# Patient Record
Sex: FEMALE | Race: WHITE | NOT HISPANIC OR LATINO
[De-identification: names, ages, dates, MRNs, and addresses within clinical notes are randomized per-mention and may not be internally consistent; named-entity substitution may affect disease eponyms.]

---

## 2021-08-25 ENCOUNTER — APPOINTMENT (OUTPATIENT)
Dept: CARDIOLOGY | Facility: CLINIC | Age: 86
End: 2021-08-25
Payer: MEDICARE

## 2021-08-25 ENCOUNTER — NON-APPOINTMENT (OUTPATIENT)
Age: 86
End: 2021-08-25

## 2021-08-25 VITALS
BODY MASS INDEX: 21.71 KG/M2 | SYSTOLIC BLOOD PRESSURE: 133 MMHG | HEIGHT: 61 IN | WEIGHT: 115 LBS | HEART RATE: 99 BPM | OXYGEN SATURATION: 98 % | DIASTOLIC BLOOD PRESSURE: 85 MMHG

## 2021-08-25 DIAGNOSIS — E03.9 HYPOTHYROIDISM, UNSPECIFIED: ICD-10-CM

## 2021-08-25 DIAGNOSIS — E78.5 HYPERLIPIDEMIA, UNSPECIFIED: ICD-10-CM

## 2021-08-25 DIAGNOSIS — Z87.891 PERSONAL HISTORY OF NICOTINE DEPENDENCE: ICD-10-CM

## 2021-08-25 PROCEDURE — 93000 ELECTROCARDIOGRAM COMPLETE: CPT

## 2021-08-25 PROCEDURE — 99205 OFFICE O/P NEW HI 60 MIN: CPT

## 2021-08-25 RX ORDER — ASPIRIN 325 MG/1
325 TABLET, FILM COATED ORAL DAILY
Refills: 0 | Status: DISCONTINUED | COMMUNITY
Start: 2021-08-25 | End: 2021-08-25

## 2021-08-26 ENCOUNTER — APPOINTMENT (OUTPATIENT)
Dept: CARDIOLOGY | Facility: CLINIC | Age: 86
End: 2021-08-26
Payer: MEDICARE

## 2021-08-26 PROCEDURE — 93306 TTE W/DOPPLER COMPLETE: CPT

## 2021-08-31 ENCOUNTER — TRANSCRIPTION ENCOUNTER (OUTPATIENT)
Age: 86
End: 2021-08-31

## 2021-10-12 ENCOUNTER — APPOINTMENT (OUTPATIENT)
Dept: CARDIOLOGY | Facility: CLINIC | Age: 86
End: 2021-10-12
Payer: MEDICARE

## 2021-10-12 ENCOUNTER — NON-APPOINTMENT (OUTPATIENT)
Age: 86
End: 2021-10-12

## 2021-10-12 VITALS
BODY MASS INDEX: 21.52 KG/M2 | HEART RATE: 94 BPM | HEIGHT: 61 IN | DIASTOLIC BLOOD PRESSURE: 83 MMHG | WEIGHT: 114 LBS | OXYGEN SATURATION: 97 % | SYSTOLIC BLOOD PRESSURE: 130 MMHG

## 2021-10-12 PROCEDURE — 93000 ELECTROCARDIOGRAM COMPLETE: CPT

## 2021-10-12 PROCEDURE — 99214 OFFICE O/P EST MOD 30 MIN: CPT

## 2021-11-15 ENCOUNTER — RX RENEWAL (OUTPATIENT)
Age: 86
End: 2021-11-15

## 2022-01-11 ENCOUNTER — NON-APPOINTMENT (OUTPATIENT)
Age: 87
End: 2022-01-11

## 2022-01-11 ENCOUNTER — APPOINTMENT (OUTPATIENT)
Dept: CARDIOLOGY | Facility: CLINIC | Age: 87
End: 2022-01-11
Payer: MEDICARE

## 2022-01-11 VITALS
HEIGHT: 61 IN | WEIGHT: 116 LBS | DIASTOLIC BLOOD PRESSURE: 98 MMHG | SYSTOLIC BLOOD PRESSURE: 167 MMHG | BODY MASS INDEX: 21.9 KG/M2 | OXYGEN SATURATION: 99 % | HEART RATE: 79 BPM

## 2022-01-11 VITALS — DIASTOLIC BLOOD PRESSURE: 80 MMHG | SYSTOLIC BLOOD PRESSURE: 150 MMHG

## 2022-01-11 PROCEDURE — 93000 ELECTROCARDIOGRAM COMPLETE: CPT

## 2022-01-11 PROCEDURE — 99214 OFFICE O/P EST MOD 30 MIN: CPT

## 2022-04-01 ENCOUNTER — RX RENEWAL (OUTPATIENT)
Age: 87
End: 2022-04-01

## 2022-04-26 ENCOUNTER — APPOINTMENT (OUTPATIENT)
Dept: CARDIOLOGY | Facility: CLINIC | Age: 87
End: 2022-04-26
Payer: MEDICARE

## 2022-04-26 ENCOUNTER — NON-APPOINTMENT (OUTPATIENT)
Age: 87
End: 2022-04-26

## 2022-04-26 VITALS
WEIGHT: 116 LBS | SYSTOLIC BLOOD PRESSURE: 150 MMHG | HEART RATE: 100 BPM | HEIGHT: 61 IN | OXYGEN SATURATION: 96 % | DIASTOLIC BLOOD PRESSURE: 85 MMHG | BODY MASS INDEX: 21.9 KG/M2

## 2022-04-26 VITALS — DIASTOLIC BLOOD PRESSURE: 60 MMHG | SYSTOLIC BLOOD PRESSURE: 110 MMHG

## 2022-04-26 PROCEDURE — 99214 OFFICE O/P EST MOD 30 MIN: CPT

## 2022-04-26 PROCEDURE — 93000 ELECTROCARDIOGRAM COMPLETE: CPT

## 2022-04-27 LAB
BASOPHILS # BLD AUTO: 0.07 K/UL
BASOPHILS NFR BLD AUTO: 0.8 %
DIGOXIN SERPL-MCNC: 0.9 NG/ML
EOSINOPHIL # BLD AUTO: 0.2 K/UL
EOSINOPHIL NFR BLD AUTO: 2.3 %
HCT VFR BLD CALC: 37.2 %
HGB BLD-MCNC: 11.2 G/DL
IMM GRANULOCYTES NFR BLD AUTO: 0.6 %
LYMPHOCYTES # BLD AUTO: 1.93 K/UL
LYMPHOCYTES NFR BLD AUTO: 21.9 %
MAN DIFF?: NORMAL
MCHC RBC-ENTMCNC: 28.6 PG
MCHC RBC-ENTMCNC: 30.1 GM/DL
MCV RBC AUTO: 94.9 FL
MONOCYTES # BLD AUTO: 0.82 K/UL
MONOCYTES NFR BLD AUTO: 9.3 %
NEUTROPHILS # BLD AUTO: 5.75 K/UL
NEUTROPHILS NFR BLD AUTO: 65.1 %
PLATELET # BLD AUTO: 283 K/UL
RBC # BLD: 3.92 M/UL
RBC # FLD: 14.8 %
WBC # FLD AUTO: 8.82 K/UL

## 2022-08-16 ENCOUNTER — RX RENEWAL (OUTPATIENT)
Age: 87
End: 2022-08-16

## 2022-09-07 ENCOUNTER — NON-APPOINTMENT (OUTPATIENT)
Age: 87
End: 2022-09-07

## 2022-09-07 ENCOUNTER — APPOINTMENT (OUTPATIENT)
Dept: CARDIOLOGY | Facility: CLINIC | Age: 87
End: 2022-09-07

## 2022-09-07 VITALS
DIASTOLIC BLOOD PRESSURE: 95 MMHG | WEIGHT: 116 LBS | HEART RATE: 90 BPM | SYSTOLIC BLOOD PRESSURE: 154 MMHG | OXYGEN SATURATION: 95 % | HEIGHT: 71 IN | BODY MASS INDEX: 16.24 KG/M2

## 2022-09-07 PROCEDURE — 93000 ELECTROCARDIOGRAM COMPLETE: CPT

## 2022-09-07 PROCEDURE — 99214 OFFICE O/P EST MOD 30 MIN: CPT

## 2022-10-26 ENCOUNTER — RX RENEWAL (OUTPATIENT)
Age: 87
End: 2022-10-26

## 2022-12-21 ENCOUNTER — INPATIENT (INPATIENT)
Facility: HOSPITAL | Age: 87
LOS: 1 days | Discharge: HOME | DRG: 309 | End: 2022-12-23
Attending: INTERNAL MEDICINE | Admitting: INTERNAL MEDICINE
Payer: MEDICARE

## 2022-12-21 VITALS
SYSTOLIC BLOOD PRESSURE: 164 MMHG | TEMPERATURE: 97 F | HEART RATE: 137 BPM | HEIGHT: 61 IN | RESPIRATION RATE: 20 BRPM | DIASTOLIC BLOOD PRESSURE: 107 MMHG | WEIGHT: 113.98 LBS | OXYGEN SATURATION: 93 %

## 2022-12-21 DIAGNOSIS — E03.9 HYPOTHYROIDISM, UNSPECIFIED: ICD-10-CM

## 2022-12-21 DIAGNOSIS — R79.89 OTHER SPECIFIED ABNORMAL FINDINGS OF BLOOD CHEMISTRY: ICD-10-CM

## 2022-12-21 DIAGNOSIS — Z29.9 ENCOUNTER FOR PROPHYLACTIC MEASURES, UNSPECIFIED: ICD-10-CM

## 2022-12-21 DIAGNOSIS — I48.91 UNSPECIFIED ATRIAL FIBRILLATION: ICD-10-CM

## 2022-12-21 DIAGNOSIS — E11.9 TYPE 2 DIABETES MELLITUS WITHOUT COMPLICATIONS: ICD-10-CM

## 2022-12-21 DIAGNOSIS — Z90.49 ACQUIRED ABSENCE OF OTHER SPECIFIED PARTS OF DIGESTIVE TRACT: Chronic | ICD-10-CM

## 2022-12-21 DIAGNOSIS — Z98.890 OTHER SPECIFIED POSTPROCEDURAL STATES: Chronic | ICD-10-CM

## 2022-12-21 DIAGNOSIS — G30.9 ALZHEIMER'S DISEASE, UNSPECIFIED: ICD-10-CM

## 2022-12-21 LAB
ALBUMIN SERPL ELPH-MCNC: 2.9 G/DL — LOW (ref 3.3–5)
ALP SERPL-CCNC: 59 U/L — SIGNIFICANT CHANGE UP (ref 40–120)
ALT FLD-CCNC: 56 U/L — SIGNIFICANT CHANGE UP (ref 12–78)
ANION GAP SERPL CALC-SCNC: 8 MMOL/L — SIGNIFICANT CHANGE UP (ref 5–17)
AST SERPL-CCNC: 57 U/L — HIGH (ref 15–37)
BASOPHILS # BLD AUTO: 0.05 K/UL — SIGNIFICANT CHANGE UP (ref 0–0.2)
BASOPHILS NFR BLD AUTO: 0.6 % — SIGNIFICANT CHANGE UP (ref 0–2)
BILIRUB SERPL-MCNC: 1.1 MG/DL — SIGNIFICANT CHANGE UP (ref 0.2–1.2)
BUN SERPL-MCNC: 29 MG/DL — HIGH (ref 7–23)
CALCIUM SERPL-MCNC: 9.3 MG/DL — SIGNIFICANT CHANGE UP (ref 8.5–10.1)
CHLORIDE SERPL-SCNC: 108 MMOL/L — SIGNIFICANT CHANGE UP (ref 96–108)
CK MB BLD-MCNC: 4.7 % — HIGH (ref 0–3.5)
CK MB BLD-MCNC: 6 % — HIGH (ref 0–3.5)
CK MB BLD-MCNC: 7.1 % — HIGH (ref 0–3.5)
CK MB CFR SERPL CALC: 2.8 NG/ML — SIGNIFICANT CHANGE UP (ref 0–3.6)
CK MB CFR SERPL CALC: 3.4 NG/ML — SIGNIFICANT CHANGE UP (ref 0–3.6)
CK MB CFR SERPL CALC: 3.9 NG/ML — HIGH (ref 0–3.6)
CK SERPL-CCNC: 47 U/L — SIGNIFICANT CHANGE UP (ref 26–192)
CK SERPL-CCNC: 48 U/L — SIGNIFICANT CHANGE UP (ref 26–192)
CK SERPL-CCNC: 83 U/L — SIGNIFICANT CHANGE UP (ref 26–192)
CO2 SERPL-SCNC: 22 MMOL/L — SIGNIFICANT CHANGE UP (ref 22–31)
CREAT SERPL-MCNC: 1.6 MG/DL — HIGH (ref 0.5–1.3)
DIGOXIN SERPL-MCNC: 0.8 NG/ML — SIGNIFICANT CHANGE UP (ref 0.8–2)
EGFR: 31 ML/MIN/1.73M2 — LOW
EOSINOPHIL # BLD AUTO: 0.08 K/UL — SIGNIFICANT CHANGE UP (ref 0–0.5)
EOSINOPHIL NFR BLD AUTO: 0.9 % — SIGNIFICANT CHANGE UP (ref 0–6)
GLUCOSE SERPL-MCNC: 265 MG/DL — HIGH (ref 70–99)
HCT VFR BLD CALC: 38.3 % — SIGNIFICANT CHANGE UP (ref 34.5–45)
HGB BLD-MCNC: 11.8 G/DL — SIGNIFICANT CHANGE UP (ref 11.5–15.5)
IMM GRANULOCYTES NFR BLD AUTO: 0.7 % — SIGNIFICANT CHANGE UP (ref 0–0.9)
LYMPHOCYTES # BLD AUTO: 1.72 K/UL — SIGNIFICANT CHANGE UP (ref 1–3.3)
LYMPHOCYTES # BLD AUTO: 19.5 % — SIGNIFICANT CHANGE UP (ref 13–44)
MCHC RBC-ENTMCNC: 27.7 PG — SIGNIFICANT CHANGE UP (ref 27–34)
MCHC RBC-ENTMCNC: 30.8 GM/DL — LOW (ref 32–36)
MCV RBC AUTO: 89.9 FL — SIGNIFICANT CHANGE UP (ref 80–100)
MONOCYTES # BLD AUTO: 0.74 K/UL — SIGNIFICANT CHANGE UP (ref 0–0.9)
MONOCYTES NFR BLD AUTO: 8.4 % — SIGNIFICANT CHANGE UP (ref 2–14)
NEUTROPHILS # BLD AUTO: 6.15 K/UL — SIGNIFICANT CHANGE UP (ref 1.8–7.4)
NEUTROPHILS NFR BLD AUTO: 69.9 % — SIGNIFICANT CHANGE UP (ref 43–77)
NRBC # BLD: 0 /100 WBCS — SIGNIFICANT CHANGE UP (ref 0–0)
NT-PROBNP SERPL-SCNC: HIGH PG/ML (ref 0–450)
PLATELET # BLD AUTO: 285 K/UL — SIGNIFICANT CHANGE UP (ref 150–400)
POTASSIUM SERPL-MCNC: 4.8 MMOL/L — SIGNIFICANT CHANGE UP (ref 3.5–5.3)
POTASSIUM SERPL-SCNC: 4.8 MMOL/L — SIGNIFICANT CHANGE UP (ref 3.5–5.3)
PROT SERPL-MCNC: 8.3 G/DL — SIGNIFICANT CHANGE UP (ref 6–8.3)
RAPID RVP RESULT: SIGNIFICANT CHANGE UP
RBC # BLD: 4.26 M/UL — SIGNIFICANT CHANGE UP (ref 3.8–5.2)
RBC # FLD: 16 % — HIGH (ref 10.3–14.5)
SARS-COV-2 RNA SPEC QL NAA+PROBE: SIGNIFICANT CHANGE UP
SODIUM SERPL-SCNC: 138 MMOL/L — SIGNIFICANT CHANGE UP (ref 135–145)
TROPONIN I, HIGH SENSITIVITY RESULT: 1173.9 NG/L — HIGH
TROPONIN I, HIGH SENSITIVITY RESULT: 866.2 NG/L — HIGH
TROPONIN I, HIGH SENSITIVITY RESULT: 868.7 NG/L — HIGH
WBC # BLD: 8.8 K/UL — SIGNIFICANT CHANGE UP (ref 3.8–10.5)
WBC # FLD AUTO: 8.8 K/UL — SIGNIFICANT CHANGE UP (ref 3.8–10.5)

## 2022-12-21 PROCEDURE — 99223 1ST HOSP IP/OBS HIGH 75: CPT | Mod: GC,AI

## 2022-12-21 PROCEDURE — 93010 ELECTROCARDIOGRAM REPORT: CPT

## 2022-12-21 PROCEDURE — 99223 1ST HOSP IP/OBS HIGH 75: CPT

## 2022-12-21 PROCEDURE — 99291 CRITICAL CARE FIRST HOUR: CPT

## 2022-12-21 PROCEDURE — 71045 X-RAY EXAM CHEST 1 VIEW: CPT | Mod: 26

## 2022-12-21 RX ORDER — APIXABAN 2.5 MG/1
2.5 TABLET, FILM COATED ORAL
Refills: 0 | Status: DISCONTINUED | OUTPATIENT
Start: 2022-12-21 | End: 2022-12-21

## 2022-12-21 RX ORDER — MEMANTINE HYDROCHLORIDE 10 MG/1
10 TABLET ORAL
Refills: 0 | Status: DISCONTINUED | OUTPATIENT
Start: 2022-12-21 | End: 2022-12-23

## 2022-12-21 RX ORDER — LANOLIN ALCOHOL/MO/W.PET/CERES
3 CREAM (GRAM) TOPICAL AT BEDTIME
Refills: 0 | Status: DISCONTINUED | OUTPATIENT
Start: 2022-12-21 | End: 2022-12-23

## 2022-12-21 RX ORDER — DEXTROSE 50 % IN WATER 50 %
25 SYRINGE (ML) INTRAVENOUS ONCE
Refills: 0 | Status: DISCONTINUED | OUTPATIENT
Start: 2022-12-21 | End: 2022-12-23

## 2022-12-21 RX ORDER — GLUCAGON INJECTION, SOLUTION 0.5 MG/.1ML
1 INJECTION, SOLUTION SUBCUTANEOUS ONCE
Refills: 0 | Status: DISCONTINUED | OUTPATIENT
Start: 2022-12-21 | End: 2022-12-23

## 2022-12-21 RX ORDER — RIVASTIGMINE 4.6 MG/24H
1 PATCH, EXTENDED RELEASE TRANSDERMAL EVERY 24 HOURS
Refills: 0 | Status: DISCONTINUED | OUTPATIENT
Start: 2022-12-21 | End: 2022-12-23

## 2022-12-21 RX ORDER — LEVOTHYROXINE SODIUM 125 MCG
75 TABLET ORAL DAILY
Refills: 0 | Status: DISCONTINUED | OUTPATIENT
Start: 2022-12-21 | End: 2022-12-23

## 2022-12-21 RX ORDER — SODIUM CHLORIDE 9 MG/ML
1000 INJECTION, SOLUTION INTRAVENOUS
Refills: 0 | Status: DISCONTINUED | OUTPATIENT
Start: 2022-12-21 | End: 2022-12-23

## 2022-12-21 RX ORDER — ACETAMINOPHEN 500 MG
650 TABLET ORAL EVERY 6 HOURS
Refills: 0 | Status: DISCONTINUED | OUTPATIENT
Start: 2022-12-21 | End: 2022-12-23

## 2022-12-21 RX ORDER — APIXABAN 2.5 MG/1
2.5 TABLET, FILM COATED ORAL ONCE
Refills: 0 | Status: COMPLETED | OUTPATIENT
Start: 2022-12-21 | End: 2022-12-21

## 2022-12-21 RX ORDER — INSULIN LISPRO 100/ML
VIAL (ML) SUBCUTANEOUS
Refills: 0 | Status: DISCONTINUED | OUTPATIENT
Start: 2022-12-21 | End: 2022-12-23

## 2022-12-21 RX ORDER — DILTIAZEM HCL 120 MG
10 CAPSULE, EXT RELEASE 24 HR ORAL ONCE
Refills: 0 | Status: COMPLETED | OUTPATIENT
Start: 2022-12-21 | End: 2022-12-21

## 2022-12-21 RX ORDER — DEXTROSE 50 % IN WATER 50 %
12.5 SYRINGE (ML) INTRAVENOUS ONCE
Refills: 0 | Status: DISCONTINUED | OUTPATIENT
Start: 2022-12-21 | End: 2022-12-23

## 2022-12-21 RX ORDER — ONDANSETRON 8 MG/1
4 TABLET, FILM COATED ORAL EVERY 8 HOURS
Refills: 0 | Status: DISCONTINUED | OUTPATIENT
Start: 2022-12-21 | End: 2022-12-23

## 2022-12-21 RX ORDER — INSULIN LISPRO 100/ML
VIAL (ML) SUBCUTANEOUS AT BEDTIME
Refills: 0 | Status: DISCONTINUED | OUTPATIENT
Start: 2022-12-21 | End: 2022-12-23

## 2022-12-21 RX ORDER — DILTIAZEM HCL 120 MG
30 CAPSULE, EXT RELEASE 24 HR ORAL ONCE
Refills: 0 | Status: COMPLETED | OUTPATIENT
Start: 2022-12-21 | End: 2022-12-21

## 2022-12-21 RX ORDER — METOPROLOL TARTRATE 50 MG
50 TABLET ORAL EVERY 12 HOURS
Refills: 0 | Status: DISCONTINUED | OUTPATIENT
Start: 2022-12-21 | End: 2022-12-23

## 2022-12-21 RX ORDER — DEXTROSE 50 % IN WATER 50 %
15 SYRINGE (ML) INTRAVENOUS ONCE
Refills: 0 | Status: DISCONTINUED | OUTPATIENT
Start: 2022-12-21 | End: 2022-12-23

## 2022-12-21 RX ORDER — FUROSEMIDE 40 MG
20 TABLET ORAL ONCE
Refills: 0 | Status: COMPLETED | OUTPATIENT
Start: 2022-12-21 | End: 2022-12-21

## 2022-12-21 RX ORDER — APIXABAN 2.5 MG/1
2.5 TABLET, FILM COATED ORAL EVERY 12 HOURS
Refills: 0 | Status: DISCONTINUED | OUTPATIENT
Start: 2022-12-22 | End: 2022-12-23

## 2022-12-21 RX ORDER — ATORVASTATIN CALCIUM 80 MG/1
40 TABLET, FILM COATED ORAL AT BEDTIME
Refills: 0 | Status: DISCONTINUED | OUTPATIENT
Start: 2022-12-21 | End: 2022-12-23

## 2022-12-21 RX ADMIN — Medication 50 MILLIGRAM(S): at 18:42

## 2022-12-21 RX ADMIN — Medication 30 MILLIGRAM(S): at 10:56

## 2022-12-21 RX ADMIN — APIXABAN 2.5 MILLIGRAM(S): 2.5 TABLET, FILM COATED ORAL at 21:44

## 2022-12-21 RX ADMIN — ATORVASTATIN CALCIUM 40 MILLIGRAM(S): 80 TABLET, FILM COATED ORAL at 21:44

## 2022-12-21 RX ADMIN — Medication 10 MILLIGRAM(S): at 10:57

## 2022-12-21 RX ADMIN — Medication 20 MILLIGRAM(S): at 17:03

## 2022-12-21 RX ADMIN — RIVASTIGMINE 1 PATCH: 4.6 PATCH, EXTENDED RELEASE TRANSDERMAL at 23:18

## 2022-12-21 NOTE — CONSULT NOTE ADULT - ASSESSMENT
Pt is a 87-year-old female with PMHx of atrial fibrillation ,hypothyroid ,hypothyroidism, alzheimer's presenting to ED with afib w/ RVR that has now resolved and is being managed.   -  Pt is a 87-year-old female with PMHx of atrial fibrillation ,hypothyroid ,hypothyroidism, alzheimer's presenting to ED with SOB 2/2 afib w/ RVR that has now resolved and is being managed for elevated troponin and creatinine.    #Afib w/ RVR   - recc admission to medicine   - monitor on tele  - Give lasix 40mg QD  - change Metroprolol 25mg BID to 50mg BID   - TTE ordered, f/u results     #Elevated troponin  - Troponin 868.7 --> 866.2   - Trend troponin   - CK, CKMB ordered, f/u results    #Elevated Cr   - Cr 1.60, 1.2 11/22  - Hold digoxin   - Monitor digoxin levels       Pt is a 87-year-old female with PMHx of atrial fibrillation on eliquis ,hypothyroid ,hypothyroidism, alzheimer's presenting to ED with SOB 2/2 afib w/ RVR; pending admission if family agreeable.       that has now resolved and is being managed for elevated troponin and creatinine.    #Afib w/ RVR   - initial EKG afib with RVR HR 118bpm  - received 30mg PO cardizem followed by 10mg IV cardizem with improvement of HR to 90s   - patient is on toprol 25mg BID and digoxin 125mcg daily  - given elevated Cr; will hold Digoxin and increase Toprol to 50mg BID  - Cr 1.60 today; Cr 1.2 noted on last outpatient labs 11/22  - f/u TTE, monitor on remote tele     #Elevated BNP  - BNP 17k; CXR with increased pulmonary congestion  - recommend 40mg IV lasix x 1 now   - F/u TTE     #Elevated troponin  - Troponin 868.7 --> 866.2   - Trend cardiac enzymes for 2 more sets  - likely elevated 2/2 demand in the setting of rapid afib  - CK and CKMB added to initial trops; f/u results          Pt is a 87-year-old female with PMHx of atrial fibrillation on eliquis ,hypothyroid ,hypothyroidism, alzheimer's presenting to ED with SOB 2/2 afib w/ RVR; pending admission if family agreeable.       that has now resolved and is being managed for elevated troponin and creatinine.    #Afib w/ RVR   - initial EKG afib with RVR HR 118bpm  - received 30mg PO cardizem followed by 10mg IV cardizem with improvement of HR to 90s   - patient is on toprol 25mg BID and digoxin 125mcg daily  - given elevated Cr; will hold Digoxin and increase Toprol to 50mg BID  - Cr 1.60 today; Cr 1.2 noted on last outpatient labs 11/22  - f/u TTE, monitor on remote tele     #Elevated BNP  - BNP 17k; CXR with increased pulmonary congestion  - recommend 20mg IV lasix x 1 now   - F/u TTE     #Elevated troponin  - Troponin 868.7 --> 866.2   - Trend cardiac enzymes for 2 more sets  - likely elevated 2/2 demand in the setting of rapid afib  - CK and CKMB added to initial trops; f/u results          87-year-old female with PMHx of atrial fibrillation on eliquis ,hypothyroid ,hypothyroidism, alzheimer's presenting to ED with SOB 2/2 afib w/ RVR    #Afib w/ RVR   - initial EKG afib with RVR HR 118bpm with tele up to 130s  - received 30mg PO cardizem followed by 10mg IV cardizem with improvement of HR to 90s   - patient is on toprol 25mg BID and digoxin 125mcg daily  - given elevated Cr; will hold Digoxin and increase Toprol to 50mg BID  - Cr 1.60 today; Cr 1.2 noted on last outpatient labs 11/22  - f/u TTE, monitor on tele   - cont eliquis for AC     #Elevated BNP  - BNP 17k; CXR with increased pulmonary congestion  - recommend lasix 40mg IV x 1  - F/u TTE     #Elevated troponin  - echo from office with inferior hypokinesis with normal overall EF. decision was made to be conservative. no ischemic testing was performed.   - Troponin 868.7 --> 866.2   - CK and CKMB added to initial trops; f/u results   - Trend full set cardiac enzymes    - likely elevated 2/2 demand in the setting of rapid afib    - Further cardiac workup will depend on clinical course.   - All other workup per primary team. Will followup.

## 2022-12-21 NOTE — H&P ADULT - PROBLEM SELECTOR PLAN 2
Patient found to have elevated BNP level, 77650  - CXR: curly B lines, consistent with thickening of the interlobular septa, no pneumothorax, no pleural effusions, no evidence of pneumonia  - Asymptomatic, denies SOB at this time. Mild edema of LE L>R.   - Given IV Lasix 20 mg x1 in ED  - Lasix 40 mg IVP x1 per cardiology  - Supplemental O2 if needed to maintain O2 sat >90%   - F/u TTE  - Strict I/Os, daily weights  - Monitor and replace electrolytes  - Cardiology consulted, f/u recs Patient found to have elevated BNP level, 11939  - CXR: curly B lines, consistent with thickening of the interlobular septa, no pneumothorax, no pleural effusions, no evidence of pneumonia  - Asymptomatic, denies SOB at this time.  Mild edema of LE L>R, no signs/symptoms of overload  - Per cardio, recommended IV Lasix 40mg x1  - Given IV Lasix 20 mg x1 in ED  - Hold off on diuretics for now  - Supplemental O2 if needed to maintain O2 sat >90%   - F/u TTE  - Monitor and replace electrolytes  - Cardiology consulted, f/u recs - Patient found to have elevated pro-BNP level, 18372  - suspect pt in mild acute on chronic systolic CHF due to afib w/ RVR  - CXR: curly B lines, consistent with thickening of the interlobular septa, no pneumothorax, no pleural effusions, no evidence of pneumonia  - denies SOB at this time at rest.  Trace edema of LEs, no significant evidence of total body fluid overload; pulm edema likely mediated by afib w/ RVR  - Given IV Lasix 20 mg x1 in ED this evening and pt feeling well, lying down comfortably on RA at 30 degrees without dyspnea; will hold off further lasix for now given mild YVETTE and reassess in AM  - F/u TTE  - Monitor and replace electrolytes  - Cardiology Dr. Carl following, recs appreciated

## 2022-12-21 NOTE — H&P ADULT - NSHPLABSRESULTS_GEN_ALL_CORE
CXR (personally reviewed): curly B lines, consistent with thickening of the interlobular septa, no pneumothorax, no pleural effusions, no evidence of pneumonia  EKG (personally reviewed): Afib with RVR, at 118bpm                          11.8   8.80  )-----------( 285      ( 21 Dec 2022 10:20 )             38.3     CBC Full  -  ( 21 Dec 2022 10:20 )  WBC Count : 8.80 K/uL  Hemoglobin : 11.8 g/dL  Hematocrit : 38.3 %  Platelet Count - Automated : 285 K/uL  Mean Cell Volume : 89.9 fl  Mean Cell Hemoglobin : 27.7 pg  Mean Cell Hemoglobin Concentration : 30.8 gm/dL  Auto Neutrophil # : 6.15 K/uL  Auto Lymphocyte # : 1.72 K/uL  Auto Monocyte # : 0.74 K/uL  Auto Eosinophil # : 0.08 K/uL  Auto Basophil # : 0.05 K/uL  Auto Neutrophil % : 69.9 %  Auto Lymphocyte % : 19.5 %  Auto Monocyte % : 8.4 %  Auto Eosinophil % : 0.9 %  Auto Basophil % : 0.6 %    12-21    138  |  108  |  29<H>  ----------------------------<  265<H>  4.8   |  22  |  1.60<H>    Ca    9.3      21 Dec 2022 10:20    TPro  8.3  /  Alb  2.9<L>  /  TBili  1.1  /  DBili  x   /  AST  57<H>  /  ALT  56  /  AlkPhos  59  12-21

## 2022-12-21 NOTE — H&P ADULT - PROBLEM SELECTOR PLAN 3
Chronic, known history of T2DM. Per daughter, DM is controlled on metformin  - Elevated  on admission  - Hold home meds  - Low dose insulin corrective scale  - Hypoglycemia protocol, fingerstick glucose QAC&HS   - Consistent carb/DASH diet  - F/u AM HbA1c

## 2022-12-21 NOTE — H&P ADULT - ASSESSMENT
86yo F with PMH Afib on Eliquis, DM on metformin, hypothyroidism, mild Alzheimer's dementia,  presents to the ED with SOB. Admitted for Afib with RVR.  88yo F with PMH of Afib on Eliquis, DM2 on metformin, hypothyroidism, mild Alzheimer's dementia, presents to the ED with SOB/MORTON, palpitations, admitted with Afib with RVR with pulmonary edema.

## 2022-12-21 NOTE — H&P ADULT - NSICDXPASTMEDICALHX_GEN_ALL_CORE_FT
PAST MEDICAL HISTORY:  Atrial fibrillation     Diabetes mellitus     Hypothyroidism     Mild Alzheimer's dementia

## 2022-12-21 NOTE — ED PROVIDER NOTE - PROGRESS NOTE DETAILS
HR about 75 and pt feels better. All results were explained to patient and/or family and a copy of all available results given.  As per Siddhartha, needs admission.  case verona Burns

## 2022-12-21 NOTE — CONSULT NOTE ADULT - ATTENDING COMMENTS
Pt presents with rapid AF. CXR appears congested and BNP is elevated. Would give lasix 40mg IV x 1. Please continue to maintain strict I/Os, monitor daily weights, Cr, and K.   AF better controlled post cardizem. stop dig in setting of rick. inc toprol to 50mg q12.   cont ac  tele  echo. previously conservative management was agreed upon given known inferior hypokinesis. Will need to reassess ischemic eval depending on clinical coarse. Trend full set of Arnold.   Monitor and replete electrolytes. Keep K>4.0 and Mg>2.0.  Further cardiac workup will depend on clinical course.

## 2022-12-21 NOTE — ED ADULT TRIAGE NOTE - CHIEF COMPLAINT QUOTE
patient to ED A&Ox4 hx of alzheimers as per daughter. pt with c/o SOB since this AM. hx COPD HR in 130-140ss hx of afib. o2 sat 93-94% on RA

## 2022-12-21 NOTE — H&P ADULT - NSICDXPASTSURGICALHX_GEN_ALL_CORE_FT
PAST SURGICAL HISTORY:  H/O hernia repair     History of appendectomy     History of cholecystectomy

## 2022-12-21 NOTE — ED ADULT NURSE NOTE - OBJECTIVE STATEMENT
patient complaining of SOB. daughter at bedside assisting with history. patient A&OX3. no complaints of pain at this time. patient stated that this morning she had some tenderness over L breast. no respiratory distress noted.

## 2022-12-21 NOTE — ED PROVIDER NOTE - OBJECTIVE STATEMENT
87-year-old female with history of atrial fibrillation ,hypothyroid ,hypothyroidism , mild dementia presents with sob, especially with exertion for last 2 days.  No chest pain no fever no chills no nausea no vomiting no diarrhea no other complaints.

## 2022-12-21 NOTE — H&P ADULT - PROBLEM SELECTOR PLAN 4
Chronic, history of hypothyroidism  On Synthroid  75 mcg at home  Continue Synthroid 75 mcg Chronic, history of hypothyroidism  On Synthroid  75 mcg at home  Continue Synthroid 75 mcg  check TFTs

## 2022-12-21 NOTE — H&P ADULT - NSHPSOCIALHISTORY_GEN_ALL_CORE
Living Situation: lives with daughter's family (daughter, son-in-law, 2 grandchildren  ADLs/Mobility: independently  Occupation: retired  Tobacco Use: denies  Alcohol Use: denies  Drug Use: denies  Vaccination: COVID x3 Pfizer, flu shot

## 2022-12-21 NOTE — H&P ADULT - NSHPPHYSICALEXAM_GEN_ALL_CORE
T(C): 36.1 (12-21-22 @ 10:10), Max: 36.1 (12-21-22 @ 10:10)  HR: 90 (12-21-22 @ 11:03) (90 - 137)  BP: 140/67 (12-21-22 @ 11:03) (140/67 - 164/107)  RR: 18 (12-21-22 @ 11:03) (18 - 20)  SpO2: 93% (12-21-22 @ 11:03) (93% - 93%)  Wt(kg): --    Physical Exam:   GENERAL: elderly female, NAD, lying in bed comfortably, pleasant  HEENT: head NC/AT; conjunctiva & sclera clear; hearing grossly intact, moist mucous membranes  NECK: supple  RESPIRATORY: +crackles B/L bases, no wheezing, rales, rhonchi  CARDIOVASCULAR: S1&S2, tachycardic, irregular rhythm, no murmurs or gallops  ABDOMEN: soft, non-tender, non-distended, + Bowel sounds x4 quadrants, no guarding, rebound or rigidity  MUSCULOSKELETAL:  +mild non-pitting edema of b/l LE L>R. No clubbing, cyanosis of all 4 extremities  LYMPH: no cervical lymphadenopathy  VASCULAR: Radial pulses 2+ bilaterally, no varicose veins   SKIN: warm and dry, color normal  NEUROLOGIC: AA&O X3, no focal deficits, no sensory loss  Psych: Normal mood and affect, normal behavior T(C): 36.1 (12-21-22 @ 10:10), Max: 36.1 (12-21-22 @ 10:10)  HR: 90 (12-21-22 @ 11:03) (90 - 137)  BP: 140/67 (12-21-22 @ 11:03) (140/67 - 164/107)  RR: 18 (12-21-22 @ 11:03) (18 - 20)  SpO2: 93% (12-21-22 @ 11:03) (93% - 93%)  Wt(kg): --    Physical Exam:   GENERAL: elderly female, NAD, lying in bed comfortably, pleasant  HEENT: head NC/AT; conjunctiva & sclera clear; hearing grossly intact, moist mucous membranes  NECK: supple  RESPIRATORY: +crackles in right lower lung field, no wheezing, rales, rhonchi  CARDIOVASCULAR: S1, S2, tachycardic, irregular rhythm at 92bpm  ABDOMEN: soft, non-tender, non-distended, + Bowel sounds x4 quadrants, no guarding, rebound or rigidity  MUSCULOSKELETAL: trace edema of LEs L>R. No clubbing, cyanosis of all 4 extremities  LYMPH: no cervical lymphadenopathy  SKIN: warm and dry, color normal  NEUROLOGIC: AA&O X3, no focal deficits, no sensory loss  Psych: Normal mood and affect, normal behavior

## 2022-12-21 NOTE — H&P ADULT - NSHPREVIEWOFSYSTEMS_GEN_ALL_CORE
CONSTITUTIONAL: denies fever, chills, fatigue, weakness  HEENT: denies blurred vision, sore throat  SKIN: denies new lesions, rash  CARDIOVASCULAR: +palpitations, denies chest pain, chest pressure  RESPIRATORY: + improving shortness of breath, No sputum production  GASTROINTESTINAL: denies nausea, vomiting, diarrhea, abdominal pain  GENITOURINARY: denies dysuria, discharge  NEUROLOGICAL: denies numbness, headache, focal weakness  MUSCULOSKELETAL: denies new joint pain, muscle aches  HEMATOLOGIC: denies gross bleeding, bruising  LYMPHATICS: denies enlarged lymph nodes, extremity swelling  PSYCHIATRIC: denies recent changes in anxiety, depression  ENDOCRINOLOGIC: denies sweating, cold or heat intolerance CONSTITUTIONAL: denies fever, chills, fatigue, weakness  HEENT: denies blurred vision, sore throat  SKIN: denies new lesions, rash  CARDIOVASCULAR: +palpitations, denies chest pain, chest pressure  RESPIRATORY: + improving shortness of breath, +MORTON, No sputum production  GASTROINTESTINAL: denies nausea, vomiting, diarrhea, abdominal pain  GENITOURINARY: denies dysuria, discharge  NEUROLOGICAL: denies numbness, headache, focal weakness  MUSCULOSKELETAL: denies new joint pain, muscle aches  HEMATOLOGIC: denies gross bleeding, bruising  LYMPHATICS: denies enlarged lymph nodes, extremity swelling  PSYCHIATRIC: denies recent changes in anxiety, depression  ENDOCRINOLOGIC: denies sweating, cold or heat intolerance

## 2022-12-21 NOTE — H&P ADULT - NSICDXFAMILYHX_GEN_ALL_CORE_FT
FAMILY HISTORY:  Father  Still living? No  FH: renal failure, Age at diagnosis: Age Unknown    Mother  Still living? No  Family history of cerebral hemorrhage, Age at diagnosis: Age Unknown

## 2022-12-21 NOTE — H&P ADULT - TIME BILLING
Pt seen + examined. Case discussed with resident. Agree with assessment and plan above (edited by me in detail):  Time spent: 75min. More than 50% of the visit was spent counseling the patient and pt's daughter on medical condition -- afib w/ RVR, pulmonary edema, rate control, diuresis.     88yo F with PMH of Afib on Eliquis, DM2 on metformin, hypothyroidism, mild Alzheimer's dementia, presents to the ED with SOB/MORTON, palpitations, admitted with Afib with RVR with pulmonary edema.    - Patient  with known history of Afib presents with SOB likely 2/2 pulmonary edema from Afib RVR up to 140s   - Admit to telemetry  - Received cardizem PO 30mg x1, cardizem IVP 10x1, furosemide 20mg IVP x1  - HR improved 90s-100s  - Takes metoprolol 25 BID and digoxin 125 mcg at home  - Hold digoxin per cardio recs due to YVETTE  - Increase metoprolol succinate to 50mg po BID  - Serial troponin x3 868.7>866.2>1173.9, likely elevated due to demand ischemia in setting of afib with RVR ; CK normal 83->47->48 -- ruled out ACS  - CKs flat, CKMB downtrending 3.9 > 2.8  - Cr 1.60 today; Cr 1.2 noted on last outpatient labs 11/2022  - F/u TTE to evaluation function and to r/o thrombus  - F/u TFTs, lipid panel, A1c, Mg, Phos  - Monitor lytes and replete as needed  - Continuous cardiac monitoring on tele  - Cardiology Dr. Carl following, recs appreciated    - Patient found to have elevated pro-BNP level, 30876  - suspect pt in mild acute on chronic systolic CHF due to afib w/ RVR  - CXR: curly B lines, consistent with thickening of the interlobular septa, no pneumothorax, no pleural effusions, no evidence of pneumonia  - denies SOB at this time at rest.  Trace edema of LEs, no significant evidence of total body fluid overload; pulm edema likely mediated by afib w/ RVR  - Given IV Lasix 20 mg x1 in ED this evening and pt feeling well, lying down comfortably on RA at 30 degrees without dyspnea; will hold off further lasix for now given mild YVETTE and reassess in AM  - F/u TTE  - Monitor and replace electrolytes  - Cardiology Dr. Carl following, recs appreciated Pt seen + examined. Case discussed with resident. Agree with assessment and plan above (edited by me in detail):  Time spent: 75min. More than 50% of the visit was spent counseling the patient and pt's daughter on medical condition -- afib w/ RVR, pulmonary edema, rate control, diuresis.     86yo F with PMH of Afib on Eliquis, DM2 on metformin, hypothyroidism, mild Alzheimer's dementia, presents to the ED with SOB/MORTON, palpitations, admitted with Afib with RVR with pulmonary edema.    - Patient  with known history of Afib presents with SOB likely 2/2 pulmonary edema from Afib RVR up to 140s   - Admit to telemetry  - Received cardizem PO 30mg x1, cardizem IVP 10x1, furosemide 20mg IVP x1  - HR improved 90s-100s  - Takes metoprolol 25 BID and digoxin 125 mcg at home  - Hold digoxin per cardio recs due to YVETTE  - Increase metoprolol succinate to 50mg po BID  - Serial troponin x3 868.7>866.2>1173.9, likely elevated due to demand ischemia in setting of afib with RVR ; CK normal 83->47->48 -- ruled out ACS  - CKs flat, CKMB downtrending 3.9 > 2.8  - Cr 1.60 today; Cr 1.2 noted on last outpatient labs 11/2022  - F/u TTE to evaluation function and to r/o thrombus  - F/u TFTs, lipid panel, A1c, Mg, Phos  - Monitor lytes and replete as needed  - Continuous cardiac monitoring on tele  - Cardiology Dr. Carl following, recs appreciated    - Patient found to have elevated pro-BNP level, 29895  - suspect pt in mild acute on chronic systolic CHF due to afib w/ RVR  - CXR: curly B lines, consistent with thickening of the interlobular septa, no pneumothorax, no pleural effusions, no evidence of pneumonia  - denies SOB at this time at rest.  Trace edema of LEs, no significant evidence of total body fluid overload; pulm edema likely mediated by afib w/ RVR  - Given IV Lasix 20 mg x1 in ED this evening and pt feeling well, lying down comfortably on RA at 30 degrees without dyspnea; will hold off further lasix for now given mild YVETTE and reassess in AM  - F/u TTE  - Monitor and replace electrolytes  - Cardiology Dr. Carl following, recs appreciated    Notified PMD, Dr. Shukla of the admission.

## 2022-12-21 NOTE — H&P ADULT - PROBLEM SELECTOR PLAN 5
Patient has history of mild Alzheimer's dementia, follows with outpatient neurology  - Takes home medications: memantine and rivastigmine patch  - Continue memantine and rivastigmine patch

## 2022-12-21 NOTE — H&P ADULT - HISTORY OF PRESENT ILLNESS
88yo F with PMH Afib on Eliquis, DM on metformin, hypothyroidism, mild Alzheimer's dementia,  presents to the ED with SOB since 3:00. History was taken from the patient with some assistance from daughter at bedside. Patient reports that she was not feeling well at 3am this morning, described that she felt winded, had palpitations, and felt bloated. Patient used the bathroom and then returned to sleep with an additional pillow. Patient reports that she felt SOB with lying flat and the additional pillow made her feel better. Patient recalls feeling winded in the last couple of days, especially when using the stairs. Patient's bedroom is on ground floor but she does use the stairs to do laundry in the basement. Patient had breakfast and tolerated her meal. Patient was taken to her neurologist Dr. Brannon for her appointment for managing her Alzheimer's disease. Patient mentioned that she was feeling winded and Dr. Brannon called patient's PCP Dr. Shukla who told patient to go to [facility] ED. Patient reports that her SOB has since resolved.     Denies fever, chills, chest pain, palpitations, cough, abdominal pain, nausea, vomiting, diarrhea, constipation, urinary frequency, urgency, or dysuria, headaches, changes in vision, dizziness, numbness, tingling.    ED Course:   Vitals: BP: 164/107, HR:137, Temp: 97F, RR: 20, SpO2: 93% on RA  Labs: Troponin 868.7>866.2, BUN/Cr 29/1.6, Gluc 265, BNP 81664  CXR: curly B lines, consistent with thickening of the interlobular septa, no pneumothorax, no pleural effusions, no evidence of pneumonia  EKG: Afib with RVR, rate 118  Received in the ED: cardizem PO 30mg x1, cardizem IVP 10x1, furosemide 20mg IVP x1     86yo F with PMH Afib on Eliquis, DM2 on metformin, hypothyroidism, mild Alzheimer's dementia, presents to the ED with SOB since 3:00. History was taken from the patient with some assistance from daughter at bedside. Patient reports that she was not feeling well at 3am this morning, described that she felt winded, had palpitations, and felt bloated. Patient used the bathroom and then returned to sleep with an additional pillow. Patient reports that she felt SOB with lying flat and the additional pillow made her feel better. Patient recalls feeling winded in the last couple of days, especially when using the stairs. Patient's bedroom is on ground floor but she does use the stairs to do laundry in the basement. Patient had breakfast and tolerated her meal. Patient was taken to her neurologist Dr. Brannon for her appointment for managing her Alzheimer's disease. Patient mentioned that she was feeling winded and Dr. Brannon called patient's PCP Dr. Shukla who told patient to go to [facility] ED. Patient reports that her SOB has since resolved in the ED with treatment.     Denies fever, chills, chest pain, palpitations, cough, abdominal pain, nausea, vomiting, diarrhea, constipation, urinary frequency, urgency, or dysuria, headaches, changes in vision, dizziness, numbness, tingling.    ED Course:   Vitals: BP: 164/107, HR:137, Temp: 97F, RR: 20, SpO2: 93% on RA  Labs: Troponin 868.7>866.2, BUN/Cr 29/1.6, Gluc 265, BNP 40175  CXR (personally reviewed): curly B lines, consistent with thickening of the interlobular septa, no pneumothorax, no pleural effusions, no evidence of pneumonia  EKG (personally reviewed): Afib with RVR, at 118bpm  Received in the ED: cardizem PO 30mg x1, cardizem IVP 10x1, furosemide 20mg IVP x1

## 2022-12-21 NOTE — CONSULT NOTE ADULT - SUBJECTIVE AND OBJECTIVE BOX
---IN PROGRESS-----    Interval HPI: Patient seen and examined at bedside. No acute events overnight.     HPI: Pt is a 87-year-old female with PMHx of atrial fibrillation ,hypothyroid ,hypothyroidism, alzheimer's presenting to ED with SOB, especially with exertion for last 2 days. Daughter is at beside. As per daughter, she noticed her mother looked "winded" as she was walking up and down the stairs this morning. Daughter reports that when walking with her mother to her neurology appointment this morning she noticed the pts SOB worsened as they were walking into the building. The neurologist told the daughter to bring the mother to the ED. Pt reports that she did not loose consciousness or experience dizziness during the episode. As per daughter, this is the first time this episode has occurred. Pt last saw  3 months ago, next f/u visit is scheduled for 01/03/23. Pt reports L sided chest pain that is worsened upon palpation. As per pt, pain does not radiate. No fever no chills no nausea no vomiting no diarrhea no other complaints.      PAST MEDICAL & SURGICAL HISTORY:  Atrial fibrillation      Hypothyroidism      History of appendectomy      H/O hernia repair          SOCIAL HISTORY: No active tobacco, alcohol or illicit drug use    FAMILY HISTORY:      Home Medications:      MEDICATIONS  (STANDING):    MEDICATIONS  (PRN):      Allergies    No Known Allergies    Intolerances        REVIEW OF SYSTEMS: Negative except as per HPI.    VITAL SIGNS:   Vital Signs Last 24 Hrs  T(C): 36.1 (21 Dec 2022 10:10), Max: 36.1 (21 Dec 2022 10:10)  T(F): 97 (21 Dec 2022 10:10), Max: 97 (21 Dec 2022 10:10)  HR: 90 (21 Dec 2022 11:03) (90 - 137)  BP: 140/67 (21 Dec 2022 11:03) (140/67 - 164/107)  BP(mean): --  RR: 18 (21 Dec 2022 11:03) (18 - 20)  SpO2: 93% (21 Dec 2022 11:03) (93% - 93%)    Parameters below as of 21 Dec 2022 11:03  Patient On (Oxygen Delivery Method): room air        I&O's Summary      PHYSICAL EXAM:  Constitutional: A&OX3, Pt laying comfortably in bed in NAD, well-developed  HEENT NC/AT, moist mucous membranes  Pulmonary: Non-labored, breath sounds are clear bilaterally, no wheezing, rales or rhonchi  Cardiovascular: +S1, S2, RRR, no murmur  Gastrointestinal: Soft, nontender, nondistended, normoactive bowel sounds  Extremities: No peripheral edema   Neurological: Alert  Skin: No obvious rashes  Psych: Mood & affect appropriate. Answered all questions appropriately    LABS: All Labs Reviewed:                        11.8   8.80  )-----------( 285      ( 21 Dec 2022 10:20 )             38.3     21 Dec 2022 10:20    138    |  108    |  29     ----------------------------<  265    4.8     |  22     |  1.60     Ca    9.3        21 Dec 2022 10:20    TPro  8.3    /  Alb  2.9    /  TBili  1.1    /  DBili  x      /  AST  57     /  ALT  56     /  AlkPhos  59     21 Dec 2022 10:20          Blood Culture:   12-21 @ 10:20  Pro Bnp [id number]        EKG:  Atrial fibrillation w. RVR with premature ventricular or aberrantly conducted complexes     RADIOLOGY:    CXR:   Interval HPI: Patient seen and examined at bedside. No acute events overnight.     HPI: Pt is a 87-year-old female with PMHx of atrial fibrillation (EF 55-60%, 2021) ,hypothyroid ,hypothyroidism, alzheimer's presenting to ED with SOB, especially with exertion for last 2 days. Daughter is at beside. As per daughter, she noticed her mother looked "winded" as she was walking up and down the stairs this morning. Daughter reports that when walking with her mother to her neurology appointment this morning she noticed the pts SOB worsened as they were walking into the building. The neurologist told the daughter to bring the mother to the ED. Pt reports that she did not loose consciousness or experience dizziness during the episode. As per daughter, this is the first time this episode has occurred. Pt last saw  3 months ago, next f/u visit is scheduled for 01/03/23. Pt reports L sided chest pain that is worsened upon palpation. As per pt, pain does not radiate. No fever no chills no nausea no vomiting no diarrhea no other complaints.      PAST MEDICAL & SURGICAL HISTORY:  Atrial fibrillation      Hypothyroidism      History of appendectomy      H/O hernia repair          SOCIAL HISTORY: No active tobacco, alcohol or illicit drug use    FAMILY HISTORY:      Home Medications:      MEDICATIONS  (STANDING):    MEDICATIONS  (PRN):      Allergies    No Known Allergies    Intolerances        REVIEW OF SYSTEMS: Negative except as per HPI.    VITAL SIGNS:   Vital Signs Last 24 Hrs  T(C): 36.1 (21 Dec 2022 10:10), Max: 36.1 (21 Dec 2022 10:10)  T(F): 97 (21 Dec 2022 10:10), Max: 97 (21 Dec 2022 10:10)  HR: 90 (21 Dec 2022 11:03) (90 - 137)  BP: 140/67 (21 Dec 2022 11:03) (140/67 - 164/107)  BP(mean): --  RR: 18 (21 Dec 2022 11:03) (18 - 20)  SpO2: 93% (21 Dec 2022 11:03) (93% - 93%)    Parameters below as of 21 Dec 2022 11:03  Patient On (Oxygen Delivery Method): room air        I&O's Summary      PHYSICAL EXAM:  Constitutional: A&OX3, Pt laying comfortably in bed in NAD, well-developed  HEENT NC/AT, moist mucous membranes  Pulmonary: Non-labored, breath sounds are clear bilaterally, no wheezing, rales or rhonchi  Cardiovascular: +S1, S2, RRR, no murmur  Gastrointestinal: Soft, nontender, nondistended, normoactive bowel sounds  Extremities: No peripheral edema   Neurological: Alert  Skin: No obvious rashes  Psych: Mood & affect appropriate. Answered all questions appropriately    LABS: All Labs Reviewed:                        11.8   8.80  )-----------( 285      ( 21 Dec 2022 10:20 )             38.3     21 Dec 2022 10:20    138    |  108    |  29     ----------------------------<  265    4.8     |  22     |  1.60     Ca    9.3        21 Dec 2022 10:20    TPro  8.3    /  Alb  2.9    /  TBili  1.1    /  DBili  x      /  AST  57     /  ALT  56     /  AlkPhos  59     21 Dec 2022 10:20          Blood Culture:   12-21 @ 10:20  Pro Bnp [id number]        EKG:  Atrial fibrillation w. RVR with premature ventricular or aberrantly conducted complexes     RADIOLOGY:    CXR:   Interval HPI: Patient seen and examined at bedside. No acute events overnight.     HPI: Pt is a 87-year-old female with PMHx of atrial fibrillation (EF 55-60%, 2021) ,hypothyroid ,hypothyroidism, alzheimer's presenting to ED with SOB, especially with exertion for last 2 days. Daughter is at beside. As per daughter, she noticed her mother looked "winded" as she was walking up and down the stairs this morning. Daughter reports that when walking with her mother to her neurology appointment this morning she noticed the pts SOB worsened as they were walking into the building. The neurologist told the daughter to bring the mother to the ED. Pt reports that she did not loose consciousness or experience dizziness during the episode. As per daughter, this is the first time this episode has occurred. Pt last saw  3 months ago, next f/u visit is scheduled for 01/03/23. Pt reports L sided chest pain that is worsened upon palpation. As per pt, pain does not radiate. No fever no chills no nausea no vomiting no diarrhea no other complaints.      PAST MEDICAL & SURGICAL HISTORY:  Atrial fibrillation      Hypothyroidism      History of appendectomy      H/O hernia repair          SOCIAL HISTORY: No active tobacco, alcohol or illicit drug use    FAMILY HISTORY: no scd or early CAD         Allergies  No Known Allergies    REVIEW OF SYSTEMS: Negative except as per HPI.    VITAL SIGNS:   Vital Signs Last 24 Hrs  T(C): 36.1 (21 Dec 2022 10:10), Max: 36.1 (21 Dec 2022 10:10)  T(F): 97 (21 Dec 2022 10:10), Max: 97 (21 Dec 2022 10:10)  HR: 90 (21 Dec 2022 11:03) (90 - 137)  BP: 140/67 (21 Dec 2022 11:03) (140/67 - 164/107)  BP(mean): --  RR: 18 (21 Dec 2022 11:03) (18 - 20)  SpO2: 93% (21 Dec 2022 11:03) (93% - 93%)    Parameters below as of 21 Dec 2022 11:03  Patient On (Oxygen Delivery Method): room air        I&O's Summary      PHYSICAL EXAM:  Constitutional: A&OX3, Pt laying comfortably in bed in NAD, well-developed  HEENT NC/AT, moist mucous membranes  Pulmonary: Non-labored, breath sounds are clear bilaterally, no wheezing, rales or rhonchi  Cardiovascular: +S1, S2, RRR, no murmur  Gastrointestinal: Soft, nontender, nondistended, normoactive bowel sounds  Extremities: No peripheral edema   Neurological: Alert  Skin: No obvious rashes  Psych: Mood & affect appropriate. Answered all questions appropriately    LABS: All Labs Reviewed:                        11.8   8.80  )-----------( 285      ( 21 Dec 2022 10:20 )             38.3     21 Dec 2022 10:20    138    |  108    |  29     ----------------------------<  265    4.8     |  22     |  1.60     Ca    9.3        21 Dec 2022 10:20    TPro  8.3    /  Alb  2.9    /  TBili  1.1    /  DBili  x      /  AST  57     /  ALT  56     /  AlkPhos  59     21 Dec 2022 10:20          Blood Culture:   12-21 @ 10:20  Pro Bnp [id number]        EKG:  Atrial fibrillation w. RVR with premature ventricular or aberrantly conducted complexes     RADIOLOGY:    CXR:

## 2022-12-22 ENCOUNTER — TRANSCRIPTION ENCOUNTER (OUTPATIENT)
Age: 87
End: 2022-12-22

## 2022-12-22 LAB
A1C WITH ESTIMATED AVERAGE GLUCOSE RESULT: 7.8 % — HIGH (ref 4–5.6)
ALBUMIN SERPL ELPH-MCNC: 2.9 G/DL — LOW (ref 3.3–5)
ALP SERPL-CCNC: 57 U/L — SIGNIFICANT CHANGE UP (ref 40–120)
ALT FLD-CCNC: 48 U/L — SIGNIFICANT CHANGE UP (ref 12–78)
ANION GAP SERPL CALC-SCNC: 5 MMOL/L — SIGNIFICANT CHANGE UP (ref 5–17)
AST SERPL-CCNC: 40 U/L — HIGH (ref 15–37)
BASOPHILS # BLD AUTO: 0.06 K/UL — SIGNIFICANT CHANGE UP (ref 0–0.2)
BASOPHILS NFR BLD AUTO: 0.7 % — SIGNIFICANT CHANGE UP (ref 0–2)
BILIRUB SERPL-MCNC: 1.4 MG/DL — HIGH (ref 0.2–1.2)
BUN SERPL-MCNC: 30 MG/DL — HIGH (ref 7–23)
CALCIUM SERPL-MCNC: 9.7 MG/DL — SIGNIFICANT CHANGE UP (ref 8.5–10.1)
CHLORIDE SERPL-SCNC: 109 MMOL/L — HIGH (ref 96–108)
CHOLEST SERPL-MCNC: 120 MG/DL — SIGNIFICANT CHANGE UP
CK MB BLD-MCNC: 5.2 % — HIGH (ref 0–3.5)
CK MB CFR SERPL CALC: 2.2 NG/ML — SIGNIFICANT CHANGE UP (ref 0–3.6)
CK SERPL-CCNC: 42 U/L — SIGNIFICANT CHANGE UP (ref 26–192)
CO2 SERPL-SCNC: 27 MMOL/L — SIGNIFICANT CHANGE UP (ref 22–31)
CREAT SERPL-MCNC: 1.4 MG/DL — HIGH (ref 0.5–1.3)
EGFR: 37 ML/MIN/1.73M2 — LOW
EOSINOPHIL # BLD AUTO: 0.19 K/UL — SIGNIFICANT CHANGE UP (ref 0–0.5)
EOSINOPHIL NFR BLD AUTO: 2.1 % — SIGNIFICANT CHANGE UP (ref 0–6)
ESTIMATED AVERAGE GLUCOSE: 177 MG/DL — HIGH (ref 68–114)
GLUCOSE SERPL-MCNC: 186 MG/DL — HIGH (ref 70–99)
HCT VFR BLD CALC: 36.2 % — SIGNIFICANT CHANGE UP (ref 34.5–45)
HDLC SERPL-MCNC: 39 MG/DL — LOW
HGB BLD-MCNC: 11.4 G/DL — LOW (ref 11.5–15.5)
IMM GRANULOCYTES NFR BLD AUTO: 0.7 % — SIGNIFICANT CHANGE UP (ref 0–0.9)
LIPID PNL WITH DIRECT LDL SERPL: 37 MG/DL — SIGNIFICANT CHANGE UP
LYMPHOCYTES # BLD AUTO: 2.1 K/UL — SIGNIFICANT CHANGE UP (ref 1–3.3)
LYMPHOCYTES # BLD AUTO: 23.6 % — SIGNIFICANT CHANGE UP (ref 13–44)
MAGNESIUM SERPL-MCNC: 1.8 MG/DL — SIGNIFICANT CHANGE UP (ref 1.6–2.6)
MCHC RBC-ENTMCNC: 28.1 PG — SIGNIFICANT CHANGE UP (ref 27–34)
MCHC RBC-ENTMCNC: 31.5 GM/DL — LOW (ref 32–36)
MCV RBC AUTO: 89.2 FL — SIGNIFICANT CHANGE UP (ref 80–100)
MONOCYTES # BLD AUTO: 0.97 K/UL — HIGH (ref 0–0.9)
MONOCYTES NFR BLD AUTO: 10.9 % — SIGNIFICANT CHANGE UP (ref 2–14)
NEUTROPHILS # BLD AUTO: 5.53 K/UL — SIGNIFICANT CHANGE UP (ref 1.8–7.4)
NEUTROPHILS NFR BLD AUTO: 62 % — SIGNIFICANT CHANGE UP (ref 43–77)
NON HDL CHOLESTEROL: 81 MG/DL — SIGNIFICANT CHANGE UP
NRBC # BLD: 0 /100 WBCS — SIGNIFICANT CHANGE UP (ref 0–0)
PHOSPHATE SERPL-MCNC: 3.9 MG/DL — SIGNIFICANT CHANGE UP (ref 2.5–4.5)
PLATELET # BLD AUTO: 288 K/UL — SIGNIFICANT CHANGE UP (ref 150–400)
POTASSIUM SERPL-MCNC: 5.1 MMOL/L — SIGNIFICANT CHANGE UP (ref 3.5–5.3)
POTASSIUM SERPL-SCNC: 5.1 MMOL/L — SIGNIFICANT CHANGE UP (ref 3.5–5.3)
PROT SERPL-MCNC: 7.9 G/DL — SIGNIFICANT CHANGE UP (ref 6–8.3)
RBC # BLD: 4.06 M/UL — SIGNIFICANT CHANGE UP (ref 3.8–5.2)
RBC # FLD: 16.1 % — HIGH (ref 10.3–14.5)
SODIUM SERPL-SCNC: 141 MMOL/L — SIGNIFICANT CHANGE UP (ref 135–145)
T3 SERPL-MCNC: 79 NG/DL — LOW (ref 80–200)
T4 FREE SERPL-MCNC: 1.5 NG/DL — SIGNIFICANT CHANGE UP (ref 0.9–1.8)
TRIGL SERPL-MCNC: 220 MG/DL — HIGH
TROPONIN I, HIGH SENSITIVITY RESULT: 788.2 NG/L — HIGH
TSH SERPL-MCNC: 2.63 UIU/ML — SIGNIFICANT CHANGE UP (ref 0.36–3.74)
WBC # BLD: 8.91 K/UL — SIGNIFICANT CHANGE UP (ref 3.8–10.5)
WBC # FLD AUTO: 8.91 K/UL — SIGNIFICANT CHANGE UP (ref 3.8–10.5)

## 2022-12-22 PROCEDURE — 99233 SBSQ HOSP IP/OBS HIGH 50: CPT | Mod: GC

## 2022-12-22 PROCEDURE — 99233 SBSQ HOSP IP/OBS HIGH 50: CPT

## 2022-12-22 PROCEDURE — 93306 TTE W/DOPPLER COMPLETE: CPT | Mod: 26

## 2022-12-22 RX ORDER — DILTIAZEM HCL 120 MG
30 CAPSULE, EXT RELEASE 24 HR ORAL EVERY 6 HOURS
Refills: 0 | Status: DISCONTINUED | OUTPATIENT
Start: 2022-12-22 | End: 2022-12-23

## 2022-12-22 RX ADMIN — Medication 1: at 12:31

## 2022-12-22 RX ADMIN — Medication 75 MICROGRAM(S): at 06:39

## 2022-12-22 RX ADMIN — RIVASTIGMINE 1 PATCH: 4.6 PATCH, EXTENDED RELEASE TRANSDERMAL at 10:10

## 2022-12-22 RX ADMIN — APIXABAN 2.5 MILLIGRAM(S): 2.5 TABLET, FILM COATED ORAL at 21:46

## 2022-12-22 RX ADMIN — Medication 50 MILLIGRAM(S): at 18:00

## 2022-12-22 RX ADMIN — APIXABAN 2.5 MILLIGRAM(S): 2.5 TABLET, FILM COATED ORAL at 11:43

## 2022-12-22 RX ADMIN — Medication 30 MILLIGRAM(S): at 17:59

## 2022-12-22 RX ADMIN — RIVASTIGMINE 1 PATCH: 4.6 PATCH, EXTENDED RELEASE TRANSDERMAL at 21:18

## 2022-12-22 RX ADMIN — Medication 30 MILLIGRAM(S): at 21:48

## 2022-12-22 RX ADMIN — ATORVASTATIN CALCIUM 40 MILLIGRAM(S): 80 TABLET, FILM COATED ORAL at 21:47

## 2022-12-22 RX ADMIN — MEMANTINE HYDROCHLORIDE 10 MILLIGRAM(S): 10 TABLET ORAL at 06:39

## 2022-12-22 RX ADMIN — Medication 1: at 09:02

## 2022-12-22 RX ADMIN — Medication 1: at 17:40

## 2022-12-22 RX ADMIN — Medication 50 MILLIGRAM(S): at 06:39

## 2022-12-22 RX ADMIN — RIVASTIGMINE 1 PATCH: 4.6 PATCH, EXTENDED RELEASE TRANSDERMAL at 21:52

## 2022-12-22 RX ADMIN — MEMANTINE HYDROCHLORIDE 10 MILLIGRAM(S): 10 TABLET ORAL at 17:59

## 2022-12-22 RX ADMIN — RIVASTIGMINE 1 PATCH: 4.6 PATCH, EXTENDED RELEASE TRANSDERMAL at 19:49

## 2022-12-22 NOTE — PROGRESS NOTE ADULT - PROBLEM SELECTOR PLAN 1
- Patient  with known history of Afib presents with SOB likely 2/2 pulmonary edema from Afib RVR up to 140s   - HR up to 133 today(12/22/22)  - Continue Telemetry Monitoring  - Metoprolol  Succinate 50mg po BID  - Hold digoxin per cardio recs due to YVETTE  - Serial troponin x3 868.7>866.2>1173.9, likely elevated due to demand ischemia in setting of afib with RVR ; CK normal 83->47->48 -- ruled out ACS  - CKs flat, CKMB downtrending 3.9 > 2.8  - Cr 1.40 today(12/22/22) Cr 1.2 noted on last outpatient labs 11/2022  - F/u TTE as  per cardiology recs  - , HDL 39 (12/22/22) - Patient  with known history of Afib presents with SOB likely 2/2 pulmonary edema from Afib RVR up to 140s   - HR up to 133 today(12/22/22), continue Telemetry Monitoring  - Metoprolol  Succinate 50mg po BID, can increase if HR remains elevated   - Continue Eliquis 2.5 mg BID for AC  - Hold digoxin per cardio recs due to YVETTE  - Serial troponin x3 868.7>866.2>1173.9-->788.2 likely elevated due to demand ischemia in setting of afib with RVR ; CK normal 83->47->48 -- ruled out ACS  - CKs flat, CKMB downtrending 3.9 > 2.8  - Cr 1.40 today(12/22/22) Cr 1.2 noted on last outpatient labs 11/2022  - F/u TTE as  per cardiology recs - Patient  with known history of Afib presents with SOB likely 2/2 pulmonary edema from Afib RVR up to 140s   - HR up to 133 today(12/22/22), continue Telemetry Monitoring  - Metoprolol  Succinate 50mg po BID, can increase if HR remains elevated   - Started on Cardizem 30 mg q6 hours today   - Continue Eliquis 2.5 mg BID for AC  - Hold digoxin per cardio recs due to YVETTE  - Serial troponin x3 868.7>866.2>1173.9-->788.2 likely elevated due to demand ischemia in setting of afib with RVR ; CK normal 83->47->48 -- ruled out ACS  - CKs flat, CKMB downtrending 3.9 > 2.8  - Cr 1.40 today(12/22/22) Cr 1.2 noted on last outpatient labs 11/2022  - F/u TTE as  per cardiology recs

## 2022-12-22 NOTE — PROGRESS NOTE ADULT - ASSESSMENT
86yo F with PMH of Afib on Eliquis, DM2 on metformin, hypothyroidism, mild Alzheimer's dementia, presents to the ED with SOB/MORTON, palpitations, admitted with Afib with RVR with pulmonary edema.  88yo F with PMH of Afib on Eliquis, DM2 on metformin, hypothyroidism, mild Alzheimer's dementia, presents to the ED with SOB/MORTON, palpitations, admitted with Afib with RVR with pulmonary edema.

## 2022-12-22 NOTE — DISCHARGE NOTE PROVIDER - HOSPITAL COURSE
FROM ADMISSION H+P:   HPI:  86yo F with PMH Afib on Eliquis, DM2 on metformin, hypothyroidism, mild Alzheimer's dementia, presents to the ED with SOB since 3:00. History was taken from the patient with some assistance from daughter at bedside. Patient reports that she was not feeling well at 3am this morning, described that she felt winded, had palpitations, and felt bloated. Patient used the bathroom and then returned to sleep with an additional pillow. Patient reports that she felt SOB with lying flat and the additional pillow made her feel better. Patient recalls feeling winded in the last couple of days, especially when using the stairs. Patient's bedroom is on ground floor but she does use the stairs to do laundry in the basement. Patient had breakfast and tolerated her meal. Patient was taken to her neurologist Dr. Brannon for her appointment for managing her Alzheimer's disease. Patient mentioned that she was feeling winded and Dr. Brannon called patient's PCP Dr. Shukla who told patient to go to [facility] ED. Patient reports that her SOB has since resolved in the ED with treatment.     Denies fever, chills, chest pain, palpitations, cough, abdominal pain, nausea, vomiting, diarrhea, constipation, urinary frequency, urgency, or dysuria, headaches, changes in vision, dizziness, numbness, tingling.    ED Course:   Vitals: BP: 164/107, HR:137, Temp: 97F, RR: 20, SpO2: 93% on RA  Labs: Troponin 868.7>866.2, BUN/Cr 29/1.6, Gluc 265, BNP 67653  CXR (personally reviewed): curly B lines, consistent with thickening of the interlobular septa, no pneumothorax, no pleural effusions, no evidence of pneumonia  EKG (personally reviewed): Afib with RVR, at 118bpm  Received in the ED: cardizem PO 30mg x1, cardizem IVP 10x1, furosemide 20mg IVP x1     (21 Dec 2022 17:55)      ---  HOSPITAL COURSE: Patient presented to the hospital with shortness of breath and was admitted for afib with RVR and volume overload. Her metoprolol was increased from 25mg BID to 50mg BID. TTE showed *******.     ---  CONSULTANTS:   Cardiology- Dr. Harvey     ---  TIME SPENT:  I, the attending physician, was physically present for the key portions of the evaluation and management (E/M) service provided. The total amount of time spent reviewing the hospital notes, laboratory values, imaging findings, assessing/counseling the patient, discussing with consultant physicians, social work, nursing staff was -- minutes    ---  Primary care provider was made aware of plan for discharge:      [  ] NO     [  ] YES   FROM ADMISSION H+P:   HPI:  86yo F with PMH Afib on Eliquis, DM2 on metformin, hypothyroidism, mild Alzheimer's dementia, presents to the ED with SOB since 3:00. History was taken from the patient with some assistance from daughter at bedside. Patient reports that she was not feeling well at 3am this morning, described that she felt winded, had palpitations, and felt bloated. Patient used the bathroom and then returned to sleep with an additional pillow. Patient reports that she felt SOB with lying flat and the additional pillow made her feel better. Patient recalls feeling winded in the last couple of days, especially when using the stairs. Patient's bedroom is on ground floor but she does use the stairs to do laundry in the basement. Patient had breakfast and tolerated her meal. Patient was taken to her neurologist Dr. Brannon for her appointment for managing her Alzheimer's disease. Patient mentioned that she was feeling winded and Dr. Brannon called patient's PCP Dr. Shukla who told patient to go to [facility] ED. Patient reports that her SOB has since resolved in the ED with treatment.     Denies fever, chills, chest pain, palpitations, cough, abdominal pain, nausea, vomiting, diarrhea, constipation, urinary frequency, urgency, or dysuria, headaches, changes in vision, dizziness, numbness, tingling.    ED Course:   Vitals: BP: 164/107, HR:137, Temp: 97F, RR: 20, SpO2: 93% on RA  Labs: Troponin 868.7>866.2, BUN/Cr 29/1.6, Gluc 265, BNP 17327  CXR (personally reviewed): curly B lines, consistent with thickening of the interlobular septa, no pneumothorax, no pleural effusions, no evidence of pneumonia  EKG (personally reviewed): Afib with RVR, at 118bpm  Received in the ED: cardizem PO 30mg x1, cardizem IVP 10x1, furosemide 20mg IVP x1     (21 Dec 2022 17:55)      ---  HOSPITAL COURSE: Patient presented to the hospital with shortness of breath and was admitted for afib with RVR and elevated BNP. Cardiology was consulted. Her metoprolol was increased from 25mg BID to 50mg BID, with improved rate control and intermittent elevation of HR. Digoxin was held in the setting on increased creatinine. Cardizem 30 mg q6hr was started. Patient was monitored off lasix as she did not appear to be volume overloaded on exam. Respiratory viral panel was negative. TTE was performed and showed preliminary result showed EF 25% with hypokinetic anterior. Metoprolol was then increased to 100 BID. Cardizem was discontinued. Nephrology was consulted for increased creatinine level, and digoxin was restarted at 62.5 mcg daily.     ---  CONSULTANTS:   Cardiology- Dr. Harvey   Nephrology- Dr. Platt    ---  TIME SPENT:  I, the attending physician, was physically present for the key portions of the evaluation and management (E/M) service provided. The total amount of time spent reviewing the hospital notes, laboratory values, imaging findings, assessing/counseling the patient, discussing with consultant physicians, social work, nursing staff was -- minutes    ---  Primary care provider was made aware of plan for discharge:      [  ] NO     [  ] YES   FROM ADMISSION H+P:   HPI:  86yo F with PMH Afib on Eliquis, DM2 on metformin, hypothyroidism, mild Alzheimer's dementia, presents to the ED with SOB since 3:00. History was taken from the patient with some assistance from daughter at bedside. Patient reports that she was not feeling well at 3am this morning, described that she felt winded, had palpitations, and felt bloated. Patient used the bathroom and then returned to sleep with an additional pillow. Patient reports that she felt SOB with lying flat and the additional pillow made her feel better. Patient recalls feeling winded in the last couple of days, especially when using the stairs. Patient's bedroom is on ground floor but she does use the stairs to do laundry in the basement. Patient had breakfast and tolerated her meal. Patient was taken to her neurologist Dr. Brannon for her appointment for managing her Alzheimer's disease. Patient mentioned that she was feeling winded and Dr. Brannon called patient's PCP Dr. Shukla who told patient to go to \[facility]\"" ED. Patient reports that her SOB has since resolved in the ED with treatment.     Denies fever, chills, chest pain, palpitations, cough, abdominal pain, nausea, vomiting, diarrhea, constipation, urinary frequency, urgency, or dysuria, headaches, changes in vision, dizziness, numbness, tingling.    ED Course:   Vitals: BP: 164/107, HR:137, Temp: 97F, RR: 20, SpO2: 93% on RA  Labs: Troponin 868.7>866.2, BUN/Cr 29/1.6, Gluc 265, BNP 27349  CXR (personally reviewed): curly B lines, consistent with thickening of the interlobular septa, no pneumothorax, no pleural effusions, no evidence of pneumonia  EKG (personally reviewed): Afib with RVR, at 118bpm  Received in the ED: cardizem PO 30mg x1, cardizem IVP 10x1, furosemide 20mg IVP x1     (21 Dec 2022 17:55)      ---  HOSPITAL COURSE: Patient presented to the hospital with shortness of breath and was admitted for afib with RVR and elevated BNP. Cardiology was consulted. Her metoprolol was increased from 25mg BID to 50mg BID, with improved rate control and intermittent elevation of HR. Digoxin was held in the setting on increased creatinine. Cardizem 30 mg q6hr was started. Patient was monitored off lasix as she did not appear to be volume overloaded on exam. Respiratory viral panel was negative. TTE was performed and showed preliminary result showed EF 25% with hypokinetic anterior. Metoprolol was then increased to 100 BID. Cardizem was discontinued. Nephrology was consulted for increased creatinine level, and digoxin was restarted at 62.5 mcg daily.     ---  CONSULTANTS:   Cardiology- Dr. Grider  Nephrology- Dr. Platt    ---  TIME SPENT:  I, the attending physician, was physically present for the key portions of the evaluation and management (E/M) service provided. The total amount of time spent reviewing the hospital notes, laboratory values, imaging findings, assessing/counseling the patient, discussing with consultant physicians, social work, nursing staff was 40 minutes

## 2022-12-22 NOTE — DISCHARGE NOTE PROVIDER - NSDCQMAMI_CARD_ALL_CORE
BEHAVIORAL HEALTH PROGRESS NOTE    Patient Name: Pedrito Belcher  YOB: 1977  MRN: [id number]    Date of Appointment: 4/9/2020    Time of Session: 1600    Duration of Session: 40    Type of Session: ind video session    People present in session (in addition to patient): None      Diagnoses:   ED Diagnosis   1. Severe recurrent major depression without psychotic features (CMS/HCC)           Mental Status Exam: No problem indicated      Orientation  x4 (person, place, time, situation)      Grooming good grooming and hygiene      Attitude cooperative      Affect/Mood euthymic      Thought content unremarkable      Suicidality:Admits to passive ideation without planning or intent.      Self-harm/dangerous to self Admits to passive ideation without planning or intent.      Homicidal thoughts/dangerousness to othersDenies ideation, plan, or intent.                Changes in symptoms since last session: same      Types of Interventions: Cognitive-behavioral therapy      Session Summary:Cont. To discuss identification of cognitive distortions and the importance of being able to understand the development of them and how to intervene when these thoughts develop.  Reports SI has decreased in intensity this past week.  Reports the thoughts cont. To be passive and less frequent.  Denies any plan or intent to harm self.  Cont. To talk with his wife on a daily basis for support.  He is reading some books on cognitive interventions.  He is looking forward to the weekend as he will see his kids and his family for Easter.  Needs to add more routine exercise to his routine as this is infrequent and may be helpful in managing anxiety and stress.                    Action Plan/Recommendations:-Continue psychotherapy        Return to Care:1 week\          Fran Zhao LCPC, CAADC   No

## 2022-12-22 NOTE — DISCHARGE NOTE PROVIDER - NSDCMRMEDTOKEN_GEN_ALL_CORE_FT
Crestor 10 mg oral tablet: 1 tab(s) orally once a day  digoxin 125 mcg (0.125 mg) oral tablet: 1 tab(s) orally once a day  Eliquis 2.5 mg oral tablet: 1 tab(s) orally 2 times a day  levothyroxine 75 mcg (0.075 mg) oral tablet: 1 tab(s) orally once a day  memantine 10 mg oral tablet: 1 tab(s) orally 2 times a day  metFORMIN 1000 mg oral tablet: 1 tab(s) orally 2 times a day  metoprolol tartrate 25 mg oral tablet: 1 tab(s) orally 2 times a day  rivastigmine 9.5 mg/24 hr transdermal film, extended release: 1 patch transdermal once a day  Vitamin D3 50 mcg (2000 intl units) oral tablet: 1 tab(s) orally once a day   Crestor 10 mg oral tablet: 1 tab(s) orally once a day  digoxin 62.5 mcg (0.0625 mg) oral tablet: 1 tab(s) orally once a day  Eliquis 2.5 mg oral tablet: 1 tab(s) orally 2 times a day  levothyroxine 75 mcg (0.075 mg) oral tablet: 1 tab(s) orally once a day  memantine 10 mg oral tablet: 1 tab(s) orally 2 times a day  metFORMIN 1000 mg oral tablet: 1 tab(s) orally 2 times a day  metoprolol succinate 100 mg oral tablet, extended release: 1 tab(s) orally 2 times a day   rivastigmine 9.5 mg/24 hr transdermal film, extended release: 1 patch transdermal once a day  Vitamin D3 50 mcg (2000 intl units) oral tablet: 1 tab(s) orally once a day   Crestor 10 mg oral tablet: 1 tab(s) orally once a day  digoxin 62.5 mcg (0.0625 mg) oral tablet: 1 tab(s) orally every other day   Eliquis 2.5 mg oral tablet: 1 tab(s) orally 2 times a day  levothyroxine 75 mcg (0.075 mg) oral tablet: 1 tab(s) orally once a day  memantine 10 mg oral tablet: 1 tab(s) orally 2 times a day  metFORMIN 1000 mg oral tablet: 1 tab(s) orally 2 times a day  metoprolol succinate 100 mg oral tablet, extended release: 1 tab(s) orally 2 times a day   rivastigmine 9.5 mg/24 hr transdermal film, extended release: 1 patch transdermal once a day  Vitamin D3 50 mcg (2000 intl units) oral tablet: 1 tab(s) orally once a day

## 2022-12-22 NOTE — PROGRESS NOTE ADULT - PROBLEM SELECTOR PLAN 2
- Patient found to have elevated pro-BNP level, 11571  - suspect pt in mild acute on chronic systolic CHF due to afib w/ RVR  - CXR: curly B lines, consistent with thickening of the interlobular septa, no pneumothorax, no pleural effusions, no evidence of pneumonia  - denies SOB at this time at rest.  No signs of volume overload. Monitor off Lasix.  -  F/u TTE  - Monitor and replace electrolytes  - Cardiology following, recs appreciated

## 2022-12-22 NOTE — DISCHARGE NOTE PROVIDER - CARE PROVIDER_API CALL
Amauri Shukla (DO)  Family Medicine  [address] [place] [address], [address]  [place] [address]  Phone: [phone]  Fax: [phone]  Established Patient  Follow Up Time:     Harpal Grider)  [place] [address]  Phone: [phone]  Fax: [phone]  Scheduled Appointment: 01/03/2023   Amauri Shukla (DO)  Family Medicine  [address], [address]  [place] [address]  Phone: [phone]  Fax: [phone]  Established Patient  Follow Up Time:     Harpal Grider)  [place] [address]  Phone: [phone]  Fax: [phone]  Scheduled Appointment: 01/03/2023    Luis Cherry)  Internal Medicine; Nephrology  [address], [address]  [place] [address]  Phone: [phone]  Fax: [phone]  Follow Up Time:

## 2022-12-22 NOTE — PROGRESS NOTE ADULT - ASSESSMENT
87-year-old female with PMHx of atrial fibrillation on eliquis ,hypothyroid ,hypothyroidism, alzheimer's presenting to ED with SOB 2/2 afib w/ RVR    Afib w/ RVR   - initial EKG afib with RVR HR 118bpm with tele up to 130s  -tele with Af  bpm   -on toprol , increased to 50mg q 12 hours   -add cardizem 30mg PO every six hours   -is on digoxin at home was held for Kiara ,   -2decho pending   -thromboembolism on eliquis renally dosed   -sp lasix iv x 1 , not volume overloaded on exam laying flat on room air   - echo from office with inferior hypokinesis with normal overall EF-decision was made to be conservative. no ischemic testing was performed.  follow up repeat echo, follow with Dr Grider   - Troponin peaked now downtrending , no anginal complaints   - likely elevated 2/2 demand in the setting of rapid afib    - Further cardiac workup will depend on clinical course.   - All other workup per primary team. Will followup.     Janet Cash FNP-C  Cardiology NP  [facility] [phone]

## 2022-12-22 NOTE — DISCHARGE NOTE PROVIDER - NSDCCPCAREPLAN_GEN_ALL_CORE_FT
PRINCIPAL DISCHARGE DIAGNOSIS  Diagnosis: Atrial fibrillation with rapid ventricular response  Assessment and Plan of Treatment: You were admitted with afib with RVR. You were seen and evaluated by cardiology. Your heart rate was controlled with increaed dosage of metoprolol.    Continue your eliquis 2.5mg twice a day.   Please START/CONTINUE ***********      SECONDARY DISCHARGE DIAGNOSES  Diagnosis: Diabetes mellitus  Assessment and Plan of Treatment: You have a known history of diabetes prior to your admission. This is a disorder that disrupts the way your body uses sugar. All the cells in your body need sugar to work normally. Sugar gets into the cells with the help of a hormone called insulin. If there is not enough insulin, or if the body stops responding to insulin, sugar builds up in the blood.   Uncontrolled blood sugar levels can lead to kidney and heart damage, pain/numbness/paralysis in your hands and feel and increased risk of infections. Your hemoglobin A1c on this hospital admission was ________. Continue your diabetes medication of metformin to control your blood sugar and prevent the possible complications from this disease.   Follow up with your PCP, podiatrist, ophthalmologist on a regular basis.    Diagnosis: Hypothyroid  Assessment and Plan of Treatment: You have a history of hypothyroidism.  Continue your synthroid 75cmg daily.    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 Continue memantine and rivastigmine.     PRINCIPAL DISCHARGE DIAGNOSIS  Diagnosis: Atrial fibrillation with rapid ventricular response  Assessment and Plan of Treatment: You were admitted with afib with RVR. You were seen and evaluated by cardiology. Your heart rate was controlled with increaed dosage of metoprolol.    Continue your eliquis 2.5mg twice a day.   Please take metoprolol 100mg twice a day  Please start digoxin 62.5 mcg daily      SECONDARY DISCHARGE DIAGNOSES  Diagnosis: Diabetes mellitus  Assessment and Plan of Treatment: You have a known history of diabetes prior to your admission. This is a disorder that disrupts the way your body uses sugar. All the cells in your body need sugar to work normally. Sugar gets into the cells with the help of a hormone called insulin. If there is not enough insulin, or if the body stops responding to insulin, sugar builds up in the blood.   Uncontrolled blood sugar levels can lead to kidney and heart damage, pain/numbness/paralysis in your hands and feel and increased risk of infections. Your hemoglobin A1c on this hospital admission was 7.8. Continue your diabetes medication of metformin to control your blood sugar and prevent the possible complications from this disease.   Follow up with your PCP, podiatrist, ophthalmologist on a regular basis.    Diagnosis: Hypothyroid  Assessment and Plan of Treatment: You have a history of hypothyroidism.  Continue your synthroid 75cmg daily.    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 Continue memantine and rivastigmine.     PRINCIPAL DISCHARGE DIAGNOSIS  Diagnosis: Atrial fibrillation with rapid ventricular response  Assessment and Plan of Treatment: You were admitted with afib with RVR. You were seen and evaluated by cardiology. Your heart rate was controlled with increaed dosage of metoprolol.    Continue your eliquis 2.5mg twice a day.   Please increase metoprolol succinate to 100mg twice a day  Please decrease digoxin to 62.5 mcg daily      SECONDARY DISCHARGE DIAGNOSES  Diagnosis: Diabetes mellitus  Assessment and Plan of Treatment: You have a known history of diabetes prior to your admission. This is a disorder that disrupts the way your body uses sugar. All the cells in your body need sugar to work normally. Sugar gets into the cells with the help of a hormone called insulin. If there is not enough insulin, or if the body stops responding to insulin, sugar builds up in the blood.   Uncontrolled blood sugar levels can lead to kidney and heart damage, pain/numbness/paralysis in your hands and feel and increased risk of infections. Your hemoglobin A1c on this hospital admission was 7.8. Continue your diabetes medication of metformin to control your blood sugar and prevent the possible complications from this disease.   Follow up with your PCP, podiatrist, ophthalmologist on a regular basis.    Diagnosis: Hypothyroid  Assessment and Plan of Treatment: You have a history of hypothyroidism.  Continue your synthroid 75mcg daily.    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 Continue memantine and rivastigmine.     PRINCIPAL DISCHARGE DIAGNOSIS  Diagnosis: Atrial fibrillation with rapid ventricular response  Assessment and Plan of Treatment: You were admitted with afib with RVR. You were seen and evaluated by cardiology. Your heart rate was controlled with increaed dosage of metoprolol.    Continue your eliquis 2.5mg twice a day.   Please increase metoprolol succinate to 100mg twice a day  Please decrease digoxin to 62.5 mcg every other day      SECONDARY DISCHARGE DIAGNOSES  Diagnosis: Diabetes mellitus  Assessment and Plan of Treatment: You have a known history of diabetes prior to your admission. This is a disorder that disrupts the way your body uses sugar. All the cells in your body need sugar to work normally. Sugar gets into the cells with the help of a hormone called insulin. If there is not enough insulin, or if the body stops responding to insulin, sugar builds up in the blood.   Uncontrolled blood sugar levels can lead to kidney and heart damage, pain/numbness/paralysis in your hands and feel and increased risk of infections. Your hemoglobin A1c on this hospital admission was 7.8. Continue your diabetes medication of metformin to control your blood sugar and prevent the possible complications from this disease.   Follow up with your PCP, podiatrist, ophthalmologist on a regular basis.    Diagnosis: Hypothyroid  Assessment and Plan of Treatment: You have a history of hypothyroidism.  Continue your synthroid 75mcg daily.    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 Continue memantine and rivastigmine.    Diagnosis: Elevated serum creatinine  Assessment and Plan of Treatment: You had an elevated serum Cr on admission, which a measure of your kidney function. You were seen by a Nephrologist. It is recommended to decrease your digoxin to 62.5mcg every other day. Please follow up with Nephrology as outpatient

## 2022-12-22 NOTE — DISCHARGE NOTE PROVIDER - NSDCFUSCHEDAPPT_GEN_ALL_CORE_FT
Harpal Grider  [facility]  CARDIOLOGY [address] [facility]  Scheduled Appointment: 01/03/2023

## 2022-12-22 NOTE — PROGRESS NOTE ADULT - ATTENDING COMMENTS
88yo F with PMH of Afib on Eliquis, DM2 on metformin, hypothyroidism, mild Alzheimer's dementia, presents to the ED with SOB/MORTON, palpitations, admitted with Afib with RVR with pulmonary edema.     Patient seen and examined at bedside. States she is feeling well, states her shortness of breath and orthopnea has resolved. Patient euvolemic, hold further diuresis at this time. Remains in Afib- HR 90s to 110s on tele, with spikes to 130s. Discussed with Cardio, will add cardizem to help control HR better. Troponin elevated, peaked, now downtrending- elevation likely 2/2 rapid afib. F/U TTE. Plan of care discussed with daughter at bedside.

## 2022-12-22 NOTE — PROGRESS NOTE ADULT - SUBJECTIVE AND OBJECTIVE BOX
Patient is a 87y old  Female who presents with a chief complaint of Afib with RVR, pulmonary edema (21 Dec 2022 17:55)      INTERVAL HPI/OVERNIGHT EVENTS: Patient seen and examined at bedside. No overnight events occurred. Patient has no complaints at this time. Patient slept well overnight and had no SOB. Denies fevers, chills, headache, lightheadedness, chest pain, dyspnea, abdominal pain, n/v/d/c.    MEDICATIONS  (STANDING):  apixaban 2.5 milliGRAM(s) Oral every 12 hours  atorvastatin 40 milliGRAM(s) Oral at bedtime  dextrose 5%. 1000 milliLiter(s) (50 mL/Hr) IV Continuous <Continuous>  dextrose 5%. 1000 milliLiter(s) (100 mL/Hr) IV Continuous <Continuous>  dextrose 50% Injectable 25 Gram(s) IV Push once  dextrose 50% Injectable 12.5 Gram(s) IV Push once  dextrose 50% Injectable 25 Gram(s) IV Push once  glucagon  Injectable 1 milliGRAM(s) IntraMuscular once  insulin lispro (ADMELOG) corrective regimen sliding scale   SubCutaneous three times a day before meals  insulin lispro (ADMELOG) corrective regimen sliding scale   SubCutaneous at bedtime  levothyroxine 75 MICROGram(s) Oral daily  memantine 10 milliGRAM(s) Oral two times a day  metoprolol succinate ER 50 milliGRAM(s) Oral every 12 hours  rivastigmine patch  9.5 mG/24 Hr(s) 1 Patch Transdermal every 24 hours    MEDICATIONS  (PRN):  acetaminophen     Tablet .. 650 milliGRAM(s) Oral every 6 hours PRN Temp greater or equal to 38C (100.4F), Mild Pain (1 - 3)  aluminum hydroxide/magnesium hydroxide/simethicone Suspension 30 milliLiter(s) Oral every 4 hours PRN Dyspepsia  dextrose Oral Gel 15 Gram(s) Oral once PRN Blood Glucose LESS THAN 70 milliGRAM(s)/deciliter  melatonin 3 milliGRAM(s) Oral at bedtime PRN Insomnia  ondansetron Injectable 4 milliGRAM(s) IV Push every 8 hours PRN Nausea and/or Vomiting      Allergies    No Known Allergies    Intolerances        REVIEW OF SYSTEMS:  CONSTITUTIONAL: No fever or chills  HEENT:  No headache, no sore throat  RESPIRATORY: No cough, wheezing, or shortness of breath  CARDIOVASCULAR: No chest pain, palpitations  GASTROINTESTINAL: No abd pain, nausea, vomiting, or diarrhea  GENITOURINARY: No dysuria, frequency, or hematuria  NEUROLOGICAL: no focal weakness or dizziness  MUSCULOSKELETAL: no myalgias     Vital Signs Last 24 Hrs  T(C): 36.5 (22 Dec 2022 10:07), Max: 36.8 (22 Dec 2022 08:30)  T(F): 97.7 (22 Dec 2022 10:07), Max: 98.3 (22 Dec 2022 08:30)  HR: 116 (22 Dec 2022 10:07) (111 - 116)  BP: 136/95 (22 Dec 2022 10:07) (132/89 - 153/89)  BP(mean): --  RR: 16 (22 Dec 2022 10:07) (16 - 18)  SpO2: 96% (22 Dec 2022 10:07) (94% - 96%)    Parameters below as of 22 Dec 2022 10:07  Patient On (Oxygen Delivery Method): room air        PHYSICAL EXAM:  GENERAL: NAD.AOX3  HEENT:  anicteric, moist mucous membranes  CHEST/LUNG:  CTA b/l, no rales, wheezes, or rhonchi  HEART:  Irregular   ABDOMEN:  BS+, soft, nontender, nondistended  EXTREMITIES: no edema, cyanosis, or calf tenderness  NERVOUS SYSTEM: answers questions and follows commands appropriately    LABS:                        11.4   8.91  )-----------( 288      ( 22 Dec 2022 06:25 )             36.2     CBC Full  -  ( 22 Dec 2022 06:25 )  WBC Count : 8.91 K/uL  Hemoglobin : 11.4 g/dL  Hematocrit : 36.2 %  Platelet Count - Automated : 288 K/uL  Mean Cell Volume : 89.2 fl  Mean Cell Hemoglobin : 28.1 pg  Mean Cell Hemoglobin Concentration : 31.5 gm/dL  Auto Neutrophil # : 5.53 K/uL  Auto Lymphocyte # : 2.10 K/uL  Auto Monocyte # : 0.97 K/uL  Auto Eosinophil # : 0.19 K/uL  Auto Basophil # : 0.06 K/uL  Auto Neutrophil % : 62.0 %  Auto Lymphocyte % : 23.6 %  Auto Monocyte % : 10.9 %  Auto Eosinophil % : 2.1 %  Auto Basophil % : 0.7 %    22 Dec 2022 06:25    141    |  109    |  30     ----------------------------<  186    5.1     |  27     |  1.40     Ca    9.7        22 Dec 2022 06:25  Phos  3.9       22 Dec 2022 06:25  Mg     1.8       22 Dec 2022 06:25    TPro  7.9    /  Alb  2.9    /  TBili  1.4    /  DBili  x      /  AST  40     /  ALT  48     /  AlkPhos  57     22 Dec 2022 06:25        CAPILLARY BLOOD GLUCOSE      POCT Blood Glucose.: 162 mg/dL (22 Dec 2022 07:34)  POCT Blood Glucose.: 172 mg/dL (22 Dec 2022 00:33)          RADIOLOGY & ADDITIONAL TESTS:    Personally reviewed.     Consultant(s) Notes Reviewed:  [x] YES  [ ] NO     Patient is a 87y old  Female who presents with a chief complaint of Afib with RVR, pulmonary edema (21 Dec 2022 17:55)      INTERVAL HPI/OVERNIGHT EVENTS: Patient seen and examined at bedside. No overnight events occurred. Patient has no complaints at this time. Patient slept well overnight and had no SOB. Denies fevers, chills, headache, lightheadedness, chest pain, dyspnea, abdominal pain, n/v/d/c.  Heart rate 133 today.     MEDICATIONS  (STANDING):  apixaban 2.5 milliGRAM(s) Oral every 12 hours  atorvastatin 40 milliGRAM(s) Oral at bedtime  dextrose 5%. 1000 milliLiter(s) (50 mL/Hr) IV Continuous <Continuous>  dextrose 5%. 1000 milliLiter(s) (100 mL/Hr) IV Continuous <Continuous>  dextrose 50% Injectable 25 Gram(s) IV Push once  dextrose 50% Injectable 12.5 Gram(s) IV Push once  dextrose 50% Injectable 25 Gram(s) IV Push once  glucagon  Injectable 1 milliGRAM(s) IntraMuscular once  insulin lispro (ADMELOG) corrective regimen sliding scale   SubCutaneous three times a day before meals  insulin lispro (ADMELOG) corrective regimen sliding scale   SubCutaneous at bedtime  levothyroxine 75 MICROGram(s) Oral daily  memantine 10 milliGRAM(s) Oral two times a day  metoprolol succinate ER 50 milliGRAM(s) Oral every 12 hours  rivastigmine patch  9.5 mG/24 Hr(s) 1 Patch Transdermal every 24 hours    MEDICATIONS  (PRN):  acetaminophen     Tablet .. 650 milliGRAM(s) Oral every 6 hours PRN Temp greater or equal to 38C (100.4F), Mild Pain (1 - 3)  aluminum hydroxide/magnesium hydroxide/simethicone Suspension 30 milliLiter(s) Oral every 4 hours PRN Dyspepsia  dextrose Oral Gel 15 Gram(s) Oral once PRN Blood Glucose LESS THAN 70 milliGRAM(s)/deciliter  melatonin 3 milliGRAM(s) Oral at bedtime PRN Insomnia  ondansetron Injectable 4 milliGRAM(s) IV Push every 8 hours PRN Nausea and/or Vomiting      Allergies    No Known Allergies    Intolerances        REVIEW OF SYSTEMS:  CONSTITUTIONAL: No fever or chills  HEENT:  No headache, no sore throat  RESPIRATORY: No cough, wheezing, or shortness of breath  CARDIOVASCULAR: No chest pain, palpitations  GASTROINTESTINAL: No abd pain, nausea, vomiting, or diarrhea  GENITOURINARY: No dysuria, frequency, or hematuria  NEUROLOGICAL: no focal weakness or dizziness  MUSCULOSKELETAL: no myalgias     Vital Signs Last 24 Hrs  T(C): 36.5 (22 Dec 2022 10:07), Max: 36.8 (22 Dec 2022 08:30)  T(F): 97.7 (22 Dec 2022 10:07), Max: 98.3 (22 Dec 2022 08:30)  HR: 116 (22 Dec 2022 10:07) (111 - 116)  BP: 136/95 (22 Dec 2022 10:07) (132/89 - 153/89)  BP(mean): --  RR: 16 (22 Dec 2022 10:07) (16 - 18)  SpO2: 96% (22 Dec 2022 10:07) (94% - 96%)    Parameters below as of 22 Dec 2022 10:07  Patient On (Oxygen Delivery Method): room air        PHYSICAL EXAM:  GENERAL: NAD.AOX3  HEENT:  anicteric, moist mucous membranes  CHEST/LUNG:  CTA b/l, no rales, wheezes, or rhonchi  HEART:  Irregular   ABDOMEN:  BS+, soft, nontender, nondistended  EXTREMITIES: no edema, cyanosis, or calf tenderness  NERVOUS SYSTEM: answers questions and follows commands appropriately    LABS:                        11.4   8.91  )-----------( 288      ( 22 Dec 2022 06:25 )             36.2     CBC Full  -  ( 22 Dec 2022 06:25 )  WBC Count : 8.91 K/uL  Hemoglobin : 11.4 g/dL  Hematocrit : 36.2 %  Platelet Count - Automated : 288 K/uL  Mean Cell Volume : 89.2 fl  Mean Cell Hemoglobin : 28.1 pg  Mean Cell Hemoglobin Concentration : 31.5 gm/dL  Auto Neutrophil # : 5.53 K/uL  Auto Lymphocyte # : 2.10 K/uL  Auto Monocyte # : 0.97 K/uL  Auto Eosinophil # : 0.19 K/uL  Auto Basophil # : 0.06 K/uL  Auto Neutrophil % : 62.0 %  Auto Lymphocyte % : 23.6 %  Auto Monocyte % : 10.9 %  Auto Eosinophil % : 2.1 %  Auto Basophil % : 0.7 %    22 Dec 2022 06:25    141    |  109    |  30     ----------------------------<  186    5.1     |  27     |  1.40     Ca    9.7        22 Dec 2022 06:25  Phos  3.9       22 Dec 2022 06:25  Mg     1.8       22 Dec 2022 06:25    TPro  7.9    /  Alb  2.9    /  TBili  1.4    /  DBili  x      /  AST  40     /  ALT  48     /  AlkPhos  57     22 Dec 2022 06:25        CAPILLARY BLOOD GLUCOSE      POCT Blood Glucose.: 162 mg/dL (22 Dec 2022 07:34)  POCT Blood Glucose.: 172 mg/dL (22 Dec 2022 00:33)          RADIOLOGY & ADDITIONAL TESTS:    Personally reviewed.     Consultant(s) Notes Reviewed:  [x] YES  [ ] NO     Patient is a 87y old  Female who presents with a chief complaint of Afib with RVR, pulmonary edema (21 Dec 2022 17:55)      INTERVAL HPI/OVERNIGHT EVENTS: Patient seen and examined at bedside. No overnight events occurred. Patient has no complaints at this time. Patient slept well overnight and had no SOB. Denies fevers, chills, headache, lightheadedness, chest pain, dyspnea, abdominal pain, n/v/d/c.  Heart rate 100-115 today per tele    MEDICATIONS  (STANDING):  apixaban 2.5 milliGRAM(s) Oral every 12 hours  atorvastatin 40 milliGRAM(s) Oral at bedtime  dextrose 5%. 1000 milliLiter(s) (50 mL/Hr) IV Continuous <Continuous>  dextrose 5%. 1000 milliLiter(s) (100 mL/Hr) IV Continuous <Continuous>  dextrose 50% Injectable 25 Gram(s) IV Push once  dextrose 50% Injectable 12.5 Gram(s) IV Push once  dextrose 50% Injectable 25 Gram(s) IV Push once  glucagon  Injectable 1 milliGRAM(s) IntraMuscular once  insulin lispro (ADMELOG) corrective regimen sliding scale   SubCutaneous three times a day before meals  insulin lispro (ADMELOG) corrective regimen sliding scale   SubCutaneous at bedtime  levothyroxine 75 MICROGram(s) Oral daily  memantine 10 milliGRAM(s) Oral two times a day  metoprolol succinate ER 50 milliGRAM(s) Oral every 12 hours  rivastigmine patch  9.5 mG/24 Hr(s) 1 Patch Transdermal every 24 hours    MEDICATIONS  (PRN):  acetaminophen     Tablet .. 650 milliGRAM(s) Oral every 6 hours PRN Temp greater or equal to 38C (100.4F), Mild Pain (1 - 3)  aluminum hydroxide/magnesium hydroxide/simethicone Suspension 30 milliLiter(s) Oral every 4 hours PRN Dyspepsia  dextrose Oral Gel 15 Gram(s) Oral once PRN Blood Glucose LESS THAN 70 milliGRAM(s)/deciliter  melatonin 3 milliGRAM(s) Oral at bedtime PRN Insomnia  ondansetron Injectable 4 milliGRAM(s) IV Push every 8 hours PRN Nausea and/or Vomiting      Allergies    No Known Allergies    Intolerances        REVIEW OF SYSTEMS:  CONSTITUTIONAL: No fever or chills  HEENT:  No headache, no sore throat  RESPIRATORY: No cough, wheezing, or shortness of breath  CARDIOVASCULAR: No chest pain, palpitations  GASTROINTESTINAL: No abd pain, nausea, vomiting, or diarrhea  GENITOURINARY: No dysuria, frequency, or hematuria  NEUROLOGICAL: no focal weakness or dizziness  MUSCULOSKELETAL: no myalgias     Vital Signs Last 24 Hrs  T(C): 36.5 (22 Dec 2022 10:07), Max: 36.8 (22 Dec 2022 08:30)  T(F): 97.7 (22 Dec 2022 10:07), Max: 98.3 (22 Dec 2022 08:30)  HR: 116 (22 Dec 2022 10:07) (111 - 116)  BP: 136/95 (22 Dec 2022 10:07) (132/89 - 153/89)  BP(mean): --  RR: 16 (22 Dec 2022 10:07) (16 - 18)  SpO2: 96% (22 Dec 2022 10:07) (94% - 96%)    Parameters below as of 22 Dec 2022 10:07  Patient On (Oxygen Delivery Method): room air        PHYSICAL EXAM:  GENERAL: NAD.AOX3  HEENT:  anicteric, moist mucous membranes  CHEST/LUNG:  CTA b/l, no rales, wheezes, or rhonchi  HEART:  Irregular   ABDOMEN:  BS+, soft, nontender, nondistended  EXTREMITIES: no edema, cyanosis, or calf tenderness  NERVOUS SYSTEM: answers questions and follows commands appropriately    LABS:                        11.4   8.91  )-----------( 288      ( 22 Dec 2022 06:25 )             36.2     CBC Full  -  ( 22 Dec 2022 06:25 )  WBC Count : 8.91 K/uL  Hemoglobin : 11.4 g/dL  Hematocrit : 36.2 %  Platelet Count - Automated : 288 K/uL  Mean Cell Volume : 89.2 fl  Mean Cell Hemoglobin : 28.1 pg  Mean Cell Hemoglobin Concentration : 31.5 gm/dL  Auto Neutrophil # : 5.53 K/uL  Auto Lymphocyte # : 2.10 K/uL  Auto Monocyte # : 0.97 K/uL  Auto Eosinophil # : 0.19 K/uL  Auto Basophil # : 0.06 K/uL  Auto Neutrophil % : 62.0 %  Auto Lymphocyte % : 23.6 %  Auto Monocyte % : 10.9 %  Auto Eosinophil % : 2.1 %  Auto Basophil % : 0.7 %    22 Dec 2022 06:25    141    |  109    |  30     ----------------------------<  186    5.1     |  27     |  1.40     Ca    9.7        22 Dec 2022 06:25  Phos  3.9       22 Dec 2022 06:25  Mg     1.8       22 Dec 2022 06:25    TPro  7.9    /  Alb  2.9    /  TBili  1.4    /  DBili  x      /  AST  40     /  ALT  48     /  AlkPhos  57     22 Dec 2022 06:25        CAPILLARY BLOOD GLUCOSE      POCT Blood Glucose.: 162 mg/dL (22 Dec 2022 07:34)  POCT Blood Glucose.: 172 mg/dL (22 Dec 2022 00:33)          RADIOLOGY & ADDITIONAL TESTS:    Personally reviewed.     Consultant(s) Notes Reviewed:  [x] YES  [ ] NO

## 2022-12-22 NOTE — DISCHARGE NOTE PROVIDER - PROVIDER TOKENS
PROVIDER:[TOKEN:[[id number]],ESTABLISHEDPATIENT:[T]],PROVIDER:[TOKEN:[[id number]],SCHEDULEDAPPT:[01/03/2023]] PROVIDER:[TOKEN:[[id number]],ESTABLISHEDPATIENT:[T]],PROVIDER:[TOKEN:[[id number]],SCHEDULEDAPPT:[01/03/2023]],PROVIDER:[TOKEN:[[id number]]]

## 2022-12-22 NOTE — PROGRESS NOTE ADULT - PROBLEM SELECTOR PLAN 3
Chronic, known history of T2DM. Per daughter, DM is controlled on metformin  - Elevated  today (12/22/22)  - Hold home meds  - Low dose insulin corrective scale  - Hypoglycemia protocol, fingerstick glucose QAC&HS   - Consistent carb/DASH diet  -  f/u AM HbA1c Chronic, known history of T2DM. Per daughter, DM is controlled on metformin  - Elevated  today (12/22/22)  - Hold home meds  - Low dose insulin corrective scale  - Hypoglycemia protocol, fingerstick glucose QAC&HS   - Consistent carb/DASH diet  -  f/u HbA1c

## 2022-12-22 NOTE — DISCHARGE NOTE PROVIDER - CARE PROVIDERS DIRECT ADDRESSES
,DirectAddress_Unknown,[email or web address] ,DirectAddress_Unknown,[email or web address],DirectAddress_Unknown

## 2022-12-22 NOTE — PATIENT PROFILE ADULT - NSPROHMDIABETMGMTSTRAT_GEN_A_NUR
activity/adequate rest/blood glucose testing/diet modification/exercise/medication therapy/routine screenings/weight management

## 2022-12-22 NOTE — PROGRESS NOTE ADULT - SUBJECTIVE AND OBJECTIVE BOX
[facility] -- Dylan Hernandez Pannella, Patel, Savella [facility]  Office # [phone]      Follow Up:  Af    Subjective/Observations:   No events overnight resting comfortably in bed.  No complaints of chest pain, dyspnea, or palpitations reported. No signs of orthopnea or PND.  remains on room air   daughter at bedside, all concerns answered     REVIEW OF SYSTEMS: All other review of systems is negative unless indicated above    PAST MEDICAL & SURGICAL HISTORY:  Atrial fibrillation      Hypothyroidism      Mild Alzheimer&#x27;s dementia      Diabetes mellitus      History of appendectomy      H/O hernia repair      History of cholecystectomy          MEDICATIONS  (STANDING):  apixaban 2.5 milliGRAM(s) Oral every 12 hours  atorvastatin 40 milliGRAM(s) Oral at bedtime  dextrose 5%. 1000 milliLiter(s) (50 mL/Hr) IV Continuous <Continuous>  dextrose 5%. 1000 milliLiter(s) (100 mL/Hr) IV Continuous <Continuous>  dextrose 50% Injectable 25 Gram(s) IV Push once  dextrose 50% Injectable 12.5 Gram(s) IV Push once  dextrose 50% Injectable 25 Gram(s) IV Push once  glucagon  Injectable 1 milliGRAM(s) IntraMuscular once  insulin lispro (ADMELOG) corrective regimen sliding scale   SubCutaneous three times a day before meals  insulin lispro (ADMELOG) corrective regimen sliding scale   SubCutaneous at bedtime  levothyroxine 75 MICROGram(s) Oral daily  memantine 10 milliGRAM(s) Oral two times a day  metoprolol succinate ER 50 milliGRAM(s) Oral every 12 hours  rivastigmine patch  9.5 mG/24 Hr(s) 1 Patch Transdermal every 24 hours    MEDICATIONS  (PRN):  acetaminophen     Tablet .. 650 milliGRAM(s) Oral every 6 hours PRN Temp greater or equal to 38C (100.4F), Mild Pain (1 - 3)  aluminum hydroxide/magnesium hydroxide/simethicone Suspension 30 milliLiter(s) Oral every 4 hours PRN Dyspepsia  dextrose Oral Gel 15 Gram(s) Oral once PRN Blood Glucose LESS THAN 70 milliGRAM(s)/deciliter  melatonin 3 milliGRAM(s) Oral at bedtime PRN Insomnia  ondansetron Injectable 4 milliGRAM(s) IV Push every 8 hours PRN Nausea and/or Vomiting      Allergies    No Known Allergies    Intolerances        Vital Signs Last 24 Hrs  T(C): 36.5 (22 Dec 2022 10:07), Max: 36.8 (22 Dec 2022 08:30)  T(F): 97.7 (22 Dec 2022 10:07), Max: 98.3 (22 Dec 2022 08:30)  HR: 116 (22 Dec 2022 10:07) (111 - 116)  BP: 136/95 (22 Dec 2022 10:07) (132/89 - 153/89)  BP(mean): --  RR: 16 (22 Dec 2022 10:07) (16 - 18)  SpO2: 96% (22 Dec 2022 10:07) (94% - 96%)    Parameters below as of 22 Dec 2022 10:07  Patient On (Oxygen Delivery Method): room air        I&O's Summary        PHYSICAL EXAM:  TELE: Af 116 bpm   Constitutional: NAD, awake and alert, well-developed  HEENT: Moist Mucous Membranes, Anicteric  Pulmonary: Non-labored, breath sounds are clear bilaterally, No wheezing, crackles or rhonchi  Cardiovascular: Regular, S1 and S2 nl, No murmurs, rubs, gallops or clicks  Gastrointestinal: Bowel Sounds present, soft, nontender.   Lymph: No lymphadenopathy. No peripheral edema.  Skin: No visible rashes or ulcers.  Psych:  Mood & affect appropriate    LABS: All Labs Reviewed:                        11.4   8.91  )-----------( 288      ( 22 Dec 2022 06:25 )             36.2                         11.8   8.80  )-----------( 285      ( 21 Dec 2022 10:20 )             38.3     22 Dec 2022 06:25    141    |  109    |  30     ----------------------------<  186    5.1     |  27     |  1.40   21 Dec 2022 10:20    138    |  108    |  29     ----------------------------<  265    4.8     |  22     |  1.60     Ca    9.7        22 Dec 2022 06:25  Ca    9.3        21 Dec 2022 10:20  Phos  3.9       22 Dec 2022 06:25  Mg     1.8       22 Dec 2022 06:25    TPro  7.9    /  Alb  2.9    /  TBili  1.4    /  DBili  x      /  AST  40     /  ALT  48     /  AlkPhos  57     22 Dec 2022 06:25  TPro  8.3    /  Alb  2.9    /  TBili  1.1    /  DBili  x      /  AST  57     /  ALT  56     /  AlkPhos  59     21 Dec 2022 10:20      CARDIAC MARKERS ( 22 Dec 2022 06:25 )  x     / x     / 42 U/L / x     / 2.2 ng/mL  CARDIAC MARKERS ( 21 Dec 2022 18:11 )  x     / x     / 48 U/L / x     / 3.4 ng/mL  CARDIAC MARKERS ( 21 Dec 2022 13:00 )  x     / x     / 47 U/L / x     / 2.8 ng/mL  CARDIAC MARKERS ( 21 Dec 2022 10:20 )  x     / x     / 83 U/L / x     / 3.9 ng/mL         EC Lead ECG:   Ventricular Rate 118 BPM    QRS Duration 86 ms    Q-T Interval 304 ms    QTC Calculation(Bazett) 426 ms    R Axis 8 degrees    T Axis -76 degrees    Diagnosis Line Atrial fibrillation with rapid ventricular response with premature ventricular or aberrantly conducted complexes  Minimal voltage criteria for LVH, may be normal variant ( [place] product )  Nonspecific ST and T wave abnormality  Abnormal ECG  When compared with ECG of 12-OCT-2011 23:06,  Significant changes have occurred  Confirmed by CR GRAJEDA ([id number]) on 2022 5:39:14 PM (22 @ 10:18)

## 2022-12-22 NOTE — PROGRESS NOTE ADULT - PROBLEM SELECTOR PLAN 4
Chronic, history of hypothyroidism  -On Synthroid  75 mcg at home  -Continue Synthroid 75 mcg  -TSH 2.63, T3 79 (12/22/22)

## 2022-12-22 NOTE — PATIENT PROFILE ADULT - FALL HARM RISK - HARM RISK INTERVENTIONS
Assistance with ambulation/Assistance OOB with selected safe patient handling equipment/Communicate Risk of Fall with Harm to all staff/Discuss with provider need for PT consult/Monitor for mental status changes/Monitor gait and stability/Provide patient with walking aids - walker, cane, crutches/Reinforce activity limits and safety measures with patient and family/Sit up slowly, dangle for a short time, stand at bedside before walking/Tailored Fall Risk Interventions/Use of alarms - bed, chair and/or voice tab/Visual Cue: Yellow wristband and red socks/Bed in lowest position, wheels locked, appropriate side rails in place/Call bell, personal items and telephone in reach/Instruct patient to call for assistance before getting out of bed or chair/Non-slip footwear when patient is out of bed/[place] to call system/Physically safe environment - no spills, clutter or unnecessary equipment/Purposeful Proactive Rounding/Room/bathroom lighting operational, light cord in reach

## 2022-12-23 ENCOUNTER — TRANSCRIPTION ENCOUNTER (OUTPATIENT)
Age: 87
End: 2022-12-23

## 2022-12-23 VITALS
TEMPERATURE: 98 F | RESPIRATION RATE: 16 BRPM | DIASTOLIC BLOOD PRESSURE: 69 MMHG | OXYGEN SATURATION: 96 % | HEART RATE: 89 BPM | SYSTOLIC BLOOD PRESSURE: 102 MMHG

## 2022-12-23 LAB
ALBUMIN SERPL ELPH-MCNC: 2.5 G/DL — LOW (ref 3.3–5)
ALP SERPL-CCNC: 53 U/L — SIGNIFICANT CHANGE UP (ref 40–120)
ALT FLD-CCNC: 45 U/L — SIGNIFICANT CHANGE UP (ref 12–78)
ANION GAP SERPL CALC-SCNC: 7 MMOL/L — SIGNIFICANT CHANGE UP (ref 5–17)
AST SERPL-CCNC: 42 U/L — HIGH (ref 15–37)
BILIRUB SERPL-MCNC: 1.3 MG/DL — HIGH (ref 0.2–1.2)
BUN SERPL-MCNC: 31 MG/DL — HIGH (ref 7–23)
CALCIUM SERPL-MCNC: 9.1 MG/DL — SIGNIFICANT CHANGE UP (ref 8.5–10.1)
CHLORIDE SERPL-SCNC: 107 MMOL/L — SIGNIFICANT CHANGE UP (ref 96–108)
CO2 SERPL-SCNC: 26 MMOL/L — SIGNIFICANT CHANGE UP (ref 22–31)
CREAT SERPL-MCNC: 1.4 MG/DL — HIGH (ref 0.5–1.3)
EGFR: 37 ML/MIN/1.73M2 — LOW
GLUCOSE SERPL-MCNC: 170 MG/DL — HIGH (ref 70–99)
HCT VFR BLD CALC: 34.4 % — LOW (ref 34.5–45)
HGB BLD-MCNC: 10.9 G/DL — LOW (ref 11.5–15.5)
MCHC RBC-ENTMCNC: 28 PG — SIGNIFICANT CHANGE UP (ref 27–34)
MCHC RBC-ENTMCNC: 31.7 GM/DL — LOW (ref 32–36)
MCV RBC AUTO: 88.4 FL — SIGNIFICANT CHANGE UP (ref 80–100)
NRBC # BLD: 0 /100 WBCS — SIGNIFICANT CHANGE UP (ref 0–0)
PLATELET # BLD AUTO: 268 K/UL — SIGNIFICANT CHANGE UP (ref 150–400)
POTASSIUM SERPL-MCNC: 4.5 MMOL/L — SIGNIFICANT CHANGE UP (ref 3.5–5.3)
POTASSIUM SERPL-SCNC: 4.5 MMOL/L — SIGNIFICANT CHANGE UP (ref 3.5–5.3)
PROT SERPL-MCNC: 7.3 G/DL — SIGNIFICANT CHANGE UP (ref 6–8.3)
RBC # BLD: 3.89 M/UL — SIGNIFICANT CHANGE UP (ref 3.8–5.2)
RBC # FLD: 16 % — HIGH (ref 10.3–14.5)
SODIUM SERPL-SCNC: 140 MMOL/L — SIGNIFICANT CHANGE UP (ref 135–145)
WBC # BLD: 8.42 K/UL — SIGNIFICANT CHANGE UP (ref 3.8–10.5)
WBC # FLD AUTO: 8.42 K/UL — SIGNIFICANT CHANGE UP (ref 3.8–10.5)

## 2022-12-23 PROCEDURE — 83880 ASSAY OF NATRIURETIC PEPTIDE: CPT

## 2022-12-23 PROCEDURE — 84443 ASSAY THYROID STIM HORMONE: CPT

## 2022-12-23 PROCEDURE — 99291 CRITICAL CARE FIRST HOUR: CPT

## 2022-12-23 PROCEDURE — 85027 COMPLETE CBC AUTOMATED: CPT

## 2022-12-23 PROCEDURE — 85025 COMPLETE CBC W/AUTO DIFF WBC: CPT

## 2022-12-23 PROCEDURE — 82962 GLUCOSE BLOOD TEST: CPT

## 2022-12-23 PROCEDURE — 99239 HOSP IP/OBS DSCHRG MGMT >30: CPT

## 2022-12-23 PROCEDURE — 0225U NFCT DS DNA&RNA 21 SARSCOV2: CPT

## 2022-12-23 PROCEDURE — 93306 TTE W/DOPPLER COMPLETE: CPT

## 2022-12-23 PROCEDURE — 84484 ASSAY OF TROPONIN QUANT: CPT

## 2022-12-23 PROCEDURE — 80061 LIPID PANEL: CPT

## 2022-12-23 PROCEDURE — 84480 ASSAY TRIIODOTHYRONINE (T3): CPT

## 2022-12-23 PROCEDURE — 99233 SBSQ HOSP IP/OBS HIGH 50: CPT

## 2022-12-23 PROCEDURE — 80162 ASSAY OF DIGOXIN TOTAL: CPT

## 2022-12-23 PROCEDURE — 82550 ASSAY OF CK (CPK): CPT

## 2022-12-23 PROCEDURE — 83036 HEMOGLOBIN GLYCOSYLATED A1C: CPT

## 2022-12-23 PROCEDURE — 71045 X-RAY EXAM CHEST 1 VIEW: CPT

## 2022-12-23 PROCEDURE — 83735 ASSAY OF MAGNESIUM: CPT

## 2022-12-23 PROCEDURE — 80053 COMPREHEN METABOLIC PANEL: CPT

## 2022-12-23 PROCEDURE — 84439 ASSAY OF FREE THYROXINE: CPT

## 2022-12-23 PROCEDURE — 82553 CREATINE MB FRACTION: CPT

## 2022-12-23 PROCEDURE — 36415 COLL VENOUS BLD VENIPUNCTURE: CPT

## 2022-12-23 PROCEDURE — 93005 ELECTROCARDIOGRAM TRACING: CPT

## 2022-12-23 PROCEDURE — 84100 ASSAY OF PHOSPHORUS: CPT

## 2022-12-23 PROCEDURE — 96374 THER/PROPH/DIAG INJ IV PUSH: CPT

## 2022-12-23 RX ORDER — METOPROLOL TARTRATE 50 MG
100 TABLET ORAL
Refills: 0 | Status: DISCONTINUED | OUTPATIENT
Start: 2022-12-23 | End: 2022-12-23

## 2022-12-23 RX ORDER — DIGOXIN 250 MCG
62.5 TABLET ORAL DAILY
Refills: 0 | Status: DISCONTINUED | OUTPATIENT
Start: 2022-12-23 | End: 2022-12-23

## 2022-12-23 RX ORDER — METOPROLOL TARTRATE 50 MG
1 TABLET ORAL
Qty: 0 | Refills: 0 | DISCHARGE

## 2022-12-23 RX ORDER — DIGOXIN 250 MCG
1 TABLET ORAL
Qty: 15 | Refills: 0
Start: 2022-12-23 | End: 2023-01-21

## 2022-12-23 RX ORDER — METOPROLOL TARTRATE 50 MG
1 TABLET ORAL
Qty: 60 | Refills: 0
Start: 2022-12-23 | End: 2023-01-21

## 2022-12-23 RX ORDER — DIGOXIN 250 MCG
1 TABLET ORAL
Qty: 30 | Refills: 0
Start: 2022-12-23 | End: 2023-01-21

## 2022-12-23 RX ORDER — DIGOXIN 250 MCG
1 TABLET ORAL
Qty: 0 | Refills: 0 | DISCHARGE

## 2022-12-23 RX ADMIN — Medication 75 MICROGRAM(S): at 05:26

## 2022-12-23 RX ADMIN — Medication 1: at 09:09

## 2022-12-23 RX ADMIN — Medication 3: at 12:29

## 2022-12-23 RX ADMIN — Medication 50 MILLIGRAM(S): at 05:26

## 2022-12-23 RX ADMIN — APIXABAN 2.5 MILLIGRAM(S): 2.5 TABLET, FILM COATED ORAL at 09:10

## 2022-12-23 RX ADMIN — Medication 30 MILLIGRAM(S): at 12:30

## 2022-12-23 RX ADMIN — Medication 30 MILLIGRAM(S): at 05:26

## 2022-12-23 RX ADMIN — MEMANTINE HYDROCHLORIDE 10 MILLIGRAM(S): 10 TABLET ORAL at 05:26

## 2022-12-23 NOTE — PROGRESS NOTE ADULT - SUBJECTIVE AND OBJECTIVE BOX
[facility] -- David Le,  Dylan, Siddhartha Grider Savella, [place]  Office # [phone]    Follow Up:  Afib    Subjective/Observations: No events overnight resting comfortably in bed.  No complaints of chest pain, dyspnea, or palpitations reported. No signs of orthopnea or PND.      REVIEW OF SYSTEMS: All other review of systems is negative unless indicated above  PAST MEDICAL & SURGICAL HISTORY:  Atrial fibrillation      Hypothyroidism      Mild Alzheimer&#x27;s dementia      Diabetes mellitus      History of appendectomy      H/O hernia repair      History of cholecystectomy        MEDICATIONS  (STANDING):  apixaban 2.5 milliGRAM(s) Oral every 12 hours  atorvastatin 40 milliGRAM(s) Oral at bedtime  dextrose 5%. 1000 milliLiter(s) (50 mL/Hr) IV Continuous <Continuous>  dextrose 5%. 1000 milliLiter(s) (100 mL/Hr) IV Continuous <Continuous>  dextrose 50% Injectable 25 Gram(s) IV Push once  dextrose 50% Injectable 12.5 Gram(s) IV Push once  dextrose 50% Injectable 25 Gram(s) IV Push once  diltiazem    Tablet 30 milliGRAM(s) Oral every 6 hours  glucagon  Injectable 1 milliGRAM(s) IntraMuscular once  insulin lispro (ADMELOG) corrective regimen sliding scale   SubCutaneous three times a day before meals  insulin lispro (ADMELOG) corrective regimen sliding scale   SubCutaneous at bedtime  levothyroxine 75 MICROGram(s) Oral daily  memantine 10 milliGRAM(s) Oral two times a day  metoprolol succinate ER 50 milliGRAM(s) Oral every 12 hours  rivastigmine patch  9.5 mG/24 Hr(s) 1 Patch Transdermal every 24 hours    MEDICATIONS  (PRN):  acetaminophen     Tablet .. 650 milliGRAM(s) Oral every 6 hours PRN Temp greater or equal to 38C (100.4F), Mild Pain (1 - 3)  aluminum hydroxide/magnesium hydroxide/simethicone Suspension 30 milliLiter(s) Oral every 4 hours PRN Dyspepsia  dextrose Oral Gel 15 Gram(s) Oral once PRN Blood Glucose LESS THAN 70 milliGRAM(s)/deciliter  melatonin 3 milliGRAM(s) Oral at bedtime PRN Insomnia  ondansetron Injectable 4 milliGRAM(s) IV Push every 8 hours PRN Nausea and/or Vomiting    Allergies    No Known Allergies    Intolerances      Vital Signs Last 24 Hrs  T(C): 36.4 (23 Dec 2022 04:15), Max: 36.7 (22 Dec 2022 21:04)  T(F): 97.6 (23 Dec 2022 04:15), Max: 98 (22 Dec 2022 21:04)  HR: 85 (23 Dec 2022 04:15) (85 - 116)  BP: 137/87 (23 Dec 2022 04:15) (136/95 - 142/88)  BP(mean): --  RR: 18 (23 Dec 2022 04:15) (16 - 18)  SpO2: 92% (23 Dec 2022 04:15) (92% - 97%)    Parameters below as of 23 Dec 2022 04:15  Patient On (Oxygen Delivery Method): room air      I&O's Summary    22 Dec 2022 07:01  -  23 Dec 2022 07:00  --------------------------------------------------------  IN: 200 mL / OUT: 0 mL / NET: 200 mL        TELE: Afib rate controlled, 6 beats of NSVTs  PHYSICAL EXAM:  Constitutional: NAD, awake and alert, well-developed  HEENT: Moist Mucous Membranes, Anicteric  Pulmonary: Non-labored, breath sounds are clear bilaterally, No wheezing, crackles or rhonchi  Cardiovascular: IRRegular, S1 and S2 nl, No murmurs, rubs, gallops or clicks  Gastrointestinal: Bowel Sounds present, soft, nontender.   Lymph: No lymphadenopathy. No peripheral edema.  Skin: No visible rashes or ulcers.  Psych:  Mood & affect appropriate  LABS: All Labs Reviewed:                        10.9   8.42  )-----------( 268      ( 23 Dec 2022 06:50 )             34.4                         11.4   8.91  )-----------( 288      ( 22 Dec 2022 06:25 )             36.2                         11.8   8.80  )-----------( 285      ( 21 Dec 2022 10:20 )             38.3     23 Dec 2022 06:50    140    |  107    |  31     ----------------------------<  170    4.5     |  26     |  1.40   22 Dec 2022 06:25    141    |  109    |  30     ----------------------------<  186    5.1     |  27     |  1.40   21 Dec 2022 10:20    138    |  108    |  29     ----------------------------<  265    4.8     |  22     |  1.60     Ca    9.1        23 Dec 2022 06:50  Ca    9.7        22 Dec 2022 06:25  Ca    9.3        21 Dec 2022 10:20  Phos  3.9       22 Dec 2022 06:25  Mg     1.8       22 Dec 2022 06:25    TPro  7.3    /  Alb  2.5    /  TBili  1.3    /  DBili  x      /  AST  42     /  ALT  45     /  AlkPhos  53     23 Dec 2022 06:50  TPro  7.9    /  Alb  2.9    /  TBili  1.4    /  DBili  x      /  AST  40     /  ALT  48     /  AlkPhos  57     22 Dec 2022 06:25  TPro  8.3    /  Alb  2.9    /  TBili  1.1    /  DBili  x      /  AST  57     /  ALT  56     /  AlkPhos  59     21 Dec 2022 10:20      CARDIAC MARKERS ( 22 Dec 2022 06:25 )  x     / x     / 42 U/L / x     / 2.2 ng/mL  CARDIAC MARKERS ( 21 Dec 2022 18:11 )  x     / x     / 48 U/L / x     / 3.4 ng/mL  CARDIAC MARKERS ( 21 Dec 2022 13:00 )  x     / x     / 47 U/L / x     / 2.8 ng/mL  CARDIAC MARKERS ( 21 Dec 2022 10:20 )  x     / x     / 83 U/L / x     / 3.9 ng/mL      12 Lead ECG:   Ventricular Rate 118 BPM    QRS Duration 86 ms    Q-T Interval 304 ms    QTC Calculation(Bazett) 426 ms    R Axis 8 degrees    T Axis -76 degrees    Diagnosis Line Atrial fibrillation with rapid ventricular response with premature ventricular or aberrantly conducted complexes  Minimal voltage criteria for LVH, may be normal variant ( [place] product )  Nonspecific ST and T wave abnormality  Abnormal ECG  When compared with ECG of 12-OCT-2011 23:06,  Significant changes have occurred  Confirmed by CR GRAJEDA ([id number]) on 12/21/2022 5:39:14 PM (12-21-22 @ 10:18)

## 2022-12-23 NOTE — PROGRESS NOTE ADULT - SUBJECTIVE AND OBJECTIVE BOX
Patient seen and examined at bedside. States she feels well, denies any chest pain, SOB, abd pain. Eager to go home. Prelim TTE showing global hypokinesis with EF 25%, discussed with Cardio- Dr. Grider, patient euvolemic on exam, Afib rate controlled, stop cardizem, increase metoprolol to 100mg BID. Cr slightly elevated, Nephro consulted, recommend restart digoxin at lower dose. Advised close outpatient follow up with Cardio and Nephrology upon discharge. Plan of care discussed with daughter at bedside who is agreeable with D/C plan.    Physical Exam:  GENERAL: NAD. sitting in chair  HEENT:  anicteric, moist mucous membranes  CHEST/LUNG:  CTA b/l, no rales, wheezes, or rhonchi  HEART:  Irregularly irregular  ABDOMEN:  BS+, soft, nontender, nondistended  EXTREMITIES: no edema, cyanosis, or calf tenderness  NERVOUS SYSTEM: answers questions and follows commands appropriately    Please refer to updated D/C note for further details.

## 2022-12-23 NOTE — DISCHARGE NOTE NURSING/CASE MANAGEMENT/SOCIAL WORK - NSSCTYPOFSERV_GEN_ALL_CORE
Nursing.  Agency will call to arrange first visit. Requesting start of care 12/24.   Family is aware that first visit may be delayed due to holiday weekend.

## 2022-12-23 NOTE — CONSULT NOTE ADULT - ASSESSMENT
CKD Stage 3b  A-fib  HTN with CKD  DM2      -Stable renal function at baseline; outpatient Creatinine 1.2-1.4 per primary tean  -Current eGFR 37ml/min  -Recommend Digoxin maintenance dose to be 62.5mcg because eGFR < 40ml/min; if the eGFR is > 40, we can do 125mcg daily for maintenance based on data  -BP stable on current regimen  -Insulin per primary team    Renally stable for DC; follow up outpatient    D/W Daughter and Dr. Toney

## 2022-12-23 NOTE — DISCHARGE NOTE NURSING/CASE MANAGEMENT/SOCIAL WORK - NSDCPEFALRISK_GEN_ALL_CORE
For information on Fall & Injury Prevention, visit: [email or web address] OR  [email or web address] OR  https://www.cdc.gov/steadi/patient.html

## 2022-12-23 NOTE — PROGRESS NOTE ADULT - REASON FOR ADMISSION
Afib with RVR, pulmonary edema

## 2022-12-23 NOTE — DISCHARGE NOTE NURSING/CASE MANAGEMENT/SOCIAL WORK - PATIENT PORTAL LINK FT
You can access the FollowMyHealth Patient Portal offered by [facility] by registering at the following website: [email or web address]. By joining [employer]’s FollowMyHealth portal, you will also be able to view your health information using other applications (apps) compatible with our system.

## 2022-12-23 NOTE — PROGRESS NOTE ADULT - ASSESSMENT
87-year-old female with PMHx of atrial fibrillation on eliquis ,hypothyroid ,hypothyroidism, alzheimer's presenting to ED with SOB 2/2 afib w/ RVR    Afib w/ RVR   - initial EKG afib with RVR HR 118bpm with tele up to 130s  - now rate controlled  - continue metoprolol 50mg q 12 hours and cardizem 30mg q6hrs  - is on digoxin at home, held for YVETTE   - thromboembolism on eliquis renally dosed     -s/p lasix iv x 1  - now euvolemic on exam,  laying flat on room air   - echo from office with inferior hypokinesis with normal overall EF-decision was made to be conservative. no ischemic testing was performed.  -  follow up repeat echo, follows with Dr Grider     - Troponin peaked now downtrending , no anginal complaints likely elevated 2/2 demand in the setting of rapid afib  - continue statin

## 2022-12-23 NOTE — CONSULT NOTE ADULT - SUBJECTIVE AND OBJECTIVE BOX
Patient is a 87y old  Female who presents with a chief complaint of Afib with RVR, pulmonary edema (23 Dec 2022 09:13)       HPI:  88yo F with PMH Afib on Eliquis, DM2 on metformin, hypothyroidism, mild Alzheimer's dementia, presents to the ED with SOB since 3:00. History was taken from the patient with some assistance from daughter at bedside. Patient reports that she was not feeling well at 3am this morning, described that she felt winded, had palpitations, and felt bloated. Patient used the bathroom and then returned to sleep with an additional pillow. Patient reports that she felt SOB with lying flat and the additional pillow made her feel better. Patient recalls feeling winded in the last couple of days, especially when using the stairs. Patient's bedroom is on ground floor but she does use the stairs to do laundry in the basement. Patient had breakfast and tolerated her meal. Patient was taken to her neurologist Dr. Brannon for her appointment for managing her Alzheimer's disease. Patient mentioned that she was feeling winded and Dr. Brannon called patient's PCP Dr. Shukla who told patient to go to [facility] ED. Patient reports that her SOB has since resolved in the ED with treatment.     Denies fever, chills, chest pain, palpitations, cough, abdominal pain, nausea, vomiting, diarrhea, constipation, urinary frequency, urgency, or dysuria, headaches, changes in vision, dizziness, numbness, tingling.    ED Course:   Vitals: BP: 164/107, HR:137, Temp: 97F, RR: 20, SpO2: 93% on RA  Labs: Troponin 868.7>866.2, BUN/Cr 29/1.6, Gluc 265, BNP 86812  CXR (personally reviewed): curly B lines, consistent with thickening of the interlobular septa, no pneumothorax, no pleural effusions, no evidence of pneumonia  EKG (personally reviewed): Afib with RVR, at 118bpm  Received in the ED: cardizem PO 30mg x1, cardizem IVP 10x1, furosemide 20mg IVP x1     (21 Dec 2022 17:55)       Renal consulted for recommendation on digoxin dosing. Patient with no complaints and feels better.     PAST MEDICAL & SURGICAL HISTORY:  Atrial fibrillation      Hypothyroidism      Mild Alzheimer&#x27;s dementia      Diabetes mellitus      History of appendectomy      H/O hernia repair      History of cholecystectomy           FAMILY HISTORY:  Family history of cerebral hemorrhage (Mother)    FH: renal failure (Father)    NC    Social History:Non smoker    MEDICATIONS  (STANDING):  apixaban 2.5 milliGRAM(s) Oral every 12 hours  atorvastatin 40 milliGRAM(s) Oral at bedtime  dextrose 5%. 1000 milliLiter(s) (50 mL/Hr) IV Continuous <Continuous>  dextrose 5%. 1000 milliLiter(s) (100 mL/Hr) IV Continuous <Continuous>  dextrose 50% Injectable 25 Gram(s) IV Push once  dextrose 50% Injectable 12.5 Gram(s) IV Push once  dextrose 50% Injectable 25 Gram(s) IV Push once  diltiazem    Tablet 30 milliGRAM(s) Oral every 6 hours  glucagon  Injectable 1 milliGRAM(s) IntraMuscular once  insulin lispro (ADMELOG) corrective regimen sliding scale   SubCutaneous three times a day before meals  insulin lispro (ADMELOG) corrective regimen sliding scale   SubCutaneous at bedtime  levothyroxine 75 MICROGram(s) Oral daily  memantine 10 milliGRAM(s) Oral two times a day  metoprolol succinate ER 50 milliGRAM(s) Oral every 12 hours  rivastigmine patch  9.5 mG/24 Hr(s) 1 Patch Transdermal every 24 hours    MEDICATIONS  (PRN):  acetaminophen     Tablet .. 650 milliGRAM(s) Oral every 6 hours PRN Temp greater or equal to 38C (100.4F), Mild Pain (1 - 3)  aluminum hydroxide/magnesium hydroxide/simethicone Suspension 30 milliLiter(s) Oral every 4 hours PRN Dyspepsia  dextrose Oral Gel 15 Gram(s) Oral once PRN Blood Glucose LESS THAN 70 milliGRAM(s)/deciliter  melatonin 3 milliGRAM(s) Oral at bedtime PRN Insomnia  ondansetron Injectable 4 milliGRAM(s) IV Push every 8 hours PRN Nausea and/or Vomiting      Allergies    No Known Allergies    Intolerances         REVIEW OF SYSTEMS:    Review of Systems:   Constitutional: Denies fatigue  HEENT: Denies headaches and dizziness  Respiratory: denies SOB, cough, or wheezing  Cardiovascular: denies CP, palpitations  Gastrointestinal: Denies nausea, denies vomiting, diarrhea, constipation, abdominal pain, or bloody stools  Genitourinary: denies painful urination, increased frequency, urgency, or bloody urine  Skin: denies rashes or itching  Musculoskeletal: denies muscle aches, joint swelling  Neurologic: Denies generalized weakness, denies loss of sensation, numbness, or tingling      Vital Signs Last 24 Hrs  T(C): 36.4 (23 Dec 2022 04:15), Max: 36.7 (22 Dec 2022 21:04)  T(F): 97.6 (23 Dec 2022 04:15), Max: 98 (22 Dec 2022 21:04)  HR: 85 (23 Dec 2022 04:15) (85 - 99)  BP: 137/87 (23 Dec 2022 04:15) (137/87 - 142/88)  BP(mean): --  RR: 18 (23 Dec 2022 04:15) (17 - 18)  SpO2: 92% (23 Dec 2022 04:15) (92% - 97%)    Parameters below as of 23 Dec 2022 04:15  Patient On (Oxygen Delivery Method): room air        PHYSICAL EXAM:    GENERAL: NAD  HEAD:  Atraumatic, Normocephalic  EYES: EOMI, conjunctiva and sclera clear  ENMT: No Drainage from nares, No drainage from ears  NECK: Supple, neck  veins full  NERVOUS SYSTEM:  Awake and Alert  CHEST/LUNG: Clear to percussion bilaterally; No rales, rhonchi, wheezing, or rubs  HEART: Regular rate and rhythm; No murmurs, rubs, or gallops  ABDOMEN: Soft, Nontender, Nondistended; Bowel sounds present  EXTREMITIES:  No Edema  SKIN: No rashes No obvious ecchymosis      LABS:                        10.9   8.42  )-----------( 268      ( 23 Dec 2022 06:50 )             34.4     12-23    140  |  107  |  31<H>  ----------------------------<  170<H>  4.5   |  26  |  1.40<H>    Ca    9.1      23 Dec 2022 06:50  Phos  3.9     12-22  Mg     1.8     12-22    TPro  7.3  /  Alb  2.5<L>  /  TBili  1.3<H>  /  DBili  x   /  AST  42<H>  /  ALT  45  /  AlkPhos  53  12-23                RADIOLOGY & ADDITIONAL TESTS:

## 2022-12-29 ENCOUNTER — EMERGENCY (EMERGENCY)
Facility: HOSPITAL | Age: 87
LOS: 1 days | Discharge: HOME | End: 2022-12-29
Attending: EMERGENCY MEDICINE | Admitting: EMERGENCY MEDICINE
Payer: MEDICARE

## 2022-12-29 VITALS
OXYGEN SATURATION: 98 % | TEMPERATURE: 99 F | DIASTOLIC BLOOD PRESSURE: 78 MMHG | HEART RATE: 100 BPM | SYSTOLIC BLOOD PRESSURE: 112 MMHG | RESPIRATION RATE: 20 BRPM

## 2022-12-29 VITALS
HEART RATE: 113 BPM | DIASTOLIC BLOOD PRESSURE: 83 MMHG | TEMPERATURE: 98 F | RESPIRATION RATE: 18 BRPM | SYSTOLIC BLOOD PRESSURE: 117 MMHG | WEIGHT: 117.95 LBS | HEIGHT: 62 IN | OXYGEN SATURATION: 99 %

## 2022-12-29 DIAGNOSIS — Z90.49 ACQUIRED ABSENCE OF OTHER SPECIFIED PARTS OF DIGESTIVE TRACT: Chronic | ICD-10-CM

## 2022-12-29 DIAGNOSIS — Z98.890 OTHER SPECIFIED POSTPROCEDURAL STATES: Chronic | ICD-10-CM

## 2022-12-29 LAB
ANION GAP SERPL CALC-SCNC: 8 MMOL/L — SIGNIFICANT CHANGE UP (ref 5–17)
BUN SERPL-MCNC: 36 MG/DL — HIGH (ref 7–23)
CALCIUM SERPL-MCNC: 8.8 MG/DL — SIGNIFICANT CHANGE UP (ref 8.5–10.1)
CHLORIDE SERPL-SCNC: 109 MMOL/L — HIGH (ref 96–108)
CO2 SERPL-SCNC: 23 MMOL/L — SIGNIFICANT CHANGE UP (ref 22–31)
CREAT SERPL-MCNC: 1.7 MG/DL — HIGH (ref 0.5–1.3)
DIGOXIN SERPL-MCNC: 0.4 NG/ML — LOW (ref 0.8–2)
EGFR: 29 ML/MIN/1.73M2 — LOW
GLUCOSE SERPL-MCNC: 303 MG/DL — HIGH (ref 70–99)
HCT VFR BLD CALC: 35.2 % — SIGNIFICANT CHANGE UP (ref 34.5–45)
HGB BLD-MCNC: 11 G/DL — LOW (ref 11.5–15.5)
MCHC RBC-ENTMCNC: 27.8 PG — SIGNIFICANT CHANGE UP (ref 27–34)
MCHC RBC-ENTMCNC: 31.3 GM/DL — LOW (ref 32–36)
MCV RBC AUTO: 89.1 FL — SIGNIFICANT CHANGE UP (ref 80–100)
NRBC # BLD: 0 /100 WBCS — SIGNIFICANT CHANGE UP (ref 0–0)
NT-PROBNP SERPL-SCNC: HIGH PG/ML (ref 0–450)
PLATELET # BLD AUTO: 314 K/UL — SIGNIFICANT CHANGE UP (ref 150–400)
POTASSIUM SERPL-MCNC: 4.6 MMOL/L — SIGNIFICANT CHANGE UP (ref 3.5–5.3)
POTASSIUM SERPL-SCNC: 4.6 MMOL/L — SIGNIFICANT CHANGE UP (ref 3.5–5.3)
RBC # BLD: 3.95 M/UL — SIGNIFICANT CHANGE UP (ref 3.8–5.2)
RBC # FLD: 15.9 % — HIGH (ref 10.3–14.5)
SODIUM SERPL-SCNC: 140 MMOL/L — SIGNIFICANT CHANGE UP (ref 135–145)
WBC # BLD: 9.52 K/UL — SIGNIFICANT CHANGE UP (ref 3.8–10.5)
WBC # FLD AUTO: 9.52 K/UL — SIGNIFICANT CHANGE UP (ref 3.8–10.5)

## 2022-12-29 PROCEDURE — 96374 THER/PROPH/DIAG INJ IV PUSH: CPT

## 2022-12-29 PROCEDURE — 83880 ASSAY OF NATRIURETIC PEPTIDE: CPT

## 2022-12-29 PROCEDURE — 99284 EMERGENCY DEPT VISIT MOD MDM: CPT

## 2022-12-29 PROCEDURE — 80048 BASIC METABOLIC PNL TOTAL CA: CPT

## 2022-12-29 PROCEDURE — 36415 COLL VENOUS BLD VENIPUNCTURE: CPT

## 2022-12-29 PROCEDURE — 85027 COMPLETE CBC AUTOMATED: CPT

## 2022-12-29 PROCEDURE — 99285 EMERGENCY DEPT VISIT HI MDM: CPT

## 2022-12-29 PROCEDURE — 93005 ELECTROCARDIOGRAM TRACING: CPT

## 2022-12-29 PROCEDURE — 99284 EMERGENCY DEPT VISIT MOD MDM: CPT | Mod: 25

## 2022-12-29 PROCEDURE — 93010 ELECTROCARDIOGRAM REPORT: CPT

## 2022-12-29 PROCEDURE — 80162 ASSAY OF DIGOXIN TOTAL: CPT

## 2022-12-29 RX ORDER — DIGOXIN 250 MCG
1 TABLET ORAL
Qty: 15 | Refills: 0
Start: 2022-12-29 | End: 2023-01-27

## 2022-12-29 RX ORDER — DIGOXIN 250 MCG
125 TABLET ORAL ONCE
Refills: 0 | Status: COMPLETED | OUTPATIENT
Start: 2022-12-29 | End: 2022-12-29

## 2022-12-29 RX ADMIN — Medication 125 MICROGRAM(S): at 17:09

## 2022-12-29 NOTE — ED ADULT TRIAGE NOTE - CHIEF COMPLAINT QUOTE
Patient BIB daughter with c/o fatigue, difficulty breathing, heart racing, denies chest pains. Trouble picking up pulse ox in triage 2/2 patient is cold, HR palp 90s-110s, feels irregular. Patient BIB daughter with c/o fatigue, difficulty breathing, heart racing, denies chest pains. Trouble picking up pulse ox in triage 2/2 patient is cold, HR palp 90s-120s, feels irregular. Daughter reports patient was seen here last week for the same complaint.

## 2022-12-29 NOTE — ED PROVIDER NOTE - PATIENT PORTAL LINK FT
You can access the FollowMyHealth Patient Portal offered by [facility] by registering at the following website: [email or web address]. By joining [employer]’s FollowMyHealth portal, you will also be able to view your health information using other applications (apps) compatible with our system.

## 2022-12-29 NOTE — ED PROVIDER NOTE - CARE PROVIDER_API CALL
John Harvey)  Cardiovascular Disease; Internal Medicine  [address], [place] [id number]  Phone: [phone]  Fax: [phone]  Follow Up Time:

## 2022-12-29 NOTE — CONSULT NOTE ADULT - SUBJECTIVE AND OBJECTIVE BOX
Reason for Consult: heart palpations and weakness     Interval HPI: Patient seen and examined at bedside. No acute events overnight.     HPI: Pt is an 86yo F with PMH Afib (EF 20-25%, 2022) on Eliquis, DM2 on metformin, hypothyroidism, mild Alzheimer's dementia, presents to the ED with heart palpitations and weakness. Pt was admitted on 12/21/2022 for similar symptoms. Daughter present at bedside. As per pt, this morning while eating breakfast her heart was racing and she "could not breath." Pt admits symptoms felt similar to her previous episode on 12/21 but reports this is "worse." Pt reports dizziness but denies any LOC, n//v, or chest tightness during episode. As per pt, since her previous visit she becomes easily fatigued while climbing the stairs. Pt reports she still sleeps with two pillows and is able to lay flat on her back. Pt reports having dysuria for the past two days. As per pt, her home health aid gave her cranberry juice and the symptoms have improved. As per daughter, her mother has been anxious recently.       PAST MEDICAL & SURGICAL HISTORY:  Atrial fibrillation      Hypothyroidism      Mild Alzheimer&#x27;s dementia      Diabetes mellitus      History of appendectomy      H/O hernia repair      History of cholecystectomy          SOCIAL HISTORY: No active tobacco, alcohol or illicit drug use    FAMILY HISTORY:  Family history of cerebral hemorrhage (Mother)    FH: renal failure (Father)        Home Medications:  Crestor 10 mg oral tablet: 1 tab(s) orally once a day (29 Dec 2022 09:08)  Eliquis 2.5 mg oral tablet: 1 tab(s) orally 2 times a day (29 Dec 2022 09:08)  levothyroxine 75 mcg (0.075 mg) oral tablet: 1 tab(s) orally once a day (29 Dec 2022 09:08)  memantine 10 mg oral tablet: 1 tab(s) orally 2 times a day (29 Dec 2022 09:08)  metFORMIN 1000 mg oral tablet: 1 tab(s) orally 2 times a day (29 Dec 2022 09:08)  rivastigmine 9.5 mg/24 hr transdermal film, extended release: 1 patch transdermal once a day (21 Dec 2022 17:55)  Vitamin D3 50 mcg (2000 intl units) oral tablet: 1 tab(s) orally once a day (21 Dec 2022 17:55)      MEDICATIONS  (STANDING):    MEDICATIONS  (PRN):      Allergies    No Known Allergies    Intolerances        REVIEW OF SYSTEMS: Negative except as per HPI.    VITAL SIGNS:   Vital Signs Last 24 Hrs  T(C): 36.8 (29 Dec 2022 09:04), Max: 36.8 (29 Dec 2022 09:04)  T(F): 98.2 (29 Dec 2022 09:04), Max: 98.2 (29 Dec 2022 09:04)  HR: 113 (29 Dec 2022 09:04) (113 - 113)  BP: 117/83 (29 Dec 2022 09:04) (117/83 - 117/83)  BP(mean): --  RR: 18 (29 Dec 2022 09:04) (18 - 18)  SpO2: 99% (29 Dec 2022 09:04) (99% - 99%)    Parameters below as of 29 Dec 2022 09:04  Patient On (Oxygen Delivery Method): room air        I&O's Summary      PHYSICAL EXAM:  Constitutional: A&ox3 Pt laying comfortably in bed in NAD, well-developed  HEENT NC/AT, moist mucous membranes  Pulmonary: Non-labored, breath sounds are clear bilaterally, no wheezing, rales or rhonchi  Cardiovascular: +S1, S2, RRR, no murmur  Gastrointestinal: Soft, nontender, nondistended, normoactive bowel sounds  Extremities: No peripheral edema   Neurological: Alert  Skin: No obvious lesions/rashes  Psych: Mood & affect appropriate. Answers questions appropriately     LABS: All Labs Reviewed:                        11.0   9.52  )-----------( 314      ( 29 Dec 2022 09:40 )             35.2     29 Dec 2022 09:40    140    |  109    |  36     ----------------------------<  303    4.6     |  23     |  1.70     Ca    8.8        29 Dec 2022 09:40            Blood Culture:   12-29 @ 09:40  Pro Bnp [id number]        EKG:    RADIOLOGY:    CXR:   Reason for Consult: heart palpations and weakness     Interval HPI: Patient seen and examined at bedside. No acute events overnight.     HPI: Pt is an 86yo F with PMH Afib on eliquis, CHF (EF 20-25%, 12/2022), DM2 on metformin, hypothyroidism, mild Alzheimer's dementia, presents to the ED with heart palpitations and weakness onset today. Pt was admitted on 12/21/2022 for similar symptoms; her cardizem was stopped due to her severe LV dysfunction, and her digoxin was decreased due to YVETTE, and her metoprolol was increased. She was discharged home. She returns with Daughter present at bedside. As per pt, this morning while eating breakfast her heart was racing and she "could not breath." Pt admits symptoms felt similar to her previous episode on 12/21 but reports this is "worse." Pt reports dizziness but denies any LOC, n//v, or chest tightness during episode. As per pt, since her previous visit she becomes easily fatigued while climbing the stairs. Pt reports she still sleeps with two pillows and is able to lay flat on her back. Pt reports having dysuria for the past two days. As per pt, her home health aid gave her cranberry juice and the symptoms have improved. As per daughter, her mother has been anxious recently.       PAST MEDICAL & SURGICAL HISTORY:  Atrial fibrillation      Hypothyroidism      Mild Alzheimer&#x27;s dementia      Diabetes mellitus      History of appendectomy      H/O hernia repair      History of cholecystectomy          SOCIAL HISTORY: No active tobacco, alcohol or illicit drug use    FAMILY HISTORY:  Family history of cerebral hemorrhage (Mother)    FH: renal failure (Father)        Home Medications:  Crestor 10 mg oral tablet: 1 tab(s) orally once a day (29 Dec 2022 09:08)  Eliquis 2.5 mg oral tablet: 1 tab(s) orally 2 times a day (29 Dec 2022 09:08)  levothyroxine 75 mcg (0.075 mg) oral tablet: 1 tab(s) orally once a day (29 Dec 2022 09:08)  memantine 10 mg oral tablet: 1 tab(s) orally 2 times a day (29 Dec 2022 09:08)  metFORMIN 1000 mg oral tablet: 1 tab(s) orally 2 times a day (29 Dec 2022 09:08)  rivastigmine 9.5 mg/24 hr transdermal film, extended release: 1 patch transdermal once a day (21 Dec 2022 17:55)  Vitamin D3 50 mcg (2000 intl units) oral tablet: 1 tab(s) orally once a day (21 Dec 2022 17:55)      MEDICATIONS  (STANDING):    MEDICATIONS  (PRN):      Allergies    No Known Allergies    Intolerances        REVIEW OF SYSTEMS: Negative except as per HPI.    VITAL SIGNS:   Vital Signs Last 24 Hrs  T(C): 36.8 (29 Dec 2022 09:04), Max: 36.8 (29 Dec 2022 09:04)  T(F): 98.2 (29 Dec 2022 09:04), Max: 98.2 (29 Dec 2022 09:04)  HR: 113 (29 Dec 2022 09:04) (113 - 113)  BP: 117/83 (29 Dec 2022 09:04) (117/83 - 117/83)  BP(mean): --  RR: 18 (29 Dec 2022 09:04) (18 - 18)  SpO2: 99% (29 Dec 2022 09:04) (99% - 99%)    Parameters below as of 29 Dec 2022 09:04  Patient On (Oxygen Delivery Method): room air        I&O's Summary      PHYSICAL EXAM:  Constitutional: A&ox3 Pt laying comfortably in bed in NAD, well-developed  HEENT NC/AT, moist mucous membranes  Pulmonary: Non-labored, breath sounds are clear bilaterally, no wheezing, rales or rhonchi  Cardiovascular: +S1, S2, RRR, no murmur  Gastrointestinal: Soft, nontender, nondistended, normoactive bowel sounds  Extremities: No peripheral edema   Neurological: Alert  Skin: No obvious lesions/rashes  Psych: Mood & affect appropriate. Answers questions appropriately     LABS: All Labs Reviewed:                        11.0   9.52  )-----------( 314      ( 29 Dec 2022 09:40 )             35.2     29 Dec 2022 09:40    140    |  109    |  36     ----------------------------<  303    4.6     |  23     |  1.70     Ca    8.8        29 Dec 2022 09:40            Blood Culture:   12-29 @ 09:40  Pro Bnp [id number]        EKG:    RADIOLOGY:    CXR:

## 2022-12-29 NOTE — ED ADULT NURSE NOTE - OBJECTIVE STATEMENT
patient came in for palpitations. daughter at bedside. patient states she felt her heart racing and couldn't breathe. daughter decided to get her checked out. patient was recently here for the same thing. patient stated she is feeling better now.

## 2022-12-29 NOTE — ED PROVIDER NOTE - NSFOLLOWUPINSTRUCTIONS_ED_ALL_ED_FT
Rest.  Increase fluid intake.  Increase dose of digoxin.  New dose is 0.125 milligrams every other day.  Follow-up with your cardiologist as directed and as scheduled.  Return here if needed.        [employer]® CareNotes®     :  [facility]  	                     A-FIB (ATRIAL FIBRILLATION) - AfterCare(R) Instructions(ER/ED)            A-fib (Atrial Fibrillation)    WHAT YOU NEED TO KNOW:    Atrial fibrillation (A-fib) is an irregular heartbeat. It reduces your heart's ability to pump blood through your body. A-fib may come and go, or it may be a long-term condition. A-fib can cause blood clots, stroke, or heart failure. These conditions may become life-threatening. It is important to treat and manage A-fib to help prevent a blood clot, stroke, or heart failure.  Heart Chambers         DISCHARGE INSTRUCTIONS:    Call your local emergency number (911 in the [place]) or have someone call if:   •You have any of the following signs of a heart attack: ?Squeezing, pressure, or pain in your chest      ?You may also have any of the following: ?Discomfort or pain in your back, neck, jaw, stomach, or arm      ?Shortness of breath      ?Nausea or vomiting      ?Lightheadedness or a sudden cold sweat        •You have any of the following signs of a stroke: ?Numbness or drooping on one side of your face       ?Weakness in an arm or leg      ?Confusion or difficulty speaking      ?Dizziness, a severe headache, or vision loss        Return to the emergency department if:   •Your arm or leg feels warm, tender, and painful. It may look swollen and red.      •Your heart rate is more than 110 beats per minute.      •You are short of breath, even at rest.      Call your doctor or cardiologist if:   •You have new or worsening swelling in your legs, feet, ankles, or abdomen.       •You have questions or concerns about your condition or care.      Medicines: You may need any of the following:  •Heart medicines help control your heart rate or rhythm. You may need more than one medicine to treat your symptoms.      •Blood thinners help prevent blood clots. Clots can cause strokes, heart attacks, and death. The following are general safety guidelines to follow while you are taking a blood thinner:?Watch for bleeding and bruising while you take blood thinners. Watch for bleeding from your gums or nose. Watch for blood in your urine and bowel movements. Use a soft washcloth on your skin, and a soft toothbrush to brush your teeth. This can keep your skin and gums from bleeding. If you shave, use an electric shaver. Do not play contact sports.       ?Tell your dentist and other healthcare providers that you take a blood thinner. Wear a bracelet or necklace that says you take this medicine.       ?Do not start or stop any other medicines unless your healthcare provider tells you to. Many medicines cannot be used with blood thinners.      ?Take your blood thinner exactly as prescribed by your healthcare provider. Do not skip does or take less than prescribed. Tell your provider right away if you forget to take your blood thinner, or if you take too much.      ?Warfarin is a blood thinner that you may need to take. The following are things you should be aware of if you take warfarin: ?Foods and medicines can affect the amount of warfarin in your blood. Do not make major changes to your diet while you take warfarin. Warfarin works best when you eat about the same amount of vitamin K every day. Vitamin K is found in green leafy vegetables and certain other foods. Ask for more information about what to eat when you are taking warfarin.      ?You will need to see your healthcare provider for follow-up visits when you are on warfarin. You will need regular blood tests. These tests are used to decide how much medicine you need.         •Antiplatelets, such as aspirin, help prevent blood clots. Take your antiplatelet medicine exactly as directed. These medicines make it more likely for you to bleed or bruise. If you are told to take aspirin, do not take acetaminophen or ibuprofen instead.      •Take your medicine as directed. Contact your healthcare provider if you think your medicine is not helping or if you have side effects. Tell your provider if you are allergic to any medicine. Keep a list of the medicines, vitamins, and herbs you take. Include the amounts, and when and why you take them. Bring the list or the pill bottles to follow-up visits. Carry your medicine list with you in case of an emergency.      Manage A-fib:   •Know your target heart rate. Learn how to check your pulse and monitor your heart rate.  How to Take a Pulse           •Know the risks if you choose to drink alcohol. Alcohol can increase your risk for A-fib or make A-fib harder to manage. Ask your healthcare provider if it is okay for you to drink any alcohol. He or she can help you set limits for the number of drinks you have in 24 hours and in a week. A drink of alcohol is 12 ounces of beer, 5 ounces of wine, or 1½ ounces of liquor.      •Do not smoke. Nicotine can cause heart damage and make it more difficult to manage your A-fib. Do not use e-cigarettes or smokeless tobacco in place of cigarettes or to help you quit. They still contain nicotine. Ask your healthcare provider for information if you currently smoke and need help quitting.      •Eat heart-healthy foods. Heart healthy foods will help keep your cholesterol low. These include fruits, vegetables, whole-grain breads, low-fat dairy products, beans, lean meats, and fish. Replace butter and margarine with heart-healthy oils such as olive oil and canola oil.             •Maintain a healthy weight. Ask your healthcare provider what a healthy weight is for you. Ask him or her to help you create a safe weight loss plan if needed. Even a small goal of a 10% weight loss can improve your heart health.      •Get regular physical activity. Physical activity helps improve your heart health. Get at least 150 minutes of moderate aerobic physical activity each week. Your healthcare provider can help you create an activity plan.  [employer] Walking for Exercise           •Manage other health conditions. This includes high blood pressure or cholesterol, sleep apnea, diabetes, and other heart conditions. Take medicine as directed and follow your treatment plan. Your healthcare provider may need to change a medicine you are taking if it is causing your A-fib. Do not stop taking any medicine unless directed by your provider.      Follow up with your doctor or cardiologist as directed: You will need regular blood tests and monitoring. Write down your questions so you remember to ask them during your visits.       © Copyright [employer] 2022           back to top                          © Copyright [employer] 2022

## 2022-12-29 NOTE — ED PROVIDER NOTE - CLINICAL SUMMARY MEDICAL DECISION MAKING FREE TEXT BOX
Palpitations in this elderly female with known history of atrial fibrillation requiring evaluation labs ECG and cardiology consultation.

## 2022-12-29 NOTE — ED PROVIDER NOTE - PHYSICAL EXAMINATION
Gen: WD/WN Elderly white female in NAD  Head:  NC/AT  ENT:  PERRLA, EOMI, Mucous membranes moist  Neck: Supple/No Midline tenderness/No meningismus  CV:  Irregular irregular w/o any murmurs/rubs or gallops  Pulm:  Clear to A and P  Abd:  Soft, nontender, nondistended, +BS, no rebound/guarding  Ext:  warm, well perfused, moving all extremities spontaneously, no peripheral edema, distal pulses intact  Skin: intact w/o rash  Neuro:  A&Ox3, no focal neuro deficit, speech clear

## 2022-12-29 NOTE — CONSULT NOTE ADULT - ASSESSMENT
Pt is an 88yo F with PMH Afib (EF 20-25%, 2022) on Eliquis, DM2 on metformin, hypothyroidism, mild Alzheimer's dementia, presents to the ED with heart palpitations and weakness.     #Afib w/ rate control   - pt reports having heart palpitations and weakness this morning  - patient is on toprol 100mg BID and digoxin 62.5mcg every other day  - will check digoxin levels given elevated Cr  - If dig levels are elevated, will hold Digoxin and continue Toprol 100mg BID  - If dig levels are low, will increase Digoxin to 62.5mcg QD  - Cr 1.70 today; Cr 1.40 noted on last inpatient labs 12/23/2022  - f/u out pt w/    - cont eliquis 2.5mg BID for AC    Pt is an 88yo F with PMH Afib on eliquis, CHF (EF 20-25%, 12/2022), DM2 on metformin, hypothyroidism, mild Alzheimer's dementia, presents to the ED with heart palpitations and weakness onset this morning.      #Afib  - Pt was recently admitted on 12/21/2022: her cardizem was stopped due to her severe LV dysfunction, her digoxin was decreased due to YVETTE, and her metoprolol was increased  - she now reports having heart palpitations and weakness this morning  - patient is compliant with her toprol 100mg BID and digoxin 62.5mcg every other day  - EKG afib 95bpm  - Cr 1.70 today; Cr 1.40 noted on last inpatient labs 12/23/2022; elevated Cr could be due to dehydration vs digoxin  - recommend checking digoxin levels  - If dig levels are elevated, will hold Digoxin and consider increasing Toprol dose  - If dig levels are low, will increase Digoxin from every other day to 62.5mcg QD  - f/u out pt w/  appt 1/3/22  - cont eliquis 2.5mg BID for AC    Pt is an 88yo F with PMH Afib on eliquis, CHF (EF 20-25%, 12/2022), DM2 on metformin, hypothyroidism, mild Alzheimer's dementia, presents to the ED with heart palpitations and weakness onset this morning.      #Afib  - Pt was recently admitted on 12/21/2022: her cardizem was stopped due to her severe LV dysfunction, her digoxin was decreased due to YVETTE, and her metoprolol was increased  - she now reports having heart palpitations and weakness this morning  - patient is compliant with her toprol 100mg BID and digoxin 62.5mcg every other day  - EKG afib 95bpm; Bnp 15k  - no signs or sx of acs or volume overload   - Cr 1.70 today; Cr 1.40 noted on last inpatient labs 12/23/2022; elevated Cr could be due to dehydration vs digoxin  - recommend checking digoxin levels  - If dig levels are elevated, will hold Digoxin and consider increasing Toprol dose  - If dig levels are low, will increase Digoxin from every other day to 62.5mcg QD  - f/u out pt w/  appt 1/3/22  - cont eliquis 2.5mg BID for AC

## 2022-12-29 NOTE — CONSULT NOTE ADULT - ATTENDING COMMENTS
recent presentation for af with rvr, initially rate controlled with cardizem which was stopped because of new diagnosis of lv dysfunction.  palpitations this am likely from af, though rates now ok  await digoxin level  cont metprolol 100 bid   will hopefully be able to dc home with outpatient fu with Dr. Grider next week

## 2022-12-29 NOTE — ED PROVIDER NOTE - OBJECTIVE STATEMENT
87-year-old white female with history of hypertension hyperlipidemia diabetes mild Alzheimer's dementia as well as atrial fibrillation who presents now to the emergency department at [facility] complaining of palpitations and feeling of weakness since this morning.  Patient denies any chest pain or shortness of breath no dizziness no lightheadedness no syncope.  Patient was hospitalized here last week for 3 days on telemetry for rapid atrial fibrillation.

## 2022-12-29 NOTE — ED ADULT NURSE NOTE - CHIEF COMPLAINT QUOTE
Patient BIB daughter with c/o fatigue, difficulty breathing, heart racing, denies chest pains. Trouble picking up pulse ox in triage 2/2 patient is cold, HR palp 90s-110s, feels irregular.

## 2022-12-29 NOTE — ED PROVIDER NOTE - PROGRESS NOTE DETAILS
Patient was reevaluated numerous times while in the emergency department was already seen by cardiology Dr. Harvey and it was decided that patient can be discharged increasing the dose of digoxin from 0.0626 to 0.125 mg every other day.  This was explained to patient and daughter and they agree and they understand.  At the present time patient feels much better heart rate approximately 70-90

## 2022-12-30 PROBLEM — G30.9 ALZHEIMER'S DISEASE, UNSPECIFIED: Chronic | Status: ACTIVE | Noted: 2022-12-21

## 2022-12-30 PROBLEM — I48.91 UNSPECIFIED ATRIAL FIBRILLATION: Chronic | Status: ACTIVE | Noted: 2022-12-21

## 2022-12-30 PROBLEM — E11.9 TYPE 2 DIABETES MELLITUS WITHOUT COMPLICATIONS: Chronic | Status: ACTIVE | Noted: 2022-12-21

## 2022-12-30 PROBLEM — E03.9 HYPOTHYROIDISM, UNSPECIFIED: Chronic | Status: ACTIVE | Noted: 2022-12-21

## 2023-01-03 ENCOUNTER — NON-APPOINTMENT (OUTPATIENT)
Age: 88
End: 2023-01-03

## 2023-01-03 ENCOUNTER — APPOINTMENT (OUTPATIENT)
Dept: CARDIOLOGY | Facility: CLINIC | Age: 88
End: 2023-01-03
Payer: MEDICARE

## 2023-01-03 VITALS
WEIGHT: 120 LBS | BODY MASS INDEX: 22.66 KG/M2 | SYSTOLIC BLOOD PRESSURE: 120 MMHG | HEIGHT: 61 IN | DIASTOLIC BLOOD PRESSURE: 73 MMHG | HEART RATE: 85 BPM | OXYGEN SATURATION: 96 %

## 2023-01-03 DIAGNOSIS — Z00.00 ENCOUNTER FOR GENERAL ADULT MEDICAL EXAMINATION W/OUT ABNORMAL FINDINGS: ICD-10-CM

## 2023-01-03 PROCEDURE — 93000 ELECTROCARDIOGRAM COMPLETE: CPT

## 2023-01-03 PROCEDURE — 99215 OFFICE O/P EST HI 40 MIN: CPT

## 2023-01-03 RX ORDER — LEVOTHYROXINE SODIUM 0.07 MG/1
75 TABLET ORAL
Qty: 90 | Refills: 0 | Status: DISCONTINUED | COMMUNITY
Start: 2021-08-25 | End: 2023-01-03

## 2023-01-03 RX ORDER — AMOXICILLIN AND CLAVULANATE POTASSIUM 500; 125 MG/1; MG/1
500-125 TABLET, FILM COATED ORAL
Qty: 20 | Refills: 0 | Status: DISCONTINUED | COMMUNITY
Start: 2022-08-18

## 2023-01-03 RX ORDER — LISINOPRIL 2.5 MG/1
2.5 TABLET ORAL DAILY
Refills: 3 | Status: DISCONTINUED | COMMUNITY
Start: 2021-08-25 | End: 2023-01-03

## 2023-01-03 RX ORDER — METOPROLOL SUCCINATE 100 MG/1
100 TABLET, EXTENDED RELEASE ORAL
Qty: 60 | Refills: 0 | Status: DISCONTINUED | COMMUNITY
Start: 2022-12-23

## 2023-01-03 RX ORDER — DIGOXIN 0.06 MG/1
62.5 TABLET ORAL
Qty: 15 | Refills: 0 | Status: DISCONTINUED | COMMUNITY
Start: 2022-12-23

## 2023-01-03 RX ORDER — CIPROFLOXACIN HYDROCHLORIDE 250 MG/1
250 TABLET, FILM COATED ORAL
Qty: 6 | Refills: 0 | Status: DISCONTINUED | COMMUNITY
Start: 2022-09-13

## 2023-01-04 LAB
BASOPHILS # BLD AUTO: 0.11 K/UL
BASOPHILS NFR BLD AUTO: 1 %
EOSINOPHIL # BLD AUTO: 0.21 K/UL
EOSINOPHIL NFR BLD AUTO: 1.9 %
HCT VFR BLD CALC: 40.9 %
HGB BLD-MCNC: 12.5 G/DL
IMM GRANULOCYTES NFR BLD AUTO: 0.5 %
LYMPHOCYTES # BLD AUTO: 2.42 K/UL
LYMPHOCYTES NFR BLD AUTO: 22.4 %
MAN DIFF?: NORMAL
MCHC RBC-ENTMCNC: 28 PG
MCHC RBC-ENTMCNC: 30.6 GM/DL
MCV RBC AUTO: 91.7 FL
MONOCYTES # BLD AUTO: 0.95 K/UL
MONOCYTES NFR BLD AUTO: 8.8 %
NEUTROPHILS # BLD AUTO: 7.07 K/UL
NEUTROPHILS NFR BLD AUTO: 65.4 %
PLATELET # BLD AUTO: 329 K/UL
RBC # BLD: 4.46 M/UL
RBC # FLD: 16.7 %
WBC # FLD AUTO: 10.81 K/UL

## 2023-01-05 LAB
ALBUMIN SERPL ELPH-MCNC: 3.8 G/DL
ALP BLD-CCNC: 89 U/L
ALT SERPL-CCNC: 371 U/L
ANION GAP SERPL CALC-SCNC: 11 MMOL/L
AST SERPL-CCNC: 193 U/L
BILIRUB SERPL-MCNC: 0.8 MG/DL
BUN SERPL-MCNC: 28 MG/DL
CALCIUM SERPL-MCNC: 9.6 MG/DL
CHLORIDE SERPL-SCNC: 105 MMOL/L
CHOLEST SERPL-MCNC: 125 MG/DL
CO2 SERPL-SCNC: 20 MMOL/L
CREAT SERPL-MCNC: 1.53 MG/DL
DIGOXIN SERPL-MCNC: 0.9 NG/ML
EGFR: 33 ML/MIN/1.73M2
ESTIMATED AVERAGE GLUCOSE: 192 MG/DL
GLUCOSE SERPL-MCNC: 141 MG/DL
HBA1C MFR BLD HPLC: 8.3 %
HDLC SERPL-MCNC: 39 MG/DL
LDLC SERPL CALC-MCNC: 43 MG/DL
NONHDLC SERPL-MCNC: 86 MG/DL
POTASSIUM SERPL-SCNC: 5.5 MMOL/L
PROT SERPL-MCNC: 7.7 G/DL
SODIUM SERPL-SCNC: 136 MMOL/L
T4 FREE SERPL-MCNC: 1.6 NG/DL
TRIGL SERPL-MCNC: 217 MG/DL
TSH SERPL-ACNC: 3.46 UIU/ML

## 2023-01-25 ENCOUNTER — INPATIENT (INPATIENT)
Facility: HOSPITAL | Age: 88
LOS: 5 days | Discharge: HOME | DRG: 291 | End: 2023-01-31
Attending: GENERAL PRACTICE | Admitting: STUDENT IN AN ORGANIZED HEALTH CARE EDUCATION/TRAINING PROGRAM
Payer: MEDICARE

## 2023-01-25 VITALS
TEMPERATURE: 98 F | DIASTOLIC BLOOD PRESSURE: 94 MMHG | RESPIRATION RATE: 18 BRPM | HEART RATE: 94 BPM | OXYGEN SATURATION: 98 % | WEIGHT: 113.1 LBS | HEIGHT: 62 IN | SYSTOLIC BLOOD PRESSURE: 145 MMHG

## 2023-01-25 DIAGNOSIS — Z90.49 ACQUIRED ABSENCE OF OTHER SPECIFIED PARTS OF DIGESTIVE TRACT: Chronic | ICD-10-CM

## 2023-01-25 DIAGNOSIS — Z98.890 OTHER SPECIFIED POSTPROCEDURAL STATES: Chronic | ICD-10-CM

## 2023-01-25 LAB
ALBUMIN SERPL ELPH-MCNC: 3 G/DL — LOW (ref 3.3–5)
ALP SERPL-CCNC: 99 U/L — SIGNIFICANT CHANGE UP (ref 40–120)
ALT FLD-CCNC: 228 U/L — HIGH (ref 12–78)
ANION GAP SERPL CALC-SCNC: 10 MMOL/L — SIGNIFICANT CHANGE UP (ref 5–17)
AST SERPL-CCNC: 210 U/L — HIGH (ref 15–37)
BASE EXCESS BLDA CALC-SCNC: -7.5 MMOL/L — LOW (ref -2–3)
BASOPHILS # BLD AUTO: 0.04 K/UL — SIGNIFICANT CHANGE UP (ref 0–0.2)
BASOPHILS NFR BLD AUTO: 0.3 % — SIGNIFICANT CHANGE UP (ref 0–2)
BILIRUB SERPL-MCNC: 1.2 MG/DL — SIGNIFICANT CHANGE UP (ref 0.2–1.2)
BLOOD GAS COMMENTS ARTERIAL: SIGNIFICANT CHANGE UP
BUN SERPL-MCNC: 27 MG/DL — HIGH (ref 7–23)
CALCIUM SERPL-MCNC: 8.8 MG/DL — SIGNIFICANT CHANGE UP (ref 8.5–10.1)
CHLORIDE SERPL-SCNC: 105 MMOL/L — SIGNIFICANT CHANGE UP (ref 96–108)
CO2 SERPL-SCNC: 16 MMOL/L — LOW (ref 22–31)
CREAT SERPL-MCNC: 1.4 MG/DL — HIGH (ref 0.5–1.3)
DIGOXIN SERPL-MCNC: 0.5 NG/ML — LOW (ref 0.8–2)
EGFR: 37 ML/MIN/1.73M2 — LOW
EOSINOPHIL # BLD AUTO: 0.05 K/UL — SIGNIFICANT CHANGE UP (ref 0–0.5)
EOSINOPHIL NFR BLD AUTO: 0.4 % — SIGNIFICANT CHANGE UP (ref 0–6)
GAS PNL BLDA: SIGNIFICANT CHANGE UP
GLUCOSE SERPL-MCNC: 249 MG/DL — HIGH (ref 70–99)
HCO3 BLDA-SCNC: 17 MMOL/L — LOW (ref 21–28)
HCT VFR BLD CALC: 38.9 % — SIGNIFICANT CHANGE UP (ref 34.5–45)
HGB BLD-MCNC: 11.8 G/DL — SIGNIFICANT CHANGE UP (ref 11.5–15.5)
HOROWITZ INDEX BLDA+IHG-RTO: 30 — SIGNIFICANT CHANGE UP
IMM GRANULOCYTES NFR BLD AUTO: 0.9 % — SIGNIFICANT CHANGE UP (ref 0–0.9)
LYMPHOCYTES # BLD AUTO: 1.92 K/UL — SIGNIFICANT CHANGE UP (ref 1–3.3)
LYMPHOCYTES # BLD AUTO: 16.3 % — SIGNIFICANT CHANGE UP (ref 13–44)
MAGNESIUM SERPL-MCNC: 1.8 MG/DL — SIGNIFICANT CHANGE UP (ref 1.6–2.6)
MCHC RBC-ENTMCNC: 27.7 PG — SIGNIFICANT CHANGE UP (ref 27–34)
MCHC RBC-ENTMCNC: 30.3 GM/DL — LOW (ref 32–36)
MCV RBC AUTO: 91.3 FL — SIGNIFICANT CHANGE UP (ref 80–100)
MONOCYTES # BLD AUTO: 0.91 K/UL — HIGH (ref 0–0.9)
MONOCYTES NFR BLD AUTO: 7.7 % — SIGNIFICANT CHANGE UP (ref 2–14)
NEUTROPHILS # BLD AUTO: 8.73 K/UL — HIGH (ref 1.8–7.4)
NEUTROPHILS NFR BLD AUTO: 74.4 % — SIGNIFICANT CHANGE UP (ref 43–77)
NRBC # BLD: 0 /100 WBCS — SIGNIFICANT CHANGE UP (ref 0–0)
NT-PROBNP SERPL-SCNC: HIGH PG/ML (ref 0–450)
PCO2 BLDA: 28 MMHG — LOW (ref 32–35)
PH BLDA: 7.4 — SIGNIFICANT CHANGE UP (ref 7.35–7.45)
PLATELET # BLD AUTO: 322 K/UL — SIGNIFICANT CHANGE UP (ref 150–400)
PO2 BLDA: 85 MMHG — SIGNIFICANT CHANGE UP (ref 83–108)
POTASSIUM SERPL-MCNC: 5.2 MMOL/L — SIGNIFICANT CHANGE UP (ref 3.5–5.3)
POTASSIUM SERPL-SCNC: 5.2 MMOL/L — SIGNIFICANT CHANGE UP (ref 3.5–5.3)
PROT SERPL-MCNC: 8.3 G/DL — SIGNIFICANT CHANGE UP (ref 6–8.3)
RAPID RVP RESULT: SIGNIFICANT CHANGE UP
RBC # BLD: 4.26 M/UL — SIGNIFICANT CHANGE UP (ref 3.8–5.2)
RBC # FLD: 17 % — HIGH (ref 10.3–14.5)
SAO2 % BLDA: 98 % — SIGNIFICANT CHANGE UP (ref 94–98)
SARS-COV-2 RNA SPEC QL NAA+PROBE: SIGNIFICANT CHANGE UP
SODIUM SERPL-SCNC: 131 MMOL/L — LOW (ref 135–145)
TROPONIN I, HIGH SENSITIVITY RESULT: 144.9 NG/L — HIGH
WBC # BLD: 11.75 K/UL — HIGH (ref 3.8–10.5)
WBC # FLD AUTO: 11.75 K/UL — HIGH (ref 3.8–10.5)

## 2023-01-25 PROCEDURE — 71250 CT THORAX DX C-: CPT | Mod: 26,MG

## 2023-01-25 PROCEDURE — 74176 CT ABD & PELVIS W/O CONTRAST: CPT | Mod: 26,MG

## 2023-01-25 PROCEDURE — 99285 EMERGENCY DEPT VISIT HI MDM: CPT | Mod: FS

## 2023-01-25 PROCEDURE — G1004: CPT

## 2023-01-25 PROCEDURE — 71045 X-RAY EXAM CHEST 1 VIEW: CPT | Mod: 26

## 2023-01-25 PROCEDURE — 93010 ELECTROCARDIOGRAM REPORT: CPT

## 2023-01-25 RX ORDER — METOPROLOL TARTRATE 50 MG
5 TABLET ORAL ONCE
Refills: 0 | Status: COMPLETED | OUTPATIENT
Start: 2023-01-25 | End: 2023-01-25

## 2023-01-25 RX ORDER — FUROSEMIDE 40 MG
40 TABLET ORAL ONCE
Refills: 0 | Status: COMPLETED | OUTPATIENT
Start: 2023-01-25 | End: 2023-01-25

## 2023-01-25 RX ORDER — DIGOXIN 250 MCG
125 TABLET ORAL ONCE
Refills: 0 | Status: COMPLETED | OUTPATIENT
Start: 2023-01-25 | End: 2023-01-25

## 2023-01-25 RX ADMIN — Medication 40 MILLIGRAM(S): at 22:44

## 2023-01-25 RX ADMIN — Medication 125 MICROGRAM(S): at 22:10

## 2023-01-25 RX ADMIN — Medication 5 MILLIGRAM(S): at 22:08

## 2023-01-25 NOTE — ED PROVIDER NOTE - NS ED ATTENDING STATEMENT MOD
This was a shared visit with the CATINA. I reviewed and verified the documentation and independently performed the documented:

## 2023-01-25 NOTE — ED PROVIDER NOTE - SKIN, MLM
Skin normal color for race, warm, dry and intact. No evidence of rash.
I personally performed the service described in the documentation recorded by the scribe in my presence, and it accurately and completely records my words and actions.

## 2023-01-25 NOTE — ED PROVIDER NOTE - CLINICAL SUMMARY MEDICAL DECISION MAKING FREE TEXT BOX
87-year old female with history of hypertension, hyperlipidemia, CHF, diabetes, A. fib on Eliquis, dementia presents to the ED with her daughter for shortness of breath, palpitations and generalized weakness since tonight.  Daughter states worsening swelling in bilateral legs.  Shortness of breath and palpitations worse with exertion.  Patient states that just sitting up and moving in different positions causes her to be short of breath.  Patient at baseline ambulates with walker however has been too short of breath to ambulate.  Denies fever, chills, chest pain, abdominal pain, nausea, vomiting, syncope, upper or lower extremity weakness or paresthesias.  Patient on exam with no respiratory distress although feels short of breath with atrial fibrillation between 90 and 110 bpm.  Question volume overload and CHF.  Chest x-ray appears to have worsening asymmetric CHF and proBNP is higher than prior.  Case discussed with cardiology and patient treated with Lasix and beta-blocker for rate control as well as low-dose digoxin.  Metabolic panel with acidosis and ABG reveals non-anion gap acidosis with respiratory compensation CT scans of the chest and abdomen were performed which did not reveal obvious source although they do show that the patient has effusions bilaterally.  Case discussed with hospitalist and will admit to telemetry with cardiology consultation in the morning

## 2023-01-25 NOTE — ED PROVIDER NOTE - CARE PLAN
1 Principal Discharge DX:	Acute on chronic systolic congestive heart failure   Principal Discharge DX:	Acute on chronic systolic congestive heart failure  Secondary Diagnosis:	Metabolic acidosis  Secondary Diagnosis:	Atrial fibrillation and flutter

## 2023-01-25 NOTE — ED PROVIDER NOTE - OBJECTIVE STATEMENT
87-year old female with history of hypertension, hyperlipidemia, CHF, diabetes, A. fib on Eliquis, dementia presents to the ED with her daughter for shortness of breath, palpitations and generalized weakness since tonight.  Daughter states worsening swelling in bilateral legs.  Shortness of breath and palpitations worse with exertion.  Patient states that just sitting up and moving in different positions causes her to be short of breath.  Patient at baseline ambulates with walker however has been too short of breath to ambulate.  Denies fever, chills, chest pain, abdominal pain, nausea, vomiting, syncope, upper or lower extremity weakness or paresthesias.    PMD: Dr. Shukla, cardiologist: Dr. Grider

## 2023-01-25 NOTE — ED ADULT TRIAGE NOTE - CHIEF COMPLAINT QUOTE
Pt brought to ed by family who reports increased sob today.   Family reports pt "broke out in a cold sweat" while sitting in her bed today and reports palpations at that time.   Pt denies cp/sob/palpitations at present.   Respiratoins even and unlabored pt speaking in clear complete sentences at this time.   Skin warm and dry, pt joking with family appears in no acute distress. BN

## 2023-01-25 NOTE — ED PROVIDER NOTE - DIFFERENTIAL DIAGNOSIS
differential includes but no limited to: rapid a fib vs chf exacerbation vs pneumonia Differential Diagnosis

## 2023-01-26 DIAGNOSIS — Z29.9 ENCOUNTER FOR PROPHYLACTIC MEASURES, UNSPECIFIED: ICD-10-CM

## 2023-01-26 DIAGNOSIS — I50.23 ACUTE ON CHRONIC SYSTOLIC (CONGESTIVE) HEART FAILURE: ICD-10-CM

## 2023-01-26 DIAGNOSIS — G30.9 ALZHEIMER'S DISEASE, UNSPECIFIED: ICD-10-CM

## 2023-01-26 DIAGNOSIS — R93.89 ABNORMAL FINDINGS ON DIAGNOSTIC IMAGING OF OTHER SPECIFIED BODY STRUCTURES: ICD-10-CM

## 2023-01-26 DIAGNOSIS — I48.91 UNSPECIFIED ATRIAL FIBRILLATION: ICD-10-CM

## 2023-01-26 DIAGNOSIS — E87.4 MIXED DISORDER OF ACID-BASE BALANCE: ICD-10-CM

## 2023-01-26 DIAGNOSIS — R74.01 ELEVATION OF LEVELS OF LIVER TRANSAMINASE LEVELS: ICD-10-CM

## 2023-01-26 DIAGNOSIS — E03.9 HYPOTHYROIDISM, UNSPECIFIED: ICD-10-CM

## 2023-01-26 DIAGNOSIS — E87.20 ACIDOSIS, UNSPECIFIED: ICD-10-CM

## 2023-01-26 DIAGNOSIS — E11.9 TYPE 2 DIABETES MELLITUS WITHOUT COMPLICATIONS: ICD-10-CM

## 2023-01-26 DIAGNOSIS — R77.8 OTHER SPECIFIED ABNORMALITIES OF PLASMA PROTEINS: ICD-10-CM

## 2023-01-26 LAB
ALBUMIN SERPL ELPH-MCNC: 2.6 G/DL — LOW (ref 3.3–5)
ALP SERPL-CCNC: 73 U/L — SIGNIFICANT CHANGE UP (ref 40–120)
ALT FLD-CCNC: 176 U/L — HIGH (ref 12–78)
ANION GAP SERPL CALC-SCNC: 9 MMOL/L — SIGNIFICANT CHANGE UP (ref 5–17)
APPEARANCE UR: CLEAR — SIGNIFICANT CHANGE UP
AST SERPL-CCNC: 148 U/L — HIGH (ref 15–37)
BASOPHILS # BLD AUTO: 0.03 K/UL — SIGNIFICANT CHANGE UP (ref 0–0.2)
BASOPHILS NFR BLD AUTO: 0.3 % — SIGNIFICANT CHANGE UP (ref 0–2)
BILIRUB DIRECT SERPL-MCNC: 0.4 MG/DL — HIGH (ref 0–0.3)
BILIRUB INDIRECT FLD-MCNC: 1 MG/DL — SIGNIFICANT CHANGE UP (ref 0.2–1)
BILIRUB SERPL-MCNC: 1.4 MG/DL — HIGH (ref 0.2–1.2)
BILIRUB SERPL-MCNC: 1.4 MG/DL — HIGH (ref 0.2–1.2)
BILIRUB UR-MCNC: NEGATIVE — SIGNIFICANT CHANGE UP
BUN SERPL-MCNC: 26 MG/DL — HIGH (ref 7–23)
CALCIUM SERPL-MCNC: 8.7 MG/DL — SIGNIFICANT CHANGE UP (ref 8.5–10.1)
CHLORIDE SERPL-SCNC: 105 MMOL/L — SIGNIFICANT CHANGE UP (ref 96–108)
CK MB BLD-MCNC: 1.5 % — SIGNIFICANT CHANGE UP (ref 0–3.5)
CK MB CFR SERPL CALC: 2.5 NG/ML — SIGNIFICANT CHANGE UP (ref 0–3.6)
CK SERPL-CCNC: 164 U/L — SIGNIFICANT CHANGE UP (ref 26–192)
CO2 SERPL-SCNC: 18 MMOL/L — LOW (ref 22–31)
COLOR SPEC: SIGNIFICANT CHANGE UP
CREAT SERPL-MCNC: 1.2 MG/DL — SIGNIFICANT CHANGE UP (ref 0.5–1.3)
DIFF PNL FLD: NEGATIVE — SIGNIFICANT CHANGE UP
EGFR: 44 ML/MIN/1.73M2 — LOW
EOSINOPHIL # BLD AUTO: 0.02 K/UL — SIGNIFICANT CHANGE UP (ref 0–0.5)
EOSINOPHIL NFR BLD AUTO: 0.2 % — SIGNIFICANT CHANGE UP (ref 0–6)
GLUCOSE SERPL-MCNC: 170 MG/DL — HIGH (ref 70–99)
GLUCOSE UR QL: NEGATIVE — SIGNIFICANT CHANGE UP
HCT VFR BLD CALC: 32.4 % — LOW (ref 34.5–45)
HGB BLD-MCNC: 10 G/DL — LOW (ref 11.5–15.5)
IMM GRANULOCYTES NFR BLD AUTO: 0.7 % — SIGNIFICANT CHANGE UP (ref 0–0.9)
KETONES UR-MCNC: NEGATIVE — SIGNIFICANT CHANGE UP
LACTATE SERPL-SCNC: 1.6 MMOL/L — SIGNIFICANT CHANGE UP (ref 0.7–2)
LEUKOCYTE ESTERASE UR-ACNC: NEGATIVE — SIGNIFICANT CHANGE UP
LYMPHOCYTES # BLD AUTO: 2.19 K/UL — SIGNIFICANT CHANGE UP (ref 1–3.3)
LYMPHOCYTES # BLD AUTO: 23.9 % — SIGNIFICANT CHANGE UP (ref 13–44)
MAGNESIUM SERPL-MCNC: 1.7 MG/DL — SIGNIFICANT CHANGE UP (ref 1.6–2.6)
MCHC RBC-ENTMCNC: 27.2 PG — SIGNIFICANT CHANGE UP (ref 27–34)
MCHC RBC-ENTMCNC: 30.9 GM/DL — LOW (ref 32–36)
MCV RBC AUTO: 88 FL — SIGNIFICANT CHANGE UP (ref 80–100)
MONOCYTES # BLD AUTO: 0.92 K/UL — HIGH (ref 0–0.9)
MONOCYTES NFR BLD AUTO: 10.1 % — SIGNIFICANT CHANGE UP (ref 2–14)
NEUTROPHILS # BLD AUTO: 5.93 K/UL — SIGNIFICANT CHANGE UP (ref 1.8–7.4)
NEUTROPHILS NFR BLD AUTO: 64.8 % — SIGNIFICANT CHANGE UP (ref 43–77)
NITRITE UR-MCNC: NEGATIVE — SIGNIFICANT CHANGE UP
NRBC # BLD: 0 /100 WBCS — SIGNIFICANT CHANGE UP (ref 0–0)
PH UR: 5 — SIGNIFICANT CHANGE UP (ref 5–8)
PHOSPHATE SERPL-MCNC: 3.8 MG/DL — SIGNIFICANT CHANGE UP (ref 2.5–4.5)
PLATELET # BLD AUTO: 290 K/UL — SIGNIFICANT CHANGE UP (ref 150–400)
POTASSIUM SERPL-MCNC: 5.5 MMOL/L — HIGH (ref 3.5–5.3)
POTASSIUM SERPL-SCNC: 5.5 MMOL/L — HIGH (ref 3.5–5.3)
PROT SERPL-MCNC: 7 G/DL — SIGNIFICANT CHANGE UP (ref 6–8.3)
PROT UR-MCNC: 30 MG/DL
RBC # BLD: 3.68 M/UL — LOW (ref 3.8–5.2)
RBC # FLD: 16.9 % — HIGH (ref 10.3–14.5)
SODIUM SERPL-SCNC: 132 MMOL/L — LOW (ref 135–145)
SP GR SPEC: 1.01 — SIGNIFICANT CHANGE UP (ref 1.01–1.02)
TROPONIN I, HIGH SENSITIVITY RESULT: 156.8 NG/L — HIGH
TROPONIN I, HIGH SENSITIVITY RESULT: 164.8 NG/L — HIGH
TROPONIN I, HIGH SENSITIVITY RESULT: 165.2 NG/L — HIGH
TSH SERPL-MCNC: 1.19 UIU/ML — SIGNIFICANT CHANGE UP (ref 0.36–3.74)
UROBILINOGEN FLD QL: NEGATIVE — SIGNIFICANT CHANGE UP
WBC # BLD: 9.15 K/UL — SIGNIFICANT CHANGE UP (ref 3.8–10.5)
WBC # FLD AUTO: 9.15 K/UL — SIGNIFICANT CHANGE UP (ref 3.8–10.5)

## 2023-01-26 PROCEDURE — 99223 1ST HOSP IP/OBS HIGH 75: CPT | Mod: GC

## 2023-01-26 PROCEDURE — 99223 1ST HOSP IP/OBS HIGH 75: CPT

## 2023-01-26 PROCEDURE — 99497 ADVNCD CARE PLAN 30 MIN: CPT | Mod: GC,25

## 2023-01-26 PROCEDURE — 99221 1ST HOSP IP/OBS SF/LOW 40: CPT

## 2023-01-26 RX ORDER — INSULIN LISPRO 100/ML
VIAL (ML) SUBCUTANEOUS
Refills: 0 | Status: DISCONTINUED | OUTPATIENT
Start: 2023-01-26 | End: 2023-01-29

## 2023-01-26 RX ORDER — ALPRAZOLAM 0.25 MG
0.25 TABLET ORAL ONCE
Refills: 0 | Status: DISCONTINUED | OUTPATIENT
Start: 2023-01-26 | End: 2023-01-26

## 2023-01-26 RX ORDER — GLUCAGON INJECTION, SOLUTION 0.5 MG/.1ML
1 INJECTION, SOLUTION SUBCUTANEOUS ONCE
Refills: 0 | Status: DISCONTINUED | OUTPATIENT
Start: 2023-01-26 | End: 2023-01-31

## 2023-01-26 RX ORDER — LEVOTHYROXINE SODIUM 125 MCG
75 TABLET ORAL DAILY
Refills: 0 | Status: DISCONTINUED | OUTPATIENT
Start: 2023-01-26 | End: 2023-01-31

## 2023-01-26 RX ORDER — CHOLECALCIFEROL (VITAMIN D3) 125 MCG
2000 CAPSULE ORAL DAILY
Refills: 0 | Status: DISCONTINUED | OUTPATIENT
Start: 2023-01-26 | End: 2023-01-31

## 2023-01-26 RX ORDER — DEXTROSE 50 % IN WATER 50 %
15 SYRINGE (ML) INTRAVENOUS ONCE
Refills: 0 | Status: DISCONTINUED | OUTPATIENT
Start: 2023-01-26 | End: 2023-01-31

## 2023-01-26 RX ORDER — LANOLIN ALCOHOL/MO/W.PET/CERES
3 CREAM (GRAM) TOPICAL AT BEDTIME
Refills: 0 | Status: DISCONTINUED | OUTPATIENT
Start: 2023-01-26 | End: 2023-01-30

## 2023-01-26 RX ORDER — APIXABAN 2.5 MG/1
2.5 TABLET, FILM COATED ORAL EVERY 12 HOURS
Refills: 0 | Status: DISCONTINUED | OUTPATIENT
Start: 2023-01-26 | End: 2023-01-26

## 2023-01-26 RX ORDER — SODIUM CHLORIDE 9 MG/ML
1000 INJECTION, SOLUTION INTRAVENOUS
Refills: 0 | Status: DISCONTINUED | OUTPATIENT
Start: 2023-01-26 | End: 2023-01-31

## 2023-01-26 RX ORDER — MEMANTINE HYDROCHLORIDE 10 MG/1
10 TABLET ORAL
Refills: 0 | Status: DISCONTINUED | OUTPATIENT
Start: 2023-01-26 | End: 2023-01-31

## 2023-01-26 RX ORDER — ACETAMINOPHEN 500 MG
650 TABLET ORAL EVERY 6 HOURS
Refills: 0 | Status: DISCONTINUED | OUTPATIENT
Start: 2023-01-26 | End: 2023-01-26

## 2023-01-26 RX ORDER — INSULIN LISPRO 100/ML
VIAL (ML) SUBCUTANEOUS AT BEDTIME
Refills: 0 | Status: DISCONTINUED | OUTPATIENT
Start: 2023-01-26 | End: 2023-01-29

## 2023-01-26 RX ORDER — ATORVASTATIN CALCIUM 80 MG/1
40 TABLET, FILM COATED ORAL AT BEDTIME
Refills: 0 | Status: DISCONTINUED | OUTPATIENT
Start: 2023-01-26 | End: 2023-01-31

## 2023-01-26 RX ORDER — SODIUM BICARBONATE 1 MEQ/ML
0.05 SYRINGE (ML) INTRAVENOUS
Qty: 50 | Refills: 0 | Status: COMPLETED | OUTPATIENT
Start: 2023-01-26 | End: 2023-01-26

## 2023-01-26 RX ORDER — DEXTROSE 50 % IN WATER 50 %
25 SYRINGE (ML) INTRAVENOUS ONCE
Refills: 0 | Status: DISCONTINUED | OUTPATIENT
Start: 2023-01-26 | End: 2023-01-31

## 2023-01-26 RX ORDER — DIGOXIN 250 MCG
1 TABLET ORAL
Qty: 0 | Refills: 0 | DISCHARGE

## 2023-01-26 RX ORDER — DEXTROSE 50 % IN WATER 50 %
12.5 SYRINGE (ML) INTRAVENOUS ONCE
Refills: 0 | Status: DISCONTINUED | OUTPATIENT
Start: 2023-01-26 | End: 2023-01-31

## 2023-01-26 RX ORDER — ONDANSETRON 8 MG/1
4 TABLET, FILM COATED ORAL EVERY 8 HOURS
Refills: 0 | Status: DISCONTINUED | OUTPATIENT
Start: 2023-01-26 | End: 2023-01-31

## 2023-01-26 RX ORDER — LANOLIN ALCOHOL/MO/W.PET/CERES
5 CREAM (GRAM) TOPICAL AT BEDTIME
Refills: 0 | Status: DISCONTINUED | OUTPATIENT
Start: 2023-01-26 | End: 2023-01-26

## 2023-01-26 RX ORDER — DIGOXIN 250 MCG
125 TABLET ORAL EVERY OTHER DAY
Refills: 0 | Status: DISCONTINUED | OUTPATIENT
Start: 2023-01-26 | End: 2023-01-28

## 2023-01-26 RX ORDER — SODIUM BICARBONATE 1 MEQ/ML
2 SYRINGE (ML) INTRAVENOUS
Qty: 50 | Refills: 0 | Status: DISCONTINUED | OUTPATIENT
Start: 2023-01-26 | End: 2023-01-26

## 2023-01-26 RX ORDER — METOPROLOL TARTRATE 50 MG
100 TABLET ORAL
Refills: 0 | Status: DISCONTINUED | OUTPATIENT
Start: 2023-01-26 | End: 2023-01-31

## 2023-01-26 RX ORDER — FUROSEMIDE 40 MG
40 TABLET ORAL
Refills: 0 | Status: DISCONTINUED | OUTPATIENT
Start: 2023-01-26 | End: 2023-01-28

## 2023-01-26 RX ADMIN — Medication 50 MEQ/KG/HR: at 06:26

## 2023-01-26 RX ADMIN — Medication 125 MICROGRAM(S): at 12:26

## 2023-01-26 RX ADMIN — Medication 2000 UNIT(S): at 12:26

## 2023-01-26 RX ADMIN — Medication 100 MILLIGRAM(S): at 06:15

## 2023-01-26 RX ADMIN — MEMANTINE HYDROCHLORIDE 10 MILLIGRAM(S): 10 TABLET ORAL at 06:15

## 2023-01-26 RX ADMIN — ATORVASTATIN CALCIUM 40 MILLIGRAM(S): 80 TABLET, FILM COATED ORAL at 22:51

## 2023-01-26 RX ADMIN — Medication 100 MILLIGRAM(S): at 17:28

## 2023-01-26 RX ADMIN — Medication 2: at 17:27

## 2023-01-26 RX ADMIN — APIXABAN 2.5 MILLIGRAM(S): 2.5 TABLET, FILM COATED ORAL at 06:15

## 2023-01-26 RX ADMIN — Medication 40 MILLIGRAM(S): at 14:02

## 2023-01-26 RX ADMIN — Medication 0.25 MILLIGRAM(S): at 22:50

## 2023-01-26 RX ADMIN — APIXABAN 2.5 MILLIGRAM(S): 2.5 TABLET, FILM COATED ORAL at 17:29

## 2023-01-26 RX ADMIN — Medication 3: at 12:26

## 2023-01-26 RX ADMIN — MEMANTINE HYDROCHLORIDE 10 MILLIGRAM(S): 10 TABLET ORAL at 17:29

## 2023-01-26 RX ADMIN — Medication 75 MICROGRAM(S): at 06:15

## 2023-01-26 RX ADMIN — Medication 0.25 MILLIGRAM(S): at 01:50

## 2023-01-26 RX ADMIN — Medication 1: at 08:39

## 2023-01-26 RX ADMIN — Medication 40 MILLIGRAM(S): at 06:14

## 2023-01-26 NOTE — H&P ADULT - ASSESSMENT
87 years old female patient with PMHx of HTN, HLD, HFrEF (EF 20-25% on 12/22), T2DM, hypothyroidism, alzheimer's dementia, chronic Afib who presents today for SOB. Of note patient recently admitted at [facility] from 12/21 to 12/23/22  for Afib with RVR.  History obtained from patient and daughter who reports that last night patient walked from the bedroom to the bathroom and complained severe respiratory distress. Daughter states that patient has been progressively getting more weak and short of breath since last discharge. Patient has been complaint with meds and diet. Denies chest pain, dizziness, abdominal pain, urinary symptoms, fever chills or other symptoms.     ED course   VS: Afebrile, HR: 94->130->109, BP: 145/94 SpO2 98% 3 lt NC   Labs significant for WBC 11.7 K, Na 131, CO2 16, BUN/ creatine 27/1.4, glucose 249, AST//228 Trop 144.9, Pro BMP >93528 ABG 7.4/28/85/17  EKG shows Afib with RVR @ 105 bpm, minimal voltage criteria for LVH, non specify T wave   CT chest shows Moderate right and small to moderate left pleural effusion with compressive atelectasis. Nonspecific interlobar septal thickening and ground-glass opacities Nonspecific pulmonary nodules.  Nonspecific mediastinal lymph adenopathy. Mildly heterogeneous thyroid gland  CT a/p shows central intrahepatic and common bile duct dilatation, reflecting postcholecystectomy state. 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 Pancreatic ductal dilatation. Nonspecific small density/calcification at the pancreatic head. Prominent wall of the stomach and duodenum, which may be due to partial distention and/or gastroduodenitis. Nonspecific bilateral perinephric stranding without hydronephrosis or obstructing radiopaque urinary tract stone. Prominent bladder wall. Nodular thickening of the left adrenal gland.  Given in the ED xanax 0.25 mg x1, digoxin 125 mcg x1, Lopressor 5 IVP x1, Furosemide 40 mg x1, cardio consulted    87 years old female patient with PMHx of HTN, HLD, HFrEF (EF 20-25% on 12/22), T2DM, hypothyroidism, alzheimer's dementia, chronic Afib who presents today for sever SOB. Patient admitted for ADHF

## 2023-01-26 NOTE — PROGRESS NOTE ADULT - PROBLEM SELECTOR PLAN 6
Acute on chronic, prer daughter liver enzymes have been slightly elevated on previous visits to her pmd however have never been as high as on admission   - AST//228  - Trend hepatic function   - Avoid hepatotoxic medications  - GI consult Acute on chronic, prer daughter liver enzymes have been slightly elevated on previous visits to her pmd however have never been as high as on admission   - AST//228 --> AST//176  - Trend hepatic function   - Avoid hepatotoxic medications  - GI Dr. Leonardo following, recs appreciated Acute on chronic, prer daughter liver enzymes have been slightly elevated on previous visits to her pmd however have never been as high as on admission   - AST//228 --> AST//176  - Trend hepatic function   - Avoid hepatotoxic medications  - Consider discontinue statin if liver enzymes continue to uptrend  - GI Dr. Leonardo following, recs appreciated

## 2023-01-26 NOTE — H&P ADULT - PROBLEM SELECTOR PLAN 7
T2DM non insulin dependent   - F/up HgbA1c  -  hold home metformin   - Low Insulin sliding scale  - Accu checks w/ hypoglycemic protocol

## 2023-01-26 NOTE — H&P ADULT - CONVERSATION DETAILS
Writer met with  patient and patient daughter at bedside. Reviewed patient's medical and social history as well as events leading to patient's hospitalization. Writer discussed patient's current diagnosis acidosis, decompensated HF, rapid a fib,  medical condition and management,   prognosis, and life expectancy. Inquired about patient's wishes regarding extent of medical care to be provided including escalation of medical care into the ICU and use of vasopressor support. In addition, the writer inquired about thoughts regarding cardiopulmnary resuscitation, artificial nutrition and hydration including use of feeding tubes and IVF, antibiotics, and further investigative studies such as blood draws and radiology. Pt. daughter showed good insight into medical condition. All questions answered. Writer recommended MOLST. Patient consented to DNR/DNI status, no feeding tubes.  MOLST form filled out and placed in chart. Psychosocial support provided.    time spent 20 minutes

## 2023-01-26 NOTE — PROGRESS NOTE ADULT - PROBLEM SELECTOR PLAN 2
Probably secondary to metformin and CKD   - ABG on admission 7.4/28/85/17  - Given NaHCO3 50 meq x1   - fu repeat abg   - Nephro Dr. Oleary consulted

## 2023-01-26 NOTE — PROGRESS NOTE ADULT - PROBLEM SELECTOR PLAN 1
Patient presents with shortness of breath likely 2/2 acute on chronic CHF exacerbation  - Admit to telemetry   - CT chest showed moderate right and small to moderate left pleural effusion with compressive atelectasis.  - On admission Pro BMP >78004 <-97025  - Last TTE on 12/22  showed Left ventricular enlargement with severe left ventricular systolic dysfunction, estimated LVEF of 20-25%. The entire apex and anterior walls appear akinetic. The inferior and inferolateral walls appear hypokinetic.  - Given  digoxin 125 mcg x1, Lopressor 5 IVP x1, Furosemide 40 mg x1, cardio consulted in the ED   - start Lasix 40 mg IVP BID   - Will continue home digoxin 125 mcg qod, Metoprolol succinate 100 mg BID,   - Strict I/Os, daily weights  - Monitor and replace electrolytes  - Cardiology consulted ([facility]) consulted in the ED Patient presents with shortness of breath likely 2/2 acute on chronic CHF exacerbation  - Admit to telemetry   - CT chest showed moderate right and small to moderate left pleural effusion with compressive atelectasis.  - On admission Pro BMP >00866 <-08963  - Last TTE on 12/22  showed Left ventricular enlargement with severe left ventricular systolic dysfunction, estimated LVEF of 20-25%. The entire apex and anterior walls appear akinetic. The inferior and inferolateral walls appear hypokinetic.  - Given digoxin 125 mcg x1, Lopressor 5 IVP x1, Furosemide 40 mg x1, cardio consulted in the ED   - Lasix 40 mg IVP BID   - Continue home digoxin 125 mcg qod, Metoprolol succinate 100 mg BID,   - Strict I/Os, daily weights  - Monitor and replace electrolytes  - Per pulm, consider thoracentesis for pleural effusions  - Cardiology Dr. Harvey following, recs appreciated  - Pulm Dr. Isaac following, recs appreciated

## 2023-01-26 NOTE — CONSULT NOTE ADULT - ASSESSMENT
elevated lfts  chf  abnormal CT     suspect elevated lfts 2/2 passive congestion  diuresis per cardiology  normal liver on CT   dilated cbd likely 2/2 pancreatic atrophy  doubt duodenal-colonic fistula; patient and dtr deny severe diarrhea  can consider repeat CT with oral contrast only; however patient and dtr do not want surgery if present  will follow     I reviewed the overnight course of events on the unit, re-confirming the patient history. I discussed the care with the patient and their family  The plan of care was discussed with the physician assistant and modifications were made to the notation where appropriate.   Differential diagnosis and plan of care discussed with patient after the evaluation  35 minutes spent on total encounter of which more than fifty percent of the encounter was spent counseling and/or coordinating care by the attending physician.  Advanced care planning was discussed with patient and family.  Advanced care planning forms were reviewed and discussed.  Risks, benefits and alternatives of gastroenterologic procedures were discussed in detail and all questions were answered.

## 2023-01-26 NOTE — CONSULT NOTE ADULT - ASSESSMENT
86 yo F with PMHx of HTN, HLD, HFrEF (EF 20-25% on 12/22), T2DM, hypothyroidism, alzheimer's dementia, chronic Afib who presents today for SOB, admitted for acute on chronic congestive heart failure exacerbation     - Patient is not complaining of any cardiac symptoms at this time.  - Troponin mildly elevated x2 (144.9 --> 156.9) likely 2/2 demand in setting of CHF exacerbation, trend to peak   - , CKMB 2.5, CPK 1.5; wnl   - Pro-BNP >04302, with physical signs of volume overload  - 2 L NC currently, continue to wean as tolerated   - EKG: Atrial fibrillation with RVR, minimal voltage criteria for LVH, nonspecific T wave abnormality; improved lateral leads from prior EKG on 12/29/2022   - ECHO FINDINGS: 12/22/2022 LV enlargement with severe left ventricular systolic dysfunction, LVEF of 20-25%, apex and anterior walls akinetic, inferior and inferolateral walls hypokinetic, biatrial enlargement, Moderate MR, Mild TR    - BP well controlled, monitor routine hemodynamics.  - Continue Lasix 40 IVP BID   - Continue home Digoxin 125 mcg PO every other day (first dose today) and Metoprolol succinate 100 mg BID  - Monitor and replete lytes, keep K>4, Mg>2.  - Strict I/Os, daily weights  - Other cardiovascular workup will depend on clinical course.  - All other workup per primary team.  - Will continue to follow.             86 yo F with PMHx of HTN, HLD, HFrEF (EF 20-25% on 12/22), T2DM, hypothyroidism, alzheimer's dementia, chronic Afib who presents today for SOB, admitted for acute on chronic congestive heart failure exacerbation     - Patient is not complaining of any cardiac symptoms at this time.  - Troponin mildly elevated x2 (144.9 --> 156.9) likely 2/2 demand in setting of CHF exacerbation, trend to peak   - , CKMB 2.5, CPK 1.5; wnl   - Pro-BNP >89775, with physical signs of volume overload  - 2 L NC currently, continue to wean as tolerated   - EKG: Atrial fibrillation with RVR, minimal voltage criteria for LVH, nonspecific T wave abnormality; improved lateral leads from prior EKG on 12/29/2022   - ECHO FINDINGS: 12/22/2022 LV enlargement with severe left ventricular systolic dysfunction, LVEF of 20-25%, apex and anterior walls akinetic, inferior and inferolateral walls hypokinetic, biatrial enlargement, Moderate MR, Mild TR    - BP well controlled, monitor routine hemodynamics.  - Continue Lasix 40 IVP BID   - Continue home Digoxin 125 mcg PO every other day (first dose today) and Metoprolol succinate 100 mg BID  - Hold Eliquis for Thoracentesis   - Monitor and replete lytes, keep K>4, Mg>2.  - Strict I/Os, daily weights  - Other cardiovascular workup will depend on clinical course.  - All other workup per primary team.  - Will continue to follow.             86 yo F with PMHx of HTN, HLD, HFrEF (EF 20-25% on 12/22), T2DM, hypothyroidism, alzheimer's dementia, chronic Afib who presents today for SOB, admitted for acute on chronic congestive heart failure exacerbation     - Patient is not complaining of any cardiac symptoms at this time.  - Troponin mildly elevated x2 (144.9 --> 156.9) likely 2/2 demand in setting of CHF exacerbation, trend to peak   - , CKMB 2.5, CPK 1.5; wnl   - Pro-BNP >94842, with physical signs of volume overload  - 2 L NC currently, continue to wean as tolerated   - EKG: Atrial fibrillation with RVR, minimal voltage criteria for LVH, nonspecific T wave abnormality; improved lateral leads from prior EKG on 12/29/2022   - ECHO FINDINGS: 12/22/2022 LV enlargement with severe left ventricular systolic dysfunction, LVEF of 20-25%, apex and anterior walls akinetic, inferior and inferolateral walls hypokinetic, biatrial enlargement, Moderate MR, Mild TR    - BP well controlled, monitor routine hemodynamics.  - Continue Lasix 40 IVP BID   - Continue home Digoxin 125 mcg PO every other day (first dose today) and Metoprolol succinate 100 mg BID  - Hold Eliquis for Thoracentesis   - Monitor and replete lytes, keep K>4, Mg>2.  - Strict I/Os, daily weights  - Other cardiovascular workup will depend on clinical course.  - All other workup per primary team.  - Will continue to follow.

## 2023-01-26 NOTE — PROGRESS NOTE ADULT - PROBLEM SELECTOR PLAN 10
DVT ppx: continue home Eliquis DVT ppx: consider transition Eliquis to FD lovenox for thoracentesis pending Cardio recs

## 2023-01-26 NOTE — CONSULT NOTE ADULT - SUBJECTIVE AND OBJECTIVE BOX
[facility]  INFECTIOUS DISEASES - [address], [place] [address]  Tel: [phone]     Fax: [phone]  =======================================================    [id number]  LEXY EISENBERG     CC: Patient is a 87y old  Female who presents with a chief complaint of ADHF (26 Jan 2023 10:38)    HPI:  87 years old female patient with PMHx of HTN, HLD, HFrEF, T2DM, hypothyroidism, alzheimer's dementia, chronic Afib, who presented with SOB. Daughter at bedside provided additional information. Patient has been progressively getting more weak and short of breath since last discharge, but yesterday did not feel well and had some sweats. Denies fevers, chills, chest pain,  abdominal pain, nausea, vomiting or diarrhea.      PAST MEDICAL & SURGICAL HISTORY:  Atrial fibrillation      Hypothyroidism      Mild Alzheimer&#x27;s dementia      Diabetes mellitus      Acute on chronic systolic congestive heart failure      Hypertension      Hyperlipemia      Cardiac LV ejection fraction 21-30%  12/22      History of appendectomy      H/O hernia repair      History of cholecystectomy          Social Hx:     FAMILY HISTORY:  Family history of cerebral hemorrhage (Mother)    FH: renal failure (Father)        Allergies    No Known Allergies    Intolerances        Antibiotics:  MEDICATIONS  (STANDING):  apixaban 2.5 milliGRAM(s) Oral every 12 hours  atorvastatin 40 milliGRAM(s) Oral at bedtime  cholecalciferol 2000 Unit(s) Oral daily  dextrose 5%. 1000 milliLiter(s) (100 mL/Hr) IV Continuous <Continuous>  dextrose 5%. 1000 milliLiter(s) (50 mL/Hr) IV Continuous <Continuous>  dextrose 50% Injectable 25 Gram(s) IV Push once  dextrose 50% Injectable 12.5 Gram(s) IV Push once  dextrose 50% Injectable 25 Gram(s) IV Push once  digoxin     Tablet 125 MICROGram(s) Oral every other day  furosemide   Injectable 40 milliGRAM(s) IV Push two times a day  glucagon  Injectable 1 milliGRAM(s) IntraMuscular once  insulin lispro (ADMELOG) corrective regimen sliding scale   SubCutaneous three times a day before meals  insulin lispro (ADMELOG) corrective regimen sliding scale   SubCutaneous at bedtime  levothyroxine 75 MICROGram(s) Oral daily  memantine 10 milliGRAM(s) Oral two times a day  metoprolol succinate  milliGRAM(s) Oral two times a day    MEDICATIONS  (PRN):  aluminum hydroxide/magnesium hydroxide/simethicone Suspension 30 milliLiter(s) Oral every 4 hours PRN Dyspepsia  dextrose Oral Gel 15 Gram(s) Oral once PRN Blood Glucose LESS THAN 70 milliGRAM(s)/deciliter  melatonin 3 milliGRAM(s) Oral at bedtime PRN Insomnia  ondansetron Injectable 4 milliGRAM(s) IV Push every 8 hours PRN Nausea and/or Vomiting       REVIEW OF SYSTEMS:  CONSTITUTIONAL:  No Fever or chills  HEENT:  No sore throat or runny nose.  CARDIOVASCULAR:  No chest pain   RESPIRATORY:  see history  GASTROINTESTINAL:  No nausea, vomiting or diarrhea.  GENITOURINARY:  No dysuria, frequency or urgency  NEUROLOGIC: No headache, no dizziness  PSYCHIATRIC:  No disorder of thought or mood.      Physical Exam:  Vital Signs Last 24 Hrs  T(C): 36.6 (26 Jan 2023 09:46), Max: 36.7 (26 Jan 2023 02:20)  T(F): 97.8 (26 Jan 2023 09:46), Max: 98 (26 Jan 2023 02:20)  HR: 96 (26 Jan 2023 11:56) (94 - 130)  BP: 135/63 (26 Jan 2023 09:46) (126/80 - 145/94)  BP(mean): --  RR: 18 (26 Jan 2023 09:46) (16 - 18)  SpO2: 95% (26 Jan 2023 09:46) (95% - 98%)    Parameters below as of 26 Jan 2023 09:46  Patient On (Oxygen Delivery Method): room air      Height (cm): 157.5 (01-25 @ 18:47)  Weight (kg): 51.3 (01-25 @ 18:47)  BMI (kg/m2): 20.7 (01-25 @ 18:47)  BSA (m2): 1.5 (01-25 @ 18:47)  GEN: NAD  HEENT: normocephalic and atraumatic.    NECK: Supple.  LUNGS: Clear to auscultation.  HEART: Regular rate and rhythm   ABDOMEN: Soft, nontender, and nondistended.   EXTREMITIES: bipedal edema.  NEUROLOGIC: Answering questions appropriately  PSYCHIATRIC: Appropriate affect .      Labs:  01-26    132<L>  |  105  |  26<H>  ----------------------------<  170<H>  5.5<H>   |  18<L>  |  1.20    Ca    8.7      26 Jan 2023 04:06  Phos  3.8     01-26  Mg     1.7     01-26    TPro  x   /  Alb  x   /  TBili  1.4<H>  /  DBili  0.4<H>  /  AST  x   /  ALT  x   /  AlkPhos  x   01-26                          10.0   9.15  )-----------( 290      ( 26 Jan 2023 05:52 )             32.4         LIVER FUNCTIONS - ( 26 Jan 2023 04:06 )  Alb: 2.6 g/dL / Pro: 7.0 g/dL / ALK PHOS: 73 U/L / ALT: 176 U/L / AST: 148 U/L / GGT: x           CARDIAC MARKERS ( 26 Jan 2023 04:00 )  x     / x     / 164 U/L / x     / 2.5 ng/mL      ABG - ( 25 Jan 2023 22:54 )  pH, Arterial: 7.40  pH, Blood: x     /  pCO2: 28    /  pO2: 85    / HCO3: 17    / Base Excess: -7.5  /  SaO2: 98.0                    SARS-CoV-2: NotDetec (01-25-23 @ 20:20)      RECENT CULTURES:  01-25 @ 20:20          NotDetec        All imaging and other data have been reviewed.    CT chest and A/P:  Addendum: Prominent wall at the vaginal introitus, which was mentioned in   the body of the original report. Recommend clinical correlation to assess   infection/inflammation and direct visualization to exclude potential   underlying lesion/neoplasm.    --- End of Report ---    *** END OF ADDENDUM # 1 ***      PROCEDURE DATE:  01/25/2023          INTERPRETATION:  CLINICAL INFORMATION: shortness of breath chf vs   infiltrate.    PROCEDURE: CT of the chest, abdomen and pelvis was performed without   intravenous contrast. Coronal and sagittal reconstruction images were   obtained.    CONTRAST/COMPLICATIONS:  IV Contrast: NONE  Oral Contrast: NONE  Complications: None reported at time of study completion    COMPARISON: None.    FINDINGS:  Evaluation of the thoracic, abdominal and pelvic organs and   vasculature is limited without intravenous contrast. The patient's   respiratory motion degrades images.    CHEST:    LUNGS AND AIRWAYS: Patent central airways. Interlobar septal thickening   and groundglass opacities in both lungs. Compressive atelectasis of the   right > left lung. Small scattered nodules measuring up to 0.3 cm, for   example, right upper lobe (4:118) and left lower lobe (4:134).  PLEURA: Moderate right and small to moderate left pleural effusion  HEART: Cardiomegaly. No pericardial effusion..  VESSELS: Atherosclerosis. Normal caliber of the thoracic aorta.  MEDIASTINUM AND FATOUMATA: Enlarged mediastinal lymph nodes, for example, 1.8   x 1.5 cm precarinal lymph node (2:33).  CHEST WALL AND LOWER NECK: Mildly heterogeneous thyroid gland.    ABDOMEN/PELVIS:    LIVER: Unremarkable.  BILE DUCTS/GALLBLADDER: Central intrahepatic and common bile duct (  1.0 cm) dilatation, reflecting postcholecystectomy state.  PANCREAS: Diffuse fatty atrophy. Duct measuring up to 0.4 cm. Nonspecific   0.8 x 0.6 cm density/calcification at the pancreatic head (2:97). A 2.5 x   1.5 cm hypodense structure near the duodenum/duodenal diverticulum   (2:106), suspicious for collection. Adjacent calcifications.  SPLEEN: Unremarkable.    ADRENALS: A 1.3 x 1.3 cm nodular thickening of the left adrenal gland.  KIDNEYS/URETERS: Bilateral perinephric stranding without   hydroureteronephrosis. Nonobstructing 0.4 x 0.3 cm left renal stone. No   obstructing radiopaque urinary tract stone. Bilateral renal cysts, the   largest on the right measuring up to 8.5 x 10.0 cm. Subcentimeter   hyperdense focus at the left kidney, suggesting proteinaceous/hemorrhagic   cyst.  BLADDER: Partially distended. Prominent wall.  REPRODUCTIVE ORGANS: Anteverted uterus.. Fluid-filled vaginal canal.   Prominent wall at the vaginal introitus.    BOWEL: No bowel obstruction. Unremarkable appendix. Colon diverticulosis.   Prominent wall of the stomach and duodenum. Duodenal diverticulum.   Connection between the second/third portion of duodenum and ascending   colon (2:113 and 601:32), which appears filled with stool and measures   6.0 x 2.5 x 3.0 cm (transverse x AP x CC). Scattered areas prominent   colon wall.  PERITONEUM: No obvious free air.  VESSELS: Atherosclerosis. Normal caliber of the abdominal aorta.   Displaced intimal calcification at the infrarenal aorta, which may be due   to chronic dissection and/or atheromatous plaque.  RETROPERITONEUM: Subcentimeter lymph nodes without lymphadenopathy.  ABDOMINAL WALL/SOFT TISSUES: Small fat-containing umbilical hernia.  BONES: Degenerative changes of the spine. Age-indeterminate endplate   depression at T11, suggesting chronic.    IMPRESSION:    Moderate right and small to moderate left pleural effusion with   compressive atelectasis. Nonspecific interlobar septal thickening and   groundglass opacities, which can be seen in noninfectious (pulmonary   edema) and infectious processes.    Nonspecific pulmonary nodules. If the patient is at low risk for   malignancy, no further follow-up is needed. If the patient is at high   risk, 12 month follow-up CT chest may be obtained for further evaluation.    Nonspecific mediastinal lymphadenopathy.    Mildly heterogeneous thyroid gland, which may further characterized on a   nonemergent ultrasound as indicated.    Central intrahepatic and common bile duct dilatation, reflecting   postcholecystectomy state. Recommend clinical correlation (LFT) and   additional imaging (MRCP/MR) if there is clinical suspicion for biliary   pathology.    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   Pancreatic ductal dilatation. Nonspecific small density/calcification at   the pancreatic head. Recommend correlation with MRCP/MR.    Prominent wall of the stomach and duodenum, which may be due to partial   distention and/or gastroduodenitis. Recommend correlation with patient's   symptoms and endoscopy as indicated.    Nonspecific bilateral perinephric stranding without hydronephrosis or   obstructing radiopaque urinary tract stone. Prominent bladder wall.   Recommend clinical correlation to assess urinary tract infection.    Nodular thickening of the left adrenal gland, which can be further   assessed by a nonemergent MRI.    Additional findings as described.

## 2023-01-26 NOTE — PROGRESS NOTE ADULT - PROBLEM SELECTOR PLAN 5
CT a/p shows central intrahepatic and common bile duct dilatation, reflecting postcholecystectomy state. 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 Pancreatic ductal dilatation. Nonspecific small density/calcification at the pancreatic head. Prominent wall of the stomach and duodenum, which may be due to partial distention and/or gastroduodenitis. Nonspecific bilateral perinephric stranding without hydronephrosis or obstructing radiopaque urinary tract stone. Prominent bladder wall. Nodular thickening of the left adrenal gland. Prominent wall at the vaginal introitus, which was mentioned in the body of the original report. Recommend clinical correlation to assess infection/inflammation and direct visualization to exclude potential underlying lesion/neoplasm.  - GI Sheikh Yoo consulted   - Surgery consulted  - Consider GYN consult in AM CT a/p shows central intrahepatic and common bile duct dilatation, reflecting postcholecystectomy state. 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 Pancreatic ductal dilatation. Nonspecific small density/calcification at the pancreatic head. Prominent wall of the stomach and duodenum, which may be due to partial distention and/or gastroduodenitis. Nonspecific bilateral perinephric stranding without hydronephrosis or obstructing radiopaque urinary tract stone. Prominent bladder wall. Nodular thickening of the left adrenal gland. Prominent wall at the vaginal introitus, which was mentioned in the body of the original report. Recommend clinical correlation to assess infection/inflammation and direct visualization to exclude potential underlying lesion/neoplasm.  - Per GI, dilated cbd likely 2/2 pancreatic atrophy, doubt duodenal-colonic fistula; patient denies severe diarrhea  - Consider repeat CT with oral contrast only; however patient and dtr do not want surgery if present  - GI Dr. Leonardo following, recs appreciated  - Surgery consulted, recs appreciated. Signed off.   - ID Dr. Laughlin consulted, f/u recs  - Recommend follow up outpatient with gynecology on discharge

## 2023-01-26 NOTE — CONSULT NOTE ADULT - SUBJECTIVE AND OBJECTIVE BOX
86 yo F with PMHx of HTN, HLD, HFrEF (EF 20-25% on 12/22), T2DM, hypothyroidism, alzheimer's dementia, chronic Afib who presents today for SOB. Daughter at bedside who provided supplemental history. States that pt was doing well since her discharge fro [facility] on 12/23/22 without any cardiac complaints. Pt worked with physical therapy on Tuesday morning, but by the evening pt endorsed feeling short of breath. Daughter states pt would walk a couple of feet and experience increased respiratory effort and diaphoresis. Symptoms resolved at rest. Pt experienced orthopnea when laying flat and again increased work of breathing. Throughout this time, pt states she started to feel palpitations, denies chest pain. Pt became profusely diaphoretic with increased respiratory effort with slight exertion, prompting presentation to the ED. Pt and daughter deny any diet changes, increased salt or fluid intake, changes to medications. Pt is compliant with all medications. Pt endorses feeling much better in the ED, with decreased work of breathing and shortness of breath. Pt denies any cardiac symptoms at this time.       HPI:  87 years old female patient with PMHx of HTN, HLD, HFrEF (EF 20-25% on 12/22), T2DM, hypothyroidism, alzheimer's dementia, chronic Afib who presents today for SOB. Of note patient recently admitted at [facility] from 12/21 to 12/23/22  for Afib with RVR.  History obtained from patient and daughter who reports that last night patient walked from the bedroom to the bathroom and complained severe respiratory distress. Daughter states that patient has been progressively getting more weak and short of breath since last discharge. Patient has been complaint with meds and diet. Denies chest pain, dizziness, abdominal pain, urinary symptoms, fever chills or other symptoms.   ED course   VS: Afebrile, HR: 94->130->109, BP: 145/94 SpO2 98% 3 lt NC   Labs significant for WBC 11.7 K, Na 131, CO2 16, BUN/ creatine 27/1.4, glucose 249, AST//228 Trop 144.9, Pro BMP >63157 ABG 7.4/28/85/17  EKG shows Afib with RVR @ 105 bpm, minimal voltage criteria for LVH, non specify T wave   CT chest shows Moderate right and small to moderate left pleural effusion with compressive atelectasis. Nonspecific interlobar septal thickening and ground-glass opacities Nonspecific pulmonary nodules. Nonspecific mediastinal lymphadenopathy. Mildly heterogeneous thyroid gland  CT a/p shows central intrahepatic and common bile duct dilatation, reflecting postcholecystectomy state. 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 Pancreatic ductal dilatation. Nonspecific small density/calcification at the pancreatic head. Prominent wall of the stomach and duodenum, which may be due to partial distention and/or gastroduodenitis. Nonspecific bilateral perinephric stranding without hydronephrosis or obstructing radiopaque urinary tract stone. Prominent bladder wall. Nodular thickening of the left adrenal gland.  Given in the ED xanax 0.25 mg x1, digoxin 125 mcg x1, Lopressor 5 IVP x1, Furosemide 40 mg x1, cardio consulted    (26 Jan 2023 02:15)      PAST MEDICAL & SURGICAL HISTORY:  Atrial fibrillation      Hypothyroidism      Mild Alzheimer&#x27;s dementia      Diabetes mellitus      Acute on chronic systolic congestive heart failure      History of appendectomy      H/O hernia repair      History of cholecystectomy        EKG: Atrial fibrillation with RVR, minimal voltage criteria for LVH, nonspecific T wave abnormality; improved lateral leads from prior EKG on 12/29/2022       ECHO FINDINGS: 12/22/2022 LV enlargement with severe left ventricular systolic dysfunction, LVEF of 20-25%, apex and anterior walls akinetic, inferior and inferolateral walls hypokinetic, biatrial enlargement, Moderate MR, Mild TR        MEDICATIONS  (STANDING):  apixaban 2.5 milliGRAM(s) Oral every 12 hours  atorvastatin 40 milliGRAM(s) Oral at bedtime  cholecalciferol 2000 Unit(s) Oral daily  dextrose 5%. 1000 milliLiter(s) (100 mL/Hr) IV Continuous <Continuous>  dextrose 5%. 1000 milliLiter(s) (50 mL/Hr) IV Continuous <Continuous>  dextrose 50% Injectable 25 Gram(s) IV Push once  dextrose 50% Injectable 12.5 Gram(s) IV Push once  dextrose 50% Injectable 25 Gram(s) IV Push once  digoxin     Tablet 125 MICROGram(s) Oral every other day  furosemide   Injectable 40 milliGRAM(s) IV Push two times a day  glucagon  Injectable 1 milliGRAM(s) IntraMuscular once  insulin lispro (ADMELOG) corrective regimen sliding scale   SubCutaneous three times a day before meals  insulin lispro (ADMELOG) corrective regimen sliding scale   SubCutaneous at bedtime  levothyroxine 75 MICROGram(s) Oral daily  memantine 10 milliGRAM(s) Oral two times a day  metoprolol succinate  milliGRAM(s) Oral two times a day    MEDICATIONS  (PRN):  aluminum hydroxide/magnesium hydroxide/simethicone Suspension 30 milliLiter(s) Oral every 4 hours PRN Dyspepsia  dextrose Oral Gel 15 Gram(s) Oral once PRN Blood Glucose LESS THAN 70 milliGRAM(s)/deciliter  melatonin 3 milliGRAM(s) Oral at bedtime PRN Insomnia  ondansetron Injectable 4 milliGRAM(s) IV Push every 8 hours PRN Nausea and/or Vomiting      FAMILY HISTORY:  Family history of cerebral hemorrhage (Mother)    FH: renal failure (Father)      Denies Family history of CAD or early MI    REVIEW OF SYSTEMS:   Constitutional: denies fever, chills  HEENT: denies blurry vision, difficulty hearing  Respiratory: denies SOB, MORTON, cough  Cardiovascular: +LE edema; denies CP, palpitations, orthopnea  Gastrointestinal: denies nausea, vomiting, abdominal pain  Genitourinary: denies urinary changes  Skin: Denies rashes, itching  Neurologic: denies headache, weakness, dizziness  Hematology/Oncology: denies bleeding, easy bruising  ROS negative except as noted above      SOCIAL HISTORY:    No tobacco, Alcohol or Drug use    Vital Signs Last 24 Hrs  T(C): 36.7 (26 Jan 2023 06:10), Max: 36.7 (26 Jan 2023 02:20)  T(F): 98 (26 Jan 2023 06:10), Max: 98 (26 Jan 2023 02:20)  HR: 96 (26 Jan 2023 06:10) (94 - 130)  BP: 132/75 (26 Jan 2023 06:10) (126/80 - 145/94)  BP(mean): --  RR: 18 (26 Jan 2023 06:10) (16 - 18)  SpO2: 98% (26 Jan 2023 06:10) (98% - 98%)    Parameters below as of 26 Jan 2023 06:10  Patient On (Oxygen Delivery Method): nasal cannula  O2 Flow (L/min): 2      Physical Exam:  General: Elderly, pleasant woman, resting comfortably in bed, head elevated to 60 degrees, NAD   HEENT: NCAT, EOMI bl, moist mucous membranes   Neck: Supple, nontender  Neurology: A&Ox3, nonfocal, sensation intact   Respiratory: Moderate inspiratory crackles in left lower lung field, right lung fields CTA, no increased work of breathing, NC 2L   CV: afib, +S1/S2, no murmurs, rubs or gallops  Abdominal: Soft, NT, ND +BSx4   Extremities: b/l +2 LE edema, warm, +peripheral pulses   Heme: No obvious ecchymosis or petechiae   Skin: warm, dry, normal color      I&O's Detail      LABS:                        10.0   9.15  )-----------( 290      ( 26 Jan 2023 05:52 )             32.4     01-26    132<L>  |  105  |  26<H>  ----------------------------<  170<H>  5.5<H>   |  18<L>  |  1.20    Ca    8.7      26 Jan 2023 04:06  Phos  3.8     01-26  Mg     1.7     01-26    TPro  x   /  Alb  x   /  TBili  1.4<H>  /  DBili  0.4<H>  /  AST  x   /  ALT  x   /  AlkPhos  x   01-26    CARDIAC MARKERS ( 26 Jan 2023 04:00 )  x     / x     / 164 U/L / x     / 2.5 ng/mL          I&O's Summary    BNPSerum Pro-Brain Natriuretic Peptide: >40083 pg/mL (01-25 @ 20:20)    RADIOLOGY & ADDITIONAL STUDIES:

## 2023-01-26 NOTE — CONSULT NOTE ADULT - ASSESSMENT
87 years old female patient with PMHx of HTN, HLD, HFrEF (EF 20-25% on 12/22), T2DM, hypothyroidism, alzheimer's dementia, chronic Afib who presents for SOB. 87 years old female patient with PMHx of HTN, HLD, HFrEF (EF 20-25% on 12/22), T2DM, hypothyroidism, alzheimer's dementia, chronic Afib who presents for SOB.    mild Dementia  Dyspnea  Pleural Effusions  HF  Atelectasis  Sub Cm Pulm Nodules  Eval for Biliary tract pathology -   HTN  HLD  OP  OA  DM  Thyroid Disease    spoke with Dtr  reviewed CT chest and abdomen  effusions - R > L - may benefit from thoracentesis right side - if we can HOLD ELIQUIS  Cardio eval - follows with Dr Del Cid in the office - diuresis - cvs rx regimen optimization  monitor VS and HD and Sat  Surgery and GI eval for abnormal Biliary - GI CT imaging - eval cholangitis - malignancy -   GOC discussion  assist with needs  serial labs  serial ABD exam  keep sat > 88 pct

## 2023-01-26 NOTE — CONSULT NOTE ADULT - ASSESSMENT
87 years old female patient with PMHx of HTN, HLD, HFrEF, T2DM, hypothyroidism, alzheimer's dementia, chronic Afib, who presented with SOB. Incidentally found to have transaminitis and central intrahepatic/common bile duct dilatation. 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     ID consulted to determine if antibiotics needed for this--currently lower clinical suspicion for cholangitis or intraabdominal infection. She has no fevers or GI symptoms. No leukocytosis and LFT's improving.    -observe closely off antibiotics  -follow blood cultures  -monitor LFT's  -discussed with daughter at bedside    Thank you for courtesy of this consult.     Will follow.  Discussed with the primary team.     Andreina Laughlin MD  Division of Infectious Diseases   Cell [phone] between 8am and 6pm   After 6pm and weekends please call ID service at [phone].

## 2023-01-26 NOTE — PROGRESS NOTE ADULT - PROBLEM SELECTOR PLAN 3
Probably demand ischemia in the setting of ADHF.   - EKG shows Afib with RVR @ 105 bpm, minimal voltage criteria for LVH, non specify T wave   - Trend CE until peak   - Cardio consulted in the ED, fu recs

## 2023-01-26 NOTE — H&P ADULT - PROBLEM SELECTOR PLAN 5
CT a/p shows central intrahepatic and common bile duct dilatation, reflecting postcholecystectomy state. 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 Pancreatic ductal dilatation. Nonspecific small density/calcification at the pancreatic head. Prominent wall of the stomach and duodenum, which may be due to partial distention and/or gastroduodenitis. Nonspecific bilateral perinephric stranding without hydronephrosis or obstructing radiopaque urinary tract stone. Prominent bladder wall. Nodular thickening of the left adrenal gland.  - GI Sheik Yoo consulted   - Surgery consulted CT a/p shows central intrahepatic and common bile duct dilatation, reflecting postcholecystectomy state. 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 Pancreatic ductal dilatation. Nonspecific small density/calcification at the pancreatic head. Prominent wall of the stomach and duodenum, which may be due to partial distention and/or gastroduodenitis. Nonspecific bilateral perinephric stranding without hydronephrosis or obstructing radiopaque urinary tract stone. Prominent bladder wall. Nodular thickening of the left adrenal gland. Prominent wall at the vaginal introitus, which was mentioned in the body of the original report. Recommend clinical correlation to assess infection/inflammation and direct visualization to exclude potential underlying lesion/neoplasm.  - GI Sheik Yoo consulted   - Surgery consulted  - Consider GYN consult CT a/p shows central intrahepatic and common bile duct dilatation, reflecting postcholecystectomy state. 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 Pancreatic ductal dilatation. Nonspecific small density/calcification at the pancreatic head. Prominent wall of the stomach and duodenum, which may be due to partial distention and/or gastroduodenitis. Nonspecific bilateral perinephric stranding without hydronephrosis or obstructing radiopaque urinary tract stone. Prominent bladder wall. Nodular thickening of the left adrenal gland. Prominent wall at the vaginal introitus, which was mentioned in the body of the original report. Recommend clinical correlation to assess infection/inflammation and direct visualization to exclude potential underlying lesion/neoplasm.  - GI Sheikh Yoo consulted   - Surgery consulted  - Consider GYN consult in AM

## 2023-01-26 NOTE — H&P ADULT - HISTORY OF PRESENT ILLNESS
87 years old female patient with PMHx of HTN, HLD, HFrEF (EF 20-25% on 12/22), T2DM, hypothyroidism, alzheimer's dementia, chronic Afib who presents today for SOB. Of note patient recently admitted at [facility] from 12/21 to 12/23/22  for Afib with RVR.     ED course   VS: Afebrile, HR: 94->130->109, BP: 145/94 SpO2 98% 3 lt NC   Labs significant for WBC 11.7 K, Na 131, CO2 16, BUN/ creatine 27/1.4, glucose 249, AST//228 Trop 144.9, Pro BMP >60881 ABG 7.4/28/85/17  EKG shows   Given in the ED xanax 0.25 mg x1, digoxin 125 mcg x1, Lopressor 5 IVP x1, Furosemide 40 mg x1, cardio consulted  87 years old female patient with PMHx of HTN, HLD, HFrEF (EF 20-25% on 12/22), T2DM, hypothyroidism, alzheimer's dementia, chronic Afib who presents today for SOB. Of note patient recently admitted at [facility] from 12/21 to 12/23/22  for Afib with RVR.  History obtained from patient and daughter who reports that last night patient walked from the bedroom to the bathroom and complained severe respiratory distress. Daughter states that patient has been progressively getting more weak and short of breath since last discharge. Patient has been complaint with meds and diet. Denies chest pain, dizziness, abdominal pain, urinary symptoms, fever chills or other symptoms.     ED course   VS: Afebrile, HR: 94->130->109, BP: 145/94 SpO2 98% 3 lt NC   Labs significant for WBC 11.7 K, Na 131, CO2 16, BUN/ creatine 27/1.4, glucose 249, AST//228 Trop 144.9, Pro BMP >78100 ABG 7.4/28/85/17  EKG shows Afib with RVR @ 105 bpm, minimal voltage criteria for LVH, non specify T wave   CT chest shows Moderate right and small to moderate left pleural effusion with compressive atelectasis. Nonspecific interlobar septal thickening and ground-glass opacitiesNonspecific pulmonary nodules. INonspecific mediastinal lymphadenopathy.Mildly heterogeneous thyroid gland  CT a/p shows central intrahepatic and common bile duct dilatation, reflecting postcholecystectomy state. 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 Pancreatic ductal dilatation. Nonspecific small density/calcification at the pancreatic head. Prominent wall of the stomach and duodenum, which may be due to partial distention and/or gastroduodenitis. Nonspecific bilateral perinephric stranding without hydronephrosis or obstructing radiopaque urinary tract stone. Prominent bladder wall. Nodular thickening of the left adrenal gland.  Given in the ED xanax 0.25 mg x1, digoxin 125 mcg x1, Lopressor 5 IVP x1, Furosemide 40 mg x1, cardio consulted    87 years old female patient with PMHx of HTN, HLD, HFrEF (EF 20-25% on 12/22), T2DM, hypothyroidism, alzheimer's dementia, chronic Afib who presents today for SOB. Of note patient recently admitted at [facility] from 12/21 to 12/23/22  for Afib with RVR.  History obtained from patient and daughter who reports that last night patient walked from the bedroom to the bathroom and complained severe respiratory distress. Daughter states that patient has been progressively getting more weak and short of breath since last discharge. Patient has been complaint with meds and diet. Denies chest pain, dizziness, abdominal pain, urinary symptoms, fever chills or other symptoms.     ED course   VS: Afebrile, HR: 94->130->109, BP: 145/94 SpO2 98% 3 lt NC   Labs significant for WBC 11.7 K, Na 131, CO2 16, BUN/ creatine 27/1.4, glucose 249, AST//228 Trop 144.9, Pro BMP >34003 ABG 7.4/28/85/17  EKG shows Afib with RVR @ 105 bpm, minimal voltage criteria for LVH, non specify T wave   CT chest shows Moderate right and small to moderate left pleural effusion with compressive atelectasis. Nonspecific interlobar septal thickening and ground-glass opacitiesNonspecific pulmonary nodules. INonspecific mediastinal lymphadenopathy.Mildly heterogeneous thyroid gland  CT a/p shows central intrahepatic and common bile duct dilatation, reflecting postcholecystectomy state. 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 Pancreatic ductal dilatation. Nonspecific small density/calcification at the pancreatic head. Prominent wall of the stomach and duodenum, which may be due to partial distention and/or gastroduodenitis. Nonspecific bilateral perinephric stranding without hydronephrosis or obstructing radiopaque urinary tract stone. Prominent bladder wall. Nodular thickening of the left adrenal gland.  Given in the ED xanax 0.25 mg x1, digoxin 125 mcg x1, Lopressor 5 IVP x1, Furosemide 40 mg x1, cardio consulted    87 years old female patient with PMHx of HTN, HLD, HFrEF (EF 20-25% on 12/22), T2DM, hypothyroidism, alzheimer's dementia, chronic Afib who presents today for SOB. Of note patient recently admitted at [facility] from 12/21 to 12/23/22  for Afib with RVR.  History obtained from patient and daughter who reports that last night patient walked from the bedroom to the bathroom and complained severe respiratory distress. Daughter states that patient has been progressively getting more weak and short of breath since last discharge. Patient has been complaint with meds and diet. Denies chest pain, dizziness, abdominal pain, urinary symptoms, fever chills or other symptoms.   ED course   VS: Afebrile, HR: 94->130->109, BP: 145/94 SpO2 98% 3 lt NC   Labs significant for WBC 11.7 K, Na 131, CO2 16, BUN/ creatine 27/1.4, glucose 249, AST//228 Trop 144.9, Pro BMP >22026 ABG 7.4/28/85/17  EKG shows Afib with RVR @ 105 bpm, minimal voltage criteria for LVH, non specify T wave   CT chest shows Moderate right and small to moderate left pleural effusion with compressive atelectasis. Nonspecific interlobar septal thickening and ground-glass opacitiesNonspecific pulmonary nodules. INonspecific mediastinal lymphadenopathy.Mildly heterogeneous thyroid gland  CT a/p shows central intrahepatic and common bile duct dilatation, reflecting postcholecystectomy state. 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 Pancreatic ductal dilatation. Nonspecific small density/calcification at the pancreatic head. Prominent wall of the stomach and duodenum, which may be due to partial distention and/or gastroduodenitis. Nonspecific bilateral perinephric stranding without hydronephrosis or obstructing radiopaque urinary tract stone. Prominent bladder wall. Nodular thickening of the left adrenal gland.  Given in the ED xanax 0.25 mg x1, digoxin 125 mcg x1, Lopressor 5 IVP x1, Furosemide 40 mg x1, cardio consulted

## 2023-01-26 NOTE — H&P ADULT - NSICDXPASTMEDICALHX_GEN_ALL_CORE_FT
PAST MEDICAL HISTORY:  Atrial fibrillation     Diabetes mellitus     Hypothyroidism     Mild Alzheimer's dementia      PAST MEDICAL HISTORY:  Acute on chronic systolic congestive heart failure     Atrial fibrillation     Diabetes mellitus     Hypothyroidism     Mild Alzheimer's dementia

## 2023-01-26 NOTE — H&P ADULT - PROBLEM SELECTOR PLAN 3
Probably demand ischemia in the setting of ADHF.   - EKG shows Afib with RVR @ 105 bpm, minimal voltage criteria for LVH, non specify T wave   - Trend CE until peak   - Cardio consulted in the ED Probably demand ischemia in the setting of ADHF.   - EKG shows Afib with RVR @ 105 bpm, minimal voltage criteria for LVH, non specify T wave   - Trend CE until peak   - Cardio consulted in the ED, fu recs

## 2023-01-26 NOTE — CONSULT NOTE ADULT - ATTENDING COMMENTS
short of breath and volume overloaded  cont iv lasix  known severe lv dysfunction with an attempt to manage medically  known af, with borderline rate control  cont with av mindy blockers  can repeat echocardiogram to evaluate mr  can hold ac if need for thoracentesis

## 2023-01-26 NOTE — H&P ADULT - PROBLEM SELECTOR PLAN 1
Patient presents with shortness of breath likely 2/2 acute on chronic CHF exacerbation  - Admit to telemetry   - CT chest showed moderate right and small to moderate left pleural effusion with compressive atelectasis. Nonspecific interlobar septal thickening and ground-glass opacities Nonspecific pulmonary nodules.  Nonspecific mediastinal lymph adenopathy. Mildly heterogeneous thyroid gland  - Asymptomatic with no signs or symptoms of volume overload  - Given  digoxin 125 mcg x1, Lopressor 5 IVP x1, Furosemide 40 mg x1, cardio consulted in the ED   - Will continue home   - Unless contraindicated: ACE/ARBs, BB  - Supplemental O2 to maintain O2 sat >90%. Will attempt to wean patient to baseline O2 status  - F/u TTE  - Strict I/Os, daily weights  - Monitor and replace electrolytes  - Cardiology consulted ([facility]), f/u recs/recs appreciated Patient presents with shortness of breath likely 2/2 acute on chronic CHF exacerbation  - Admit to telemetry   - CT chest showed moderate right and small to moderate left pleural effusion with compressive atelectasis.  - On admission Pro BMP >[id number] <-[id number]  - Last TTE on 12/22  showed Left ventricular enlargement with severe left ventricular systolic dysfunction, estimated LVEF of 20-25%. The entire apex and anterior walls appear akinetic. The inferior and inferolateral walls appear hypokinetic.  - Given  digoxin 125 mcg x1, Lopressor 5 IVP x1, Furosemide 40 mg x1, cardio consulted in the ED   - Will continue home digoxin 125 mcg qod, Metoprolol succinate 100 mg BID,   - Strict I/Os, daily weights  - Monitor and replace electrolytes  - Cardiology consulted ([facility]) consulted in the ED Patient presents with shortness of breath likely 2/2 acute on chronic CHF exacerbation  - Admit to telemetry   - CT chest showed moderate right and small to moderate left pleural effusion with compressive atelectasis.  - On admission Pro BMP >[id number] <-[id number]  - Last TTE on 12/22  showed Left ventricular enlargement with severe left ventricular systolic dysfunction, estimated LVEF of 20-25%. The entire apex and anterior walls appear akinetic. The inferior and inferolateral walls appear hypokinetic.  - Given  digoxin 125 mcg x1, Lopressor 5 IVP x1, Furosemide 40 mg x1, cardio consulted in the ED   - Lasix 40 mg IVP BID   - Will continue home digoxin 125 mcg qod, Metoprolol succinate 100 mg BID,   - Strict I/Os, daily weights  - Monitor and replace electrolytes  - Cardiology consulted ([facility]) consulted in the ED Patient presents with shortness of breath likely 2/2 acute on chronic CHF exacerbation  - Admit to telemetry   - CT chest showed moderate right and small to moderate left pleural effusion with compressive atelectasis.  - On admission Pro BMP >[id number] <-[id number]  - Last TTE on 12/22  showed Left ventricular enlargement with severe left ventricular systolic dysfunction, estimated LVEF of 20-25%. The entire apex and anterior walls appear akinetic. The inferior and inferolateral walls appear hypokinetic.  - Given  digoxin 125 mcg x1, Lopressor 5 IVP x1, Furosemide 40 mg x1, cardio consulted in the ED   - start Lasix 40 mg IVP BID   - Will continue home digoxin 125 mcg qod, Metoprolol succinate 100 mg BID,   - Strict I/Os, daily weights  - Monitor and replace electrolytes  - Cardiology consulted ([facility]) consulted in the ED

## 2023-01-26 NOTE — H&P ADULT - PROBLEM SELECTOR PLAN 4
Chronic Afib   - EKG shows Afib with RVR @ 105 bpm, minimal voltage criteria for LVH, non specify T wave   - Will continue home metoprolol succinate 100 mg BID and digoxin 125 mcg qod   - AC Eliquis 2.5 BID initially in RVR on arrival to the ed HR now improved s/p digoxin 125 mcg x1, Lopressor 5 IVP x1  - EKG shows Afib with RVR @ 105 bpm, minimal voltage criteria for LVH, non specify T wave   - Will continue home metoprolol succinate 100 mg BID and digoxin 125 mcg qod   - AC Eliquis 2.5 BID

## 2023-01-26 NOTE — PROGRESS NOTE ADULT - PROBLEM SELECTOR PLAN 9
Patient oriented in person, time and place   - Continue home memantine   - Family need to bring from home rivastigmine patch   - Fall and aspiration precautions

## 2023-01-26 NOTE — PROGRESS NOTE ADULT - PROBLEM SELECTOR PLAN 4
initially in RVR on arrival to the ed HR now improved s/p digoxin 125 mcg x1, Lopressor 5 IVP x1  - EKG shows Afib with RVR @ 105 bpm, minimal voltage criteria for LVH, non specify T wave   - Will continue home metoprolol succinate 100 mg BID and digoxin 125 mcg qod   - AC Eliquis 2.5 BID

## 2023-01-26 NOTE — CONSULT NOTE ADULT - SUBJECTIVE AND OBJECTIVE BOX
Date/Time Patient Seen:  		  Referring MD:   Data Reviewed	       Patient is a 87y old  Female who presents with a chief complaint of ADHF (26 Jan 2023 02:15)      Subjective/HPI    History of Present Illness:  Reason for Admission: ADHF  History of Present Illness:   87 years old female patient with PMHx of HTN, HLD, HFrEF (EF 20-25% on 12/22), T2DM, hypothyroidism, alzheimer's dementia, chronic Afib who presents today for SOB. Of note patient recently admitted at [facility] from 12/21 to 12/23/22  for Afib with RVR.  History obtained from patient and daughter who reports that last night patient walked from the bedroom to the bathroom and complained severe respiratory distress. Daughter states that patient has been progressively getting more weak and short of breath since last discharge. Patient has been complaint with meds and diet. Denies chest pain, dizziness, abdominal pain, urinary symptoms, fever chills or other symptoms.   ED course   PAST MEDICAL & SURGICAL HISTORY:  Atrial fibrillation    Hypothyroidism    Mild Alzheimer&#x27;s dementia    Diabetes mellitus    Acute on chronic systolic congestive heart failure    History of appendectomy    H/O hernia repair    History of cholecystectomy      FAMILY HISTORY:  Father  Still living? No  FH: renal failure, Age at diagnosis: Age Unknown    Mother  Still living? No  Family history of cerebral hemorrhage, Age at diagnosis: Age Unknown.     Social History:  · Substance use	No  · Social History (marital status, living situation, occupation, and sexual history)	Tobacco: denies   EtOH:  denies   Recreational drug use: denies   Lives with: daughter   Ambulates: cane   ADLs: independent     Tobacco Screening:  · Core Measure Site	Yes  · Has the patient used tobacco in the past 30 days?	No    Risk Assessment:    Present on Admission:  Deep Venous Thrombosis	no  Pulmonary Embolus	no     HIV Screening:  · In accordance with [place] State law, we offer every patient who comes to our ED an HIV test. Would you like to be tested today?	Opt out        Medication list         MEDICATIONS  (STANDING):  apixaban 2.5 milliGRAM(s) Oral every 12 hours  atorvastatin 40 milliGRAM(s) Oral at bedtime  cholecalciferol 2000 Unit(s) Oral daily  dextrose 5%. 1000 milliLiter(s) (100 mL/Hr) IV Continuous <Continuous>  dextrose 5%. 1000 milliLiter(s) (50 mL/Hr) IV Continuous <Continuous>  dextrose 50% Injectable 25 Gram(s) IV Push once  dextrose 50% Injectable 12.5 Gram(s) IV Push once  dextrose 50% Injectable 25 Gram(s) IV Push once  digoxin     Tablet 125 MICROGram(s) Oral every other day  furosemide   Injectable 40 milliGRAM(s) IV Push two times a day  glucagon  Injectable 1 milliGRAM(s) IntraMuscular once  insulin lispro (ADMELOG) corrective regimen sliding scale   SubCutaneous three times a day before meals  insulin lispro (ADMELOG) corrective regimen sliding scale   SubCutaneous at bedtime  levothyroxine 75 MICROGram(s) Oral daily  memantine 10 milliGRAM(s) Oral two times a day  metoprolol succinate  milliGRAM(s) Oral two times a day  sodium bicarbonate  Infusion 0.049 mEq/kG/Hr (50 mL/Hr) IV Continuous <Continuous>    MEDICATIONS  (PRN):  aluminum hydroxide/magnesium hydroxide/simethicone Suspension 30 milliLiter(s) Oral every 4 hours PRN Dyspepsia  dextrose Oral Gel 15 Gram(s) Oral once PRN Blood Glucose LESS THAN 70 milliGRAM(s)/deciliter  melatonin 3 milliGRAM(s) Oral at bedtime PRN Insomnia  ondansetron Injectable 4 milliGRAM(s) IV Push every 8 hours PRN Nausea and/or Vomiting         Vitals log        ICU Vital Signs Last 24 Hrs  T(C): 36.7 (26 Jan 2023 02:20), Max: 36.7 (26 Jan 2023 02:20)  T(F): 98 (26 Jan 2023 02:20), Max: 98 (26 Jan 2023 02:20)  HR: 108 (26 Jan 2023 02:20) (94 - 130)  BP: 134/74 (26 Jan 2023 02:20) (126/80 - 145/94)  BP(mean): --  ABP: --  ABP(mean): --  RR: 16 (26 Jan 2023 02:20) (16 - 18)  SpO2: 98% (26 Jan 2023 02:20) (98% - 98%)    O2 Parameters below as of 26 Jan 2023 02:20  Patient On (Oxygen Delivery Method): nasal cannula  O2 Flow (L/min): 2               Input and Output:  I&O's Detail      Lab Data                        11.8   11.75 )-----------( 322      ( 25 Jan 2023 20:20 )             38.9     01-26    132<L>  |  105  |  26<H>  ----------------------------<  170<H>  5.5<H>   |  18<L>  |  1.20    Ca    8.7      26 Jan 2023 04:06  Phos  3.8     01-26  Mg     1.7     01-26    TPro  7.0  /  Alb  2.6<L>  /  TBili  1.4<H>  /  DBili  x   /  AST  148<H>  /  ALT  176<H>  /  AlkPhos  73  01-26    ABG - ( 25 Jan 2023 22:54 )  pH, Arterial: 7.40  pH, Blood: x     /  pCO2: 28    /  pO2: 85    / HCO3: 17    / Base Excess: -7.5  /  SaO2: 98.0              CARDIAC MARKERS ( 26 Jan 2023 04:00 )  x     / x     / 164 U/L / x     / 2.5 ng/mL        Review of Systems	  sob  putnam  weakness      Objective     Physical Examination        Pertinent Lab findings & Imaging      Clancy:  NO   Adequate UO     I&O's Detail           Discussed with:     Cultures:	        Radiology      ACC: [id number] EXAM:  CT ABDOMEN AND PELVIS   ORDERED BY: KAY OLIVAREZ     ACC: [id number] EXAM:  CT CHEST   ORDERED BY: KAY OLIVAREZ     *** ADDENDUM # 1 ***    Addendum: Prominent wall at the vaginal introitus, which was mentioned in   the body of the original report. Recommend clinical correlation to assess   infection/inflammation and direct visualization to exclude potential   underlying lesion/neoplasm.    --- End of Report ---    *** END OF ADDENDUM # 1 ***      PROCEDURE DATE:  01/25/2023          INTERPRETATION:  CLINICAL INFORMATION: shortness of breath chf vs   infiltrate.    PROCEDURE: CT of the chest, abdomen and pelvis was performed without   intravenous contrast. Coronal and sagittal reconstruction images were   obtained.    CONTRAST/COMPLICATIONS:  IV Contrast: NONE  Oral Contrast: NONE  Complications: None reported at time of study completion    COMPARISON: None.    FINDINGS:  Evaluation of the thoracic, abdominal and pelvic organs and   vasculature is limited without intravenous contrast. The patient's   respiratory motion degrades images.    CHEST:    LUNGS AND AIRWAYS: Patent central airways. Interlobar septal thickening   and groundglass opacities in both lungs. Compressive atelectasis of the   right > left lung. Small scattered nodules measuring up to 0.3 cm, for   example, right upper lobe (4:118) and left lower lobe (4:134).  PLEURA: Moderate right and small to moderate left pleural effusion  HEART: Cardiomegaly. No pericardial effusion..  VESSELS: Atherosclerosis. Normal caliber of the thoracic aorta.  MEDIASTINUM AND FATOUMATA: Enlarged mediastinal lymph nodes, for example, 1.8   x 1.5 cm precarinal lymph node (2:33).  CHEST WALL AND LOWER NECK: Mildly heterogeneous thyroid gland.    ABDOMEN/PELVIS:    LIVER: Unremarkable.  BILE DUCTS/GALLBLADDER: Central intrahepatic and common bile duct (  1.0 cm) dilatation, reflecting postcholecystectomy state.  PANCREAS: Diffuse fatty atrophy. Duct measuring up to 0.4 cm. Nonspecific   0.8 x 0.6 cm density/calcification at the pancreatic head (2:97). A 2.5 x   1.5 cm hypodense structure near the duodenum/duodenal diverticulum   (2:106), suspicious for collection. Adjacent calcifications.  SPLEEN: Unremarkable.    ADRENALS: A 1.3 x 1.3 cm nodular thickening of the left adrenal gland.  KIDNEYS/URETERS: Bilateral perinephric stranding without   hydroureteronephrosis. Nonobstructing 0.4 x 0.3 cm left renal stone. No   obstructing radiopaque urinary tract stone. Bilateral renal cysts, the   largest on the right measuring up to 8.5 x 10.0 cm. Subcentimeter   hyperdense focus at the left kidney, suggesting proteinaceous/hemorrhagic   cyst.  BLADDER: Partially distended. Prominent wall.  REPRODUCTIVE ORGANS: Anteverted uterus.. Fluid-filled vaginal canal.   Prominent wall at the vaginal introitus.    BOWEL: No bowel obstruction. Unremarkable appendix. Colon diverticulosis.   Prominent wall of the stomach and duodenum. Duodenal diverticulum.   Connection between the second/third portion of duodenum and ascending   colon (2:113 and 601:32), which appears filled with stool and measures   6.0 x 2.5 x 3.0 cm (transverse x AP x CC). Scattered areas prominent   colon wall.  PERITONEUM: No obvious free air.  VESSELS: Atherosclerosis. Normal caliber of the abdominal aorta.   Displaced intimal calcification at the infrarenal aorta, which may be due   to chronic dissection and/or atheromatous plaque.  RETROPERITONEUM: Subcentimeter lymph nodes without lymphadenopathy.  ABDOMINAL WALL/SOFT TISSUES: Small fat-containing umbilical hernia.  BONES: Degenerative changes of the spine. Age-indeterminate endplate   depression at T11, suggesting chronic.    IMPRESSION:    Moderate right and small to moderate left pleural effusion with   compressive atelectasis. Nonspecific interlobar septal thickening and   groundglass opacities, which can be seen in noninfectious (pulmonary   edema) and infectious processes.    Nonspecific pulmonary nodules. If the patient is at low risk for   malignancy, no further follow-up is needed. If the patient is at high   risk, 12 month follow-up CT chest may be obtained for further evaluation.    Nonspecific mediastinal lymphadenopathy.    Mildly heterogeneous thyroid gland, which may further characterized on a   nonemergent ultrasound as indicated.    Central intrahepatic and common bile duct dilatation, reflecting   postcholecystectomy state. Recommend clinical correlation (LFT) and   additional imaging (MRCP/MR) if there is clinical suspicion for biliary   pathology.    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   Pancreatic ductal dilatation. Nonspecific small density/calcification at   the pancreatic head. Recommend correlation with MRCP/MR.    Prominent wall of the stomach and duodenum, which may be due to partial   distention and/or gastroduodenitis. Recommend correlation with patient's   symptoms and endoscopy as indicated.    Nonspecific bilateral perinephric stranding without hydronephrosis or   obstructing radiopaque urinary tract stone. Prominent bladder wall.   Recommend clinical correlation to assess urinary tract infection.    Nodular thickening of the left adrenal gland, which can be further   assessed by a nonemergent MRI.    Additional findings as described.    --- End of Report ---    ***Please see the addendum at the top of this report. It may contain   additional important information or changes.****        IGNACIA THOMAS MD; Attending Radiologist  This document has been electronically signed. Jan 26 2023  1:17AM  1st Addendum: IGNACIA THOMAS MD; Attending Radiologist  The first addendum was electronically signed on: Jan 26 2023  2:29AM.                         Date/Time Patient Seen:  		  Referring MD:   Data Reviewed	       Patient is a 87y old  Female who presents with a chief complaint of ADHF (26 Jan 2023 02:15)      Subjective/HPI  in bed  seen and examined  vs noted  labs reviewed  imaging reviewed  dtr at bedside  H and P reviewed  ER provider note reviewed  alert  verbal  weak  on o2 support  SOB and PUTNAM      History of Present Illness:  Reason for Admission: ADHF  History of Present Illness:   87 years old female patient with PMHx of HTN, HLD, HFrEF (EF 20-25% on 12/22), T2DM, hypothyroidism, alzheimer's dementia, chronic Afib who presents today for SOB. Of note patient recently admitted [facility] from 12/21 to 12/23/22  for Afib with RVR.  History obtained from patient and daughter who reports that last night patient walked from the bedroom to the bathroom and complained severe respiratory distress. Daughter states that patient has been progressively getting more weak and short of breath since last discharge. Patient has been complaint with meds and diet. Denies chest pain, dizziness, abdominal pain, urinary symptoms, fever chills or other symptoms.   ED course   PAST MEDICAL & SURGICAL HISTORY:  Atrial fibrillation    Hypothyroidism    Mild Alzheimer&#x27;s dementia    Diabetes mellitus    Acute on chronic systolic congestive heart failure    History of appendectomy    H/O hernia repair    History of cholecystectomy      FAMILY HISTORY:  Father  Still living? No  FH: renal failure, Age at diagnosis: Age Unknown    Mother  Still living? No  Family history of cerebral hemorrhage, Age at diagnosis: Age Unknown.     Social History:  · Substance use	No  · Social History (marital status, living situation, occupation, and sexual history)	Tobacco: denies   EtOH:  denies   Recreational drug use: denies   Lives with: daughter   Ambulates: cane   ADLs: independent     Tobacco Screening:  · Core Measure Site	Yes  · Has the patient used tobacco in the past 30 days?	No    Risk Assessment:    Present on Admission:  Deep Venous Thrombosis	no  Pulmonary Embolus	no     HIV Screening:  · In accordance with [place] State law, we offer every patient who comes to our ED an HIV test. Would you like to be tested today?	Opt out        Medication list         MEDICATIONS  (STANDING):  apixaban 2.5 milliGRAM(s) Oral every 12 hours  atorvastatin 40 milliGRAM(s) Oral at bedtime  cholecalciferol 2000 Unit(s) Oral daily  dextrose 5%. 1000 milliLiter(s) (100 mL/Hr) IV Continuous <Continuous>  dextrose 5%. 1000 milliLiter(s) (50 mL/Hr) IV Continuous <Continuous>  dextrose 50% Injectable 25 Gram(s) IV Push once  dextrose 50% Injectable 12.5 Gram(s) IV Push once  dextrose 50% Injectable 25 Gram(s) IV Push once  digoxin     Tablet 125 MICROGram(s) Oral every other day  furosemide   Injectable 40 milliGRAM(s) IV Push two times a day  glucagon  Injectable 1 milliGRAM(s) IntraMuscular once  insulin lispro (ADMELOG) corrective regimen sliding scale   SubCutaneous three times a day before meals  insulin lispro (ADMELOG) corrective regimen sliding scale   SubCutaneous at bedtime  levothyroxine 75 MICROGram(s) Oral daily  memantine 10 milliGRAM(s) Oral two times a day  metoprolol succinate  milliGRAM(s) Oral two times a day  sodium bicarbonate  Infusion 0.049 mEq/kG/Hr (50 mL/Hr) IV Continuous <Continuous>    MEDICATIONS  (PRN):  aluminum hydroxide/magnesium hydroxide/simethicone Suspension 30 milliLiter(s) Oral every 4 hours PRN Dyspepsia  dextrose Oral Gel 15 Gram(s) Oral once PRN Blood Glucose LESS THAN 70 milliGRAM(s)/deciliter  melatonin 3 milliGRAM(s) Oral at bedtime PRN Insomnia  ondansetron Injectable 4 milliGRAM(s) IV Push every 8 hours PRN Nausea and/or Vomiting         Vitals log        ICU Vital Signs Last 24 Hrs  T(C): 36.7 (26 Jan 2023 02:20), Max: 36.7 (26 Jan 2023 02:20)  T(F): 98 (26 Jan 2023 02:20), Max: 98 (26 Jan 2023 02:20)  HR: 108 (26 Jan 2023 02:20) (94 - 130)  BP: 134/74 (26 Jan 2023 02:20) (126/80 - 145/94)  BP(mean): --  ABP: --  ABP(mean): --  RR: 16 (26 Jan 2023 02:20) (16 - 18)  SpO2: 98% (26 Jan 2023 02:20) (98% - 98%)    O2 Parameters below as of 26 Jan 2023 02:20  Patient On (Oxygen Delivery Method): nasal cannula  O2 Flow (L/min): 2               Input and Output:  I&O's Detail      Lab Data                        11.8   11.75 )-----------( 322      ( 25 Jan 2023 20:20 )             38.9     01-26    132<L>  |  105  |  26<H>  ----------------------------<  170<H>  5.5<H>   |  18<L>  |  1.20    Ca    8.7      26 Jan 2023 04:06  Phos  3.8     01-26  Mg     1.7     01-26    TPro  7.0  /  Alb  2.6<L>  /  TBili  1.4<H>  /  DBili  x   /  AST  148<H>  /  ALT  176<H>  /  AlkPhos  73  01-26    ABG - ( 25 Jan 2023 22:54 )  pH, Arterial: 7.40  pH, Blood: x     /  pCO2: 28    /  pO2: 85    / HCO3: 17    / Base Excess: -7.5  /  SaO2: 98.0              CARDIAC MARKERS ( 26 Jan 2023 04:00 )  x     / x     / 164 U/L / x     / 2.5 ng/mL        Review of Systems	  sob  putnam  weakness      Objective     Physical Examination    heart s1s2  lung dec BS  head nc  head at  verbal  alert  cn grossly int  on o2 support      Pertinent Lab findings & Imaging      Clancy:  NO   Adequate UO     I&O's Detail           Discussed with:     Cultures:	        Radiology      ACC: [id number] EXAM:  CT ABDOMEN AND PELVIS   ORDERED BY: KAY OLIVAREZ     ACC: [id number] EXAM:  CT CHEST   ORDERED BY: KAY OLIVAREZ     *** ADDENDUM # 1 ***    Addendum: Prominent wall at the vaginal introitus, which was mentioned in   the body of the original report. Recommend clinical correlation to assess   infection/inflammation and direct visualization to exclude potential   underlying lesion/neoplasm.    --- End of Report ---    *** END OF ADDENDUM # 1 ***      PROCEDURE DATE:  01/25/2023          INTERPRETATION:  CLINICAL INFORMATION: shortness of breath chf vs   infiltrate.    PROCEDURE: CT of the chest, abdomen and pelvis was performed without   intravenous contrast. Coronal and sagittal reconstruction images were   obtained.    CONTRAST/COMPLICATIONS:  IV Contrast: NONE  Oral Contrast: NONE  Complications: None reported at time of study completion    COMPARISON: None.    FINDINGS:  Evaluation of the thoracic, abdominal and pelvic organs and   vasculature is limited without intravenous contrast. The patient's   respiratory motion degrades images.    CHEST:    LUNGS AND AIRWAYS: Patent central airways. Interlobar septal thickening   and groundglass opacities in both lungs. Compressive atelectasis of the   right > left lung. Small scattered nodules measuring up to 0.3 cm, for   example, right upper lobe (4:118) and left lower lobe (4:134).  PLEURA: Moderate right and small to moderate left pleural effusion  HEART: Cardiomegaly. No pericardial effusion..  VESSELS: Atherosclerosis. Normal caliber of the thoracic aorta.  MEDIASTINUM AND FATOUMATA: Enlarged mediastinal lymph nodes, for example, 1.8   x 1.5 cm precarinal lymph node (2:33).  CHEST WALL AND LOWER NECK: Mildly heterogeneous thyroid gland.    ABDOMEN/PELVIS:    LIVER: Unremarkable.  BILE DUCTS/GALLBLADDER: Central intrahepatic and common bile duct (  1.0 cm) dilatation, reflecting postcholecystectomy state.  PANCREAS: Diffuse fatty atrophy. Duct measuring up to 0.4 cm. Nonspecific   0.8 x 0.6 cm density/calcification at the pancreatic head (2:97). A 2.5 x   1.5 cm hypodense structure near the duodenum/duodenal diverticulum   (2:106), suspicious for collection. Adjacent calcifications.  SPLEEN: Unremarkable.    ADRENALS: A 1.3 x 1.3 cm nodular thickening of the left adrenal gland.  KIDNEYS/URETERS: Bilateral perinephric stranding without   hydroureteronephrosis. Nonobstructing 0.4 x 0.3 cm left renal stone. No   obstructing radiopaque urinary tract stone. Bilateral renal cysts, the   largest on the right measuring up to 8.5 x 10.0 cm. Subcentimeter   hyperdense focus at the left kidney, suggesting proteinaceous/hemorrhagic   cyst.  BLADDER: Partially distended. Prominent wall.  REPRODUCTIVE ORGANS: Anteverted uterus.. Fluid-filled vaginal canal.   Prominent wall at the vaginal introitus.    BOWEL: No bowel obstruction. Unremarkable appendix. Colon diverticulosis.   Prominent wall of the stomach and duodenum. Duodenal diverticulum.   Connection between the second/third portion of duodenum and ascending   colon (2:113 and 601:32), which appears filled with stool and measures   6.0 x 2.5 x 3.0 cm (transverse x AP x CC). Scattered areas prominent   colon wall.  PERITONEUM: No obvious free air.  VESSELS: Atherosclerosis. Normal caliber of the abdominal aorta.   Displaced intimal calcification at the infrarenal aorta, which may be due   to chronic dissection and/or atheromatous plaque.  RETROPERITONEUM: Subcentimeter lymph nodes without lymphadenopathy.  ABDOMINAL WALL/SOFT TISSUES: Small fat-containing umbilical hernia.  BONES: Degenerative changes of the spine. Age-indeterminate endplate   depression at T11, suggesting chronic.    IMPRESSION:    Moderate right and small to moderate left pleural effusion with   compressive atelectasis. Nonspecific interlobar septal thickening and   groundglass opacities, which can be seen in noninfectious (pulmonary   edema) and infectious processes.    Nonspecific pulmonary nodules. If the patient is at low risk for   malignancy, no further follow-up is needed. If the patient is at high   risk, 12 month follow-up CT chest may be obtained for further evaluation.    Nonspecific mediastinal lymphadenopathy.    Mildly heterogeneous thyroid gland, which may further characterized on a   nonemergent ultrasound as indicated.    Central intrahepatic and common bile duct dilatation, reflecting   postcholecystectomy state. Recommend clinical correlation (LFT) and   additional imaging (MRCP/MR) if there is clinical suspicion for biliary   pathology.    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   Pancreatic ductal dilatation. Nonspecific small density/calcification at   the pancreatic head. Recommend correlation with MRCP/MR.    Prominent wall of the stomach and duodenum, which may be due to partial   distention and/or gastroduodenitis. Recommend correlation with patient's   symptoms and endoscopy as indicated.    Nonspecific bilateral perinephric stranding without hydronephrosis or   obstructing radiopaque urinary tract stone. Prominent bladder wall.   Recommend clinical correlation to assess urinary tract infection.    Nodular thickening of the left adrenal gland, which can be further   assessed by a nonemergent MRI.    Additional findings as described.    --- End of Report ---    ***Please see the addendum at the top of this report. It may contain   additional important information or changes.****        IGNACIA THOMAS MD; Attending Radiologist  This document has been electronically signed. Jan 26 2023  1:17AM  1st Addendum: IGNACIA THOMAS MD; Attending Radiologist  The first addendum was electronically signed on: Jan 26 2023  2:29AM.

## 2023-01-26 NOTE — PROGRESS NOTE ADULT - SUBJECTIVE AND OBJECTIVE BOX
-------------------------------NOTE IN PROGRESS----------------------------  Patient is a 87y old  Female who presents with a chief complaint of ADHF (26 Jan 2023 08:06)      INTERVAL HPI/OVERNIGHT EVENTS: Patient seen and examined at bedside. Daughters Machelle and Kaylie present at bedside. Patient has no complaints at this time. Denies fevers, chills, headache, lightheadedness, chest pain, dyspnea, abdominal pain, n/v/d/c. Patient reports she is breathing well this morning, speaking in full sentences. Now on RA.     MEDICATIONS  (STANDING):  apixaban 2.5 milliGRAM(s) Oral every 12 hours  atorvastatin 40 milliGRAM(s) Oral at bedtime  cholecalciferol 2000 Unit(s) Oral daily  dextrose 5%. 1000 milliLiter(s) (100 mL/Hr) IV Continuous <Continuous>  dextrose 5%. 1000 milliLiter(s) (50 mL/Hr) IV Continuous <Continuous>  dextrose 50% Injectable 25 Gram(s) IV Push once  dextrose 50% Injectable 12.5 Gram(s) IV Push once  dextrose 50% Injectable 25 Gram(s) IV Push once  digoxin     Tablet 125 MICROGram(s) Oral every other day  furosemide   Injectable 40 milliGRAM(s) IV Push two times a day  glucagon  Injectable 1 milliGRAM(s) IntraMuscular once  insulin lispro (ADMELOG) corrective regimen sliding scale   SubCutaneous three times a day before meals  insulin lispro (ADMELOG) corrective regimen sliding scale   SubCutaneous at bedtime  levothyroxine 75 MICROGram(s) Oral daily  memantine 10 milliGRAM(s) Oral two times a day  metoprolol succinate  milliGRAM(s) Oral two times a day    MEDICATIONS  (PRN):  aluminum hydroxide/magnesium hydroxide/simethicone Suspension 30 milliLiter(s) Oral every 4 hours PRN Dyspepsia  dextrose Oral Gel 15 Gram(s) Oral once PRN Blood Glucose LESS THAN 70 milliGRAM(s)/deciliter  melatonin 3 milliGRAM(s) Oral at bedtime PRN Insomnia  ondansetron Injectable 4 milliGRAM(s) IV Push every 8 hours PRN Nausea and/or Vomiting      Allergies    No Known Allergies    Intolerances        REVIEW OF SYSTEMS:  CONSTITUTIONAL: No fever or chills  HEENT:  No headache, no sore throat  RESPIRATORY: No cough, wheezing, or shortness of breath  CARDIOVASCULAR: No chest pain, palpitations  GASTROINTESTINAL: No abd pain, nausea, vomiting, or diarrhea  GENITOURINARY: No dysuria, frequency, or hematuria  NEUROLOGICAL: no focal weakness or dizziness  MUSCULOSKELETAL: no myalgias     Vital Signs Last 24 Hrs  T(C): 36.6 (26 Jan 2023 09:46), Max: 36.7 (26 Jan 2023 02:20)  T(F): 97.8 (26 Jan 2023 09:46), Max: 98 (26 Jan 2023 02:20)  HR: 101 (26 Jan 2023 09:46) (94 - 130)  BP: 135/63 (26 Jan 2023 09:46) (126/80 - 145/94)  BP(mean): --  RR: 18 (26 Jan 2023 09:46) (16 - 18)  SpO2: 95% (26 Jan 2023 09:46) (95% - 98%)    Parameters below as of 26 Jan 2023 09:46  Patient On (Oxygen Delivery Method): room air        PHYSICAL EXAM:  GENERAL: NAD, sitting up in bed comfortably  HEENT:  anicteric, moist mucous membranes  CHEST/LUNG:  decreased breath sounds b/l bases, no rales, wheezes, or rhonchi  HEART:  IRRR, S1, S2  ABDOMEN:  BS+, soft, nontender, nondistended  EXTREMITIES: no edema, cyanosis, or calf tenderness  NERVOUS SYSTEM: answers questions and follows commands appropriately    LABS:                        10.0   9.15  )-----------( 290      ( 26 Jan 2023 05:52 )             32.4     CBC Full  -  ( 26 Jan 2023 05:52 )  WBC Count : 9.15 K/uL  Hemoglobin : 10.0 g/dL  Hematocrit : 32.4 %  Platelet Count - Automated : 290 K/uL  Mean Cell Volume : 88.0 fl  Mean Cell Hemoglobin : 27.2 pg  Mean Cell Hemoglobin Concentration : 30.9 gm/dL  Auto Neutrophil # : 5.93 K/uL  Auto Lymphocyte # : 2.19 K/uL  Auto Monocyte # : 0.92 K/uL  Auto Eosinophil # : 0.02 K/uL  Auto Basophil # : 0.03 K/uL  Auto Neutrophil % : 64.8 %  Auto Lymphocyte % : 23.9 %  Auto Monocyte % : 10.1 %  Auto Eosinophil % : 0.2 %  Auto Basophil % : 0.3 %    26 Jan 2023 04:06    132    |  105    |  26     ----------------------------<  170    5.5     |  18     |  1.20     Ca    8.7        26 Jan 2023 04:06  Phos  3.8       26 Jan 2023 04:06  Mg     1.7       26 Jan 2023 04:06    TPro  x      /  Alb  x      /  TBili  1.4    /  DBili  0.4    /  AST  x      /  ALT  x      /  AlkPhos  x      26 Jan 2023 06:05        CAPILLARY BLOOD GLUCOSE      POCT Blood Glucose.: 155 mg/dL (26 Jan 2023 08:34)          RADIOLOGY & ADDITIONAL TESTS:    Personally reviewed.     Consultant(s) Notes Reviewed:  [x] YES  [ ] NO     Patient is a 87y old  Female who presents with a chief complaint of ADHF (26 Jan 2023 08:06)      INTERVAL HPI/OVERNIGHT EVENTS: Patient seen and examined at bedside. Daughters Machelle and Kaylie present at bedside. Patient has no complaints at this time. Denies fevers, chills, headache, lightheadedness, chest pain, dyspnea, abdominal pain, n/v/d/c. Patient reports she is breathing well this morning, speaking in full sentences. Now on RA.     MEDICATIONS  (STANDING):  apixaban 2.5 milliGRAM(s) Oral every 12 hours  atorvastatin 40 milliGRAM(s) Oral at bedtime  cholecalciferol 2000 Unit(s) Oral daily  dextrose 5%. 1000 milliLiter(s) (100 mL/Hr) IV Continuous <Continuous>  dextrose 5%. 1000 milliLiter(s) (50 mL/Hr) IV Continuous <Continuous>  dextrose 50% Injectable 25 Gram(s) IV Push once  dextrose 50% Injectable 12.5 Gram(s) IV Push once  dextrose 50% Injectable 25 Gram(s) IV Push once  digoxin     Tablet 125 MICROGram(s) Oral every other day  furosemide   Injectable 40 milliGRAM(s) IV Push two times a day  glucagon  Injectable 1 milliGRAM(s) IntraMuscular once  insulin lispro (ADMELOG) corrective regimen sliding scale   SubCutaneous three times a day before meals  insulin lispro (ADMELOG) corrective regimen sliding scale   SubCutaneous at bedtime  levothyroxine 75 MICROGram(s) Oral daily  memantine 10 milliGRAM(s) Oral two times a day  metoprolol succinate  milliGRAM(s) Oral two times a day    MEDICATIONS  (PRN):  aluminum hydroxide/magnesium hydroxide/simethicone Suspension 30 milliLiter(s) Oral every 4 hours PRN Dyspepsia  dextrose Oral Gel 15 Gram(s) Oral once PRN Blood Glucose LESS THAN 70 milliGRAM(s)/deciliter  melatonin 3 milliGRAM(s) Oral at bedtime PRN Insomnia  ondansetron Injectable 4 milliGRAM(s) IV Push every 8 hours PRN Nausea and/or Vomiting      Allergies    No Known Allergies    Intolerances        REVIEW OF SYSTEMS:  CONSTITUTIONAL: No fever or chills  HEENT:  No headache, no sore throat  RESPIRATORY: No cough, wheezing, or shortness of breath  CARDIOVASCULAR: No chest pain, palpitations  GASTROINTESTINAL: No abd pain, nausea, vomiting, or diarrhea  GENITOURINARY: No dysuria, frequency, or hematuria  NEUROLOGICAL: no focal weakness or dizziness  MUSCULOSKELETAL: no myalgias     Vital Signs Last 24 Hrs  T(C): 36.6 (26 Jan 2023 09:46), Max: 36.7 (26 Jan 2023 02:20)  T(F): 97.8 (26 Jan 2023 09:46), Max: 98 (26 Jan 2023 02:20)  HR: 101 (26 Jan 2023 09:46) (94 - 130)  BP: 135/63 (26 Jan 2023 09:46) (126/80 - 145/94)  BP(mean): --  RR: 18 (26 Jan 2023 09:46) (16 - 18)  SpO2: 95% (26 Jan 2023 09:46) (95% - 98%)    Parameters below as of 26 Jan 2023 09:46  Patient On (Oxygen Delivery Method): room air        PHYSICAL EXAM:  GENERAL: NAD, sitting up in bed comfortably  HEENT:  anicteric, moist mucous membranes  CHEST/LUNG:  decreased breath sounds b/l bases, no rales, wheezes, or rhonchi  HEART:  IRRR, S1, S2  ABDOMEN:  BS+, soft, nontender, nondistended  EXTREMITIES: no edema, cyanosis, or calf tenderness  NERVOUS SYSTEM: answers questions and follows commands appropriately    LABS:                        10.0   9.15  )-----------( 290      ( 26 Jan 2023 05:52 )             32.4     CBC Full  -  ( 26 Jan 2023 05:52 )  WBC Count : 9.15 K/uL  Hemoglobin : 10.0 g/dL  Hematocrit : 32.4 %  Platelet Count - Automated : 290 K/uL  Mean Cell Volume : 88.0 fl  Mean Cell Hemoglobin : 27.2 pg  Mean Cell Hemoglobin Concentration : 30.9 gm/dL  Auto Neutrophil # : 5.93 K/uL  Auto Lymphocyte # : 2.19 K/uL  Auto Monocyte # : 0.92 K/uL  Auto Eosinophil # : 0.02 K/uL  Auto Basophil # : 0.03 K/uL  Auto Neutrophil % : 64.8 %  Auto Lymphocyte % : 23.9 %  Auto Monocyte % : 10.1 %  Auto Eosinophil % : 0.2 %  Auto Basophil % : 0.3 %    26 Jan 2023 04:06    132    |  105    |  26     ----------------------------<  170    5.5     |  18     |  1.20     Ca    8.7        26 Jan 2023 04:06  Phos  3.8       26 Jan 2023 04:06  Mg     1.7       26 Jan 2023 04:06    TPro  x      /  Alb  x      /  TBili  1.4    /  DBili  0.4    /  AST  x      /  ALT  x      /  AlkPhos  x      26 Jan 2023 06:05        CAPILLARY BLOOD GLUCOSE      POCT Blood Glucose.: 155 mg/dL (26 Jan 2023 08:34)          RADIOLOGY & ADDITIONAL TESTS:    Personally reviewed.     Consultant(s) Notes Reviewed:  [x] YES  [ ] NO

## 2023-01-26 NOTE — CONSULT NOTE ADULT - ASSESSMENT
87 years old female patient with PMHx of HTN, HLD, HFrEF (EF 20-25% on 12/22), T2DM, hypothyroidism, alzheimer's dementia, chronic Afib who presents today for sever SOB. Patient admitted for ADHF.  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 Johns  87 years old female patient with PMHx of HTN, HLD, HFrEF (EF 20-25% on 12/22), T2DM, hypothyroidism, alzheimer's dementia, chronic Afib who presents today for sever SOB. Patient admitted for ADHF.  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 Johns   Surg. Att.  Imaging studies reviewed with Dr. Braun.  There , indeed is a structure suspicious of af a fistulous tract between duodenum and colon. Better , contrast studies and EGD should be performed.

## 2023-01-26 NOTE — H&P ADULT - NSHPSOCIALHISTORY_GEN_ALL_CORE
Tobacco:   EtOH:   Recreational drug use:  Lives with:  Ambulates:  ADLs:  Occupation:  Vaccinations: Tobacco: denies   EtOH:  denies   Recreational drug use: denies   Lives with: daughter   Ambulates: cane   ADLs: independent

## 2023-01-26 NOTE — PATIENT PROFILE ADULT - FALL HARM RISK - HARM RISK INTERVENTIONS
Assistance with ambulation/Assistance OOB with selected safe patient handling equipment/Communicate Risk of Fall with Harm to all staff/Reinforce activity limits and safety measures with patient and family/Tailored Fall Risk Interventions/Visual Cue: Yellow wristband and red socks/Bed in lowest position, wheels locked, appropriate side rails in place/Call bell, personal items and telephone in reach/Instruct patient to call for assistance before getting out of bed or chair/Non-slip footwear when patient is out of bed/[place] to call system/Physically safe environment - no spills, clutter or unnecessary equipment/Purposeful Proactive Rounding/Room/bathroom lighting operational, light cord in reach Assistance with ambulation/Assistance OOB with selected safe patient handling equipment/Communicate Risk of Fall with Harm to all staff/Discuss with provider need for PT consult/Monitor gait and stability/Provide patient with walking aids - walker, cane, crutches/Reinforce activity limits and safety measures with patient and family/Tailored Fall Risk Interventions/Visual Cue: Yellow wristband and red socks/Bed in lowest position, wheels locked, appropriate side rails in place/Call bell, personal items and telephone in reach/Instruct patient to call for assistance before getting out of bed or chair/Non-slip footwear when patient is out of bed/[place] to call system/Physically safe environment - no spills, clutter or unnecessary equipment/Purposeful Proactive Rounding/Room/bathroom lighting operational, light cord in reach

## 2023-01-26 NOTE — PROGRESS NOTE ADULT - PROBLEM SELECTOR PLAN 7
T2DM non insulin dependent   - F/up HgbA1c  -  hold home metformin   - Low Insulin sliding scale  - Accu checks w/ hypoglycemic protocol T2DM non insulin dependent   - HgbA1c from 12/22/22 7.8  - Hold home metformin   - Low Insulin sliding scale  - Accu checks w/ hypoglycemic protocol

## 2023-01-26 NOTE — H&P ADULT - NSHPPHYSICALEXAM_GEN_ALL_CORE
T(C): 36.3 (01-25-23 @ 20:12), Max: 36.4 (01-25-23 @ 18:47)  HR: 109 (01-25-23 @ 22:50) (94 - 130)  BP: 126/80 (01-25-23 @ 22:00) (126/80 - 145/94)  RR: 18 (01-25-23 @ 22:00) (18 - 18)  SpO2: 98% (01-25-23 @ 22:00) (98% - 98%)    GENERAL: patient appears well, sleeping s  LUNGS: decreased air entry b/l base, clear to auscultation, symmetric breath sounds, no wheezing or rhonchi appreciated  HEART: soft S1/S2, irregular rhythm and rate , no murmurs noted, no lower extremity edema  GASTROINTESTINAL: abdomen is soft, nontender, nondistended, normoactive bowel sounds, no palpable masses  INTEGUMENT: good skin turgor, no lesions noted  NEUROLOGIC: awake, alert, oriented x3,  PSYCHIATRIC: mood is good, affect is congruent,  appear anxious   HEME/LYMPH: no palpable supraclavicular nodules, no obvious ecchymosis or petechiae T(C): 36.3 (01-25-23 @ 20:12), Max: 36.4 (01-25-23 @ 18:47)  HR: 109 (01-25-23 @ 22:50) (94 - 130)  BP: 126/80 (01-25-23 @ 22:00) (126/80 - 145/94)  RR: 18 (01-25-23 @ 22:00) (18 - 18)  SpO2: 98% (01-25-23 @ 22:00) (98% - 98%)  GENERAL: patient appears well, sleeping s  LUNGS: decreased air entry b/l base, clear to auscultation, symmetric breath sounds, no wheezing or rhonchi appreciated  HEART: soft S1/S2, irregular rhythm and rate , no murmurs noted, no lower extremity edema  GASTROINTESTINAL: abdomen is soft, nontender, nondistended, normoactive bowel sounds, no palpable masses  INTEGUMENT: good skin turgor, no lesions noted  NEUROLOGIC: awake, alert, oriented x3,  PSYCHIATRIC: mood is good, affect is congruent,  appear anxious   HEME/LYMPH: no palpable supraclavicular nodules, no obvious ecchymosis or petechiae

## 2023-01-26 NOTE — PROGRESS NOTE ADULT - ATTENDING COMMENTS
Patient seen at bedside.   reports feeling well  tolerated diet without any nausea/vomiting or abdominal pain  Daughter Kaylie at bedside.     A/P  acute on chronic HFrEF  Afib, chronic      - cardio following    - cont lasix 40mg IV BID    - eliquis was given today.    - pulm following. CT with moderate pleural effusions.      transaminitis likely secondary to CHF  abnormal CT    - seen by GI and surgery. ?duodenal-colonic fistula. d/w daughter at bedside. currently want to hold off on invasive measures for her abdomen. Patient tolerating diet. denies any abdominal pain.     - ID eval appreciated. no abx needed at this time.     Prominent wall at vaginal introitus on CT   - d/w daughter. will follow up with PMD. She does not want too much other workup because it can overwhelm patient. agreeable with outpatient follow up with PMD/gyn.     - UA negative.     DVT proph: eliquis 2.5mg BID. May need to hold for possible thoracentesis.

## 2023-01-26 NOTE — H&P ADULT - TIME BILLING
I personally conducted a physical examination of the patient. I personally gathered the patient's history. I edited the above listed findings which were prepared by the listed resident physician. I personally discussed the plan of care with the patient. The questions and concerns were addressed to the best of my ability. The patient is in agreement with the listed treatment plan.    DW daughter at bedside. all questions answered

## 2023-01-26 NOTE — H&P ADULT - PROBLEM SELECTOR PLAN 6
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   - AST//228  - Trend hepatic function   - Avoid hepatotoxic medications Acute on chronic, prer daughter liver enzymes have been slightly elevated on previous visits to her pmd however have never been as high as on admission   - AST//228  - Trend hepatic function   - Avoid hepatotoxic medications  - GI consult

## 2023-01-26 NOTE — CONSULT NOTE ADULT - ASSESSMENT
Mild hyperkalemia  Metabolic acidosis with Resp alkalosis  Mild Hyponatremia  CHF  Abnormal LFTs  Hypertension  Diabetes    Continue IV lasix. Will follow potassium trend. Follow bicarb trend. GI and Cardiology evaluation in progress. PO sodium bicarb if acidosis worsening.   Avoid hypotonic fluids. Monitor blood sugar levels. Insulin coverage as needed. Monitor BP trend. Titrate BP meds as needed.   Will follow electrolytes and renal function trend.     Further recommendations pending clinical course. Thank you for the courtesy of this referral.

## 2023-01-26 NOTE — CONSULT NOTE ADULT - SUBJECTIVE AND OBJECTIVE BOX
HPI:  87 years old female patient with PMHx of HTN, HLD, HFrEF (EF 20-25% on 12/22), T2DM, hypothyroidism, alzheimer's dementia, chronic Afib who presents today for SOB. Of note patient recently admitted at [facility] from 12/21 to 12/23/22  for Afib with RVR.  History obtained from patient and daughter who reports that last night patient walked from the bedroom to the bathroom and complained severe respiratory distress. Daughter states that patient has been progressively getting more weak and short of breath since last discharge. Patient has been complaint with meds and diet. Denies chest pain, dizziness, abdominal pain, urinary symptoms, fever chills or other symptoms.   ED course   VS: Afebrile, HR: 94->130->109, BP: 145/94 SpO2 98% 3 lt NC   Labs significant for WBC 11.7 K, Na 131, CO2 16, BUN/ creatine 27/1.4, glucose 249, AST//228 Trop 144.9, Pro BMP >63608 ABG 7.4/28/85/17  EKG shows Afib with RVR @ 105 bpm, minimal voltage criteria for LVH, non specify T wave   CT chest shows Moderate right and small to moderate left pleural effusion with compressive atelectasis. Nonspecific interlobar septal thickening and ground-glass opacitiesNonspecific pulmonary nodules. INonspecific mediastinal lymphadenopathy.Mildly heterogeneous thyroid gland  CT a/p shows central intrahepatic and common bile duct dilatation, reflecting postcholecystectomy state. 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 Pancreatic ductal dilatation. Nonspecific small density/calcification at the pancreatic head. Prominent wall of the stomach and duodenum, which may be due to partial distention and/or gastroduodenitis. Nonspecific bilateral perinephric stranding without hydronephrosis or obstructing radiopaque urinary tract stone. Prominent bladder wall. Nodular thickening of the left adrenal gland.  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 Currently without any complaints, asymptomatic      PAST MEDICAL & SURGICAL HISTORY:  Atrial fibrillation      Hypothyroidism      Mild Alzheimer&#x27;s dementia      Diabetes mellitus      Acute on chronic systolic congestive heart failure      History of appendectomy      H/O hernia repair      History of cholecystectomy          REVIEW OF SYSTEMS      General:	    Skin/Breast:  	  Ophthalmologic:  	  ENMT:	    Respiratory and Thorax:  	  Cardiovascular:	    Gastrointestinal:	    Genitourinary:	    Musculoskeletal:	    Neurological:	    Psychiatric:	    Hematology/Lymphatics:	    Endocrine:	    Allergic/Immunologic:	    MEDICATIONS  (STANDING):  apixaban 2.5 milliGRAM(s) Oral every 12 hours  atorvastatin 40 milliGRAM(s) Oral at bedtime  cholecalciferol 2000 Unit(s) Oral daily  dextrose 5%. 1000 milliLiter(s) (100 mL/Hr) IV Continuous <Continuous>  dextrose 5%. 1000 milliLiter(s) (50 mL/Hr) IV Continuous <Continuous>  dextrose 50% Injectable 25 Gram(s) IV Push once  dextrose 50% Injectable 12.5 Gram(s) IV Push once  dextrose 50% Injectable 25 Gram(s) IV Push once  digoxin     Tablet 125 MICROGram(s) Oral every other day  furosemide   Injectable 40 milliGRAM(s) IV Push two times a day  glucagon  Injectable 1 milliGRAM(s) IntraMuscular once  insulin lispro (ADMELOG) corrective regimen sliding scale   SubCutaneous three times a day before meals  insulin lispro (ADMELOG) corrective regimen sliding scale   SubCutaneous at bedtime  levothyroxine 75 MICROGram(s) Oral daily  memantine 10 milliGRAM(s) Oral two times a day  metoprolol succinate  milliGRAM(s) Oral two times a day  sodium bicarbonate  Infusion 0.049 mEq/kG/Hr (50 mL/Hr) IV Continuous <Continuous>    MEDICATIONS  (PRN):  aluminum hydroxide/magnesium hydroxide/simethicone Suspension 30 milliLiter(s) Oral every 4 hours PRN Dyspepsia  dextrose Oral Gel 15 Gram(s) Oral once PRN Blood Glucose LESS THAN 70 milliGRAM(s)/deciliter  melatonin 3 milliGRAM(s) Oral at bedtime PRN Insomnia  ondansetron Injectable 4 milliGRAM(s) IV Push every 8 hours PRN Nausea and/or Vomiting      Allergies    No Known Allergies    Intolerances        SOCIAL HISTORY:    FAMILY HISTORY:  Family history of cerebral hemorrhage (Mother)    FH: renal failure (Father)        Vital Signs Last 24 Hrs  T(C): 36.7 (26 Jan 2023 02:20), Max: 36.7 (26 Jan 2023 02:20)  T(F): 98 (26 Jan 2023 02:20), Max: 98 (26 Jan 2023 02:20)  HR: 108 (26 Jan 2023 02:20) (94 - 130)  BP: 134/74 (26 Jan 2023 02:20) (126/80 - 145/94)  BP(mean): --  RR: 16 (26 Jan 2023 02:20) (16 - 18)  SpO2: 98% (26 Jan 2023 02:20) (98% - 98%)    Parameters below as of 26 Jan 2023 02:20  Patient On (Oxygen Delivery Method): nasal cannula  O2 Flow (L/min): 2      PHYSICAL EXAM:      Constitutional:    Eyes:    ENMT:    Neck:    Breasts:    Back:    Respiratory:    Cardiovascular:    Gastrointestinal:    Genitourinary:    Rectal:    Extremities:    Vascular:    Neurological:    Skin:    Lymph Nodes:    Musculoskeletal:    Psychiatric:        LABS:                        11.8   11.75 )-----------( 322      ( 25 Jan 2023 20:20 )             38.9     01-26    132<L>  |  105  |  26<H>  ----------------------------<  170<H>  5.5<H>   |  18<L>  |  1.20    Ca    8.7      26 Jan 2023 04:06  Phos  3.8     01-26  Mg     1.7     01-26    TPro  7.0  /  Alb  2.6<L>  /  TBili  1.4<H>  /  DBili  x   /  AST  148<H>  /  ALT  176<H>  /  AlkPhos  73  01-26          RADIOLOGY & ADDITIONAL STUDIES: HPI:  87 years old female patient with PMHx of HTN, HLD, HFrEF (EF 20-25% on 12/22), T2DM, hypothyroidism, alzheimer's dementia, chronic Afib who presents today for SOB. Of note patient recently admitted at [facility] from 12/21 to 12/23/22  for Afib with RVR.  History obtained from patient and daughter who reports that last night patient walked from the bedroom to the bathroom and complained severe respiratory distress. Daughter states that patient has been progressively getting more weak and short of breath since last discharge. Patient has been complaint with meds and diet. Denies chest pain, dizziness, abdominal pain, urinary symptoms, fever chills or other symptoms.   ED course   VS: Afebrile, HR: 94->130->109, BP: 145/94 SpO2 98% 3 lt NC   Labs significant for WBC 11.7 K, Na 131, CO2 16, BUN/ creatine 27/1.4, glucose 249, AST//228 Trop 144.9, Pro BMP >37846 ABG 7.4/28/85/17  EKG shows Afib with RVR @ 105 bpm, minimal voltage criteria for LVH, non specify T wave   CT chest shows Moderate right and small to moderate left pleural effusion with compressive atelectasis. Nonspecific interlobar septal thickening and ground-glass opacitiesNonspecific pulmonary nodules. INonspecific mediastinal lymphadenopathy.Mildly heterogeneous thyroid gland  CT a/p shows central intrahepatic and common bile duct dilatation, reflecting postcholecystectomy state. 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 Pancreatic ductal dilatation. Nonspecific small density/calcification at the pancreatic head. Prominent wall of the stomach and duodenum, which may be due to partial distention and/or gastroduodenitis. Nonspecific bilateral perinephric stranding without hydronephrosis or obstructing radiopaque urinary tract stone. Prominent bladder wall. Nodular thickening of the left adrenal gland.  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 Currently without any complaints, asymptomatic, abdominal exam is benign. Vitals are stable, WBC slightly elevated at 11.7. History obtained from daughter at bedside.       PAST MEDICAL & SURGICAL HISTORY:  Atrial fibrillation    Hypothyroidism    Mild Alzheimer&#x27;s dementia    Diabetes mellitus    Acute on chronic systolic congestive heart failure    History of appendectomy    H/O hernia repair    History of cholecystectomy        REVIEW OF SYSTEMS  Head: denies headaches, dizziness & lightheadedness  Eyes: denies changes in vision, eye pain, double vision & eye discharge  Ears: denies changes in hearing & ear discharge  Nose: denies rhinorrhea  Mouth: denies bleeding gums & sore tongue & sore throat  Neck: denies swollen lymph nodes   Respiratory: denies SOB, cough, sputum production, wheezing  Cardiac: denies CP & irregular heart beat  Abdominal: denies abdominal pain, + bowel movements  : denies dysuria, frequent urination, hematuria  Musculoskeletal: denies joint pain & muscle pain  Neuro: denies involuntary muscle movements  Psych: no depression, no anxiety    MEDICATIONS  (STANDING):  apixaban 2.5 milliGRAM(s) Oral every 12 hours  atorvastatin 40 milliGRAM(s) Oral at bedtime  cholecalciferol 2000 Unit(s) Oral daily  dextrose 5%. 1000 milliLiter(s) (100 mL/Hr) IV Continuous <Continuous>  dextrose 5%. 1000 milliLiter(s) (50 mL/Hr) IV Continuous <Continuous>  dextrose 50% Injectable 25 Gram(s) IV Push once  dextrose 50% Injectable 12.5 Gram(s) IV Push once  dextrose 50% Injectable 25 Gram(s) IV Push once  digoxin     Tablet 125 MICROGram(s) Oral every other day  furosemide   Injectable 40 milliGRAM(s) IV Push two times a day  glucagon  Injectable 1 milliGRAM(s) IntraMuscular once  insulin lispro (ADMELOG) corrective regimen sliding scale   SubCutaneous three times a day before meals  insulin lispro (ADMELOG) corrective regimen sliding scale   SubCutaneous at bedtime  levothyroxine 75 MICROGram(s) Oral daily  memantine 10 milliGRAM(s) Oral two times a day  metoprolol succinate  milliGRAM(s) Oral two times a day  sodium bicarbonate  Infusion 0.049 mEq/kG/Hr (50 mL/Hr) IV Continuous <Continuous>    MEDICATIONS  (PRN):  aluminum hydroxide/magnesium hydroxide/simethicone Suspension 30 milliLiter(s) Oral every 4 hours PRN Dyspepsia  dextrose Oral Gel 15 Gram(s) Oral once PRN Blood Glucose LESS THAN 70 milliGRAM(s)/deciliter  melatonin 3 milliGRAM(s) Oral at bedtime PRN Insomnia  ondansetron Injectable 4 milliGRAM(s) IV Push every 8 hours PRN Nausea and/or Vomiting      Allergies    No Known Allergies    FAMILY HISTORY:  Family history of cerebral hemorrhage (Mother)    FH: renal failure (Father)        Vital Signs Last 24 Hrs  T(C): 36.7 (26 Jan 2023 02:20), Max: 36.7 (26 Jan 2023 02:20)  T(F): 98 (26 Jan 2023 02:20), Max: 98 (26 Jan 2023 02:20)  HR: 108 (26 Jan 2023 02:20) (94 - 130)  BP: 134/74 (26 Jan 2023 02:20) (126/80 - 145/94)  BP(mean): --  RR: 16 (26 Jan 2023 02:20) (16 - 18)  SpO2: 98% (26 Jan 2023 02:20) (98% - 98%)    Parameters below as of 26 Jan 2023 02:20  Patient On (Oxygen Delivery Method): nasal cannula  O2 Flow (L/min): 2      PHYSICAL EXAM:  Constitutional: AAOx3, no acute distress  HEENT: NCAT, airway patent  Cardiovascular: RRR, pulses present bilaterally  Respiratory: nonlabored breathing  Gastrointestinal: abdomen soft, nontender, non distended, no rebound or guarding  Neuro: no focal deficits     LABS:                        11.8   11.75 )-----------( 322      ( 25 Jan 2023 20:20 )             38.9     01-26    132<L>  |  105  |  26<H>  ----------------------------<  170<H>  5.5<H>   |  18<L>  |  1.20    Ca    8.7      26 Jan 2023 04:06  Phos  3.8     01-26  Mg     1.7     01-26    TPro  7.0  /  Alb  2.6<L>  /  TBili  1.4<H>  /  DBili  x   /  AST  148<H>  /  ALT  176<H>  /  AlkPhos  73  01-26          RADIOLOGY & ADDITIONAL STUDIES:  < from: CT Abdomen and Pelvis No Cont (01.25.23 @ 22:42) >  ACC: [id number] EXAM:  CT ABDOMEN AND PELVIS   ORDERED BY: KAY OLIVAREZ     ACC: [id number] EXAM:  CT CHEST   ORDERED BY: KAY OLIVAREZ     *** ADDENDUM # 1 ***    Addendum: Prominent wall at the vaginal introitus, which was mentioned in   the body of the original report. Recommend clinical correlation to assess   infection/inflammation and direct visualization to exclude potential   underlying lesion/neoplasm.    --- End of Report ---    *** END OF ADDENDUM # 1 ***      PROCEDURE DATE:  01/25/2023          INTERPRETATION:  CLINICAL INFORMATION: shortness of breath chf vs   infiltrate.    PROCEDURE: CT of the chest, abdomen and pelvis was performed without   intravenous contrast. Coronal and sagittal reconstruction images were   obtained.    CONTRAST/COMPLICATIONS:  IV Contrast: NONE  Oral Contrast: NONE  Complications: None reported at time of study completion    COMPARISON: None.    FINDINGS:  Evaluation of the thoracic, abdominal and pelvic organs and   vasculature is limited without intravenous contrast. The patient's   respiratory motion degrades images.    CHEST:    LUNGS AND AIRWAYS: Patent central airways. Interlobar septal thickening   and groundglass opacities in both lungs. Compressive atelectasis of the   right > left lung. Small scattered nodules measuring up to 0.3 cm, for   example, right upper lobe (4:118) and left lower lobe (4:134).  PLEURA: Moderate right and small to moderate left pleural effusion  HEART: Cardiomegaly. No pericardial effusion..  VESSELS: Atherosclerosis. Normal caliber of the thoracic aorta.  MEDIASTINUM AND FATOUMATA: Enlarged mediastinal lymph nodes, for example, 1.8   x 1.5 cm precarinal lymph node (2:33).  CHEST WALL AND LOWER NECK: Mildly heterogeneous thyroid gland.    ABDOMEN/PELVIS:    LIVER: Unremarkable.  BILE DUCTS/GALLBLADDER: Central intrahepatic and common bile duct (  1.0 cm) dilatation, reflecting postcholecystectomy state.  PANCREAS: Diffuse fatty atrophy. Duct measuring up to 0.4 cm. Nonspecific   0.8 x 0.6 cm density/calcification at the pancreatic head (2:97). A 2.5 x   1.5 cm hypodense structure near the duodenum/duodenal diverticulum   (2:106), suspicious for collection. Adjacent calcifications.  SPLEEN: Unremarkable.    ADRENALS: A 1.3 x 1.3 cm nodular thickening of the left adrenal gland.  KIDNEYS/URETERS: Bilateral perinephric stranding without   hydroureteronephrosis. Nonobstructing 0.4 x 0.3 cm left renal stone. No   obstructing radiopaque urinary tract stone. Bilateral renal cysts, the   largeston the right measuring up to 8.5 x 10.0 cm. Subcentimeter   hyperdense focus at the left kidney, suggesting proteinaceous/hemorrhagic   cyst.  BLADDER: Partially distended. Prominent wall.  REPRODUCTIVE ORGANS: Anteverted uterus.. Fluid-filled vaginal canal.   Prominent wall at the vaginal introitus.    BOWEL: No bowel obstruction. Unremarkable appendix. Colon diverticulosis.   Prominent wall of the stomach and duodenum. Duodenal diverticulum.   Connection between the second/third portion of duodenum and ascending   colon (2:113 and 601:32), which appears filled with stool and measures   6.0 x 2.5 x 3.0 cm (transverse x AP x CC). Scattered areas prominent   colon wall.  PERITONEUM: No obvious free air.  VESSELS: Atherosclerosis. Normal caliber of the abdominal aorta.   Displaced intimal calcification at the infrarenal aorta, which may be due   to chronic dissection and/or atheromatous plaque.  RETROPERITONEUM: Subcentimeter lymph nodes without lymphadenopathy.  ABDOMINAL WALL/SOFT TISSUES: Small fat-containing umbilical hernia.  BONES: Degenerative changes of the spine. Age-indeterminate endplate   depression at T11, suggesting chronic.    IMPRESSION:    Moderate right and small to moderate left pleural effusion with   compressive atelectasis. Nonspecific interlobar septal thickening and   groundglass opacities, which can be seen in noninfectious (pulmonary   edema) and infectious processes.    Nonspecific pulmonary nodules. If the patient is at low risk for   malignancy, no further follow-up is needed. If the patient is at high   risk, 12 month follow-up CT chest may be obtained for further evaluation.    Nonspecific mediastinal lymphadenopathy.    Mildly heterogeneous thyroid gland, which may further characterized on a   nonemergent ultrasound as indicated.    Central intrahepatic and common bile duct dilatation, reflecting   postcholecystectomy state. Recommend clinical correlation (LFT) and   additional imaging (MRCP/MR) if there is clinical suspicion for biliary   pathology.    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   Pancreatic ductal dilatation. Nonspecific small density/calcification at   the pancreatic head. Recommend correlation with MRCP/MR.    Prominent wall of the stomach and duodenum, which may be due to partial   distention and/or gastroduodenitis. Recommend correlation with patient's   symptoms and endoscopy as indicated.    Nonspecific bilateral perinephric stranding without hydronephrosis or   obstructing radiopaque urinary tract stone. Prominent bladder wall.   Recommend clinical correlation to assess urinary tract infection.    Nodular thickening of the left adrenal gland, which can be further   assessed by a nonemergent MRI.    Additional findings as described.    --- End of Report ---    ***Please see the addendum at the top of this report. It may contain   additional important information or changes.****        IGNACIA THOMAS MD; Attending Radiologist  This document has been electronically signed. Jan 26 2023  1:17AM  1st Addendum: IGNACIA THOMAS MD; Attending Radiologist  The first addendum was electronically signed on: Jan 26 2023  2:29AM.    < end of copied text >

## 2023-01-26 NOTE — PROGRESS NOTE ADULT - ASSESSMENT
87 years old female patient with PMHx of HTN, HLD, HFrEF (EF 20-25% on 12/22), T2DM, hypothyroidism, alzheimer's dementia, chronic Afib who presents today for sever SOB. Patient admitted for ADHF

## 2023-01-26 NOTE — CONSULT NOTE ADULT - SUBJECTIVE AND OBJECTIVE BOX
[facility]  Murphy Jenkins PA-C  [address] [place] [id number]  [phone]      Chief Complaint:  Patient is a 87y old  Female who presents with a chief complaint of ADHF (26 Jan 2023 10:13)      HPI:87 years old female patient with PMHx of HTN, HLD, HFrEF (EF 20-25% on 12/22), T2DM, hypothyroidism, alzheimer's dementia, chronic Afib who presents today for sever SOB. Patient admitted for ADHF    Allergies:  No Known Allergies      Medications:  aluminum hydroxide/magnesium hydroxide/simethicone Suspension 30 milliLiter(s) Oral every 4 hours PRN  apixaban 2.5 milliGRAM(s) Oral every 12 hours  atorvastatin 40 milliGRAM(s) Oral at bedtime  cholecalciferol 2000 Unit(s) Oral daily  dextrose 5%. 1000 milliLiter(s) IV Continuous <Continuous>  dextrose 5%. 1000 milliLiter(s) IV Continuous <Continuous>  dextrose 50% Injectable 25 Gram(s) IV Push once  dextrose 50% Injectable 12.5 Gram(s) IV Push once  dextrose 50% Injectable 25 Gram(s) IV Push once  dextrose Oral Gel 15 Gram(s) Oral once PRN  digoxin     Tablet 125 MICROGram(s) Oral every other day  furosemide   Injectable 40 milliGRAM(s) IV Push two times a day  glucagon  Injectable 1 milliGRAM(s) IntraMuscular once  insulin lispro (ADMELOG) corrective regimen sliding scale   SubCutaneous three times a day before meals  insulin lispro (ADMELOG) corrective regimen sliding scale   SubCutaneous at bedtime  levothyroxine 75 MICROGram(s) Oral daily  melatonin 3 milliGRAM(s) Oral at bedtime PRN  memantine 10 milliGRAM(s) Oral two times a day  metoprolol succinate  milliGRAM(s) Oral two times a day  ondansetron Injectable 4 milliGRAM(s) IV Push every 8 hours PRN      PMHX/PSHX:  Atrial fibrillation    Hypothyroidism    Mild Alzheimer&#x27;s dementia    Diabetes mellitus    Acute on chronic systolic congestive heart failure    Hypertension    Hyperlipemia    Cardiac LV ejection fraction 21-30%    History of appendectomy    H/O hernia repair    History of cholecystectomy        Family history:  Family history of cerebral hemorrhage (Mother)    FH: renal failure (Father)        Social History:     ROS:     General:  no fevers, chills, night sweats, fatigue,   Eyes:  Good vision, no reported pain  ENT:  No sore throat, pain, runny nose, dysphagia  CV:  No pain, + palpitations, hypo/hypertension  Resp:  + dyspnea, cough, tachypnea, wheezing  GI:  No pain, No nausea, No vomiting, No diarrhea, No constipation, No weight loss, No fever, No pruritis, No rectal bleeding, No tarry stools, No dysphagia,  :  No pain, bleeding, incontinence, nocturia  Muscle:  No pain, weakness  Neuro:  No weakness, tingling, memory problems  Psych:  No fatigue, insomnia, mood problems, depression  Endocrine:  No polyuria, polydipsia, cold/heat intolerance  Heme:  No petechiae, ecchymosis, easy bruisability  Skin:  No rash, tattoos, scars, edema      PHYSICAL EXAM:   Vital Signs:  Vital Signs Last 24 Hrs  T(C): 36.6 (26 Jan 2023 09:46), Max: 36.7 (26 Jan 2023 02:20)  T(F): 97.8 (26 Jan 2023 09:46), Max: 98 (26 Jan 2023 02:20)  HR: 101 (26 Jan 2023 09:46) (94 - 130)  BP: 135/63 (26 Jan 2023 09:46) (126/80 - 145/94)  BP(mean): --  RR: 18 (26 Jan 2023 09:46) (16 - 18)  SpO2: 95% (26 Jan 2023 09:46) (95% - 98%)    Parameters below as of 26 Jan 2023 09:46  Patient On (Oxygen Delivery Method): room air      Daily Height in cm: 157.48 (25 Jan 2023 18:47)    Daily     GENERAL:  Appears stated age, frail  HEENT:  NC/AT,    CHEST:  Full & symmetric excursion,   HEART:  Regular rhythm  ABDOMEN:  Soft, non-tender, non-distended,   EXTEREMITIES:  no cyanosis,clubbing or edema  SKIN:  No rash  NEURO:  Alert,    LABS:                        10.0   9.15  )-----------( 290      ( 26 Jan 2023 05:52 )             32.4     01-26    132<L>  |  105  |  26<H>  ----------------------------<  170<H>  5.5<H>   |  18<L>  |  1.20    Ca    8.7      26 Jan 2023 04:06  Phos  3.8     01-26  Mg     1.7     01-26    TPro  x   /  Alb  x   /  TBili  1.4<H>  /  DBili  0.4<H>  /  AST  x   /  ALT  x   /  AlkPhos  x   01-26    LIVER FUNCTIONS - ( 26 Jan 2023 04:06 )  Alb: 2.6 g/dL / Pro: 7.0 g/dL / ALK PHOS: 73 U/L / ALT: 176 U/L / AST: 148 U/L / GGT: x                   Imaging:

## 2023-01-26 NOTE — CONSULT NOTE ADULT - CONSULT REQUESTED DATE/TIME
26-Jan-2023 10:39
26-Jan-2023 08:07
26-Jan-2023 10:13
26-Jan-2023 13:44
26-Jan-2023 05:20
26-Jan-2023 05:55

## 2023-01-26 NOTE — H&P ADULT - ATTENDING COMMENTS
87 years old female patient with PMHx of HTN, HLD, HFrEF (EF 20-25% on 12/22), T2DM, hypothyroidism, alzheimer's dementia, chronic Afib who presents today for sever SOB. Patient admitted for ADHF    Agree with above. Edited where appropriate in the body of the note.

## 2023-01-26 NOTE — H&P ADULT - PROBLEM SELECTOR PLAN 2
Probably secondary to metformin and CKD   - ABG 7.4/28/85/17  - Given NaHCO3 50 meq x1   - Nephro Dr. Oleary consulted Probably secondary to metformin and CKD   - ABG on admission 7.4/28/85/17  - Given NaHCO3 50 meq x1   - fu repeat abg   - Nephro Dr. Oleary consulted

## 2023-01-26 NOTE — CONSULT NOTE ADULT - SUBJECTIVE AND OBJECTIVE BOX
Patient is a 87y old  Female who presents with a chief complaint of ADHF (26 Jan 2023 10:07)    HPI:  87 years old female patient with PMHx of HTN, HLD, HFrEF (EF 20-25% on 12/22), T2DM, hypothyroidism, alzheimer's dementia, chronic Afib who presents today for SOB. Of note patient recently admitted at [facility] from 12/21 to 12/23/22  for Afib with RVR.  History obtained from patient and daughter who reports that last night patient walked from the bedroom to the bathroom and complained severe respiratory distress. Daughter states that patient has been progressively getting more weak and short of breath since last discharge. Patient has been complaint with meds and diet. Denies chest pain, dizziness, abdominal pain, urinary symptoms, fever chills or other symptoms.   ED course   VS: Afebrile, HR: 94->130->109, BP: 145/94 SpO2 98% 3 lt NC   Labs significant for WBC 11.7 K, Na 131, CO2 16, BUN/ creatine 27/1.4, glucose 249, AST//228 Trop 144.9, Pro BMP >58561 ABG 7.4/28/85/17  EKG shows Afib with RVR @ 105 bpm, minimal voltage criteria for LVH, non specify T wave   CT chest shows Moderate right and small to moderate left pleural effusion with compressive atelectasis. Nonspecific interlobar septal thickening and ground-glass opacitiesNonspecific pulmonary nodules. INonspecific mediastinal lymphadenopathy.Mildly heterogeneous thyroid gland  CT a/p shows central intrahepatic and common bile duct dilatation, reflecting postcholecystectomy state. 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 Pancreatic ductal dilatation. Nonspecific small density/calcification at the pancreatic head. Prominent wall of the stomach and duodenum, which may be due to partial distention and/or gastroduodenitis. Nonspecific bilateral perinephric stranding without hydronephrosis or obstructing radiopaque urinary tract stone. Prominent bladder wall. Nodular thickening of the left adrenal gland.  Given in the ED xanax 0.25 mg x1, digoxin 125 mcg x1, Lopressor 5 IVP x1, Furosemide 40 mg x1, cardio consulted    (26 Jan 2023 02:15)      PAST MEDICAL HISTORY:  Atrial fibrillation    Hypothyroidism    Mild Alzheimer&#x27;s dementia    Diabetes mellitus    Acute on chronic systolic congestive heart failure    Hypertension    Hyperlipemia    Cardiac LV ejection fraction 21-30%        PAST SURGICAL HISTORY:  History of appendectomy    H/O hernia repair    History of cholecystectomy        FAMILY HISTORY:  Family history of cerebral hemorrhage (Mother)    FH: renal failure (Father)        SOCIAL HISTORY:    Allergies    No Known Allergies    Intolerances      Home Medications:  Crestor 10 mg oral tablet: 1 tab(s) orally once a day (26 Jan 2023 03:29)  Eliquis 2.5 mg oral tablet: 1 tab(s) orally 2 times a day (26 Jan 2023 03:29)  levothyroxine 75 mcg (0.075 mg) oral tablet: 1 tab(s) orally once a day (26 Jan 2023 03:29)  Melatonin 10 mg oral capsule: 1 cap(s) orally once a day (at bedtime) (26 Jan 2023 03:29)  memantine 10 mg oral tablet: 1 tab(s) orally 2 times a day (26 Jan 2023 03:29)  metFORMIN 1000 mg oral tablet: 1 tab(s) orally 2 times a day (26 Jan 2023 03:29)  rivastigmine 9.5 mg/24 hr transdermal film, extended release: 1 patch transdermal once a day (26 Jan 2023 03:29)  Vitamin D3 50 mcg (2000 intl units) oral tablet: 1 tab(s) orally once a day (26 Jan 2023 03:29)    MEDICATIONS  (STANDING):  apixaban 2.5 milliGRAM(s) Oral every 12 hours  atorvastatin 40 milliGRAM(s) Oral at bedtime  cholecalciferol 2000 Unit(s) Oral daily  dextrose 5%. 1000 milliLiter(s) (100 mL/Hr) IV Continuous <Continuous>  dextrose 5%. 1000 milliLiter(s) (50 mL/Hr) IV Continuous <Continuous>  dextrose 50% Injectable 25 Gram(s) IV Push once  dextrose 50% Injectable 12.5 Gram(s) IV Push once  dextrose 50% Injectable 25 Gram(s) IV Push once  digoxin     Tablet 125 MICROGram(s) Oral every other day  furosemide   Injectable 40 milliGRAM(s) IV Push two times a day  glucagon  Injectable 1 milliGRAM(s) IntraMuscular once  insulin lispro (ADMELOG) corrective regimen sliding scale   SubCutaneous three times a day before meals  insulin lispro (ADMELOG) corrective regimen sliding scale   SubCutaneous at bedtime  levothyroxine 75 MICROGram(s) Oral daily  memantine 10 milliGRAM(s) Oral two times a day  metoprolol succinate  milliGRAM(s) Oral two times a day    MEDICATIONS  (PRN):  aluminum hydroxide/magnesium hydroxide/simethicone Suspension 30 milliLiter(s) Oral every 4 hours PRN Dyspepsia  dextrose Oral Gel 15 Gram(s) Oral once PRN Blood Glucose LESS THAN 70 milliGRAM(s)/deciliter  melatonin 3 milliGRAM(s) Oral at bedtime PRN Insomnia  ondansetron Injectable 4 milliGRAM(s) IV Push every 8 hours PRN Nausea and/or Vomiting      REVIEW OF SYSTEMS:  General:   Respiratory: No cough, SOB  Cardiovascular: No CP or Palpitations	  Gastrointestinal: No nausea, Vomiting. No diarrhea  Genitourinary: No urinary complaints	  Musculoskeletal: No leg swelling, No new rash or lesions	  Neurological: 	  all other systems negative    T(F): 97.8 (01-26-23 @ 09:46), Max: 98 (01-26-23 @ 02:20)  HR: 101 (01-26-23 @ 09:46) (94 - 130)  BP: 135/63 (01-26-23 @ 09:46) (126/80 - 145/94)  RR: 18 (01-26-23 @ 09:46) (16 - 18)  SpO2: 95% (01-26-23 @ 09:46) (95% - 98%)  Wt(kg): --    PHYSICAL EXAM:  General: NAD  Respiratory: b/l air entry  Cardiovascular: S1 S2  Gastrointestinal: soft  Extremities: edema        01-26    132<L>  |  105  |  26<H>  ----------------------------<  170<H>  5.5<H>   |  18<L>  |  1.20    Ca    8.7      26 Jan 2023 04:06  Phos  3.8     01-26  Mg     1.7     01-26    TPro  x   /  Alb  x   /  TBili  1.4<H>  /  DBili  0.4<H>  /  AST  x   /  ALT  x   /  AlkPhos  x   01-26                          10.0   9.15  )-----------( 290      ( 26 Jan 2023 05:52 )             32.4       Hemoglobin: 10.0 g/dL (01-26 @ 05:52)  Hematocrit: 32.4 % (01-26 @ 05:52)  Potassium, Serum: 5.5 mmol/L (01-26 @ 04:06)  Blood Urea Nitrogen, Serum: 26 mg/dL (01-26 @ 04:06)      Creatinine, Serum: 1.20 (01-26 @ 04:06)  Creatinine, Serum: 1.40 (01-25 @ 20:20)        LIVER FUNCTIONS - ( 26 Jan 2023 04:06 )  Alb: 2.6 g/dL / Pro: 7.0 g/dL / ALK PHOS: 73 U/L / ALT: 176 U/L / AST: 148 U/L / GGT: x           CARDIAC MARKERS ( 26 Jan 2023 04:00 )  x     / x     / 164 U/L / x     / 2.5 ng/mL      Creatine Kinase, Serum: 164 U/L (01-26-23 @ 04:00)        ABG - ( 25 Jan 2023 22:54 )  pH, Arterial: 7.40  pH, Blood: x     /  pCO2: 28    /  pO2: 85    / HCO3: 17    / Base Excess: -7.5  /  SaO2: 98.0              I&O's Detail         Patient is a 87y old  Female who presents with a chief complaint of ADHF (26 Jan 2023 10:07)    HPI:  87 years old female patient with PMHx of HTN, HLD, HFrEF (EF 20-25% on 12/22), T2DM, hypothyroidism, alzheimer's dementia, chronic Afib who presents today for SOB. Of note patient recently admitted at [facility] from 12/21 to 12/23/22  for Afib with RVR.  History obtained from patient and daughter who reports that last night patient walked from the bedroom to the bathroom and complained severe respiratory distress. Daughter states that patient has been progressively getting more weak and short of breath since last discharge. Patient has been complaint with meds and diet. Denies chest pain, dizziness, abdominal pain, urinary symptoms, fever chills or other symptoms.   ED course   VS: Afebrile, HR: 94->130->109, BP: 145/94 SpO2 98% 3 lt NC   Labs significant for WBC 11.7 K, Na 131, CO2 16, BUN/ creatine 27/1.4, glucose 249, AST//228 Trop 144.9, Pro BMP >85357 ABG 7.4/28/85/17  EKG shows Afib with RVR @ 105 bpm, minimal voltage criteria for LVH, non specify T wave   CT chest shows Moderate right and small to moderate left pleural effusion with compressive atelectasis. Nonspecific interlobar septal thickening and ground-glass opacitiesNonspecific pulmonary nodules. INonspecific mediastinal lymphadenopathy.Mildly heterogeneous thyroid gland  CT a/p shows central intrahepatic and common bile duct dilatation, reflecting postcholecystectomy state. 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 Pancreatic ductal dilatation. Nonspecific small density/calcification at the pancreatic head. Prominent wall of the stomach and duodenum, which may be due to partial distention and/or gastroduodenitis. Nonspecific bilateral perinephric stranding without hydronephrosis or obstructing radiopaque urinary tract stone. Prominent bladder wall. Nodular thickening of the left adrenal gland.  Given in the ED xanax 0.25 mg x1, digoxin 125 mcg x1, Lopressor 5 IVP x1, Furosemide 40 mg x1, cardio consulted    (26 Jan 2023 02:15)    Renal consult called for hyperkalemia, Metabolic acidosis. History obtained from chart and patient's daughter at bedside.       PAST MEDICAL HISTORY:  Atrial fibrillation    Hypothyroidism    Mild Alzheimer&#x27;s dementia    Diabetes mellitus    Acute on chronic systolic congestive heart failure    Hypertension    Hyperlipemia    Cardiac LV ejection fraction 21-30%        PAST SURGICAL HISTORY:  History of appendectomy    H/O hernia repair    History of cholecystectomy        FAMILY HISTORY:  Family history of cerebral hemorrhage (Mother)    FH: renal failure (Father)        SOCIAL HISTORY: No smoking or alcohol use     Allergies    No Known Allergies    Intolerances      Home Medications:  Crestor 10 mg oral tablet: 1 tab(s) orally once a day (26 Jan 2023 03:29)  Eliquis 2.5 mg oral tablet: 1 tab(s) orally 2 times a day (26 Jan 2023 03:29)  levothyroxine 75 mcg (0.075 mg) oral tablet: 1 tab(s) orally once a day (26 Jan 2023 03:29)  Melatonin 10 mg oral capsule: 1 cap(s) orally once a day (at bedtime) (26 Jan 2023 03:29)  memantine 10 mg oral tablet: 1 tab(s) orally 2 times a day (26 Jan 2023 03:29)  metFORMIN 1000 mg oral tablet: 1 tab(s) orally 2 times a day (26 Jan 2023 03:29)  rivastigmine 9.5 mg/24 hr transdermal film, extended release: 1 patch transdermal once a day (26 Jan 2023 03:29)  Vitamin D3 50 mcg (2000 intl units) oral tablet: 1 tab(s) orally once a day (26 Jan 2023 03:29)    MEDICATIONS  (STANDING):  apixaban 2.5 milliGRAM(s) Oral every 12 hours  atorvastatin 40 milliGRAM(s) Oral at bedtime  cholecalciferol 2000 Unit(s) Oral daily  dextrose 5%. 1000 milliLiter(s) (100 mL/Hr) IV Continuous <Continuous>  dextrose 5%. 1000 milliLiter(s) (50 mL/Hr) IV Continuous <Continuous>  dextrose 50% Injectable 25 Gram(s) IV Push once  dextrose 50% Injectable 12.5 Gram(s) IV Push once  dextrose 50% Injectable 25 Gram(s) IV Push once  digoxin     Tablet 125 MICROGram(s) Oral every other day  furosemide   Injectable 40 milliGRAM(s) IV Push two times a day  glucagon  Injectable 1 milliGRAM(s) IntraMuscular once  insulin lispro (ADMELOG) corrective regimen sliding scale   SubCutaneous three times a day before meals  insulin lispro (ADMELOG) corrective regimen sliding scale   SubCutaneous at bedtime  levothyroxine 75 MICROGram(s) Oral daily  memantine 10 milliGRAM(s) Oral two times a day  metoprolol succinate  milliGRAM(s) Oral two times a day    MEDICATIONS  (PRN):  aluminum hydroxide/magnesium hydroxide/simethicone Suspension 30 milliLiter(s) Oral every 4 hours PRN Dyspepsia  dextrose Oral Gel 15 Gram(s) Oral once PRN Blood Glucose LESS THAN 70 milliGRAM(s)/deciliter  melatonin 3 milliGRAM(s) Oral at bedtime PRN Insomnia  ondansetron Injectable 4 milliGRAM(s) IV Push every 8 hours PRN Nausea and/or Vomiting      REVIEW OF SYSTEMS:  General: no distress  Respiratory: No cough, SOB  Cardiovascular: No CP or Palpitations	  Gastrointestinal: No nausea, Vomiting. No diarrhea  Genitourinary: No urinary complaints	  Musculoskeletal: No new rash or lesions		  all other systems negative    T(F): 97.8 (01-26-23 @ 09:46), Max: 98 (01-26-23 @ 02:20)  HR: 101 (01-26-23 @ 09:46) (94 - 130)  BP: 135/63 (01-26-23 @ 09:46) (126/80 - 145/94)  RR: 18 (01-26-23 @ 09:46) (16 - 18)  SpO2: 95% (01-26-23 @ 09:46) (95% - 98%)  Wt(kg): --      PHYSICAL EXAM:  General: NAD  Respiratory: b/l air entry  Cardiovascular: S1 S2  Gastrointestinal: soft  Extremities: trace edema        01-26    132<L>  |  105  |  26<H>  ----------------------------<  170<H>  5.5<H>   |  18<L>  |  1.20    Ca    8.7      26 Jan 2023 04:06  Phos  3.8     01-26  Mg     1.7     01-26    TPro  x   /  Alb  x   /  TBili  1.4<H>  /  DBili  0.4<H>  /  AST  x   /  ALT  x   /  AlkPhos  x   01-26                          10.0   9.15  )-----------( 290      ( 26 Jan 2023 05:52 )             32.4       Hemoglobin: 10.0 g/dL (01-26 @ 05:52)  Hematocrit: 32.4 % (01-26 @ 05:52)  Potassium, Serum: 5.5 mmol/L (01-26 @ 04:06)  Blood Urea Nitrogen, Serum: 26 mg/dL (01-26 @ 04:06)      Creatinine, Serum: 1.20 (01-26 @ 04:06)  Creatinine, Serum: 1.40 (01-25 @ 20:20)        LIVER FUNCTIONS - ( 26 Jan 2023 04:06 )  Alb: 2.6 g/dL / Pro: 7.0 g/dL / ALK PHOS: 73 U/L / ALT: 176 U/L / AST: 148 U/L / GGT: x           CARDIAC MARKERS ( 26 Jan 2023 04:00 )  x     / x     / 164 U/L / x     / 2.5 ng/mL      Creatine Kinase, Serum: 164 U/L (01-26-23 @ 04:00)        ABG - ( 25 Jan 2023 22:54 )  pH, Arterial: 7.40  pH, Blood: x     /  pCO2: 28    /  pO2: 85    / HCO3: 17    / Base Excess: -7.5  /  SaO2: 98.0            < from: CT Chest No Cont (01.25.23 @ 22:43) >    ACC: [id number] EXAM:  CT ABDOMEN AND PELVIS   ORDERED BY: KAY OLIVAREZ     ACC: [id number] EXAM:  CT CHEST   ORDERED BY: KAY OLIVAREZ     *** ADDENDUM # 1 ***    Addendum: Prominent wall at the vaginal introitus, which was mentioned in   the body of the original report. Recommend clinical correlation to assess   infection/inflammation and direct visualization to exclude potential   underlying lesion/neoplasm.    --- End of Report ---    *** END OF ADDENDUM # 1 ***      PROCEDURE DATE:  01/25/2023          INTERPRETATION:  CLINICAL INFORMATION: shortness of breath chf vs   infiltrate.    PROCEDURE: CT of the chest, abdomen and pelvis was performed without   intravenous contrast. Coronal and sagittal reconstruction images were   obtained.    CONTRAST/COMPLICATIONS:  IV Contrast: NONE  Oral Contrast: NONE  Complications: None reported at time of study completion    COMPARISON: None.    FINDINGS:  Evaluation of the thoracic, abdominal and pelvic organs and   vasculature is limited without intravenous contrast. The patient's   respiratory motion degrades images.    CHEST:    LUNGS AND AIRWAYS: Patent central airways. Interlobar septal thickening   and groundglass opacities in both lungs. Compressive atelectasis of the   right > left lung. Small scattered nodules measuring up to 0.3 cm, for   example, right upper lobe (4:118) and left lower lobe (4:134).  PLEURA: Moderate right and small to moderate left pleural effusion  HEART: Cardiomegaly. No pericardial effusion..  VESSELS: Atherosclerosis. Normal caliber of the thoracic aorta.  MEDIASTINUM AND FATOUMATA: Enlarged mediastinal lymph nodes, for example, 1.8   x 1.5 cm precarinal lymph node (2:33).  CHEST WALL AND LOWER NECK: Mildly heterogeneous thyroid gland.    ABDOMEN/PELVIS:    LIVER: Unremarkable.  BILE DUCTS/GALLBLADDER: Central intrahepatic and common bile duct (  1.0 cm) dilatation, reflecting postcholecystectomy state.  PANCREAS: Diffuse fatty atrophy. Duct measuring up to 0.4 cm. Nonspecific   0.8 x 0.6 cm density/calcification at the pancreatic head (2:97). A 2.5 x   1.5 cm hypodense structure near the duodenum/duodenal diverticulum   (2:106), suspicious for collection. Adjacent calcifications.  SPLEEN: Unremarkable.    ADRENALS: A 1.3 x 1.3 cm nodular thickening of the left adrenal gland.  KIDNEYS/URETERS: Bilateral perinephric stranding without   hydroureteronephrosis. Nonobstructing 0.4 x 0.3 cm left renal stone. No   obstructing radiopaque urinary tract stone. Bilateral renal cysts, the   largeston the right measuring up to 8.5 x 10.0 cm. Subcentimeter   hyperdense focus at the left kidney, suggesting proteinaceous/hemorrhagic   cyst.  BLADDER: Partially distended. Prominent wall.  REPRODUCTIVE ORGANS: Anteverted uterus.. Fluid-filled vaginal canal.   Prominent wall at the vaginal introitus.    BOWEL: No bowel obstruction. Unremarkable appendix. Colon diverticulosis.   Prominent wall of the stomach and duodenum. Duodenal diverticulum.   Connection between the second/third portion of duodenum and ascending   colon (2:113 and 601:32), which appears filled with stool and measures   6.0 x 2.5 x 3.0 cm (transverse x AP x CC). Scattered areas prominent   colon wall.  PERITONEUM: No obvious free air.  VESSELS: Atherosclerosis. Normal caliber of the abdominal aorta.   Displaced intimal calcification at the infrarenal aorta, which may be due   to chronic dissection and/or atheromatous plaque.  RETROPERITONEUM: Subcentimeter lymph nodes without lymphadenopathy.  ABDOMINAL WALL/SOFT TISSUES: Small fat-containing umbilical hernia.  BONES: Degenerative changes of the spine. Age-indeterminate endplate   depression at T11, suggesting chronic.    IMPRESSION:    Moderate right and small to moderate left pleural effusion with   compressive atelectasis. Nonspecific interlobar septal thickening and   groundglass opacities, which can be seen in noninfectious (pulmonary   edema) and infectious processes.    Nonspecific pulmonary nodules. If the patient is at low risk for   malignancy, no further follow-up is needed. If the patient is at high   risk, 12 month follow-up CT chest may be obtained for further evaluation.    Nonspecific mediastinal lymphadenopathy.    Mildly heterogeneous thyroid gland, which may further characterized on a   nonemergent ultrasound as indicated.    Central intrahepatic and common bile duct dilatation, reflecting   postcholecystectomy state. Recommend clinical correlation (LFT) and   additional imaging (MRCP/MR) if there is clinical suspicion for biliary   pathology.    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   Pancreatic ductal dilatation. Nonspecific small density/calcification at   the pancreatic head. Recommend correlation with MRCP/MR.    Prominent wall of the stomach and duodenum, which may be due to partial   distention and/or gastroduodenitis. Recommend correlation with patient's   symptoms and endoscopy as indicated.    Nonspecific bilateral perinephric stranding without hydronephrosis or   obstructing radiopaque urinary tract stone. Prominent bladder wall.   Recommend clinical correlation to assess urinary tract infection.    Nodular thickening of the left adrenal gland, which can be further   assessed by a nonemergent MRI.    Additional findings as described.    --- End of Report ---    ***Please see the addendum at the top of this report. It may contain   additional important information or changes.****        IGNACIA THOMAS MD; Attending Radiologist  This document has been electronically signed. Jan 26 2023  1:17AM  1st Addendum: IGNACIA THOMAS MD; Attending Radiologist  The first addendum was electronically signed on: Jan 26 2023  2:29AM.    < end of copied text >  < from: Xray Chest 1 View- PORTABLE-Urgent (01.25.23 @ 21:37) >    ACC: [id number] EXAM:  XR CHEST PORTABLE URGENT 1V   ORDERED BY: KAY OLIVAREZ     PROCEDURE DATE:  01/25/2023          INTERPRETATION:  AP semierect chest on January 25, 2023 at 9:16 PM.   Patient is short of breath.    Heart possibly enlarged.    Present film shows a moderate right effusion and central CHF. CHF   findings have increased from December 21, 2022.    IMPRESSION: Increasing CHF.    --- End of Report ---            AKOSUA CONTRERAS MD; Attending Radiologist  This document has been electronically signed. Jan 26 2023  9:46AM    < end of copied text >

## 2023-01-26 NOTE — H&P ADULT - NSHPREVIEWOFSYSTEMS_GEN_ALL_CORE
CONSTITUTIONAL: denies fever, chills, admits  fatigue, weakness  CARDIOVASCULAR: denies chest pain, chest pressure, palpitations  RESPIRATORY: admits shortness of breath no , sputum production  GASTROINTESTINAL: denies nausea, vomiting, diarrhea, abdominal pain  GENITOURINARY: denies dysuria, discharge  NEUROLOGICAL: denies numbness, headache, focal weakness  MUSCULOSKELETAL: denies new joint pain, muscle aches  HEMATOLOGIC: denies gross bleeding, bruising  LYMPHATICS: denies enlarged lymph nodes, extremity swelling  PSYCHIATRIC:  admits anxiety   ENDOCRINOLOGIC: denies sweating, cold or heat intolerance

## 2023-01-27 LAB
ALBUMIN FLD-MCNC: 1.1 G/DL — SIGNIFICANT CHANGE UP
ALBUMIN SERPL ELPH-MCNC: 2.7 G/DL — LOW (ref 3.3–5)
ALP SERPL-CCNC: 84 U/L — SIGNIFICANT CHANGE UP (ref 40–120)
ALT FLD-CCNC: 134 U/L — HIGH (ref 12–78)
ANION GAP SERPL CALC-SCNC: 8 MMOL/L — SIGNIFICANT CHANGE UP (ref 5–17)
APTT BLD: 27.2 SEC — LOW (ref 27.5–35.5)
AST SERPL-CCNC: 72 U/L — HIGH (ref 15–37)
BASOPHILS # BLD AUTO: 0.07 K/UL — SIGNIFICANT CHANGE UP (ref 0–0.2)
BASOPHILS NFR BLD AUTO: 0.6 % — SIGNIFICANT CHANGE UP (ref 0–2)
BILIRUB SERPL-MCNC: 1.5 MG/DL — HIGH (ref 0.2–1.2)
BUN SERPL-MCNC: 30 MG/DL — HIGH (ref 7–23)
CALCIUM SERPL-MCNC: 8.7 MG/DL — SIGNIFICANT CHANGE UP (ref 8.5–10.1)
CHLORIDE SERPL-SCNC: 94 MMOL/L — LOW (ref 96–108)
CO2 SERPL-SCNC: 29 MMOL/L — SIGNIFICANT CHANGE UP (ref 22–31)
CREAT SERPL-MCNC: 1.2 MG/DL — SIGNIFICANT CHANGE UP (ref 0.5–1.3)
EGFR: 44 ML/MIN/1.73M2 — LOW
EOSINOPHIL # BLD AUTO: 0.14 K/UL — SIGNIFICANT CHANGE UP (ref 0–0.5)
EOSINOPHIL NFR BLD AUTO: 1.3 % — SIGNIFICANT CHANGE UP (ref 0–6)
GLUCOSE FLD-MCNC: 288 MG/DL — SIGNIFICANT CHANGE UP
GLUCOSE SERPL-MCNC: 200 MG/DL — HIGH (ref 70–99)
HCT VFR BLD CALC: 37.2 % — SIGNIFICANT CHANGE UP (ref 34.5–45)
HGB BLD-MCNC: 12 G/DL — SIGNIFICANT CHANGE UP (ref 11.5–15.5)
IMM GRANULOCYTES NFR BLD AUTO: 0.5 % — SIGNIFICANT CHANGE UP (ref 0–0.9)
INR BLD: 1.22 RATIO — HIGH (ref 0.88–1.16)
LDH SERPL L TO P-CCNC: 267 U/L — HIGH (ref 50–242)
LDH SERPL L TO P-CCNC: 91 U/L — SIGNIFICANT CHANGE UP
LYMPHOCYTES # BLD AUTO: 2.27 K/UL — SIGNIFICANT CHANGE UP (ref 1–3.3)
LYMPHOCYTES # BLD AUTO: 21 % — SIGNIFICANT CHANGE UP (ref 13–44)
MAGNESIUM SERPL-MCNC: 1.4 MG/DL — LOW (ref 1.6–2.6)
MCHC RBC-ENTMCNC: 27.2 PG — SIGNIFICANT CHANGE UP (ref 27–34)
MCHC RBC-ENTMCNC: 32.3 GM/DL — SIGNIFICANT CHANGE UP (ref 32–36)
MCV RBC AUTO: 84.4 FL — SIGNIFICANT CHANGE UP (ref 80–100)
MONOCYTES # BLD AUTO: 1.17 K/UL — HIGH (ref 0–0.9)
MONOCYTES NFR BLD AUTO: 10.8 % — SIGNIFICANT CHANGE UP (ref 2–14)
NEUTROPHILS # BLD AUTO: 7.11 K/UL — SIGNIFICANT CHANGE UP (ref 1.8–7.4)
NEUTROPHILS NFR BLD AUTO: 65.8 % — SIGNIFICANT CHANGE UP (ref 43–77)
NRBC # BLD: 0 /100 WBCS — SIGNIFICANT CHANGE UP (ref 0–0)
PHOSPHATE SERPL-MCNC: 3.4 MG/DL — SIGNIFICANT CHANGE UP (ref 2.5–4.5)
PLATELET # BLD AUTO: 311 K/UL — SIGNIFICANT CHANGE UP (ref 150–400)
POTASSIUM SERPL-MCNC: 3.3 MMOL/L — LOW (ref 3.5–5.3)
POTASSIUM SERPL-SCNC: 3.3 MMOL/L — LOW (ref 3.5–5.3)
PROT FLD-MCNC: 2.2 G/DL — SIGNIFICANT CHANGE UP
PROT SERPL-MCNC: 7.3 G/DL — SIGNIFICANT CHANGE UP (ref 6–8.3)
PROTHROM AB SERPL-ACNC: 14.3 SEC — HIGH (ref 10.5–13.4)
RBC # BLD: 4.41 M/UL — SIGNIFICANT CHANGE UP (ref 3.8–5.2)
RBC # FLD: 16.7 % — HIGH (ref 10.3–14.5)
SODIUM SERPL-SCNC: 131 MMOL/L — LOW (ref 135–145)
TSH SERPL-MCNC: 2.41 UIU/ML — SIGNIFICANT CHANGE UP (ref 0.36–3.74)
WBC # BLD: 10.81 K/UL — HIGH (ref 3.8–10.5)
WBC # FLD AUTO: 10.81 K/UL — HIGH (ref 3.8–10.5)

## 2023-01-27 PROCEDURE — 99233 SBSQ HOSP IP/OBS HIGH 50: CPT

## 2023-01-27 PROCEDURE — 88305 TISSUE EXAM BY PATHOLOGIST: CPT | Mod: 26

## 2023-01-27 PROCEDURE — 71045 X-RAY EXAM CHEST 1 VIEW: CPT | Mod: 26

## 2023-01-27 PROCEDURE — 88108 CYTOPATH CONCENTRATE TECH: CPT | Mod: 26

## 2023-01-27 PROCEDURE — 99233 SBSQ HOSP IP/OBS HIGH 50: CPT | Mod: GC

## 2023-01-27 PROCEDURE — 32555 ASPIRATE PLEURA W/ IMAGING: CPT | Mod: RT

## 2023-01-27 RX ORDER — ALPRAZOLAM 0.25 MG
0.25 TABLET ORAL
Refills: 0 | Status: DISCONTINUED | OUTPATIENT
Start: 2023-01-27 | End: 2023-01-30

## 2023-01-27 RX ORDER — APIXABAN 2.5 MG/1
2.5 TABLET, FILM COATED ORAL EVERY 12 HOURS
Refills: 0 | Status: DISCONTINUED | OUTPATIENT
Start: 2023-01-28 | End: 2023-01-31

## 2023-01-27 RX ORDER — POTASSIUM CHLORIDE 20 MEQ
40 PACKET (EA) ORAL ONCE
Refills: 0 | Status: COMPLETED | OUTPATIENT
Start: 2023-01-27 | End: 2023-01-27

## 2023-01-27 RX ORDER — MAGNESIUM SULFATE 500 MG/ML
2 VIAL (ML) INJECTION ONCE
Refills: 0 | Status: COMPLETED | OUTPATIENT
Start: 2023-01-27 | End: 2023-01-27

## 2023-01-27 RX ADMIN — Medication 25 GRAM(S): at 12:42

## 2023-01-27 RX ADMIN — Medication 3: at 12:41

## 2023-01-27 RX ADMIN — Medication 75 MICROGRAM(S): at 06:37

## 2023-01-27 RX ADMIN — Medication 1: at 08:15

## 2023-01-27 RX ADMIN — Medication 0.25 MILLIGRAM(S): at 22:11

## 2023-01-27 RX ADMIN — Medication 40 MILLIGRAM(S): at 17:22

## 2023-01-27 RX ADMIN — Medication 100 MILLIGRAM(S): at 06:12

## 2023-01-27 RX ADMIN — ATORVASTATIN CALCIUM 40 MILLIGRAM(S): 80 TABLET, FILM COATED ORAL at 22:12

## 2023-01-27 RX ADMIN — Medication 100 MILLIGRAM(S): at 17:21

## 2023-01-27 RX ADMIN — Medication 2000 UNIT(S): at 12:41

## 2023-01-27 RX ADMIN — Medication 40 MILLIEQUIVALENT(S): at 12:42

## 2023-01-27 RX ADMIN — MEMANTINE HYDROCHLORIDE 10 MILLIGRAM(S): 10 TABLET ORAL at 06:11

## 2023-01-27 RX ADMIN — Medication 1: at 17:21

## 2023-01-27 RX ADMIN — MEMANTINE HYDROCHLORIDE 10 MILLIGRAM(S): 10 TABLET ORAL at 17:21

## 2023-01-27 RX ADMIN — Medication 40 MILLIGRAM(S): at 06:37

## 2023-01-27 NOTE — DIETITIAN INITIAL EVALUATION ADULT - NSICDXPASTMEDICALHX_GEN_ALL_CORE_FT
PAST MEDICAL HISTORY:  Acute on chronic systolic congestive heart failure     Atrial fibrillation     Diabetes mellitus     Hypothyroidism     Mild Alzheimer's dementia

## 2023-01-27 NOTE — PROGRESS NOTE ADULT - SUBJECTIVE AND OBJECTIVE BOX
Patient is a 87y old  Female who presents with a chief complaint of ADHF (2023 13:05)    Patient seen in follow up for       PAST MEDICAL HISTORY:  Atrial fibrillation    Hypothyroidism    Mild Alzheimer&#x27;s dementia    Diabetes mellitus    Acute on chronic systolic congestive heart failure    Hypertension    Hyperlipemia    Cardiac LV ejection fraction 21-30%      MEDICATIONS  (STANDING):  ALPRAZolam 0.25 milliGRAM(s) Oral <User Schedule>  atorvastatin 40 milliGRAM(s) Oral at bedtime  cholecalciferol 2000 Unit(s) Oral daily  dextrose 5%. 1000 milliLiter(s) (100 mL/Hr) IV Continuous <Continuous>  dextrose 5%. 1000 milliLiter(s) (50 mL/Hr) IV Continuous <Continuous>  dextrose 50% Injectable 25 Gram(s) IV Push once  dextrose 50% Injectable 12.5 Gram(s) IV Push once  dextrose 50% Injectable 25 Gram(s) IV Push once  digoxin     Tablet 125 MICROGram(s) Oral every other day  furosemide   Injectable 40 milliGRAM(s) IV Push two times a day  glucagon  Injectable 1 milliGRAM(s) IntraMuscular once  insulin lispro (ADMELOG) corrective regimen sliding scale   SubCutaneous three times a day before meals  insulin lispro (ADMELOG) corrective regimen sliding scale   SubCutaneous at bedtime  levothyroxine 75 MICROGram(s) Oral daily  memantine 10 milliGRAM(s) Oral two times a day  metoprolol succinate  milliGRAM(s) Oral two times a day    MEDICATIONS  (PRN):  aluminum hydroxide/magnesium hydroxide/simethicone Suspension 30 milliLiter(s) Oral every 4 hours PRN Dyspepsia  dextrose Oral Gel 15 Gram(s) Oral once PRN Blood Glucose LESS THAN 70 milliGRAM(s)/deciliter  melatonin 3 milliGRAM(s) Oral at bedtime PRN Insomnia  ondansetron Injectable 4 milliGRAM(s) IV Push every 8 hours PRN Nausea and/or Vomiting    T(C): 36.8 (23 @ 17:18), Max: 36.8 (23 @ 17:18)  HR: 85 (23 @ 17:18) (85 - 101)  BP: 125/84 (23 @ 17:18) (111/65 - 140/89)  RR: 17 (23 @ 17:18) (17 - 20)  SpO2: 95% (23 @ 17:18) (91% - 98%)  Wt(kg): --  I&O's Detail    2023 07:01  -  2023 07:00  --------------------------------------------------------  IN:  Total IN: 0 mL    OUT:    Voided (mL): 1300 mL  Total OUT: 1300 mL    Total NET: -1300 mL          PHYSICAL EXAM:  General: No distress  Respiratory: b/l air entry  Cardiovascular: S1 S2  Gastrointestinal: soft  Extremities:  edema                              12.0   10.81 )-----------( 311      ( 2023 08:18 )             37.2         131<L>  |  94<L>  |  30<H>  ----------------------------<  200<H>  3.3<L>   |  29  |  1.20    Ca    8.7      2023 08:18  Phos  3.4       Mg     1.4         TPro  7.3  /  Alb  2.7<L>  /  TBili  1.5<H>  /  DBili  x   /  AST  72<H>  /  ALT  134<H>  /  AlkPhos  84      CARDIAC MARKERS ( 2023 04:00 )  x     / x     / 164 U/L / x     / 2.5 ng/mL      LIVER FUNCTIONS - ( 2023 08:18 )  Alb: 2.7 g/dL / Pro: 7.3 g/dL / ALK PHOS: 84 U/L / ALT: 134 U/L / AST: 72 U/L / GGT: x           Urinalysis Basic - ( 2023 14:40 )    Color: Pale Yellow / Appearance: Clear / S.010 / pH: x  Gluc: x / Ketone: Negative  / Bili: Negative / Urobili: Negative   Blood: x / Protein: 30 mg/dL / Nitrite: Negative   Leuk Esterase: Negative / RBC: Negative /HPF / WBC Negative   Sq Epi: x / Non Sq Epi: Occasional / Bacteria: Negative      ABG - ( 2023 22:54 )  pH, Arterial: 7.40  pH, Blood: x     /  pCO2: 28    /  pO2: 85    / HCO3: 17    / Base Excess: -7.5  /  SaO2: 98.0              Sodium, Serum: 131 ( @ 08:18)  Sodium, Serum: 132 ( @ 04:06)  Sodium, Serum: 131 ( @ 20:20)    Creatinine, Serum: 1.20 ( @ 08:18)  Creatinine, Serum: 1.20 ( @ 04:06)  Creatinine, Serum: 1.40 ( @ 20:20)    Potassium, Serum: 3.3 ( @ 08:18)  Potassium, Serum: 5.5 ( @ 04:06)  Potassium, Serum: 5.2 ( @ 20:20)    Hemoglobin: 12.0 ( @ 08:18)  Hemoglobin: 10.0 ( @ 05:52)  Hemoglobin: 11.8 ( @ 20:20)     Patient is a 87y old  Female who presents with a chief complaint of ADHF (2023 13:05)    Patient seen in follow up for CKD 3.        PAST MEDICAL HISTORY:  Atrial fibrillation    Hypothyroidism    Mild Alzheimer&#x27;s dementia    Diabetes mellitus    Acute on chronic systolic congestive heart failure    Hypertension    Hyperlipemia    Cardiac LV ejection fraction 21-30%      MEDICATIONS  (STANDING):  ALPRAZolam 0.25 milliGRAM(s) Oral <User Schedule>  atorvastatin 40 milliGRAM(s) Oral at bedtime  cholecalciferol 2000 Unit(s) Oral daily  dextrose 5%. 1000 milliLiter(s) (100 mL/Hr) IV Continuous <Continuous>  dextrose 5%. 1000 milliLiter(s) (50 mL/Hr) IV Continuous <Continuous>  dextrose 50% Injectable 25 Gram(s) IV Push once  dextrose 50% Injectable 12.5 Gram(s) IV Push once  dextrose 50% Injectable 25 Gram(s) IV Push once  digoxin     Tablet 125 MICROGram(s) Oral every other day  furosemide   Injectable 40 milliGRAM(s) IV Push two times a day  glucagon  Injectable 1 milliGRAM(s) IntraMuscular once  insulin lispro (ADMELOG) corrective regimen sliding scale   SubCutaneous three times a day before meals  insulin lispro (ADMELOG) corrective regimen sliding scale   SubCutaneous at bedtime  levothyroxine 75 MICROGram(s) Oral daily  memantine 10 milliGRAM(s) Oral two times a day  metoprolol succinate  milliGRAM(s) Oral two times a day    MEDICATIONS  (PRN):  aluminum hydroxide/magnesium hydroxide/simethicone Suspension 30 milliLiter(s) Oral every 4 hours PRN Dyspepsia  dextrose Oral Gel 15 Gram(s) Oral once PRN Blood Glucose LESS THAN 70 milliGRAM(s)/deciliter  melatonin 3 milliGRAM(s) Oral at bedtime PRN Insomnia  ondansetron Injectable 4 milliGRAM(s) IV Push every 8 hours PRN Nausea and/or Vomiting    T(C): 36.8 (23 @ 17:18), Max: 36.8 (23 @ 17:18)  HR: 85 (23 @ 17:18) (85 - 101)  BP: 125/84 (23 @ 17:18) (111/65 - 140/89)  RR: 17 (23 @ 17:18) (17 - 20)  SpO2: 95% (23 @ 17:18) (91% - 98%)  Wt(kg): --  I&O's Detail    2023 07:01  -  2023 07:00  --------------------------------------------------------  IN:  Total IN: 0 mL    OUT:    Voided (mL): 1300 mL  Total OUT: 1300 mL    Total NET: -1300 mL          PHYSICAL EXAM:  General: No distress  Respiratory: b/l air entry  Cardiovascular: S1 S2  Gastrointestinal: soft  Extremities:  edema                              12.0   10.81 )-----------( 311      ( 2023 08:18 )             37.2         131<L>  |  94<L>  |  30<H>  ----------------------------<  200<H>  3.3<L>   |  29  |  1.20    Ca    8.7      2023 08:18  Phos  3.4       Mg     1.4         TPro  7.3  /  Alb  2.7<L>  /  TBili  1.5<H>  /  DBili  x   /  AST  72<H>  /  ALT  134<H>  /  AlkPhos  84      CARDIAC MARKERS ( 2023 04:00 )  x     / x     / 164 U/L / x     / 2.5 ng/mL      LIVER FUNCTIONS - ( 2023 08:18 )  Alb: 2.7 g/dL / Pro: 7.3 g/dL / ALK PHOS: 84 U/L / ALT: 134 U/L / AST: 72 U/L / GGT: x           Urinalysis Basic - ( 2023 14:40 )    Color: Pale Yellow / Appearance: Clear / S.010 / pH: x  Gluc: x / Ketone: Negative  / Bili: Negative / Urobili: Negative   Blood: x / Protein: 30 mg/dL / Nitrite: Negative   Leuk Esterase: Negative / RBC: Negative /HPF / WBC Negative   Sq Epi: x / Non Sq Epi: Occasional / Bacteria: Negative      ABG - ( 2023 22:54 )  pH, Arterial: 7.40  pH, Blood: x     /  pCO2: 28    /  pO2: 85    / HCO3: 17    / Base Excess: -7.5  /  SaO2: 98.0              Sodium, Serum: 131 ( @ 08:18)  Sodium, Serum: 132 ( @ 04:06)  Sodium, Serum: 131 ( @ 20:20)    Creatinine, Serum: 1.20 ( @ 08:18)  Creatinine, Serum: 1.20 ( @ 04:06)  Creatinine, Serum: 1.40 ( @ 20:20)    Potassium, Serum: 3.3 ( @ 08:18)  Potassium, Serum: 5.5 ( @ 04:06)  Potassium, Serum: 5.2 ( @ 20:20)    Hemoglobin: 12.0 ( @ 08:18)  Hemoglobin: 10.0 ( @ 05:52)  Hemoglobin: 11.8 ( @ 20:20)

## 2023-01-27 NOTE — PROGRESS NOTE ADULT - SUBJECTIVE AND OBJECTIVE BOX
[facility]  Murphy Jenkins PA-C  [address] [place] [address]  [phone]      INTERVAL HPI/OVERNIGHT EVENTS:  Pt s/e  No new GI events  LFTs noted    MEDICATIONS  (STANDING):  atorvastatin 40 milliGRAM(s) Oral at bedtime  cholecalciferol 2000 Unit(s) Oral daily  dextrose 5%. 1000 milliLiter(s) (100 mL/Hr) IV Continuous <Continuous>  dextrose 5%. 1000 milliLiter(s) (50 mL/Hr) IV Continuous <Continuous>  dextrose 50% Injectable 25 Gram(s) IV Push once  dextrose 50% Injectable 12.5 Gram(s) IV Push once  dextrose 50% Injectable 25 Gram(s) IV Push once  digoxin     Tablet 125 MICROGram(s) Oral every other day  furosemide   Injectable 40 milliGRAM(s) IV Push two times a day  glucagon  Injectable 1 milliGRAM(s) IntraMuscular once  insulin lispro (ADMELOG) corrective regimen sliding scale   SubCutaneous three times a day before meals  insulin lispro (ADMELOG) corrective regimen sliding scale   SubCutaneous at bedtime  levothyroxine 75 MICROGram(s) Oral daily  magnesium sulfate  IVPB 2 Gram(s) IV Intermittent once  memantine 10 milliGRAM(s) Oral two times a day  metoprolol succinate  milliGRAM(s) Oral two times a day  potassium chloride   Powder 40 milliEquivalent(s) Oral once    MEDICATIONS  (PRN):  aluminum hydroxide/magnesium hydroxide/simethicone Suspension 30 milliLiter(s) Oral every 4 hours PRN Dyspepsia  dextrose Oral Gel 15 Gram(s) Oral once PRN Blood Glucose LESS THAN 70 milliGRAM(s)/deciliter  melatonin 3 milliGRAM(s) Oral at bedtime PRN Insomnia  ondansetron Injectable 4 milliGRAM(s) IV Push every 8 hours PRN Nausea and/or Vomiting      Allergies    No Known Allergies      PHYSICAL EXAM:   Vital Signs:  Vital Signs Last 24 Hrs  T(C): 36.4 (2023 11:30), Max: 36.7 (2023 17:26)  T(F): 97.6 (2023 11:30), Max: 98.1 (2023 17:26)  HR: 87 (2023 12:00) (87 - 101)  BP: 111/65 (2023 12:00) (111/65 - 140/89)  BP(mean): --  RR: 18 (2023 12:00) (17 - 20)  SpO2: 98% (2023 12:00) (91% - 98%)    Parameters below as of 2023 12:00  Patient On (Oxygen Delivery Method): room air      Daily     Daily Weight in k.1 (2023 05:50)    GENERAL:  Appears stated age  HEENT:  NC/AT  CHEST:  Full & symmetric excursion  HEART:  Regular rhythm  ABDOMEN:  Soft, non-tender, non-distended  EXTEREMITIES:  no cyanosis  SKIN:  No rash  NEURO:  Alert      LABS:                        12.0   10.81 )-----------( 311      ( 2023 08:18 )             37.2         131<L>  |  94<L>  |  30<H>  ----------------------------<  200<H>  3.3<L>   |  29  |  1.20    Ca    8.7      2023 08:18  Phos  3.4       Mg     1.4         TPro  7.3  /  Alb  2.7<L>  /  TBili  1.5<H>  /  DBili  x   /  AST  72<H>  /  ALT  134<H>  /  AlkPhos  84      PT/INR - ( 2023 09:22 )   PT: 14.3 sec;   INR: 1.22 ratio         PTT - ( 2023 09:22 )  PTT:27.2 sec  Urinalysis Basic - ( 2023 14:40 )    Color: Pale Yellow / Appearance: Clear / S.010 / pH: x  Gluc: x / Ketone: Negative  / Bili: Negative / Urobili: Negative   Blood: x / Protein: 30 mg/dL / Nitrite: Negative   Leuk Esterase: Negative / RBC: Negative /HPF / WBC Negative   Sq Epi: x / Non Sq Epi: Occasional / Bacteria: Negative

## 2023-01-27 NOTE — DIETITIAN INITIAL EVALUATION ADULT - PROBLEM/PLAN-7
Airway  Urgency: elective      General Information and Staff    Patient location during procedure: OR  Anesthesiologist: Rubia Baig MD  Performed: anesthesiologist     Indications and Patient Condition  Indications for airway management: anesthesia  S
Regional Block  Performed by: Avery Bustillo MD  Authorized by: Avery Bustillo MD       General Information and Staff    Start Time:  1/6/2021 8:17 AM  End Time:  1/6/2021 8:20 AM  Anesthesiologist:  Avery Bustillo MD  Performed by:   Anesthesiologist  P
DISPLAY PLAN FREE TEXT

## 2023-01-27 NOTE — PROGRESS NOTE ADULT - ASSESSMENT
87 years old female patient with PMHx of HTN, HLD, HFrEF (EF 20-25% on 12/22), T2DM, hypothyroidism, alzheimer's dementia, chronic Afib who presents today for sever SOB. Patient admitted for ADHF.

## 2023-01-27 NOTE — DIETITIAN INITIAL EVALUATION ADULT - NUTRITIONGOAL OUTCOME2
-140 mg %  serum potassium -140 mg %  serum potassium 3.5-5.3 mmol/L  BUN within ref range -140 mg %  serum potassium 3.5-5.3 mmol/L  BUN 7-23 mg/dl

## 2023-01-27 NOTE — PROGRESS NOTE ADULT - PROBLEM SELECTOR PLAN 2
Probably secondary to metformin and CKD   - ABG on admission 7.4/28/85/17  - Given NaHCO3 50 meq x1   - Nephro Dr. Oleary consulted

## 2023-01-27 NOTE — DIETITIAN INITIAL EVALUATION ADULT - ORAL NUTRITION SUPPLEMENTS
RD will provide SF magic Cup BID ( each 4 oz serv provies 270 kcals and 9 gm pro)  RD will provide SF magic Cup BID ( each 4 oz serv provides 270 kcals and 9 gm pro)

## 2023-01-27 NOTE — CASE MANAGEMENT PROGRESS NOTE - NSCMPROGRESSNOTE_GEN_ALL_CORE
Attempted to meet with patient at bedside.  Sleeping,  discharge/ resource folder left at bedside with CM contact information.  Attempted to call pts HCP/daughter Machelle saravia at [phone], call did not go through.  Will continue to remain available and contact family.

## 2023-01-27 NOTE — DIETITIAN INITIAL EVALUATION ADULT - PERTINENT MEDS FT
MEDICATIONS  (STANDING):  atorvastatin 40 milliGRAM(s) Oral at bedtime  cholecalciferol 2000 Unit(s) Oral daily  dextrose 5%. 1000 milliLiter(s) (100 mL/Hr) IV Continuous <Continuous>  dextrose 5%. 1000 milliLiter(s) (50 mL/Hr) IV Continuous <Continuous>  dextrose 50% Injectable 25 Gram(s) IV Push once  dextrose 50% Injectable 12.5 Gram(s) IV Push once  dextrose 50% Injectable 25 Gram(s) IV Push once  digoxin     Tablet 125 MICROGram(s) Oral every other day  furosemide   Injectable 40 milliGRAM(s) IV Push two times a day  glucagon  Injectable 1 milliGRAM(s) IntraMuscular once  insulin lispro (ADMELOG) corrective regimen sliding scale   SubCutaneous three times a day before meals  insulin lispro (ADMELOG) corrective regimen sliding scale   SubCutaneous at bedtime  levothyroxine 75 MICROGram(s) Oral daily  memantine 10 milliGRAM(s) Oral two times a day  metoprolol succinate  milliGRAM(s) Oral two times a day    MEDICATIONS  (PRN):  aluminum hydroxide/magnesium hydroxide/simethicone Suspension 30 milliLiter(s) Oral every 4 hours PRN Dyspepsia  dextrose Oral Gel 15 Gram(s) Oral once PRN Blood Glucose LESS THAN 70 milliGRAM(s)/deciliter  melatonin 3 milliGRAM(s) Oral at bedtime PRN Insomnia  ondansetron Injectable 4 milliGRAM(s) IV Push every 8 hours PRN Nausea and/or Vomiting

## 2023-01-27 NOTE — CARE COORDINATION ASSESSMENT. - NS SW READMITTED REASON
last admit for afib with rvr, this admit for acute on chronic CHF/current reason for admission unrelated to previous admission

## 2023-01-27 NOTE — PROCEDURE NOTE - PROCEDURE FINDINGS AND DETAILS
400cc of pleuritic fluid removed from right thorax under sterile conditions and ultrasound guidance.  Post procedure CXR was ordered.

## 2023-01-27 NOTE — CAREGIVER ENGAGEMENT NOTE - CAREGIVER OUTREACH NOTES - FREE TEXT
Discussed potential d/c plan with pt and daughter Kaylie, they are in agreement with follow up services through [facility] for CHF.  Family will be able to transport home when medically ready.

## 2023-01-27 NOTE — DIETITIAN INITIAL EVALUATION ADULT - OTHER INFO
87 years old female patient with PMHx of HTN, HLD, HFrEF (EF 20-25% on 12/22), T2DM, hypothyroidism, alzheimer's dementia, chronic Afib who presents for SOB.    mild Dementia  Dyspnea  Pleural Effusions  HF  Atelectasis  Sub Cm Pulm Nodules  Eval for Biliary tract pathology -   HTN  HLD  OP  OA  DM  Thyroid Disease    diuresis in progress  thoracentesis planned    spoke with Dtr  reviewed CT chest and abdomen  effusions - R > L - may benefit from thoracentesis right side - if we can HOLD ELIQUIS  Cardio eval - follows with Dr Del Cid in the office - diuresis - cvs rx regimen optimization  monitor VS and HD and Sat  Surgery and GI eval for abnormal Biliary - GI CT imaging - eval cholangitis - malignancy -   GOC discussion  assist with needs  serial labs  serial ABD exam  keep sat > 88 pct   87 years old female patient with PMHx of  OA,HTN, HLD, HFrEF (EF 20-25% on 12/22), T2DM, hypothyroidism,mild alzheimer's dementia, chronic Afib who presented for SOB admitted for acute diastolic ADHF , being diuresed , effusions R > L thoracentesis planned if Eliquis can be held.surgery and GI eval for abnormal Biliary - GI CT imaging - eval cholangitis .  Per nsg, flow sheets, pt eating well ( %) with tray set up assistance

## 2023-01-27 NOTE — PROGRESS NOTE ADULT - PROBLEM SELECTOR PLAN 7
T2DM non insulin dependent   - HgbA1c from 12/22/22 7.8  - Hold home metformin   - Low Insulin sliding scale  - Accu checks w/ hypoglycemic protocol

## 2023-01-27 NOTE — DIETITIAN INITIAL EVALUATION ADULT - PERTINENT LABORATORY DATA
01-26    132<L>  |  105  |  26<H>  ----------------------------<  170<H>  5.5<H>   |  18<L>  |  1.20    Ca    8.7      26 Jan 2023 04:06  Phos  3.8     01-26  Mg     1.7     01-26    TPro  x   /  Alb  x   /  TBili  1.4<H>  /  DBili  0.4<H>  /  AST  x   /  ALT  x   /  AlkPhos  x   01-26  POCT Blood Glucose.: 193 mg/dL (01-27-23 @ 08:11)  A1C with Estimated Average Glucose Result: 7.8 % (12-22-22 @ 06:25)

## 2023-01-27 NOTE — PROGRESS NOTE ADULT - ASSESSMENT
87 years old female patient with PMHx of HTN, HLD, HFrEF (EF 20-25% on 12/22), T2DM, hypothyroidism, alzheimer's dementia, chronic Afib who presents for SOB.    mild Dementia  Dyspnea  Pleural Effusions  HF  Atelectasis  Sub Cm Pulm Nodules  Eval for Biliary tract pathology -   HTN  HLD  OP  OA  DM  Thyroid Disease    diuresis in progress  thoracentesis planned    spoke with Dtr  reviewed CT chest and abdomen  effusions - R > L - may benefit from thoracentesis right side - if we can HOLD ELIQUIS  Cardio eval - follows with Dr Del Cid in the office - diuresis - cvs rx regimen optimization  monitor VS and HD and Sat  Surgery and GI eval for abnormal Biliary - GI CT imaging - eval cholangitis - malignancy -   GOC discussion  assist with needs  serial labs  serial ABD exam  keep sat > 88 pct

## 2023-01-27 NOTE — DIETITIAN INITIAL EVALUATION ADULT - SIGNS/SYMPTOMS
see nutrition focused physical exam findings serum glucose today 200, yesterday 170 ( A1c in dec 2022: 7.8 acceptable for age)  serum glucose today 200, yesterday 170 ( A1c in dec 2022: 7.8 acceptable for age) , trending up BUN

## 2023-01-27 NOTE — PROGRESS NOTE ADULT - PROBLEM SELECTOR PLAN 1
Patient presents with shortness of breath likely 2/2 acute on chronic CHF exacerbation  - Admit to telemetry   - CT chest showed moderate right and small to moderate left pleural effusion with compressive atelectasis.  - On admission Pro BMP >[id number] <-[id number]  - Last TTE on 12/22  showed Left ventricular enlargement with severe left ventricular systolic dysfunction, estimated LVEF of 20-25%. The entire apex and anterior walls appear akinetic. The inferior and inferolateral walls appear hypokinetic.  - Given digoxin 125 mcg x1, Lopressor 5 IVP x1, Furosemide 40 mg x1, cardio consulted in the ED   - Lasix 40 mg IVP BID   - Continue Metoprolol succinate 100 mg BID, will increase digoxin 125 mcg to daily per cardio recs as HR uncontrolled per cardio recs  - Strict I/Os, daily weights  - Monitor and replace electrolytes  - S/p R thoracentesis 1/27 with 400cc collected  - Cardiology Dr. Harvey following, recs appreciated  - Pulm Dr. Isaac following, recs appreciated Patient presents with shortness of breath likely 2/2 acute on chronic CHF exacerbation  - Admit to telemetry   - CT chest showed moderate right and small to moderate left pleural effusion with compressive atelectasis.  - On admission Pro BMP >[id number] <-[id number]  - Last TTE on 12/22  showed Left ventricular enlargement with severe left ventricular systolic dysfunction, estimated LVEF of 20-25%. The entire apex and anterior walls appear akinetic. The inferior and inferolateral walls appear hypokinetic.  - Given digoxin 125 mcg x1, Lopressor 5 IVP x1, Furosemide 40 mg x1, cardio consulted in the ED   - Lasix 40 mg IVP BID   - Continue Metoprolol succinate 100 mg BID, digoxin 125mg qod  - Strict I/Os, daily weights  - Monitor and replace electrolytes  - S/p R thoracentesis 1/27 with 400cc collected  - Cardiology Dr. Harvey following, recs appreciated  - Pulm Dr. Isaac following, recs appreciated

## 2023-01-27 NOTE — PROGRESS NOTE ADULT - ASSESSMENT
Mild hyperkalemia  Metabolic acidosis with Resp alkalosis  Mild Hyponatremia  CHF  Abnormal LFTs  Hypertension  Diabetes    01/26/23: Continue IV lasix. Will follow potassium trend. Follow bicarb trend. GI and Cardiology evaluation in progress. PO sodium bicarb if acidosis worsening.   Avoid hypotonic fluids. Monitor blood sugar levels. Insulin coverage as needed. Monitor BP trend. Titrate BP meds as needed.   Will follow electrolytes and renal function trend.   01/27/23: Stable renal indices. Potassium lower. Cautious PO supps. Thoracentesis today. D/w pt's daughter at bedside. Avoid hypotonic fluids. PO fluid restriction.

## 2023-01-27 NOTE — PROGRESS NOTE ADULT - ATTENDING COMMENTS
Patient seen at bedside.   reports feeling well  tolerated diet without any nausea/vomiting or abdominal pain  Daughter Kaylie at bedside.   s/p thoracentesis today     A/P  acute on chronic HFrEF  Afib, chronic      - cardio following    - cont lasix 40mg IV BID    - pulm following. CT with moderate pleural effusions. s/p 400 cc fluid removed.      transaminitis likely secondary to CHF  abnormal CT    - seen by GI and surgery. ?duodenal-colonic fistula. d/w daughter at bedside. currently want to hold off on invasive measures for her abdomen. Patient tolerating diet. denies any abdominal pain.     - ID eval appreciated. no abx needed at this time.     Prominent wall at vaginal introitus on CT   - d/w daughter. will follow up with PMD. She does not want too much other workup because it can overwhelm patient. agreeable with outpatient follow up with PMD/gyn.     - UA negative.     DVT proph: restart eliquis tomorrow. Patient seen at bedside.   reports feeling well  tolerated diet without any nausea/vomiting or abdominal pain  Daughter Kaylie at bedside.   s/p thoracentesis today   patient was agitated overnight.     A/P  acute on chronic HFrEF  Afib, chronic      - cardio following    - cont lasix 40mg IV BID    - pulm following. CT with moderate pleural effusions. s/p 400 cc fluid removed.      transaminitis likely secondary to CHF  abnormal CT    - seen by GI and surgery. ?duodenal-colonic fistula. d/w daughter at bedside. currently want to hold off on invasive measures for her abdomen. Patient tolerating diet. denies any abdominal pain.     - ID eval appreciated. no abx needed at this time.     Prominent wall at vaginal introitus on CT   - d/w daughter. will follow up with PMD. She does not want too much other workup because it can overwhelm patient. agreeable with outpatient follow up with PMD/gyn.     - UA negative.     DVT proph: restart eliquis tomorrow.  d/w daughter, will order xanax at night.

## 2023-01-27 NOTE — PROGRESS NOTE ADULT - SUBJECTIVE AND OBJECTIVE BOX
[facility] -- David Le,  Dylan, Siddhartha Grider [place], [place]  Office # [phone]    Follow Up:  HF, Afib RVR, pleural effusion    Subjective/Observations: Pt seen and examined, resting comfortably in bed.  No complaints of chest pain, dyspnea, or palpitations reported. Tolerating RA.     REVIEW OF SYSTEMS: All other review of systems is negative unless indicated above  PAST MEDICAL & SURGICAL HISTORY:  Atrial fibrillation      Hypothyroidism      Mild Alzheimer&#x27;s dementia      Diabetes mellitus      Acute on chronic systolic congestive heart failure      Hypertension      Hyperlipemia      Cardiac LV ejection fraction 21-30%  12/22      History of appendectomy      H/O hernia repair      History of cholecystectomy        MEDICATIONS  (STANDING):  atorvastatin 40 milliGRAM(s) Oral at bedtime  cholecalciferol 2000 Unit(s) Oral daily  dextrose 5%. 1000 milliLiter(s) (100 mL/Hr) IV Continuous <Continuous>  dextrose 5%. 1000 milliLiter(s) (50 mL/Hr) IV Continuous <Continuous>  dextrose 50% Injectable 25 Gram(s) IV Push once  dextrose 50% Injectable 12.5 Gram(s) IV Push once  dextrose 50% Injectable 25 Gram(s) IV Push once  digoxin     Tablet 125 MICROGram(s) Oral every other day  furosemide   Injectable 40 milliGRAM(s) IV Push two times a day  glucagon  Injectable 1 milliGRAM(s) IntraMuscular once  insulin lispro (ADMELOG) corrective regimen sliding scale   SubCutaneous three times a day before meals  insulin lispro (ADMELOG) corrective regimen sliding scale   SubCutaneous at bedtime  levothyroxine 75 MICROGram(s) Oral daily  magnesium sulfate  IVPB 2 Gram(s) IV Intermittent once  memantine 10 milliGRAM(s) Oral two times a day  metoprolol succinate  milliGRAM(s) Oral two times a day  potassium chloride   Powder 40 milliEquivalent(s) Oral once    MEDICATIONS  (PRN):  aluminum hydroxide/magnesium hydroxide/simethicone Suspension 30 milliLiter(s) Oral every 4 hours PRN Dyspepsia  dextrose Oral Gel 15 Gram(s) Oral once PRN Blood Glucose LESS THAN 70 milliGRAM(s)/deciliter  melatonin 3 milliGRAM(s) Oral at bedtime PRN Insomnia  ondansetron Injectable 4 milliGRAM(s) IV Push every 8 hours PRN Nausea and/or Vomiting    Allergies    No Known Allergies    Intolerances      Vital Signs Last 24 Hrs  T(C): 36.3 (27 Jan 2023 05:50), Max: 36.7 (26 Jan 2023 17:26)  T(F): 97.4 (27 Jan 2023 05:50), Max: 98.1 (26 Jan 2023 17:26)  HR: 97 (27 Jan 2023 05:50) (95 - 101)  BP: 117/82 (27 Jan 2023 05:50) (117/82 - 138/69)  BP(mean): --  RR: 17 (27 Jan 2023 05:50) (17 - 19)  SpO2: 91% (27 Jan 2023 05:50) (91% - 95%)    Parameters below as of 27 Jan 2023 05:50  Patient On (Oxygen Delivery Method): room air      I&O's Summary    26 Jan 2023 07:01  -  27 Jan 2023 07:00  --------------------------------------------------------  IN: 0 mL / OUT: 1300 mL / NET: -1300 mL        TELE:   PHYSICAL EXAM:  Constitutional: NAD, awake and alert  HEENT: Moist Mucous Membranes, Anicteric  Pulmonary: Non-labored, breath sounds are clear bilaterally, No wheezing, rales or rhonchi  Cardiovascular: IRRR,  S1 and S2, No murmurs, rubs, gallops or clicks  Gastrointestinal: Bowel Sounds present, soft, nontender.   Lymph: trace b/l LE peripheral edema. No lymphadenopathy.  Skin: No visible rashes or ulcers.  Psych:  Mood & affect appropriate, mildly confused    LABS: All Labs Reviewed:                        12.0   10.81 )-----------( 311      ( 27 Jan 2023 08:18 )             37.2                         10.0   9.15  )-----------( 290      ( 26 Jan 2023 05:52 )             32.4                         11.8   11.75 )-----------( 322      ( 25 Jan 2023 20:20 )             38.9     27 Jan 2023 08:18    131    |  94     |  30     ----------------------------<  200    3.3     |  29     |  1.20   26 Jan 2023 04:06    132    |  105    |  26     ----------------------------<  170    5.5     |  18     |  1.20   25 Jan 2023 20:20    131    |  105    |  27     ----------------------------<  249    5.2     |  16     |  1.40     Ca    8.7        27 Jan 2023 08:18  Ca    8.7        26 Jan 2023 04:06  Ca    8.8        25 Jan 2023 20:20  Phos  3.4       27 Jan 2023 08:18  Phos  3.8       26 Jan 2023 04:06  Mg     1.4       27 Jan 2023 08:18  Mg     1.7       26 Jan 2023 04:06  Mg     1.8       25 Jan 2023 20:20    TPro  7.3    /  Alb  2.7    /  TBili  1.5    /  DBili  x      /  AST  72     /  ALT  134    /  AlkPhos  84     27 Jan 2023 08:18  TPro  x      /  Alb  x      /  TBili  1.4    /  DBili  0.4    /  AST  x      /  ALT  x      /  AlkPhos  x      26 Jan 2023 06:05  TPro  7.0    /  Alb  2.6    /  TBili  1.4    /  DBili  x      /  AST  148    /  ALT  176    /  AlkPhos  73     26 Jan 2023 04:06    PT/INR - ( 27 Jan 2023 09:22 )   PT: 14.3 sec;   INR: 1.22 ratio         PTT - ( 27 Jan 2023 09:22 )  PTT:27.2 sec  CARDIAC MARKERS ( 26 Jan 2023 04:00 )  x     / x     / 164 U/L / x     / 2.5 ng/mL      12 Lead ECG:   Ventricular Rate 105 BPM    QRS Duration 96 ms    Q-T Interval 336 ms    QTC Calculation(Bazett) 444 ms    R Axis 28 degrees    T Axis 263 degrees    Diagnosis Line Atrial fibrillation with rapid ventricular response  Minimal voltage criteria for LVH, may be normal variant ( Jonathan product )  Nonspecific T wave abnormality  Abnormal ECG    Confirmed by rogerio Harvey ([id number]) on 1/26/2023 1:29:07 PM (01-25-23 @ 19:15)      ACC: [id number] EXAM:  ECHO TTE WO CON COMP W DOPP                          PROCEDURE DATE:  12/22/2022          INTERPRETATION:  INDICATION: Abnormal EKG  Sonographer KL    Blood Pressure 142/86    Height 158 cm     Weight 51.7 kg       BSA 1.5   sqm    Dimensions:  LA 3.6       Normal Values: 2.0 - 4.0 cm  Ao 3.2        Normal Values: 2.0 - 3.8 cm  SEPTUM 1.0       Normal Values: 0.6 - 1.2 cm  PWT 0.9       Normal Values: 0.6 - 1.1 cm  LVIDd 5.8         Normal Values: 3.0 - 5.6 cm  LVIDs 4.3      Normal Values: 1.8 - 4.0 cm      OBSERVATIONS:  Mitral Valve: Moderate MR.  Aortic Valve/Aorta: normal trileaflet aortic valve.  Tricuspid Valve: Mild TR.  Pulmonic Valve: Mild PI  Left Atrium: Enlarged  Right Atrium: Enlarged  Left Ventricle: Left ventricular enlargement with severe left ventricular   systolic dysfunction, estimated LVEF of 20-25%. The entire apex and   anterior walls appear akinetic. The inferior and inferolateral walls   appear hypokinetic. Remaining walls are not well-visualized  Right Ventricle: Grossly normal size and systolic function.  Pericardium: no significant pericardial effusion.  Pulmonary/RV Pressure: estimated PA systolic pressure of at least 35 mmHg   assuming an RA pressure of 3mmHg.        IMPRESSION:  Left ventricular enlargement with severe left ventricular systolic   dysfunction, estimated LVEF of 20-25%. The entire apex and anterior walls   appear akinetic. The inferior and inferolateral walls appear hypokinetic.  Grossly normal RV size and systolic function.  Biatrial enlargement  Normal trileaflet aortic valve, without AI.  Moderate MR  Mild TR.  No significant pericardial effusion.    --- End of Report ---            JOHN BAEZA MD; Attending Cardiologist  This document has been electronically signed. Dec 23 2022  4:40PM

## 2023-01-27 NOTE — CARE COORDINATION ASSESSMENT. - CURRENT MENTAL STATUS/COGNITIVE FUNCTIONING
Met with patient and daughter at bedside. Pt is documented with dementia but is alert and oriented at present.  She is in agreement that pt speak with daughter Kaylie present.  CM contact information given in discharge/ transition resource folder, they are aware a CM will remain available through hospitalization./alert/oriented to person/oriented to place/oriented to time/oriented to situation

## 2023-01-27 NOTE — PROGRESS NOTE ADULT - PROBLEM SELECTOR PLAN 3
Probably demand ischemia in the setting of ADHF.   - EKG shows Afib with RVR @ 105 bpm, minimal voltage criteria for LVH, non specify T wave   - Troponins trended to peak  - Cardio consulted recs appreciated

## 2023-01-27 NOTE — PROGRESS NOTE ADULT - PROBLEM SELECTOR PLAN 6
Acute on chronic, prer daughter liver enzymes have been slightly elevated on previous visits to her pmd however have never been as high as on admission   - AST//228 --> AST//176, continue to decrease  - Trend hepatic function   - Avoid hepatotoxic medications  - Consider discontinuing statin if liver enzymes continue to uptrend  - GI Dr. Leonardo following, recs appreciated

## 2023-01-27 NOTE — CARE COORDINATION ASSESSMENT. - PATIENT WAS INVOLVED WITH A HOMECARE AGENCY PRIOR TO ADMISSION?
Had [facility] in the past. services had stopped but they are open to resume servcies for [facility] program/No

## 2023-01-27 NOTE — PATIENT CHOICE NOTE. - NSPTCHOICESTATE_GEN_ALL_CORE

## 2023-01-27 NOTE — PROGRESS NOTE ADULT - SUBJECTIVE AND OBJECTIVE BOX
Patient is a 87y old  Female who presents with a chief complaint of ADHF (2023 12:32)      INTERVAL HPI/OVERNIGHT EVENTS: Patient seen and examined at bedside. No overnight events occurred. Patient has no complaints, she is sitting up in bed and eating breakfast. Denies shortness of breath. She is saturating well on room air. Denies fevers, chills, headache, lightheadedness, chest pain, abdominal pain, n/v/d/c.    MEDICATIONS  (STANDING):  atorvastatin 40 milliGRAM(s) Oral at bedtime  cholecalciferol 2000 Unit(s) Oral daily  dextrose 5%. 1000 milliLiter(s) (100 mL/Hr) IV Continuous <Continuous>  dextrose 5%. 1000 milliLiter(s) (50 mL/Hr) IV Continuous <Continuous>  dextrose 50% Injectable 25 Gram(s) IV Push once  dextrose 50% Injectable 12.5 Gram(s) IV Push once  dextrose 50% Injectable 25 Gram(s) IV Push once  digoxin     Tablet 125 MICROGram(s) Oral every other day  furosemide   Injectable 40 milliGRAM(s) IV Push two times a day  glucagon  Injectable 1 milliGRAM(s) IntraMuscular once  insulin lispro (ADMELOG) corrective regimen sliding scale   SubCutaneous three times a day before meals  insulin lispro (ADMELOG) corrective regimen sliding scale   SubCutaneous at bedtime  levothyroxine 75 MICROGram(s) Oral daily  memantine 10 milliGRAM(s) Oral two times a day  metoprolol succinate  milliGRAM(s) Oral two times a day    MEDICATIONS  (PRN):  aluminum hydroxide/magnesium hydroxide/simethicone Suspension 30 milliLiter(s) Oral every 4 hours PRN Dyspepsia  dextrose Oral Gel 15 Gram(s) Oral once PRN Blood Glucose LESS THAN 70 milliGRAM(s)/deciliter  melatonin 3 milliGRAM(s) Oral at bedtime PRN Insomnia  ondansetron Injectable 4 milliGRAM(s) IV Push every 8 hours PRN Nausea and/or Vomiting      Allergies    No Known Allergies    Intolerances        REVIEW OF SYSTEMS:  CONSTITUTIONAL: No fever or chills  HEENT:  No headache, no sore throat  RESPIRATORY: No cough, wheezing, or shortness of breath  CARDIOVASCULAR: No chest pain, palpitations  GASTROINTESTINAL: No abd pain, nausea, vomiting, or diarrhea  GENITOURINARY: No dysuria, frequency, or hematuria  NEUROLOGICAL: no focal weakness or dizziness  MUSCULOSKELETAL: no myalgias     Vital Signs Last 24 Hrs  T(C): 36.4 (2023 11:30), Max: 36.7 (2023 17:26)  T(F): 97.6 (2023 11:30), Max: 98.1 (2023 17:26)  HR: 87 (2023 12:00) (87 - 101)  BP: 111/65 (2023 12:00) (111/65 - 140/89)  BP(mean): --  RR: 18 (2023 12:00) (17 - 20)  SpO2: 98% (2023 12:00) (91% - 98%)    Parameters below as of 2023 12:00  Patient On (Oxygen Delivery Method): room air        PHYSICAL EXAM:  GENERAL: NAD, sitting up in bed comfortably  HEENT:  anicteric, moist mucous membranes  CHEST/LUNG:  decreased breath sounds b/l bases, no rales, wheezes, or rhonchi  HEART:  IRRR, S1, S2  ABDOMEN:  BS+, soft, nontender, nondistended  EXTREMITIES: no edema, cyanosis, or calf tenderness  NERVOUS SYSTEM: answers questions and follows commands appropriately    LABS:                        12.0   10.81 )-----------( 311      ( 2023 08:18 )             37.2     CBC Full  -  ( 2023 08:18 )  WBC Count : 10.81 K/uL  Hemoglobin : 12.0 g/dL  Hematocrit : 37.2 %  Platelet Count - Automated : 311 K/uL  Mean Cell Volume : 84.4 fl  Mean Cell Hemoglobin : 27.2 pg  Mean Cell Hemoglobin Concentration : 32.3 gm/dL  Auto Neutrophil # : 7.11 K/uL  Auto Lymphocyte # : 2.27 K/uL  Auto Monocyte # : 1.17 K/uL  Auto Eosinophil # : 0.14 K/uL  Auto Basophil # : 0.07 K/uL  Auto Neutrophil % : 65.8 %  Auto Lymphocyte % : 21.0 %  Auto Monocyte % : 10.8 %  Auto Eosinophil % : 1.3 %  Auto Basophil % : 0.6 %    2023 08:18    131    |  94     |  30     ----------------------------<  200    3.3     |  29     |  1.20     Ca    8.7        2023 08:18  Phos  3.4       2023 08:18  Mg     1.4       2023 08:18    TPro  7.3    /  Alb  2.7    /  TBili  1.5    /  DBili  x      /  AST  72     /  ALT  134    /  AlkPhos  84     2023 08:18    PT/INR - ( 2023 09:22 )   PT: 14.3 sec;   INR: 1.22 ratio         PTT - ( 2023 09:22 )  PTT:27.2 sec  Urinalysis Basic - ( 2023 14:40 )    Color: Pale Yellow / Appearance: Clear / S.010 / pH: x  Gluc: x / Ketone: Negative  / Bili: Negative / Urobili: Negative   Blood: x / Protein: 30 mg/dL / Nitrite: Negative   Leuk Esterase: Negative / RBC: Negative /HPF / WBC Negative   Sq Epi: x / Non Sq Epi: Occasional / Bacteria: Negative      CAPILLARY BLOOD GLUCOSE      POCT Blood Glucose.: 262 mg/dL (2023 12:37)  POCT Blood Glucose.: 193 mg/dL (2023 08:11)  POCT Blood Glucose.: 170 mg/dL (2023 21:49)  POCT Blood Glucose.: 210 mg/dL (2023 16:37)        Culture - Blood (collected 23 @ 01:15)  Source: .Blood Blood-Venous  Preliminary Report (23 @ 09:01):    No growth to date.    Culture - Blood (collected 23 @ 01:15)  Source: .Blood Blood-Venous  Preliminary Report (23 @ 09:01):    No growth to date.        RADIOLOGY & ADDITIONAL TESTS:    Personally reviewed.     Consultant(s) Notes Reviewed:  [x] YES  [ ] NO

## 2023-01-27 NOTE — PROGRESS NOTE ADULT - SUBJECTIVE AND OBJECTIVE BOX
Date/Time Patient Seen:  		  Referring MD:   Data Reviewed	       Patient is a 87y old  Female who presents with a chief complaint of ADHF (26 Jan 2023 13:44)      Subjective/HPI     PAST MEDICAL & SURGICAL HISTORY:  Atrial fibrillation    Hypothyroidism    Mild Alzheimer&#x27;s dementia    Diabetes mellitus    Acute on chronic systolic congestive heart failure    Hypertension    Hyperlipemia    Cardiac LV ejection fraction 21-30%  12/22    History of appendectomy    H/O hernia repair    History of cholecystectomy          Medication list         MEDICATIONS  (STANDING):  atorvastatin 40 milliGRAM(s) Oral at bedtime  cholecalciferol 2000 Unit(s) Oral daily  dextrose 5%. 1000 milliLiter(s) (100 mL/Hr) IV Continuous <Continuous>  dextrose 5%. 1000 milliLiter(s) (50 mL/Hr) IV Continuous <Continuous>  dextrose 50% Injectable 25 Gram(s) IV Push once  dextrose 50% Injectable 12.5 Gram(s) IV Push once  dextrose 50% Injectable 25 Gram(s) IV Push once  digoxin     Tablet 125 MICROGram(s) Oral every other day  furosemide   Injectable 40 milliGRAM(s) IV Push two times a day  glucagon  Injectable 1 milliGRAM(s) IntraMuscular once  insulin lispro (ADMELOG) corrective regimen sliding scale   SubCutaneous three times a day before meals  insulin lispro (ADMELOG) corrective regimen sliding scale   SubCutaneous at bedtime  levothyroxine 75 MICROGram(s) Oral daily  memantine 10 milliGRAM(s) Oral two times a day  metoprolol succinate  milliGRAM(s) Oral two times a day    MEDICATIONS  (PRN):  aluminum hydroxide/magnesium hydroxide/simethicone Suspension 30 milliLiter(s) Oral every 4 hours PRN Dyspepsia  dextrose Oral Gel 15 Gram(s) Oral once PRN Blood Glucose LESS THAN 70 milliGRAM(s)/deciliter  melatonin 3 milliGRAM(s) Oral at bedtime PRN Insomnia  ondansetron Injectable 4 milliGRAM(s) IV Push every 8 hours PRN Nausea and/or Vomiting         Vitals log        ICU Vital Signs Last 24 Hrs  T(C): 36.3 (27 Jan 2023 05:50), Max: 36.7 (26 Jan 2023 17:26)  T(F): 97.4 (27 Jan 2023 05:50), Max: 98.1 (26 Jan 2023 17:26)  HR: 97 (27 Jan 2023 05:50) (95 - 101)  BP: 117/82 (27 Jan 2023 05:50) (117/82 - 138/69)  BP(mean): --  ABP: --  ABP(mean): --  RR: 17 (27 Jan 2023 05:50) (17 - 19)  SpO2: 91% (27 Jan 2023 05:50) (91% - 95%)    O2 Parameters below as of 27 Jan 2023 05:50  Patient On (Oxygen Delivery Method): room air                 Input and Output:  I&O's Detail    26 Jan 2023 07:01  -  27 Jan 2023 06:48  --------------------------------------------------------  IN:  Total IN: 0 mL    OUT:    Voided (mL): 1300 mL  Total OUT: 1300 mL    Total NET: -1300 mL          Lab Data                        10.0   9.15  )-----------( 290      ( 26 Jan 2023 05:52 )             32.4     01-26    132<L>  |  105  |  26<H>  ----------------------------<  170<H>  5.5<H>   |  18<L>  |  1.20    Ca    8.7      26 Jan 2023 04:06  Phos  3.8     01-26  Mg     1.7     01-26    TPro  x   /  Alb  x   /  TBili  1.4<H>  /  DBili  0.4<H>  /  AST  x   /  ALT  x   /  AlkPhos  x   01-26    ABG - ( 25 Jan 2023 22:54 )  pH, Arterial: 7.40  pH, Blood: x     /  pCO2: 28    /  pO2: 85    / HCO3: 17    / Base Excess: -7.5  /  SaO2: 98.0              CARDIAC MARKERS ( 26 Jan 2023 04:00 )  x     / x     / 164 U/L / x     / 2.5 ng/mL        Review of Systems	      Objective     Physical Examination  heart s1s2  lng dc BS  headnc        Pertinent Lab findings & Imaging      Julieth:  NO   Adequate UO     I&O's Detail    26 Jan 2023 07:01  -  27 Jan 2023 06:48  --------------------------------------------------------  IN:  Total IN: 0 mL    OUT:    Voided (mL): 1300 mL  Total OUT: 1300 mL    Total NET: -1300 mL               Discussed with:     Cultures:	        Radiology

## 2023-01-27 NOTE — PATIENT CHOICE NOTE. - NSPTCHOICENOTES_GEN_ALL_CORE
Family wishes to use same vendor as prior admission, reviewed EMR/ had [place], will sent to [facility] at time of discharge

## 2023-01-27 NOTE — CARE COORDINATION ASSESSMENT. - NSPASTMEDSURGHISTORY_GEN_ALL_CORE_FT
PAST MEDICAL & SURGICAL HISTORY:  Diabetes mellitus      Mild Alzheimer&#x27;s dementia      Hypothyroidism      Atrial fibrillation      History of cholecystectomy      H/O hernia repair      History of appendectomy      Cardiac LV ejection fraction 21-30%  12/22      Hyperlipemia      Hypertension      Acute on chronic systolic congestive heart failure

## 2023-01-27 NOTE — PROGRESS NOTE ADULT - PROBLEM SELECTOR PLAN 4
initially in RVR on arrival to the ed HR now improved s/p digoxin 125 mcg x1, Lopressor 5 IVP x1  - EKG shows Afib with RVR @ 105 bpm, minimal voltage criteria for LVH, non specify T wave   - Will continue home metoprolol succinate 100 mg BID and digoxin 125 mcg qod   - AC Eliquis 2.5 BID initially in RVR on arrival to the ed HR now improved s/p digoxin 125 mcg x1, Lopressor 5 IVP x1  - EKG shows Afib with RVR @ 105 bpm, minimal voltage criteria for LVH, non specify T wave   - Will continue home metoprolol succinate 100 mg BID and digoxin 125 mcg qod   - AC Eliquis 2.5 BID to restart 1/28 evening

## 2023-01-27 NOTE — PROGRESS NOTE ADULT - ASSESSMENT
86 yo F with PMHx of HTN, HLD, HFrEF (EF 20-25% on 12/22), T2DM, hypothyroidism, alzheimer's dementia, chronic Afib who presents today for SOB, admitted for acute on chronic congestive heart failure exacerbation     HF, Afib  - Patient is not complaining of any cardiac symptoms at this time.  - Troponin mildly elevated x2 (144.9 --> 156.9) likely 2/2 demand in setting of CHF exacerbation, trend to peak   - , CKMB 2.5, CPK 1.5; wnl   - Pro-BNP >07112  - continue Lasix 40mg IVP BID  - ECHO FINDINGS: 12/22/2022 LV enlargement with severe left ventricular systolic dysfunction, LVEF of 20-25%, apex and anterior walls akinetic, inferior and inferolateral walls hypokinetic, biatrial enlargement, Moderate MR, Mild TR    - CT chest and abdomen with pleural effusions - R > L - may benefit from thoracentesis right side. can HOLD eliquis for procedure if needed     - EKG: Atrial fibrillation with RVR, minimal voltage criteria for LVH, nonspecific T wave abnormality; improved lateral leads from prior EKG on 12/29/2022   - HR uncontrolled, tele reviewed HR 90-120s  - Increase home Digoxin 125 mcg PO to daily  - Continue Metoprolol succinate 100 mg BID    - Monitor and replete lytes, keep K>4, Mg>2.  - Will continue to follow.    Samuel Alcazar NP  Nurse Practitioner- Cardiology   Spectra #[phone]

## 2023-01-27 NOTE — PROGRESS NOTE ADULT - PROBLEM SELECTOR PLAN 5
CT a/p shows central intrahepatic and common bile duct dilatation, reflecting postcholecystectomy state. 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 Pancreatic ductal dilatation. Nonspecific small density/calcification at the pancreatic head. Prominent wall of the stomach and duodenum, which may be due to partial distention and/or gastroduodenitis. Nonspecific bilateral perinephric stranding without hydronephrosis or obstructing radiopaque urinary tract stone. Prominent bladder wall. Nodular thickening of the left adrenal gland. Prominent wall at the vaginal introitus, which was mentioned in the body of the original report. Recommend clinical correlation to assess infection/inflammation and direct visualization to exclude potential underlying lesion/neoplasm.  - Per GI, dilated cbd likely 2/2 pancreatic atrophy, doubt duodenal-colonic fistula; patient denies severe diarrhea  - Consider repeat CT with oral contrast only; however patient and dtr do not want surgery if present  - GI Dr. Leonardo following, recs appreciated  - Surgery consulted, recs appreciated. Signed off.   - ID Dr. Laughlin consulted, recs appreciated  - Recommend follow up outpatient with gynecology on discharge

## 2023-01-27 NOTE — DIETITIAN NUTRITION RISK NOTIFICATION - TREATMENT: THE FOLLOWING DIET HAS BEEN RECOMMENDED
Diet, Consistent Carbohydrate w/Evening Snack:   1200mL Fluid Restriction ([id number])  Low Sodium (01-26-23 @ 02:25) [Active]

## 2023-01-27 NOTE — PROGRESS NOTE ADULT - ASSESSMENT
87 years old female patient with PMHx of HTN, HLD, HFrEF, T2DM, hypothyroidism, alzheimer's dementia, chronic Afib, who presented with SOB. Incidentally found to have transaminitis and central intrahepatic/common bile duct dilatation. 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     Patient improving off antibiotics. No fevers and denies any GI symptoms. No leukocytosis and LFT's improving. Bili stable. Blood cultures currently no growth.    -observe off antibiotics  -follow cultures to completion.  -discussed with daughter at bedside  -will sign off, please call ID if any further questions. Thank you.    Andreina Laughlin MD  Division of Infectious Diseases   Cell [phone] between 8am and 6pm   After 6pm and weekends please call ID service at [phone].

## 2023-01-27 NOTE — PROGRESS NOTE ADULT - SUBJECTIVE AND OBJECTIVE BOX
[facility]  INFECTIOUS DISEASES - Nasrin Amezcua, [place] [address]  Tel: [phone]     Fax: [phone]  =======================================================    ELGINLEXY [id number]    Follow up: No fevers. Seen earlier today. Feels better. Denies any cough or SOB. Denies any pain, nausea or diarrhea.    Allergies:  No Known Allergies      Antibiotics:  ALPRAZolam 0.25 milliGRAM(s) Oral <User Schedule>  aluminum hydroxide/magnesium hydroxide/simethicone Suspension 30 milliLiter(s) Oral every 4 hours PRN  atorvastatin 40 milliGRAM(s) Oral at bedtime  cholecalciferol 2000 Unit(s) Oral daily  dextrose 5%. 1000 milliLiter(s) IV Continuous <Continuous>  dextrose 5%. 1000 milliLiter(s) IV Continuous <Continuous>  dextrose 50% Injectable 25 Gram(s) IV Push once  dextrose 50% Injectable 12.5 Gram(s) IV Push once  dextrose 50% Injectable 25 Gram(s) IV Push once  dextrose Oral Gel 15 Gram(s) Oral once PRN  digoxin     Tablet 125 MICROGram(s) Oral every other day  furosemide   Injectable 40 milliGRAM(s) IV Push two times a day  glucagon  Injectable 1 milliGRAM(s) IntraMuscular once  insulin lispro (ADMELOG) corrective regimen sliding scale   SubCutaneous three times a day before meals  insulin lispro (ADMELOG) corrective regimen sliding scale   SubCutaneous at bedtime  levothyroxine 75 MICROGram(s) Oral daily  melatonin 3 milliGRAM(s) Oral at bedtime PRN  memantine 10 milliGRAM(s) Oral two times a day  metoprolol succinate  milliGRAM(s) Oral two times a day  ondansetron Injectable 4 milliGRAM(s) IV Push every 8 hours PRN       REVIEW OF SYSTEMS:  CONSTITUTIONAL:  No Fever or chills  HEENT:  No sore throat or runny nose.  CARDIOVASCULAR:  No chest pain   RESPIRATORY:  see history  GASTROINTESTINAL:  No nausea, vomiting or diarrhea.  GENITOURINARY:  No dysuria, frequency or urgency  NEUROLOGIC: No headache, no dizziness  PSYCHIATRIC:  No disorder of thought or mood.     Physical Exam:  ICU Vital Signs Last 24 Hrs  T(C): 36.8 (2023 17:18), Max: 36.8 (2023 17:18)  T(F): 98.2 (2023 17:18), Max: 98.2 (2023 17:18)  HR: 85 (2023 17:18) (85 - 101)  BP: 125/84 (2023 17:18) (111/65 - 140/89)  BP(mean): --  ABP: --  ABP(mean): --  RR: 17 (2023 17:18) (17 - 20)  SpO2: 95% (2023 17:18) (91% - 98%)    O2 Parameters below as of 2023 17:18  Patient On (Oxygen Delivery Method): room air        GEN: NAD  HEENT: normocephalic and atraumatic.    NECK: Supple.  LUNGS: Normal respiratory effort  HEART: Regular rate and rhythm   ABDOMEN: Soft, nontender, and nondistended.   EXTREMITIES: no leg edema.  NEUROLOGIC: Answering questions appropriately  PSYCHIATRIC: Appropriate affect .    Labs:      131<L>  |  94<L>  |  30<H>  ----------------------------<  200<H>  3.3<L>   |  29  |  1.20    Ca    8.7      2023 08:18  Phos  3.4       Mg     1.4         TPro  7.3  /  Alb  2.7<L>  /  TBili  1.5<H>  /  DBili  x   /  AST  72<H>  /  ALT  134<H>  /  AlkPhos  84                            12.0   10.81 )-----------( 311      ( 2023 08:18 )             37.2     PT/INR - ( 2023 09:22 )   PT: 14.3 sec;   INR: 1.22 ratio         PTT - ( 2023 09:22 )  PTT:27.2 sec  Urinalysis Basic - ( 2023 14:40 )    Color: Pale Yellow / Appearance: Clear / S.010 / pH: x  Gluc: x / Ketone: Negative  / Bili: Negative / Urobili: Negative   Blood: x / Protein: 30 mg/dL / Nitrite: Negative   Leuk Esterase: Negative / RBC: Negative /HPF / WBC Negative   Sq Epi: x / Non Sq Epi: Occasional / Bacteria: Negative      LIVER FUNCTIONS - ( 2023 08:18 )  Alb: 2.7 g/dL / Pro: 7.3 g/dL / ALK PHOS: 84 U/L / ALT: 134 U/L / AST: 72 U/L / GGT: x             RECENT CULTURES:   @ 01:15 .Blood Blood-Venous     No growth to date.         @ 20:20          NotDete        All imaging and data are reviewed.     Assessment and Plan:       Andreina Laughlin MD  Division of infectious Diseases  Cell [phone] between 8am and 6pm  After 6pm and over the weekends please call ID service line at [phone].

## 2023-01-28 ENCOUNTER — TRANSCRIPTION ENCOUNTER (OUTPATIENT)
Age: 88
End: 2023-01-28

## 2023-01-28 DIAGNOSIS — N18.9 CHRONIC KIDNEY DISEASE, UNSPECIFIED: ICD-10-CM

## 2023-01-28 LAB
ALBUMIN SERPL ELPH-MCNC: 2.7 G/DL — LOW (ref 3.3–5)
ALP SERPL-CCNC: 90 U/L — SIGNIFICANT CHANGE UP (ref 40–120)
ALT FLD-CCNC: 117 U/L — HIGH (ref 12–78)
ANION GAP SERPL CALC-SCNC: 7 MMOL/L — SIGNIFICANT CHANGE UP (ref 5–17)
AST SERPL-CCNC: 68 U/L — HIGH (ref 15–37)
B PERT IGG+IGM PNL SER: ABNORMAL
BASOPHILS # BLD AUTO: 0.09 K/UL — SIGNIFICANT CHANGE UP (ref 0–0.2)
BASOPHILS NFR BLD AUTO: 0.9 % — SIGNIFICANT CHANGE UP (ref 0–2)
BILIRUB SERPL-MCNC: 0.9 MG/DL — SIGNIFICANT CHANGE UP (ref 0.2–1.2)
BUN SERPL-MCNC: 41 MG/DL — HIGH (ref 7–23)
CALCIUM SERPL-MCNC: 9.3 MG/DL — SIGNIFICANT CHANGE UP (ref 8.5–10.1)
CHLORIDE SERPL-SCNC: 96 MMOL/L — SIGNIFICANT CHANGE UP (ref 96–108)
CO2 SERPL-SCNC: 29 MMOL/L — SIGNIFICANT CHANGE UP (ref 22–31)
COLOR FLD: YELLOW — SIGNIFICANT CHANGE UP
CREAT SERPL-MCNC: 1.5 MG/DL — HIGH (ref 0.5–1.3)
EGFR: 34 ML/MIN/1.73M2 — LOW
EOSINOPHIL # BLD AUTO: 0.22 K/UL — SIGNIFICANT CHANGE UP (ref 0–0.5)
EOSINOPHIL # FLD: 0 % — SIGNIFICANT CHANGE UP
EOSINOPHIL NFR BLD AUTO: 2.1 % — SIGNIFICANT CHANGE UP (ref 0–6)
FLUID INTAKE SUBSTANCE CLASS: SIGNIFICANT CHANGE UP
FOLATE+VIT B12 SERBLD-IMP: 0 % — SIGNIFICANT CHANGE UP
GLUCOSE SERPL-MCNC: 217 MG/DL — HIGH (ref 70–99)
GRAM STN FLD: SIGNIFICANT CHANGE UP
HCT VFR BLD CALC: 38.8 % — SIGNIFICANT CHANGE UP (ref 34.5–45)
HGB BLD-MCNC: 12.5 G/DL — SIGNIFICANT CHANGE UP (ref 11.5–15.5)
IMM GRANULOCYTES NFR BLD AUTO: 0.7 % — SIGNIFICANT CHANGE UP (ref 0–0.9)
LYMPHOCYTES # BLD AUTO: 1.86 K/UL — SIGNIFICANT CHANGE UP (ref 1–3.3)
LYMPHOCYTES # BLD AUTO: 17.6 % — SIGNIFICANT CHANGE UP (ref 13–44)
LYMPHOCYTES # FLD: 32 % — SIGNIFICANT CHANGE UP
MCHC RBC-ENTMCNC: 27.5 PG — SIGNIFICANT CHANGE UP (ref 27–34)
MCHC RBC-ENTMCNC: 32.2 GM/DL — SIGNIFICANT CHANGE UP (ref 32–36)
MCV RBC AUTO: 85.5 FL — SIGNIFICANT CHANGE UP (ref 80–100)
MESOTHL CELL # FLD: 1 % — SIGNIFICANT CHANGE UP
MONOCYTES # BLD AUTO: 1.32 K/UL — HIGH (ref 0–0.9)
MONOCYTES NFR BLD AUTO: 12.5 % — SIGNIFICANT CHANGE UP (ref 2–14)
MONOS+MACROS # FLD: 50 % — SIGNIFICANT CHANGE UP
NEUTROPHILS # BLD AUTO: 7.01 K/UL — SIGNIFICANT CHANGE UP (ref 1.8–7.4)
NEUTROPHILS NFR BLD AUTO: 66.2 % — SIGNIFICANT CHANGE UP (ref 43–77)
NEUTROPHILS-BODY FLUID: 17 % — SIGNIFICANT CHANGE UP
NRBC # BLD: 0 /100 WBCS — SIGNIFICANT CHANGE UP (ref 0–0)
NRBC # FLD: 0 % — SIGNIFICANT CHANGE UP
OTHER CELLS FLD MANUAL: 0 % — SIGNIFICANT CHANGE UP
PH FLD: 7.9 — SIGNIFICANT CHANGE UP
PLATELET # BLD AUTO: 287 K/UL — SIGNIFICANT CHANGE UP (ref 150–400)
POTASSIUM SERPL-MCNC: 4.4 MMOL/L — SIGNIFICANT CHANGE UP (ref 3.5–5.3)
POTASSIUM SERPL-SCNC: 4.4 MMOL/L — SIGNIFICANT CHANGE UP (ref 3.5–5.3)
PROT SERPL-MCNC: 7.3 G/DL — SIGNIFICANT CHANGE UP (ref 6–8.3)
RBC # BLD: 4.54 M/UL — SIGNIFICANT CHANGE UP (ref 3.8–5.2)
RBC # FLD: 17.1 % — HIGH (ref 10.3–14.5)
RCV VOL RI: 1000 /UL — HIGH (ref 0–0)
SODIUM SERPL-SCNC: 132 MMOL/L — LOW (ref 135–145)
SPECIMEN SOURCE: SIGNIFICANT CHANGE UP
TOTAL NUCLEATED CELL COUNT, BODY FLUID: 909 /UL — SIGNIFICANT CHANGE UP
TUBE TYPE: SIGNIFICANT CHANGE UP
WBC # BLD: 10.57 K/UL — HIGH (ref 3.8–10.5)
WBC # FLD AUTO: 10.57 K/UL — HIGH (ref 3.8–10.5)

## 2023-01-28 PROCEDURE — 99232 SBSQ HOSP IP/OBS MODERATE 35: CPT

## 2023-01-28 PROCEDURE — 99233 SBSQ HOSP IP/OBS HIGH 50: CPT | Mod: GC

## 2023-01-28 RX ORDER — DIGOXIN 250 MCG
125 TABLET ORAL DAILY
Refills: 0 | Status: DISCONTINUED | OUTPATIENT
Start: 2023-01-28 | End: 2023-01-31

## 2023-01-28 RX ADMIN — MEMANTINE HYDROCHLORIDE 10 MILLIGRAM(S): 10 TABLET ORAL at 18:01

## 2023-01-28 RX ADMIN — Medication 2: at 08:49

## 2023-01-28 RX ADMIN — APIXABAN 2.5 MILLIGRAM(S): 2.5 TABLET, FILM COATED ORAL at 18:00

## 2023-01-28 RX ADMIN — Medication 75 MICROGRAM(S): at 05:15

## 2023-01-28 RX ADMIN — ATORVASTATIN CALCIUM 40 MILLIGRAM(S): 80 TABLET, FILM COATED ORAL at 20:49

## 2023-01-28 RX ADMIN — Medication 0.25 MILLIGRAM(S): at 20:49

## 2023-01-28 RX ADMIN — Medication 6: at 12:30

## 2023-01-28 RX ADMIN — Medication 1: at 17:53

## 2023-01-28 RX ADMIN — MEMANTINE HYDROCHLORIDE 10 MILLIGRAM(S): 10 TABLET ORAL at 05:14

## 2023-01-28 RX ADMIN — Medication 100 MILLIGRAM(S): at 18:01

## 2023-01-28 RX ADMIN — Medication 2000 UNIT(S): at 12:29

## 2023-01-28 RX ADMIN — Medication 125 MICROGRAM(S): at 12:28

## 2023-01-28 NOTE — PROGRESS NOTE ADULT - ATTENDING COMMENTS
Patient seen at bedside.   reports feeling well  tolerated diet without any nausea/vomiting or abdominal pain  s/p thoracentesis 1/27  BP soft    A/P  acute on chronic HFrEF  Afib, chronic      - cardio following    - cont lasix 40mg IV BID, held today given elevated Cr.     - pulm following. CT with moderate pleural effusions. s/p 400 cc fluid removed.      YVETTE    - lasix held today given elevated Cr.     transaminitis likely secondary to CHF  abnormal CT    - seen by GI and surgery. ?duodenal-colonic fistula. d/w daughter at bedside. currently want to hold off on invasive measures for her abdomen. Patient tolerating diet. denies any abdominal pain.     - ID eval appreciated. no abx needed at this time.     Prominent wall at vaginal introitus on CT   - d/w daughter. will follow up with PMD. She does not want too much other workup because it can overwhelm patient. agreeable with outpatient follow up with PMD/gyn.     - UA negative.     DVT proph: restart eliquis today  xanax at night to help with agitation/sundowning.

## 2023-01-28 NOTE — DISCHARGE NOTE PROVIDER - NSDCCPCAREPLAN_GEN_ALL_CORE_FT
PRINCIPAL DISCHARGE DIAGNOSIS  Diagnosis: Acute on chronic systolic congestive heart failure  Assessment and Plan of Treatment: You had a procedure done to drian fluid from your R lung.      SECONDARY DISCHARGE DIAGNOSES  Diagnosis: Metabolic acidosis  Assessment and Plan of Treatment:     Diagnosis: Atrial fibrillation and flutter  Assessment and Plan of Treatment: Continue your home eliquis dose twice daily  Increase your digoxin to once daily instead of once every other day     PRINCIPAL DISCHARGE DIAGNOSIS  Diagnosis: Acute on chronic systolic congestive heart failure  Assessment and Plan of Treatment: You had a procedure done to drian fluid from your R lung.      SECONDARY DISCHARGE DIAGNOSES  Diagnosis: Metabolic acidosis  Assessment and Plan of Treatment:     Diagnosis: Transaminitis  Assessment and Plan of Treatment: You have a history of high liver enzymes and they were found to be high while in the hospital as well, but improved throughout your hospital stay. Please follow up with your PCP for repeat bloodwork to monitor your liver enzymes    Diagnosis: Atrial fibrillation and flutter  Assessment and Plan of Treatment: Continue your home eliquis dose twice daily  Increase your digoxin to once daily instead of once every other day    Diagnosis: Abnormal CT scan  Assessment and Plan of Treatment: Follow up with PMD/gyn for Prominent wall at vaginal introitus on CT     PRINCIPAL DISCHARGE DIAGNOSIS  Diagnosis: Acute on chronic systolic congestive heart failure  Assessment and Plan of Treatment: You had a procedure done to drain fluid from your R lung.  Your creatinine was monitored while you were given diuretics.      SECONDARY DISCHARGE DIAGNOSES  Diagnosis: Metabolic acidosis  Assessment and Plan of Treatment:     Diagnosis: Atrial fibrillation and flutter  Assessment and Plan of Treatment: Continue your home eliquis dose twice daily  Increase your digoxin to once daily instead of once every other day    Diagnosis: Transaminitis  Assessment and Plan of Treatment: You have a history of high liver enzymes and they were found to be high while in the hospital as well, but improved throughout your hospital stay. Please follow up with your PCP for repeat bloodwork to monitor your liver enzymes    Diagnosis: Diabetes mellitus  Assessment and Plan of Treatment: Your blood sugar was monitored.  You were given insulin according to your fingerstick glucose numbers.    Diagnosis: Abnormal CT scan  Assessment and Plan of Treatment: Follow up with PMD/gyn for Pprominent wall at vaginal introitus on CT     PRINCIPAL DISCHARGE DIAGNOSIS  Diagnosis: Acute on chronic systolic congestive heart failure  Assessment and Plan of Treatment: You had a procedure done to drain fluid from your R lung.  Your creatinine was monitored while you were given diuretics.  Please take lasix 20mg lasix once a day as prescribed. Follow up with Dr. Oleary (nephrology) 4-6 weeks after discharge.   Please follow with your primary care doctor 1 week after discharge.  Please follow with your Cardiologist Dr. Harvey 1 week after discharge.      SECONDARY DISCHARGE DIAGNOSES  Diagnosis: Diabetes mellitus  Assessment and Plan of Treatment: Your blood sugar was monitored.  You were given insulin according to your fingerstick glucose numbers.   Please continue to take your metformin 1000 twice a day upon discharge. Please start taking Januvia 25mg once a day as prescribed.   Please follow with misty primary care doctor 1 week after discharge.    Diagnosis: Atrial fibrillation and flutter  Assessment and Plan of Treatment: Continue your home eliquis 2.5mg  dose twice daily.  Increase your digoxin 0.125mg to once daily instead of once every other day.  Continue to take your metoprolol 100mg twice a day.   Please follow with your PCP 1 week after discharge.       Diagnosis: Transaminitis  Assessment and Plan of Treatment: You have a history of high liver enzymes and they were found to be high while in the hospital as well, but improved throughout your hospital stay. Please follow up with your PCP for repeat bloodwork to monitor your liver enzymes    Diagnosis: Abnormal CT scan  Assessment and Plan of Treatment: Follow up with PMD/gyn for prominent wall at vaginal introitus on CT    Diagnosis: Hypothyroidism  Assessment and Plan of Treatment: Please continue to take your levothyroxine 75 micrograms as prescribed.       Diagnosis: Mild Alzheimer's dementia  Assessment and Plan of Treatment: Continue to take your memantine 10mg twice a day as prescribed. Continue to take your rivastigmine patch as prescribed.  PLease follow with your PCP 1 week after discharge.    Diagnosis: Hyperlipidemia  Assessment and Plan of Treatment: Please continue to  take your crestor 10mg once a day as prescribed.     PRINCIPAL DISCHARGE DIAGNOSIS  Diagnosis: Acute on chronic systolic congestive heart failure  Assessment and Plan of Treatment: You had a procedure done to drain fluid from your R lung.  Your creatinine was monitored while you were given diuretics.  Please take lasix 20mg lasix once a day as prescribed. Follow up with Dr. Oleary (nephrology) 4-6 weeks after discharge.   Please follow with your primary care doctor 1 week after discharge.  Please follow with your Cardiologist Dr. Harvey 1 week after discharge.      SECONDARY DISCHARGE DIAGNOSES  Diagnosis: Diabetes mellitus  Assessment and Plan of Treatment: Your blood sugar was monitored.  You were given insulin according to your fingerstick glucose numbers.   Please continue to take your metformin 1000 twice a day upon discharge. Please start taking Januvia 25mg once a day as prescribed.   Please follow with [facility] primary care doctor 1 week after discharge.    Diagnosis: Atrial fibrillation and flutter  Assessment and Plan of Treatment: Continue your home eliquis 2.5mg  dose twice daily.  Increase your digoxin 0.125mg to once daily instead of once every other day.  Continue to take your metoprolol 100mg twice a day.   Please follow with your PCP 1 week after discharge.   Please follow with your Cardiologist Dr. Harvey 1 week after discharge.      Diagnosis: Transaminitis  Assessment and Plan of Treatment: You have a history of high liver enzymes and they were found to be high while in the hospital as well, but improved throughout your hospital stay. Please follow up with your PCP for repeat bloodwork to monitor your liver enzymes    Diagnosis: Abnormal CT scan  Assessment and Plan of Treatment: Follow up with PMD/gyn for prominent wall at vaginal introitus on CT    Diagnosis: Hypothyroidism  Assessment and Plan of Treatment: Please continue to take your levothyroxine 75 micrograms as prescribed.       Diagnosis: Mild Alzheimer's dementia  Assessment and Plan of Treatment: Continue to take your memantine 10mg twice a day as prescribed. Continue to take your rivastigmine patch as prescribed.  PLease follow with your PCP 1 week after discharge.    Diagnosis: Hyperlipidemia  Assessment and Plan of Treatment: Please continue to  take your crestor 10mg once a day as prescribed.     PRINCIPAL DISCHARGE DIAGNOSIS  Diagnosis: Acute on chronic systolic congestive heart failure  Assessment and Plan of Treatment: You had a procedure done to drain fluid from your R lung.  Your creatinine was monitored while you were given diuretics.  Please take lasix 20mg lasix once a day as prescribed. Follow up with Dr. Oleary (nephrology) 3-4 weeks after discharge. Please call office to obtain lab script for outpatient blood work.   Please follow with your primary care doctor 1 week after discharge.  Please follow with your Cardiologist Dr. Grider 1-2 week after discharge.      SECONDARY DISCHARGE DIAGNOSES  Diagnosis: Atrial fibrillation and flutter  Assessment and Plan of Treatment: Continue your home eliquis 2.5mg  dose twice daily.  Increase your digoxin 0.125mg to once daily instead of every other day.  Continue to take your metoprolol 100mg twice a day.   Please follow with your PCP 1 week after discharge.   Please follow with your Cardiologist Dr. Grider 1-2 week after discharge.      Diagnosis: Transaminitis  Assessment and Plan of Treatment: You have a history of high liver enzymes and they were found to be high while in the hospital as well, but improved throughout your hospital stay. Please follow up with your PCP for repeat bloodwork to monitor your liver enzymes  You were seen by GI and surgery. after discussion with family, we have decided to hold off on aggressive management. Low suspicion for fistula per discussion with GI. Suspect elevated transaminitis secondary to hepatic congestion from  You were tolerating diet during hospitalization.    Diagnosis: Abnormal CT scan  Assessment and Plan of Treatment: Follow up with PMD/gyn for prominent wall at vaginal introitus on CT. We have discussed with family. Recommended outpatient followup with PMD or Gyn if it correlates with your goals of care.    Diagnosis: Diabetes mellitus  Assessment and Plan of Treatment: Your blood sugar was monitored.  You were given insulin according to your fingerstick glucose numbers.   Your hgba1c was 7.8. You had several episodes of hyperglycemia during hospitalization.   Please continue to take your metformin 1000 twice a day upon discharge. Please start taking Januvia 25mg once a day as prescribed.   Please follow with misty primary care doctor 1 week after discharge.    Diagnosis: Hypothyroidism  Assessment and Plan of Treatment: Please continue to take your levothyroxine 75 micrograms as prescribed.       Diagnosis: Mild Alzheimer's dementia  Assessment and Plan of Treatment: Continue to take your memantine 10mg twice a day as prescribed. Continue to take your rivastigmine patch as prescribed.  Please follow with your PCP 1 week after discharge.    Diagnosis: Hyperlipidemia  Assessment and Plan of Treatment: Please continue to take your crestor 10mg once a day as prescribed.

## 2023-01-28 NOTE — PROGRESS NOTE ADULT - PROBLEM SELECTOR PLAN 1
Patient presents with shortness of breath likely 2/2 acute on chronic CHF exacerbation  - Admit to telemetry   - CT chest showed moderate right and small to moderate left pleural effusion with compressive atelectasis.  - On admission Pro BMP >[id number] <-[id number]  - Last TTE on 12/22  showed Left ventricular enlargement with severe left ventricular systolic dysfunction, estimated LVEF of 20-25%. The entire apex and anterior walls appear akinetic. The inferior and inferolateral walls appear hypokinetic.  - Given digoxin 125 mcg x1, Lopressor 5 IVP x1, Furosemide 40 mg x1, cardio consulted in the ED   - Lasix 40 mg IVP BID   - Continue Metoprolol succinate 100 mg BID, digoxin 125mg qod  - Strict I/Os, daily weights  - Monitor and replace electrolytes  - S/p R thoracentesis 1/27 with 400cc collected  - Cardiology Dr. Harvey following, recs appreciated  - Pulm Dr. Isaac following, recs appreciated Patient presents with shortness of breath likely 2/2 acute on chronic CHF exacerbation  - CT chest showed moderate right and small to moderate left pleural effusion with compressive atelectasis.  - Last TTE on 12/22  showed Left ventricular enlargement with severe left ventricular systolic dysfunction, estimated LVEF of 20-25%. The entire apex and anterior walls appear akinetic. The inferior and inferolateral walls appear hypokinetic.  - Lasix 40 mg IVP BID w/hold parameters  - Continue Metoprolol succinate 100 mg BID, digoxin 125mg qod  - Fluid restriction, Strict I/Os, daily weights  - Monitor and replace electrolytes  - S/p R thoracentesis 1/27 with 400cc collected  - Cardiology Dr. Harvey following, recs appreciated  - Pulm Dr. Isaac following, recs appreciated Patient presents with shortness of breath likely 2/2 acute on chronic CHF exacerbation  - CT chest showed moderate right and small to moderate left pleural effusion with compressive atelectasis.  - Last TTE on 12/22  showed Left ventricular enlargement with severe left ventricular systolic dysfunction, estimated LVEF of 20-25%. The entire apex and anterior walls appear akinetic. The inferior and inferolateral walls appear hypokinetic.  - On Lasix 40 mg IVP BID w/hold parameters. Hold today given Cr uptrending and reassess tomorrow  - Continue Metoprolol succinate 100 mg BID, increase digoxin 125mg qod --> qd  - Trops elevated 2/2 demand ischemia in the setting of CHF  - Fluid restriction, Strict I/Os, daily weights  - Monitor and replace electrolytes  - S/p R thoracentesis 1/27 with 400cc collected transudative effusion  - Cardiology Dr. Harvey following, recs appreciated  - Pulm Dr. Isaac following, recs appreciated

## 2023-01-28 NOTE — PROGRESS NOTE ADULT - PROBLEM SELECTOR PLAN 7
T2DM non insulin dependent   - HgbA1c from 12/22/22 7.8  - Hold home metformin   - Low Insulin sliding scale  - Accu checks w/ hypoglycemic protocol CKD, Cr increased today to 1.5, baseline appears to be 1.2 per chart review  f/u bladder scan  f/u AM renal indices and lytes

## 2023-01-28 NOTE — PROGRESS NOTE ADULT - SUBJECTIVE AND OBJECTIVE BOX
Subjective: somnolent. Not SOB      MEDICATIONS  (STANDING):  ALPRAZolam 0.25 milliGRAM(s) Oral <User Schedule>  apixaban 2.5 milliGRAM(s) Oral every 12 hours  atorvastatin 40 milliGRAM(s) Oral at bedtime  cholecalciferol 2000 Unit(s) Oral daily  dextrose 5%. 1000 milliLiter(s) (100 mL/Hr) IV Continuous <Continuous>  dextrose 5%. 1000 milliLiter(s) (50 mL/Hr) IV Continuous <Continuous>  dextrose 50% Injectable 25 Gram(s) IV Push once  dextrose 50% Injectable 12.5 Gram(s) IV Push once  dextrose 50% Injectable 25 Gram(s) IV Push once  digoxin     Tablet 125 MICROGram(s) Oral daily  glucagon  Injectable 1 milliGRAM(s) IntraMuscular once  insulin lispro (ADMELOG) corrective regimen sliding scale   SubCutaneous three times a day before meals  insulin lispro (ADMELOG) corrective regimen sliding scale   SubCutaneous at bedtime  levothyroxine 75 MICROGram(s) Oral daily  memantine 10 milliGRAM(s) Oral two times a day  metoprolol succinate  milliGRAM(s) Oral two times a day    MEDICATIONS  (PRN):  aluminum hydroxide/magnesium hydroxide/simethicone Suspension 30 milliLiter(s) Oral every 4 hours PRN Dyspepsia  dextrose Oral Gel 15 Gram(s) Oral once PRN Blood Glucose LESS THAN 70 milliGRAM(s)/deciliter  melatonin 3 milliGRAM(s) Oral at bedtime PRN Insomnia  ondansetron Injectable 4 milliGRAM(s) IV Push every 8 hours PRN Nausea and/or Vomiting          T(C): 36.4 (01-28-23 @ 04:19), Max: 36.8 (01-27-23 @ 17:18)  HR: 70 (01-28-23 @ 08:45) (70 - 91)  BP: 110/70 (01-28-23 @ 08:45) (107/66 - 130/70)  RR: 16 (01-28-23 @ 08:45) (16 - 19)  SpO2: 94% (01-28-23 @ 08:45) (91% - 98%)  Wt(kg): --        I&O's Detail    27 Jan 2023 07:01  -  28 Jan 2023 07:00  --------------------------------------------------------  IN:  Total IN: 0 mL    OUT:    Voided (mL): 250 mL  Total OUT: 250 mL    Total NET: -250 mL               PHYSICAL EXAM:    GENERAL: NAD  NECK: Supple, no inc in JVP  CHEST/LUNG: Clear  HEART: S1S2  ABDOMEN: Soft  EXTREMITIES: min edema.       LABS:  CBC Full  -  ( 28 Jan 2023 08:30 )  WBC Count : 10.57 K/uL  RBC Count : 4.54 M/uL  Hemoglobin : 12.5 g/dL  Hematocrit : 38.8 %  Platelet Count - Automated : 287 K/uL  Mean Cell Volume : 85.5 fl  Mean Cell Hemoglobin : 27.5 pg  Mean Cell Hemoglobin Concentration : 32.2 gm/dL  Auto Neutrophil # : 7.01 K/uL  Auto Lymphocyte # : 1.86 K/uL  Auto Monocyte # : 1.32 K/uL  Auto Eosinophil # : 0.22 K/uL  Auto Basophil # : 0.09 K/uL  Auto Neutrophil % : 66.2 %  Auto Lymphocyte % : 17.6 %  Auto Monocyte % : 12.5 %  Auto Eosinophil % : 2.1 %  Auto Basophil % : 0.9 %    01-28    132<L>  |  96  |  41<H>  ----------------------------<  217<H>  4.4   |  29  |  1.50<H>    Ca    9.3      28 Jan 2023 08:30  Phos  3.4     01-27  Mg     1.4     01-27    TPro  7.3  /  Alb  2.7<L>  /  TBili  0.9  /  DBili  x   /  AST  68<H>  /  ALT  117<H>  /  AlkPhos  90  01-28    PT/INR - ( 27 Jan 2023 09:22 )   PT: 14.3 sec;   INR: 1.22 ratio        Impression:        CKD, mild Cr flux near baseline  Mild Hyponatremia, hypervolemic, high ADH state of severe CM  Systolic CM, EF 20-25%, mod MR  Abnormal LFTs  Hypertension  Diabetes    Recommendations:   * Monitor off lasix  * Follow bladder scan and repeat Cr in 24h

## 2023-01-28 NOTE — PROGRESS NOTE ADULT - SUBJECTIVE AND OBJECTIVE BOX
Date/Time Patient Seen:  		  Referring MD:   Data Reviewed	       Patient is a 87y old  Female who presents with a chief complaint of ADHF (27 Jan 2023 20:36)      Subjective/HPI     PAST MEDICAL & SURGICAL HISTORY:  Atrial fibrillation    Hypothyroidism    Mild Alzheimer&#x27;s dementia    Diabetes mellitus    Acute on chronic systolic congestive heart failure    Hypertension    Hyperlipemia    Cardiac LV ejection fraction 21-30%  12/22    History of appendectomy    H/O hernia repair    History of cholecystectomy          Medication list         MEDICATIONS  (STANDING):  ALPRAZolam 0.25 milliGRAM(s) Oral <User Schedule>  apixaban 2.5 milliGRAM(s) Oral every 12 hours  atorvastatin 40 milliGRAM(s) Oral at bedtime  cholecalciferol 2000 Unit(s) Oral daily  dextrose 5%. 1000 milliLiter(s) (100 mL/Hr) IV Continuous <Continuous>  dextrose 5%. 1000 milliLiter(s) (50 mL/Hr) IV Continuous <Continuous>  dextrose 50% Injectable 25 Gram(s) IV Push once  dextrose 50% Injectable 12.5 Gram(s) IV Push once  dextrose 50% Injectable 25 Gram(s) IV Push once  digoxin     Tablet 125 MICROGram(s) Oral every other day  furosemide   Injectable 40 milliGRAM(s) IV Push two times a day  glucagon  Injectable 1 milliGRAM(s) IntraMuscular once  insulin lispro (ADMELOG) corrective regimen sliding scale   SubCutaneous three times a day before meals  insulin lispro (ADMELOG) corrective regimen sliding scale   SubCutaneous at bedtime  levothyroxine 75 MICROGram(s) Oral daily  memantine 10 milliGRAM(s) Oral two times a day  metoprolol succinate  milliGRAM(s) Oral two times a day    MEDICATIONS  (PRN):  aluminum hydroxide/magnesium hydroxide/simethicone Suspension 30 milliLiter(s) Oral every 4 hours PRN Dyspepsia  dextrose Oral Gel 15 Gram(s) Oral once PRN Blood Glucose LESS THAN 70 milliGRAM(s)/deciliter  melatonin 3 milliGRAM(s) Oral at bedtime PRN Insomnia  ondansetron Injectable 4 milliGRAM(s) IV Push every 8 hours PRN Nausea and/or Vomiting         Vitals log        ICU Vital Signs Last 24 Hrs  T(C): 36.4 (28 Jan 2023 04:19), Max: 36.8 (27 Jan 2023 17:18)  T(F): 97.5 (28 Jan 2023 04:19), Max: 98.2 (27 Jan 2023 17:18)  HR: 91 (28 Jan 2023 04:19) (85 - 91)  BP: 109/76 (28 Jan 2023 04:19) (107/66 - 140/89)  BP(mean): --  ABP: --  ABP(mean): --  RR: 17 (28 Jan 2023 04:19) (17 - 20)  SpO2: 91% (28 Jan 2023 04:19) (91% - 98%)    O2 Parameters below as of 28 Jan 2023 04:19  Patient On (Oxygen Delivery Method): room air                 Input and Output:  I&O's Detail    26 Jan 2023 07:01  -  27 Jan 2023 07:00  --------------------------------------------------------  IN:  Total IN: 0 mL    OUT:    Voided (mL): 1300 mL  Total OUT: 1300 mL    Total NET: -1300 mL          Lab Data                        12.0   10.81 )-----------( 311      ( 27 Jan 2023 08:18 )             37.2     01-27    131<L>  |  94<L>  |  30<H>  ----------------------------<  200<H>  3.3<L>   |  29  |  1.20    Ca    8.7      27 Jan 2023 08:18  Phos  3.4     01-27  Mg     1.4     01-27    TPro  7.3  /  Alb  2.7<L>  /  TBili  1.5<H>  /  DBili  x   /  AST  72<H>  /  ALT  134<H>  /  AlkPhos  84  01-27            Review of Systems	      Objective     Physical Examination    heart s1s2  lung dc BS      Pertinent Lab findings & Imaging      Julieth:  NO   Adequate UO     I&O's Detail    26 Jan 2023 07:01  -  27 Jan 2023 07:00  --------------------------------------------------------  IN:  Total IN: 0 mL    OUT:    Voided (mL): 1300 mL  Total OUT: 1300 mL    Total NET: -1300 mL               Discussed with:     Cultures:	        Radiology

## 2023-01-28 NOTE — DISCHARGE NOTE PROVIDER - HOSPITAL COURSE
HPI:  87 years old female patient with PMHx of HTN, HLD, HFrEF (EF 20-25% on 12/22), T2DM, hypothyroidism, alzheimer's dementia, chronic Afib who presents today for SOB. Of note patient recently admitted at [facility] from 12/21 to 12/23/22  for Afib with RVR.  History obtained from patient and daughter who reports that last night patient walked from the bedroom to the bathroom and complained severe respiratory distress. Daughter states that patient has been progressively getting more weak and short of breath since last discharge. Patient has been complaint with meds and diet. Denies chest pain, dizziness, abdominal pain, urinary symptoms, fever chills or other symptoms.   ED course   VS: Afebrile, HR: 94->130->109, BP: 145/94 SpO2 98% 3 lt NC   Labs significant for WBC 11.7 K, Na 131, CO2 16, BUN/ creatine 27/1.4, glucose 249, AST//228 Trop 144.9, Pro BMP >77998 ABG 7.4/28/85/17  EKG shows Afib with RVR @ 105 bpm, minimal voltage criteria for LVH, non specify T wave   CT chest shows Moderate right and small to moderate left pleural effusion with compressive atelectasis. Nonspecific interlobar septal thickening and ground-glass opacitiesNonspecific pulmonary nodules. INonspecific mediastinal lymphadenopathy.Mildly heterogeneous thyroid gland  CT a/p shows central intrahepatic and common bile duct dilatation, reflecting postcholecystectomy state. 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 Pancreatic ductal dilatation. Nonspecific small density/calcification at the pancreatic head. Prominent wall of the stomach and duodenum, which may be due to partial distention and/or gastroduodenitis. Nonspecific bilateral perinephric stranding without hydronephrosis or obstructing radiopaque urinary tract stone. Prominent bladder wall. Nodular thickening of the left adrenal gland.  Given in the ED xanax 0.25 mg x1, digoxin 125 mcg x1, Lopressor 5 IVP x1, Furosemide 40 mg x1, cardio consulted    (26 Jan 2023 02:15)      ---  HOSPITAL COURSE:   Pt admitted for acute decompensated HF treated w/IV lasix, fluid restrictions. s/p 400cc R transudative thoracentesis, tolerated well.   Digoxin dose incr to 125mcg daily. Xanax 0.25mg started nightly. Troponins mildly elevated, trended to peak, likely 2/2 demand in setting of CHF exacerbation.  Pt found to have transaminitis which improved prior to DC. A1c 7.8.    PT recommended ____________.    Patient showed improvement throughout hospitalization. Patient was seen and examined on day of discharge. Patient was medically optimized for discharge _____ with close outpatient follow up.    ---  CONSULTANTS:   Pulm: Poncho  Surgery: Nat  Cardio: savella  Nephro: Anirudh  Gi:   ID: Truman  IR: Eva BAILEY  CM  PT  ---  TIME SPENT:  I, the attending physician, was physically present for the key portions of the evaluation and management (E/M) service provided. The total amount of time spent reviewing the hospital notes, laboratory values, imaging findings, assessing/counseling the patient, discussing with consultant physicians, social work, nursing staff was -- minutes    ---  Primary care provider was made aware of plan for discharge:      [  ] NO     [  ] YES   HPI:  87 years old female patient with PMHx of HTN, HLD, HFrEF (EF 20-25% on 12/22), T2DM, hypothyroidism, alzheimer's dementia, chronic Afib who presents today for SOB. Of note patient recently admitted at [facility] from 12/21 to 12/23/22  for Afib with RVR.  History obtained from patient and daughter who reports that last night patient walked from the bedroom to the bathroom and complained severe respiratory distress. Daughter states that patient has been progressively getting more weak and short of breath since last discharge. Patient has been complaint with meds and diet. Denies chest pain, dizziness, abdominal pain, urinary symptoms, fever chills or other symptoms.   ED course   VS: Afebrile, HR: 94->130->109, BP: 145/94 SpO2 98% 3 lt NC   Labs significant for WBC 11.7 K, Na 131, CO2 16, BUN/ creatine 27/1.4, glucose 249, AST//228 Trop 144.9, Pro BMP >87779 ABG 7.4/28/85/17  EKG shows Afib with RVR @ 105 bpm, minimal voltage criteria for LVH, non specify T wave   CT chest shows Moderate right and small to moderate left pleural effusion with compressive atelectasis. Nonspecific interlobar septal thickening and ground-glass opacitiesNonspecific pulmonary nodules. INonspecific mediastinal lymphadenopathy.Mildly heterogeneous thyroid gland  CT a/p shows central intrahepatic and common bile duct dilatation, reflecting postcholecystectomy state. 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 Pancreatic ductal dilatation. Nonspecific small density/calcification at the pancreatic head. Prominent wall of the stomach and duodenum, which may be due to partial distention and/or gastroduodenitis. Nonspecific bilateral perinephric stranding without hydronephrosis or obstructing radiopaque urinary tract stone. Prominent bladder wall. Nodular thickening of the left adrenal gland.  Given in the ED xanax 0.25 mg x1, digoxin 125 mcg x1, Lopressor 5 IVP x1, Furosemide 40 mg x1, cardio consulted    (26 Jan 2023 02:15)      ---  HOSPITAL COURSE:   Pt admitted for acute decompensated HF treated w/IV lasix, fluid restrictions. s/p 400cc R transudative thoracentesis, tolerated well. Troponins mildly elevated, trended to peak, likely 2/2 demand in setting of CHF exacerbation. Digoxin dose incr to 125mcg daily. CT A/P showed central intrahepatic and common bile duct dilatation, reflecting postcholecystectomy state. 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 Pancreatic ductal dilatation. Nonspecific small density/calcification at the pancreatic head. Prominent wall of the stomach and duodenum, which may be due to partial distention and/or gastroduodenitis. Nonspecific bilateral perinephric stranding without hydronephrosis or obstructing radiopaque urinary tract stone. Prominent bladder wall. Nodular thickening of the left adrenal gland. Prominent wall at the vaginal introitus, which was mentioned in the body of the original report. Recommend clinical correlation to assess infection/inflammation and direct visualization to exclude potential underlying lesion/neoplasm.  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 Pt and family would prefer to followup outpt w/pmd/gyn regarding Prominent wall at vaginal introitus on CT. Xanax 0.25mg started nightly.  UA negative. Pt found to have acute on chronic transaminitis which improved prior to DC. A1c 7.8.    PT recommended ____________.    Patient showed improvement throughout hospitalization. Patient was seen and examined on day of discharge. Patient was medically optimized for discharge _____ with close outpatient follow up.    ---  CONSULTANTS:   Pulm: Poncho  Surgery: Nat  Cardio: savella  Nephro: Anirudh  Gi: Leonardo  ID: Truman  IR: Eva  RD  CM  PT  ---  TIME SPENT:  I, the attending physician, was physically present for the key portions of the evaluation and management (E/M) service provided. The total amount of time spent reviewing the hospital notes, laboratory values, imaging findings, assessing/counseling the patient, discussing with consultant physicians, social work, nursing staff was -- minutes    ---  Primary care provider was made aware of plan for discharge:      [  ] NO     [  ] YES   HPI:  87 years old female patient with PMHx of HTN, HLD, HFrEF (EF 20-25% on 12/22), T2DM, hypothyroidism, alzheimer's dementia, chronic Afib who presents today for SOB. Of note patient [facility] to 12/23/22  for Afib with RVR.  History obtained from patient and daughter who reports that last night patient walked from the bedroom to the bathroom and complained severe respiratory distress. Daughter states that patient has been progressively getting more weak and short of breath since last discharge. Patient has been complaint with meds and diet. Denies chest pain, dizziness, abdominal pain, urinary symptoms, fever chills or other symptoms.   ED course   VS: Afebrile, HR: 94->130->109, BP: 145/94 SpO2 98% 3 lt NC   Labs significant for WBC 11.7 K, Na 131, CO2 16, BUN/ creatine 27/1.4, glucose 249, AST//228 Trop 144.9, Pro BMP >78834 ABG 7.4/28/85/17  EKG shows Afib with RVR @ 105 bpm, minimal voltage criteria for LVH, non specify T wave   CT chest shows Moderate right and small to moderate left pleural effusion with compressive atelectasis. Nonspecific interlobar septal thickening and ground-glass opacitiesNonspecific pulmonary nodules. INonspecific mediastinal lymphadenopathy.Mildly heterogeneous thyroid gland  CT a/p shows central intrahepatic and common bile duct dilatation, reflecting postcholecystectomy state. 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 Pancreatic ductal dilatation. Nonspecific small density/calcification at the pancreatic head. Prominent wall of the stomach and duodenum, which may be due to partial distention and/or gastroduodenitis. Nonspecific bilateral perinephric stranding without hydronephrosis or obstructing radiopaque urinary tract stone. Prominent bladder wall. Nodular thickening of the left adrenal gland.  Given in the ED xanax 0.25 mg x1, digoxin 125 mcg x1, Lopressor 5 IVP x1, Furosemide 40 mg x1, cardio consulted    (26 Jan 2023 02:15)      ---  HOSPITAL COURSE:   Pt admitted for acute decompensated HF treated w/IV lasix, fluid restrictions. s/p 400cc R transudative thoracentesis, tolerated well. Troponins mildly elevated, trended to peak, likely 2/2 demand in setting of CHF exacerbation. Digoxin dose incr to 125mcg daily. CT A/P showed central intrahepatic and common bile duct dilatation, reflecting postcholecystectomy state. 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 Pancreatic ductal dilatation. Nonspecific small density/calcification at the pancreatic head. Prominent wall of the stomach and duodenum, which may be due to partial distention and/or gastroduodenitis. Nonspecific bilateral perinephric stranding without hydronephrosis or obstructing radiopaque urinary tract stone. Prominent bladder wall. Nodular thickening of the left adrenal gland. Prominent wall at the vaginal introitus, which was mentioned in the body of the original report. Recommend clinical correlation to assess infection/inflammation and direct visualization to exclude potential underlying lesion/neoplasm.  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 Pt and family would prefer to followup outpt w/pmd/gyn regarding Prominent wall at vaginal introitus on CT. Xanax 0.25mg started nightly.  UA negative. Pt found to have acute on chronic transaminitis which improved prior to DC. Patient underwent thoracentesis on 1/27/23, 400cc fluid were removed. Pleural fluid culture results showed Pmn leukocytes, no organisms. Blood cultures from 1/26 showed ___(prelim NGTD__. Patient's creatinine was monitored. Cr increased from 1.2 to 1.5 and Lasix was held. BG increased to 401, patient was palced on moderate ISS.     ******* Updated as of 1/29/23    PT recommended ____________.    Patient showed improvement throughout hospitalization. Patient was seen and examined on day of discharge. Patient was medically optimized for discharge to _____ with close outpatient follow up.    ---  CONSULTANTS:   Pulm: Poncho  Surgery: Nat  Cardio: Savella  Nephro: Anirudh  Gi:   ID: Truman  IR: Eva BAILEY  CM  PT  ---  TIME SPENT:  I, the attending physician, was physically present for the key portions of the evaluation and management (E/M) service provided. The total amount of time spent reviewing the hospital notes, laboratory values, imaging findings, assessing/counseling the patient, discussing with consultant physicians, social work, nursing staff was -- minutes    ---  Primary care provider was made aware of plan for discharge:      [  ] NO     [  ] YES   HPI:  87 years old female patient with PMHx of HTN, HLD, HFrEF (EF 20-25% on 12/22), T2DM, hypothyroidism, alzheimer's dementia, chronic Afib who presents today for SOB. Of note patient recently [facility] from 12/21 to 12/23/22  for Afib with RVR.  History obtained from patient and daughter who reports that last night patient walked from the bedroom to the bathroom and complained severe respiratory distress. Daughter states that patient has been progressively getting more weak and short of breath since last discharge. Patient has been complaint with meds and diet. Denies chest pain, dizziness, abdominal pain, urinary symptoms, fever chills or other symptoms.   ED course   VS: Afebrile, HR: 94->130->109, BP: 145/94 SpO2 98% 3 lt NC   Labs significant for WBC 11.7 K, Na 131, CO2 16, BUN/ creatine 27/1.4, glucose 249, AST//228 Trop 144.9, Pro BMP >61383 ABG 7.4/28/85/17  EKG shows Afib with RVR @ 105 bpm, minimal voltage criteria for LVH, non specify T wave   CT chest shows Moderate right and small to moderate left pleural effusion with compressive atelectasis. Nonspecific interlobar septal thickening and ground-glass opacitiesNonspecific pulmonary nodules. INonspecific mediastinal lymphadenopathy.Mildly heterogeneous thyroid gland  CT a/p shows central intrahepatic and common bile duct dilatation, reflecting postcholecystectomy state. 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 Pancreatic ductal dilatation. Nonspecific small density/calcification at the pancreatic head. Prominent wall of the stomach and duodenum, which may be due to partial distention and/or gastroduodenitis. Nonspecific bilateral perinephric stranding without hydronephrosis or obstructing radiopaque urinary tract stone. Prominent bladder wall. Nodular thickening of the left adrenal gland.  Given in the ED xanax 0.25 mg x1, digoxin 125 mcg x1, Lopressor 5 IVP x1, Furosemide 40 mg x1, cardio consulted    (26 Jan 2023 02:15)      ---  HOSPITAL COURSE:   Pt admitted for acute decompensated HF treated w/IV lasix, fluid restrictions. s/p 400cc R transudative thoracentesis, tolerated well. Troponins mildly elevated, trended to peak, likely 2/2 demand in setting of CHF exacerbation. Digoxin dose incr to 125mcg daily. CT A/P showed central intrahepatic and common bile duct dilatation, reflecting postcholecystectomy state. 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 Pancreatic ductal dilatation. Nonspecific small density/calcification at the pancreatic head. Prominent wall of the stomach and duodenum, which may be due to partial distention and/or gastroduodenitis. Nonspecific bilateral perinephric stranding without hydronephrosis or obstructing radiopaque urinary tract stone. Prominent bladder wall. Nodular thickening of the left adrenal gland. Prominent wall at the vaginal introitus, which was mentioned in the body of the original report. Recommend clinical correlation to assess infection/inflammation and direct visualization to exclude potential underlying lesion/neoplasm.  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 Pt and family would prefer to followup outpt w/pmd/gyn regarding Prominent wall at vaginal introitus on CT. Xanax 0.25mg started nightly and then switched to melatonin for insomnia.  UA negative. Pt found to have acute on chronic transaminitis which improved prior to DC. Patient underwent thoracentesis on 1/27/23, 400cc fluid were removed. Pleural fluid culture results showed Pmn leukocytes, no organisms. Blood cultures from 1/26 showed no growth. Patient's creatinine was monitored. Cr increased from 1.2 to 1.5 and Lasix was held for 4 dyas during her stay. She will be discharged on Lasix 20 po qd and will follow up with Dr. Oleary outpatient to titrate as needed. BG increased to 401, patient was palced on moderate ISS.         PT recommended home PT.    Patient showed improvement throughout hospitalization. Patient was seen and examined on day of discharge. Patient was medically optimized for discharge to home with close outpatient follow up.    ---  CONSULTANTS:   Pulm: Poncho  Surgery: Nat  Cardio: Savella  Nephro: Anirudh  Gi:   ID: Truman  IR: Eva  RD  CM  PT  ---  TIME SPENT:  I, the attending physician, was physically present for the key portions of the evaluation and management (E/M) service provided. The total amount of time spent reviewing the hospital notes, laboratory values, imaging findings, assessing/counseling the patient, discussing with consultant physicians, social work, nursing staff was -- minutes    ---  Primary care provider was made aware of plan for discharge:      [  ] NO     [  ] YES   HPI:  87 years old female patient with PMHx of HTN, HLD, HFrEF (EF 20-25% on 12/22), T2DM, hypothyroidism, alzheimer's dementia, chronic Afib who presents today for SOB. Of note patient recently admitted at [facility] from 12/21 to 12/23/22  for Afib with RVR.  History obtained from patient and daughter who reports that last night patient walked from the bedroom to the bathroom and complained severe respiratory distress. Daughter states that patient has been progressively getting more weak and short of breath since last discharge. Patient has been complaint with meds and diet. Denies chest pain, dizziness, abdominal pain, urinary symptoms, fever chills or other symptoms.   ED course   VS: Afebrile, HR: 94->130->109, BP: 145/94 SpO2 98% 3 lt NC   Labs significant for WBC 11.7 K, Na 131, CO2 16, BUN/ creatine 27/1.4, glucose 249, AST//228 Trop 144.9, Pro BMP >85976 ABG 7.4/28/85/17  EKG shows Afib with RVR @ 105 bpm, minimal voltage criteria for LVH, non specify T wave   CT chest shows Moderate right and small to moderate left pleural effusion with compressive atelectasis. Nonspecific interlobar septal thickening and ground-glass opacitiesNonspecific pulmonary nodules. INonspecific mediastinal lymphadenopathy.Mildly heterogeneous thyroid gland  CT a/p shows central intrahepatic and common bile duct dilatation, reflecting postcholecystectomy state. 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 Pancreatic ductal dilatation. Nonspecific small density/calcification at the pancreatic head. Prominent wall of the stomach and duodenum, which may be due to partial distention and/or gastroduodenitis. Nonspecific bilateral perinephric stranding without hydronephrosis or obstructing radiopaque urinary tract stone. Prominent bladder wall. Nodular thickening of the left adrenal gland.  Given in the ED xanax 0.25 mg x1, digoxin 125 mcg x1, Lopressor 5 IVP x1, Furosemide 40 mg x1, cardio consulted    (26 Jan 2023 02:15)      ---  HOSPITAL COURSE:   Pt admitted for acute decompensated HF treated w/IV lasix, fluid restrictions. s/p 400cc R transudative thoracentesis, tolerated well. Troponins mildly elevated, trended to peak, likely 2/2 demand in setting of CHF exacerbation. Digoxin dose incr to 125mcg daily. CT A/P showed central intrahepatic and common bile duct dilatation, reflecting postcholecystectomy state. 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 Pancreatic ductal dilatation. Nonspecific small density/calcification at the pancreatic head. Prominent wall of the stomach and duodenum, which may be due to partial distention and/or gastroduodenitis. Nonspecific bilateral perinephric stranding without hydronephrosis or obstructing radiopaque urinary tract stone. Prominent bladder wall. Nodular thickening of the left adrenal gland. Prominent wall at the vaginal introitus, which was mentioned in the body of the original report. Recommend clinical correlation to assess infection/inflammation and direct visualization to exclude potential underlying lesion/neoplasm.  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 Pt and family would prefer to followup outpt w/pmd/gyn regarding Prominent wall at vaginal introitus on CT. Xanax 0.25mg started nightly and then switched to melatonin for insomnia.  UA negative. Pt found to have acute on chronic transaminitis which improved prior to DC. Patient underwent thoracentesis on 1/27/23, 400cc fluid were removed. Pleural fluid culture results showed Pmn leukocytes, no organisms. Blood cultures from 1/26 showed no growth. Patient's creatinine was monitored. Cr increased from 1.2 to 1.5 and Lasix was held for 4 dyas during her stay. She will be discharged on Lasix 20 po qd and will follow up with Dr. Oleary outpatient to titrate as needed. Her home digoxin was increased to every day instead of every other day at the same dosage. BG increased to 401, patient was palced on moderate ISS.         PT recommended home PT.    Patient showed improvement throughout hospitalization. Patient was seen and examined on day of discharge. Patient was medically optimized for discharge to home with close outpatient follow up.    ---  CONSULTANTS:   Pulm: Poncho  Surgery: Nat  Cardio: Savella  Nephro: Anirudh  Gi:   ID: Truman  IR: Eva  RD  CM  PT  ---  TIME SPENT:  I, the attending physician, was physically present for the key portions of the evaluation and management (E/M) service provided. The total amount of time spent reviewing the hospital notes, laboratory values, imaging findings, assessing/counseling the patient, discussing with consultant physicians, social work, nursing staff was -- minutes    ---  Primary care provider was made aware of plan for discharge:      [  ] NO     [  ] YES   HPI:  87 years old female patient with PMHx of HTN, HLD, HFrEF (EF 20-25% on 12/22), T2DM, hypothyroidism, alzheimer's dementia, chronic Afib who presents today for SOB. Of note patient recently admitted at [facility] from 12/21 to 12/23/22  for Afib with RVR.  History obtained from patient and daughter who reports that last night patient walked from the bedroom to the bathroom and complained severe respiratory distress. Daughter states that patient has been progressively getting more weak and short of breath since last discharge. Patient has been complaint with meds and diet. Denies chest pain, dizziness, abdominal pain, urinary symptoms, fever chills or other symptoms.   ED course   VS: Afebrile, HR: 94->130->109, BP: 145/94 SpO2 98% 3 lt NC   Labs significant for WBC 11.7 K, Na 131, CO2 16, BUN/ creatine 27/1.4, glucose 249, AST//228 Trop 144.9, Pro BMP >90377 ABG 7.4/28/85/17  EKG shows Afib with RVR @ 105 bpm, minimal voltage criteria for LVH, non specify T wave   CT chest shows Moderate right and small to moderate left pleural effusion with compressive atelectasis. Nonspecific interlobar septal thickening and ground-glass opacitiesNonspecific pulmonary nodules. INonspecific mediastinal lymphadenopathy.Mildly heterogeneous thyroid gland  CT a/p shows central intrahepatic and common bile duct dilatation, reflecting postcholecystectomy state. 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 Pancreatic ductal dilatation. Nonspecific small density/calcification at the pancreatic head. Prominent wall of the stomach and duodenum, which may be due to partial distention and/or gastroduodenitis. Nonspecific bilateral perinephric stranding without hydronephrosis or obstructing radiopaque urinary tract stone. Prominent bladder wall. Nodular thickening of the left adrenal gland.  Given in the ED xanax 0.25 mg x1, digoxin 125 mcg x1, Lopressor 5 IVP x1, Furosemide 40 mg x1, cardio consulted    (26 Jan 2023 02:15)      ---  HOSPITAL COURSE:   Pt admitted for acute decompensated HF treated w/IV lasix, fluid restrictions. s/p 400cc R transudative thoracentesis, tolerated well. Troponins mildly elevated, trended to peak, likely 2/2 demand in setting of CHF exacerbation. Digoxin dose incr to 125mcg daily. CT A/P showed central intrahepatic and common bile duct dilatation, reflecting postcholecystectomy state. 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 Pancreatic ductal dilatation. Nonspecific small density/calcification at the pancreatic head. Prominent wall of the stomach and duodenum, which may be due to partial distention and/or gastroduodenitis. Nonspecific bilateral perinephric stranding without hydronephrosis or obstructing radiopaque urinary tract stone. Prominent bladder wall. Nodular thickening of the left adrenal gland. Prominent wall at the vaginal introitus, which was mentioned in the body of the original report. Recommend clinical correlation to assess infection/inflammation and direct visualization to exclude potential underlying lesion/neoplasm.  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 Pt and family would prefer to followup outpt w/pmd/gyn regarding Prominent wall at vaginal introitus on CT. Xanax 0.25mg started nightly and then switched to melatonin for insomnia.  UA negative. Pt found to have acute on chronic transaminitis which improved prior to DC. Patient underwent thoracentesis on 1/27/23, 400cc fluid were removed. Pleural fluid culture results showed Pmn leukocytes, no organisms. Blood cultures from 1/26 showed no growth. Patient's creatinine was monitored. Cr increased from 1.2 to 1.5 and Lasix was held for 4 days during her stay. She will be discharged on Lasix 20 po qd and will follow up with Dr. Oleary outpatient to titrate as needed. Her home digoxin was increased to every day instead of every other day at the same dosage. BG increased to 401, patient was palced on moderate ISS. Will discharge on Januvia 25mg daily. Advised to follow up with PMD for monitoring.     PT recommended home PT.    Patient showed improvement throughout hospitalization. Patient was seen and examined on day of discharge. Patient was medically optimized for discharge to home with close outpatient follow up.    ---  CONSULTANTS:   Pulm: Poncho  Surgery: Nat  Cardio: Savella  Nephro: Anirudh  Gi:   ID: Truman  IR: Eva  RD  CM  PT  ---  TIME SPENT:  I, the attending physician, was physically present for the key portions of the evaluation and management (E/M) service provided. The total amount of time spent reviewing the hospital notes, laboratory values, imaging findings, assessing/counseling the patient, discussing with consultant physicians, social work, nursing staff was 50 minutes

## 2023-01-28 NOTE — PROGRESS NOTE ADULT - PROBLEM SELECTOR PLAN 6
Acute on chronic, prer daughter liver enzymes have been slightly elevated on previous visits to her pmd however have never been as high as on admission   - AST//228 --> AST//176, continue to decrease  - Trend hepatic function   - Avoid hepatotoxic medications  - Consider discontinuing statin if liver enzymes continue to uptrend  - GI Dr. Leonardo following, recs appreciated Acute on chronic, per daughter liver enzymes have been slightly elevated on previous visits to her pmd however have never been as high as on admission   - AST/ALT downtrending  - Trend hepatic function   - Avoid hepatotoxic medications  - Consider discontinuing statin if liver enzymes uptrend  - GI Dr. Sheikh jc, recs appreciated

## 2023-01-28 NOTE — DISCHARGE NOTE PROVIDER - ATTENDING DISCHARGE PHYSICAL EXAMINATION:
Orders placed for labs to be done prior to appointment tomorrow   GENERAL: NAD, pleasant  HEENT:  anicteric, moist mucous membranes  CHEST/LUNG: Normal breath sounds. No rhonchi noted on exam.   HEART:  irregular   ABDOMEN:  BS+, soft, nontender, nondistended  EXTREMITIES: no cyanosis, or calf tenderness. Trace b/l LE nonpitting edema L>6  NERVOUS SYSTEM: answers questions and follows commands appropriately

## 2023-01-28 NOTE — PROGRESS NOTE ADULT - SUBJECTIVE AND OBJECTIVE BOX
Patient is a 87y old  Female who presents with a chief complaint of ADHF (2023 06:44)      TELE:     INTERVAL HPI/OVERNIGHT EVENTS: No acute overnight events. Pt seen and examined at the bedside this AM.     MEDICATIONS  (STANDING):  ALPRAZolam 0.25 milliGRAM(s) Oral <User Schedule>  apixaban 2.5 milliGRAM(s) Oral every 12 hours  atorvastatin 40 milliGRAM(s) Oral at bedtime  cholecalciferol 2000 Unit(s) Oral daily  dextrose 5%. 1000 milliLiter(s) (100 mL/Hr) IV Continuous <Continuous>  dextrose 5%. 1000 milliLiter(s) (50 mL/Hr) IV Continuous <Continuous>  dextrose 50% Injectable 25 Gram(s) IV Push once  dextrose 50% Injectable 12.5 Gram(s) IV Push once  dextrose 50% Injectable 25 Gram(s) IV Push once  digoxin     Tablet 125 MICROGram(s) Oral every other day  furosemide   Injectable 40 milliGRAM(s) IV Push two times a day  glucagon  Injectable 1 milliGRAM(s) IntraMuscular once  insulin lispro (ADMELOG) corrective regimen sliding scale   SubCutaneous three times a day before meals  insulin lispro (ADMELOG) corrective regimen sliding scale   SubCutaneous at bedtime  levothyroxine 75 MICROGram(s) Oral daily  memantine 10 milliGRAM(s) Oral two times a day  metoprolol succinate  milliGRAM(s) Oral two times a day    MEDICATIONS  (PRN):  aluminum hydroxide/magnesium hydroxide/simethicone Suspension 30 milliLiter(s) Oral every 4 hours PRN Dyspepsia  dextrose Oral Gel 15 Gram(s) Oral once PRN Blood Glucose LESS THAN 70 milliGRAM(s)/deciliter  melatonin 3 milliGRAM(s) Oral at bedtime PRN Insomnia  ondansetron Injectable 4 milliGRAM(s) IV Push every 8 hours PRN Nausea and/or Vomiting      Allergies    No Known Allergies    Intolerances        REVIEW OF SYSTEMS:  CONSTITUTIONAL: No fever or chills  HEENT:  No headache, no sore throat  RESPIRATORY: No cough, wheezing, or shortness of breath  CARDIOVASCULAR: No chest pain, palpitations  GASTROINTESTINAL: No abd pain, nausea, vomiting, or diarrhea  GENITOURINARY: No dysuria, frequency, or hematuria  NEUROLOGICAL: no focal weakness or dizziness  MUSCULOSKELETAL: no myalgias     Vital Signs Last 24 Hrs  T(C): 36.4 (2023 04:19), Max: 36.8 (2023 17:18)  T(F): 97.5 (2023 04:19), Max: 98.2 (2023 17:18)  HR: 70 (2023 08:45) (70 - 91)  BP: 110/70 (2023 08:45) (107/66 - 140/89)  BP(mean): --  RR: 16 (2023 08:45) (16 - 20)  SpO2: 94% (2023 08:45) (91% - 98%)    Parameters below as of 2023 08:45  Patient On (Oxygen Delivery Method): room air        PHYSICAL EXAM:  GENERAL: NAD  HEENT:  anicteric, moist mucous membranes  CHEST/LUNG:  CTA b/l, no rales, wheezes, or rhonchi  HEART:  RRR, S1, S2  ABDOMEN:  BS+, soft, nontender, nondistended  EXTREMITIES: no edema, cyanosis, or calf tenderness  NERVOUS SYSTEM: answers questions and follows commands appropriately    LABS:                        12.5   10.57 )-----------( 287      ( 2023 08:30 )             38.8     CBC Full  -  ( 2023 08:30 )  WBC Count : 10.57 K/uL  Hemoglobin : 12.5 g/dL  Hematocrit : 38.8 %  Platelet Count - Automated : 287 K/uL  Mean Cell Volume : 85.5 fl  Mean Cell Hemoglobin : 27.5 pg  Mean Cell Hemoglobin Concentration : 32.2 gm/dL  Auto Neutrophil # : 7.01 K/uL  Auto Lymphocyte # : 1.86 K/uL  Auto Monocyte # : 1.32 K/uL  Auto Eosinophil # : 0.22 K/uL  Auto Basophil # : 0.09 K/uL  Auto Neutrophil % : 66.2 %  Auto Lymphocyte % : 17.6 %  Auto Monocyte % : 12.5 %  Auto Eosinophil % : 2.1 %  Auto Basophil % : 0.9 %    2023 08:30    132    |  96     |  41     ----------------------------<  217    4.4     |  29     |  1.50     Ca    9.3        2023 08:30    TPro  7.3    /  Alb  2.7    /  TBili  0.9    /  DBili  x      /  AST  68     /  ALT  117    /  AlkPhos  90     2023 08:30    PT/INR - ( 2023 09:22 )   PT: 14.3 sec;   INR: 1.22 ratio         PTT - ( 2023 09:22 )  PTT:27.2 sec  Urinalysis Basic - ( 2023 14:40 )    Color: Pale Yellow / Appearance: Clear / S.010 / pH: x  Gluc: x / Ketone: Negative  / Bili: Negative / Urobili: Negative   Blood: x / Protein: 30 mg/dL / Nitrite: Negative   Leuk Esterase: Negative / RBC: Negative /HPF / WBC Negative   Sq Epi: x / Non Sq Epi: Occasional / Bacteria: Negative      CAPILLARY BLOOD GLUCOSE      POCT Blood Glucose.: 212 mg/dL (2023 08:16)  POCT Blood Glucose.: 215 mg/dL (2023 21:49)  POCT Blood Glucose.: 200 mg/dL (2023 16:46)  POCT Blood Glucose.: 262 mg/dL (2023 12:37)        Culture - Body Fluid with Gram Stain (collected 23 @ 11:54)  Source: Pleural Fl Pleural Fluid  Gram Stain (23 @ 03:08):    polymorphonuclear leukocytes seen    No organisms seen    by cytocentrifuge    Culture - Blood (collected 23 @ 01:15)  Source: .Blood Blood-Venous  Preliminary Report (23 @ 09:01):    No growth to date.    Culture - Blood (collected 23 @ 01:15)  Source: .Blood Blood-Venous  Preliminary Report (23 @ 09:01):    No growth to date.        RADIOLOGY & ADDITIONAL TESTS:  Personally reviewed.     Consultant(s) Notes Reviewed:  [x] YES  [ ] NO Patient is a 87y old  Female who presents with a chief complaint of ADHF (2023 06:44)    INTERVAL HPI/OVERNIGHT EVENTS: No acute overnight events. VSS. Pt seen and examined at the bedside this AM.     MEDICATIONS  (STANDING):  ALPRAZolam 0.25 milliGRAM(s) Oral <User Schedule>  apixaban 2.5 milliGRAM(s) Oral every 12 hours  atorvastatin 40 milliGRAM(s) Oral at bedtime  cholecalciferol 2000 Unit(s) Oral daily  dextrose 5%. 1000 milliLiter(s) (100 mL/Hr) IV Continuous <Continuous>  dextrose 5%. 1000 milliLiter(s) (50 mL/Hr) IV Continuous <Continuous>  dextrose 50% Injectable 25 Gram(s) IV Push once  dextrose 50% Injectable 12.5 Gram(s) IV Push once  dextrose 50% Injectable 25 Gram(s) IV Push once  digoxin     Tablet 125 MICROGram(s) Oral every other day  furosemide   Injectable 40 milliGRAM(s) IV Push two times a day  glucagon  Injectable 1 milliGRAM(s) IntraMuscular once  insulin lispro (ADMELOG) corrective regimen sliding scale   SubCutaneous three times a day before meals  insulin lispro (ADMELOG) corrective regimen sliding scale   SubCutaneous at bedtime  levothyroxine 75 MICROGram(s) Oral daily  memantine 10 milliGRAM(s) Oral two times a day  metoprolol succinate  milliGRAM(s) Oral two times a day    MEDICATIONS  (PRN):  aluminum hydroxide/magnesium hydroxide/simethicone Suspension 30 milliLiter(s) Oral every 4 hours PRN Dyspepsia  dextrose Oral Gel 15 Gram(s) Oral once PRN Blood Glucose LESS THAN 70 milliGRAM(s)/deciliter  melatonin 3 milliGRAM(s) Oral at bedtime PRN Insomnia  ondansetron Injectable 4 milliGRAM(s) IV Push every 8 hours PRN Nausea and/or Vomiting      Allergies    No Known Allergies    Intolerances        REVIEW OF SYSTEMS:  CONSTITUTIONAL: No fever or chills  HEENT:  No headache, no sore throat  RESPIRATORY: No cough, wheezing, or shortness of breath  CARDIOVASCULAR: No chest pain, palpitations  GASTROINTESTINAL: No abd pain, nausea, vomiting, or diarrhea  GENITOURINARY: No dysuria, frequency, or hematuria  NEUROLOGICAL: no focal weakness or dizziness  MUSCULOSKELETAL: no myalgias     Vital Signs Last 24 Hrs  T(C): 36.4 (2023 04:19), Max: 36.8 (2023 17:18)  T(F): 97.5 (2023 04:19), Max: 98.2 (2023 17:18)  HR: 70 (2023 08:45) (70 - 91)  BP: 110/70 (2023 08:45) (107/66 - 140/89)  BP(mean): --  RR: 16 (2023 08:45) (16 - 20)  SpO2: 94% (2023 08:45) (91% - 98%)    Parameters below as of 2023 08:45  Patient On (Oxygen Delivery Method): room air        PHYSICAL EXAM:  GENERAL: NAD  HEENT:  anicteric, moist mucous membranes  CHEST/LUNG:  CTA b/l, no rales, wheezes, or rhonchi  HEART:  RRR, S1, S2  ABDOMEN:  BS+, soft, nontender, nondistended  EXTREMITIES: no edema, cyanosis, or calf tenderness  NERVOUS SYSTEM: answers questions and follows commands appropriately    LABS:                        12.5   10.57 )-----------( 287      ( 2023 08:30 )             38.8     CBC Full  -  ( 2023 08:30 )  WBC Count : 10.57 K/uL  Hemoglobin : 12.5 g/dL  Hematocrit : 38.8 %  Platelet Count - Automated : 287 K/uL  Mean Cell Volume : 85.5 fl  Mean Cell Hemoglobin : 27.5 pg  Mean Cell Hemoglobin Concentration : 32.2 gm/dL  Auto Neutrophil # : 7.01 K/uL  Auto Lymphocyte # : 1.86 K/uL  Auto Monocyte # : 1.32 K/uL  Auto Eosinophil # : 0.22 K/uL  Auto Basophil # : 0.09 K/uL  Auto Neutrophil % : 66.2 %  Auto Lymphocyte % : 17.6 %  Auto Monocyte % : 12.5 %  Auto Eosinophil % : 2.1 %  Auto Basophil % : 0.9 %    2023 08:30    132    |  96     |  41     ----------------------------<  217    4.4     |  29     |  1.50     Ca    9.3        2023 08:30    TPro  7.3    /  Alb  2.7    /  TBili  0.9    /  DBili  x      /  AST  68     /  ALT  117    /  AlkPhos  90     2023 08:30    PT/INR - ( 2023 09:22 )   PT: 14.3 sec;   INR: 1.22 ratio         PTT - ( 2023 09:22 )  PTT:27.2 sec  Urinalysis Basic - ( 2023 14:40 )    Color: Pale Yellow / Appearance: Clear / S.010 / pH: x  Gluc: x / Ketone: Negative  / Bili: Negative / Urobili: Negative   Blood: x / Protein: 30 mg/dL / Nitrite: Negative   Leuk Esterase: Negative / RBC: Negative /HPF / WBC Negative   Sq Epi: x / Non Sq Epi: Occasional / Bacteria: Negative      CAPILLARY BLOOD GLUCOSE      POCT Blood Glucose.: 212 mg/dL (2023 08:16)  POCT Blood Glucose.: 215 mg/dL (2023 21:49)  POCT Blood Glucose.: 200 mg/dL (2023 16:46)  POCT Blood Glucose.: 262 mg/dL (2023 12:37)        Culture - Body Fluid with Gram Stain (collected 23 @ 11:54)  Source: Pleural Fl Pleural Fluid  Gram Stain (23 @ 03:08):    polymorphonuclear leukocytes seen    No organisms seen    by cytocentrifuge    Culture - Blood (collected 23 @ 01:15)  Source: .Blood Blood-Venous  Preliminary Report (23 @ 09:01):    No growth to date.    Culture - Blood (collected 23 @ 01:15)  Source: .Blood Blood-Venous  Preliminary Report (23 @ 09:01):    No growth to date.        RADIOLOGY & ADDITIONAL TESTS:  Personally reviewed.     Consultant(s) Notes Reviewed:  [x] YES  [ ] NO Patient is a 87y old  Female who presents with a chief complaint of ADHF (2023 06:44)    INTERVAL HPI/OVERNIGHT EVENTS: Pt missed lasix and metoprolol at 6am 2/2 hold parameters. BP ok given fluid removal via diuresis and thoracentesis. VSS. Pt seen and examined at the bedside this AM. Pt AAOX2 to self and place but not time. Pt denies dizziness lightheadedness. Pt states she feels "much better" s/p R thoracentesis yesterday. Now saturating well on RA. States that she's finishing her meal trays, tolerating well w/o N/V and loves the food here.    MEDICATIONS  (STANDING):  ALPRAZolam 0.25 milliGRAM(s) Oral <User Schedule>  apixaban 2.5 milliGRAM(s) Oral every 12 hours  atorvastatin 40 milliGRAM(s) Oral at bedtime  cholecalciferol 2000 Unit(s) Oral daily  dextrose 5%. 1000 milliLiter(s) (100 mL/Hr) IV Continuous <Continuous>  dextrose 5%. 1000 milliLiter(s) (50 mL/Hr) IV Continuous <Continuous>  dextrose 50% Injectable 25 Gram(s) IV Push once  dextrose 50% Injectable 12.5 Gram(s) IV Push once  dextrose 50% Injectable 25 Gram(s) IV Push once  digoxin     Tablet 125 MICROGram(s) Oral every other day  furosemide   Injectable 40 milliGRAM(s) IV Push two times a day  glucagon  Injectable 1 milliGRAM(s) IntraMuscular once  insulin lispro (ADMELOG) corrective regimen sliding scale   SubCutaneous three times a day before meals  insulin lispro (ADMELOG) corrective regimen sliding scale   SubCutaneous at bedtime  levothyroxine 75 MICROGram(s) Oral daily  memantine 10 milliGRAM(s) Oral two times a day  metoprolol succinate  milliGRAM(s) Oral two times a day    MEDICATIONS  (PRN):  aluminum hydroxide/magnesium hydroxide/simethicone Suspension 30 milliLiter(s) Oral every 4 hours PRN Dyspepsia  dextrose Oral Gel 15 Gram(s) Oral once PRN Blood Glucose LESS THAN 70 milliGRAM(s)/deciliter  melatonin 3 milliGRAM(s) Oral at bedtime PRN Insomnia  ondansetron Injectable 4 milliGRAM(s) IV Push every 8 hours PRN Nausea and/or Vomiting      Allergies    No Known Allergies    Intolerances        REVIEW OF SYSTEMS:  CONSTITUTIONAL: No fever or chills  HEENT:  No headache, no sore throat  RESPIRATORY: No cough, wheezing, or shortness of breath  CARDIOVASCULAR: No chest pain, palpitations  GASTROINTESTINAL: No abd pain, nausea, vomiting, or diarrhea  GENITOURINARY: No dysuria, frequency, or hematuria  NEUROLOGICAL: no focal weakness or dizziness  MUSCULOSKELETAL: no myalgias     Vital Signs Last 24 Hrs  T(C): 36.4 (2023 04:19), Max: 36.8 (2023 17:18)  T(F): 97.5 (2023 04:19), Max: 98.2 (2023 17:18)  HR: 70 (2023 08:45) (70 - 91)  BP: 110/70 (2023 08:45) (107/66 - 140/89)  RR: 16 (2023 08:45) (16 - 20)  SpO2: 94% (2023 08:45) (91% - 98%)    Parameters below as of 2023 08:45  Patient On (Oxygen Delivery Method): room air        PHYSICAL EXAM:  GENERAL: NAD, sitting up in bed comfortably with breakfast tray blueberry pancakes at bedside  HEENT:  anicteric, moist mucous membranes  CHEST/LUNG: no respiratory distress, speaking in full sentences on RA w/o difficulty, decreased breath sounds b/l bases, no rales, wheezes, or rhonchi  HEART:  IRRR, S1, S2  ABDOMEN:  BS+, soft, nontender, nondistended  EXTREMITIES: no edema, cyanosis, or calf tenderness  NERVOUS SYSTEM: answers questions and follows commands appropriately, AAOX2 to self and place but not time, moving all 4 extremities spontaneously    LABS:                        12.5   10.57 )-----------( 287      ( 2023 08:30 )             38.8     CBC Full  -  ( 2023 08:30 )  WBC Count : 10.57 K/uL  Hemoglobin : 12.5 g/dL  Hematocrit : 38.8 %  Platelet Count - Automated : 287 K/uL  Mean Cell Volume : 85.5 fl  Mean Cell Hemoglobin : 27.5 pg  Mean Cell Hemoglobin Concentration : 32.2 gm/dL  Auto Neutrophil # : 7.01 K/uL  Auto Lymphocyte # : 1.86 K/uL  Auto Monocyte # : 1.32 K/uL  Auto Eosinophil # : 0.22 K/uL  Auto Basophil # : 0.09 K/uL  Auto Neutrophil % : 66.2 %  Auto Lymphocyte % : 17.6 %  Auto Monocyte % : 12.5 %  Auto Eosinophil % : 2.1 %  Auto Basophil % : 0.9 %    2023 08:30    132    |  96     |  41     ----------------------------<  217    4.4     |  29     |  1.50     Ca    9.3        2023 08:30    TPro  7.3    /  Alb  2.7    /  TBili  0.9    /  DBili  x      /  AST  68     /  ALT  117    /  AlkPhos  90     2023 08:30    PT/INR - ( 2023 09:22 )   PT: 14.3 sec;   INR: 1.22 ratio         PTT - ( 2023 09:22 )  PTT:27.2 sec  Urinalysis Basic - ( 2023 14:40 )    Color: Pale Yellow / Appearance: Clear / S.010 / pH: x  Gluc: x / Ketone: Negative  / Bili: Negative / Urobili: Negative   Blood: x / Protein: 30 mg/dL / Nitrite: Negative   Leuk Esterase: Negative / RBC: Negative /HPF / WBC Negative   Sq Epi: x / Non Sq Epi: Occasional / Bacteria: Negative      CAPILLARY BLOOD GLUCOSE      POCT Blood Glucose.: 212 mg/dL (2023 08:16)  POCT Blood Glucose.: 215 mg/dL (2023 21:49)  POCT Blood Glucose.: 200 mg/dL (2023 16:46)  POCT Blood Glucose.: 262 mg/dL (2023 12:37)        Culture - Body Fluid with Gram Stain (collected 23 @ 11:54)  Source: Pleural Fl Pleural Fluid  Gram Stain (23 @ 03:08):    polymorphonuclear leukocytes seen    No organisms seen    by cytocentrifuge    Culture - Blood (collected 23 @ 01:15)  Source: .Blood Blood-Venous  Preliminary Report (23 @ 09:01):    No growth to date.    Culture - Blood (collected 23 @ 01:15)  Source: .Blood Blood-Venous  Preliminary Report (23 @ 09:01):    No growth to date.        RADIOLOGY & ADDITIONAL TESTS:  Personally reviewed.     Consultant(s) Notes Reviewed:  [x] YES  [ ] NO

## 2023-01-28 NOTE — PROGRESS NOTE ADULT - PROBLEM SELECTOR PLAN 10
DVT ppx: Eliquis 2.5 mg BID - to restart 1/28 evening DVT ppx: Eliquis 2.5 mg BID Patient oriented in person, time and place   - Continue home memantine   - Family need to bring from home rivastigmine patch   - Fall and aspiration precautions

## 2023-01-28 NOTE — PROGRESS NOTE ADULT - PROBLEM SELECTOR PLAN 3
Probably demand ischemia in the setting of ADHF.   - EKG shows Afib with RVR @ 105 bpm, minimal voltage criteria for LVH, non specify T wave   - Troponins trended to peak  - Cardio consulted recs appreciated RESOLVED  Probably demand ischemia in the setting of ADHF.   - EKG shows Afib with RVR @ 105 bpm, minimal voltage criteria for LVH, non specify T wave   - Troponins trended to peak  - Pt w/o acute ACS symptoms  - Cardio following, recs appreciated

## 2023-01-28 NOTE — PROGRESS NOTE ADULT - PROBLEM SELECTOR PLAN 9
Patient oriented in person, time and place   - Continue home memantine   - Family need to bring from home rivastigmine patch   - Fall and aspiration precautions Chronic   - Continue home synthroid  - TSH WNL

## 2023-01-28 NOTE — DISCHARGE NOTE PROVIDER - PROVIDER TOKENS
PROVIDER:[TOKEN:[[id number]],FOLLOWUP:[1 week]] PROVIDER:[TOKEN:[[id number]],FOLLOWUP:[1 week]],PROVIDER:[TOKEN:[[id number]],FOLLOWUP:[2 months]],PROVIDER:[TOKEN:[[id number]],FOLLOWUP:[1 week]] PROVIDER:[TOKEN:[[id number]],FOLLOWUP:[1 week]],PROVIDER:[TOKEN:[[id number]],FOLLOWUP:[1 month]],PROVIDER:[TOKEN:[[id number]],FOLLOWUP:[2 weeks],ESTABLISHEDPATIENT:[T]]

## 2023-01-28 NOTE — PROGRESS NOTE ADULT - ASSESSMENT
87 years old female patient with PMHx of HTN, HLD, HFrEF (EF 20-25% on 12/22), T2DM, hypothyroidism, alzheimer's dementia, chronic Afib who presents today for sever SOB. Patient admitted for ADHF. 87 years old female patient with PMHx of HTN, HLD, HFrEF (EF 20-25% on 12/22), T2DM, hypothyroidism, alzheimer's dementia, chronic Afib admitted for ADHF. s/p 400 cc Transudative thoracentesis 87 years old female patient with PMHx of HTN, HLD, HFrEF (EF 20-25% on 12/22), T2DM, hypothyroidism, alzheimer's dementia, chronic Afib admitted for ADHF. s/p R 400 cc Transudative thoracentesis 2/2 CHF

## 2023-01-28 NOTE — PROGRESS NOTE ADULT - PROBLEM SELECTOR PLAN 11
DVT ppx: Eliquis 2.5 mg BID      Updated daughter Machelle via telephone regarding updated care plan, including tolerating thora yesterday (transudative results) holding lasix, and resuming eliquis, and answered all questions.

## 2023-01-28 NOTE — DISCHARGE NOTE PROVIDER - NSDCMRMEDTOKEN_GEN_ALL_CORE_FT
Crestor 10 mg oral tablet: 1 tab(s) orally once a day  digoxin 125 mcg (0.125 mg) oral tablet: 1 tab(s) orally every other day   Eliquis 2.5 mg oral tablet: 1 tab(s) orally 2 times a day  levothyroxine 75 mcg (0.075 mg) oral tablet: 1 tab(s) orally once a day  Melatonin 10 mg oral capsule: 1 cap(s) orally once a day (at bedtime)  memantine 10 mg oral tablet: 1 tab(s) orally 2 times a day  metFORMIN 1000 mg oral tablet: 1 tab(s) orally 2 times a day  metoprolol succinate 100 mg oral tablet, extended release: 1 tab(s) orally 2 times a day   rivastigmine 9.5 mg/24 hr transdermal film, extended release: 1 patch transdermal once a day  Vitamin D3 50 mcg (2000 intl units) oral tablet: 1 tab(s) orally once a day   Crestor 10 mg oral tablet: 1 tab(s) orally once a day  digoxin 125 mcg (0.125 mg) oral tablet: 1 tab(s) orally every other day   Eliquis 2.5 mg oral tablet: 1 tab(s) orally 2 times a day  furosemide 20 mg oral tablet: 1 tab(s) orally once a day  Januvia 25 mg oral tablet: 1 tab(s) orally once a day   levothyroxine 75 mcg (0.075 mg) oral tablet: 1 tab(s) orally once a day  Melatonin 10 mg oral capsule: 1 cap(s) orally once a day (at bedtime)  memantine 10 mg oral tablet: 1 tab(s) orally 2 times a day  metFORMIN 1000 mg oral tablet: 1 tab(s) orally 2 times a day  metoprolol succinate 100 mg oral tablet, extended release: 1 tab(s) orally 2 times a day   rivastigmine 9.5 mg/24 hr transdermal film, extended release: 1 patch transdermal once a day  Vitamin D3 50 mcg (2000 intl units) oral tablet: 1 tab(s) orally once a day   Crestor 10 mg oral tablet: 1 tab(s) orally once a day  digoxin 125 mcg (0.125 mg) oral tablet: 1 tab(s) orally once a day   Eliquis 2.5 mg oral tablet: 1 tab(s) orally 2 times a day  furosemide 20 mg oral tablet: 1 tab(s) orally once a day  Januvia 25 mg oral tablet: 1 tab(s) orally once a day   levothyroxine 75 mcg (0.075 mg) oral tablet: 1 tab(s) orally once a day  Melatonin 10 mg oral capsule: 1 cap(s) orally once a day (at bedtime)  memantine 10 mg oral tablet: 1 tab(s) orally 2 times a day  metFORMIN 1000 mg oral tablet: 1 tab(s) orally 2 times a day  metoprolol succinate 100 mg oral tablet, extended release: 1 tab(s) orally 2 times a day   rivastigmine 9.5 mg/24 hr transdermal film, extended release: 1 patch transdermal once a day  Vitamin D3 50 mcg (2000 intl units) oral tablet: 1 tab(s) orally once a day

## 2023-01-28 NOTE — PROGRESS NOTE ADULT - PROBLEM SELECTOR PLAN 4
initially in RVR on arrival to the ed HR now improved s/p digoxin 125 mcg x1, Lopressor 5 IVP x1  - EKG shows Afib with RVR @ 105 bpm, minimal voltage criteria for LVH, non specify T wave   - Will continue home metoprolol succinate 100 mg BID and digoxin 125 mcg qod   - AC Eliquis 2.5 BID to restart 1/28 evening initially in RVR on arrival to the ed HR now improved  - Continue home metoprolol succinate 100 mg BID and digoxin 125 mcg qod   - AC Eliquis 2.5 BID initially in RVR on arrival to the ed HR now improved  - Continue home metoprolol succinate 100 mg BID and incr digoxin 125 mcg qod --> qd  - Continue home Eliquis 2.5 BID for AC

## 2023-01-28 NOTE — PROGRESS NOTE ADULT - ASSESSMENT
86 yo F with PMHx of HTN, HLD, HFrEF (EF 20-25% on 12/22), T2DM, hypothyroidism, alzheimer's dementia, chronic Afib who presents today for SOB, admitted for acute on chronic congestive heart failure exacerbation     HF, Afib  - Patient is not complaining of any cardiac symptoms at this time.  - Troponin mildly elevated x2 (144.9 --> 156.9) likely 2/2 demand in setting of CHF exacerbation, trend to peak   - , CKMB 2.5, CPK 1.5; wnl   - Pro-BNP >91957  - continue Lasix 40mg IVP BID  - Recent TTE on 12/22/2022 LV enlargement with severe left ventricular systolic dysfunction, LVEF of 20-25%, apex and anterior walls akinetic, inferior and inferolateral walls hypokinetic, biatrial enlargement, Moderate MR, Mild TR    - CT chest and abdomen with pleural effusions -s/p R thoracentesis 1/27    - EKG: Atrial fibrillation with RVR, minimal voltage criteria for LVH, nonspecific T wave abnormality; improved lateral leads from prior EKG on 12/29/2022   - Tele reviewed, Afib rate controlled   - Continue Digoxin 125 mcg PO daily and Metoprolol succinate 100 mg BID  - resume Eliquis post thoracentesis when able    - Monitor and replete lytes, keep K>4, Mg>2.  - Will continue to follow.    Samuel Alcazar NP  Nurse Practitioner- Cardiology   Spectra #[phone] 86 yo F with PMHx of HTN, HLD, HFrEF (EF 20-25% on 12/22), T2DM, hypothyroidism, alzheimer's dementia, chronic Afib who presents today for SOB, admitted for acute on chronic congestive heart failure exacerbation     HF, Afib  - Patient is not complaining of any cardiac symptoms at this time.  - Troponin mildly elevated x2 (144.9 --> 156.9) likely 2/2 demand in setting of CHF exacerbation, trend to peak   - , CKMB 2.5, CPK 1.5; wnl   - Pro-BNP >20980  - On Lasix 40mg IVP BID  - Cr now uptrending, hold Lasix today, reassess tomorrow   - CT chest and abdomen with pleural effusions -s/p R thoracentesis 1/27, now RA.   - Recent TTE on 12/22/2022 LV enlargement with severe left ventricular systolic dysfunction, LVEF of 20-25%, apex and anterior walls akinetic, inferior and inferolateral walls hypokinetic, biatrial enlargement, Moderate MR, Mild TR      - EKG: Atrial fibrillation with RVR, minimal voltage criteria for LVH, nonspecific T wave abnormality; improved lateral leads from prior EKG on 12/29/2022   - Tele reviewed, Afib rate controlled   - Continue Digoxin 125 mcg PO daily and Metoprolol succinate 100 mg BID  - resume Eliquis post thoracentesis when able    - Monitor and replete lytes, keep K>4, Mg>2.  - Will continue to follow.    Samuel Alcazar NP  Nurse Practitioner- Cardiology   Spectra #[phone]

## 2023-01-28 NOTE — PROGRESS NOTE ADULT - ASSESSMENT
87 years old female patient with PMHx of HTN, HLD, HFrEF (EF 20-25% on 12/22), T2DM, hypothyroidism, alzheimer's dementia, chronic Afib who presents for SOB.    mild Dementia  Dyspnea  Pleural Effusions  HF  Atelectasis  Sub Cm Pulm Nodules  Eval for Biliary tract pathology -   HTN  HLD  OP  OA  DM  Thyroid Disease    diuresis in progress  right thorax - thoracentesis - 400 cc -     spoke with Dtr  reviewed CT chest and abdomen  Cardio eval - follows with Dr Del Cid in the office - diuresis - cvs rx regimen optimization  monitor VS and HD and Sat  Surgery and GI eval for abnormal Biliary - GI CT imaging - eval cholangitis - malignancy -   GOC discussion  assist with needs  serial labs  serial ABD exam  keep sat > 88 pct

## 2023-01-28 NOTE — DISCHARGE NOTE PROVIDER - CARE PROVIDER_API CALL
Amauri Shukla (DO)  Family Medicine  [address] [place] [address], [address]  [place] [address]  Phone: [phone]  Fax: [phone]  Follow Up Time: 1 week   Amauri Shukla (DO)  Family Medicine  [address], [address]  [place] [address]  Phone: [phone]  Fax: [phone]  Follow Up Time: 1 week    Saravanan Oleary  [facility]  [address], [address]  [place] [address]  Phone: [phone]  Fax: [phone]  Follow Up Time: 2 months    John Harvey)  Cardiovascular Disease; Internal Medicine  [address], [place] [id number]  Phone: [phone]  Fax: [phone]  Follow Up Time: 1 week   Amauri Shukla (DO)  Family Medicine  [address], [address]  [place] [address]  Phone: [phone]  Fax: [phone]  Follow Up Time: 1 week    Saravanan Oleary  [facility]  [address], [address]  [place] [address]  Phone: [phone]  Fax: [phone]  Follow Up Time: 1 month    Harpal Grider)  [place] [address]  Phone: [phone]  Fax: [phone]  Established Patient  Follow Up Time: 2 weeks

## 2023-01-28 NOTE — PROGRESS NOTE ADULT - SUBJECTIVE AND OBJECTIVE BOX
[facility] -- Daivd Le,  Dylan, Siddhartha Grider Savella, Goodger  Office # [phone]    Follow Up:  HF, Afib RVR, pleural effusion    Subjective/Observations: Pt seen and examined, resting comfortably in bed.  No complaints of chest pain, dyspnea, or palpitations reported. Tolerating RA.   s/p R Thoracentesis 1/27, 400 ml removed.     REVIEW OF SYSTEMS: All other review of systems is negative unless indicated above  PAST MEDICAL & SURGICAL HISTORY:  Atrial fibrillation      Hypothyroidism      Mild Alzheimer&#x27;s dementia      Diabetes mellitus      Acute on chronic systolic congestive heart failure      Hypertension      Hyperlipemia      Cardiac LV ejection fraction 21-30%  12/22      History of appendectomy      H/O hernia repair      History of cholecystectomy        MEDICATIONS  (STANDING):  ALPRAZolam 0.25 milliGRAM(s) Oral <User Schedule>  apixaban 2.5 milliGRAM(s) Oral every 12 hours  atorvastatin 40 milliGRAM(s) Oral at bedtime  cholecalciferol 2000 Unit(s) Oral daily  dextrose 5%. 1000 milliLiter(s) (100 mL/Hr) IV Continuous <Continuous>  dextrose 5%. 1000 milliLiter(s) (50 mL/Hr) IV Continuous <Continuous>  dextrose 50% Injectable 25 Gram(s) IV Push once  dextrose 50% Injectable 12.5 Gram(s) IV Push once  dextrose 50% Injectable 25 Gram(s) IV Push once  digoxin     Tablet 125 MICROGram(s) Oral daily  furosemide   Injectable 40 milliGRAM(s) IV Push two times a day  glucagon  Injectable 1 milliGRAM(s) IntraMuscular once  insulin lispro (ADMELOG) corrective regimen sliding scale   SubCutaneous three times a day before meals  insulin lispro (ADMELOG) corrective regimen sliding scale   SubCutaneous at bedtime  levothyroxine 75 MICROGram(s) Oral daily  memantine 10 milliGRAM(s) Oral two times a day  metoprolol succinate  milliGRAM(s) Oral two times a day    MEDICATIONS  (PRN):  aluminum hydroxide/magnesium hydroxide/simethicone Suspension 30 milliLiter(s) Oral every 4 hours PRN Dyspepsia  dextrose Oral Gel 15 Gram(s) Oral once PRN Blood Glucose LESS THAN 70 milliGRAM(s)/deciliter  melatonin 3 milliGRAM(s) Oral at bedtime PRN Insomnia  ondansetron Injectable 4 milliGRAM(s) IV Push every 8 hours PRN Nausea and/or Vomiting    Allergies    No Known Allergies    Intolerances      Vital Signs Last 24 Hrs  T(C): 36.4 (28 Jan 2023 04:19), Max: 36.8 (27 Jan 2023 17:18)  T(F): 97.5 (28 Jan 2023 04:19), Max: 98.2 (27 Jan 2023 17:18)  HR: 70 (28 Jan 2023 08:45) (70 - 91)  BP: 110/70 (28 Jan 2023 08:45) (107/66 - 140/89)  BP(mean): --  RR: 16 (28 Jan 2023 08:45) (16 - 20)  SpO2: 94% (28 Jan 2023 08:45) (91% - 98%)    Parameters below as of 28 Jan 2023 08:45  Patient On (Oxygen Delivery Method): room air      I&O's Summary      TELE: Afib rate controlled  PHYSICAL EXAM:  Constitutional: NAD, awake and alert  HEENT: Moist Mucous Membranes, Anicteric  Pulmonary: Non-labored, breath sounds are clear bilaterally, No wheezing, rales or rhonchi  Cardiovascular: IRRR,  S1 and S2, No murmurs, rubs, gallops or clicks  Gastrointestinal: Bowel Sounds present, soft, nontender.   Lymph: trace b/l LE peripheral edema. No lymphadenopathy.  Skin: No visible rashes or ulcers.  Psych:  Mood & affect appropriate, mildly confuse    LABS: All Labs Reviewed:                        12.5   10.57 )-----------( 287      ( 28 Jan 2023 08:30 )             38.8                         12.0   10.81 )-----------( 311      ( 27 Jan 2023 08:18 )             37.2                         10.0   9.15  )-----------( 290      ( 26 Jan 2023 05:52 )             32.4     28 Jan 2023 08:30    132    |  96     |  41     ----------------------------<  217    4.4     |  29     |  1.50   27 Jan 2023 08:18    131    |  94     |  30     ----------------------------<  200    3.3     |  29     |  1.20   26 Jan 2023 04:06    132    |  105    |  26     ----------------------------<  170    5.5     |  18     |  1.20     Ca    9.3        28 Jan 2023 08:30  Ca    8.7        27 Jan 2023 08:18  Ca    8.7        26 Jan 2023 04:06  Phos  3.4       27 Jan 2023 08:18  Phos  3.8       26 Jan 2023 04:06  Mg     1.4       27 Jan 2023 08:18  Mg     1.7       26 Jan 2023 04:06  Mg     1.8       25 Jan 2023 20:20    TPro  7.3    /  Alb  2.7    /  TBili  0.9    /  DBili  x      /  AST  68     /  ALT  117    /  AlkPhos  90     28 Jan 2023 08:30  TPro  7.3    /  Alb  2.7    /  TBili  1.5    /  DBili  x      /  AST  72     /  ALT  134    /  AlkPhos  84     27 Jan 2023 08:18  TPro  x      /  Alb  x      /  TBili  1.4    /  DBili  0.4    /  AST  x      /  ALT  x      /  AlkPhos  x      26 Jan 2023 06:05    PT/INR - ( 27 Jan 2023 09:22 )   PT: 14.3 sec;   INR: 1.22 ratio         PTT - ( 27 Jan 2023 09:22 )  PTT:27.2 sec      12 Lead ECG:   Ventricular Rate 105 BPM    QRS Duration 96 ms    Q-T Interval 336 ms    QTC Calculation(Bazett) 444 ms    R Axis 28 degrees    T Axis 263 degrees    Diagnosis Line Atrial fibrillation with rapid ventricular response  Minimal voltage criteria for LVH, may be normal variant ( Jonathan product )  Nonspecific T wave abnormality  Abnormal ECG    Confirmed by rogerio Harvey ([id number]) on 1/26/2023 1:29:07 PM (01-25-23 @ 19:15)      ACC: [id number] EXAM:  ECHO TTE WO CON COMP W DOPP                          PROCEDURE DATE:  12/22/2022          INTERPRETATION:  INDICATION: Abnormal EKG  Sonographer ARTI    Blood Pressure 142/86    Height 158 cm     Weight 51.7 kg       BSA 1.5   sqm    Dimensions:  LA 3.6       Normal Values: 2.0 - 4.0 cm  Ao 3.2        Normal Values: 2.0 - 3.8 cm  SEPTUM 1.0       Normal Values: 0.6 - 1.2 cm  PWT 0.9       Normal Values: 0.6 - 1.1 cm  LVIDd 5.8         Normal Values: 3.0 - 5.6 cm  LVIDs 4.3      Normal Values: 1.8 - 4.0 cm      OBSERVATIONS:  Mitral Valve: Moderate MR.  Aortic Valve/Aorta: normal trileaflet aortic valve.  Tricuspid Valve: Mild TR.  Pulmonic Valve: Mild PI  Left Atrium: Enlarged  Right Atrium: Enlarged  Left Ventricle: Left ventricular enlargement with severe left ventricular   systolic dysfunction, estimated LVEF of 20-25%. The entire apex and   anterior walls appear akinetic. The inferior and inferolateral walls   appear hypokinetic. Remaining walls are not well-visualized  Right Ventricle: Grossly normal size and systolic function.  Pericardium: no significant pericardial effusion.  Pulmonary/RV Pressure: estimated PA systolic pressure of at least 35 mmHg   assuming an RA pressure of 3mmHg.        IMPRESSION:  Left ventricular enlargement with severe left ventricular systolic   dysfunction, estimated LVEF of 20-25%. The entire apex and anterior walls   appear akinetic. The inferior and inferolateral walls appear hypokinetic.  Grossly normal RV size and systolic function.  Biatrial enlargement  Normal trileaflet aortic valve, without AI.  Moderate MR  Mild TR.  No significant pericardial effusion.    --- End of Report ---            JOHN BAEZA MD; Attending Cardiologist  This document has been electronically signed. Dec 23 2022  4:40PM

## 2023-01-28 NOTE — DISCHARGE NOTE PROVIDER - NSDCFUSCHEDAPPT_GEN_ALL_CORE_FT
Harpal Grider  [facility] Physician Pending sale to [facility]  CARDIOLOGY [address]  Scheduled Appointment: 02/02/2023

## 2023-01-28 NOTE — DISCHARGE NOTE PROVIDER - CARE PROVIDERS DIRECT ADDRESSES
,DirectAddress_Unknown ,DirectAddress_Unknown,DirectAddress_Unknown,DirectAddress_Unknown ,DirectAddress_Unknown,DirectAddress_Unknown,[email or web address]

## 2023-01-28 NOTE — PROGRESS NOTE ADULT - PROBLEM SELECTOR PLAN 8
Chronic   - Continue home synthroid  - TSH WNL T2DM non insulin dependent   - HgbA1c from 12/22/22 7.8  - Hold home metformin   - Low Insulin sliding scale  - Accu checks w/ hypoglycemic protocol

## 2023-01-29 LAB
ALBUMIN SERPL ELPH-MCNC: 2.7 G/DL — LOW (ref 3.3–5)
ALP SERPL-CCNC: 89 U/L — SIGNIFICANT CHANGE UP (ref 40–120)
ALT FLD-CCNC: 98 U/L — HIGH (ref 12–78)
ANION GAP SERPL CALC-SCNC: 4 MMOL/L — LOW (ref 5–17)
AST SERPL-CCNC: 47 U/L — HIGH (ref 15–37)
BILIRUB SERPL-MCNC: 0.9 MG/DL — SIGNIFICANT CHANGE UP (ref 0.2–1.2)
BUN SERPL-MCNC: 55 MG/DL — HIGH (ref 7–23)
CALCIUM SERPL-MCNC: 9.1 MG/DL — SIGNIFICANT CHANGE UP (ref 8.5–10.1)
CHLORIDE SERPL-SCNC: 100 MMOL/L — SIGNIFICANT CHANGE UP (ref 96–108)
CO2 SERPL-SCNC: 31 MMOL/L — SIGNIFICANT CHANGE UP (ref 22–31)
CREAT SERPL-MCNC: 1.6 MG/DL — HIGH (ref 0.5–1.3)
EGFR: 31 ML/MIN/1.73M2 — LOW
GLUCOSE SERPL-MCNC: 234 MG/DL — HIGH (ref 70–99)
HCT VFR BLD CALC: 38.5 % — SIGNIFICANT CHANGE UP (ref 34.5–45)
HGB BLD-MCNC: 12.2 G/DL — SIGNIFICANT CHANGE UP (ref 11.5–15.5)
MAGNESIUM SERPL-MCNC: 2.1 MG/DL — SIGNIFICANT CHANGE UP (ref 1.6–2.6)
MCHC RBC-ENTMCNC: 27.9 PG — SIGNIFICANT CHANGE UP (ref 27–34)
MCHC RBC-ENTMCNC: 31.7 GM/DL — LOW (ref 32–36)
MCV RBC AUTO: 88.1 FL — SIGNIFICANT CHANGE UP (ref 80–100)
NRBC # BLD: 0 /100 WBCS — SIGNIFICANT CHANGE UP (ref 0–0)
PHOSPHATE SERPL-MCNC: 3.8 MG/DL — SIGNIFICANT CHANGE UP (ref 2.5–4.5)
PLATELET # BLD AUTO: 307 K/UL — SIGNIFICANT CHANGE UP (ref 150–400)
POTASSIUM SERPL-MCNC: 4.6 MMOL/L — SIGNIFICANT CHANGE UP (ref 3.5–5.3)
POTASSIUM SERPL-SCNC: 4.6 MMOL/L — SIGNIFICANT CHANGE UP (ref 3.5–5.3)
PROT SERPL-MCNC: 7.4 G/DL — SIGNIFICANT CHANGE UP (ref 6–8.3)
RBC # BLD: 4.37 M/UL — SIGNIFICANT CHANGE UP (ref 3.8–5.2)
RBC # FLD: 16.9 % — HIGH (ref 10.3–14.5)
SODIUM SERPL-SCNC: 135 MMOL/L — SIGNIFICANT CHANGE UP (ref 135–145)
WBC # BLD: 9.93 K/UL — SIGNIFICANT CHANGE UP (ref 3.8–10.5)
WBC # FLD AUTO: 9.93 K/UL — SIGNIFICANT CHANGE UP (ref 3.8–10.5)

## 2023-01-29 PROCEDURE — 99233 SBSQ HOSP IP/OBS HIGH 50: CPT | Mod: GC

## 2023-01-29 PROCEDURE — 99233 SBSQ HOSP IP/OBS HIGH 50: CPT

## 2023-01-29 RX ORDER — POLYETHYLENE GLYCOL 3350 17 G/17G
17 POWDER, FOR SOLUTION ORAL AT BEDTIME
Refills: 0 | Status: DISCONTINUED | OUTPATIENT
Start: 2023-01-29 | End: 2023-01-31

## 2023-01-29 RX ORDER — INSULIN LISPRO 100/ML
VIAL (ML) SUBCUTANEOUS
Refills: 0 | Status: DISCONTINUED | OUTPATIENT
Start: 2023-01-29 | End: 2023-01-31

## 2023-01-29 RX ORDER — INSULIN LISPRO 100/ML
VIAL (ML) SUBCUTANEOUS AT BEDTIME
Refills: 0 | Status: DISCONTINUED | OUTPATIENT
Start: 2023-01-29 | End: 2023-01-31

## 2023-01-29 RX ADMIN — APIXABAN 2.5 MILLIGRAM(S): 2.5 TABLET, FILM COATED ORAL at 17:36

## 2023-01-29 RX ADMIN — Medication 30 MILLILITER(S): at 14:52

## 2023-01-29 RX ADMIN — MEMANTINE HYDROCHLORIDE 10 MILLIGRAM(S): 10 TABLET ORAL at 17:36

## 2023-01-29 RX ADMIN — POLYETHYLENE GLYCOL 3350 17 GRAM(S): 17 POWDER, FOR SOLUTION ORAL at 21:38

## 2023-01-29 RX ADMIN — Medication 8: at 12:43

## 2023-01-29 RX ADMIN — APIXABAN 2.5 MILLIGRAM(S): 2.5 TABLET, FILM COATED ORAL at 06:14

## 2023-01-29 RX ADMIN — Medication 2000 UNIT(S): at 12:07

## 2023-01-29 RX ADMIN — Medication 125 MICROGRAM(S): at 06:13

## 2023-01-29 RX ADMIN — Medication 8: at 17:36

## 2023-01-29 RX ADMIN — Medication 75 MICROGRAM(S): at 06:14

## 2023-01-29 RX ADMIN — MEMANTINE HYDROCHLORIDE 10 MILLIGRAM(S): 10 TABLET ORAL at 06:14

## 2023-01-29 RX ADMIN — Medication 4: at 21:41

## 2023-01-29 RX ADMIN — Medication 2: at 08:49

## 2023-01-29 RX ADMIN — ATORVASTATIN CALCIUM 40 MILLIGRAM(S): 80 TABLET, FILM COATED ORAL at 21:38

## 2023-01-29 RX ADMIN — Medication 100 MILLIGRAM(S): at 17:36

## 2023-01-29 RX ADMIN — Medication 100 MILLIGRAM(S): at 06:14

## 2023-01-29 NOTE — PROGRESS NOTE ADULT - PROBLEM SELECTOR PLAN 1
Patient presents with shortness of breath likely 2/2 acute on chronic CHF exacerbation  - CT chest showed moderate right and small to moderate left pleural effusion with compressive atelectasis.  - Last TTE on 12/22  showed Left ventricular enlargement with severe left ventricular systolic dysfunction, estimated LVEF of 20-25%. The entire apex and anterior walls appear akinetic. The inferior and inferolateral walls appear hypokinetic.  - On Lasix 40 mg IVP BID w/hold parameters. Hold today given Cr uptrending and reassess tomorrow  - Continue Metoprolol succinate 100 mg BID, increase digoxin 125mg qod --> qd  - Trops elevated 2/2 demand ischemia in the setting of CHF  - Fluid restriction, Strict I/Os, daily weights  - Monitor and replace electrolytes  - S/p R thoracentesis 1/27 with 400cc collected transudative effusion  - Cardiology Dr. Harvey following, recs appreciated  - Pulm Dr. Isaac following, recs appreciated Patient presents with shortness of breath likely 2/2 acute on chronic CHF exacerbation  - CT chest showed moderate right and small to moderate left pleural effusion with compressive atelectasis.  - Last TTE on 12/22  showed Left ventricular enlargement with severe left ventricular systolic dysfunction, estimated LVEF of 20-25%. The entire apex and anterior walls appear akinetic. The inferior and inferolateral walls appear hypokinetic.  - On Lasix 40 mg IVP BID w/hold parameters. HELD 1/28  - Cr 1.5 --> 1.6, continue to hold Lasix 1/29  - Continue Metoprolol succinate 100 mg BID, increase digoxin 125mg qod --> qd  - Trops elevated 2/2 demand ischemia in the setting of CHF  - Fluid restriction, Strict I/Os, daily weights  - Monitor and replace electrolytes  - S/p R thoracentesis 1/27 with 400cc collected transudative effusion  - Cardiology Dr. Richardson following, recs appreciated  - Pulm Dr. Isaac following, recs appreciated  - Nephro Dr. Dickinson following, recs appreciated

## 2023-01-29 NOTE — PROGRESS NOTE ADULT - SUBJECTIVE AND OBJECTIVE BOX
-------------------------------NOTE IN PROGRESS----------------------------  Patient is a 87y old  Female who presents with a chief complaint of ADHF (29 Jan 2023 09:55)      INTERVAL HPI/OVERNIGHT EVENTS: Patient seen and examined at bedside. No overnight events occurred. Patient has no complaints at this time. Denies fevers, chills, headache, lightheadedness, chest pain, dyspnea, abdominal pain, n/v/d/c.    MEDICATIONS  (STANDING):  ALPRAZolam 0.25 milliGRAM(s) Oral <User Schedule>  apixaban 2.5 milliGRAM(s) Oral every 12 hours  atorvastatin 40 milliGRAM(s) Oral at bedtime  cholecalciferol 2000 Unit(s) Oral daily  dextrose 5%. 1000 milliLiter(s) (100 mL/Hr) IV Continuous <Continuous>  dextrose 5%. 1000 milliLiter(s) (50 mL/Hr) IV Continuous <Continuous>  dextrose 50% Injectable 25 Gram(s) IV Push once  dextrose 50% Injectable 12.5 Gram(s) IV Push once  dextrose 50% Injectable 25 Gram(s) IV Push once  digoxin     Tablet 125 MICROGram(s) Oral daily  glucagon  Injectable 1 milliGRAM(s) IntraMuscular once  insulin lispro (ADMELOG) corrective regimen sliding scale   SubCutaneous three times a day before meals  insulin lispro (ADMELOG) corrective regimen sliding scale   SubCutaneous at bedtime  levothyroxine 75 MICROGram(s) Oral daily  memantine 10 milliGRAM(s) Oral two times a day  metoprolol succinate  milliGRAM(s) Oral two times a day    MEDICATIONS  (PRN):  aluminum hydroxide/magnesium hydroxide/simethicone Suspension 30 milliLiter(s) Oral every 4 hours PRN Dyspepsia  dextrose Oral Gel 15 Gram(s) Oral once PRN Blood Glucose LESS THAN 70 milliGRAM(s)/deciliter  melatonin 3 milliGRAM(s) Oral at bedtime PRN Insomnia  ondansetron Injectable 4 milliGRAM(s) IV Push every 8 hours PRN Nausea and/or Vomiting      Allergies    No Known Allergies    Intolerances        REVIEW OF SYSTEMS:  CONSTITUTIONAL: No fever or chills  HEENT:  No headache, no sore throat  RESPIRATORY: No cough, wheezing, or shortness of breath  CARDIOVASCULAR: No chest pain, palpitations  GASTROINTESTINAL: No abd pain, nausea, vomiting, or diarrhea  GENITOURINARY: No dysuria, frequency, or hematuria  NEUROLOGICAL: no focal weakness or dizziness  MUSCULOSKELETAL: no myalgias     Vital Signs Last 24 Hrs  T(C): 36.4 (29 Jan 2023 04:43), Max: 37.1 (28 Jan 2023 20:34)  T(F): 97.5 (29 Jan 2023 04:43), Max: 98.7 (28 Jan 2023 20:34)  HR: 87 (29 Jan 2023 04:43) (71 - 100)  BP: 114/76 (29 Jan 2023 04:43) (114/76 - 131/87)  BP(mean): --  RR: 18 (29 Jan 2023 04:43) (17 - 18)  SpO2: 95% (29 Jan 2023 04:43) (93% - 95%)    Parameters below as of 29 Jan 2023 04:43  Patient On (Oxygen Delivery Method): room air        PHYSICAL EXAM:  GENERAL: NAD  HEENT:  anicteric, moist mucous membranes  CHEST/LUNG:  CTA b/l, no rales, wheezes, or rhonchi  HEART:  RRR, S1, S2  ABDOMEN:  BS+, soft, nontender, nondistended  EXTREMITIES: no edema, cyanosis, or calf tenderness  NERVOUS SYSTEM: answers questions and follows commands appropriately    LABS:                        12.2   9.93  )-----------( 307      ( 29 Jan 2023 08:10 )             38.5     CBC Full  -  ( 29 Jan 2023 08:10 )  WBC Count : 9.93 K/uL  Hemoglobin : 12.2 g/dL  Hematocrit : 38.5 %  Platelet Count - Automated : 307 K/uL  Mean Cell Volume : 88.1 fl  Mean Cell Hemoglobin : 27.9 pg  Mean Cell Hemoglobin Concentration : 31.7 gm/dL  Auto Neutrophil # : x  Auto Lymphocyte # : x  Auto Monocyte # : x  Auto Eosinophil # : x  Auto Basophil # : x  Auto Neutrophil % : x  Auto Lymphocyte % : x  Auto Monocyte % : x  Auto Eosinophil % : x  Auto Basophil % : x    29 Jan 2023 08:10    135    |  100    |  55     ----------------------------<  234    4.6     |  31     |  1.60     Ca    9.1        29 Jan 2023 08:10  Phos  3.8       29 Jan 2023 08:10  Mg     2.1       29 Jan 2023 08:10    TPro  7.4    /  Alb  2.7    /  TBili  0.9    /  DBili  x      /  AST  47     /  ALT  98     /  AlkPhos  89     29 Jan 2023 08:10        CAPILLARY BLOOD GLUCOSE      POCT Blood Glucose.: 228 mg/dL (29 Jan 2023 08:02)  POCT Blood Glucose.: 200 mg/dL (28 Jan 2023 21:25)  POCT Blood Glucose.: 191 mg/dL (28 Jan 2023 17:16)  POCT Blood Glucose.: 401 mg/dL (28 Jan 2023 12:05)        Culture - Body Fluid with Gram Stain (collected 01-27-23 @ 11:54)  Source: Pleural Fl Pleural Fluid  Gram Stain (01-28-23 @ 03:08):    polymorphonuclear leukocytes seen    No organisms seen    by cytocentrifuge  Preliminary Report (01-28-23 @ 20:40):    No growth to date.    Culture - Blood (collected 01-26-23 @ 01:15)  Source: .Blood Blood-Venous  Preliminary Report (01-27-23 @ 09:01):    No growth to date.    Culture - Blood (collected 01-26-23 @ 01:15)  Source: .Blood Blood-Venous  Preliminary Report (01-27-23 @ 09:01):    No growth to date.        RADIOLOGY & ADDITIONAL TESTS:    Personally reviewed.     Consultant(s) Notes Reviewed:  [x] YES  [ ] NO     Patient is a 87y old  Female who presents with a chief complaint of ADHF (29 Jan 2023 09:55)      INTERVAL HPI/OVERNIGHT EVENTS: Patient seen and examined at bedside. No overnight events occurred. Patient has no complaints at this time. Patient is sitting up in bed, eating breakfast. Lasix held yesterday due to uptrending Cr. Saturating well on RA.     MEDICATIONS  (STANDING):  ALPRAZolam 0.25 milliGRAM(s) Oral <User Schedule>  apixaban 2.5 milliGRAM(s) Oral every 12 hours  atorvastatin 40 milliGRAM(s) Oral at bedtime  cholecalciferol 2000 Unit(s) Oral daily  dextrose 5%. 1000 milliLiter(s) (100 mL/Hr) IV Continuous <Continuous>  dextrose 5%. 1000 milliLiter(s) (50 mL/Hr) IV Continuous <Continuous>  dextrose 50% Injectable 25 Gram(s) IV Push once  dextrose 50% Injectable 12.5 Gram(s) IV Push once  dextrose 50% Injectable 25 Gram(s) IV Push once  digoxin     Tablet 125 MICROGram(s) Oral daily  glucagon  Injectable 1 milliGRAM(s) IntraMuscular once  insulin lispro (ADMELOG) corrective regimen sliding scale   SubCutaneous three times a day before meals  insulin lispro (ADMELOG) corrective regimen sliding scale   SubCutaneous at bedtime  levothyroxine 75 MICROGram(s) Oral daily  memantine 10 milliGRAM(s) Oral two times a day  metoprolol succinate  milliGRAM(s) Oral two times a day    MEDICATIONS  (PRN):  aluminum hydroxide/magnesium hydroxide/simethicone Suspension 30 milliLiter(s) Oral every 4 hours PRN Dyspepsia  dextrose Oral Gel 15 Gram(s) Oral once PRN Blood Glucose LESS THAN 70 milliGRAM(s)/deciliter  melatonin 3 milliGRAM(s) Oral at bedtime PRN Insomnia  ondansetron Injectable 4 milliGRAM(s) IV Push every 8 hours PRN Nausea and/or Vomiting      Allergies    No Known Allergies    Intolerances        REVIEW OF SYSTEMS:  CONSTITUTIONAL: No fever or chills  HEENT:  No headache, no sore throat  RESPIRATORY: No cough, wheezing, or shortness of breath  CARDIOVASCULAR: No chest pain, palpitations  GASTROINTESTINAL: No abd pain, nausea, vomiting, or diarrhea  GENITOURINARY: No dysuria, frequency, or hematuria  NEUROLOGICAL: no focal weakness or dizziness  MUSCULOSKELETAL: no myalgias     Vital Signs Last 24 Hrs  T(C): 36.4 (29 Jan 2023 04:43), Max: 37.1 (28 Jan 2023 20:34)  T(F): 97.5 (29 Jan 2023 04:43), Max: 98.7 (28 Jan 2023 20:34)  HR: 87 (29 Jan 2023 04:43) (71 - 100)  BP: 114/76 (29 Jan 2023 04:43) (114/76 - 131/87)  BP(mean): --  RR: 18 (29 Jan 2023 04:43) (17 - 18)  SpO2: 95% (29 Jan 2023 04:43) (93% - 95%)    Parameters below as of 29 Jan 2023 04:43  Patient On (Oxygen Delivery Method): room air        PHYSICAL EXAM:  GENERAL: NAD, pleasant  HEENT:  anicteric, moist mucous membranes  CHEST/LUNG: decreased breath sounds b/l with bibasilar crackles; speaking in full sentences, no increased WOB  HEART:  RRR, S1, S2  ABDOMEN:  BS+, soft, nontender, nondistended  EXTREMITIES: no cyanosis, or calf tenderness. Trace b/l LE nonpitting edema L>6  NERVOUS SYSTEM: answers questions and follows commands appropriately    LABS:                        12.2   9.93  )-----------( 307      ( 29 Jan 2023 08:10 )             38.5     CBC Full  -  ( 29 Jan 2023 08:10 )  WBC Count : 9.93 K/uL  Hemoglobin : 12.2 g/dL  Hematocrit : 38.5 %  Platelet Count - Automated : 307 K/uL  Mean Cell Volume : 88.1 fl  Mean Cell Hemoglobin : 27.9 pg  Mean Cell Hemoglobin Concentration : 31.7 gm/dL  Auto Neutrophil # : x  Auto Lymphocyte # : x  Auto Monocyte # : x  Auto Eosinophil # : x  Auto Basophil # : x  Auto Neutrophil % : x  Auto Lymphocyte % : x  Auto Monocyte % : x  Auto Eosinophil % : x  Auto Basophil % : x    29 Jan 2023 08:10    135    |  100    |  55     ----------------------------<  234    4.6     |  31     |  1.60     Ca    9.1        29 Jan 2023 08:10  Phos  3.8       29 Jan 2023 08:10  Mg     2.1       29 Jan 2023 08:10    TPro  7.4    /  Alb  2.7    /  TBili  0.9    /  DBili  x      /  AST  47     /  ALT  98     /  AlkPhos  89     29 Jan 2023 08:10        CAPILLARY BLOOD GLUCOSE      POCT Blood Glucose.: 228 mg/dL (29 Jan 2023 08:02)  POCT Blood Glucose.: 200 mg/dL (28 Jan 2023 21:25)  POCT Blood Glucose.: 191 mg/dL (28 Jan 2023 17:16)  POCT Blood Glucose.: 401 mg/dL (28 Jan 2023 12:05)        Culture - Body Fluid with Gram Stain (collected 01-27-23 @ 11:54)  Source: Pleural Fl Pleural Fluid  Gram Stain (01-28-23 @ 03:08):    polymorphonuclear leukocytes seen    No organisms seen    by cytocentrifuge  Preliminary Report (01-28-23 @ 20:40):    No growth to date.    Culture - Blood (collected 01-26-23 @ 01:15)  Source: .Blood Blood-Venous  Preliminary Report (01-27-23 @ 09:01):    No growth to date.    Culture - Blood (collected 01-26-23 @ 01:15)  Source: .Blood Blood-Venous  Preliminary Report (01-27-23 @ 09:01):    No growth to date.        RADIOLOGY & ADDITIONAL TESTS:    Personally reviewed.     Consultant(s) Notes Reviewed:  [x] YES  [ ] NO

## 2023-01-29 NOTE — PROGRESS NOTE ADULT - ASSESSMENT
87 years old female patient with PMHx of HTN, HLD, HFrEF (EF 20-25% on 12/22), T2DM, hypothyroidism, alzheimer's dementia, chronic Afib who presents for SOB.    mild Dementia  Dyspnea  Pleural Effusions  HF  Atelectasis  Sub Cm Pulm Nodules  Eval for Biliary tract pathology -   HTN  HLD  OP  OA  DM  Thyroid Disease    cardio follow up  right thorax - thoracentesis - 400 cc -     spoke with Dtr  reviewed CT chest and abdomen  Cardio eval - follows with Dr Del Cdi in the office - diuresis - cvs rx regimen optimization  monitor VS and HD and Sat  Surgery and GI eval for abnormal Biliary - GI CT imaging - eval cholangitis - malignancy -   GOC discussion  assist with needs  serial labs  serial ABD exam  keep sat > 88 pct

## 2023-01-29 NOTE — PROGRESS NOTE ADULT - PROBLEM SELECTOR PLAN 3
RESOLVED  Probably demand ischemia in the setting of ADHF.   - EKG shows Afib with RVR @ 105 bpm, minimal voltage criteria for LVH, non specify T wave   - Troponins trended to peak  - Pt w/o acute ACS symptoms  - Cardio following, recs appreciated RESOLVED  Probably demand ischemia in the setting of ADHF.   - EKG shows Afib with RVR @ 105 bpm, minimal voltage criteria for LVH, non specify T wave   - Troponins trended to peak, no need to further trend  - Pt w/o acute ACS symptoms  - Cardio following, recs appreciated

## 2023-01-29 NOTE — PROGRESS NOTE ADULT - PROBLEM SELECTOR PLAN 2
Probably secondary to metformin and CKD   - ABG on admission 7.4/28/85/17  - Given NaHCO3 50 meq x1   - Nephro Dr. Oleary consulted Probably secondary to metformin and CKD   - ABG on admission 7.4/28/85/17  - Given NaHCO3 50 meq x1   - Nephro Dr. Dickinson following, recs appreciated

## 2023-01-29 NOTE — PROGRESS NOTE ADULT - ATTENDING COMMENTS
Patient seen at bedside.   reports feeling well  tolerated diet without any nausea/vomiting or abdominal pain  s/p thoracentesis 1/27    A/P  acute on chronic HFrEF  Afib, chronic      - cardio following    - lasix 40mg IV BID, held     - pulm following. CT with moderate pleural effusions. s/p 400 cc fluid removed.      YVETTE    - lasix held today given elevated Cr. Cr 1.6    - nephro following.     - bladder scan 143. voiding     transaminitis likely secondary to CHF  abnormal CT    - seen by GI and surgery. ?duodenal-colonic fistula. d/w daughter at bedside. currently want to hold off on invasive measures for her abdomen. Patient tolerating diet. denies any abdominal pain.     - ID eval appreciated. no abx needed at this time.     Prominent wall at vaginal introitus on CT   - d/w daughter. will follow up with PMD. She does not want too much other workup because it can overwhelm patient. agreeable with outpatient follow up with PMD/gyn.     - UA negative.     DVT proph: eliquis  xanax at night to help with agitation/sundowning.

## 2023-01-29 NOTE — PROGRESS NOTE ADULT - PROBLEM SELECTOR PLAN 4
initially in RVR on arrival to the ed HR now improved  - Continue home metoprolol succinate 100 mg BID and incr digoxin 125 mcg qod --> qd  - Continue home Eliquis 2.5 BID for AC

## 2023-01-29 NOTE — PHYSICAL THERAPY INITIAL EVALUATION ADULT - ADDITIONAL COMMENTS
Pt lives w/ her dtr and dtr's family in a house, no steps. Pt ambulates independently with RW and dtr assists with ADLs as needed.

## 2023-01-29 NOTE — PROGRESS NOTE ADULT - ASSESSMENT
88 yo F with PMHx of HTN, HLD, HFrEF (EF 20-25% on 12/22), T2DM, hypothyroidism, alzheimer's dementia, chronic Afib who presents today for SOB, admitted for acute on chronic congestive heart failure exacerbation     HF, Afib  - Patient is not complaining of any cardiac symptoms at this time.  - Troponin mildly elevated x2 (144.9 --> 156.9) likely 2/2 demand in setting of CHF exacerbation, trend to peak   - , CKMB 2.5, CPK 1.5; wnl   - Pro-BNP >81521  - CT chest and abdomen with pleural effusions -s/p R thoracentesis 1/27, now RA. Non orthopneic.   - Cr uptrending, hold Lasix today, reassess tomorrow   - Recent TTE on 12/22/2022 LV enlargement with severe left ventricular systolic dysfunction, LVEF of 20-25%, apex and anterior walls akinetic, inferior and inferolateral walls hypokinetic, biatrial enlargement, Moderate MR, Mild TR      - EKG: Atrial fibrillation with RVR, minimal voltage criteria for LVH, nonspecific T wave abnormality; improved lateral leads from prior EKG on 12/29/2022   - no anginal complaints  - continue statin    - Tele reviewed, Afib rate controlled   - Continue Digoxin 125 mcg PO daily and Metoprolol succinate 100 mg BID  - Contiue Eliquis     - Monitor and replete lytes, keep K>4, Mg>2.  - Will continue to follow.    Samuel Alcazar NP  Nurse Practitioner- Cardiology   Spectra #[phone]

## 2023-01-29 NOTE — PROGRESS NOTE ADULT - PROBLEM SELECTOR PLAN 11
DVT ppx: Eliquis 2.5 mg BID      Updated daughter Machelle via telephone regarding updated care plan, including tolerating thora yesterday (transudative results) holding lasix, and resuming eliquis, and answered all questions. DVT ppx: Eliquis 2.5 mg BID    Updated daughter Machelle via phone

## 2023-01-29 NOTE — PROGRESS NOTE ADULT - PROBLEM SELECTOR PLAN 7
CKD, Cr increased today to 1.5, baseline appears to be 1.2 per chart review  f/u bladder scan  f/u AM renal indices and lytes CKD, Cr increased today to 1.6, baseline appears to be 1.2 per chart review  - Monitor off Lasix  - Bladder scan 1/29 ~100cc  - F/u UA  - F/u AM renal indices and lytes

## 2023-01-29 NOTE — PROGRESS NOTE ADULT - SUBJECTIVE AND OBJECTIVE BOX
Subjective: c/o fatigue, thirst.       MEDICATIONS  (STANDING):  ALPRAZolam 0.25 milliGRAM(s) Oral <User Schedule>  apixaban 2.5 milliGRAM(s) Oral every 12 hours  atorvastatin 40 milliGRAM(s) Oral at bedtime  cholecalciferol 2000 Unit(s) Oral daily  dextrose 5%. 1000 milliLiter(s) (100 mL/Hr) IV Continuous <Continuous>  dextrose 5%. 1000 milliLiter(s) (50 mL/Hr) IV Continuous <Continuous>  dextrose 50% Injectable 25 Gram(s) IV Push once  dextrose 50% Injectable 12.5 Gram(s) IV Push once  dextrose 50% Injectable 25 Gram(s) IV Push once  digoxin     Tablet 125 MICROGram(s) Oral daily  glucagon  Injectable 1 milliGRAM(s) IntraMuscular once  insulin lispro (ADMELOG) corrective regimen sliding scale   SubCutaneous three times a day before meals  insulin lispro (ADMELOG) corrective regimen sliding scale   SubCutaneous at bedtime  levothyroxine 75 MICROGram(s) Oral daily  memantine 10 milliGRAM(s) Oral two times a day  metoprolol succinate  milliGRAM(s) Oral two times a day    MEDICATIONS  (PRN):  aluminum hydroxide/magnesium hydroxide/simethicone Suspension 30 milliLiter(s) Oral every 4 hours PRN Dyspepsia  dextrose Oral Gel 15 Gram(s) Oral once PRN Blood Glucose LESS THAN 70 milliGRAM(s)/deciliter  melatonin 3 milliGRAM(s) Oral at bedtime PRN Insomnia  ondansetron Injectable 4 milliGRAM(s) IV Push every 8 hours PRN Nausea and/or Vomiting          T(C): 36.6 (01-29-23 @ 12:04), Max: 37.1 (01-28-23 @ 20:34)  HR: 92 (01-29-23 @ 12:04) (71 - 100)  BP: 145/89 (01-29-23 @ 12:04) (114/76 - 145/89)  RR: 18 (01-29-23 @ 12:04) (17 - 18)  SpO2: 92% (01-29-23 @ 12:04) (92% - 95%)  Wt(kg): --        I&O's Detail    28 Jan 2023 07:01  -  29 Jan 2023 07:00  --------------------------------------------------------  IN:  Total IN: 0 mL    OUT:    Voided (mL): 800 mL  Total OUT: 800 mL    Total NET: -800 mL               PHYSICAL EXAM:    GENERAL:   NECK: Supple, no inc in JVP  CHEST/LUNG: Clear  HEART: S1S2  ABDOMEN: Soft, Nontender, Nondistended; Bowel sounds present  EXTREMITIES:  some edema        LABS:  CBC Full  -  ( 29 Jan 2023 08:10 )  WBC Count : 9.93 K/uL  RBC Count : 4.37 M/uL  Hemoglobin : 12.2 g/dL  Hematocrit : 38.5 %  Platelet Count - Automated : 307 K/uL  Mean Cell Volume : 88.1 fl  Mean Cell Hemoglobin : 27.9 pg  Mean Cell Hemoglobin Concentration : 31.7 gm/dL  Auto Neutrophil # : x  Auto Lymphocyte # : x  Auto Monocyte # : x  Auto Eosinophil # : x  Auto Basophil # : x  Auto Neutrophil % : x  Auto Lymphocyte % : x  Auto Monocyte % : x  Auto Eosinophil % : x  Auto Basophil % : x    01-29    135  |  100  |  55<H>  ----------------------------<  234<H>  4.6   |  31  |  1.60<H>    Ca    9.1      29 Jan 2023 08:10  Phos  3.8     01-29  Mg     2.1     01-29    TPro  7.4  /  Alb  2.7<L>  /  TBili  0.9  /  DBili  x   /  AST  47<H>  /  ALT  98<H>  /  AlkPhos  89  01-29        Impression:        CKD, mild Cr flux near baseline  Hyponatremia, hypervolemic, high ADH state of severe CM. Better  Systolic CM, EF 20-25%, mod MR  Abnormal LFTs  Hypertension  Diabetes    Recommendations:   * Monitor off lasix  * Follow bladder scan and repeat Cr in 24h

## 2023-01-29 NOTE — PROGRESS NOTE ADULT - SUBJECTIVE AND OBJECTIVE BOX
Date/Time Patient Seen:  		  Referring MD:   Data Reviewed	       Patient is a 87y old  Female who presents with a chief complaint of ADHF (28 Jan 2023 12:33)      Subjective/HPI     PAST MEDICAL & SURGICAL HISTORY:  Atrial fibrillation    Hypothyroidism    Mild Alzheimer&#x27;s dementia    Diabetes mellitus    Acute on chronic systolic congestive heart failure    Hypertension    Hyperlipemia    Cardiac LV ejection fraction 21-30%  12/22    History of appendectomy    H/O hernia repair    History of cholecystectomy          Medication list         MEDICATIONS  (STANDING):  ALPRAZolam 0.25 milliGRAM(s) Oral <User Schedule>  apixaban 2.5 milliGRAM(s) Oral every 12 hours  atorvastatin 40 milliGRAM(s) Oral at bedtime  cholecalciferol 2000 Unit(s) Oral daily  dextrose 5%. 1000 milliLiter(s) (50 mL/Hr) IV Continuous <Continuous>  dextrose 5%. 1000 milliLiter(s) (100 mL/Hr) IV Continuous <Continuous>  dextrose 50% Injectable 25 Gram(s) IV Push once  dextrose 50% Injectable 25 Gram(s) IV Push once  dextrose 50% Injectable 12.5 Gram(s) IV Push once  digoxin     Tablet 125 MICROGram(s) Oral daily  glucagon  Injectable 1 milliGRAM(s) IntraMuscular once  insulin lispro (ADMELOG) corrective regimen sliding scale   SubCutaneous three times a day before meals  insulin lispro (ADMELOG) corrective regimen sliding scale   SubCutaneous at bedtime  levothyroxine 75 MICROGram(s) Oral daily  memantine 10 milliGRAM(s) Oral two times a day  metoprolol succinate  milliGRAM(s) Oral two times a day    MEDICATIONS  (PRN):  aluminum hydroxide/magnesium hydroxide/simethicone Suspension 30 milliLiter(s) Oral every 4 hours PRN Dyspepsia  dextrose Oral Gel 15 Gram(s) Oral once PRN Blood Glucose LESS THAN 70 milliGRAM(s)/deciliter  melatonin 3 milliGRAM(s) Oral at bedtime PRN Insomnia  ondansetron Injectable 4 milliGRAM(s) IV Push every 8 hours PRN Nausea and/or Vomiting         Vitals log        ICU Vital Signs Last 24 Hrs  T(C): 36.4 (29 Jan 2023 04:43), Max: 37.1 (28 Jan 2023 20:34)  T(F): 97.5 (29 Jan 2023 04:43), Max: 98.7 (28 Jan 2023 20:34)  HR: 87 (29 Jan 2023 04:43) (70 - 100)  BP: 114/76 (29 Jan 2023 04:43) (110/70 - 131/87)  BP(mean): --  ABP: --  ABP(mean): --  RR: 18 (29 Jan 2023 04:43) (16 - 18)  SpO2: 95% (29 Jan 2023 04:43) (93% - 95%)    O2 Parameters below as of 29 Jan 2023 04:43  Patient On (Oxygen Delivery Method): room air                 Input and Output:  I&O's Detail    27 Jan 2023 07:01  -  28 Jan 2023 07:00  --------------------------------------------------------  IN:  Total IN: 0 mL    OUT:    Voided (mL): 250 mL  Total OUT: 250 mL    Total NET: -250 mL      28 Jan 2023 07:01  -  29 Jan 2023 06:48  --------------------------------------------------------  IN:  Total IN: 0 mL    OUT:    Voided (mL): 800 mL  Total OUT: 800 mL    Total NET: -800 mL          Lab Data                        12.5   10.57 )-----------( 287      ( 28 Jan 2023 08:30 )             38.8     01-28    132<L>  |  96  |  41<H>  ----------------------------<  217<H>  4.4   |  29  |  1.50<H>    Ca    9.3      28 Jan 2023 08:30  Phos  3.4     01-27  Mg     1.4     01-27    TPro  7.3  /  Alb  2.7<L>  /  TBili  0.9  /  DBili  x   /  AST  68<H>  /  ALT  117<H>  /  AlkPhos  90  01-28            Review of Systems	      Objective     Physical Examination    heart s1s2  lung dec BS  head nc      Pertinent Lab findings & Imaging      Julieth:  NO   Adequate UO     I&O's Detail    27 Jan 2023 07:01  -  28 Jan 2023 07:00  --------------------------------------------------------  IN:  Total IN: 0 mL    OUT:    Voided (mL): 250 mL  Total OUT: 250 mL    Total NET: -250 mL      28 Jan 2023 07:01  -  29 Jan 2023 06:48  --------------------------------------------------------  IN:  Total IN: 0 mL    OUT:    Voided (mL): 800 mL  Total OUT: 800 mL    Total NET: -800 mL               Discussed with:     Cultures:	        Radiology

## 2023-01-29 NOTE — PROGRESS NOTE ADULT - ASSESSMENT
87 years old female patient with PMHx of HTN, HLD, HFrEF (EF 20-25% on 12/22), T2DM, hypothyroidism, alzheimer's dementia, chronic Afib admitted for ADHF. s/p R 400 cc Transudative thoracentesis 2/2 CHF

## 2023-01-29 NOTE — PROGRESS NOTE ADULT - PROBLEM SELECTOR PLAN 8
T2DM non insulin dependent   - HgbA1c from 12/22/22 7.8  - Hold home metformin   - Low Insulin sliding scale  - Accu checks w/ hypoglycemic protocol T2DM non insulin dependent   - HgbA1c from 12/22/22 7.8  - Hold home metformin   - BG elevated 401 on 1/28  - LDISS --> moderate ISS  - Accu checks w/ hypoglycemic protocol

## 2023-01-29 NOTE — PROGRESS NOTE ADULT - PROBLEM SELECTOR PLAN 6
Acute on chronic, per daughter liver enzymes have been slightly elevated on previous visits to her pmd however have never been as high as on admission   - AST/ALT downtrending  - Trend hepatic function   - Avoid hepatotoxic medications  - Consider discontinuing statin if liver enzymes uptrend  - GI Dr. Sheikh jc, recs appreciated

## 2023-01-29 NOTE — PROGRESS NOTE ADULT - SUBJECTIVE AND OBJECTIVE BOX
[facility] -- David Le,  Dylan, Siddhartha Grider [place], [place]  Office # [phone]    Follow Up:  HF, Afib RVR, pleural effusion    Subjective/Observations:  Pt seen and examined, resting comfortably in bed.  No complaints of chest pain, dyspnea, or palpitations reported. Tolerating RA, able to lay flat.       REVIEW OF SYSTEMS: All other review of systems is negative unless indicated above  PAST MEDICAL & SURGICAL HISTORY:  Atrial fibrillation      Hypothyroidism      Mild Alzheimer&#x27;s dementia      Diabetes mellitus      Acute on chronic systolic congestive heart failure      Hypertension      Hyperlipemia      Cardiac LV ejection fraction 21-30%  12/22      History of appendectomy      H/O hernia repair      History of cholecystectomy        MEDICATIONS  (STANDING):  ALPRAZolam 0.25 milliGRAM(s) Oral <User Schedule>  apixaban 2.5 milliGRAM(s) Oral every 12 hours  atorvastatin 40 milliGRAM(s) Oral at bedtime  cholecalciferol 2000 Unit(s) Oral daily  dextrose 5%. 1000 milliLiter(s) (100 mL/Hr) IV Continuous <Continuous>  dextrose 5%. 1000 milliLiter(s) (50 mL/Hr) IV Continuous <Continuous>  dextrose 50% Injectable 25 Gram(s) IV Push once  dextrose 50% Injectable 12.5 Gram(s) IV Push once  dextrose 50% Injectable 25 Gram(s) IV Push once  digoxin     Tablet 125 MICROGram(s) Oral daily  glucagon  Injectable 1 milliGRAM(s) IntraMuscular once  insulin lispro (ADMELOG) corrective regimen sliding scale   SubCutaneous three times a day before meals  insulin lispro (ADMELOG) corrective regimen sliding scale   SubCutaneous at bedtime  levothyroxine 75 MICROGram(s) Oral daily  memantine 10 milliGRAM(s) Oral two times a day  metoprolol succinate  milliGRAM(s) Oral two times a day    MEDICATIONS  (PRN):  aluminum hydroxide/magnesium hydroxide/simethicone Suspension 30 milliLiter(s) Oral every 4 hours PRN Dyspepsia  dextrose Oral Gel 15 Gram(s) Oral once PRN Blood Glucose LESS THAN 70 milliGRAM(s)/deciliter  melatonin 3 milliGRAM(s) Oral at bedtime PRN Insomnia  ondansetron Injectable 4 milliGRAM(s) IV Push every 8 hours PRN Nausea and/or Vomiting    Allergies    No Known Allergies    Intolerances      Vital Signs Last 24 Hrs  T(C): 36.4 (29 Jan 2023 04:43), Max: 37.1 (28 Jan 2023 20:34)  T(F): 97.5 (29 Jan 2023 04:43), Max: 98.7 (28 Jan 2023 20:34)  HR: 87 (29 Jan 2023 04:43) (71 - 100)  BP: 114/76 (29 Jan 2023 04:43) (114/76 - 131/87)  BP(mean): --  RR: 18 (29 Jan 2023 04:43) (17 - 18)  SpO2: 95% (29 Jan 2023 04:43) (93% - 95%)    Parameters below as of 29 Jan 2023 04:43  Patient On (Oxygen Delivery Method): room air      I&O's Summary    28 Jan 2023 07:01  -  29 Jan 2023 07:00  --------------------------------------------------------  IN: 0 mL / OUT: 800 mL / NET: -800 mL        TELE: Afib rate controlled  PHYSICAL EXAM:  Constitutional: NAD, awake and alert  HEENT: Moist Mucous Membranes, Anicteric  Pulmonary: Non-labored, breath sounds are clear bilaterally, No wheezing, rales or rhonchi  Cardiovascular: IRRR,  S1 and S2, No murmurs, rubs, gallops or clicks  Gastrointestinal: Bowel Sounds present, soft, nontender.   Lymph: trace b/l LE peripheral edema. No lymphadenopathy.  Skin: No visible rashes or ulcers.  Psych:  Mood & affect appropriate, confuse    LABS: All Labs Reviewed:                        12.2   9.93  )-----------( 307      ( 29 Jan 2023 08:10 )             38.5                         12.5   10.57 )-----------( 287      ( 28 Jan 2023 08:30 )             38.8                         12.0   10.81 )-----------( 311      ( 27 Jan 2023 08:18 )             37.2     29 Jan 2023 08:10    135    |  100    |  55     ----------------------------<  234    4.6     |  31     |  1.60   28 Jan 2023 08:30    132    |  96     |  41     ----------------------------<  217    4.4     |  29     |  1.50   27 Jan 2023 08:18    131    |  94     |  30     ----------------------------<  200    3.3     |  29     |  1.20     Ca    9.1        29 Jan 2023 08:10  Ca    9.3        28 Jan 2023 08:30  Ca    8.7        27 Jan 2023 08:18  Phos  3.8       29 Jan 2023 08:10  Phos  3.4       27 Jan 2023 08:18  Mg     2.1       29 Jan 2023 08:10  Mg     1.4       27 Jan 2023 08:18    TPro  7.4    /  Alb  2.7    /  TBili  0.9    /  DBili  x      /  AST  47     /  ALT  98     /  AlkPhos  89     29 Jan 2023 08:10  TPro  7.3    /  Alb  2.7    /  TBili  0.9    /  DBili  x      /  AST  68     /  ALT  117    /  AlkPhos  90     28 Jan 2023 08:30  TPro  7.3    /  Alb  2.7    /  TBili  1.5    /  DBili  x      /  AST  72     /  ALT  134    /  AlkPhos  84     27 Jan 2023 08:18          12 Lead ECG:   Ventricular Rate 105 BPM    QRS Duration 96 ms    Q-T Interval 336 ms    QTC Calculation(Bazett) 444 ms    R Axis 28 degrees    T Axis 263 degrees    Diagnosis Line Atrial fibrillation with rapid ventricular response  Minimal voltage criteria for LVH, may be normal variant ( Morganza product )  Nonspecific T wave abnormality  Abnormal ECG    Confirmed by rogerio Harvey ([id number]) on 1/26/2023 1:29:07 PM (01-25-23 @ 19:15)      ACC: [id number] EXAM:  ECHO TTE WO CON COMP W DOPP                          PROCEDURE DATE:  12/22/2022          INTERPRETATION:  INDICATION: Abnormal EKG  Sonographer KL    Blood Pressure 142/86    Height 158 cm     Weight 51.7 kg       BSA 1.5   sqm    Dimensions:  LA 3.6       Normal Values: 2.0 - 4.0 cm  Ao 3.2        Normal Values: 2.0 - 3.8 cm  SEPTUM 1.0       Normal Values: 0.6 - 1.2 cm  PWT 0.9       Normal Values: 0.6 - 1.1 cm  LVIDd 5.8         Normal Values: 3.0 - 5.6 cm  LVIDs 4.3      Normal Values: 1.8 - 4.0 cm      OBSERVATIONS:  Mitral Valve: Moderate MR.  Aortic Valve/Aorta: normal trileaflet aortic valve.  Tricuspid Valve: Mild TR.  Pulmonic Valve: Mild PI  Left Atrium: Enlarged  Right Atrium: Enlarged  Left Ventricle: Left ventricular enlargement with severe left ventricular   systolic dysfunction, estimated LVEF of 20-25%. The entire apex and   anterior walls appear akinetic. The inferior and inferolateral walls   appear hypokinetic. Remaining walls are not well-visualized  Right Ventricle: Grossly normal size and systolic function.  Pericardium: no significant pericardial effusion.  Pulmonary/RV Pressure: estimated PA systolic pressure of at least 35 mmHg   assuming an RA pressure of 3mmHg.        IMPRESSION:  Left ventricular enlargement with severe left ventricular systolic   dysfunction, estimated LVEF of 20-25%. The entire apex and anterior walls   appear akinetic. The inferior and inferolateral walls appear hypokinetic.  Grossly normal RV size and systolic function.  Biatrial enlargement  Normal trileaflet aortic valve, without AI.  Moderate MR  Mild TR.  No significant pericardial effusion.    --- End of Report ---            JOHN BAEZA MD; Attending Cardiologist  This document has been electronically signed. Dec 23 2022  4:40PM

## 2023-01-30 LAB
ALBUMIN SERPL ELPH-MCNC: 2.4 G/DL — LOW (ref 3.3–5)
ALP SERPL-CCNC: 99 U/L — SIGNIFICANT CHANGE UP (ref 40–120)
ALT FLD-CCNC: 81 U/L — HIGH (ref 12–78)
ANION GAP SERPL CALC-SCNC: 5 MMOL/L — SIGNIFICANT CHANGE UP (ref 5–17)
AST SERPL-CCNC: 49 U/L — HIGH (ref 15–37)
BASOPHILS # BLD AUTO: 0.05 K/UL — SIGNIFICANT CHANGE UP (ref 0–0.2)
BASOPHILS NFR BLD AUTO: 0.5 % — SIGNIFICANT CHANGE UP (ref 0–2)
BILIRUB SERPL-MCNC: 0.9 MG/DL — SIGNIFICANT CHANGE UP (ref 0.2–1.2)
BUN SERPL-MCNC: 52 MG/DL — HIGH (ref 7–23)
CALCIUM SERPL-MCNC: 8.6 MG/DL — SIGNIFICANT CHANGE UP (ref 8.5–10.1)
CHLORIDE SERPL-SCNC: 99 MMOL/L — SIGNIFICANT CHANGE UP (ref 96–108)
CO2 SERPL-SCNC: 30 MMOL/L — SIGNIFICANT CHANGE UP (ref 22–31)
CREAT SERPL-MCNC: 1.5 MG/DL — HIGH (ref 0.5–1.3)
EGFR: 34 ML/MIN/1.73M2 — LOW
EOSINOPHIL # BLD AUTO: 0.17 K/UL — SIGNIFICANT CHANGE UP (ref 0–0.5)
EOSINOPHIL NFR BLD AUTO: 1.8 % — SIGNIFICANT CHANGE UP (ref 0–6)
GLUCOSE SERPL-MCNC: 258 MG/DL — HIGH (ref 70–99)
HCT VFR BLD CALC: 36.5 % — SIGNIFICANT CHANGE UP (ref 34.5–45)
HGB BLD-MCNC: 11.3 G/DL — LOW (ref 11.5–15.5)
IMM GRANULOCYTES NFR BLD AUTO: 0.4 % — SIGNIFICANT CHANGE UP (ref 0–0.9)
LYMPHOCYTES # BLD AUTO: 1.71 K/UL — SIGNIFICANT CHANGE UP (ref 1–3.3)
LYMPHOCYTES # BLD AUTO: 18.5 % — SIGNIFICANT CHANGE UP (ref 13–44)
MAGNESIUM SERPL-MCNC: 2 MG/DL — SIGNIFICANT CHANGE UP (ref 1.6–2.6)
MCHC RBC-ENTMCNC: 27.3 PG — SIGNIFICANT CHANGE UP (ref 27–34)
MCHC RBC-ENTMCNC: 31 GM/DL — LOW (ref 32–36)
MCV RBC AUTO: 88.2 FL — SIGNIFICANT CHANGE UP (ref 80–100)
MONOCYTES # BLD AUTO: 1.19 K/UL — HIGH (ref 0–0.9)
MONOCYTES NFR BLD AUTO: 12.9 % — SIGNIFICANT CHANGE UP (ref 2–14)
NEUTROPHILS # BLD AUTO: 6.09 K/UL — SIGNIFICANT CHANGE UP (ref 1.8–7.4)
NEUTROPHILS NFR BLD AUTO: 65.9 % — SIGNIFICANT CHANGE UP (ref 43–77)
NRBC # BLD: 0 /100 WBCS — SIGNIFICANT CHANGE UP (ref 0–0)
PHOSPHATE SERPL-MCNC: 2.6 MG/DL — SIGNIFICANT CHANGE UP (ref 2.5–4.5)
PLATELET # BLD AUTO: 295 K/UL — SIGNIFICANT CHANGE UP (ref 150–400)
POTASSIUM SERPL-MCNC: 4.6 MMOL/L — SIGNIFICANT CHANGE UP (ref 3.5–5.3)
POTASSIUM SERPL-SCNC: 4.6 MMOL/L — SIGNIFICANT CHANGE UP (ref 3.5–5.3)
PROT SERPL-MCNC: 6.9 G/DL — SIGNIFICANT CHANGE UP (ref 6–8.3)
RBC # BLD: 4.14 M/UL — SIGNIFICANT CHANGE UP (ref 3.8–5.2)
RBC # FLD: 16.7 % — HIGH (ref 10.3–14.5)
SODIUM SERPL-SCNC: 134 MMOL/L — LOW (ref 135–145)
WBC # BLD: 9.25 K/UL — SIGNIFICANT CHANGE UP (ref 3.8–10.5)
WBC # FLD AUTO: 9.25 K/UL — SIGNIFICANT CHANGE UP (ref 3.8–10.5)

## 2023-01-30 PROCEDURE — 99232 SBSQ HOSP IP/OBS MODERATE 35: CPT

## 2023-01-30 PROCEDURE — 99233 SBSQ HOSP IP/OBS HIGH 50: CPT | Mod: GC

## 2023-01-30 RX ORDER — ALPRAZOLAM 0.25 MG
0.25 TABLET ORAL
Refills: 0 | Status: DISCONTINUED | OUTPATIENT
Start: 2023-01-30 | End: 2023-01-31

## 2023-01-30 RX ORDER — SENNA PLUS 8.6 MG/1
2 TABLET ORAL AT BEDTIME
Refills: 0 | Status: DISCONTINUED | OUTPATIENT
Start: 2023-01-30 | End: 2023-01-31

## 2023-01-30 RX ORDER — INSULIN GLARGINE 100 [IU]/ML
10 INJECTION, SOLUTION SUBCUTANEOUS AT BEDTIME
Refills: 0 | Status: DISCONTINUED | OUTPATIENT
Start: 2023-01-30 | End: 2023-01-31

## 2023-01-30 RX ORDER — MAGNESIUM HYDROXIDE 400 MG/1
30 TABLET, CHEWABLE ORAL ONCE
Refills: 0 | Status: COMPLETED | OUTPATIENT
Start: 2023-01-30 | End: 2023-01-30

## 2023-01-30 RX ORDER — INSULIN LISPRO 100/ML
2 VIAL (ML) SUBCUTANEOUS
Refills: 0 | Status: DISCONTINUED | OUTPATIENT
Start: 2023-01-30 | End: 2023-01-31

## 2023-01-30 RX ORDER — LANOLIN ALCOHOL/MO/W.PET/CERES
3 CREAM (GRAM) TOPICAL AT BEDTIME
Refills: 0 | Status: DISCONTINUED | OUTPATIENT
Start: 2023-01-30 | End: 2023-01-31

## 2023-01-30 RX ADMIN — Medication 125 MICROGRAM(S): at 06:21

## 2023-01-30 RX ADMIN — APIXABAN 2.5 MILLIGRAM(S): 2.5 TABLET, FILM COATED ORAL at 17:57

## 2023-01-30 RX ADMIN — MEMANTINE HYDROCHLORIDE 10 MILLIGRAM(S): 10 TABLET ORAL at 06:22

## 2023-01-30 RX ADMIN — Medication 4: at 08:27

## 2023-01-30 RX ADMIN — Medication 2000 UNIT(S): at 12:11

## 2023-01-30 RX ADMIN — ATORVASTATIN CALCIUM 40 MILLIGRAM(S): 80 TABLET, FILM COATED ORAL at 22:37

## 2023-01-30 RX ADMIN — Medication 10: at 12:12

## 2023-01-30 RX ADMIN — APIXABAN 2.5 MILLIGRAM(S): 2.5 TABLET, FILM COATED ORAL at 06:20

## 2023-01-30 RX ADMIN — INSULIN GLARGINE 10 UNIT(S): 100 INJECTION, SOLUTION SUBCUTANEOUS at 22:38

## 2023-01-30 RX ADMIN — Medication 3 MILLIGRAM(S): at 22:37

## 2023-01-30 RX ADMIN — SENNA PLUS 2 TABLET(S): 8.6 TABLET ORAL at 22:36

## 2023-01-30 RX ADMIN — POLYETHYLENE GLYCOL 3350 17 GRAM(S): 17 POWDER, FOR SOLUTION ORAL at 22:36

## 2023-01-30 RX ADMIN — MEMANTINE HYDROCHLORIDE 10 MILLIGRAM(S): 10 TABLET ORAL at 17:56

## 2023-01-30 RX ADMIN — Medication 75 MICROGRAM(S): at 06:20

## 2023-01-30 RX ADMIN — Medication 2: at 17:57

## 2023-01-30 RX ADMIN — MAGNESIUM HYDROXIDE 30 MILLILITER(S): 400 TABLET, CHEWABLE ORAL at 17:56

## 2023-01-30 RX ADMIN — Medication 2 UNIT(S): at 12:12

## 2023-01-30 RX ADMIN — Medication 100 MILLIGRAM(S): at 06:20

## 2023-01-30 RX ADMIN — Medication 2 UNIT(S): at 17:57

## 2023-01-30 NOTE — PROGRESS NOTE ADULT - SUBJECTIVE AND OBJECTIVE BOX
Patient is a 87y old  Female who presents with a chief complaint of ADHF (30 Jan 2023 12:11)      INTERVAL HPI/OVERNIGHT EVENTS: Patient seen and examined at bedside with daughter Machelle present. . No overnight events occurred. Patient has no complaints at this time. Patient is sitting up in bed after breakfast. Lasix have been held due to uptrending Cr. Saturating well on RA.       MEDICATIONS  (STANDING):  ALPRAZolam 0.25 milliGRAM(s) Oral <User Schedule>  apixaban 2.5 milliGRAM(s) Oral every 12 hours  atorvastatin 40 milliGRAM(s) Oral at bedtime  cholecalciferol 2000 Unit(s) Oral daily  dextrose 5%. 1000 milliLiter(s) (100 mL/Hr) IV Continuous <Continuous>  dextrose 5%. 1000 milliLiter(s) (50 mL/Hr) IV Continuous <Continuous>  dextrose 50% Injectable 25 Gram(s) IV Push once  dextrose 50% Injectable 12.5 Gram(s) IV Push once  dextrose 50% Injectable 25 Gram(s) IV Push once  digoxin     Tablet 125 MICROGram(s) Oral daily  glucagon  Injectable 1 milliGRAM(s) IntraMuscular once  insulin glargine Injectable (LANTUS) 10 Unit(s) SubCutaneous at bedtime  insulin lispro (ADMELOG) corrective regimen sliding scale   SubCutaneous three times a day before meals  insulin lispro (ADMELOG) corrective regimen sliding scale   SubCutaneous at bedtime  insulin lispro Injectable (ADMELOG) 2 Unit(s) SubCutaneous three times a day before meals  levothyroxine 75 MICROGram(s) Oral daily  memantine 10 milliGRAM(s) Oral two times a day  metoprolol succinate  milliGRAM(s) Oral two times a day  polyethylene glycol 3350 17 Gram(s) Oral at bedtime  senna 2 Tablet(s) Oral at bedtime    MEDICATIONS  (PRN):  aluminum hydroxide/magnesium hydroxide/simethicone Suspension 30 milliLiter(s) Oral every 4 hours PRN Dyspepsia  dextrose Oral Gel 15 Gram(s) Oral once PRN Blood Glucose LESS THAN 70 milliGRAM(s)/deciliter  melatonin 3 milliGRAM(s) Oral at bedtime PRN Insomnia  ondansetron Injectable 4 milliGRAM(s) IV Push every 8 hours PRN Nausea and/or Vomiting      Allergies    No Known Allergies    Intolerances        REVIEW OF SYSTEMS:  CONSTITUTIONAL: No fever or chills  HEENT:  No headache, no sore throat  RESPIRATORY: No cough, wheezing, or shortness of breath  CARDIOVASCULAR: No chest pain, palpitations  GASTROINTESTINAL: endorses constipation  GENITOURINARY: No dysuria, frequency, or hematuria  NEUROLOGICAL: no focal weakness or dizziness  MUSCULOSKELETAL: no myalgias     Vital Signs Last 24 Hrs  T(C): 36.4 (30 Jan 2023 13:24), Max: 36.7 (30 Jan 2023 04:59)  T(F): 97.5 (30 Jan 2023 13:24), Max: 98 (30 Jan 2023 04:59)  HR: 73 (30 Jan 2023 13:24) (73 - 100)  BP: 105/76 (30 Jan 2023 13:24) (105/76 - 128/82)  BP(mean): --  RR: 18 (30 Jan 2023 13:24) (18 - 19)  SpO2: 94% (30 Jan 2023 13:24) (92% - 94%)    Parameters below as of 30 Jan 2023 13:24  Patient On (Oxygen Delivery Method): room air      PHYSICAL EXAM:  GENERAL: NAD, pleasant  HEENT:  anicteric, moist mucous membranes  CHEST/LUNG: decreased breath sounds b/l with bibasilar crackles; speaking in full sentences, no increased WOB  HEART:  RRR, S1, S2  ABDOMEN:  BS+, soft, nontender, nondistended  EXTREMITIES: no cyanosis, or calf tenderness. Trace b/l LE nonpitting edema L>6  NERVOUS SYSTEM: answers questions and follows commands appropriately    LABS:                        11.3   9.25  )-----------( 295      ( 30 Jan 2023 08:30 )             36.5     CBC Full  -  ( 30 Jan 2023 08:30 )  WBC Count : 9.25 K/uL  Hemoglobin : 11.3 g/dL  Hematocrit : 36.5 %  Platelet Count - Automated : 295 K/uL  Mean Cell Volume : 88.2 fl  Mean Cell Hemoglobin : 27.3 pg  Mean Cell Hemoglobin Concentration : 31.0 gm/dL  Auto Neutrophil # : 6.09 K/uL  Auto Lymphocyte # : 1.71 K/uL  Auto Monocyte # : 1.19 K/uL  Auto Eosinophil # : 0.17 K/uL  Auto Basophil # : 0.05 K/uL  Auto Neutrophil % : 65.9 %  Auto Lymphocyte % : 18.5 %  Auto Monocyte % : 12.9 %  Auto Eosinophil % : 1.8 %  Auto Basophil % : 0.5 %    30 Jan 2023 08:30    134    |  99     |  52     ----------------------------<  258    4.6     |  30     |  1.50     Ca    8.6        30 Jan 2023 08:30  Phos  2.6       30 Jan 2023 08:30  Mg     2.0       30 Jan 2023 08:30    TPro  6.9    /  Alb  2.4    /  TBili  0.9    /  DBili  x      /  AST  49     /  ALT  81     /  AlkPhos  99     30 Jan 2023 08:30        CAPILLARY BLOOD GLUCOSE      POCT Blood Glucose.: 363 mg/dL (30 Jan 2023 12:04)  POCT Blood Glucose.: 246 mg/dL (30 Jan 2023 08:09)  POCT Blood Glucose.: 340 mg/dL (29 Jan 2023 21:16)  POCT Blood Glucose.: 328 mg/dL (29 Jan 2023 17:25)        Culture - Fungal, Body Fluid (collected 01-27-23 @ 11:54)  Source: Pleural Fl Pleural Fluid  Preliminary Report (01-30-23 @ 09:24):    Testing in progress    Culture - Body Fluid with Gram Stain (collected 01-27-23 @ 11:54)  Source: Pleural Fl Pleural Fluid  Gram Stain (01-28-23 @ 03:08):    polymorphonuclear leukocytes seen    No organisms seen    by cytocentrifuge  Preliminary Report (01-28-23 @ 20:40):    No growth to date.    Culture - Blood (collected 01-26-23 @ 01:15)  Source: .Blood Blood-Venous  Preliminary Report (01-27-23 @ 09:01):    No growth to date.    Culture - Blood (collected 01-26-23 @ 01:15)  Source: .Blood Blood-Venous  Preliminary Report (01-27-23 @ 09:01):    No growth to date.        RADIOLOGY & ADDITIONAL TESTS:    Personally reviewed.     Consultant(s) Notes Reviewed:  [x] YES  [ ] NO     Patient is a 87y old  Female who presents with a chief complaint of ADHF (30 Jan 2023 12:11)      INTERVAL HPI/OVERNIGHT EVENTS: Patient seen and examined at bedside with daughter Machelle present. . No overnight events occurred. Patient has no complaints at this time. Patient is sitting up in bed after breakfast. Lasix have been held due to uptrending Cr. Saturating well on RA. Pt reports last bowel movement was several days ago.       MEDICATIONS  (STANDING):  ALPRAZolam 0.25 milliGRAM(s) Oral <User Schedule>  apixaban 2.5 milliGRAM(s) Oral every 12 hours  atorvastatin 40 milliGRAM(s) Oral at bedtime  cholecalciferol 2000 Unit(s) Oral daily  dextrose 5%. 1000 milliLiter(s) (100 mL/Hr) IV Continuous <Continuous>  dextrose 5%. 1000 milliLiter(s) (50 mL/Hr) IV Continuous <Continuous>  dextrose 50% Injectable 25 Gram(s) IV Push once  dextrose 50% Injectable 12.5 Gram(s) IV Push once  dextrose 50% Injectable 25 Gram(s) IV Push once  digoxin     Tablet 125 MICROGram(s) Oral daily  glucagon  Injectable 1 milliGRAM(s) IntraMuscular once  insulin glargine Injectable (LANTUS) 10 Unit(s) SubCutaneous at bedtime  insulin lispro (ADMELOG) corrective regimen sliding scale   SubCutaneous three times a day before meals  insulin lispro (ADMELOG) corrective regimen sliding scale   SubCutaneous at bedtime  insulin lispro Injectable (ADMELOG) 2 Unit(s) SubCutaneous three times a day before meals  levothyroxine 75 MICROGram(s) Oral daily  memantine 10 milliGRAM(s) Oral two times a day  metoprolol succinate  milliGRAM(s) Oral two times a day  polyethylene glycol 3350 17 Gram(s) Oral at bedtime  senna 2 Tablet(s) Oral at bedtime    MEDICATIONS  (PRN):  aluminum hydroxide/magnesium hydroxide/simethicone Suspension 30 milliLiter(s) Oral every 4 hours PRN Dyspepsia  dextrose Oral Gel 15 Gram(s) Oral once PRN Blood Glucose LESS THAN 70 milliGRAM(s)/deciliter  melatonin 3 milliGRAM(s) Oral at bedtime PRN Insomnia  ondansetron Injectable 4 milliGRAM(s) IV Push every 8 hours PRN Nausea and/or Vomiting      Allergies    No Known Allergies    Intolerances        REVIEW OF SYSTEMS:  CONSTITUTIONAL: No fever or chills  HEENT:  No headache, no sore throat  RESPIRATORY: No cough, wheezing, or shortness of breath  CARDIOVASCULAR: No chest pain, palpitations  GASTROINTESTINAL: endorses constipation  GENITOURINARY: cloudy urine  NEUROLOGICAL: no focal weakness or dizziness  MUSCULOSKELETAL: no myalgias     Vital Signs Last 24 Hrs  T(C): 36.4 (30 Jan 2023 13:24), Max: 36.7 (30 Jan 2023 04:59)  T(F): 97.5 (30 Jan 2023 13:24), Max: 98 (30 Jan 2023 04:59)  HR: 73 (30 Jan 2023 13:24) (73 - 100)  BP: 105/76 (30 Jan 2023 13:24) (105/76 - 128/82)  BP(mean): --  RR: 18 (30 Jan 2023 13:24) (18 - 19)  SpO2: 94% (30 Jan 2023 13:24) (92% - 94%)    Parameters below as of 30 Jan 2023 13:24  Patient On (Oxygen Delivery Method): room air      PHYSICAL EXAM:  GENERAL: NAD, pleasant  HEENT:  anicteric, moist mucous membranes  CHEST/LUNG: decreased breath sounds b/l with bibasilar crackles; speaking in full sentences, no increased WOB  HEART:  RRR, S1, S2  ABDOMEN:  BS+, soft, nontender, nondistended  EXTREMITIES: no cyanosis, or calf tenderness. Trace b/l LE nonpitting edema L>6  NERVOUS SYSTEM: answers questions and follows commands appropriately    LABS:                        11.3   9.25  )-----------( 295      ( 30 Jan 2023 08:30 )             36.5     CBC Full  -  ( 30 Jan 2023 08:30 )  WBC Count : 9.25 K/uL  Hemoglobin : 11.3 g/dL  Hematocrit : 36.5 %  Platelet Count - Automated : 295 K/uL  Mean Cell Volume : 88.2 fl  Mean Cell Hemoglobin : 27.3 pg  Mean Cell Hemoglobin Concentration : 31.0 gm/dL  Auto Neutrophil # : 6.09 K/uL  Auto Lymphocyte # : 1.71 K/uL  Auto Monocyte # : 1.19 K/uL  Auto Eosinophil # : 0.17 K/uL  Auto Basophil # : 0.05 K/uL  Auto Neutrophil % : 65.9 %  Auto Lymphocyte % : 18.5 %  Auto Monocyte % : 12.9 %  Auto Eosinophil % : 1.8 %  Auto Basophil % : 0.5 %    30 Jan 2023 08:30    134    |  99     |  52     ----------------------------<  258    4.6     |  30     |  1.50     Ca    8.6        30 Jan 2023 08:30  Phos  2.6       30 Jan 2023 08:30  Mg     2.0       30 Jan 2023 08:30    TPro  6.9    /  Alb  2.4    /  TBili  0.9    /  DBili  x      /  AST  49     /  ALT  81     /  AlkPhos  99     30 Jan 2023 08:30        CAPILLARY BLOOD GLUCOSE      POCT Blood Glucose.: 363 mg/dL (30 Jan 2023 12:04)  POCT Blood Glucose.: 246 mg/dL (30 Jan 2023 08:09)  POCT Blood Glucose.: 340 mg/dL (29 Jan 2023 21:16)  POCT Blood Glucose.: 328 mg/dL (29 Jan 2023 17:25)        Culture - Fungal, Body Fluid (collected 01-27-23 @ 11:54)  Source: Pleural Fl Pleural Fluid  Preliminary Report (01-30-23 @ 09:24):    Testing in progress    Culture - Body Fluid with Gram Stain (collected 01-27-23 @ 11:54)  Source: Pleural Fl Pleural Fluid  Gram Stain (01-28-23 @ 03:08):    polymorphonuclear leukocytes seen    No organisms seen    by cytocentrifuge  Preliminary Report (01-28-23 @ 20:40):    No growth to date.    Culture - Blood (collected 01-26-23 @ 01:15)  Source: .Blood Blood-Venous  Preliminary Report (01-27-23 @ 09:01):    No growth to date.    Culture - Blood (collected 01-26-23 @ 01:15)  Source: .Blood Blood-Venous  Preliminary Report (01-27-23 @ 09:01):    No growth to date.        RADIOLOGY & ADDITIONAL TESTS:    Personally reviewed.     Consultant(s) Notes Reviewed:  [x] YES  [ ] NO

## 2023-01-30 NOTE — PROGRESS NOTE ADULT - ATTENDING COMMENTS
Patient seen at bedside.   reports feeling well  tolerated diet without any nausea/vomiting or abdominal pain. completing meals.   Accuchecks noted to be elevated.   s/p thoracentesis 1/27  on RA.     A/P  acute on chronic HFrEF  Afib, chronic      - cardio following    - lasix 40mg IV BID, held today as well given elevated Cr. reassess in AM if to restart.     - pulm following. CT with moderate pleural effusions. s/p 400 cc fluid removed.     - pleural fluid negative for malignant cells. BC NGTD. culture so far negative.      YVETTE    - lasix held today as well. Cr 1.5     - nephro following.     - bladder scan 143. voiding     transaminitis likely secondary to CHF  abnormal CT    - seen by GI and surgery. ?duodenal-colonic fistula. d/w daughter at bedside. currently want to hold off on invasive measures for her abdomen. Patient tolerating diet. denies any abdominal pain.     - ID eval appreciated. no abx needed at this time.     Prominent wall at vaginal introitus on CT   - d/w daughter. will follow up with PMD. She does not want too much other workup because it can overwhelm patient. agreeable with outpatient follow up with PMD/gyn.     - UA negative.     uncontrolled DM    - will add lantus 10 units at night and 2 units admelog with meals.     - moderate ISS    - hgba1c 7.8     DVT proph: eliquis  xanax at night to help with agitation/sundowning.  daughter Machelle at bedside. request repeat UA. will order.   constipation, added milk of magnesia and senna. Miralax.

## 2023-01-30 NOTE — PROGRESS NOTE ADULT - ASSESSMENT
elevated lfts  chf  abnormal CT     suspect elevated lfts 2/2 passive congestion; trending down  diuresis per cardiology  normal liver on CT   dilated cbd likely 2/2 pancreatic atrophy  doubt duodenal-colonic fistula; patient and dtr deny severe diarrhea  bowel regimen  will follow     I reviewed the overnight course of events on the unit, re-confirming the patient history. I discussed the care with the patient and their family  The plan of care was discussed with the physician assistant and modifications were made to the notation where appropriate.   Differential diagnosis and plan of care discussed with patient after the evaluation  35 minutes spent on total encounter of which more than fifty percent of the encounter was spent counseling and/or coordinating care by the attending physician.  Advanced care planning was discussed with patient and family.  Advanced care planning forms were reviewed and discussed.  Risks, benefits and alternatives of gastroenterologic procedures were discussed in detail and all questions were answered.

## 2023-01-30 NOTE — PROGRESS NOTE ADULT - TIME BILLING
coordination of care with medical consultants, medical residents, [facility] [place]/ [place]
coordination of care with medical consultants, medical residents, [facility] [place]
coordination of care with medical consultants, medical residents, [facility] [place]
coordination of care with medical consultants, medical residents, [facility] [place]
coordination of care with medical consultants, medical residents, [facility] [place]

## 2023-01-30 NOTE — PROGRESS NOTE ADULT - PROBLEM SELECTOR PLAN 1
Patient presents with shortness of breath likely 2/2 acute on chronic CHF exacerbation  - CT chest showed moderate right and small to moderate left pleural effusion with compressive atelectasis.  - Last TTE on 12/22  showed Left ventricular enlargement with severe left ventricular systolic dysfunction, estimated LVEF of 20-25%. The entire apex and anterior walls appear akinetic. The inferior and inferolateral walls appear hypokinetic.  - On Lasix 40 mg IVP BID w/hold parameters. HELD 1/28-1/30  - Cr 1.5 --> 1.6, continue to hold Lasix 1/29, 1/30  - Continue Metoprolol succinate 100 mg BID, increase digoxin 125mg qod --> qd  - Trops elevated 2/2 demand ischemia in the setting of CHF  - Fluid restriction, Strict I/Os, daily weights  - Monitor and replace electrolytes  - S/p R thoracentesis 1/27 with 400cc collected transudative effusion  - Cardiology Dr. Richardson following, recs appreciated  - Pulm Dr. Isaac following, recs appreciated  - Nephro Dr. Dickinson following, recs appreciated

## 2023-01-30 NOTE — PROGRESS NOTE ADULT - PROBLEM SELECTOR PLAN 3
RESOLVED  Probably demand ischemia in the setting of ADHF.   - EKG shows Afib with RVR @ 105 bpm, minimal voltage criteria for LVH, non specify T wave   - Troponins trended to peak, no need to further trend  - Pt w/o acute ACS symptoms  - Cardio following, recs appreciated

## 2023-01-30 NOTE — PROGRESS NOTE ADULT - SUBJECTIVE AND OBJECTIVE BOX
[facility]  Murphy Jenkins PA-C  [address] [place] [address]  [phone]      INTERVAL HPI/OVERNIGHT EVENTS:  Pt s/e with daughter at bedside  Reports constipation  Otherwise no new GI complaints    MEDICATIONS  (STANDING):  ALPRAZolam 0.25 milliGRAM(s) Oral <User Schedule>  apixaban 2.5 milliGRAM(s) Oral every 12 hours  atorvastatin 40 milliGRAM(s) Oral at bedtime  cholecalciferol 2000 Unit(s) Oral daily  dextrose 5%. 1000 milliLiter(s) (100 mL/Hr) IV Continuous <Continuous>  dextrose 5%. 1000 milliLiter(s) (50 mL/Hr) IV Continuous <Continuous>  dextrose 50% Injectable 25 Gram(s) IV Push once  dextrose 50% Injectable 12.5 Gram(s) IV Push once  dextrose 50% Injectable 25 Gram(s) IV Push once  digoxin     Tablet 125 MICROGram(s) Oral daily  glucagon  Injectable 1 milliGRAM(s) IntraMuscular once  insulin glargine Injectable (LANTUS) 10 Unit(s) SubCutaneous at bedtime  insulin lispro (ADMELOG) corrective regimen sliding scale   SubCutaneous three times a day before meals  insulin lispro (ADMELOG) corrective regimen sliding scale   SubCutaneous at bedtime  insulin lispro Injectable (ADMELOG) 2 Unit(s) SubCutaneous three times a day before meals  levothyroxine 75 MICROGram(s) Oral daily  memantine 10 milliGRAM(s) Oral two times a day  metoprolol succinate  milliGRAM(s) Oral two times a day  polyethylene glycol 3350 17 Gram(s) Oral at bedtime  senna 2 Tablet(s) Oral at bedtime    MEDICATIONS  (PRN):  aluminum hydroxide/magnesium hydroxide/simethicone Suspension 30 milliLiter(s) Oral every 4 hours PRN Dyspepsia  dextrose Oral Gel 15 Gram(s) Oral once PRN Blood Glucose LESS THAN 70 milliGRAM(s)/deciliter  melatonin 3 milliGRAM(s) Oral at bedtime PRN Insomnia  ondansetron Injectable 4 milliGRAM(s) IV Push every 8 hours PRN Nausea and/or Vomiting      Allergies    No Known Allergies    PHYSICAL EXAM:   Vital Signs:  Vital Signs Last 24 Hrs  T(C): 36.7 (2023 04:59), Max: 36.7 (2023 04:59)  T(F): 98 (2023 04:59), Max: 98 (2023 04:59)  HR: 99 (2023 04:59) (96 - 100)  BP: 125/78 (2023 04:59) (124/74 - 128/82)  BP(mean): --  RR: 19 (2023 04:59) (18 - 19)  SpO2: 94% (2023 04:59) (92% - 94%)    Parameters below as of 2023 04:59  Patient On (Oxygen Delivery Method): room air      Daily     Daily Weight in k.4 (2023 04:59)    GENERAL:  Appears stated age  HEENT:  NC/AT  CHEST:  Full & symmetric excursion  HEART:  Regular rhythm  ABDOMEN:  Soft, non-tender, non-distended  EXTEREMITIES:  no cyanosis  SKIN:  No rash  NEURO:  Alert      LABS:                        11.3   9.25  )-----------( 295      ( 2023 08:30 )             36.5     01-30    134<L>  |  99  |  52<H>  ----------------------------<  258<H>  4.6   |  30  |  1.50<H>    Ca    8.6      2023 08:30  Phos  2.6       Mg     2.0         TPro  6.9  /  Alb  2.4<L>  /  TBili  0.9  /  DBili  x   /  AST  49<H>  /  ALT  81<H>  /  AlkPhos  99

## 2023-01-30 NOTE — PROGRESS NOTE ADULT - PROBLEM SELECTOR PLAN 7
CKD, Cr increased today to 1.6, baseline appears to be 1.2 per chart review  - Monitor off Lasix  - Bladder scan 1/29 ~100cc  - F/u UA  - F/u AM renal indices and lytes

## 2023-01-30 NOTE — PROGRESS NOTE ADULT - ASSESSMENT
88 yo F with PMHx of HTN, HLD, HFrEF (EF 20-25% on 12/22), T2DM, hypothyroidism, alzheimer's dementia, chronic Afib who presents today for SOB, admitted for acute on chronic congestive heart failure exacerbation     HF, Afib  - not complaining of any cardiac symptoms at this time.  - Troponin mildly elevated x2 (144.9 --> 156.9) likely 2/2 demand in setting of CHF exacerbation, trend to peak   - Pro-BNP >73437  - CT chest and abdomen with pleural effusions  - s/p R thoracentesis with 400 cc fluid removal (1/27), now RA. Non orthopneic. pulm following   - noted Cr ~ 1.5 - 1.6  would not diurese today, would reassess in AM     - TTE (12/22/2022) LV enlargement with severe left ventricular systolic dysfunction, LVEF of 20-25%, apex and anterior walls akinetic, inferior and inferolateral walls hypokinetic, biatrial enlargement, Moderate MR, Mild TR      - EKG: Atrial fibrillation with RVR, minimal voltage criteria for LVH, nonspecific T wave abnormality; improved lateral leads from prior EKG on 12/29/2022   - no anginal complaints  - continue statin    - Tele reviewed, Afib rate controlled, no events noted overnight   - Continue Digoxin 125 mcg PO daily   - continue Metoprolol succinate 100 mg BID  - Continue Eliquis   - Monitor and replete Lytes. Keep K > 4 and Mg > 2    - Will continue to follow.    Lola Garza, [facility]  Nurse Practitioner - Cardiology   Spectra #[phone]/ [phone]

## 2023-01-30 NOTE — PROGRESS NOTE ADULT - SUBJECTIVE AND OBJECTIVE BOX
[facility] -- David Le Wachsman, Pannella, Patel, Savella, Goodger  Office # [phone]    Follow Up: HF, Afib RVR, pleural effusion    Subjective/Observations: seen and examined, awake, alert, resting comfortably in bed, denies chest pain, dyspnea, palpitations or dizziness. Tolerating room air. states breathing much better after thoracentesis     REVIEW OF SYSTEMS: All other review of systems is negative unless indicated above  PAST MEDICAL & SURGICAL HISTORY:  Atrial fibrillation      Hypothyroidism      Mild Alzheimer&#x27;s dementia      Diabetes mellitus      Acute on chronic systolic congestive heart failure      Hypertension      Hyperlipemia      Cardiac LV ejection fraction 21-30%  12/22      History of appendectomy      H/O hernia repair      History of cholecystectomy        MEDICATIONS  (STANDING):  ALPRAZolam 0.25 milliGRAM(s) Oral <User Schedule>  apixaban 2.5 milliGRAM(s) Oral every 12 hours  atorvastatin 40 milliGRAM(s) Oral at bedtime  cholecalciferol 2000 Unit(s) Oral daily  dextrose 5%. 1000 milliLiter(s) (100 mL/Hr) IV Continuous <Continuous>  dextrose 5%. 1000 milliLiter(s) (50 mL/Hr) IV Continuous <Continuous>  dextrose 50% Injectable 25 Gram(s) IV Push once  dextrose 50% Injectable 12.5 Gram(s) IV Push once  dextrose 50% Injectable 25 Gram(s) IV Push once  digoxin     Tablet 125 MICROGram(s) Oral daily  glucagon  Injectable 1 milliGRAM(s) IntraMuscular once  insulin lispro (ADMELOG) corrective regimen sliding scale   SubCutaneous three times a day before meals  insulin lispro (ADMELOG) corrective regimen sliding scale   SubCutaneous at bedtime  levothyroxine 75 MICROGram(s) Oral daily  memantine 10 milliGRAM(s) Oral two times a day  metoprolol succinate  milliGRAM(s) Oral two times a day  polyethylene glycol 3350 17 Gram(s) Oral at bedtime    MEDICATIONS  (PRN):  aluminum hydroxide/magnesium hydroxide/simethicone Suspension 30 milliLiter(s) Oral every 4 hours PRN Dyspepsia  dextrose Oral Gel 15 Gram(s) Oral once PRN Blood Glucose LESS THAN 70 milliGRAM(s)/deciliter  melatonin 3 milliGRAM(s) Oral at bedtime PRN Insomnia  ondansetron Injectable 4 milliGRAM(s) IV Push every 8 hours PRN Nausea and/or Vomiting    Allergies    No Known Allergies    Intolerances      Vital Signs Last 24 Hrs  T(C): 36.7 (30 Jan 2023 04:59), Max: 36.7 (30 Jan 2023 04:59)  T(F): 98 (30 Jan 2023 04:59), Max: 98 (30 Jan 2023 04:59)  HR: 99 (30 Jan 2023 04:59) (92 - 100)  BP: 125/78 (30 Jan 2023 04:59) (124/74 - 145/89)  BP(mean): --  RR: 19 (30 Jan 2023 04:59) (18 - 19)  SpO2: 94% (30 Jan 2023 04:59) (92% - 94%)    Parameters below as of 30 Jan 2023 04:59  Patient On (Oxygen Delivery Method): room air      I&O's Summary    29 Jan 2023 07:01  -  30 Jan 2023 07:00  --------------------------------------------------------  IN: 0 mL / OUT: 1100 mL / NET: -1100 mL        TELE: [id number]   PHYSICAL EXAM:  Constitutional: NAD, awake and alert, well-developed  HEENT: Moist Mucous Membranes, Anicteric  Pulmonary: Non-labored, breath sounds are clear bilaterally, No wheezing, rales or rhonchi  Cardiovascular: IRRR,  S1 and S2, No murmurs, rubs, gallops or clicks  Gastrointestinal: Bowel Sounds present, soft, nontender.   Lymph: trace peripheral edema. No lymphadenopathy.  Skin: No visible rashes or ulcers.  Psych:  Mood & affect appropriate  LABS: All Labs Reviewed:                        11.3   9.25  )-----------( 295      ( 30 Jan 2023 08:30 )             36.5                         12.2   9.93  )-----------( 307      ( 29 Jan 2023 08:10 )             38.5                         12.5   10.57 )-----------( 287      ( 28 Jan 2023 08:30 )             38.8     30 Jan 2023 08:30    134    |  99     |  52     ----------------------------<  258    4.6     |  30     |  1.50   29 Jan 2023 08:10    135    |  100    |  55     ----------------------------<  234    4.6     |  31     |  1.60   28 Jan 2023 08:30    132    |  96     |  41     ----------------------------<  217    4.4     |  29     |  1.50     Ca    8.6        30 Jan 2023 08:30  Ca    9.1        29 Jan 2023 08:10  Ca    9.3        28 Jan 2023 08:30  Phos  2.6       30 Jan 2023 08:30  Phos  3.8       29 Jan 2023 08:10  Mg     2.0       30 Jan 2023 08:30  Mg     2.1       29 Jan 2023 08:10    TPro  6.9    /  Alb  2.4    /  TBili  0.9    /  DBili  x      /  AST  49     /  ALT  81     /  AlkPhos  99     30 Jan 2023 08:30  TPro  7.4    /  Alb  2.7    /  TBili  0.9    /  DBili  x      /  AST  47     /  ALT  98     /  AlkPhos  89     29 Jan 2023 08:10  TPro  7.3    /  Alb  2.7    /  TBili  0.9    /  DBili  x      /  AST  68     /  ALT  117    /  AlkPhos  90     28 Jan 2023 08:30          12 Lead ECG:   Ventricular Rate 105 BPM    QRS Duration 96 ms    Q-T Interval 336 ms    QTC Calculation(Bazett) 444 ms    R Axis 28 degrees    T Axis 263 degrees    Diagnosis Line Atrial fibrillation with rapid ventricular response  Minimal voltage criteria for LVH, may be normal variant ( Jonathan product )  Nonspecific T wave abnormality  Abnormal ECG    Confirmed by rogerio Harvey ([id number]) on 1/26/2023 1:29:07 PM (01-25-23 @ 19:15)      ACC: [id number] EXAM:  ECHO TTE WO CON COMP W DOPP                          PROCEDURE DATE:  12/22/2022          INTERPRETATION:  INDICATION: Abnormal EKG  Sonographer KL    Blood Pressure 142/86    Height 158 cm     Weight 51.7 kg       BSA 1.5   sqm    Dimensions:  LA 3.6       Normal Values: 2.0 - 4.0 cm  Ao 3.2        Normal Values: 2.0 - 3.8 cm  SEPTUM 1.0       Normal Values: 0.6 - 1.2 cm  PWT 0.9       Normal Values: 0.6 - 1.1 cm  LVIDd 5.8         Normal Values: 3.0 - 5.6 cm  LVIDs 4.3      Normal Values: 1.8 - 4.0 cm      OBSERVATIONS:  Mitral Valve: Moderate MR.  Aortic Valve/Aorta: normal trileaflet aortic valve.  Tricuspid Valve: Mild TR.  Pulmonic Valve: Mild PI  Left Atrium: Enlarged  Right Atrium: Enlarged  Left Ventricle: Left ventricular enlargement with severe left ventricular   systolic dysfunction, estimated LVEF of 20-25%. The entire apex and   anterior walls appear akinetic. The inferior and inferolateral walls   appear hypokinetic. Remaining walls are not well-visualized  Right Ventricle: Grossly normal size and systolic function.  Pericardium: no significant pericardial effusion.  Pulmonary/RV Pressure: estimated PA systolic pressure of at least 35 mmHg   assuming an RA pressure of 3mmHg.        IMPRESSION:  Left ventricular enlargement with severe left ventricular systolic   dysfunction, estimated LVEF of 20-25%. The entire apex and anterior walls   appear akinetic. The inferior and inferolateral walls appear hypokinetic.  Grossly normal RV size and systolic function.  Biatrial enlargement  Normal trileaflet aortic valve, without AI.  Moderate MR  Mild TR.  No significant pericardial effusion.    --- End of Report ---            JOHN BAEZA MD; Attending Cardiologist  This document has been electronically signed. Dec 23 2022  4:40PM

## 2023-01-30 NOTE — PROGRESS NOTE ADULT - PROBLEM SELECTOR PLAN 2
Probably secondary to metformin and CKD   - ABG on admission 7.4/28/85/17  - Given NaHCO3 50 meq x1   - Nephro Dr. Dickinson following, recs appreciated

## 2023-01-30 NOTE — PROGRESS NOTE ADULT - ASSESSMENT
87 years old female patient with PMHx of HTN, HLD, HFrEF (EF 20-25% on 12/22), T2DM, hypothyroidism, alzheimer's dementia, chronic Afib who presents for SOB.    mild Dementia  Dyspnea  Pleural Effusions  HF  Atelectasis  Sub Cm Pulm Nodules  Eval for Biliary tract pathology -   HTN  HLD  OP  OA  DM  Thyroid Disease    cardio follow up  right thorax - thoracentesis - 400 cc - transudate -     spoke with Dtr  reviewed CT chest and abdomen  Cardio eval - follows with Dr Del Cid in the office - diuresis - cvs rx regimen optimization  monitor VS and HD and Sat  Surgery and GI eval for abnormal Biliary - GI CT imaging - eval cholangitis - malignancy -   GOC discussion  assist with needs  serial labs  serial ABD exam  keep sat > 88 pct

## 2023-01-30 NOTE — PROGRESS NOTE ADULT - SUBJECTIVE AND OBJECTIVE BOX
Date/Time Patient Seen:  		  Referring MD:   Data Reviewed	       Patient is a 87y old  Female who presents with a chief complaint of ADHF (29 Jan 2023 14:33)      Subjective/HPI     PAST MEDICAL & SURGICAL HISTORY:  Atrial fibrillation    Hypothyroidism    Mild Alzheimer&#x27;s dementia    Diabetes mellitus    Acute on chronic systolic congestive heart failure    Hypertension    Hyperlipemia    Cardiac LV ejection fraction 21-30%  12/22    History of appendectomy    H/O hernia repair    History of cholecystectomy          Medication list         MEDICATIONS  (STANDING):  ALPRAZolam 0.25 milliGRAM(s) Oral <User Schedule>  apixaban 2.5 milliGRAM(s) Oral every 12 hours  atorvastatin 40 milliGRAM(s) Oral at bedtime  cholecalciferol 2000 Unit(s) Oral daily  dextrose 5%. 1000 milliLiter(s) (100 mL/Hr) IV Continuous <Continuous>  dextrose 5%. 1000 milliLiter(s) (50 mL/Hr) IV Continuous <Continuous>  dextrose 50% Injectable 25 Gram(s) IV Push once  dextrose 50% Injectable 12.5 Gram(s) IV Push once  dextrose 50% Injectable 25 Gram(s) IV Push once  digoxin     Tablet 125 MICROGram(s) Oral daily  glucagon  Injectable 1 milliGRAM(s) IntraMuscular once  insulin lispro (ADMELOG) corrective regimen sliding scale   SubCutaneous three times a day before meals  insulin lispro (ADMELOG) corrective regimen sliding scale   SubCutaneous at bedtime  levothyroxine 75 MICROGram(s) Oral daily  memantine 10 milliGRAM(s) Oral two times a day  metoprolol succinate  milliGRAM(s) Oral two times a day  polyethylene glycol 3350 17 Gram(s) Oral at bedtime    MEDICATIONS  (PRN):  aluminum hydroxide/magnesium hydroxide/simethicone Suspension 30 milliLiter(s) Oral every 4 hours PRN Dyspepsia  dextrose Oral Gel 15 Gram(s) Oral once PRN Blood Glucose LESS THAN 70 milliGRAM(s)/deciliter  melatonin 3 milliGRAM(s) Oral at bedtime PRN Insomnia  ondansetron Injectable 4 milliGRAM(s) IV Push every 8 hours PRN Nausea and/or Vomiting         Vitals log        ICU Vital Signs Last 24 Hrs  T(C): 36.7 (30 Jan 2023 04:59), Max: 36.7 (30 Jan 2023 04:59)  T(F): 98 (30 Jan 2023 04:59), Max: 98 (30 Jan 2023 04:59)  HR: 99 (30 Jan 2023 04:59) (92 - 100)  BP: 125/78 (30 Jan 2023 04:59) (124/74 - 145/89)  BP(mean): --  ABP: --  ABP(mean): --  RR: 19 (30 Jan 2023 04:59) (18 - 19)  SpO2: 94% (30 Jan 2023 04:59) (92% - 94%)    O2 Parameters below as of 30 Jan 2023 04:59  Patient On (Oxygen Delivery Method): room air                 Input and Output:  I&O's Detail    28 Jan 2023 07:01  -  29 Jan 2023 07:00  --------------------------------------------------------  IN:  Total IN: 0 mL    OUT:    Voided (mL): 800 mL  Total OUT: 800 mL    Total NET: -800 mL      29 Jan 2023 07:01  -  30 Jan 2023 06:17  --------------------------------------------------------  IN:  Total IN: 0 mL    OUT:    Voided (mL): 1100 mL  Total OUT: 1100 mL    Total NET: -1100 mL          Lab Data                        12.2   9.93  )-----------( 307      ( 29 Jan 2023 08:10 )             38.5     01-29    135  |  100  |  55<H>  ----------------------------<  234<H>  4.6   |  31  |  1.60<H>    Ca    9.1      29 Jan 2023 08:10  Phos  3.8     01-29  Mg     2.1     01-29    TPro  7.4  /  Alb  2.7<L>  /  TBili  0.9  /  DBili  x   /  AST  47<H>  /  ALT  98<H>  /  AlkPhos  89  01-29            Review of Systems	      Objective     Physical Examination    heart s1s2  lung dc BS  head nc      Pertinent Lab findings & Imaging      Julieth:  NO   Adequate UO     I&O's Detail    28 Jan 2023 07:01  -  29 Jan 2023 07:00  --------------------------------------------------------  IN:  Total IN: 0 mL    OUT:    Voided (mL): 800 mL  Total OUT: 800 mL    Total NET: -800 mL      29 Jan 2023 07:01  -  30 Jan 2023 06:17  --------------------------------------------------------  IN:  Total IN: 0 mL    OUT:    Voided (mL): 1100 mL  Total OUT: 1100 mL    Total NET: -1100 mL               Discussed with:     Cultures:	        Radiology

## 2023-01-30 NOTE — PROGRESS NOTE ADULT - NUTRITIONAL ASSESSMENT
This patient has been assessed with a concern for Malnutrition and has been determined to have a diagnosis/diagnoses of Moderate protein-calorie malnutrition.    This patient is being managed with:   Diet Consistent Carbohydrate w/Evening Snack-  1200mL Fluid Restriction ([id number])  Low Sodium  Entered: Jan 26 2023  2:22AM    
This patient has been assessed with a concern for Malnutrition and has been determined to have a diagnosis/diagnoses of Moderate protein-calorie malnutrition.    This patient is being managed with:   Diet Consistent Carbohydrate w/Evening Snack-  1200mL Fluid Restriction ([id number])  Low Sodium  Entered: Jan 26 2023  2:22AM    
This patient has been assessed with a concern for Malnutrition and has been determined to have a diagnosis/diagnoses of Moderate protein-calorie malnutrition.    This patient is being managed with:   Diet Consistent Carbohydrate w/Evening Snack-  1200mL Fluid Restriction ([id number])  Low Sodium  Entered: Jan 26 2023  2:22AM    
This patient has been assessed with a concern for Malnutrition and has been determined to have a diagnosis/diagnoses of Moderate protein-calorie malnutrition.    This patient is being managed with:   Diet Consistent Carbohydrate w/Evening Snack-  1200mL Fluid Restriction ([id number])  Low Sodium  Entered: Jan 26 2023  2:22AM

## 2023-01-30 NOTE — PROGRESS NOTE ADULT - PROBLEM SELECTOR PLAN 8
T2DM non insulin dependent   - HgbA1c from 12/22/22 7.8  - Hold home metformin   - BG elevated 401 on 1/28  - LDISS --> moderate ISS  - 10u lantus, 2U admelog TID (started on 1/30)  - Accu checks w/ hypoglycemic protocol

## 2023-01-30 NOTE — PROGRESS NOTE ADULT - PROBLEM SELECTOR PROBLEM 2
I called patient because there is a referral in her chart to Dr José Hanna from Dr Tejinder Wynne  I did confirm that she would like to get on the schedule with Dr José Hanna for ultrasound-guided corticosteroid injection to the left biceps tendon  I let her know that someone would be calling her back to schedule  Patient also prefers to schedule this in [place]        [phone]   
Metabolic acidosis

## 2023-01-31 ENCOUNTER — TRANSCRIPTION ENCOUNTER (OUTPATIENT)
Age: 88
End: 2023-01-31

## 2023-01-31 VITALS
DIASTOLIC BLOOD PRESSURE: 79 MMHG | SYSTOLIC BLOOD PRESSURE: 125 MMHG | HEART RATE: 80 BPM | RESPIRATION RATE: 17 BRPM | OXYGEN SATURATION: 96 % | TEMPERATURE: 97 F

## 2023-01-31 LAB
ANION GAP SERPL CALC-SCNC: 4 MMOL/L — LOW (ref 5–17)
BUN SERPL-MCNC: 50 MG/DL — HIGH (ref 7–23)
CALCIUM SERPL-MCNC: 8.8 MG/DL — SIGNIFICANT CHANGE UP (ref 8.5–10.1)
CHLORIDE SERPL-SCNC: 101 MMOL/L — SIGNIFICANT CHANGE UP (ref 96–108)
CO2 SERPL-SCNC: 30 MMOL/L — SIGNIFICANT CHANGE UP (ref 22–31)
CREAT SERPL-MCNC: 1.5 MG/DL — HIGH (ref 0.5–1.3)
CULTURE RESULTS: SIGNIFICANT CHANGE UP
CULTURE RESULTS: SIGNIFICANT CHANGE UP
EGFR: 34 ML/MIN/1.73M2 — LOW
GLUCOSE SERPL-MCNC: 225 MG/DL — HIGH (ref 70–99)
HCT VFR BLD CALC: 35.4 % — SIGNIFICANT CHANGE UP (ref 34.5–45)
HGB BLD-MCNC: 10.9 G/DL — LOW (ref 11.5–15.5)
MCHC RBC-ENTMCNC: 27.1 PG — SIGNIFICANT CHANGE UP (ref 27–34)
MCHC RBC-ENTMCNC: 30.8 GM/DL — LOW (ref 32–36)
MCV RBC AUTO: 88.1 FL — SIGNIFICANT CHANGE UP (ref 80–100)
NRBC # BLD: 0 /100 WBCS — SIGNIFICANT CHANGE UP (ref 0–0)
PLATELET # BLD AUTO: 285 K/UL — SIGNIFICANT CHANGE UP (ref 150–400)
POTASSIUM SERPL-MCNC: 5.1 MMOL/L — SIGNIFICANT CHANGE UP (ref 3.5–5.3)
POTASSIUM SERPL-SCNC: 5.1 MMOL/L — SIGNIFICANT CHANGE UP (ref 3.5–5.3)
RBC # BLD: 4.02 M/UL — SIGNIFICANT CHANGE UP (ref 3.8–5.2)
RBC # FLD: 16.8 % — HIGH (ref 10.3–14.5)
SODIUM SERPL-SCNC: 135 MMOL/L — SIGNIFICANT CHANGE UP (ref 135–145)
SPECIMEN SOURCE: SIGNIFICANT CHANGE UP
SPECIMEN SOURCE: SIGNIFICANT CHANGE UP
WBC # BLD: 9.51 K/UL — SIGNIFICANT CHANGE UP (ref 3.8–10.5)
WBC # FLD AUTO: 9.51 K/UL — SIGNIFICANT CHANGE UP (ref 3.8–10.5)

## 2023-01-31 PROCEDURE — 84443 ASSAY THYROID STIM HORMONE: CPT

## 2023-01-31 PROCEDURE — 85610 PROTHROMBIN TIME: CPT

## 2023-01-31 PROCEDURE — 83615 LACTATE (LD) (LDH) ENZYME: CPT

## 2023-01-31 PROCEDURE — 71045 X-RAY EXAM CHEST 1 VIEW: CPT

## 2023-01-31 PROCEDURE — 36415 COLL VENOUS BLD VENIPUNCTURE: CPT

## 2023-01-31 PROCEDURE — G1004: CPT

## 2023-01-31 PROCEDURE — 82945 GLUCOSE OTHER FLUID: CPT

## 2023-01-31 PROCEDURE — 82962 GLUCOSE BLOOD TEST: CPT

## 2023-01-31 PROCEDURE — 89051 BODY FLUID CELL COUNT: CPT

## 2023-01-31 PROCEDURE — 87040 BLOOD CULTURE FOR BACTERIA: CPT

## 2023-01-31 PROCEDURE — 82042 OTHER SOURCE ALBUMIN QUAN EA: CPT

## 2023-01-31 PROCEDURE — 71250 CT THORAX DX C-: CPT | Mod: MG

## 2023-01-31 PROCEDURE — 74176 CT ABD & PELVIS W/O CONTRAST: CPT | Mod: MG

## 2023-01-31 PROCEDURE — 84100 ASSAY OF PHOSPHORUS: CPT

## 2023-01-31 PROCEDURE — 97116 GAIT TRAINING THERAPY: CPT

## 2023-01-31 PROCEDURE — C1729: CPT

## 2023-01-31 PROCEDURE — 87205 SMEAR GRAM STAIN: CPT

## 2023-01-31 PROCEDURE — 83605 ASSAY OF LACTIC ACID: CPT

## 2023-01-31 PROCEDURE — 84484 ASSAY OF TROPONIN QUANT: CPT

## 2023-01-31 PROCEDURE — 82550 ASSAY OF CK (CPK): CPT

## 2023-01-31 PROCEDURE — 80053 COMPREHEN METABOLIC PANEL: CPT

## 2023-01-31 PROCEDURE — 87070 CULTURE OTHR SPECIMN AEROBIC: CPT

## 2023-01-31 PROCEDURE — 36600 WITHDRAWAL OF ARTERIAL BLOOD: CPT

## 2023-01-31 PROCEDURE — 82248 BILIRUBIN DIRECT: CPT

## 2023-01-31 PROCEDURE — 80048 BASIC METABOLIC PNL TOTAL CA: CPT

## 2023-01-31 PROCEDURE — 83986 ASSAY PH BODY FLUID NOS: CPT

## 2023-01-31 PROCEDURE — 0225U NFCT DS DNA&RNA 21 SARSCOV2: CPT

## 2023-01-31 PROCEDURE — 82553 CREATINE MB FRACTION: CPT

## 2023-01-31 PROCEDURE — 99232 SBSQ HOSP IP/OBS MODERATE 35: CPT

## 2023-01-31 PROCEDURE — 85730 THROMBOPLASTIN TIME PARTIAL: CPT

## 2023-01-31 PROCEDURE — 83735 ASSAY OF MAGNESIUM: CPT

## 2023-01-31 PROCEDURE — 81001 URINALYSIS AUTO W/SCOPE: CPT

## 2023-01-31 PROCEDURE — 84157 ASSAY OF PROTEIN OTHER: CPT

## 2023-01-31 PROCEDURE — 96374 THER/PROPH/DIAG INJ IV PUSH: CPT

## 2023-01-31 PROCEDURE — 99239 HOSP IP/OBS DSCHRG MGMT >30: CPT

## 2023-01-31 PROCEDURE — 82247 BILIRUBIN TOTAL: CPT

## 2023-01-31 PROCEDURE — 99285 EMERGENCY DEPT VISIT HI MDM: CPT | Mod: 25

## 2023-01-31 PROCEDURE — 87102 FUNGUS ISOLATION CULTURE: CPT

## 2023-01-31 PROCEDURE — 96375 TX/PRO/DX INJ NEW DRUG ADDON: CPT

## 2023-01-31 PROCEDURE — 32555 ASPIRATE PLEURA W/ IMAGING: CPT

## 2023-01-31 PROCEDURE — 80162 ASSAY OF DIGOXIN TOTAL: CPT

## 2023-01-31 PROCEDURE — 93005 ELECTROCARDIOGRAM TRACING: CPT

## 2023-01-31 PROCEDURE — 88305 TISSUE EXAM BY PATHOLOGIST: CPT

## 2023-01-31 PROCEDURE — 85025 COMPLETE CBC W/AUTO DIFF WBC: CPT

## 2023-01-31 PROCEDURE — 87075 CULTR BACTERIA EXCEPT BLOOD: CPT

## 2023-01-31 PROCEDURE — 97530 THERAPEUTIC ACTIVITIES: CPT

## 2023-01-31 PROCEDURE — 97161 PT EVAL LOW COMPLEX 20 MIN: CPT

## 2023-01-31 PROCEDURE — 88108 CYTOPATH CONCENTRATE TECH: CPT

## 2023-01-31 PROCEDURE — 82803 BLOOD GASES ANY COMBINATION: CPT

## 2023-01-31 PROCEDURE — 85027 COMPLETE CBC AUTOMATED: CPT

## 2023-01-31 PROCEDURE — 83880 ASSAY OF NATRIURETIC PEPTIDE: CPT

## 2023-01-31 RX ORDER — DIGOXIN 250 MCG
1 TABLET ORAL
Qty: 30 | Refills: 0
Start: 2023-01-31 | End: 2023-03-01

## 2023-01-31 RX ORDER — FUROSEMIDE 40 MG
20 TABLET ORAL DAILY
Refills: 0 | Status: DISCONTINUED | OUTPATIENT
Start: 2023-01-31 | End: 2023-01-31

## 2023-01-31 RX ORDER — INSULIN GLARGINE 100 [IU]/ML
14 INJECTION, SOLUTION SUBCUTANEOUS AT BEDTIME
Refills: 0 | Status: DISCONTINUED | OUTPATIENT
Start: 2023-01-31 | End: 2023-01-31

## 2023-01-31 RX ORDER — DIGOXIN 250 MCG
1 TABLET ORAL
Qty: 30 | Refills: 0 | DISCHARGE
Start: 2023-01-31 | End: 2023-03-01

## 2023-01-31 RX ORDER — SITAGLIPTIN 50 MG/1
1 TABLET, FILM COATED ORAL
Qty: 30 | Refills: 0
Start: 2023-01-31

## 2023-01-31 RX ADMIN — APIXABAN 2.5 MILLIGRAM(S): 2.5 TABLET, FILM COATED ORAL at 06:06

## 2023-01-31 RX ADMIN — Medication 2000 UNIT(S): at 11:37

## 2023-01-31 RX ADMIN — Medication 2 UNIT(S): at 12:17

## 2023-01-31 RX ADMIN — MEMANTINE HYDROCHLORIDE 10 MILLIGRAM(S): 10 TABLET ORAL at 06:06

## 2023-01-31 RX ADMIN — Medication 10: at 12:16

## 2023-01-31 RX ADMIN — Medication 75 MICROGRAM(S): at 06:06

## 2023-01-31 RX ADMIN — Medication 20 MILLIGRAM(S): at 12:16

## 2023-01-31 RX ADMIN — Medication 2 UNIT(S): at 08:04

## 2023-01-31 RX ADMIN — Medication 125 MICROGRAM(S): at 06:06

## 2023-01-31 RX ADMIN — Medication 4: at 08:04

## 2023-01-31 RX ADMIN — Medication 100 MILLIGRAM(S): at 06:06

## 2023-01-31 NOTE — PROGRESS NOTE ADULT - ASSESSMENT
Mild hyperkalemia  Metabolic acidosis with Resp alkalosis  Mild Hyponatremia  CHF, Pleural effusion, s/p thoracentesis  Abnormal LFTs  Hypertension  Diabetes    Stable renal indices. Low potassium diet. PO fluid restriction. Resume PO lasix.   Monitor BP trend. Titrate BP meds as needed. Monitor blood sugar levels. Insulin coverage as needed. Dietary restriction.   Will follow electrolytes and renal function trend. D/w pt's daughter at bedside. D/w patient regarding need for out patient nephrology follow up. D/c planning.

## 2023-01-31 NOTE — CASE MANAGEMENT PROGRESS NOTE - NSCMPROGRESSNOTE_GEN_ALL_CORE
Met with patient and daughter at bedside.  Pt is for home today.  Reviewed d/c plan, pt is currently on room air, having no supplemental o2 needs.  She is recommended for home PT and nursing for STAR patient.  Referral sent to [facility] for [facility] services requesting SOC 2/1 as was discussed with family.  Awaiting acceptance.  Family will transport patient home.

## 2023-01-31 NOTE — DISCHARGE NOTE NURSING/CASE MANAGEMENT/SOCIAL WORK - PATIENT PORTAL LINK FT
You can access the FollowMyHealth Patient Portal offered by [facility] by registering at the following website: [email or web address]. By joining [employer]’s FollowMyHealth portal, you will also be able to view your health information using other applications (apps) compatible with our system.

## 2023-01-31 NOTE — PROGRESS NOTE ADULT - REASON FOR ADMISSION
ADHF

## 2023-01-31 NOTE — PROGRESS NOTE ADULT - ASSESSMENT
87 years old female patient with PMHx of HTN, HLD, HFrEF (EF 20-25% on 12/22), T2DM, hypothyroidism, alzheimer's dementia, chronic Afib who presents for SOB.    mild Dementia  Dyspnea  Pleural Effusions  HF  Atelectasis  Sub Cm Pulm Nodules  Eval for Biliary tract pathology -   HTN  HLD  OP  OA  DM  Thyroid Disease    cardio follow up  right thorax - thoracentesis - 400 cc - transudate -   back on Eliquis    spoke with Dtr  reviewed CT chest and abdomen  Cardio eval - follows with Dr Del Cid in the office - diuresis - cvs rx regimen optimization  monitor VS and HD and Sat  Surgery and GI eval for abnormal Biliary - GI CT imaging - eval cholangitis - malignancy -   GOC discussion  assist with needs  serial labs  serial ABD exam  keep sat > 88 pct

## 2023-01-31 NOTE — PROGRESS NOTE ADULT - PROVIDER SPECIALTY LIST ADULT
Cardiology
Gastroenterology
Cardiology
Gastroenterology
Nephrology
Nephrology
Hospitalist
Infectious Disease
Pulmonology
Cardiology
Gastroenterology
Nephrology
Nephrology
Pulmonology
Hospitalist

## 2023-01-31 NOTE — PROGRESS NOTE ADULT - SUBJECTIVE AND OBJECTIVE BOX
[facility]  Murphy Jenkins PA-C  [address] [place] [address]  [phone]      INTERVAL HPI/OVERNIGHT EVENTS:  Pt s/e with daughter at bedside  Reports +BM overnight  Otherwise no GI complaints    MEDICATIONS  (STANDING):  apixaban 2.5 milliGRAM(s) Oral every 12 hours  atorvastatin 40 milliGRAM(s) Oral at bedtime  cholecalciferol 2000 Unit(s) Oral daily  dextrose 5%. 1000 milliLiter(s) (100 mL/Hr) IV Continuous <Continuous>  dextrose 5%. 1000 milliLiter(s) (50 mL/Hr) IV Continuous <Continuous>  dextrose 50% Injectable 25 Gram(s) IV Push once  dextrose 50% Injectable 12.5 Gram(s) IV Push once  dextrose 50% Injectable 25 Gram(s) IV Push once  digoxin     Tablet 125 MICROGram(s) Oral daily  furosemide    Tablet 20 milliGRAM(s) Oral daily  glucagon  Injectable 1 milliGRAM(s) IntraMuscular once  insulin glargine Injectable (LANTUS) 14 Unit(s) SubCutaneous at bedtime  insulin lispro (ADMELOG) corrective regimen sliding scale   SubCutaneous three times a day before meals  insulin lispro (ADMELOG) corrective regimen sliding scale   SubCutaneous at bedtime  insulin lispro Injectable (ADMELOG) 2 Unit(s) SubCutaneous three times a day before meals  levothyroxine 75 MICROGram(s) Oral daily  melatonin 3 milliGRAM(s) Oral at bedtime  memantine 10 milliGRAM(s) Oral two times a day  metoprolol succinate  milliGRAM(s) Oral two times a day  polyethylene glycol 3350 17 Gram(s) Oral at bedtime  senna 2 Tablet(s) Oral at bedtime    MEDICATIONS  (PRN):  ALPRAZolam 0.25 milliGRAM(s) Oral <User Schedule> PRN insomnia  aluminum hydroxide/magnesium hydroxide/simethicone Suspension 30 milliLiter(s) Oral every 4 hours PRN Dyspepsia  dextrose Oral Gel 15 Gram(s) Oral once PRN Blood Glucose LESS THAN 70 milliGRAM(s)/deciliter  ondansetron Injectable 4 milliGRAM(s) IV Push every 8 hours PRN Nausea and/or Vomiting      Allergies    No Known Allergies    PHYSICAL EXAM:   Vital Signs:  Vital Signs Last 24 Hrs  T(C): 36.3 (2023 11:54), Max: 36.8 (2023 20:22)  T(F): 97.3 (2023 11:54), Max: 98.2 (2023 20:22)  HR: 80 (2023 11:54) (73 - 97)  BP: 125/79 (2023 11:54) (105/76 - 135/83)  BP(mean): --  RR: 17 (2023 11:54) (17 - 18)  SpO2: 96% (2023 11:54) (94% - 97%)    Parameters below as of 2023 11:54  Patient On (Oxygen Delivery Method): room air      Daily     Daily Weight in k (2023 04:57)    GENERAL:  Appears stated age  HEENT:  NC/AT  CHEST:  Full & symmetric excursion  HEART:  Regular rhythm  ABDOMEN:  Soft, non-tender, non-distended  EXTEREMITIES:  no cyanosis  SKIN:  No rash  NEURO:  Alert      LABS:                        10.9   9.51  )-----------( 285      ( 2023 07:20 )             35.4         135  |  101  |  50<H>  ----------------------------<  225<H>  5.1   |  30  |  1.50<H>    Ca    8.8      2023 07:20  Phos  2.6       Mg     2.0         TPro  6.9  /  Alb  2.4<L>  /  TBili  0.9  /  DBili  x   /  AST  49<H>  /  ALT  81<H>  /  AlkPhos  99

## 2023-01-31 NOTE — PROGRESS NOTE ADULT - SUBJECTIVE AND OBJECTIVE BOX
Patient is a 87y old  Female who presents with a chief complaint of ADHF (27 Jan 2023 13:05)    Patient seen in follow up for CKD 3.        PAST MEDICAL HISTORY:  Atrial fibrillation    Hypothyroidism    Mild Alzheimer&#x27;s dementia    Diabetes mellitus    Acute on chronic systolic congestive heart failure    Hypertension    Hyperlipemia    Cardiac LV ejection fraction 21-30%      MEDICATIONS  (STANDING):  apixaban 2.5 milliGRAM(s) Oral every 12 hours  atorvastatin 40 milliGRAM(s) Oral at bedtime  cholecalciferol 2000 Unit(s) Oral daily  dextrose 5%. 1000 milliLiter(s) (100 mL/Hr) IV Continuous <Continuous>  dextrose 5%. 1000 milliLiter(s) (50 mL/Hr) IV Continuous <Continuous>  dextrose 50% Injectable 25 Gram(s) IV Push once  dextrose 50% Injectable 12.5 Gram(s) IV Push once  dextrose 50% Injectable 25 Gram(s) IV Push once  digoxin     Tablet 125 MICROGram(s) Oral daily  glucagon  Injectable 1 milliGRAM(s) IntraMuscular once  insulin glargine Injectable (LANTUS) 14 Unit(s) SubCutaneous at bedtime  insulin lispro (ADMELOG) corrective regimen sliding scale   SubCutaneous three times a day before meals  insulin lispro (ADMELOG) corrective regimen sliding scale   SubCutaneous at bedtime  insulin lispro Injectable (ADMELOG) 2 Unit(s) SubCutaneous three times a day before meals  levothyroxine 75 MICROGram(s) Oral daily  melatonin 3 milliGRAM(s) Oral at bedtime  memantine 10 milliGRAM(s) Oral two times a day  metoprolol succinate  milliGRAM(s) Oral two times a day  polyethylene glycol 3350 17 Gram(s) Oral at bedtime  senna 2 Tablet(s) Oral at bedtime    MEDICATIONS  (PRN):  ALPRAZolam 0.25 milliGRAM(s) Oral <User Schedule> PRN insomnia  aluminum hydroxide/magnesium hydroxide/simethicone Suspension 30 milliLiter(s) Oral every 4 hours PRN Dyspepsia  dextrose Oral Gel 15 Gram(s) Oral once PRN Blood Glucose LESS THAN 70 milliGRAM(s)/deciliter  ondansetron Injectable 4 milliGRAM(s) IV Push every 8 hours PRN Nausea and/or Vomiting    T(C): 36.6 (01-31-23 @ 04:57), Max: 36.8 (01-30-23 @ 20:22)  HR: 81 (01-31-23 @ 04:57) (73 - 100)  BP: 108/72 (01-31-23 @ 04:57) (105/76 - 145/89)  RR: 18 (01-31-23 @ 04:57)  SpO2: 96% (01-31-23 @ 04:57)  Wt(kg): --  I&O's Detail    30 Jan 2023 07:01  -  31 Jan 2023 07:00  --------------------------------------------------------  IN:  Total IN: 0 mL    OUT:    Voided (mL): 600 mL  Total OUT: 600 mL    Total NET: -600 mL              PHYSICAL EXAM:  General: No distress  Respiratory: b/l air entry  Cardiovascular: S1 S2  Gastrointestinal: soft  Extremities: no edema                             LABORATORY:                        10.9   9.51  )-----------( 285      ( 31 Jan 2023 07:20 )             35.4     01-31    135  |  101  |  50<H>  ----------------------------<  225<H>  5.1   |  30  |  1.50<H>    Ca    8.8      31 Jan 2023 07:20  Phos  2.6     01-30  Mg     2.0     01-30    TPro  6.9  /  Alb  2.4<L>  /  TBili  0.9  /  DBili  x   /  AST  49<H>  /  ALT  81<H>  /  AlkPhos  99  01-30    Sodium, Serum: 135 mmol/L (01-31 @ 07:20)  Sodium, Serum: 134 mmol/L (01-30 @ 08:30)    Potassium, Serum: 5.1 mmol/L (01-31 @ 07:20)  Potassium, Serum: 4.6 mmol/L (01-30 @ 08:30)    Hemoglobin: 10.9 g/dL (01-31 @ 07:20)  Hemoglobin: 11.3 g/dL (01-30 @ 08:30)  Hemoglobin: 12.2 g/dL (01-29 @ 08:10)    Creatinine, Serum 1.50 (01-31 @ 07:20)  Creatinine, Serum 1.50 (01-30 @ 08:30)  Creatinine, Serum 1.60 (01-29 @ 08:10)        LIVER FUNCTIONS - ( 30 Jan 2023 08:30 )  Alb: 2.4 g/dL / Pro: 6.9 g/dL / ALK PHOS: 99 U/L / ALT: 81 U/L / AST: 49 U/L / GGT: x

## 2023-01-31 NOTE — PROGRESS NOTE ADULT - SUBJECTIVE AND OBJECTIVE BOX
[facility] -- Dylan Hernandez Pannella, Patel, Savella [facility]  Office # [phone]      Follow Up:    hf afib pleural effusion   Subjective/Observations:   No events overnight resting comfortably in bed.  No complaints of chest pain, dyspnea, or palpitations reported. No signs of orthopnea or PND.  on room air laying flat in nad     REVIEW OF SYSTEMS: All other review of systems is negative unless indicated above    PAST MEDICAL & SURGICAL HISTORY:  Atrial fibrillation      Hypothyroidism      Mild Alzheimer&#x27;s dementia      Diabetes mellitus      Acute on chronic systolic congestive heart failure      Hypertension      Hyperlipemia      Cardiac LV ejection fraction 21-30%        History of appendectomy      H/O hernia repair      History of cholecystectomy          MEDICATIONS  (STANDING):  apixaban 2.5 milliGRAM(s) Oral every 12 hours  atorvastatin 40 milliGRAM(s) Oral at bedtime  cholecalciferol 2000 Unit(s) Oral daily  dextrose 5%. 1000 milliLiter(s) (100 mL/Hr) IV Continuous <Continuous>  dextrose 5%. 1000 milliLiter(s) (50 mL/Hr) IV Continuous <Continuous>  dextrose 50% Injectable 25 Gram(s) IV Push once  dextrose 50% Injectable 12.5 Gram(s) IV Push once  dextrose 50% Injectable 25 Gram(s) IV Push once  digoxin     Tablet 125 MICROGram(s) Oral daily  glucagon  Injectable 1 milliGRAM(s) IntraMuscular once  insulin glargine Injectable (LANTUS) 14 Unit(s) SubCutaneous at bedtime  insulin lispro (ADMELOG) corrective regimen sliding scale   SubCutaneous three times a day before meals  insulin lispro (ADMELOG) corrective regimen sliding scale   SubCutaneous at bedtime  insulin lispro Injectable (ADMELOG) 2 Unit(s) SubCutaneous three times a day before meals  levothyroxine 75 MICROGram(s) Oral daily  melatonin 3 milliGRAM(s) Oral at bedtime  memantine 10 milliGRAM(s) Oral two times a day  metoprolol succinate  milliGRAM(s) Oral two times a day  polyethylene glycol 3350 17 Gram(s) Oral at bedtime  senna 2 Tablet(s) Oral at bedtime    MEDICATIONS  (PRN):  ALPRAZolam 0.25 milliGRAM(s) Oral <User Schedule> PRN insomnia  aluminum hydroxide/magnesium hydroxide/simethicone Suspension 30 milliLiter(s) Oral every 4 hours PRN Dyspepsia  dextrose Oral Gel 15 Gram(s) Oral once PRN Blood Glucose LESS THAN 70 milliGRAM(s)/deciliter  ondansetron Injectable 4 milliGRAM(s) IV Push every 8 hours PRN Nausea and/or Vomiting      Allergies    No Known Allergies    Intolerances        Vital Signs Last 24 Hrs  T(C): 36.6 (2023 04:57), Max: 36.8 (2023 20:22)  T(F): 97.9 (2023 04:57), Max: 98.2 (2023 20:22)  HR: 81 (2023 04:57) (73 - 97)  BP: 108/72 (2023 04:57) (105/76 - 135/83)  BP(mean): --  RR: 18 (2023 04:57) (18 - 18)  SpO2: 96% (2023 04:57) (94% - 97%)    Parameters below as of 2023 04:57  Patient On (Oxygen Delivery Method): room air        I&O's Summary    2023 07:01  -  2023 07:00  --------------------------------------------------------  IN: 0 mL / OUT: 600 mL / NET: -600 mL          PHYSICAL EXAM:  TELE: af  Constitutional: NAD, awake and alert, well-developed  HEENT:dry Mucous Membranes, Anicteric  Pulmonary: Non-labored, breath sounds are clear bilaterally, No wheezing, crackles or rhonchi  Cardiovascular: UIRRegular, S1 and S2 nl, murmur   Gastrointestinal: Bowel Sounds present, soft, nontender.   Lymph: No lymphadenopathy. No peripheral edema.  Skin: No visible rashes or ulcers.  Psych:  Mood & affect appropriate    LABS: All Labs Reviewed:                        10.9   9.51  )-----------( 285      ( 2023 07:20 )             35.4                         11.3   9.25  )-----------( 295      ( 2023 08:30 )             36.5                         12.2   9.93  )-----------( 307      ( 2023 08:10 )             38.5     2023 07:20    135    |  101    |  50     ----------------------------<  225    5.1     |  30     |  1.50   2023 08:30    134    |  99     |  52     ----------------------------<  258    4.6     |  30     |  1.50   2023 08:10    135    |  100    |  55     ----------------------------<  234    4.6     |  31     |  1.60     Ca    8.8        2023 07:20  Ca    8.6        2023 08:30  Ca    9.1        2023 08:10  Phos  2.6       2023 08:30  Phos  3.8       2023 08:10  Mg     2.0       2023 08:30  Mg     2.1       2023 08:10    TPro  6.9    /  Alb  2.4    /  TBili  0.9    /  DBili  x      /  AST  49     /  ALT  81     /  AlkPhos  99     2023 08:30  TPro  7.4    /  Alb  2.7    /  TBili  0.9    /  DBili  x      /  AST  47     /  ALT  98     /  AlkPhos  89     2023 08:10             EC Lead ECG:   Ventricular Rate 105 BPM    QRS Duration 96 ms    Q-T Interval 336 ms    QTC Calculation(Bazett) 444 ms    R Axis 28 degrees    T Axis 263 degrees    Diagnosis Line Atrial fibrillation with rapid ventricular response  Minimal voltage criteria for LVH, may be normal variant ( Montgomery product )  Nonspecific T wave abnormality  Abnormal ECG    Confirmed by rogerio Harvey ([id number]) on 2023 1:29:07 PM (23 @ 19:15)      ACC: [id number] EXAM:  ECHO TTE WO CON COMP W DOPP                          PROCEDURE DATE:  2022          INTERPRETATION:  INDICATION: Abnormal EKG  Sonographer KL    Blood Pressure 142/86    Height 158 cm     Weight 51.7 kg       BSA 1.5   sqm    Dimensions:  LA 3.6       Normal Values: 2.0 - 4.0 cm  Ao 3.2        Normal Values: 2.0 - 3.8 cm  SEPTUM 1.0       Normal Values: 0.6 - 1.2 cm  PWT 0.9       Normal Values: 0.6 - 1.1 cm  LVIDd 5.8         Normal Values: 3.0 - 5.6 cm  LVIDs 4.3      Normal Values: 1.8 - 4.0 cm      OBSERVATIONS:  Mitral Valve: Moderate MR.  Aortic Valve/Aorta: normal trileaflet aortic valve.  Tricuspid Valve: Mild TR.  Pulmonic Valve: Mild PI  Left Atrium: Enlarged  Right Atrium: Enlarged  Left Ventricle: Left ventricular enlargement with severe left ventricular   systolic dysfunction, estimated LVEF of 20-25%. The entire apex and   anterior walls appear akinetic. The inferior and inferolateral walls   appear hypokinetic. Remaining walls are not well-visualized  Right Ventricle: Grossly normal size and systolic function.  Pericardium: no significant pericardial effusion.  Pulmonary/RV Pressure: estimated PA systolic pressure of at least 35 mmHg   assuming an RA pressure of 3mmHg.        IMPRESSION:  Left ventricular enlargement with severe left ventricular systolic   dysfunction, estimated LVEF of 20-25%. The entire apex and anterior walls   appear akinetic. The inferior and inferolateral walls appear hypokinetic.  Grossly normal RV size and systolic function.  Biatrial enlargement  Normal trileaflet aortic valve, without AI.  Moderate MR  Mild TR.  No significant pericardial effusion.    --- End of Report ---            JOHN BAEZA MD; Attending Cardiologist  This document has been electronically signed. Dec 23 2022  4:40PM      Radiology:

## 2023-01-31 NOTE — PROGRESS NOTE ADULT - SUBJECTIVE AND OBJECTIVE BOX
Date/Time Patient Seen:  		  Referring MD:   Data Reviewed	       Patient is a 87y old  Female who presents with a chief complaint of ADHF (30 Jan 2023 14:21)      Subjective/HPI     PAST MEDICAL & SURGICAL HISTORY:  Atrial fibrillation    Hypothyroidism    Mild Alzheimer&#x27;s dementia    Diabetes mellitus    Acute on chronic systolic congestive heart failure    Hypertension    Hyperlipemia    Cardiac LV ejection fraction 21-30%  12/22    History of appendectomy    H/O hernia repair    History of cholecystectomy          Medication list         MEDICATIONS  (STANDING):  apixaban 2.5 milliGRAM(s) Oral every 12 hours  atorvastatin 40 milliGRAM(s) Oral at bedtime  cholecalciferol 2000 Unit(s) Oral daily  dextrose 5%. 1000 milliLiter(s) (100 mL/Hr) IV Continuous <Continuous>  dextrose 5%. 1000 milliLiter(s) (50 mL/Hr) IV Continuous <Continuous>  dextrose 50% Injectable 25 Gram(s) IV Push once  dextrose 50% Injectable 12.5 Gram(s) IV Push once  dextrose 50% Injectable 25 Gram(s) IV Push once  digoxin     Tablet 125 MICROGram(s) Oral daily  glucagon  Injectable 1 milliGRAM(s) IntraMuscular once  insulin glargine Injectable (LANTUS) 10 Unit(s) SubCutaneous at bedtime  insulin lispro (ADMELOG) corrective regimen sliding scale   SubCutaneous three times a day before meals  insulin lispro (ADMELOG) corrective regimen sliding scale   SubCutaneous at bedtime  insulin lispro Injectable (ADMELOG) 2 Unit(s) SubCutaneous three times a day before meals  levothyroxine 75 MICROGram(s) Oral daily  melatonin 3 milliGRAM(s) Oral at bedtime  memantine 10 milliGRAM(s) Oral two times a day  metoprolol succinate  milliGRAM(s) Oral two times a day  polyethylene glycol 3350 17 Gram(s) Oral at bedtime  senna 2 Tablet(s) Oral at bedtime    MEDICATIONS  (PRN):  ALPRAZolam 0.25 milliGRAM(s) Oral <User Schedule> PRN insomnia  aluminum hydroxide/magnesium hydroxide/simethicone Suspension 30 milliLiter(s) Oral every 4 hours PRN Dyspepsia  dextrose Oral Gel 15 Gram(s) Oral once PRN Blood Glucose LESS THAN 70 milliGRAM(s)/deciliter  ondansetron Injectable 4 milliGRAM(s) IV Push every 8 hours PRN Nausea and/or Vomiting         Vitals log        ICU Vital Signs Last 24 Hrs  T(C): 36.6 (31 Jan 2023 04:57), Max: 36.8 (30 Jan 2023 20:22)  T(F): 97.9 (31 Jan 2023 04:57), Max: 98.2 (30 Jan 2023 20:22)  HR: 81 (31 Jan 2023 04:57) (73 - 97)  BP: 108/72 (31 Jan 2023 04:57) (105/76 - 135/83)  BP(mean): --  ABP: --  ABP(mean): --  RR: 18 (31 Jan 2023 04:57) (18 - 18)  SpO2: 96% (31 Jan 2023 04:57) (94% - 97%)    O2 Parameters below as of 31 Jan 2023 04:57  Patient On (Oxygen Delivery Method): room air                 Input and Output:  I&O's Detail    29 Jan 2023 07:01  -  30 Jan 2023 07:00  --------------------------------------------------------  IN:  Total IN: 0 mL    OUT:    Voided (mL): 1100 mL  Total OUT: 1100 mL    Total NET: -1100 mL          Lab Data                        11.3   9.25  )-----------( 295      ( 30 Jan 2023 08:30 )             36.5     01-30    134<L>  |  99  |  52<H>  ----------------------------<  258<H>  4.6   |  30  |  1.50<H>    Ca    8.6      30 Jan 2023 08:30  Phos  2.6     01-30  Mg     2.0     01-30    TPro  6.9  /  Alb  2.4<L>  /  TBili  0.9  /  DBili  x   /  AST  49<H>  /  ALT  81<H>  /  AlkPhos  99  01-30            Review of Systems	      Objective     Physical Examination    heart s1s2  lung dec BS  head nc      Pertinent Lab findings & Imaging      Julieth:  NO   Adequate UO     I&O's Detail    29 Jan 2023 07:01  -  30 Jan 2023 07:00  --------------------------------------------------------  IN:  Total IN: 0 mL    OUT:    Voided (mL): 1100 mL  Total OUT: 1100 mL    Total NET: -1100 mL               Discussed with:     Cultures:	        Radiology

## 2023-01-31 NOTE — CHART NOTE - NSCHARTNOTEFT_GEN_A_CORE
Patient seen at bedside with daughter.   comfortable on RA.  ambulated with PT.   Denies any dyspnea.   tolerating diet.   accuchecks better.     d/w cardio, nephro. plan for discharge with lasix 20mg daily and will start Januvia.
Assessment: Pt seen for nutrition follow-up. New referral ordered for HF. Chart reviewed, hospital course noted.    Brief hx: Pt is a 87 year old female with PMHx of HTN, HLD, HFrEF (EF 20-25% on 12/22), T2DM, hypothyroidism, alzheimer's dementia, chronic Afib admitted for ADHF. s/p R 400 cc Transudative thoracentesis 2/2 CHF.     Visited pt at bedside this morning. Pt's daughter present during visit. Pt reports good appetite/intake. Ate very well for breakfast meal this am. PO intakes % per nursing documentation. Pt tolerating diet well. Denies N/V. Reports constipation; last BM 1/25, bowel regimen rx. Daughter states that patient was eating very well at home PTA. Typically consumes 3 balanced meals/day. Pt avoids salt at home, limits sugar intake. Hx of T2DM, A1c of 7.8% obtained. Provided verbal and written DM and Heart Failure diet education to daughter during visit today. Discussed current diet order. No questions/concerns at this time.     Factors impacting intake: [X] none [ ] nausea  [ ] vomiting [ ] diarrhea [ ] constipation  [ ]chewing problems [ ] swallowing issues  [ ] other:     Diet Prescription: Diet, Consistent Carbohydrate w/Evening Snack:   1200mL Fluid Restriction ([id number])  Low Sodium (01-26-23 @ 02:25)    Intake: good    Current Weight: Weight (kg): 51.3 (01-25 @ 18:47), 1/30 113.3# (no edema noted)  % Weight Change    Pertinent Medications: MEDICATIONS  (STANDING):  ALPRAZolam 0.25 milliGRAM(s) Oral <User Schedule>  apixaban 2.5 milliGRAM(s) Oral every 12 hours  atorvastatin 40 milliGRAM(s) Oral at bedtime  cholecalciferol 2000 Unit(s) Oral daily  dextrose 5%. 1000 milliLiter(s) (100 mL/Hr) IV Continuous <Continuous>  dextrose 5%. 1000 milliLiter(s) (50 mL/Hr) IV Continuous <Continuous>  dextrose 50% Injectable 25 Gram(s) IV Push once  dextrose 50% Injectable 12.5 Gram(s) IV Push once  dextrose 50% Injectable 25 Gram(s) IV Push once  digoxin     Tablet 125 MICROGram(s) Oral daily  glucagon  Injectable 1 milliGRAM(s) IntraMuscular once  insulin glargine Injectable (LANTUS) 10 Unit(s) SubCutaneous at bedtime  insulin lispro (ADMELOG) corrective regimen sliding scale   SubCutaneous three times a day before meals  insulin lispro (ADMELOG) corrective regimen sliding scale   SubCutaneous at bedtime  insulin lispro Injectable (ADMELOG) 2 Unit(s) SubCutaneous three times a day before meals  levothyroxine 75 MICROGram(s) Oral daily  memantine 10 milliGRAM(s) Oral two times a day  metoprolol succinate  milliGRAM(s) Oral two times a day  polyethylene glycol 3350 17 Gram(s) Oral at bedtime  senna 2 Tablet(s) Oral at bedtime    MEDICATIONS  (PRN):  aluminum hydroxide/magnesium hydroxide/simethicone Suspension 30 milliLiter(s) Oral every 4 hours PRN Dyspepsia  dextrose Oral Gel 15 Gram(s) Oral once PRN Blood Glucose LESS THAN 70 milliGRAM(s)/deciliter  melatonin 3 milliGRAM(s) Oral at bedtime PRN Insomnia  ondansetron Injectable 4 milliGRAM(s) IV Push every 8 hours PRN Nausea and/or Vomiting    Pertinent Labs: 01-30 Na134 mmol/L<L> Glu 258 mg/dL<H> K+ 4.6 mmol/L Cr  1.50 mg/dL<H> BUN 52 mg/dL<H> 01-30 Phos 2.6 mg/dL 01-30 Alb 2.4 g/dL<L>    CAPILLARY BLOOD GLUCOSE  POCT Blood Glucose.: 246 mg/dL (30 Jan 2023 08:09)  POCT Blood Glucose.: 340 mg/dL (29 Jan 2023 21:16)  POCT Blood Glucose.: 328 mg/dL (29 Jan 2023 17:25)    Skin: no pressure ulcers    Estimated Needs:   [X] no change since previous assessment: based on #/54.8kg  25-30kcal/kg (1370-1644kcal)  1-1.2g pro/kg (55-66gm protein)  [ ] recalculated:     Previous Nutrition Diagnosis:   [ ] Inadequate Energy Intake [ ]Inadequate Oral Intake [ ] Excessive Energy Intake   [ ] Underweight [ ] Increased Nutrient Needs [ ] Overweight/Obesity   [X] Altered Nutrition Related Lab Values [ ] Unintended Weight Loss [ ] Food & Nutrition Related Knowledge Deficit [X] Malnutrition (moderate/chronic)    Nutrition Diagnosis is [X] ongoing  [ ] resolved [ ] not applicable     New Nutrition Diagnosis: [X] not applicable     Interventions:   Recommend  [ ] Change Diet To:  [X] Nutrition Supplement: providing DEVON magic cup bid (260kcal, 9gm protein per 4 oz cup serving)  [ ] Nutrition Support  [X] Other: Continue current diet as ordered; consistent carb, low sodium diet with 1200ml FR restriction per MD order    Monitoring and Evaluation:   [X] PO intake [ x ] Tolerance to diet prescription [ x ] weights [ x ] labs[ x ] follow up per protocol  [X] other: s/s GI distress, bowel function, skin integrity/ edema

## 2023-01-31 NOTE — PROGRESS NOTE ADULT - NS ATTEND AMEND GEN_ALL_CORE FT
hf in setting of severe cmy  s/p diuresis and thoracentesis, improved  no acute ischemia, mild trops from demand  cont meds
severe cmy with heart failure  s/p thoracentesis  renal function remains poor and worsening and would not diurese today  no acute ischemia, mild trops consistent with demand in setting of hfref
severe cmy  s/p thoracentesis now on ra  resume ac  cont iv lasix and trend bun and creat
remains with severe vol ol  s/p 400 cc thoracentesis  remains at risk of decompensation
No signs of significant ischemia or volume overload. AF is rate controlled. cont dig, toprol. cont eliquis. Further cardiac workup will depend on clinical course.

## 2023-01-31 NOTE — DISCHARGE NOTE NURSING/CASE MANAGEMENT/SOCIAL WORK - NSDCPEFALRISK_GEN_ALL_CORE
For information on Fall & Injury Prevention, visit: [email or web address] OR  [email or web address] OR  https://www.cdc.gov/steadi/patient.html

## 2023-01-31 NOTE — PROGRESS NOTE ADULT - ASSESSMENT
88 yo F with PMHx of HTN, HLD, HFrEF (EF 20-25% on 12/22), T2DM, hypothyroidism, alzheimer's dementia, chronic Afib who presents today for SOB, admitted for acute on chronic congestive heart failure exacerbation     HF, Afib    - Troponin mildly elevated x2 (144.9 --> 156.9) likely 2/2 demand in setting of CHF exacerbation, trend to peak   - CT chest and abdomen with pleural effusions  - s/p R thoracentesis with 400 cc fluid removal (1/27), now RA. Non orthopneic. pulm following   appears dry on exam will reassess daily     - TTE (12/22/2022) LV enlargement with severe left ventricular systolic dysfunction, LVEF of 20-25%, apex and anterior walls akinetic, inferior and inferolateral walls hypokinetic, biatrial enlargement, Moderate MR, Mild TR      - EKG: Atrial fibrillation with RVR, minimal voltage criteria for LVH, nonspecific T wave abnormality; improved lateral leads from prior EKG on 12/29/2022   - continue statin  - Tele reviewed, Afib rate controlled, no events noted overnight   - Continue Digoxin 125 mcg PO daily , if cr worsens change to every other day   - continue Metoprolol succinate 100 mg BID  - Continue Eliquis   - Monitor and replete Lytes. Keep K > 4 and Mg > 2    Janet Cash FNP-C  Cardiology NP  [id number] [phone]

## 2023-02-01 LAB
CULTURE RESULTS: SIGNIFICANT CHANGE UP
SPECIMEN SOURCE: SIGNIFICANT CHANGE UP

## 2023-02-01 RX ORDER — FUROSEMIDE 40 MG
1 TABLET ORAL
Qty: 30 | Refills: 0
Start: 2023-02-01

## 2023-02-02 ENCOUNTER — APPOINTMENT (OUTPATIENT)
Dept: CARDIOLOGY | Facility: CLINIC | Age: 88
End: 2023-02-02

## 2023-02-02 PROBLEM — R94.30 ABNORMAL RESULT OF CARDIOVASCULAR FUNCTION STUDY, UNSPECIFIED: Chronic | Status: ACTIVE | Noted: 2023-01-26

## 2023-02-02 PROBLEM — E78.5 HYPERLIPIDEMIA, UNSPECIFIED: Chronic | Status: ACTIVE | Noted: 2023-01-26

## 2023-02-02 PROBLEM — I10 ESSENTIAL (PRIMARY) HYPERTENSION: Chronic | Status: ACTIVE | Noted: 2023-01-26

## 2023-02-02 PROBLEM — I50.23 ACUTE ON CHRONIC SYSTOLIC (CONGESTIVE) HEART FAILURE: Chronic | Status: ACTIVE | Noted: 2023-01-26

## 2023-02-06 ENCOUNTER — APPOINTMENT (OUTPATIENT)
Dept: CARE COORDINATION | Facility: HOME HEALTH | Age: 88
End: 2023-02-06
Payer: MEDICARE

## 2023-02-06 VITALS
BODY MASS INDEX: 20.97 KG/M2 | DIASTOLIC BLOOD PRESSURE: 64 MMHG | WEIGHT: 111 LBS | HEART RATE: 56 BPM | SYSTOLIC BLOOD PRESSURE: 122 MMHG | OXYGEN SATURATION: 97 % | RESPIRATION RATE: 14 BRPM

## 2023-02-06 DIAGNOSIS — F03.90 UNSPECIFIED DEMENTIA W/OUT BEHAVIORAL DISTURBANCE: ICD-10-CM

## 2023-02-06 PROCEDURE — 99495 TRANSJ CARE MGMT MOD F2F 14D: CPT

## 2023-02-06 RX ORDER — MECLIZINE HYDROCHLORIDE 12.5 MG/1
12.5 TABLET ORAL
Qty: 90 | Refills: 0 | Status: DISCONTINUED | COMMUNITY
Start: 2022-10-21 | End: 2023-02-06

## 2023-02-06 NOTE — HISTORY OF PRESENT ILLNESS
[Post-hospitalization from ___ Hospital] : Post-hospitalization from [unfilled] Hospital [Admitted on: ___] : The patient was admitted on [unfilled] [Discharged on ___] : discharged on [unfilled] [Discharge Summary] : discharge summary [Discharge Med List] : discharge medication list [Med Reconciliation] : medication reconciliation has been completed [Patient Contacted By: ____] : and contacted by [unfilled] [FreeTextEntry3] : Patient was contacted by TCM RN on 2/1/23 for 24/48 hour call and documentation is present in the Clinical Viewer  [FreeTextEntry2] : 87 years old female patient with PMHx of HTN, HLD, HFrEF (EF 20-25% on 12/22), T2DM, hypothyroidism, alzheimer's dementia, chronic Afib who presented today for SOB. Of note patient recently admitted at \[facility]\"" from 12/21 to 12/23/22  for Afib with RVR.  History obtained from patient and daughter who reports that last night patient walked from the bedroom to the bathroom and complained severe respiratory distress.  Pt admitted for acute decompensated HF treated w/IV lasix, fluid restrictions. s/p 400cc R transudative thoracentesis, tolerated well.\par \par Since dc, denies cp/sob/pnd/orthopnea/putnam.  Today's weight 111lbs.  Appt with PMD 2/8 Dr. Slater, appt with Dr. Grider.  HCRN and PT have had SOC, TCM numbers provided for any questions/concerns.

## 2023-02-06 NOTE — PHYSICAL EXAM
[No Acute Distress] : no acute distress [Well Nourished] : well nourished [Well Developed] : well developed [Well-Appearing] : well-appearing [Normal Sclera/Conjunctiva] : normal sclera/conjunctiva [PERRL] : pupils equal round and reactive to light [EOMI] : extraocular movements intact [Normal Outer Ear/Nose] : the outer ears and nose were normal in appearance [Normal Oropharynx] : the oropharynx was normal [No JVD] : no jugular venous distention [No Respiratory Distress] : no respiratory distress  [Clear to Auscultation] : lungs were clear to auscultation bilaterally [No Accessory Muscle Use] : no accessory muscle use [Normal Rate] : normal rate  [Pedal Pulses Present] : the pedal pulses are present [No Edema] : there was no peripheral edema [Soft] : abdomen soft [Non Tender] : non-tender [Non-distended] : non-distended [No Masses] : no abdominal mass palpated [No HSM] : no HSM [Normal Bowel Sounds] : normal bowel sounds [No CVA Tenderness] : no CVA  tenderness [No Spinal Tenderness] : no spinal tenderness [No Joint Swelling] : no joint swelling [Grossly Normal Strength/Tone] : grossly normal strength/tone [No Rash] : no rash [Normal Gait] : normal gait [Coordination Grossly Intact] : coordination grossly intact [No Focal Deficits] : no focal deficits [Normal Affect] : the affect was normal [Normal Insight/Judgement] : insight and judgment were intact

## 2023-02-13 ENCOUNTER — TRANSCRIPTION ENCOUNTER (OUTPATIENT)
Age: 88
End: 2023-02-13

## 2023-02-14 ENCOUNTER — APPOINTMENT (OUTPATIENT)
Dept: CARDIOLOGY | Facility: CLINIC | Age: 88
End: 2023-02-14
Payer: MEDICARE

## 2023-02-14 ENCOUNTER — NON-APPOINTMENT (OUTPATIENT)
Age: 88
End: 2023-02-14

## 2023-02-14 VITALS
BODY MASS INDEX: 21.14 KG/M2 | DIASTOLIC BLOOD PRESSURE: 90 MMHG | OXYGEN SATURATION: 95 % | SYSTOLIC BLOOD PRESSURE: 149 MMHG | WEIGHT: 112 LBS | HEART RATE: 80 BPM | HEIGHT: 61 IN

## 2023-02-14 VITALS — DIASTOLIC BLOOD PRESSURE: 70 MMHG | SYSTOLIC BLOOD PRESSURE: 120 MMHG

## 2023-02-14 DIAGNOSIS — I10 ESSENTIAL (PRIMARY) HYPERTENSION: ICD-10-CM

## 2023-02-14 PROCEDURE — 99215 OFFICE O/P EST HI 40 MIN: CPT

## 2023-02-14 PROCEDURE — 93000 ELECTROCARDIOGRAM COMPLETE: CPT

## 2023-02-14 NOTE — DISCUSSION/SUMMARY
[FreeTextEntry1] : This is an 87 year old woman with a history of mild dementia, chronic atrial fibrillation, hypothyroid, hyperlipidemia who presents to the office for a follow up visit.  \par She is in no acute distress.  She reports no signs of decompensation, she is euvolemic on exam.  Does not demonstrate orthopnea here in the office.  ECG illustrates atrial fibrillation at a controlled ventricular response rate.  Her blood pressure is normal.\par She likely has new severe LV dysfunction from a missed MI. Given her frailty, increased creatinine, etc, I would prefer to manage this medically as agreed by her DTR.  However, they are not opposed to invasive procedures if needed.  \par At this time we will try to optimize GDMT for HFrEF.  She will continue metoprolol 100mg BID, lasix 20mg daily, digoxin 125 daily.  We will attempt to add losartan 25mg today.  Her baseline creat is hovering 1.5.  She will go for repeat blood work in 2 weeks.\par \par She will continue her statin with rosuvastatin 10mg.  I will keep her off of aspirin to minimize her risk of bleeding. \par \par She is in rate controlled AFib, she will continue metoprolol and digoxin as dosed.  Last dig level was 0.9ng/ml.  \par She will continue oral anticoagulation for thromboembolic prevention with eliquis 2.5mg BID. \par \par she will followup again with me in one month.  Sooner with any questions or concerns.\par

## 2023-02-14 NOTE — REVIEW OF SYSTEMS
[Weakness] : weakness [Confusion] : confusion was observed [Memory Lapses Or Loss] : memory lapses or loss [Negative] : Heme/Lymph [FreeTextEntry5] : See HPI

## 2023-02-14 NOTE — PHYSICAL EXAM
[Well Developed] : well developed [Well Nourished] : well nourished [No Acute Distress] : no acute distress [Normal Conjunctiva] : normal conjunctiva [Normal Venous Pressure] : normal venous pressure [No Carotid Bruit] : no carotid bruit [Normal S1, S2] : normal S1, S2 [No Rub] : no rub [Not Palpable] : not palpable [Tachycardia] : tachycardic [Irregularly Irregular] : irregularly irregular [Normal S1] : normal S1 [Normal S2] : normal S2 [No Gallop] : no gallop heard [No Murmur] : no murmurs heard [No Pitting Edema] : no pitting edema present [Clear Lung Fields] : clear lung fields [Good Air Entry] : good air entry [No Respiratory Distress] : no respiratory distress  [Soft] : abdomen soft [Non Tender] : non-tender [No Masses/organomegaly] : no masses/organomegaly [Normal Bowel Sounds] : normal bowel sounds [Normal Gait] : normal gait [No Edema] : no edema [No Cyanosis] : no cyanosis [No Clubbing] : no clubbing [No Varicosities] : no varicosities [No Rash] : no rash [No Skin Lesions] : no skin lesions [Moves all extremities] : moves all extremities [No Focal Deficits] : no focal deficits [Normal Speech] : normal speech [Alert and Oriented] : alert and oriented [Normal memory] : normal memory [de-identified] : 1/6 apical murmur.  IRRR

## 2023-02-14 NOTE — REASON FOR VISIT
[Arrhythmia/ECG Abnorrmalities] : arrhythmia/ECG abnormalities [Cardiac Failure] : cardiac failure [Hyperlipidemia] : hyperlipidemia [Hypertension] : hypertension

## 2023-02-14 NOTE — HISTORY OF PRESENT ILLNESS
[FreeTextEntry1] : This is an 87 year old woman with a history of mild dementia, chronic atrial fibrillation, hypothyroid, hyperlipidemia who presents to the office for a follow up visit.  She was just admitted to  with AF with RVR, and had an echocardiogram that showed new severe segmental LV dysfunction with an akinetic anterior wall and apex, with a hypokinetic inferior and inferolateral wall.  She had no significant valvular heart disease.  She was on diltiazem, which was stopped.  Her digoxin has been increased, and her lisinopril stopped for YVETTE.   Other than feeling fatigued, she is not reporting any other symptoms.   She has no history of stroke or TIA. She is tolerating Eliquis with no evidence of abnormal bleeding. She reports no black or dark colored stool. She has not had increased palpitations or dizziness.  \par \par \par  She arrives today for hospital follow up.  she was [facility] [place] [facility] for decompensated HF from 1/26 -1/3/23.  She presented with an acute onset of SOB.\par HOspital work up included, CT chest that revealed a moderate right pleural effusion for which she had successful thoracentesis with out put of ~400cc.  CT A/P revealed common bile duct dilation, reflecting post cholecystectomy state, transaminitis.\par Successful IV diuresis for management of congetion and volume overoad. \par \par She arrives today feel "much better" .  she is accompanied by her DTR whom reports that she is looking much better as well.  she is ambulating with a walker.\par \par She denies chest pain, increased dyspnea, PND, orthopnea, LE swelling, or syncope. She is short of breath when climbing two flights of stairs, which has been normal for her. \par \par

## 2023-02-14 NOTE — CARDIOLOGY SUMMARY
[de-identified] :  AF.  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  Nonspecific T wave changes.  [de-identified] : 12/2022\par EF 20-25%\par mod MR\par bi atrial enlargement\par sever segmental LV systolic dysfunction, akinetic anterior wall, hypokinetic inf wall\par PA pressure 35mmhg

## 2023-02-23 LAB
ALBUMIN SERPL ELPH-MCNC: 3.5 G/DL
ALP BLD-CCNC: 55 U/L
ALT SERPL-CCNC: 54 U/L
ANION GAP SERPL CALC-SCNC: 12 MMOL/L
AST SERPL-CCNC: 33 U/L
BILIRUB SERPL-MCNC: 0.7 MG/DL
BUN SERPL-MCNC: 39 MG/DL
CALCIUM SERPL-MCNC: 9.6 MG/DL
CHLORIDE SERPL-SCNC: 106 MMOL/L
CO2 SERPL-SCNC: 25 MMOL/L
CREAT SERPL-MCNC: 1.41 MG/DL
EGFR: 36 ML/MIN/1.73M2
GLUCOSE SERPL-MCNC: 134 MG/DL
POTASSIUM SERPL-SCNC: 5.4 MMOL/L
PROT SERPL-MCNC: 7.1 G/DL
SODIUM SERPL-SCNC: 142 MMOL/L

## 2023-02-25 LAB
CULTURE RESULTS: SIGNIFICANT CHANGE UP
SPECIMEN SOURCE: SIGNIFICANT CHANGE UP

## 2023-03-07 ENCOUNTER — TRANSCRIPTION ENCOUNTER (OUTPATIENT)
Age: 88
End: 2023-03-07

## 2023-03-15 ENCOUNTER — APPOINTMENT (OUTPATIENT)
Dept: CARDIOLOGY | Facility: CLINIC | Age: 88
End: 2023-03-15
Payer: MEDICARE

## 2023-03-15 ENCOUNTER — NON-APPOINTMENT (OUTPATIENT)
Age: 88
End: 2023-03-15

## 2023-03-15 VITALS
DIASTOLIC BLOOD PRESSURE: 83 MMHG | SYSTOLIC BLOOD PRESSURE: 134 MMHG | OXYGEN SATURATION: 95 % | BODY MASS INDEX: 21.71 KG/M2 | HEIGHT: 61 IN | HEART RATE: 78 BPM | WEIGHT: 115 LBS

## 2023-03-15 PROCEDURE — 93000 ELECTROCARDIOGRAM COMPLETE: CPT

## 2023-03-15 PROCEDURE — 99214 OFFICE O/P EST MOD 30 MIN: CPT

## 2023-04-25 NOTE — PHYSICAL THERAPY INITIAL EVALUATION ADULT - ASSISTIVE DEVICE FOR TRANSFER: SIT/STAND, REHAB EVAL
rolling walker Instructions (Optional): A. Right trapezius 0.7 - add Melan a stain \\nB. Right upper arm 0.6\\nC. Right anterior shoulder 0.9\\nD.  Left lateral orbital 0.6 Size Of Lesion In Cm (Optional): 0 Introduction Text (Please End With A Colon): ; Detail Level: Detailed Procedure To Be Performed At Next Visit: Shave Removal Procedure To Be Performed At Next Visit: Cryotherapy Procedure To Be Performed At Next Visit: Intralesional Kenalog

## 2023-06-20 ENCOUNTER — APPOINTMENT (OUTPATIENT)
Dept: CARDIOLOGY | Facility: CLINIC | Age: 88
End: 2023-06-20
Payer: MEDICARE

## 2023-06-20 ENCOUNTER — NON-APPOINTMENT (OUTPATIENT)
Age: 88
End: 2023-06-20

## 2023-06-20 VITALS
HEART RATE: 62 BPM | BODY MASS INDEX: 21.14 KG/M2 | DIASTOLIC BLOOD PRESSURE: 74 MMHG | WEIGHT: 112 LBS | HEIGHT: 61 IN | OXYGEN SATURATION: 97 % | SYSTOLIC BLOOD PRESSURE: 130 MMHG

## 2023-06-20 PROCEDURE — 93000 ELECTROCARDIOGRAM COMPLETE: CPT

## 2023-06-20 PROCEDURE — 99214 OFFICE O/P EST MOD 30 MIN: CPT

## 2023-06-23 NOTE — PATIENT PROFILE ADULT - FLU SEASON?
Yes... Sarecycline Pregnancy And Lactation Text: This medication is Pregnancy Category D and not consider safe during pregnancy. It is also excreted in breast milk.

## 2023-09-19 ENCOUNTER — NON-APPOINTMENT (OUTPATIENT)
Age: 88
End: 2023-09-19

## 2023-09-19 ENCOUNTER — APPOINTMENT (OUTPATIENT)
Dept: CARDIOLOGY | Facility: CLINIC | Age: 88
End: 2023-09-19
Payer: MEDICARE

## 2023-09-19 VITALS — DIASTOLIC BLOOD PRESSURE: 70 MMHG | SYSTOLIC BLOOD PRESSURE: 130 MMHG

## 2023-09-19 VITALS
OXYGEN SATURATION: 97 % | DIASTOLIC BLOOD PRESSURE: 84 MMHG | WEIGHT: 114 LBS | SYSTOLIC BLOOD PRESSURE: 170 MMHG | HEIGHT: 61 IN | BODY MASS INDEX: 21.52 KG/M2 | HEART RATE: 73 BPM

## 2023-09-19 PROCEDURE — 99215 OFFICE O/P EST HI 40 MIN: CPT

## 2023-09-19 PROCEDURE — 93000 ELECTROCARDIOGRAM COMPLETE: CPT

## 2023-10-08 LAB — DIGOXIN SERPL-MCNC: 1.2 NG/ML

## 2023-10-31 ENCOUNTER — APPOINTMENT (OUTPATIENT)
Dept: WOUND CARE | Facility: HOSPITAL | Age: 88
End: 2023-10-31
Payer: MEDICARE

## 2023-10-31 ENCOUNTER — OUTPATIENT (OUTPATIENT)
Dept: OUTPATIENT SERVICES | Facility: HOSPITAL | Age: 88
LOS: 1 days | Discharge: HOME | End: 2023-10-31
Payer: MEDICARE

## 2023-10-31 VITALS
RESPIRATION RATE: 18 BRPM | HEART RATE: 83 BPM | SYSTOLIC BLOOD PRESSURE: 112 MMHG | WEIGHT: 114 LBS | BODY MASS INDEX: 21.52 KG/M2 | DIASTOLIC BLOOD PRESSURE: 67 MMHG | TEMPERATURE: 97.8 F | HEIGHT: 61 IN

## 2023-10-31 VITALS — TEMPERATURE: 97.6 F | OXYGEN SATURATION: 97 %

## 2023-10-31 DIAGNOSIS — Z63.4 DISAPPEARANCE AND DEATH OF FAMILY MEMBER: ICD-10-CM

## 2023-10-31 DIAGNOSIS — Z90.49 ACQUIRED ABSENCE OF OTHER SPECIFIED PARTS OF DIGESTIVE TRACT: Chronic | ICD-10-CM

## 2023-10-31 DIAGNOSIS — Z78.9 OTHER SPECIFIED HEALTH STATUS: ICD-10-CM

## 2023-10-31 DIAGNOSIS — Z98.890 OTHER SPECIFIED POSTPROCEDURAL STATES: Chronic | ICD-10-CM

## 2023-10-31 DIAGNOSIS — L89.150 PRESSURE ULCER OF SACRAL REGION, UNSTAGEABLE: ICD-10-CM

## 2023-10-31 DIAGNOSIS — Z82.3 FAMILY HISTORY OF STROKE: ICD-10-CM

## 2023-10-31 PROCEDURE — 99203 OFFICE O/P NEW LOW 30 MIN: CPT

## 2023-10-31 PROCEDURE — G0463: CPT

## 2023-10-31 RX ORDER — APIXABAN 2.5 MG/1
2.5 TABLET, FILM COATED ORAL
Qty: 180 | Refills: 2 | Status: ACTIVE | COMMUNITY
Start: 2021-08-25

## 2023-10-31 RX ORDER — RIVASTIGMINE 9.5 MG/24H
9.5 PATCH, EXTENDED RELEASE TRANSDERMAL DAILY
Refills: 0 | Status: ACTIVE | COMMUNITY
Start: 2021-08-25

## 2023-10-31 RX ORDER — YOHIMBE BARK 500 MG
CAPSULE ORAL
Refills: 0 | Status: ACTIVE | COMMUNITY

## 2023-10-31 RX ORDER — GLUCOSAMINE/MSM/CHONDROIT SULF 500-166.6
10 TABLET ORAL DAILY
Refills: 0 | Status: ACTIVE | COMMUNITY
Start: 2023-02-06

## 2023-10-31 RX ORDER — MULTIVIT-MIN/FOLIC/VIT K/LYCOP 400-300MCG
50 MCG TABLET ORAL DAILY
Refills: 0 | Status: ACTIVE | COMMUNITY
Start: 2023-02-06

## 2023-10-31 RX ORDER — METOPROLOL SUCCINATE 100 MG/1
100 TABLET, EXTENDED RELEASE ORAL TWICE DAILY
Qty: 180 | Refills: 2 | Status: ACTIVE | COMMUNITY
Start: 2021-08-25

## 2023-10-31 RX ORDER — CLOTRIMAZOLE AND BETAMETHASONE DIPROPIONATE 10; .5 MG/G; MG/G
1-0.05 CREAM TOPICAL
Qty: 45 | Refills: 2 | Status: ACTIVE | COMMUNITY
Start: 2023-10-31 | End: 1900-01-01

## 2023-10-31 RX ORDER — METFORMIN HYDROCHLORIDE 1000 MG/1
1000 TABLET, FILM COATED, EXTENDED RELEASE ORAL
Refills: 3 | Status: COMPLETED | COMMUNITY
Start: 2021-08-25 | End: 2023-10-31

## 2023-10-31 RX ORDER — METFORMIN HYDROCHLORIDE 1000 MG/1
1000 TABLET, COATED ORAL TWICE DAILY
Refills: 0 | Status: ACTIVE | COMMUNITY
Start: 2022-07-24

## 2023-10-31 RX ORDER — DIGOXIN 125 UG/1
125 TABLET ORAL
Qty: 15 | Refills: 2 | Status: ACTIVE | COMMUNITY
Start: 2021-08-25

## 2023-10-31 RX ORDER — FLUCONAZOLE 200 MG/1
200 TABLET ORAL
Refills: 0 | Status: ACTIVE | COMMUNITY

## 2023-10-31 RX ORDER — ROSUVASTATIN CALCIUM 10 MG/1
10 TABLET, FILM COATED ORAL
Qty: 30 | Refills: 3 | Status: COMPLETED | COMMUNITY
Start: 2021-08-25 | End: 2023-10-31

## 2023-10-31 RX ORDER — LEVOTHYROXINE SODIUM 0.07 MG/1
75 TABLET ORAL
Qty: 90 | Refills: 3 | Status: ACTIVE | COMMUNITY
Start: 2023-01-03

## 2023-10-31 RX ORDER — MEMANTINE HYDROCHLORIDE 10 MG/1
10 TABLET, FILM COATED ORAL TWICE DAILY
Qty: 180 | Refills: 3 | Status: ACTIVE | COMMUNITY
Start: 2021-08-25

## 2023-10-31 SDOH — SOCIAL STABILITY - SOCIAL INSECURITY: DISSAPEARANCE AND DEATH OF FAMILY MEMBER: Z63.4

## 2023-11-01 DIAGNOSIS — Z87.891 PERSONAL HISTORY OF NICOTINE DEPENDENCE: ICD-10-CM

## 2023-11-01 DIAGNOSIS — E03.9 HYPOTHYROIDISM, UNSPECIFIED: ICD-10-CM

## 2023-11-01 DIAGNOSIS — I48.91 UNSPECIFIED ATRIAL FIBRILLATION: ICD-10-CM

## 2023-11-01 DIAGNOSIS — Z79.01 LONG TERM (CURRENT) USE OF ANTICOAGULANTS: ICD-10-CM

## 2023-11-01 DIAGNOSIS — L89.312 PRESSURE ULCER OF RIGHT BUTTOCK, STAGE 2: ICD-10-CM

## 2023-11-01 DIAGNOSIS — Z79.84 LONG TERM (CURRENT) USE OF ORAL HYPOGLYCEMIC DRUGS: ICD-10-CM

## 2023-11-01 DIAGNOSIS — Z90.49 ACQUIRED ABSENCE OF OTHER SPECIFIED PARTS OF DIGESTIVE TRACT: ICD-10-CM

## 2023-11-01 DIAGNOSIS — I50.9 HEART FAILURE, UNSPECIFIED: ICD-10-CM

## 2023-11-01 DIAGNOSIS — E78.5 HYPERLIPIDEMIA, UNSPECIFIED: ICD-10-CM

## 2023-11-01 DIAGNOSIS — E11.9 TYPE 2 DIABETES MELLITUS WITHOUT COMPLICATIONS: ICD-10-CM

## 2023-11-01 DIAGNOSIS — I11.0 HYPERTENSIVE HEART DISEASE WITH HEART FAILURE: ICD-10-CM

## 2023-11-01 DIAGNOSIS — Z79.899 OTHER LONG TERM (CURRENT) DRUG THERAPY: ICD-10-CM

## 2023-11-03 PROBLEM — Z78.9 CURRENT NON-SMOKER: Status: ACTIVE | Noted: 2023-11-03

## 2023-11-14 ENCOUNTER — OUTPATIENT (OUTPATIENT)
Dept: OUTPATIENT SERVICES | Facility: HOSPITAL | Age: 88
LOS: 1 days | Discharge: HOME | End: 2023-11-14
Payer: MEDICARE

## 2023-11-14 ENCOUNTER — APPOINTMENT (OUTPATIENT)
Dept: WOUND CARE | Facility: HOSPITAL | Age: 88
End: 2023-11-14
Payer: MEDICARE

## 2023-11-14 VITALS
TEMPERATURE: 97.6 F | DIASTOLIC BLOOD PRESSURE: 84 MMHG | HEIGHT: 61 IN | BODY MASS INDEX: 21.52 KG/M2 | HEART RATE: 95 BPM | RESPIRATION RATE: 18 BRPM | OXYGEN SATURATION: 98 % | WEIGHT: 114 LBS | SYSTOLIC BLOOD PRESSURE: 133 MMHG

## 2023-11-14 DIAGNOSIS — Z79.84 LONG TERM (CURRENT) USE OF ORAL HYPOGLYCEMIC DRUGS: ICD-10-CM

## 2023-11-14 DIAGNOSIS — E11.59 TYPE 2 DIABETES MELLITUS WITH OTHER CIRCULATORY COMPLICATIONS: ICD-10-CM

## 2023-11-14 DIAGNOSIS — Z87.891 PERSONAL HISTORY OF NICOTINE DEPENDENCE: ICD-10-CM

## 2023-11-14 DIAGNOSIS — L89.312 PRESSURE ULCER OF RIGHT BUTTOCK, STAGE 2: ICD-10-CM

## 2023-11-14 DIAGNOSIS — Z90.49 ACQUIRED ABSENCE OF OTHER SPECIFIED PARTS OF DIGESTIVE TRACT: Chronic | ICD-10-CM

## 2023-11-14 DIAGNOSIS — E78.5 HYPERLIPIDEMIA, UNSPECIFIED: ICD-10-CM

## 2023-11-14 DIAGNOSIS — Z79.899 OTHER LONG TERM (CURRENT) DRUG THERAPY: ICD-10-CM

## 2023-11-14 DIAGNOSIS — I48.91 UNSPECIFIED ATRIAL FIBRILLATION: ICD-10-CM

## 2023-11-14 DIAGNOSIS — I50.9 HEART FAILURE, UNSPECIFIED: ICD-10-CM

## 2023-11-14 DIAGNOSIS — Z98.890 OTHER SPECIFIED POSTPROCEDURAL STATES: Chronic | ICD-10-CM

## 2023-11-14 DIAGNOSIS — Z90.49 ACQUIRED ABSENCE OF OTHER SPECIFIED PARTS OF DIGESTIVE TRACT: ICD-10-CM

## 2023-11-14 DIAGNOSIS — E03.9 HYPOTHYROIDISM, UNSPECIFIED: ICD-10-CM

## 2023-11-14 DIAGNOSIS — E11.621 TYPE 2 DIABETES MELLITUS WITH FOOT ULCER: ICD-10-CM

## 2023-11-14 DIAGNOSIS — L89.150 PRESSURE ULCER OF SACRAL REGION, UNSTAGEABLE: ICD-10-CM

## 2023-11-14 DIAGNOSIS — L97.411 NON-PRESSURE CHRONIC ULCER OF RIGHT HEEL AND MIDFOOT LIMITED TO BREAKDOWN OF SKIN: ICD-10-CM

## 2023-11-14 DIAGNOSIS — I11.0 HYPERTENSIVE HEART DISEASE WITH HEART FAILURE: ICD-10-CM

## 2023-11-14 DIAGNOSIS — Z79.01 LONG TERM (CURRENT) USE OF ANTICOAGULANTS: ICD-10-CM

## 2023-11-14 PROCEDURE — 99203 OFFICE O/P NEW LOW 30 MIN: CPT

## 2023-11-14 PROCEDURE — 99213 OFFICE O/P EST LOW 20 MIN: CPT

## 2023-11-14 PROCEDURE — G0463: CPT

## 2023-11-14 RX ORDER — CLOTRIMAZOLE AND BETAMETHASONE DIPROPIONATE 10; .5 MG/G; MG/G
1-0.05 CREAM TOPICAL
Qty: 2 | Refills: 4 | Status: ACTIVE | COMMUNITY
Start: 2023-11-14 | End: 1900-01-01

## 2023-11-27 ENCOUNTER — INPATIENT (INPATIENT)
Facility: HOSPITAL | Age: 88
LOS: 4 days | Discharge: HOME | DRG: 682 | End: 2023-12-02
Attending: STUDENT IN AN ORGANIZED HEALTH CARE EDUCATION/TRAINING PROGRAM | Admitting: STUDENT IN AN ORGANIZED HEALTH CARE EDUCATION/TRAINING PROGRAM
Payer: MEDICARE

## 2023-11-27 VITALS
HEART RATE: 127 BPM | SYSTOLIC BLOOD PRESSURE: 122 MMHG | WEIGHT: 102.07 LBS | HEIGHT: 60 IN | DIASTOLIC BLOOD PRESSURE: 82 MMHG | TEMPERATURE: 97 F | OXYGEN SATURATION: 96 % | RESPIRATION RATE: 18 BRPM

## 2023-11-27 DIAGNOSIS — T14.8XXA OTHER INJURY OF UNSPECIFIED BODY REGION, INITIAL ENCOUNTER: ICD-10-CM

## 2023-11-27 DIAGNOSIS — I48.91 UNSPECIFIED ATRIAL FIBRILLATION: ICD-10-CM

## 2023-11-27 DIAGNOSIS — Z90.49 ACQUIRED ABSENCE OF OTHER SPECIFIED PARTS OF DIGESTIVE TRACT: Chronic | ICD-10-CM

## 2023-11-27 DIAGNOSIS — R94.31 ABNORMAL ELECTROCARDIOGRAM [ECG] [EKG]: ICD-10-CM

## 2023-11-27 DIAGNOSIS — G30.9 ALZHEIMER'S DISEASE, UNSPECIFIED: ICD-10-CM

## 2023-11-27 DIAGNOSIS — N17.9 ACUTE KIDNEY FAILURE, UNSPECIFIED: ICD-10-CM

## 2023-11-27 DIAGNOSIS — N30.00 ACUTE CYSTITIS WITHOUT HEMATURIA: ICD-10-CM

## 2023-11-27 DIAGNOSIS — I10 ESSENTIAL (PRIMARY) HYPERTENSION: ICD-10-CM

## 2023-11-27 DIAGNOSIS — E11.9 TYPE 2 DIABETES MELLITUS WITHOUT COMPLICATIONS: ICD-10-CM

## 2023-11-27 DIAGNOSIS — E87.20 ACIDOSIS, UNSPECIFIED: ICD-10-CM

## 2023-11-27 DIAGNOSIS — Z29.9 ENCOUNTER FOR PROPHYLACTIC MEASURES, UNSPECIFIED: ICD-10-CM

## 2023-11-27 DIAGNOSIS — I50.22 CHRONIC SYSTOLIC (CONGESTIVE) HEART FAILURE: ICD-10-CM

## 2023-11-27 DIAGNOSIS — E78.5 HYPERLIPIDEMIA, UNSPECIFIED: ICD-10-CM

## 2023-11-27 DIAGNOSIS — Z98.890 OTHER SPECIFIED POSTPROCEDURAL STATES: Chronic | ICD-10-CM

## 2023-11-27 DIAGNOSIS — E03.9 HYPOTHYROIDISM, UNSPECIFIED: ICD-10-CM

## 2023-11-27 LAB
ALBUMIN SERPL ELPH-MCNC: 2.4 G/DL — LOW (ref 3.3–5)
ALBUMIN SERPL ELPH-MCNC: 2.4 G/DL — LOW (ref 3.3–5)
ALP SERPL-CCNC: 68 U/L — SIGNIFICANT CHANGE UP (ref 40–120)
ALP SERPL-CCNC: 68 U/L — SIGNIFICANT CHANGE UP (ref 40–120)
ALT FLD-CCNC: 56 U/L — SIGNIFICANT CHANGE UP (ref 12–78)
ALT FLD-CCNC: 56 U/L — SIGNIFICANT CHANGE UP (ref 12–78)
ANION GAP SERPL CALC-SCNC: 10 MMOL/L — SIGNIFICANT CHANGE UP (ref 5–17)
ANION GAP SERPL CALC-SCNC: 10 MMOL/L — SIGNIFICANT CHANGE UP (ref 5–17)
APPEARANCE UR: ABNORMAL
APPEARANCE UR: ABNORMAL
APTT BLD: 28.3 SEC — SIGNIFICANT CHANGE UP (ref 24.5–35.6)
APTT BLD: 28.3 SEC — SIGNIFICANT CHANGE UP (ref 24.5–35.6)
AST SERPL-CCNC: 30 U/L — SIGNIFICANT CHANGE UP (ref 15–37)
AST SERPL-CCNC: 30 U/L — SIGNIFICANT CHANGE UP (ref 15–37)
BASOPHILS # BLD AUTO: 0 K/UL — SIGNIFICANT CHANGE UP (ref 0–0.2)
BASOPHILS # BLD AUTO: 0 K/UL — SIGNIFICANT CHANGE UP (ref 0–0.2)
BASOPHILS NFR BLD AUTO: 0 % — SIGNIFICANT CHANGE UP (ref 0–2)
BASOPHILS NFR BLD AUTO: 0 % — SIGNIFICANT CHANGE UP (ref 0–2)
BILIRUB SERPL-MCNC: 0.3 MG/DL — SIGNIFICANT CHANGE UP (ref 0.2–1.2)
BILIRUB SERPL-MCNC: 0.3 MG/DL — SIGNIFICANT CHANGE UP (ref 0.2–1.2)
BILIRUB UR-MCNC: NEGATIVE — SIGNIFICANT CHANGE UP
BILIRUB UR-MCNC: NEGATIVE — SIGNIFICANT CHANGE UP
BUN SERPL-MCNC: 82 MG/DL — HIGH (ref 7–23)
BUN SERPL-MCNC: 82 MG/DL — HIGH (ref 7–23)
CALCIUM SERPL-MCNC: 9.5 MG/DL — SIGNIFICANT CHANGE UP (ref 8.5–10.1)
CALCIUM SERPL-MCNC: 9.5 MG/DL — SIGNIFICANT CHANGE UP (ref 8.5–10.1)
CHLORIDE SERPL-SCNC: 112 MMOL/L — HIGH (ref 96–108)
CHLORIDE SERPL-SCNC: 112 MMOL/L — HIGH (ref 96–108)
CO2 SERPL-SCNC: 19 MMOL/L — LOW (ref 22–31)
CO2 SERPL-SCNC: 19 MMOL/L — LOW (ref 22–31)
COLOR SPEC: YELLOW — SIGNIFICANT CHANGE UP
COLOR SPEC: YELLOW — SIGNIFICANT CHANGE UP
CREAT SERPL-MCNC: 2.3 MG/DL — HIGH (ref 0.5–1.3)
CREAT SERPL-MCNC: 2.3 MG/DL — HIGH (ref 0.5–1.3)
DIFF PNL FLD: ABNORMAL
DIFF PNL FLD: ABNORMAL
EGFR: 20 ML/MIN/1.73M2 — LOW
EGFR: 20 ML/MIN/1.73M2 — LOW
EOSINOPHIL # BLD AUTO: 0 K/UL — SIGNIFICANT CHANGE UP (ref 0–0.5)
EOSINOPHIL # BLD AUTO: 0 K/UL — SIGNIFICANT CHANGE UP (ref 0–0.5)
EOSINOPHIL NFR BLD AUTO: 0 % — SIGNIFICANT CHANGE UP (ref 0–6)
EOSINOPHIL NFR BLD AUTO: 0 % — SIGNIFICANT CHANGE UP (ref 0–6)
GLUCOSE SERPL-MCNC: 168 MG/DL — HIGH (ref 70–99)
GLUCOSE SERPL-MCNC: 168 MG/DL — HIGH (ref 70–99)
GLUCOSE UR QL: NEGATIVE MG/DL — SIGNIFICANT CHANGE UP
GLUCOSE UR QL: NEGATIVE MG/DL — SIGNIFICANT CHANGE UP
HCT VFR BLD CALC: 41.5 % — SIGNIFICANT CHANGE UP (ref 34.5–45)
HCT VFR BLD CALC: 41.5 % — SIGNIFICANT CHANGE UP (ref 34.5–45)
HGB BLD-MCNC: 13.1 G/DL — SIGNIFICANT CHANGE UP (ref 11.5–15.5)
HGB BLD-MCNC: 13.1 G/DL — SIGNIFICANT CHANGE UP (ref 11.5–15.5)
INR BLD: 1.25 RATIO — HIGH (ref 0.85–1.18)
INR BLD: 1.25 RATIO — HIGH (ref 0.85–1.18)
KETONES UR-MCNC: NEGATIVE MG/DL — SIGNIFICANT CHANGE UP
KETONES UR-MCNC: NEGATIVE MG/DL — SIGNIFICANT CHANGE UP
LEUKOCYTE ESTERASE UR-ACNC: ABNORMAL
LEUKOCYTE ESTERASE UR-ACNC: ABNORMAL
LYMPHOCYTES # BLD AUTO: 1.97 K/UL — SIGNIFICANT CHANGE UP (ref 1–3.3)
LYMPHOCYTES # BLD AUTO: 1.97 K/UL — SIGNIFICANT CHANGE UP (ref 1–3.3)
LYMPHOCYTES # BLD AUTO: 19 % — SIGNIFICANT CHANGE UP (ref 13–44)
LYMPHOCYTES # BLD AUTO: 19 % — SIGNIFICANT CHANGE UP (ref 13–44)
MCHC RBC-ENTMCNC: 28.4 PG — SIGNIFICANT CHANGE UP (ref 27–34)
MCHC RBC-ENTMCNC: 28.4 PG — SIGNIFICANT CHANGE UP (ref 27–34)
MCHC RBC-ENTMCNC: 31.6 GM/DL — LOW (ref 32–36)
MCHC RBC-ENTMCNC: 31.6 GM/DL — LOW (ref 32–36)
MCV RBC AUTO: 90 FL — SIGNIFICANT CHANGE UP (ref 80–100)
MCV RBC AUTO: 90 FL — SIGNIFICANT CHANGE UP (ref 80–100)
MONOCYTES # BLD AUTO: 0.93 K/UL — HIGH (ref 0–0.9)
MONOCYTES # BLD AUTO: 0.93 K/UL — HIGH (ref 0–0.9)
MONOCYTES NFR BLD AUTO: 9 % — SIGNIFICANT CHANGE UP (ref 2–14)
MONOCYTES NFR BLD AUTO: 9 % — SIGNIFICANT CHANGE UP (ref 2–14)
NEUTROPHILS # BLD AUTO: 6.83 K/UL — SIGNIFICANT CHANGE UP (ref 1.8–7.4)
NEUTROPHILS # BLD AUTO: 6.83 K/UL — SIGNIFICANT CHANGE UP (ref 1.8–7.4)
NEUTROPHILS NFR BLD AUTO: 66 % — SIGNIFICANT CHANGE UP (ref 43–77)
NEUTROPHILS NFR BLD AUTO: 66 % — SIGNIFICANT CHANGE UP (ref 43–77)
NITRITE UR-MCNC: POSITIVE
NITRITE UR-MCNC: POSITIVE
NRBC # BLD: SIGNIFICANT CHANGE UP /100 WBCS (ref 0–0)
NRBC # BLD: SIGNIFICANT CHANGE UP /100 WBCS (ref 0–0)
PH UR: 6 — SIGNIFICANT CHANGE UP (ref 5–8)
PH UR: 6 — SIGNIFICANT CHANGE UP (ref 5–8)
PLATELET # BLD AUTO: 394 K/UL — SIGNIFICANT CHANGE UP (ref 150–400)
PLATELET # BLD AUTO: 394 K/UL — SIGNIFICANT CHANGE UP (ref 150–400)
POTASSIUM SERPL-MCNC: 5.4 MMOL/L — HIGH (ref 3.5–5.3)
POTASSIUM SERPL-MCNC: 5.4 MMOL/L — HIGH (ref 3.5–5.3)
POTASSIUM SERPL-SCNC: 5.4 MMOL/L — HIGH (ref 3.5–5.3)
POTASSIUM SERPL-SCNC: 5.4 MMOL/L — HIGH (ref 3.5–5.3)
PROT SERPL-MCNC: 7.2 G/DL — SIGNIFICANT CHANGE UP (ref 6–8.3)
PROT SERPL-MCNC: 7.2 G/DL — SIGNIFICANT CHANGE UP (ref 6–8.3)
PROT UR-MCNC: 100 MG/DL
PROT UR-MCNC: 100 MG/DL
PROTHROM AB SERPL-ACNC: 14.5 SEC — HIGH (ref 9.5–13)
PROTHROM AB SERPL-ACNC: 14.5 SEC — HIGH (ref 9.5–13)
RBC # BLD: 4.61 M/UL — SIGNIFICANT CHANGE UP (ref 3.8–5.2)
RBC # BLD: 4.61 M/UL — SIGNIFICANT CHANGE UP (ref 3.8–5.2)
RBC # FLD: 18.6 % — HIGH (ref 10.3–14.5)
RBC # FLD: 18.6 % — HIGH (ref 10.3–14.5)
SODIUM SERPL-SCNC: 141 MMOL/L — SIGNIFICANT CHANGE UP (ref 135–145)
SODIUM SERPL-SCNC: 141 MMOL/L — SIGNIFICANT CHANGE UP (ref 135–145)
SP GR SPEC: 1.02 — SIGNIFICANT CHANGE UP (ref 1–1.03)
SP GR SPEC: 1.02 — SIGNIFICANT CHANGE UP (ref 1–1.03)
TROPONIN I, HIGH SENSITIVITY RESULT: 26.9 NG/L — SIGNIFICANT CHANGE UP
TROPONIN I, HIGH SENSITIVITY RESULT: 26.9 NG/L — SIGNIFICANT CHANGE UP
UROBILINOGEN FLD QL: 0.2 MG/DL — SIGNIFICANT CHANGE UP (ref 0.2–1)
UROBILINOGEN FLD QL: 0.2 MG/DL — SIGNIFICANT CHANGE UP (ref 0.2–1)
WBC # BLD: 10.35 K/UL — SIGNIFICANT CHANGE UP (ref 3.8–10.5)
WBC # BLD: 10.35 K/UL — SIGNIFICANT CHANGE UP (ref 3.8–10.5)
WBC # FLD AUTO: 10.35 K/UL — SIGNIFICANT CHANGE UP (ref 3.8–10.5)
WBC # FLD AUTO: 10.35 K/UL — SIGNIFICANT CHANGE UP (ref 3.8–10.5)

## 2023-11-27 PROCEDURE — 74177 CT ABD & PELVIS W/CONTRAST: CPT | Mod: 26,MA

## 2023-11-27 PROCEDURE — 99223 1ST HOSP IP/OBS HIGH 75: CPT | Mod: GC

## 2023-11-27 PROCEDURE — 93010 ELECTROCARDIOGRAM REPORT: CPT | Mod: 76

## 2023-11-27 PROCEDURE — 71045 X-RAY EXAM CHEST 1 VIEW: CPT | Mod: 26

## 2023-11-27 PROCEDURE — 99285 EMERGENCY DEPT VISIT HI MDM: CPT | Mod: FS

## 2023-11-27 PROCEDURE — 70450 CT HEAD/BRAIN W/O DYE: CPT | Mod: 26,MA

## 2023-11-27 RX ORDER — INSULIN LISPRO 100/ML
VIAL (ML) SUBCUTANEOUS
Refills: 0 | Status: DISCONTINUED | OUTPATIENT
Start: 2023-11-27 | End: 2023-12-02

## 2023-11-27 RX ORDER — CHOLECALCIFEROL (VITAMIN D3) 125 MCG
2000 CAPSULE ORAL DAILY
Refills: 0 | Status: DISCONTINUED | OUTPATIENT
Start: 2023-11-27 | End: 2023-12-02

## 2023-11-27 RX ORDER — MEMANTINE HYDROCHLORIDE 10 MG/1
10 TABLET ORAL
Refills: 0 | Status: DISCONTINUED | OUTPATIENT
Start: 2023-11-27 | End: 2023-12-02

## 2023-11-27 RX ORDER — APIXABAN 2.5 MG/1
2.5 TABLET, FILM COATED ORAL
Refills: 0 | Status: DISCONTINUED | OUTPATIENT
Start: 2023-11-27 | End: 2023-12-02

## 2023-11-27 RX ORDER — SODIUM BICARBONATE 1 MEQ/ML
0.05 SYRINGE (ML) INTRAVENOUS
Qty: 50 | Refills: 0 | Status: COMPLETED | OUTPATIENT
Start: 2023-11-27 | End: 2023-11-28

## 2023-11-27 RX ORDER — DIGOXIN 250 MCG
125 TABLET ORAL EVERY OTHER DAY
Refills: 0 | Status: DISCONTINUED | OUTPATIENT
Start: 2023-11-27 | End: 2023-12-02

## 2023-11-27 RX ORDER — LOSARTAN POTASSIUM 100 MG/1
25 TABLET, FILM COATED ORAL AT BEDTIME
Refills: 0 | Status: DISCONTINUED | OUTPATIENT
Start: 2023-11-27 | End: 2023-11-27

## 2023-11-27 RX ORDER — DEXTROSE 50 % IN WATER 50 %
12.5 SYRINGE (ML) INTRAVENOUS ONCE
Refills: 0 | Status: DISCONTINUED | OUTPATIENT
Start: 2023-11-27 | End: 2023-12-02

## 2023-11-27 RX ORDER — CEFTRIAXONE 500 MG/1
2000 INJECTION, POWDER, FOR SOLUTION INTRAMUSCULAR; INTRAVENOUS EVERY 24 HOURS
Refills: 0 | Status: DISCONTINUED | OUTPATIENT
Start: 2023-11-28 | End: 2023-11-28

## 2023-11-27 RX ORDER — LEVOTHYROXINE SODIUM 125 MCG
75 TABLET ORAL DAILY
Refills: 0 | Status: DISCONTINUED | OUTPATIENT
Start: 2023-11-27 | End: 2023-12-02

## 2023-11-27 RX ORDER — SODIUM CHLORIDE 9 MG/ML
250 INJECTION INTRAMUSCULAR; INTRAVENOUS; SUBCUTANEOUS ONCE
Refills: 0 | Status: COMPLETED | OUTPATIENT
Start: 2023-11-27 | End: 2023-11-27

## 2023-11-27 RX ORDER — SODIUM CHLORIDE 9 MG/ML
1000 INJECTION, SOLUTION INTRAVENOUS
Refills: 0 | Status: DISCONTINUED | OUTPATIENT
Start: 2023-11-27 | End: 2023-11-28

## 2023-11-27 RX ORDER — OXYCODONE HYDROCHLORIDE 5 MG/1
5 TABLET ORAL ONCE
Refills: 0 | Status: DISCONTINUED | OUTPATIENT
Start: 2023-11-27 | End: 2023-11-27

## 2023-11-27 RX ORDER — OXYCODONE HYDROCHLORIDE 5 MG/1
2.5 TABLET ORAL EVERY 4 HOURS
Refills: 0 | Status: DISCONTINUED | OUTPATIENT
Start: 2023-11-27 | End: 2023-12-02

## 2023-11-27 RX ORDER — DEXTROSE 50 % IN WATER 50 %
25 SYRINGE (ML) INTRAVENOUS ONCE
Refills: 0 | Status: DISCONTINUED | OUTPATIENT
Start: 2023-11-27 | End: 2023-12-02

## 2023-11-27 RX ORDER — CEFTRIAXONE 500 MG/1
2000 INJECTION, POWDER, FOR SOLUTION INTRAMUSCULAR; INTRAVENOUS EVERY 24 HOURS
Refills: 0 | Status: DISCONTINUED | OUTPATIENT
Start: 2023-11-27 | End: 2023-11-27

## 2023-11-27 RX ORDER — GLUCAGON INJECTION, SOLUTION 0.5 MG/.1ML
1 INJECTION, SOLUTION SUBCUTANEOUS ONCE
Refills: 0 | Status: DISCONTINUED | OUTPATIENT
Start: 2023-11-27 | End: 2023-12-02

## 2023-11-27 RX ORDER — INSULIN LISPRO 100/ML
VIAL (ML) SUBCUTANEOUS AT BEDTIME
Refills: 0 | Status: DISCONTINUED | OUTPATIENT
Start: 2023-11-27 | End: 2023-12-02

## 2023-11-27 RX ORDER — ROSUVASTATIN CALCIUM 5 MG/1
1 TABLET ORAL
Qty: 0 | Refills: 0 | DISCHARGE

## 2023-11-27 RX ORDER — ONDANSETRON 8 MG/1
4 TABLET, FILM COATED ORAL EVERY 8 HOURS
Refills: 0 | Status: DISCONTINUED | OUTPATIENT
Start: 2023-11-27 | End: 2023-12-02

## 2023-11-27 RX ORDER — LANOLIN ALCOHOL/MO/W.PET/CERES
5 CREAM (GRAM) TOPICAL AT BEDTIME
Refills: 0 | Status: DISCONTINUED | OUTPATIENT
Start: 2023-11-27 | End: 2023-12-02

## 2023-11-27 RX ORDER — SODIUM CHLORIDE 9 MG/ML
500 INJECTION INTRAMUSCULAR; INTRAVENOUS; SUBCUTANEOUS ONCE
Refills: 0 | Status: COMPLETED | OUTPATIENT
Start: 2023-11-27 | End: 2023-11-27

## 2023-11-27 RX ORDER — ACETAMINOPHEN 500 MG
650 TABLET ORAL EVERY 6 HOURS
Refills: 0 | Status: DISCONTINUED | OUTPATIENT
Start: 2023-11-27 | End: 2023-12-02

## 2023-11-27 RX ORDER — SODIUM CHLORIDE 9 MG/ML
1000 INJECTION, SOLUTION INTRAVENOUS
Refills: 0 | Status: DISCONTINUED | OUTPATIENT
Start: 2023-11-27 | End: 2023-12-02

## 2023-11-27 RX ORDER — DIGOXIN 250 MCG
62.5 TABLET ORAL EVERY OTHER DAY
Refills: 0 | Status: DISCONTINUED | OUTPATIENT
Start: 2023-11-27 | End: 2023-11-27

## 2023-11-27 RX ORDER — ACETAMINOPHEN 500 MG
700 TABLET ORAL ONCE
Refills: 0 | Status: COMPLETED | OUTPATIENT
Start: 2023-11-27 | End: 2023-11-27

## 2023-11-27 RX ORDER — OXYCODONE HYDROCHLORIDE 5 MG/1
5 TABLET ORAL EVERY 4 HOURS
Refills: 0 | Status: DISCONTINUED | OUTPATIENT
Start: 2023-11-27 | End: 2023-12-02

## 2023-11-27 RX ORDER — DEXTROSE 50 % IN WATER 50 %
15 SYRINGE (ML) INTRAVENOUS ONCE
Refills: 0 | Status: DISCONTINUED | OUTPATIENT
Start: 2023-11-27 | End: 2023-12-02

## 2023-11-27 RX ORDER — LANOLIN ALCOHOL/MO/W.PET/CERES
3 CREAM (GRAM) TOPICAL AT BEDTIME
Refills: 0 | Status: DISCONTINUED | OUTPATIENT
Start: 2023-11-27 | End: 2023-11-27

## 2023-11-27 RX ORDER — CEFTRIAXONE 500 MG/1
1000 INJECTION, POWDER, FOR SOLUTION INTRAMUSCULAR; INTRAVENOUS ONCE
Refills: 0 | Status: COMPLETED | OUTPATIENT
Start: 2023-11-27 | End: 2023-11-27

## 2023-11-27 RX ORDER — METOPROLOL TARTRATE 50 MG
100 TABLET ORAL
Refills: 0 | Status: DISCONTINUED | OUTPATIENT
Start: 2023-11-27 | End: 2023-12-02

## 2023-11-27 RX ADMIN — SODIUM CHLORIDE 250 MILLILITER(S): 9 INJECTION INTRAMUSCULAR; INTRAVENOUS; SUBCUTANEOUS at 22:02

## 2023-11-27 RX ADMIN — SODIUM CHLORIDE 500 MILLILITER(S): 9 INJECTION INTRAMUSCULAR; INTRAVENOUS; SUBCUTANEOUS at 22:02

## 2023-11-27 RX ADMIN — CEFTRIAXONE 100 MILLIGRAM(S): 500 INJECTION, POWDER, FOR SOLUTION INTRAMUSCULAR; INTRAVENOUS at 22:02

## 2023-11-27 RX ADMIN — Medication 280 MILLIGRAM(S): at 19:24

## 2023-11-27 RX ADMIN — SODIUM CHLORIDE 250 MILLILITER(S): 9 INJECTION INTRAMUSCULAR; INTRAVENOUS; SUBCUTANEOUS at 18:50

## 2023-11-27 NOTE — ED PROVIDER NOTE - OBJECTIVE STATEMENT
88-year-old female, history of dementia, hypothyroidism, heart failure (EF 20-25%), appendectomy, recently treated for yeast in stool with fluconazole for 3 weeks, atrial fibrillation on Eliquis, brought by daughter from home for evaluation of AMS x 3 days.  1 week ago had cough and runny nose and was started on Doxycycline and Medrol pack. In the last 3 days daughter reports pt with generalized weakness, not getting out of bed and decreased PO intake. No fever at home. Pt reports dysuria, urinary frequency and lower abdo pain.Pt currently being treated by wound care for sacral pressure ulcers and R heel pressure injury.

## 2023-11-27 NOTE — H&P ADULT - PROBLEM SELECTOR PLAN 5
ECG afib witih RVR 119bpm  - continue home toprol 100mg BID (pm dose now)  - decrease home digoxin from 125mcg qod to 62.5mcg qod given GFR  - continue home eliquis 2.5mg BID  - remote tele monitoring  - f/u am digoxin level ECG afib witih RVR 119bpm  - continue home toprol 100mg BID (pm dose now)  - decrease home digoxin from 125mcg qod to 62.5mcg qod given GFR  - continue home eliquis 2.5mg BID  - avoid CCB given systolic CHF  - remote tele monitoring  - f/u am digoxin level ECG afib witih RVR 119bpm  - continue home toprol 100mg BID (pm dose now)  - continue digoxin 62.5mcg qod given GFR  - continue home eliquis 2.5mg BID  - avoid CCB given systolic CHF  - remote tele monitoring  - f/u am digoxin level

## 2023-11-27 NOTE — H&P ADULT - PROBLEM SELECTOR PLAN 8
Not on statin (recently discontinued) 2/2 transaminitis that resolved with discontinuation of statin   - check lipid panel

## 2023-11-27 NOTE — ED PROVIDER NOTE - ATTENDING APP SHARED VISIT CONTRIBUTION OF CARE
This was a shared visit with CATINA. I reviewed and verified the documentation and independently performed the documented MDM.

## 2023-11-27 NOTE — H&P ADULT - PROBLEM SELECTOR PLAN 1
Presenting with dysuria and generalized weakness  - UA: turbid, positive nitrites, large LE, TNTC WBC, many bacteria  - CT abdomen: Mild left hydronephrosis without obstructing calculus may be related to recently expelled calculus and/or bladder distention. Nonobstructive left nephrolithiasis. Suggestion of left pyeloureteritis  and cystitis.   - did not meet sepsis criteria on admission  - continue IV ceftriaxone and f/u blood and urine cx   - ID Consult Dr. Laughlin f/u recs Presenting with dysuria and generalized weakness  - UA: turbid, positive nitrites, large LE, TNTC WBC, many bacteria  - CT abdomen: Mild left hydronephrosis without obstructing calculus may be related to recently expelled calculus and/or bladder distention. Nonobstructive left nephrolithiasis. Suggestion of left pyeloureteritis  and cystitis.   - did not meet sepsis criteria on admission  - continue IV ceftriaxone and f/u blood and urine cx   - f/u ID Consult Dr. Laughlin

## 2023-11-27 NOTE — ED PROVIDER NOTE - CLINICAL SUMMARY MEDICAL DECISION MAKING FREE TEXT BOX
88-year-old female, brought by a family member for evaluation of generalized weakness, dysuria and lower abdominal pain x 3 days.  Nontoxic-appearing however weak, heart monitor shows A-fib.  No signs of acute heart failure.  Alert and oriented x 2.  Abdomen soft, mildly distended with lower abdominal tenderness.  No CVAT.  Plan for sepsis workup, CT to assess for acute intracranial injury, gentle hydration, A-fib rate control and admission. 88-year-old female, brought by a family member for evaluation of generalized weakness, dysuria and lower abdominal pain x 3 days.  Nontoxic-appearing however weak, heart monitor shows A-fib.  No signs of acute heart failure.  Alert and oriented x 2.  Abdomen soft, mildly distended with lower abdominal tenderness.  No CVAT.  Plan for sepsis workup, CT to assess for acute intracranial injury, gentle hydration, A-fib rate control and admission.    21: 19–patient appears more awake.  Labs show new YVETTE.  Abdomen CT shows left-sided hydronephrosis without any obstructive uropathy.  Postvoid bladder scan shows over 500 cc.  Plan for Clancy, urine test/culture and 1 dose of ceftriaxone for possible pyelonephritis.  Will admit to the hospital.  Discussed with hospitalist. 88-year-old female, brought by a family member for evaluation of generalized weakness, dysuria and lower abdominal pain x 3 days.  Nontoxic-appearing however weak, heart monitor shows A-fib.  No signs of acute heart failure.  Alert and oriented x 2.  Abdomen soft, mildly distended with lower abdominal tenderness.  No CVAT.  Plan for sepsis workup, CT to assess for acute intracranial injury, gentle hydration, A-fib rate control and admission.    KV: here for abdominal pain, dysuria, weakness. found to have UTI/pyelo with urinary retention. patient admitted.    21: 19–patient appears more awake.  Labs show new YVETTE.  Abdomen CT shows left-sided hydronephrosis without any obstructive uropathy.  Postvoid bladder scan shows over 500 cc.  Plan for Clancy, urine test/culture and 1 dose of ceftriaxone for possible pyelonephritis.  Will admit to the hospital.  Discussed with hospitalist.

## 2023-11-27 NOTE — ED PROVIDER NOTE - NSICDXPASTMEDICALHX_GEN_ALL_CORE_FT
PAST MEDICAL HISTORY:  Acute on chronic systolic congestive heart failure     Atrial fibrillation     Cardiac LV ejection fraction 21-30% 12/22    Diabetes mellitus     Hyperlipemia     Hypertension     Hypothyroidism     Mild Alzheimer's dementia

## 2023-11-27 NOTE — H&P ADULT - NSHPPHYSICALEXAM_GEN_ALL_CORE
GENERAL: in moderate distress due to pain  EYES: sclera clear, no exudates  ENMT: dry mucous membranes  LUNGS: cta b/l, no rales wheezing or ronchi  HEART: s1s2, irregularly irregular, tachycardic, no murmur  GASTROINTESTINAL: soft ntnd, no cva tenderness  INTEGUMENT: good skin turgor, warm skin, appears well perfused  NEUROLOGIC: awake, alert, oriented x2 to person and place  HEME/LYMPH: no obvious ecchymosis or petechiae T(C): 36.9 (11-28-23 @ 08:20), Max: 37.4 (11-27-23 @ 18:20)  HR: 105 (11-28-23 @ 08:20) (105 - 138)  BP: 115/77 (11-28-23 @ 08:20) (113/74 - 122/82)  RR: 17 (11-28-23 @ 08:20) (17 - 18)  SpO2: 95% (11-28-23 @ 08:20) (95% - 96%)    GENERAL: in moderate distress due to pain, somnolent  EYES: sclera clear, no exudates  ENMT: dry mucous membranes  LUNGS: cta b/l, no rales wheezing or ronchi  HEART: s1s2, irregularly irregular, tachycardic, no murmur  GASTROINTESTINAL: soft ntnd, no cva tenderness  INTEGUMENT: good skin turgor, warm skin, appears well perfused  NEUROLOGIC: awake, alert, oriented x2 to person and place  HEME/LYMPH: no obvious ecchymosis or petechiae  PSYCH: normal affect

## 2023-11-27 NOTE — H&P ADULT - PROBLEM SELECTOR PLAN 3
ST depressions in leads 2, avf, V4-V6 with early repolarization  - not seen on ecg from 1/2023 which was also afib with rvr  - troponin negative in ED  - f/u am ecg and am troponin

## 2023-11-27 NOTE — H&P ADULT - NSHPREVIEWOFSYSTEMS_GEN_ALL_CORE
CONSTITUTIONAL: denies fever, + generalized weakness  HEENT: denies blurred vision, sore throat  SKIN: denies new lesions, rash  CARDIOVASCULAR: denies chest pain, chest pressure, palpitations  RESPIRATORY: denies shortness of breath, sputum production  GASTROINTESTINAL: denies nausea, vomiting, diarrhea, abdominal pain  GENITOURINARY: + dysuria  NEUROLOGICAL: denies numbness, headache, focal weakness  MUSCULOSKELETAL: denies new joint pain, muscle aches  HEMATOLOGIC: denies gross bleeding, bruising  LYMPHATICS: denies extremity swelling

## 2023-11-27 NOTE — H&P ADULT - PROBLEM SELECTOR PLAN 2
BUN 82, Cr 2.3, GFR 20   - BLCR ~1.4-1.6 as per chart review  - YVETTE likely 2/2 recent poor PO intake  - continue gentle IVF and f/u am metabolic panel BUN 82, Cr 2.3, GFR 20   - BLCR ~1.4-1.6 as per chart review  - YVETTE likely 2/2 recent poor PO intake vs urinary retention  - chaney placed in ED  - continue IVF (sodium bicarb for metabolic acidosis) and f/u am metabolic panel BUN 82, Cr 2.3, GFR 20   - BLCR ~1.4-1.6 as per chart review  - YVETTE likely 2/2 recent poor PO intake vs urinary retention  - chaney placed in ED for urinary retention  - continue IVF (sodium bicarb for metabolic acidosis) and f/u am metabolic panel BUN 82, Cr 2.3, GFR 20   - with metabolic acidosis and lethargy - f/u am blood gas s/p bicarb gtt  - BLCR ~1.4-1.6 as per chart review  - YVETTE likely 2/2 recent poor PO intake vs urinary retention  - chaney placed in ED for urinary retention  - continue IVF (sodium bicarb for metabolic acidosis) and f/u am metabolic panel

## 2023-11-27 NOTE — ED ADULT NURSE NOTE - NSFALLHARMRISKINTERV_ED_ALL_ED

## 2023-11-27 NOTE — H&P ADULT - HISTORY OF PRESENT ILLNESS
88yF pmhx CHFrEF (LVEF 20-25% 12/2022), afib on eliquis, T2DM, HTN, HLD, alzheimers dementia, hypothyroidism, chronic sacral and heel ulcer brought to the hospital by children for 1 week of generalized weakness, decreased PO intake, generalized body aches followed by dysuria onset today. As per family, patient was seen by PM Dr. Shukla 1 month ago and had elevated LFTs with normal abdominal sono. Crestor was stopped and she was seen by GI Dr. Wilder and found to have yeast in stool and completed course of fluconazole. Then she developed a cough/URI and completed course of doxy+medrol dose guerline from Dr. Shukla PMD. Home Tmax 98.2F. No recent cp, sob, abd pain, n/b/d.    ED Course  Vitals: 97F, 127bpm, 122/82, 96% RA  Labs: cbc wnl, K 5.4, bicarb 19, BUN 82, Cr 2.3, GFR 20  CXR: no acute infiltrates  ECG: ST  leads 2, avf, v4-v6, depressions, afib with rvr, early repol  UA: turbid, positive nitrites, large LE, TNTC WBC, many bacteria   CT head: no acute findings  CT abdomen: Mild left hydronephrosis without obstructing calculus may be related to recently expelled calculus and/or bladder distention. Nonobstructive left nephrolithiasis. Suggestion of left pyeloureteritis  and cystitis. Subcentimeter cystic lesion pancreatic head.      Received in ED: ofirmev, IVF, and rocephin       88yF pmhx CHFrEF (LVEF 20-25% 12/2022), afib on eliquis, T2DM, HTN, HLD, alzheimers dementia, hypothyroidism, chronic sacral and heel ulcer brought to the hospital by children for 1 week of generalized weakness, decreased PO intake, generalized body aches followed by dysuria onset today. As per family, patient was seen by PM Dr. Shukla 1 month ago and had elevated LFTs with normal abdominal sono. Crestor was stopped and she was seen by GI Dr. Wilder and found to have yeast in stool and completed course of fluconazole. Then she developed a cough/URI and completed course of doxy+medrol dose guerline from Dr. Shukla PMD. Home Tmax 98.2F. No recent cp, sob, abd pain, n/b/d.    ED Course  Vitals: 97F, 127bpm, 122/82, 96% RA  Labs: cbc wnl, K 5.4, bicarb 19, BUN 82, Cr 2.3, GFR 20  CXR: no acute infiltrates on wet read  ECG: ST  leads 2, avf, v4-v6, depressions, afib with rvr, early repol  UA: turbid, positive nitrites, large LE, TNTC WBC, many bacteria   CT head: no acute findings  CT abdomen: Mild left hydronephrosis without obstructing calculus may be related to recently expelled calculus and/or bladder distention. Nonobstructive left nephrolithiasis. Suggestion of left pyeloureteritis  and cystitis. Subcentimeter cystic lesion pancreatic head.      Received in ED: ofirmev, IVF, and rocephin       88yF pmhx CHFrEF (LVEF 20-25% 12/2022), afib on eliquis, T2DM, HTN, HLD, alzheimers dementia, hypothyroidism, chronic sacral and heel ulcer brought to the hospital by children for 1 week of generalized weakness, decreased PO intake, generalized body aches followed by dysuria onset today. As per family, patient was seen by PM Dr. Shukla 1 month ago and had elevated LFTs with normal abdominal sono. Crestor was stopped and she was seen by GI Dr. Wilder and found to have yeast in stool and completed course of fluconazole. Then she developed a cough/URI and completed course of doxy+medrol dose guerline from Dr. Shukla PMD. Home Tmax 98.2F. No recent cp, sob, abd pain, n/b/d. She is lethargic and AAOx2 on admission. Daughter states it is normal for her not to know the year but the real change is her lethargy.    ED Course  Vitals: 97F, 127bpm, 122/82, 96% RA  Labs: cbc wnl, K 5.4, bicarb 19, BUN 82, Cr 2.3, GFR 20  CXR: no acute infiltrates on wet read  ECG: ST  leads 2, avf, v4-v6, depressions, afib with rvr, early repol  UA: turbid, positive nitrites, large LE, TNTC WBC, many bacteria   CT head: no acute findings  CT abdomen: Mild left hydronephrosis without obstructing calculus may be related to recently expelled calculus and/or bladder distention. Nonobstructive left nephrolithiasis. Suggestion of left pyeloureteritis  and cystitis. Subcentimeter cystic lesion pancreatic head.      Received in ED: ofirmev, IVF, and rocephin

## 2023-11-27 NOTE — H&P ADULT - PROBLEM SELECTOR PLAN 12
Patient follows with Dr. Vaughn and Dr. Nuno for chronic sacral and heel wounds  - primary team to consult in am    DVT ppx: eliquis 2.5mg BID Patient follows with Dr. Vaughn and Dr. Nuno for chronic sacral and heel wounds  - primary team to consult in am    DVT ppx: eliquis 2.5mg BID    Dispo: DNR/DNI MOLST in chart Patient follows with Dr. Vaughn and Dr. Nuno for chronic sacral and heel wounds  - primary team to consult in am - family requesting wound care    DVT ppx: eliquis 2.5mg BID    Dispo: DNR/DNI MOLST in chart

## 2023-11-27 NOTE — ED ADULT NURSE REASSESSMENT NOTE - NSFALLHARMRISKINTERV_ED_ALL_ED
Assistance OOB with selected safe patient handling equipment if applicable/Assistance with ambulation/Communicate risk of Fall with Harm to all staff, patient, and family/Monitor gait and stability/Monitor for mental status changes and reorient to person, place, and time, as needed/Provide visual cue: red socks, yellow wristband, yellow gown, etc/Reinforce activity limits and safety measures with patient and family/Toileting schedule using arm’s reach rule for commode and bathroom/Use of alarms - bed, stretcher, chair and/or video monitoring/Bed in lowest position, wheels locked, appropriate side rails in place/Call bell, personal items and telephone in reach/Instruct patient to call for assistance before getting out of bed/chair/stretcher/Non-slip footwear applied when patient is off stretcher/[place] to call system/Physically safe environment - no spills, clutter or unnecessary equipment/Purposeful Proactive Rounding/Room/bathroom lighting operational, light cord in reach

## 2023-11-27 NOTE — H&P ADULT - PROBLEM SELECTOR PLAN 4
Bicarb 19, Chloride 112, K 5.4  - in the past has had metabolic acidosis resolved after sodium bicarb infusion and attributed to metformin/CKD  - will give sodium bicarb now and f/u am metabolic panel Bicarb 19, Chloride 112  - in the past has had metabolic acidosis resolved after sodium bicarb infusion and attributed to metformin/CKD  - will give sodium bicarb now @ 50cc/hr x 9 hours and f/u am metabolic panel Bicarb 19, Chloride 112  - in the past has had metabolic acidosis resolved after sodium bicarb infusion and attributed to metformin/CKD  - will give sodium bicarb now @ 50cc/hr x 9 hours and f/u am metabolic panel  - monitor closely while in IVF given hx of CHFrEF Bicarb 19, Chloride 112  - in the past has had metabolic acidosis resolved after sodium bicarb infusion and attributed to metformin/CKD  - will give sodium bicarb now @ 50cc/hr x 9 hours and f/u am metabolic panel + ABG  - monitor closely while in IVF given hx of CHFrEF

## 2023-11-27 NOTE — ED PROVIDER NOTE - CARE PLAN
Principal Discharge DX:	YVETTE (acute kidney injury)  Secondary Diagnosis:	Dehydration  Secondary Diagnosis:	Hydronephrosis  Secondary Diagnosis:	Generalized weakness   1

## 2023-11-27 NOTE — H&P ADULT - ATTENDING COMMENTS
88yF pmhx CHFrEF (LVEF 20-25% 12/2022), afib on eliquis, T2DM, HTN, HLD, alzheimers dementia, hypothyroidism, chronic sacral and heel ulcers, admitted for acute cystitis. Metabolic encephalopathy likely secondary to infection/metabolic acidosis and YVETTE on CKD. Admit to medicine. Continue empiric antibiotics. Follow up cultures. Bicarb gtt and follow up ABG. Monitor renal indices. Nephrology consult Anirudh. ID consult. Discussed with patient and daughter at bedside.    Agree with H&P as outlined above, edited where appropriate.

## 2023-11-27 NOTE — H&P ADULT - PROBLEM/PLAN-1
----- Message from Markel Joseph sent at 1/8/2018  9:11 AM CST -----  Contact: [phone] self  Patient would like refill of lisinopril (PRINIVIL,ZESTRIL) sent to [facility] (fax # [phone]). Please advise.     DISPLAY PLAN FREE TEXT

## 2023-11-27 NOTE — H&P ADULT - ASSESSMENT
88yF pmhx CHFrEF (LVEF 20-25% 12/2022), afib on eliquis, T2DM, HTN, HLD, alzheimers dementia, hypothyroidism, chronic sacral and heel ulcer brought to the hospital by children for 1 week of generalized weakness, decreased PO intake, generalized body aches followed by dysuria onset today. As per family, patient was seen by PM Dr. Shukla 1 month ago and had elevated LFTs with normal abdominal sono. Crestor was stopped and she was seen by GI Dr. Wilder and found to have yeast in stool and completed course of fluconazole. Then she developed a cough/URI and completed course of doxy+medrol dose guerline from Dr. Shukla PMD. Home Tmax 98.2F. No recent cp, sob, abd pain, n/b/d.    ED Course  Vitals: 97F, 127bpm, 122/82, 96% RA  Labs: cbc wnl, K 5.4, bicarb 19, BUN 82, Cr 2.3, GFR 20  CXR: no acute infiltrates  CT head: no acute findings  CT abdomen: Mild left hydronephrosis without obstructing calculus may be related to recently expelled calculus and/or bladder distention. Nonobstructive left nephrolithiasis. Suggestion of left pyeloureteritis  and cystitis. Subcentimeter cystic lesion pancreatic head.      Received in ED: ofirmev, IVF, and rocephin       88yF pmhx CHFrEF (LVEF 20-25% 12/2022), afib on eliquis, T2DM, HTN, HLD, alzheimers dementia, hypothyroidism, chronic sacral and heel ulcers, admitted for acute cystitis.     88yF pmhx CHFrEF (LVEF 20-25% 12/2022), afib on eliquis, T2DM, HTN, HLD, alzheimers dementia, hypothyroidism, chronic sacral and heel ulcers, admitted for acute cystitis. Metabolic encephalopathy likely secondary to infection/metabolic acidosis and YVETTE on CKD.

## 2023-11-27 NOTE — H&P ADULT - PROBLEM SELECTOR PLAN 6
LVEF 20-25% 12/2022  - hold home lasix 2/2 rick   - continue home toprol 100mg BID  - no signs/sx of volume overload  - monitor routine hemodynamics LVEF 20-25% 12/2022  - hold home lasix 2/2 rick   - continue home toprol 100mg BID  - no signs/sx of volume overload  - monitor routine hemodynamics especially while on IVF

## 2023-11-27 NOTE — ED ADULT NURSE NOTE - OBJECTIVE STATEMENT
Pt alert and oriented to person and place only (baseline), biba from home, accompanied by daughter (caregiver).  Pt denies any pain at this time.  As per daughter, pt has been lethargic and decreased PO intake with weakness X 3 days, no fevers/pain.  Pt has a sacral wound and right heel pressure sore.  Pt has been treated with PO antibiotics for cough, finished antibiotics 3 days ago.  Abd is round and mildly distended, nontender, moving all ext well.  CM in place, Afib with HR in 110's, Edy ESCOBAR made aware.

## 2023-11-28 ENCOUNTER — APPOINTMENT (OUTPATIENT)
Dept: WOUND CARE | Facility: HOSPITAL | Age: 88
End: 2023-11-28

## 2023-11-28 DIAGNOSIS — L89.152 PRESSURE ULCER OF SACRAL REGION, STAGE 2: ICD-10-CM

## 2023-11-28 LAB
A1C WITH ESTIMATED AVERAGE GLUCOSE RESULT: 7.2 % — HIGH (ref 4–5.6)
A1C WITH ESTIMATED AVERAGE GLUCOSE RESULT: 7.2 % — HIGH (ref 4–5.6)
ALBUMIN SERPL ELPH-MCNC: 2 G/DL — LOW (ref 3.3–5)
ALBUMIN SERPL ELPH-MCNC: 2 G/DL — LOW (ref 3.3–5)
ALP SERPL-CCNC: 59 U/L — SIGNIFICANT CHANGE UP (ref 40–120)
ALP SERPL-CCNC: 59 U/L — SIGNIFICANT CHANGE UP (ref 40–120)
ALT FLD-CCNC: 53 U/L — SIGNIFICANT CHANGE UP (ref 12–78)
ALT FLD-CCNC: 53 U/L — SIGNIFICANT CHANGE UP (ref 12–78)
ANION GAP SERPL CALC-SCNC: 8 MMOL/L — SIGNIFICANT CHANGE UP (ref 5–17)
ANION GAP SERPL CALC-SCNC: 8 MMOL/L — SIGNIFICANT CHANGE UP (ref 5–17)
AST SERPL-CCNC: 33 U/L — SIGNIFICANT CHANGE UP (ref 15–37)
AST SERPL-CCNC: 33 U/L — SIGNIFICANT CHANGE UP (ref 15–37)
BASE EXCESS BLDA CALC-SCNC: -12 MMOL/L — LOW (ref -2–3)
BASE EXCESS BLDA CALC-SCNC: -12 MMOL/L — LOW (ref -2–3)
BASOPHILS # BLD AUTO: 0.04 K/UL — SIGNIFICANT CHANGE UP (ref 0–0.2)
BASOPHILS # BLD AUTO: 0.04 K/UL — SIGNIFICANT CHANGE UP (ref 0–0.2)
BASOPHILS NFR BLD AUTO: 0.4 % — SIGNIFICANT CHANGE UP (ref 0–2)
BASOPHILS NFR BLD AUTO: 0.4 % — SIGNIFICANT CHANGE UP (ref 0–2)
BILIRUB SERPL-MCNC: 0.3 MG/DL — SIGNIFICANT CHANGE UP (ref 0.2–1.2)
BILIRUB SERPL-MCNC: 0.3 MG/DL — SIGNIFICANT CHANGE UP (ref 0.2–1.2)
BLOOD GAS COMMENTS ARTERIAL: SIGNIFICANT CHANGE UP
BLOOD GAS COMMENTS ARTERIAL: SIGNIFICANT CHANGE UP
BUN SERPL-MCNC: 59 MG/DL — HIGH (ref 7–23)
BUN SERPL-MCNC: 59 MG/DL — HIGH (ref 7–23)
CALCIUM SERPL-MCNC: 8.3 MG/DL — LOW (ref 8.5–10.1)
CALCIUM SERPL-MCNC: 8.3 MG/DL — LOW (ref 8.5–10.1)
CHLORIDE SERPL-SCNC: 115 MMOL/L — HIGH (ref 96–108)
CHLORIDE SERPL-SCNC: 115 MMOL/L — HIGH (ref 96–108)
CO2 SERPL-SCNC: 19 MMOL/L — LOW (ref 22–31)
CO2 SERPL-SCNC: 19 MMOL/L — LOW (ref 22–31)
CREAT SERPL-MCNC: 1.8 MG/DL — HIGH (ref 0.5–1.3)
CREAT SERPL-MCNC: 1.8 MG/DL — HIGH (ref 0.5–1.3)
DIGOXIN SERPL-MCNC: 0.5 NG/ML — LOW (ref 0.8–2)
DIGOXIN SERPL-MCNC: 0.5 NG/ML — LOW (ref 0.8–2)
DIGOXIN SERPL-MCNC: 0.6 NG/ML — LOW (ref 0.8–2)
DIGOXIN SERPL-MCNC: 0.6 NG/ML — LOW (ref 0.8–2)
EGFR: 27 ML/MIN/1.73M2 — LOW
EGFR: 27 ML/MIN/1.73M2 — LOW
EOSINOPHIL # BLD AUTO: 0.08 K/UL — SIGNIFICANT CHANGE UP (ref 0–0.5)
EOSINOPHIL # BLD AUTO: 0.08 K/UL — SIGNIFICANT CHANGE UP (ref 0–0.5)
EOSINOPHIL NFR BLD AUTO: 0.7 % — SIGNIFICANT CHANGE UP (ref 0–6)
EOSINOPHIL NFR BLD AUTO: 0.7 % — SIGNIFICANT CHANGE UP (ref 0–6)
ESTIMATED AVERAGE GLUCOSE: 160 MG/DL — HIGH (ref 68–114)
ESTIMATED AVERAGE GLUCOSE: 160 MG/DL — HIGH (ref 68–114)
GAS PNL BLDA: SIGNIFICANT CHANGE UP
GAS PNL BLDA: SIGNIFICANT CHANGE UP
GLUCOSE SERPL-MCNC: 105 MG/DL — HIGH (ref 70–99)
GLUCOSE SERPL-MCNC: 105 MG/DL — HIGH (ref 70–99)
HCO3 BLDA-SCNC: 13 MMOL/L — LOW (ref 21–28)
HCO3 BLDA-SCNC: 13 MMOL/L — LOW (ref 21–28)
HCT VFR BLD CALC: 36.2 % — SIGNIFICANT CHANGE UP (ref 34.5–45)
HCT VFR BLD CALC: 36.2 % — SIGNIFICANT CHANGE UP (ref 34.5–45)
HGB BLD-MCNC: 11.7 G/DL — SIGNIFICANT CHANGE UP (ref 11.5–15.5)
HGB BLD-MCNC: 11.7 G/DL — SIGNIFICANT CHANGE UP (ref 11.5–15.5)
IMM GRANULOCYTES NFR BLD AUTO: 3.8 % — HIGH (ref 0–0.9)
IMM GRANULOCYTES NFR BLD AUTO: 3.8 % — HIGH (ref 0–0.9)
LYMPHOCYTES # BLD AUTO: 1.53 K/UL — SIGNIFICANT CHANGE UP (ref 1–3.3)
LYMPHOCYTES # BLD AUTO: 1.53 K/UL — SIGNIFICANT CHANGE UP (ref 1–3.3)
LYMPHOCYTES # BLD AUTO: 14.3 % — SIGNIFICANT CHANGE UP (ref 13–44)
LYMPHOCYTES # BLD AUTO: 14.3 % — SIGNIFICANT CHANGE UP (ref 13–44)
MAGNESIUM SERPL-MCNC: 1.5 MG/DL — LOW (ref 1.6–2.6)
MAGNESIUM SERPL-MCNC: 1.5 MG/DL — LOW (ref 1.6–2.6)
MCHC RBC-ENTMCNC: 29 PG — SIGNIFICANT CHANGE UP (ref 27–34)
MCHC RBC-ENTMCNC: 29 PG — SIGNIFICANT CHANGE UP (ref 27–34)
MCHC RBC-ENTMCNC: 32.3 GM/DL — SIGNIFICANT CHANGE UP (ref 32–36)
MCHC RBC-ENTMCNC: 32.3 GM/DL — SIGNIFICANT CHANGE UP (ref 32–36)
MCV RBC AUTO: 89.8 FL — SIGNIFICANT CHANGE UP (ref 80–100)
MCV RBC AUTO: 89.8 FL — SIGNIFICANT CHANGE UP (ref 80–100)
MONOCYTES # BLD AUTO: 1.03 K/UL — HIGH (ref 0–0.9)
MONOCYTES # BLD AUTO: 1.03 K/UL — HIGH (ref 0–0.9)
MONOCYTES NFR BLD AUTO: 9.6 % — SIGNIFICANT CHANGE UP (ref 2–14)
MONOCYTES NFR BLD AUTO: 9.6 % — SIGNIFICANT CHANGE UP (ref 2–14)
NEUTROPHILS # BLD AUTO: 7.6 K/UL — HIGH (ref 1.8–7.4)
NEUTROPHILS # BLD AUTO: 7.6 K/UL — HIGH (ref 1.8–7.4)
NEUTROPHILS NFR BLD AUTO: 71.2 % — SIGNIFICANT CHANGE UP (ref 43–77)
NEUTROPHILS NFR BLD AUTO: 71.2 % — SIGNIFICANT CHANGE UP (ref 43–77)
NRBC # BLD: 0 /100 WBCS — SIGNIFICANT CHANGE UP (ref 0–0)
NRBC # BLD: 0 /100 WBCS — SIGNIFICANT CHANGE UP (ref 0–0)
PCO2 BLDA: 23 MMHG — LOW (ref 32–35)
PCO2 BLDA: 23 MMHG — LOW (ref 32–35)
PH BLDA: 7.37 — SIGNIFICANT CHANGE UP (ref 7.35–7.45)
PH BLDA: 7.37 — SIGNIFICANT CHANGE UP (ref 7.35–7.45)
PHOSPHATE SERPL-MCNC: 2.6 MG/DL — SIGNIFICANT CHANGE UP (ref 2.5–4.5)
PHOSPHATE SERPL-MCNC: 2.6 MG/DL — SIGNIFICANT CHANGE UP (ref 2.5–4.5)
PLATELET # BLD AUTO: 337 K/UL — SIGNIFICANT CHANGE UP (ref 150–400)
PLATELET # BLD AUTO: 337 K/UL — SIGNIFICANT CHANGE UP (ref 150–400)
PO2 BLDA: 122 MMHG — HIGH (ref 83–108)
PO2 BLDA: 122 MMHG — HIGH (ref 83–108)
POTASSIUM SERPL-MCNC: 4.4 MMOL/L — SIGNIFICANT CHANGE UP (ref 3.5–5.3)
POTASSIUM SERPL-MCNC: 4.4 MMOL/L — SIGNIFICANT CHANGE UP (ref 3.5–5.3)
POTASSIUM SERPL-SCNC: 4.4 MMOL/L — SIGNIFICANT CHANGE UP (ref 3.5–5.3)
POTASSIUM SERPL-SCNC: 4.4 MMOL/L — SIGNIFICANT CHANGE UP (ref 3.5–5.3)
PROT SERPL-MCNC: 6 G/DL — SIGNIFICANT CHANGE UP (ref 6–8.3)
PROT SERPL-MCNC: 6 G/DL — SIGNIFICANT CHANGE UP (ref 6–8.3)
RBC # BLD: 4.03 M/UL — SIGNIFICANT CHANGE UP (ref 3.8–5.2)
RBC # BLD: 4.03 M/UL — SIGNIFICANT CHANGE UP (ref 3.8–5.2)
RBC # FLD: 18.3 % — HIGH (ref 10.3–14.5)
RBC # FLD: 18.3 % — HIGH (ref 10.3–14.5)
SAO2 % BLDA: 99.8 % — HIGH (ref 94–98)
SAO2 % BLDA: 99.8 % — HIGH (ref 94–98)
SODIUM SERPL-SCNC: 142 MMOL/L — SIGNIFICANT CHANGE UP (ref 135–145)
SODIUM SERPL-SCNC: 142 MMOL/L — SIGNIFICANT CHANGE UP (ref 135–145)
TROPONIN I, HIGH SENSITIVITY RESULT: 25.3 NG/L — SIGNIFICANT CHANGE UP
TROPONIN I, HIGH SENSITIVITY RESULT: 25.3 NG/L — SIGNIFICANT CHANGE UP
WBC # BLD: 10.69 K/UL — HIGH (ref 3.8–10.5)
WBC # BLD: 10.69 K/UL — HIGH (ref 3.8–10.5)
WBC # FLD AUTO: 10.69 K/UL — HIGH (ref 3.8–10.5)
WBC # FLD AUTO: 10.69 K/UL — HIGH (ref 3.8–10.5)

## 2023-11-28 PROCEDURE — 99221 1ST HOSP IP/OBS SF/LOW 40: CPT

## 2023-11-28 PROCEDURE — 99222 1ST HOSP IP/OBS MODERATE 55: CPT

## 2023-11-28 PROCEDURE — 99233 SBSQ HOSP IP/OBS HIGH 50: CPT

## 2023-11-28 PROCEDURE — 99223 1ST HOSP IP/OBS HIGH 75: CPT

## 2023-11-28 RX ORDER — MAGNESIUM SULFATE 500 MG/ML
2 VIAL (ML) INJECTION ONCE
Refills: 0 | Status: COMPLETED | OUTPATIENT
Start: 2023-11-28 | End: 2023-11-29

## 2023-11-28 RX ORDER — SODIUM CHLORIDE 9 MG/ML
1000 INJECTION, SOLUTION INTRAVENOUS
Refills: 0 | Status: DISCONTINUED | OUTPATIENT
Start: 2023-11-28 | End: 2023-11-29

## 2023-11-28 RX ORDER — CEFTRIAXONE 500 MG/1
1000 INJECTION, POWDER, FOR SOLUTION INTRAMUSCULAR; INTRAVENOUS EVERY 24 HOURS
Refills: 0 | Status: COMPLETED | OUTPATIENT
Start: 2023-11-28 | End: 2023-12-01

## 2023-11-28 RX ORDER — SACCHAROMYCES BOULARDII 250 MG
250 POWDER IN PACKET (EA) ORAL
Refills: 0 | Status: DISCONTINUED | OUTPATIENT
Start: 2023-11-28 | End: 2023-12-02

## 2023-11-28 RX ORDER — CLOTRIMAZOLE AND BETAMETHASONE DIPROPIONATE 10; .5 MG/G; MG/G
1 CREAM TOPICAL DAILY
Refills: 0 | Status: DISCONTINUED | OUTPATIENT
Start: 2023-11-28 | End: 2023-12-02

## 2023-11-28 RX ORDER — METOPROLOL TARTRATE 50 MG
5 TABLET ORAL ONCE
Refills: 0 | Status: COMPLETED | OUTPATIENT
Start: 2023-11-28 | End: 2023-11-28

## 2023-11-28 RX ADMIN — Medication 50 MEQ/KG/HR: at 08:54

## 2023-11-28 RX ADMIN — SODIUM CHLORIDE 50 MILLILITER(S): 9 INJECTION, SOLUTION INTRAVENOUS at 23:47

## 2023-11-28 RX ADMIN — Medication 100 MILLIGRAM(S): at 01:25

## 2023-11-28 RX ADMIN — APIXABAN 2.5 MILLIGRAM(S): 2.5 TABLET, FILM COATED ORAL at 01:25

## 2023-11-28 RX ADMIN — MEMANTINE HYDROCHLORIDE 10 MILLIGRAM(S): 10 TABLET ORAL at 21:51

## 2023-11-28 RX ADMIN — Medication 75 MICROGRAM(S): at 06:25

## 2023-11-28 RX ADMIN — Medication 100 MILLIGRAM(S): at 21:51

## 2023-11-28 RX ADMIN — MEMANTINE HYDROCHLORIDE 10 MILLIGRAM(S): 10 TABLET ORAL at 06:25

## 2023-11-28 RX ADMIN — Medication 650 MILLIGRAM(S): at 01:25

## 2023-11-28 RX ADMIN — Medication 100 MILLIGRAM(S): at 06:25

## 2023-11-28 RX ADMIN — APIXABAN 2.5 MILLIGRAM(S): 2.5 TABLET, FILM COATED ORAL at 06:25

## 2023-11-28 RX ADMIN — Medication 5 MILLIGRAM(S): at 21:51

## 2023-11-28 RX ADMIN — Medication 1: at 09:24

## 2023-11-28 RX ADMIN — Medication 2000 UNIT(S): at 11:29

## 2023-11-28 RX ADMIN — MEMANTINE HYDROCHLORIDE 10 MILLIGRAM(S): 10 TABLET ORAL at 01:24

## 2023-11-28 RX ADMIN — Medication 5 MILLIGRAM(S): at 17:26

## 2023-11-28 RX ADMIN — OXYCODONE HYDROCHLORIDE 5 MILLIGRAM(S): 5 TABLET ORAL at 01:24

## 2023-11-28 RX ADMIN — OXYCODONE HYDROCHLORIDE 2.5 MILLIGRAM(S): 5 TABLET ORAL at 11:29

## 2023-11-28 RX ADMIN — OXYCODONE HYDROCHLORIDE 2.5 MILLIGRAM(S): 5 TABLET ORAL at 12:30

## 2023-11-28 RX ADMIN — CEFTRIAXONE 100 MILLIGRAM(S): 500 INJECTION, POWDER, FOR SOLUTION INTRAMUSCULAR; INTRAVENOUS at 23:47

## 2023-11-28 RX ADMIN — APIXABAN 2.5 MILLIGRAM(S): 2.5 TABLET, FILM COATED ORAL at 21:51

## 2023-11-28 RX ADMIN — Medication 125 MICROGRAM(S): at 17:26

## 2023-11-28 NOTE — CONSULT NOTE ADULT - SUBJECTIVE AND OBJECTIVE BOX
Patient is a 88y old  Female who presents with a chief complaint of UTI/pyelo (2023 11:34)      HPI:  88yF pmhx CHFrEF (LVEF 20-25% 2022), afib on eliquis, T2DM, HTN, HLD, alzheimers dementia, hypothyroidism, chronic sacral and heel ulcer brought to the hospital by children for 1 week of generalized weakness, decreased PO intake, generalized body aches followed by dysuria onset today. As per family, patient was seen by PM Dr. Shukla 1 month ago and had elevated LFTs with normal abdominal sono. Crestor was stopped and she was seen by GI Dr. Wilder and found to have yeast in stool and completed course of fluconazole. Then she developed a cough/URI and completed course of doxy+medrol dose guerline from Dr. Shukla PMD. Home Tmax 98.2F. No recent cp, sob, abd pain, n/b/d. She is lethargic and AAOx2 on admission. Daughter states it is normal for her not to know the year but the real change is her lethargy.    ED Course  Vitals: 97F, 127bpm, 122/82, 96% RA  Labs: cbc wnl, K 5.4, bicarb 19, BUN 82, Cr 2.3, GFR 20  CXR: no acute infiltrates on wet read  ECG: ST  leads 2, avf, v4-v6, depressions, afib with rvr, early repol  UA: turbid, positive nitrites, large LE, TNTC WBC, many bacteria   CT head: no acute findings  CT abdomen: Mild left hydronephrosis without obstructing calculus may be related to recently expelled calculus and/or bladder distention. Nonobstructive left nephrolithiasis. Suggestion of left pyeloureteritis  and cystitis. Subcentimeter cystic lesion pancreatic head.      Received in ED: ofirmev, IVF, and rocephin (2023 22:38)      PAST MEDICAL & SURGICAL HISTORY:  Atrial fibrillation      Hypothyroidism      Mild Alzheimer's dementia      Diabetes mellitus      Acute on chronic systolic congestive heart failure      Hypertension      Hyperlipemia      Cardiac LV ejection fraction 21-30%        History of appendectomy      H/O hernia repair      History of cholecystectomy                ECHO (2022)  FINDINGS: EF 20-25%, apex and anterior wall akinesis, inferior wall hypokinesis, mod MR, mild TR      MEDICATIONS  (STANDING):  apixaban 2.5 milliGRAM(s) Oral two times a day  cefTRIAXone   IVPB 2000 milliGRAM(s) IV Intermittent every 24 hours  cholecalciferol 2000 Unit(s) Oral daily  dextrose 5%. 1000 milliLiter(s) (100 mL/Hr) IV Continuous <Continuous>  dextrose 5%. 1000 milliLiter(s) (50 mL/Hr) IV Continuous <Continuous>  dextrose 50% Injectable 25 Gram(s) IV Push once  dextrose 50% Injectable 25 Gram(s) IV Push once  dextrose 50% Injectable 12.5 Gram(s) IV Push once  digoxin     Tablet 125 MICROGram(s) Oral every other day  glucagon  Injectable 1 milliGRAM(s) IntraMuscular once  insulin lispro (ADMELOG) corrective regimen sliding scale   SubCutaneous three times a day before meals  insulin lispro (ADMELOG) corrective regimen sliding scale   SubCutaneous at bedtime  levothyroxine 75 MICROGram(s) Oral daily  melatonin 5 milliGRAM(s) Oral at bedtime  memantine 10 milliGRAM(s) Oral two times a day  metoprolol succinate  milliGRAM(s) Oral two times a day    MEDICATIONS  (PRN):  acetaminophen     Tablet .. 650 milliGRAM(s) Oral every 6 hours PRN Temp greater or equal to 38C (100.4F), Mild Pain (1 - 3)  aluminum hydroxide/magnesium hydroxide/simethicone Suspension 30 milliLiter(s) Oral every 4 hours PRN Dyspepsia  dextrose Oral Gel 15 Gram(s) Oral once PRN Blood Glucose LESS THAN 70 milliGRAM(s)/deciliter  ondansetron Injectable 4 milliGRAM(s) IV Push every 8 hours PRN Nausea and/or Vomiting  oxyCODONE    IR 5 milliGRAM(s) Oral every 4 hours PRN Severe Pain (7 - 10)  oxyCODONE    IR 2.5 milliGRAM(s) Oral every 4 hours PRN Moderate Pain (4 - 6)      FAMILY HISTORY:  Family history of cerebral hemorrhage (Mother)    FH: renal failure (Father)      Denies Family history of CAD or early MI    ROS: Unable to obtain, A&Ox1      SOCIAL HISTORY:    No tobacco, Alcohol or Ddrug use    Vital Signs Last 24 Hrs  T(C): 36.9 (2023 08:20), Max: 37.4 (2023 18:20)  T(F): 98.5 (2023 08:20), Max: 99.4 (2023 18:20)  HR: 105 (2023 08:20) (105 - 138)  BP: 115/77 (2023 08:20) (113/74 - 122/82)  BP(mean): --  RR: 17 (2023 08:20) (17 - 18)  SpO2: 95% (2023 08:20) (95% - 96%)    Parameters below as of 2023 08:20  Patient On (Oxygen Delivery Method): room air        Physical Exam:  General: Well developed, well nourished, NAD  HEENT: NCAT, PERRLA, EOMI bl, moist mucous membranes   Neck: Supple, nontender, no mass  Neurology: A&Ox3, nonfocal, sensation intact   Respiratory: CTA B/L, No W/R/R  CV: RRR, +S1/S2, no murmurs, rubs or gallops  Abdominal: Soft, NT, ND +BSx4, no palpable masses  Extremities: No C/C/E, + peripheral pulses  MSK: Normal ROM, no joint erythema or warmth, no joint swelling   Heme: No obvious ecchymosis or petechiae   Skin: warm, dry, normal color      ECbpm, afib rvr, ST depression lead 2, aVF, V4-V6 w/ early repolarization     I&O's Detail      LABS:                        13.1   10.35 )-----------( 394      ( 2023 18:20 )             41.5         141  |  112<H>  |  82<H>  ----------------------------<  168<H>  5.4<H>   |  19<L>  |  2.30<H>    Ca    9.5      2023 18:20    TPro  7.2  /  Alb  2.4<L>  /  TBili  0.3  /  DBili  x   /  AST  30  /  ALT  56  /  AlkPhos  68          PT/INR - ( 2023 18:20 )   PT: 14.5 sec;   INR: 1.25 ratio         PTT - ( 2023 18:20 )  PTT:28.3 sec  Urinalysis Basic - ( 2023 21:40 )    Color: Yellow / Appearance: Turbid / S.017 / pH: x  Gluc: x / Ketone: Negative mg/dL  / Bili: Negative / Urobili: 0.2 mg/dL   Blood: x / Protein: 100 mg/dL / Nitrite: Positive   Leuk Esterase: Large / RBC: 10 /HPF / WBC Too Numerous to count /HPF   Sq Epi: x / Non Sq Epi: x / Bacteria: Many /HPF      I&O's Summary

## 2023-11-28 NOTE — SWALLOW BEDSIDE ASSESSMENT ADULT - ASR SWALLOW DENTITION
Upper dentition present, some lower dentition present though partial denture was absent and at home per pt's daughter./incomplete

## 2023-11-28 NOTE — ED ADULT NURSE REASSESSMENT NOTE - NS ED NURSE REASSESS COMMENT FT1
(pt sitting up in stretcher respiration even and unlabored, family noted at bedside. cardiology notes hold digoxin per pt to get blood work, will continue to monitor pt. Garth MARQUEZ
Advised family and patient that a bed was assigned to her waiting for hold nurse to give report
Called to patient's room by daughters and son who wants to complain about infectious disease doctor they raised an issue and requesting for MD to speak to them. Called admitting to reserve for the patient D?C cdiff order as per MD rutledge.
1930: Pt received from previous RN. Alert and oriented x1-2, confused. As per family pt is more altered and lethargic than usual. Denies any cp, sob, n/v/d, dizziness, headache, fever/chills. Respirations even and unlabored. All safety and comfort measures maintained. Call bell within reach. Will continue to monitor.

## 2023-11-28 NOTE — CONSULT NOTE ADULT - SUBJECTIVE AND OBJECTIVE BOX
Patient is a 88y old  Female who presents with a chief complaint of UTI/pyelo (2023 09:31)    HPI:  88yF pmhx CHFrEF (LVEF 20-25% 2022), afib on eliquis, T2DM, HTN, HLD, alzheimers dementia, hypothyroidism, chronic sacral and heel ulcer brought to the hospital by children for 1 week of generalized weakness, decreased PO intake, generalized body aches followed by dysuria onset today. As per family, patient was seen by PM Dr. Shukla 1 month ago and had elevated LFTs with normal abdominal sono. Crestor was stopped and she was seen by GI Dr. Wilder and found to have yeast in stool and completed course of fluconazole. Then she developed a cough/URI and completed course of doxy+medrol dose guerline from Dr. Shukla PMD. Home Tmax 98.2F. No recent cp, sob, abd pain, n/b/d. She is lethargic and AAOx2 on admission. Daughter states it is normal for her not to know the year but the real change is her lethargy.    ED Course  Vitals: 97F, 127bpm, 122/82, 96% RA  Labs: cbc wnl, K 5.4, bicarb 19, BUN 82, Cr 2.3, GFR 20  CXR: no acute infiltrates on wet read  ECG: ST  leads 2, avf, v4-v6, depressions, afib with rvr, early repol  UA: turbid, positive nitrites, large LE, TNTC WBC, many bacteria   CT head: no acute findings  CT abdomen: Mild left hydronephrosis without obstructing calculus may be related to recently expelled calculus and/or bladder distention. Nonobstructive left nephrolithiasis. Suggestion of left pyeloureteritis  and cystitis. Subcentimeter cystic lesion pancreatic head.      Received in ED: ofirmev, IVF, and rocephin (2023 22:38)      PAST MEDICAL HISTORY:  Atrial fibrillation    Hypothyroidism    Mild Alzheimer's dementia    Diabetes mellitus    Acute on chronic systolic congestive heart failure    Hypertension    Hyperlipemia    Cardiac LV ejection fraction 21-30%        PAST SURGICAL HISTORY:  History of appendectomy    H/O hernia repair    History of cholecystectomy        FAMILY HISTORY:  Family history of cerebral hemorrhage (Mother)    FH: renal failure (Father)        SOCIAL HISTORY:    Allergies    No Known Allergies    Intolerances      Home Medications:  digoxin 125 mcg (0.125 mg) oral tablet: 1 tab(s) orally every other day (2023 22:26)  Eliquis 2.5 mg oral tablet: 1 tab(s) orally 2 times a day (2023 22:28)  levothyroxine 75 mcg (0.075 mg) oral tablet: 1 tab(s) orally once a day (2023 22:28)  losartan 25 mg oral tablet: 1 tab(s) orally once a day (at bedtime) (2023 22:26)  Melatonin 10 mg oral capsule: 1 cap(s) orally once a day (at bedtime) (2023 22:28)  memantine 10 mg oral tablet: 1 tab(s) orally 2 times a day (2023 22:28)  metFORMIN 1000 mg oral tablet: 1 tab(s) orally 2 times a day (2023 22:28)  rivastigmine 9.5 mg/24 hr transdermal film, extended release: 1 patch transdermal once a day (2023 22:28)  Vitamin D3 50 mcg (2000 intl units) oral tablet: 1 tab(s) orally once a day (2023 22:28)    MEDICATIONS  (STANDING):  apixaban 2.5 milliGRAM(s) Oral two times a day  cefTRIAXone   IVPB 2000 milliGRAM(s) IV Intermittent every 24 hours  cholecalciferol 2000 Unit(s) Oral daily  dextrose 5%. 1000 milliLiter(s) (100 mL/Hr) IV Continuous <Continuous>  dextrose 5%. 1000 milliLiter(s) (50 mL/Hr) IV Continuous <Continuous>  dextrose 50% Injectable 12.5 Gram(s) IV Push once  dextrose 50% Injectable 25 Gram(s) IV Push once  dextrose 50% Injectable 25 Gram(s) IV Push once  digoxin     Tablet 125 MICROGram(s) Oral every other day  glucagon  Injectable 1 milliGRAM(s) IntraMuscular once  insulin lispro (ADMELOG) corrective regimen sliding scale   SubCutaneous three times a day before meals  insulin lispro (ADMELOG) corrective regimen sliding scale   SubCutaneous at bedtime  levothyroxine 75 MICROGram(s) Oral daily  melatonin 5 milliGRAM(s) Oral at bedtime  memantine 10 milliGRAM(s) Oral two times a day  metoprolol succinate  milliGRAM(s) Oral two times a day    MEDICATIONS  (PRN):  acetaminophen     Tablet .. 650 milliGRAM(s) Oral every 6 hours PRN Temp greater or equal to 38C (100.4F), Mild Pain (1 - 3)  aluminum hydroxide/magnesium hydroxide/simethicone Suspension 30 milliLiter(s) Oral every 4 hours PRN Dyspepsia  dextrose Oral Gel 15 Gram(s) Oral once PRN Blood Glucose LESS THAN 70 milliGRAM(s)/deciliter  ondansetron Injectable 4 milliGRAM(s) IV Push every 8 hours PRN Nausea and/or Vomiting  oxyCODONE    IR 2.5 milliGRAM(s) Oral every 4 hours PRN Moderate Pain (4 - 6)  oxyCODONE    IR 5 milliGRAM(s) Oral every 4 hours PRN Severe Pain (7 - 10)      REVIEW OF SYSTEMS:  General:   Respiratory: No cough, SOB  Cardiovascular: No CP or Palpitations	  Gastrointestinal: No nausea, Vomiting. No diarrhea  Genitourinary: No urinary complaints	  Musculoskeletal: No leg swelling, No new rash or lesions	  Neurological: 	  all other systems negative    T(F): 98.5 (23 @ 08:20), Max: 99.4 (23 @ 18:20)  HR: 105 (23 @ 08:20) (105 - 138)  BP: 115/77 (23 @ 08:20) (113/74 - 122/82)  RR: 17 (23 @ 08:20) (17 - 18)  SpO2: 95% (23 @ 08:20) (95% - 96%)  Wt(kg): --    PHYSICAL EXAM:  General: NAD  Respiratory: b/l air entry  Cardiovascular: S1 S2  Gastrointestinal: soft  Extremities: edema            141  |  112<H>  |  82<H>  ----------------------------<  168<H>  5.4<H>   |  19<L>  |  2.30<H>    Ca    9.5      2023 18:20    TPro  7.2  /  Alb  2.4<L>  /  TBili  0.3  /  DBili  x   /  AST  30  /  ALT  56  /  AlkPhos  68                            13.1   10.35 )-----------( 394      ( 2023 18:20 )             41.5       Hemoglobin: 13.1 g/dL ( @ 18:20)  Hematocrit: 41.5 % ( @ 18:20)  Potassium: 5.4 mmol/L ( @ 18:20)  Blood Urea Nitrogen: 82 mg/dL ( @ 18:20)      Creatinine, Serum: 2.30 ( @ 18:20)      Urinalysis Basic - ( 2023 21:40 )    Color: Yellow / Appearance: Turbid / S.017 / pH: x  Gluc: x / Ketone: Negative mg/dL  / Bili: Negative / Urobili: 0.2 mg/dL   Blood: x / Protein: 100 mg/dL / Nitrite: Positive   Leuk Esterase: Large / RBC: 10 /HPF / WBC Too Numerous to count /HPF   Sq Epi: x / Non Sq Epi: x / Bacteria: Many /HPF      LIVER FUNCTIONS - ( 2023 18:20 )  Alb: 2.4 g/dL / Pro: 7.2 g/dL / ALK PHOS: 68 U/L / ALT: 56 U/L / AST: 30 U/L / GGT: x                     ABG - ( 2023 06:55 )  pH, Arterial: 7.37  pH, Blood: x     /  pCO2: 23    /  pO2: 122   / HCO3: 13    / Base Excess: -12.0 /  SaO2: 99.8              I&O's Detail           Patient is a 88y old  Female who presents with a chief complaint of UTI/pyelo (2023 09:31)    HPI:  88yF pmhx CHFrEF (LVEF 20-25% 2022), afib on eliquis, T2DM, HTN, HLD, alzheimers dementia, hypothyroidism, chronic sacral and heel ulcer brought to the hospital by children for 1 week of generalized weakness, decreased PO intake, generalized body aches followed by dysuria onset today. As per family, patient was seen by PM Dr. Shukla 1 month ago and had elevated LFTs with normal abdominal sono. Crestor was stopped and she was seen by GI Dr. Wilder and found to have yeast in stool and completed course of fluconazole. Then she developed a cough/URI and completed course of doxy+medrol dose guerline from Dr. Shukla PMD. Home Tmax 98.2F. No recent cp, sob, abd pain, n/b/d. She is lethargic and AAOx2 on admission. Daughter states it is normal for her not to know the year but the real change is her lethargy.    ED Course  Vitals: 97F, 127bpm, 122/82, 96% RA  Labs: cbc wnl, K 5.4, bicarb 19, BUN 82, Cr 2.3, GFR 20  CXR: no acute infiltrates on wet read  ECG: ST  leads 2, avf, v4-v6, depressions, afib with rvr, early repol  UA: turbid, positive nitrites, large LE, TNTC WBC, many bacteria   CT head: no acute findings  CT abdomen: Mild left hydronephrosis without obstructing calculus may be related to recently expelled calculus and/or bladder distention. Nonobstructive left nephrolithiasis. Suggestion of left pyeloureteritis  and cystitis. Subcentimeter cystic lesion pancreatic head.      Received in ED: ofirmev, IVF, and rocephin (2023 22:38)    Renal consult called for YVETTE. History obtained from chart and patient's daughters at bedside.       PAST MEDICAL HISTORY:  Atrial fibrillation    Hypothyroidism    Mild Alzheimer's dementia    Diabetes mellitus    Acute on chronic systolic congestive heart failure    Hypertension    Hyperlipemia    Cardiac LV ejection fraction 21-30%        PAST SURGICAL HISTORY:  History of appendectomy    H/O hernia repair    History of cholecystectomy        FAMILY HISTORY:  Family history of cerebral hemorrhage (Mother)    FH: renal failure (Father)        SOCIAL HISTORY: No smoking or alcohol use     Allergies    No Known Allergies    Intolerances      Home Medications:  digoxin 125 mcg (0.125 mg) oral tablet: 1 tab(s) orally every other day (2023 22:26)  Eliquis 2.5 mg oral tablet: 1 tab(s) orally 2 times a day (2023 22:28)  levothyroxine 75 mcg (0.075 mg) oral tablet: 1 tab(s) orally once a day (2023 22:28)  losartan 25 mg oral tablet: 1 tab(s) orally once a day (at bedtime) (2023 22:26)  Melatonin 10 mg oral capsule: 1 cap(s) orally once a day (at bedtime) (2023 22:28)  memantine 10 mg oral tablet: 1 tab(s) orally 2 times a day (2023 22:28)  metFORMIN 1000 mg oral tablet: 1 tab(s) orally 2 times a day (2023 22:28)  rivastigmine 9.5 mg/24 hr transdermal film, extended release: 1 patch transdermal once a day (2023 22:28)  Vitamin D3 50 mcg (2000 intl units) oral tablet: 1 tab(s) orally once a day (2023 22:28)    MEDICATIONS  (STANDING):  apixaban 2.5 milliGRAM(s) Oral two times a day  cefTRIAXone   IVPB 2000 milliGRAM(s) IV Intermittent every 24 hours  cholecalciferol 2000 Unit(s) Oral daily  dextrose 5%. 1000 milliLiter(s) (100 mL/Hr) IV Continuous <Continuous>  dextrose 5%. 1000 milliLiter(s) (50 mL/Hr) IV Continuous <Continuous>  dextrose 50% Injectable 12.5 Gram(s) IV Push once  dextrose 50% Injectable 25 Gram(s) IV Push once  dextrose 50% Injectable 25 Gram(s) IV Push once  digoxin     Tablet 125 MICROGram(s) Oral every other day  glucagon  Injectable 1 milliGRAM(s) IntraMuscular once  insulin lispro (ADMELOG) corrective regimen sliding scale   SubCutaneous three times a day before meals  insulin lispro (ADMELOG) corrective regimen sliding scale   SubCutaneous at bedtime  levothyroxine 75 MICROGram(s) Oral daily  melatonin 5 milliGRAM(s) Oral at bedtime  memantine 10 milliGRAM(s) Oral two times a day  metoprolol succinate  milliGRAM(s) Oral two times a day    MEDICATIONS  (PRN):  acetaminophen     Tablet .. 650 milliGRAM(s) Oral every 6 hours PRN Temp greater or equal to 38C (100.4F), Mild Pain (1 - 3)  aluminum hydroxide/magnesium hydroxide/simethicone Suspension 30 milliLiter(s) Oral every 4 hours PRN Dyspepsia  dextrose Oral Gel 15 Gram(s) Oral once PRN Blood Glucose LESS THAN 70 milliGRAM(s)/deciliter  ondansetron Injectable 4 milliGRAM(s) IV Push every 8 hours PRN Nausea and/or Vomiting  oxyCODONE    IR 2.5 milliGRAM(s) Oral every 4 hours PRN Moderate Pain (4 - 6)  oxyCODONE    IR 5 milliGRAM(s) Oral every 4 hours PRN Severe Pain (7 - 10)      REVIEW OF SYSTEMS:  General: weak  Respiratory: No cough, SOB  Cardiovascular: No CP or Palpitations	  Gastrointestinal: No nausea, Vomiting. No diarrhea  Genitourinary: No urinary complaints		  all other systems negative    T(F): 98.5 (23 @ 08:20), Max: 99.4 (23 @ 18:20)  HR: 105 (23 @ 08:20) (105 - 138)  BP: 115/77 (23 @ 08:20) (113/74 - 122/82)  RR: 17 (23 @ 08:20) (17 - 18)  SpO2: 95% (23 @ 08:20) (95% - 96%)  Wt(kg): --    PHYSICAL EXAM:  General: NAD  Respiratory: b/l air entry  Cardiovascular: S1 S2  Gastrointestinal: soft  Extremities: no edema            141  |  112<H>  |  82<H>  ----------------------------<  168<H>  5.4<H>   |  19<L>  |  2.30<H>    Ca    9.5      2023 18:20    TPro  7.2  /  Alb  2.4<L>  /  TBili  0.3  /  DBili  x   /  AST  30  /  ALT  56  /  AlkPhos  68                            13.1   10.35 )-----------( 394      ( 2023 18:20 )             41.5       Hemoglobin: 13.1 g/dL ( @ 18:20)  Hematocrit: 41.5 % ( @ 18:20)  Potassium: 5.4 mmol/L ( @ 18:20)  Blood Urea Nitrogen: 82 mg/dL ( @ 18:20)      Creatinine, Serum: 2.30 ( @ 18:20)      Urinalysis Basic - ( 2023 21:40 )    Color: Yellow / Appearance: Turbid / S.017 / pH: x  Gluc: x / Ketone: Negative mg/dL  / Bili: Negative / Urobili: 0.2 mg/dL   Blood: x / Protein: 100 mg/dL / Nitrite: Positive   Leuk Esterase: Large / RBC: 10 /HPF / WBC Too Numerous to count /HPF   Sq Epi: x / Non Sq Epi: x / Bacteria: Many /HPF      LIVER FUNCTIONS - ( 2023 18:20 )  Alb: 2.4 g/dL / Pro: 7.2 g/dL / ALK PHOS: 68 U/L / ALT: 56 U/L / AST: 30 U/L / GGT: x                     ABG - ( 2023 06:55 )  pH, Arterial: 7.37  pH, Blood: x     /  pCO2: 23    /  pO2: 122   / HCO3: 13    / Base Excess: -12.0 /  SaO2: 99.8            < from: CT Abdomen and Pelvis w/ IV Cont (23 @ 19:13) >    ACC: [id number] EXAM:  CT ABDOMEN AND PELVIS IC   ORDERED BY:  NICOLE INMAN     PROCEDURE DATE:  2023          INTERPRETATION:  CLINICAL INFORMATION: Abdominal pain, dysuria    COMPARISON: CT abdomen and pelvis 2023    CONTRAST/COMPLICATIONS:  IV Contrast: Omnipaque 350  90 cc administered   10 cc discarded  Oral Contrast: NONE  Complications: None reported at time of study completion    PROCEDURE:  CT of the Abdomen and Pelvis was performed.  Sagittal and coronal reformats were performed.    FINDINGS:  LOWER CHEST: Coronary artery calcification.    LIVER: Within normal limits.  BILE DUCTS: Biliary dilatation compatible with postcholecystectomy state  GALLBLADDER: Cholecystectomy.  SPLEEN: Within normal limits.  PANCREAS: Diffusely atrophic. Subcentimeter cystic lesion pancreatic   head. Correlate with pancreatic MRI.  ADRENALS: Thickened left adrenal similar to prior  KIDNEYS/URETERS: Bilateral renal cysts. Nonobstructive 5 mm left renal   calculus left cortical thinning. Mild left hydroureteronephrosis down to   the bladder level without obstructing calculus. This may be due to recent   spell calculus and/or markedly distended bladder. There is mild augmented   urothelial enhancement involving the left renal pelvis and ureter with   perinephric and periureteral stranding suggestive of pyeloureteritis    BLADDER: Markedly distended. Mild diffuse bladder wall thickening   suggestive of cystitis..  REPRODUCTIVE ORGANS: No mass    BOWEL: No bowel obstruction. Colonic diverticula. Appendix no appendicitis  PERITONEUM: No ascites.  VESSELS: Within normal limits.  RETROPERITONEUM/LYMPH NODES: No lymphadenopathy.  ABDOMINAL WALL: Small fat-containing umbilical hernia.  BONES: Degenerative changes. Stable chronic T11 compression deformity.    IMPRESSION:  Mild left hydronephrosis without obstructing calculus may be related to   recently expelled calculus and/or bladder distention.    Nonobstructive left nephrolithiasis.    Suggestion of left pyeloureteritis  and cystitis. Correlate with   symptomatology and urinalysis    Subcentimeter cystic lesion pancreatic head. Correlate with pancreatic MRI    Additional findings as discussed      --- End of Report ---            KYLIE KENNY MD; Attending Radiologist  This document has been electronically signed. 2023  8:11PM    < end of copied text >  < from: Xray Chest 1 View- PORTABLE-Urgent (23 @ 18:57) >    ACC: [id number] EXAM:  XR CHEST PORTABLE URGENT 1V   ORDERED BY:  NICOLE INMAN     PROCEDURE DATE:  2023          INTERPRETATION:  TIME OF EXAM: 2023 at 6:42 PM.    CLINICAL INFORMATION: Sepsis.    COMPARISON:  2023.    TECHNIQUE:   AP Portable chest x-ray. Inferior left costophrenic angle   excluded from image.    INTERPRETATION:    The heart is not enlarged. The thoracic aorta is calcified.  The mediastinum and brenna are unremarkable.  The lungs are clear.  No pleural effusion or pneumothorax is seen.  There is osteoarthritic degenerative change of the spine.      IMPRESSION:  Clear lungs.    --- End of Report ---            CLEVELAND ARCHULETA MD; Attending Radiologist  This document has been electronically signed. 2023 10:59AM    < end of copied text >

## 2023-11-28 NOTE — CONSULT NOTE ADULT - ASSESSMENT
Patient is an 88yF with a PMH of CHFrEF (LVEF 20-25% 2022), afib on eliquis, T2DM, HTN, HLD, alzheimers dementia, hypothyroidism, chronic sacral and heel ulcers, admitted for acute cystitis, metabolic encephalopathy likely secondary to infection/metabolic acidosis and YVETTE on CKD. Per patients daughters, she has not had any recent complaints of chest pain, palpitations or increased SOB. Patient follows with Cardio Dr. Grider outpatient and has a long standing hx of afib. She is rate controlled on her current home meds, and baseline HR is usually <100bpm.     #Ischemia   - No clear evidence of acute ischemia  - Troponin negative   - EKbpm, afib w/ rvr, ST depressions in leads 2, avf, V4-V6 with early repolarization  - ST depressions likely 2/2 Digoxin use   - TTE (2022): EF 20-25%, apex and anterior wall akinesis, inferior wall hypokinesis, mod MR, mild TR  - Presumed Hx of CAD: LV dysfunction on TTE likely from a missed MI --> Given her frailty, increased creatinine, decision was made by family and outpatioent Cardio to manage her medically   - Continue to monitor for signs or symptoms of ischemia     #Arrhythmia  - EKbpm, afib w/ rvr, ST depressions in leads 2, avf, V4-V6 with early repolarization  - F/u AM dig level  - Hold Dig until levels come back --> will likely be elevated in the setting of her YVETTE  - Continue Metoprolol daily   - Continue Eliquis daily    - Monitor and replete lytes, keep K>4, Mg>2.    #HTN  - BP stable currently  - Continue home Metoprolol  - Hold home Losartan in the setting of YVETTE   - Continue to monitor hemodynamics     #HFrEF    - TTE (2022): EF 20-25%, apex and anterior wall akinesis, inferior wall hypokinesis, mod MR, mild TR  - Hold home Lasix in the setting of YVETTE  - Not overtly volume overloaded on exam   - Strict I/Os, daily weights    - Other cardiovascular workup will depend on clinical course.  - All other workup per primary team.  - Will continue to follow.      
88yF pmhx CHFrEF (LVEF 20-25% 12/2022), afib on eliquis, T2DM, HTN, HLD, alzheimers dementia, hypothyroidism, chronic sacral and heel ulcer, who presented with 1 week of generalized weakness, decreased PO intake, generalized body aches followed by dysuria. Found to have left sided pyeloureteritis and cystitis. Family also reports diarrhea, patient has been on antibiotics recently. Otherwise no fevers or leukocytosis.    #Left pyeloureteritis and cystitis  #Diarrhea    -continue ceftriaxone, can decrease to 1g IV q24h  -follow blood and urine cultures  -send stool for GI PCR and c diff  -wound care evaluation  -discussed with family at bedside    Thank you for courtesy of this consult.     Will follow.  Discussed with Dr. Boswell.     Andreina Laughlin MD  Division of Infectious Diseases   Cell [phone] between 8am and 6pm   After 6pm and weekends please call ID service at [phone].     55 minutes spent on total encounter assessing patient, examination, chart review, counseling and coordinating care by the attending physician/nurse/care manager.   
right buttock/sacral ulcer stage 2 
YVETTE: Prerenal azotemia, Urinary retention  Hypotension  Diabetes    Will follow creatinine trend on IV hydration. BP improving. s/p CT with IV contrast. Monitor blood sugar levels. Insulin coverage as needed.  Encourage PO intake as tolerated. Avoid nephrotoxic meds as possible. Avoid ACEI, ARB, NSAIDs and IV contrast. Will follow electrolytes and renal function trend.   D/w patients family at bedside.     Further recommendations pending clinical course. Thank you for the courtesy of this referral.

## 2023-11-28 NOTE — SWALLOW BEDSIDE ASSESSMENT ADULT - COMMENTS
"88yF pmhx CHFrEF (LVEF 20-25% 12/2022), afib on eliquis, T2DM, HTN, HLD, alzheimers dementia, hypothyroidism, chronic sacral and heel ulcer brought to the hospital by children for 1 week of generalized weakness, decreased PO intake, generalized body aches followed by dysuria onset today. As per family, patient was seen by PM Dr. Shukla 1 month ago and had elevated LFTs with normal abdominal sono. Crestor was stopped and she was seen by GI Dr. Wilder and found to have yeast in stool and completed course of fluconazole. Then she developed a cough/URI and completed course of doxy+medrol dose guerline from Dr. Shukla PMD. Home Tmax 98.2F. No recent cp, sob, abd pain, n/b/d. She is lethargic and AAOx2 on admission. Daughter states it is normal for her not to know the year but the real change is her lethargy."      The pt was received at bedside upright with her daughters present. Pt was alert, awake, and in NAD. Pt denied any difficulty chewing or swallowing. The pt's daughters stated that they give her regular solids when she is wearing her partial denture. However, when she does not wear her partial denture they provide her with food consistent with soft and bite sized texture. The pt's voice was noted to be mildly hoarse. The pt's daughter reported that the pt's voice gets hoarse when she does not have her humidifier present which she utilizes daily at home. Pt consumed soft and bite sized solids (x4). Adequate oral containment noted. Mildly prolonged mastication was noted though suspected to be 2/2 the pt not wearing her partial lower denture. Oral transit was judged to be timely. Pharyngeal swallow trigger was suspected to timely. Upon palpation, hyolaryngeal excursion was appreciated. No overt s/s of aspiration or penetration noted. Oral cavity was clear post trials. "88yF pmhx CHFrEF (LVEF 20-25% 12/2022), afib on eliquis, T2DM, HTN, HLD, alzheimers dementia, hypothyroidism, chronic sacral and heel ulcer brought to the hospital by children for 1 week of generalized weakness, decreased PO intake, generalized body aches followed by dysuria onset today. As per family, patient was seen by PM Dr. Shukla 1 month ago and had elevated LFTs with normal abdominal sono. Crestor was stopped and she was seen by GI Dr. Wilder and found to have yeast in stool and completed course of fluconazole. Then she developed a cough/URI and completed course of doxy+medrol dose guerline from Dr. Shukla PMD. Home Tmax 98.2F. No recent cp, sob, abd pain, n/b/d. She is lethargic and AAOx2 on admission. Daughter states it is normal for her not to know the year but the real change is her lethargy."    CT Head 11/27/23:   IMPRESSION:  No acute transcortical infarct or intracranial hemorrhage.    The pt was received at bedside upright with her daughters present. Pt was alert, awake, and in NAD. Pt denied any difficulty chewing or swallowing. The pt's daughters stated that they give her regular solids when she is wearing her partial denture. However, when she does not wear her partial denture they provide her with food consistent with soft and bite sized texture. The pt's voice was noted to be mildly hoarse. The pt's daughter reported that the pt's voice gets hoarse when she does not have her humidifier present which she utilizes daily at home. "88yF pmhx CHFrEF (LVEF 20-25% 12/2022), afib on eliquis, T2DM, HTN, HLD, alzheimers dementia, hypothyroidism, chronic sacral and heel ulcer brought to the hospital by children for 1 week of generalized weakness, decreased PO intake, generalized body aches followed by dysuria onset today. As per family, patient was seen by PM Dr. Shukla 1 month ago and had elevated LFTs with normal abdominal sono. Crestor was stopped and she was seen by GI Dr. Wilder and found to have yeast in stool and completed course of fluconazole. Then she developed a cough/URI and completed course of doxy+medrol dose guerline from Dr. Shukla PMD. Home Tmax 98.2F. No recent cp, sob, abd pain, n/b/d. She is lethargic and AAOx2 on admission. Daughter states it is normal for her not to know the year but the real change is her lethargy."    CT Head 11/27/23:   IMPRESSION:  No acute transcortical infarct or intracranial hemorrhage.    CT Chest 11/27/23:   IMPRESSION:  Clear lungs.      The pt was received at bedside upright with her daughters present. Pt was alert, awake, and in NAD. Pt denied any difficulty chewing or swallowing. The pt's daughters stated that they give her regular solids when she is wearing her partial denture. However, when she does not wear her partial denture they provide her with food consistent with soft and bite sized texture. The pt's voice was noted to be mildly hoarse. The pt's daughter reported that the pt's voice gets hoarse when she does not have her humidifier present which she utilizes daily at home. Further, they endorsed that they give her medication whole in applesauce as she takes a lot of pills and they find it easier for her to consume them all this way.

## 2023-11-28 NOTE — CONSULT NOTE ADULT - ATTENDING COMMENTS
Patient is an 88yF with a PMH of CHFrEF (LVEF 20-25% 12/2022), afib on eliquis, T2DM, HTN, HLD, alzheimers dementia, hypothyroidism, chronic sacral and heel ulcers, admitted for acute cystitis, metabolic encephalopathy likely secondary to infection/metabolic acidosis and YVETTE on CKD. Per patients daughters, she has not had any recent complaints of chest pain, palpitations or increased SOB. Patient follows with Cardio Dr. Grider outpatient and has a long standing hx of afib. She is rate controlled on her current home meds, and baseline HR is usually <100bpm.      - No clear evidence of acute ischemia   - EKG afib w/ rvr, ST depressions in leads 2, avf, V4-V6 with early repolarization  - ST depressions likely 2/2 Digoxin use though ischemia cannot be excluded  - TTE (12/2022): EF 20-25%, apex and anterior wall akinesis, inferior wall hypokinesis, mod MR, mild TR  - Presumed Hx of CAD: LV dysfunction on TTE likely from a missed MI --> Given her frailty, increased creatinine, decision was made by family and outpt cardio to manage her medically    -followup dig level, and would hold dig until level available, noting that it may be high in the setting of yvette  -cont bb and ac  -hold losartan, lasix in setting of yvette, resume when able given cmy

## 2023-11-28 NOTE — CARE COORDINATION ASSESSMENT. - NSPASTMEDSURGHISTORY_GEN_ALL_CORE_FT
PAST MEDICAL & SURGICAL HISTORY:  Diabetes mellitus      Mild Alzheimer's dementia      Hypothyroidism      Atrial fibrillation      History of cholecystectomy      H/O hernia repair      History of appendectomy      Cardiac LV ejection fraction 21-30%  12/22      Hyperlipemia      Hypertension      Acute on chronic systolic congestive heart failure

## 2023-11-28 NOTE — CONSULT NOTE ADULT - SUBJECTIVE AND OBJECTIVE BOX
[facility]  INFECTIOUS DISEASES - Nasrin Amezcua, [address], [place] [address]  Tel: [phone]     Fax: [phone]  =======================================================    [id number]  LEXY EISENBERG     CC: Patient is a 88y old  Female who presents with a chief complaint of UTI/pyelo (2023 11:44)    HPI:  88yF pmhx CHFrEF (LVEF 20-25% 2022), afib on eliquis, T2DM, HTN, HLD, alzheimers dementia, hypothyroidism, chronic sacral and heel ulcer, who presented with 1 week of generalized weakness, decreased PO intake, generalized body aches followed by dysuria onset today. As per family at bedside, patient was seen by PM Dr. Shukla 1 month ago and had elevated LFTs with normal abdominal sono. Crestor was stopped and she was seen by GI Dr. Wilder and found to have yeast in stool and completed course of fluconazole. Then she developed a cough/URI and completed course of doxy+medrol dose guerline from Dr. Shukla PMD. No reported fevers, but they have think she has been more lethargic recently. They also report diarrhea.      PAST MEDICAL & SURGICAL HISTORY:  Atrial fibrillation      Hypothyroidism      Mild Alzheimer's dementia      Diabetes mellitus      Acute on chronic systolic congestive heart failure      Hypertension      Hyperlipemia      Cardiac LV ejection fraction 21-30%        History of appendectomy      H/O hernia repair      History of cholecystectomy          Social Hx:     FAMILY HISTORY:  Family history of cerebral hemorrhage (Mother)    FH: renal failure (Father)        Allergies    No Known Allergies    Intolerances        Antibiotics:  MEDICATIONS  (STANDING):  apixaban 2.5 milliGRAM(s) Oral two times a day  cefTRIAXone   IVPB 2000 milliGRAM(s) IV Intermittent every 24 hours  cholecalciferol 2000 Unit(s) Oral daily  dextrose 5%. 1000 milliLiter(s) (100 mL/Hr) IV Continuous <Continuous>  dextrose 5%. 1000 milliLiter(s) (50 mL/Hr) IV Continuous <Continuous>  dextrose 50% Injectable 25 Gram(s) IV Push once  dextrose 50% Injectable 25 Gram(s) IV Push once  dextrose 50% Injectable 12.5 Gram(s) IV Push once  digoxin     Tablet 125 MICROGram(s) Oral every other day  glucagon  Injectable 1 milliGRAM(s) IntraMuscular once  insulin lispro (ADMELOG) corrective regimen sliding scale   SubCutaneous three times a day before meals  insulin lispro (ADMELOG) corrective regimen sliding scale   SubCutaneous at bedtime  levothyroxine 75 MICROGram(s) Oral daily  melatonin 5 milliGRAM(s) Oral at bedtime  memantine 10 milliGRAM(s) Oral two times a day  metoprolol succinate  milliGRAM(s) Oral two times a day    MEDICATIONS  (PRN):  acetaminophen     Tablet .. 650 milliGRAM(s) Oral every 6 hours PRN Temp greater or equal to 38C (100.4F), Mild Pain (1 - 3)  aluminum hydroxide/magnesium hydroxide/simethicone Suspension 30 milliLiter(s) Oral every 4 hours PRN Dyspepsia  dextrose Oral Gel 15 Gram(s) Oral once PRN Blood Glucose LESS THAN 70 milliGRAM(s)/deciliter  ondansetron Injectable 4 milliGRAM(s) IV Push every 8 hours PRN Nausea and/or Vomiting  oxyCODONE    IR 5 milliGRAM(s) Oral every 4 hours PRN Severe Pain (7 - 10)  oxyCODONE    IR 2.5 milliGRAM(s) Oral every 4 hours PRN Moderate Pain (4 - 6)       REVIEW OF SYSTEMS: *limited 2/2 dementia*  CONSTITUTIONAL:  No Fevers  CARDIOVASCULAR:  No chest pain or SOB.  RESPIRATORY:  No cough, shortness of breath  GASTROINTESTINAL:  No abdominal pain  GENITOURINARY:  (+) discomfort from Lcancy catheter  MUSCULOSKELETAL:  no back pain.  NEUROLOGIC:  No headache     Physical Exam:  Vital Signs Last 24 Hrs  T(C): 36.9 (2023 08:20), Max: 37.4 (2023 18:20)  T(F): 98.5 (2023 08:20), Max: 99.4 (2023 18:20)  HR: 105 (2023 08:20) (105 - 138)  BP: 115/77 (2023 08:20) (113/74 - 122/82)  BP(mean): --  RR: 17 (2023 08:20) (17 - 18)  SpO2: 95% (2023 08:20) (95% - 96%)    Parameters below as of 2023 08:20  Patient On (Oxygen Delivery Method): room air      Height (cm): 152.4 ( @ 17:47)  Weight (kg): 46.3 ( @ 17:47)  BMI (kg/m2): 19.9 ( @ 17:47)  BSA (m2): 1.4 ( @ 17:47)  GEN: NAD  HEENT: normocephalic and atraumatic.   NECK: Supple.   LUNGS: Clear to auscultation.  HEART: Regular rate and rhythm   ABDOMEN: Soft, nontender, and nondistended.    : (+) Clancy with dark yellow urine  EXTREMITIES: No leg edema.  NEUROLOGIC: AO x 2, knows she's in [place] but not the year (family says this is normal for her)    Labs:      141  |  112<H>  |  82<H>  ----------------------------<  168<H>  5.4<H>   |  19<L>  |  2.30<H>    Ca    9.5      2023 18:20    TPro  7.2  /  Alb  2.4<L>  /  TBili  0.3  /  DBili  x   /  AST  30  /  ALT  56  /  AlkPhos  68                            13.1   10.35 )-----------( 394      ( 2023 18:20 )             41.5     PT/INR - ( 2023 18:20 )   PT: 14.5 sec;   INR: 1.25 ratio         PTT - ( 2023 18:20 )  PTT:28.3 sec  Urinalysis Basic - ( 2023 21:40 )    Color: Yellow / Appearance: Turbid / S.017 / pH: x  Gluc: x / Ketone: Negative mg/dL  / Bili: Negative / Urobili: 0.2 mg/dL   Blood: x / Protein: 100 mg/dL / Nitrite: Positive   Leuk Esterase: Large / RBC: 10 /HPF / WBC Too Numerous to count /HPF   Sq Epi: x / Non Sq Epi: x / Bacteria: Many /HPF      LIVER FUNCTIONS - ( 2023 18:20 )  Alb: 2.4 g/dL / Pro: 7.2 g/dL / ALK PHOS: 68 U/L / ALT: 56 U/L / AST: 30 U/L / GGT: x               ABG - ( 2023 06:55 )  pH, Arterial: 7.37  pH, Blood: x     /  pCO2: 23    /  pO2: 122   / HCO3: 13    / Base Excess: -12.0 /  SaO2: 99.8                        RECENT CULTURES:        All imaging and other data have been reviewed.    < from: CT Abdomen and Pelvis w/ IV Cont (23 @ 19:13) >  FINDINGS:  LOWER CHEST: Coronary artery calcification.    LIVER: Within normal limits.  BILE DUCTS: Biliary dilatation compatible with postcholecystectomy state  GALLBLADDER: Cholecystectomy.  SPLEEN: Within normal limits.  PANCREAS: Diffusely atrophic. Subcentimeter cystic lesion pancreatic   head. Correlate with pancreatic MRI.  ADRENALS: Thickened left adrenal similar to prior  KIDNEYS/URETERS: Bilateral renal cysts. Nonobstructive 5 mm left renal   calculus left cortical thinning. Mild left hydroureteronephrosis down to   the bladder level without obstructing calculus. This may be due to recent   spell calculus and/or markedly distended bladder. There is mild augmented   urothelial enhancement involving the left renal pelvis and ureter with   perinephric and periureteral stranding suggestive of pyeloureteritis    BLADDER: Markedly distended. Mild diffuse bladder wall thickening   suggestive of cystitis..  REPRODUCTIVE ORGANS: No mass    BOWEL: No bowel obstruction. Colonic diverticula. Appendix no appendicitis  PERITONEUM: No ascites.  VESSELS: Within normal limits.  RETROPERITONEUM/LYMPH NODES: No lymphadenopathy.  ABDOMINAL WALL: Small fat-containing umbilical hernia.  BONES: Degenerative changes. Stable chronic T11 compression deformity.    IMPRESSION:  Mild left hydronephrosis without obstructing calculus may be related to   recently expelled calculus and/or bladder distention.    Nonobstructive left nephrolithiasis.    Suggestion of left pyeloureteritis  and cystitis. Correlate with   symptomatology and urinalysis    Subcentimeter cystic lesion pancreatic head. Correlate with pancreatic MRI    Additional findings as discussed        < end of copied text >

## 2023-11-28 NOTE — SWALLOW BEDSIDE ASSESSMENT ADULT - ADDITIONAL RECOMMENDATIONS
1. ST to f/u in-house for diet tolerance as schedule permits. 1. ST to f/u in-house for diet tolerance as schedule permits.  2. Provide medication whole in applesauce per pt preference.

## 2023-11-28 NOTE — CONSULT NOTE ADULT - SUBJECTIVE AND OBJECTIVE BOX
Chief Complaint: sacral ulcer stage 2 and right heel blister    HPI: patient is know 2 use from [facility] consultation about 3 weeks ago. At that time she was seen by podiatry for right foot blister that was drained and for an inflamed sacral stage 2 ulcer with 2 small scabs, no signs of infection    PAST MEDICAL & SURGICAL HISTORY:  Atrial fibrillation      Hypothyroidism      Mild Alzheimer's dementia      Diabetes mellitus      Acute on chronic systolic congestive heart failure      Hypertension      Hyperlipemia      Cardiac LV ejection fraction 21-30%  12/22      History of appendectomy      H/O hernia repair      History of cholecystectomy          Allergies    No Known Allergies    Intolerances        MEDICATIONS  (STANDING):  apixaban 2.5 milliGRAM(s) Oral two times a day  cefTRIAXone   IVPB 1000 milliGRAM(s) IV Intermittent every 24 hours  cholecalciferol 2000 Unit(s) Oral daily  dextrose 5%. 1000 milliLiter(s) (100 mL/Hr) IV Continuous <Continuous>  dextrose 5%. 1000 milliLiter(s) (50 mL/Hr) IV Continuous <Continuous>  dextrose 50% Injectable 12.5 Gram(s) IV Push once  dextrose 50% Injectable 25 Gram(s) IV Push once  dextrose 50% Injectable 25 Gram(s) IV Push once  digoxin     Tablet 125 MICROGram(s) Oral every other day  glucagon  Injectable 1 milliGRAM(s) IntraMuscular once  insulin lispro (ADMELOG) corrective regimen sliding scale   SubCutaneous at bedtime  insulin lispro (ADMELOG) corrective regimen sliding scale   SubCutaneous three times a day before meals  levothyroxine 75 MICROGram(s) Oral daily  melatonin 5 milliGRAM(s) Oral at bedtime  memantine 10 milliGRAM(s) Oral two times a day  metoprolol succinate  milliGRAM(s) Oral two times a day    MEDICATIONS  (PRN):  acetaminophen     Tablet .. 650 milliGRAM(s) Oral every 6 hours PRN Temp greater or equal to 38C (100.4F), Mild Pain (1 - 3)  aluminum hydroxide/magnesium hydroxide/simethicone Suspension 30 milliLiter(s) Oral every 4 hours PRN Dyspepsia  dextrose Oral Gel 15 Gram(s) Oral once PRN Blood Glucose LESS THAN 70 milliGRAM(s)/deciliter  ondansetron Injectable 4 milliGRAM(s) IV Push every 8 hours PRN Nausea and/or Vomiting  oxyCODONE    IR 5 milliGRAM(s) Oral every 4 hours PRN Severe Pain (7 - 10)  oxyCODONE    IR 2.5 milliGRAM(s) Oral every 4 hours PRN Moderate Pain (4 - 6)      FAMILY HISTORY:  Family history of cerebral hemorrhage (Mother)    FH: renal failure (Father)            ROS:  CONSTITUTIONAL: No fever, weight loss, or fatigue  EYES: No eye pain, visual disturbances, or discharge  ENMT:  No difficulty hearing, tinnitus, vertigo; No sinus or throat pain  NECK: No pain or stiffness  BREASTS: No pain, masses, or nipple discharge  RESPIRATORY: No cough, wheezing, chills or hemoptysis; No shortness of breath  CARDIOVASCULAR: No chest pain, palpitations, dizziness, or leg swelling  GASTROINTESTINAL: No abdominal or epigastric pain. No nausea, vomiting, or hematemesis; No diarrhea or constipation. No melena or hematochezia.  GENITOURINARY: No dysuria, frequency, hematuria, or incontinence  NEUROLOGICAL: No headaches, memory loss, loss of strength, numbness, or tremors  SKIN: No itching, burning, rashes, or lesions   LYMPH NODES: No enlarged glands  ENDOCRINE: No heat or cold intolerance; No hair loss  MUSCULOSKELETAL: No joint pain or swelling; No muscle, back, or extremity pain  PSYCHIATRIC: No depression, anxiety, mood swings, or difficulty sleeping  HEME/LYMPH: No easy bruising, or bleeding gums  ALLERGY AND IMMUNOLOGIC: No hives or eczema    PHYSICAL EXAM-    Height (cm): 152.4 (11-27 @ 17:47)  Weight (kg): 46.3 (11-27 @ 17:47)  BMI (kg/m2): 19.9 (11-27 @ 17:47)  Vital Signs Last 24 Hrs  T(C): 36.9 (28 Nov 2023 08:20), Max: 37.4 (27 Nov 2023 18:20)  T(F): 98.5 (28 Nov 2023 08:20), Max: 99.4 (27 Nov 2023 18:20)  HR: 105 (28 Nov 2023 08:20) (105 - 138)  BP: 115/77 (28 Nov 2023 08:20) (113/74 - 122/82)  BP(mean): --  RR: 17 (28 Nov 2023 08:20) (17 - 18)  SpO2: 95% (28 Nov 2023 08:20) (95% - 96%)    Parameters below as of 28 Nov 2023 08:20  Patient On (Oxygen Delivery Method): room air        Constitutional: well developed, well nourished, no apparent distress, alert, oriented x 3.   Pulmonary: no respiratory distress, normal respiratory rhythm and effort, lungs are clear to auscultation/percussion. No CVA tenderness.  Cardiovascular: heart rate normal, normal sinus rhythm; no murmurs, gallops, rubs, heaves or thrills   Abdomen: soft, non-tender, +BS, no guarding/rebound/rigidity.  Vascular:   ENMT:  Neck:  Extremites: right foot no open wounds residual dry blister, no signs of infection    Skin: sacral area mild erythema and 2 dry small scabs, no signs of infection                            13.1   10.35 )-----------( 394      ( 27 Nov 2023 18:20 )             41.5     11-27    141  |  112<H>  |  82<H>  ----------------------------<  168<H>  5.4<H>   |  19<L>  |  2.30<H>    Ca    9.5      27 Nov 2023 18:20    TPro  7.2  /  Alb  2.4<L>  /  TBili  0.3  /  DBili  x   /  AST  30  /  ALT  56  /  AlkPhos  68  11-27      Radiology      Impression:      Recommendations:

## 2023-11-28 NOTE — SWALLOW BEDSIDE ASSESSMENT ADULT - ASR SWALLOW REFERRAL
Dietary referral is recommended to ensure pt is meeting her nutrition and hydration needs./Registered Dietitian

## 2023-11-28 NOTE — PROGRESS NOTE ADULT - SUBJECTIVE AND OBJECTIVE BOX
Patient is a 88y old  Female who presents with a chief complaint of UTI/pyelo (27 Nov 2023 22:38)      INTERVAL HPI/OVERNIGHT EVENTS: Patient seen and examined at bedside. C/o generalized weakness. Wants to eat, feels hungry. Denies chest pain, palpitation, sob. Daughters at bedside     MEDICATIONS  (STANDING):  apixaban 2.5 milliGRAM(s) Oral two times a day  cefTRIAXone   IVPB 2000 milliGRAM(s) IV Intermittent every 24 hours  cholecalciferol 2000 Unit(s) Oral daily  dextrose 5%. 1000 milliLiter(s) (100 mL/Hr) IV Continuous <Continuous>  dextrose 5%. 1000 milliLiter(s) (50 mL/Hr) IV Continuous <Continuous>  dextrose 50% Injectable 12.5 Gram(s) IV Push once  dextrose 50% Injectable 25 Gram(s) IV Push once  dextrose 50% Injectable 25 Gram(s) IV Push once  digoxin     Tablet 125 MICROGram(s) Oral every other day  glucagon  Injectable 1 milliGRAM(s) IntraMuscular once  insulin lispro (ADMELOG) corrective regimen sliding scale   SubCutaneous at bedtime  insulin lispro (ADMELOG) corrective regimen sliding scale   SubCutaneous three times a day before meals  levothyroxine 75 MICROGram(s) Oral daily  melatonin 5 milliGRAM(s) Oral at bedtime  memantine 10 milliGRAM(s) Oral two times a day  metoprolol succinate  milliGRAM(s) Oral two times a day    MEDICATIONS  (PRN):  acetaminophen     Tablet .. 650 milliGRAM(s) Oral every 6 hours PRN Temp greater or equal to 38C (100.4F), Mild Pain (1 - 3)  aluminum hydroxide/magnesium hydroxide/simethicone Suspension 30 milliLiter(s) Oral every 4 hours PRN Dyspepsia  dextrose Oral Gel 15 Gram(s) Oral once PRN Blood Glucose LESS THAN 70 milliGRAM(s)/deciliter  ondansetron Injectable 4 milliGRAM(s) IV Push every 8 hours PRN Nausea and/or Vomiting  oxyCODONE    IR 5 milliGRAM(s) Oral every 4 hours PRN Severe Pain (7 - 10)  oxyCODONE    IR 2.5 milliGRAM(s) Oral every 4 hours PRN Moderate Pain (4 - 6)      Allergies    No Known Allergies    Intolerances        REVIEW OF SYSTEMS:  Per HPI, all other ROS noted negative   Vital Signs Last 24 Hrs  T(C): 36.9 (28 Nov 2023 08:20), Max: 37.4 (27 Nov 2023 18:20)  T(F): 98.5 (28 Nov 2023 08:20), Max: 99.4 (27 Nov 2023 18:20)  HR: 105 (28 Nov 2023 08:20) (105 - 138)  BP: 115/77 (28 Nov 2023 08:20) (113/74 - 122/82)  BP(mean): --  RR: 17 (28 Nov 2023 08:20) (17 - 18)  SpO2: 95% (28 Nov 2023 08:20) (95% - 96%)    Parameters below as of 28 Nov 2023 08:20  Patient On (Oxygen Delivery Method): room air        PHYSICAL EXAM:    GENERAL: NAD, alert, able to answer questions   EYES: sclera clear, no exudates  ENMT: dry mucous membranes  LUNGS: cta b/l, no rales wheezing or ronchi  HEART: s1s2, irregularly irregular, tachycardic, no murmur  GASTROINTESTINAL: soft ntnd, no cva tenderness  INTEGUMENT: good skin turgor, warm skin, appears well perfused  NEUROLOGIC: awake, alert, oriented x2 to person and place  HEME/LYMPH: no obvious ecchymosis or petechiae  PSYCH: normal affect  LABS:                        13.1   10.35 )-----------( 394      ( 27 Nov 2023 18:20 )             41.5     27 Nov 2023 18:20    141    |  112    |  82     ----------------------------<  168    5.4     |  19     |  2.30     Ca    9.5        27 Nov 2023 18:20    TPro  7.2    /  Alb  2.4    /  TBili  0.3    /  DBili  x      /  AST  30     /  ALT  56     /  AlkPhos  68     27 Nov 2023 18:20    PT/INR - ( 27 Nov 2023 18:20 )   PT: 14.5 sec;   INR: 1.25 ratio         PTT - ( 27 Nov 2023 18:20 )  PTT:28.3 sec  CAPILLARY BLOOD GLUCOSE      POCT Blood Glucose.: 181 mg/dL (28 Nov 2023 09:03)  POCT Blood Glucose.: 128 mg/dL (27 Nov 2023 22:58)    BLOOD CULTURE    RADIOLOGY & ADDITIONAL TESTS:    Imaging Personally Reviewed:  [ ] YES     Consultant(s) Notes Reviewed:      Care Discussed with Consultants/Other Providers:

## 2023-11-28 NOTE — SWALLOW BEDSIDE ASSESSMENT ADULT - NS SPL SWALLOW CLINIC TRIAL FT
Pt consumed thin liquids via straw (x5oz). Adequate oral containment noted. Oral transit was judged to be timely. Pharyngeal swallow trigger was suspected to timely, Upon palpation, hyolaryngeal excursion was appreciated. No overt s/s of aspiration or penetration noted.

## 2023-11-28 NOTE — CONSULT NOTE ADULT - PROBLEM SELECTOR RECOMMENDATION 9
off load sacral and right buttock areas  alternating pressure mattress  lotrisone, Allevyn foam dressing QD/PRN  bilateral foot booties

## 2023-11-28 NOTE — CARE COORDINATION ASSESSMENT. - NSDCPLANSERVICES_GEN_ALL_CORE
PT evaluation pending. SW/ CM to follow for safe discharge plan and support./Anticipated Needs Unclear at Present

## 2023-11-28 NOTE — CARE COORDINATION ASSESSMENT. - NSCAREPROVIDERS_GEN_ALL_CORE_FT
CARE PROVIDERS:  Accepting Physician: Jasiel Taylor  Administration: Tobi Ornelas  Administration: Johan Shaw  Administration: Saravanan Palomo  Administration: Champ Jenkins  Admitting: Jasiel Taylor  Attending: Kati Boswell  Consultant: Abelardo Richardson  Consultant: Saravanan Oleary  Consultant: Christiano Williamson  Consultant: Sukumar Vaughn  Consultant: Weil, Patricia  Consultant: Andreina Laughlin  Covering Nurse: Dewayne Carlos  ED ACP: Edy Gonsalez  ED Attending: Cielo Noriega  ED Nurse: Andrei Naidu  Nurse: Cecilia Banuelos  Nurse: Cassia Cesar  Ordered: ADM, User  Ordered: Doctor, Unknown  Outpatient Provider: Saravanan Oleary  Outpatient Provider: Harpla Grider  Outpatient Provider: Ashkan Whittaker  Override: Cassia Cesar  PCA/Nursing Assistant: Dot Herrera  PCA/Nursing Assistant: Quique Manriquez  Physical Therapy: Charles Madrigal  Primary Team: Manolo Vega  Primary Team: Kati Boswell  Primary Team: Nicole Hemphill  Respiratory Therapy: Leda Negron  : Elva Wynne  Speech Pathology: Lima Richmond  Speech Pathology: Ayo Palma  Team: NERISSA  Hospitalists, Team  UR// Supp. Assoc.: Monique Kebede  UR// Supp. Assoc.: Phoenix Recinos

## 2023-11-28 NOTE — PROGRESS NOTE ADULT - ASSESSMENT
88yF pmhx CHFrEF (LVEF 20-25% 12/2022), afib on eliquis, T2DM, HTN, HLD, alzheimers dementia, hypothyroidism, chronic sacral and heel ulcers, admitted for acute cystitis. Metabolic encephalopathy likely secondary to infection/metabolic acidosis and YVETTE on CKD.

## 2023-11-28 NOTE — CAREGIVER ENGAGEMENT NOTE - CAREGIVER EDUCATION NOTES - FREE TEXT
DTr expressed interest in possibly meeting with palliative team. She will think about it.  PT evaluation pending.

## 2023-11-28 NOTE — CARE COORDINATION ASSESSMENT. - OTHER PERTINENT REFERRAL INFORMATION
Pt admitted from home where she lives with her dtr. Pt alert and oriented X2 at baseline. Very lethargic at this time. As per dtr pt walks with a walker at home. She has PT/OT that comes in 2x a week. DTR helps with adls.

## 2023-11-28 NOTE — SWALLOW BEDSIDE ASSESSMENT ADULT - SWALLOW EVAL: DIAGNOSIS
Pt presented with mild oral phase dysphagia c/b mildly prolonged mastication though suspected to be attributed to the pt not wearing her lower partial denture. Pharyngeal phase of the swallow was suspected to be functional for soft and bite sized trials with thin liquids. No overt s/s of aspiration or penetration noted.

## 2023-11-28 NOTE — SWALLOW BEDSIDE ASSESSMENT ADULT - ASR SWALLOW RECOMMEND DIAG
Objective testing is not indicated at this time based on clinical performance during dysphagia evaluation. Objective testing is not indicated at this time based on clinical performance during dysphagia evaluation. Will monitor and refer for objective testing if warranted.

## 2023-11-28 NOTE — CHART NOTE - NSCHARTNOTEFT_GEN_A_CORE
RN called patient with heart rate in 120's. Digoxin was held this morning pending level. Level is back and is low.  D/w Cardio Dr. Richardson, will give Digoxin now and metoprolol 5mg IVP X 1 also. Continue to monitor closely. RN called patient with heart rate in 120- 130's. Digoxin was held this morning pending level. Level is back and is low.  D/w Cardio Dr. Richardson, will give Digoxin now and metoprolol 5mg IVP X 1 also. Continue to monitor closely.

## 2023-11-29 LAB
ALBUMIN SERPL ELPH-MCNC: 2.1 G/DL — LOW (ref 3.3–5)
ALBUMIN SERPL ELPH-MCNC: 2.1 G/DL — LOW (ref 3.3–5)
ALP SERPL-CCNC: 67 U/L — SIGNIFICANT CHANGE UP (ref 40–120)
ALP SERPL-CCNC: 67 U/L — SIGNIFICANT CHANGE UP (ref 40–120)
ALT FLD-CCNC: 53 U/L — SIGNIFICANT CHANGE UP (ref 12–78)
ALT FLD-CCNC: 53 U/L — SIGNIFICANT CHANGE UP (ref 12–78)
ANION GAP SERPL CALC-SCNC: 6 MMOL/L — SIGNIFICANT CHANGE UP (ref 5–17)
ANION GAP SERPL CALC-SCNC: 6 MMOL/L — SIGNIFICANT CHANGE UP (ref 5–17)
AST SERPL-CCNC: 31 U/L — SIGNIFICANT CHANGE UP (ref 15–37)
AST SERPL-CCNC: 31 U/L — SIGNIFICANT CHANGE UP (ref 15–37)
BASOPHILS # BLD AUTO: 0.04 K/UL — SIGNIFICANT CHANGE UP (ref 0–0.2)
BASOPHILS # BLD AUTO: 0.04 K/UL — SIGNIFICANT CHANGE UP (ref 0–0.2)
BASOPHILS NFR BLD AUTO: 0.3 % — SIGNIFICANT CHANGE UP (ref 0–2)
BASOPHILS NFR BLD AUTO: 0.3 % — SIGNIFICANT CHANGE UP (ref 0–2)
BILIRUB SERPL-MCNC: 0.2 MG/DL — SIGNIFICANT CHANGE UP (ref 0.2–1.2)
BILIRUB SERPL-MCNC: 0.2 MG/DL — SIGNIFICANT CHANGE UP (ref 0.2–1.2)
BUN SERPL-MCNC: 45 MG/DL — HIGH (ref 7–23)
BUN SERPL-MCNC: 45 MG/DL — HIGH (ref 7–23)
CALCIUM SERPL-MCNC: 8.4 MG/DL — LOW (ref 8.5–10.1)
CALCIUM SERPL-MCNC: 8.4 MG/DL — LOW (ref 8.5–10.1)
CHLORIDE SERPL-SCNC: 112 MMOL/L — HIGH (ref 96–108)
CHLORIDE SERPL-SCNC: 112 MMOL/L — HIGH (ref 96–108)
CO2 SERPL-SCNC: 21 MMOL/L — LOW (ref 22–31)
CO2 SERPL-SCNC: 21 MMOL/L — LOW (ref 22–31)
CREAT SERPL-MCNC: 1.5 MG/DL — HIGH (ref 0.5–1.3)
CREAT SERPL-MCNC: 1.5 MG/DL — HIGH (ref 0.5–1.3)
EGFR: 34 ML/MIN/1.73M2 — LOW
EGFR: 34 ML/MIN/1.73M2 — LOW
EOSINOPHIL # BLD AUTO: 0.17 K/UL — SIGNIFICANT CHANGE UP (ref 0–0.5)
EOSINOPHIL # BLD AUTO: 0.17 K/UL — SIGNIFICANT CHANGE UP (ref 0–0.5)
EOSINOPHIL NFR BLD AUTO: 1.4 % — SIGNIFICANT CHANGE UP (ref 0–6)
EOSINOPHIL NFR BLD AUTO: 1.4 % — SIGNIFICANT CHANGE UP (ref 0–6)
GLUCOSE SERPL-MCNC: 164 MG/DL — HIGH (ref 70–99)
GLUCOSE SERPL-MCNC: 164 MG/DL — HIGH (ref 70–99)
HCT VFR BLD CALC: 39.1 % — SIGNIFICANT CHANGE UP (ref 34.5–45)
HCT VFR BLD CALC: 39.1 % — SIGNIFICANT CHANGE UP (ref 34.5–45)
HGB BLD-MCNC: 12.4 G/DL — SIGNIFICANT CHANGE UP (ref 11.5–15.5)
HGB BLD-MCNC: 12.4 G/DL — SIGNIFICANT CHANGE UP (ref 11.5–15.5)
IMM GRANULOCYTES NFR BLD AUTO: 3.6 % — HIGH (ref 0–0.9)
IMM GRANULOCYTES NFR BLD AUTO: 3.6 % — HIGH (ref 0–0.9)
LYMPHOCYTES # BLD AUTO: 1.88 K/UL — SIGNIFICANT CHANGE UP (ref 1–3.3)
LYMPHOCYTES # BLD AUTO: 1.88 K/UL — SIGNIFICANT CHANGE UP (ref 1–3.3)
LYMPHOCYTES # BLD AUTO: 15.4 % — SIGNIFICANT CHANGE UP (ref 13–44)
LYMPHOCYTES # BLD AUTO: 15.4 % — SIGNIFICANT CHANGE UP (ref 13–44)
MAGNESIUM SERPL-MCNC: 2.6 MG/DL — SIGNIFICANT CHANGE UP (ref 1.6–2.6)
MAGNESIUM SERPL-MCNC: 2.6 MG/DL — SIGNIFICANT CHANGE UP (ref 1.6–2.6)
MCHC RBC-ENTMCNC: 28.6 PG — SIGNIFICANT CHANGE UP (ref 27–34)
MCHC RBC-ENTMCNC: 28.6 PG — SIGNIFICANT CHANGE UP (ref 27–34)
MCHC RBC-ENTMCNC: 31.7 GM/DL — LOW (ref 32–36)
MCHC RBC-ENTMCNC: 31.7 GM/DL — LOW (ref 32–36)
MCV RBC AUTO: 90.1 FL — SIGNIFICANT CHANGE UP (ref 80–100)
MCV RBC AUTO: 90.1 FL — SIGNIFICANT CHANGE UP (ref 80–100)
MONOCYTES # BLD AUTO: 1.1 K/UL — HIGH (ref 0–0.9)
MONOCYTES # BLD AUTO: 1.1 K/UL — HIGH (ref 0–0.9)
MONOCYTES NFR BLD AUTO: 9 % — SIGNIFICANT CHANGE UP (ref 2–14)
MONOCYTES NFR BLD AUTO: 9 % — SIGNIFICANT CHANGE UP (ref 2–14)
NEUTROPHILS # BLD AUTO: 8.6 K/UL — HIGH (ref 1.8–7.4)
NEUTROPHILS # BLD AUTO: 8.6 K/UL — HIGH (ref 1.8–7.4)
NEUTROPHILS NFR BLD AUTO: 70.3 % — SIGNIFICANT CHANGE UP (ref 43–77)
NEUTROPHILS NFR BLD AUTO: 70.3 % — SIGNIFICANT CHANGE UP (ref 43–77)
NRBC # BLD: 0 /100 WBCS — SIGNIFICANT CHANGE UP (ref 0–0)
NRBC # BLD: 0 /100 WBCS — SIGNIFICANT CHANGE UP (ref 0–0)
PHOSPHATE SERPL-MCNC: 2.4 MG/DL — LOW (ref 2.5–4.5)
PHOSPHATE SERPL-MCNC: 2.4 MG/DL — LOW (ref 2.5–4.5)
PLATELET # BLD AUTO: 303 K/UL — SIGNIFICANT CHANGE UP (ref 150–400)
PLATELET # BLD AUTO: 303 K/UL — SIGNIFICANT CHANGE UP (ref 150–400)
POTASSIUM SERPL-MCNC: 4.6 MMOL/L — SIGNIFICANT CHANGE UP (ref 3.5–5.3)
POTASSIUM SERPL-MCNC: 4.6 MMOL/L — SIGNIFICANT CHANGE UP (ref 3.5–5.3)
POTASSIUM SERPL-SCNC: 4.6 MMOL/L — SIGNIFICANT CHANGE UP (ref 3.5–5.3)
POTASSIUM SERPL-SCNC: 4.6 MMOL/L — SIGNIFICANT CHANGE UP (ref 3.5–5.3)
PROT SERPL-MCNC: 6.3 G/DL — SIGNIFICANT CHANGE UP (ref 6–8.3)
PROT SERPL-MCNC: 6.3 G/DL — SIGNIFICANT CHANGE UP (ref 6–8.3)
RBC # BLD: 4.34 M/UL — SIGNIFICANT CHANGE UP (ref 3.8–5.2)
RBC # BLD: 4.34 M/UL — SIGNIFICANT CHANGE UP (ref 3.8–5.2)
RBC # FLD: 18.4 % — HIGH (ref 10.3–14.5)
RBC # FLD: 18.4 % — HIGH (ref 10.3–14.5)
SODIUM SERPL-SCNC: 139 MMOL/L — SIGNIFICANT CHANGE UP (ref 135–145)
SODIUM SERPL-SCNC: 139 MMOL/L — SIGNIFICANT CHANGE UP (ref 135–145)
WBC # BLD: 12.23 K/UL — HIGH (ref 3.8–10.5)
WBC # BLD: 12.23 K/UL — HIGH (ref 3.8–10.5)
WBC # FLD AUTO: 12.23 K/UL — HIGH (ref 3.8–10.5)
WBC # FLD AUTO: 12.23 K/UL — HIGH (ref 3.8–10.5)

## 2023-11-29 PROCEDURE — 99232 SBSQ HOSP IP/OBS MODERATE 35: CPT

## 2023-11-29 RX ORDER — LIDOCAINE 4 G/100G
1 CREAM TOPICAL
Refills: 0 | Status: COMPLETED | OUTPATIENT
Start: 2023-11-29 | End: 2023-12-01

## 2023-11-29 RX ADMIN — Medication 250 MILLIGRAM(S): at 17:30

## 2023-11-29 RX ADMIN — MEMANTINE HYDROCHLORIDE 10 MILLIGRAM(S): 10 TABLET ORAL at 06:53

## 2023-11-29 RX ADMIN — Medication 100 MILLIGRAM(S): at 06:53

## 2023-11-29 RX ADMIN — LIDOCAINE 1 APPLICATION(S): 4 CREAM TOPICAL at 13:45

## 2023-11-29 RX ADMIN — Medication 5 MILLIGRAM(S): at 21:36

## 2023-11-29 RX ADMIN — CEFTRIAXONE 100 MILLIGRAM(S): 500 INJECTION, POWDER, FOR SOLUTION INTRAMUSCULAR; INTRAVENOUS at 22:33

## 2023-11-29 RX ADMIN — Medication 25 GRAM(S): at 02:08

## 2023-11-29 RX ADMIN — MEMANTINE HYDROCHLORIDE 10 MILLIGRAM(S): 10 TABLET ORAL at 17:30

## 2023-11-29 RX ADMIN — Medication 75 MICROGRAM(S): at 06:53

## 2023-11-29 RX ADMIN — Medication 250 MILLIGRAM(S): at 06:53

## 2023-11-29 RX ADMIN — CLOTRIMAZOLE AND BETAMETHASONE DIPROPIONATE 1 APPLICATION(S): 10; .5 CREAM TOPICAL at 12:05

## 2023-11-29 RX ADMIN — APIXABAN 2.5 MILLIGRAM(S): 2.5 TABLET, FILM COATED ORAL at 17:31

## 2023-11-29 RX ADMIN — APIXABAN 2.5 MILLIGRAM(S): 2.5 TABLET, FILM COATED ORAL at 06:53

## 2023-11-29 RX ADMIN — Medication 2: at 12:05

## 2023-11-29 RX ADMIN — Medication 2000 UNIT(S): at 12:02

## 2023-11-29 RX ADMIN — Medication 100 MILLIGRAM(S): at 17:30

## 2023-11-29 NOTE — PHYSICAL THERAPY INITIAL EVALUATION ADULT - ADDITIONAL COMMENTS
Patient lives with daughter in private home, +JASSON then resides on main floor. Patient requires assistance for all ADLs and ambulates with RW with constant supervision and assistance

## 2023-11-29 NOTE — PROGRESS NOTE ADULT - SUBJECTIVE AND OBJECTIVE BOX
[facility] -- Dylan Hernandez Pannella, Patel, Savella Goodger, Cohen  Office # [phone]      Follow Up:    tachycardia   Subjective/Observations:       REVIEW OF SYSTEMS: All other review of systems is negative unless indicated above    PAST MEDICAL & SURGICAL HISTORY:  Atrial fibrillation      Hypothyroidism      Mild Alzheimer's dementia      Diabetes mellitus      Acute on chronic systolic congestive heart failure      Hypertension      Hyperlipemia      Cardiac LV ejection fraction 21-30%        History of appendectomy      H/O hernia repair      History of cholecystectomy          MEDICATIONS  (STANDING):  apixaban 2.5 milliGRAM(s) Oral two times a day  cefTRIAXone   IVPB 1000 milliGRAM(s) IV Intermittent every 24 hours  cholecalciferol 2000 Unit(s) Oral daily  clotrimazole/betamethasone Cream 1 Application(s) Topical daily  dextrose 5%. 1000 milliLiter(s) (100 mL/Hr) IV Continuous <Continuous>  dextrose 5%. 1000 milliLiter(s) (50 mL/Hr) IV Continuous <Continuous>  dextrose 50% Injectable 12.5 Gram(s) IV Push once  dextrose 50% Injectable 25 Gram(s) IV Push once  dextrose 50% Injectable 25 Gram(s) IV Push once  digoxin     Tablet 125 MICROGram(s) Oral every other day  glucagon  Injectable 1 milliGRAM(s) IntraMuscular once  insulin lispro (ADMELOG) corrective regimen sliding scale   SubCutaneous three times a day before meals  insulin lispro (ADMELOG) corrective regimen sliding scale   SubCutaneous at bedtime  levothyroxine 75 MICROGram(s) Oral daily  melatonin 5 milliGRAM(s) Oral at bedtime  memantine 10 milliGRAM(s) Oral two times a day  metoprolol succinate  milliGRAM(s) Oral two times a day  saccharomyces boulardii 250 milliGRAM(s) Oral two times a day  sodium chloride 0.45%. 1000 milliLiter(s) (50 mL/Hr) IV Continuous <Continuous>    MEDICATIONS  (PRN):  acetaminophen     Tablet .. 650 milliGRAM(s) Oral every 6 hours PRN Temp greater or equal to 38C (100.4F), Mild Pain (1 - 3)  aluminum hydroxide/magnesium hydroxide/simethicone Suspension 30 milliLiter(s) Oral every 4 hours PRN Dyspepsia  dextrose Oral Gel 15 Gram(s) Oral once PRN Blood Glucose LESS THAN 70 milliGRAM(s)/deciliter  ondansetron Injectable 4 milliGRAM(s) IV Push every 8 hours PRN Nausea and/or Vomiting  oxyCODONE    IR 5 milliGRAM(s) Oral every 4 hours PRN Severe Pain (7 - 10)  oxyCODONE    IR 2.5 milliGRAM(s) Oral every 4 hours PRN Moderate Pain (4 - 6)      Allergies    No Known Allergies    Intolerances        Vital Signs Last 24 Hrs  T(C): 36.3 (2023 04:19), Max: 36.9 (2023 08:20)  T(F): 97.4 (2023 04:19), Max: 98.5 (2023 08:20)  HR: 97 (:25) (96 - 115)  BP: 136/87 (:25) (112/73 - 136/87)  BP(mean): --  RR: 18 (:25) (17 - 19)  SpO2: 96% (:25) (95% - 99%)    Parameters below as of 2023 06:25  Patient On (Oxygen Delivery Method): room air        I&O's Summary    2023 07:01  -  2023 07:00  --------------------------------------------------------  IN: 200 mL / OUT: 1200 mL / NET: -1000 mL          PHYSICAL EXAM:  TELE:   Constitutional: NAD, awake and alert, well-developed  HEENT: Moist Mucous Membranes, Anicteric  Pulmonary: Non-labored, breath sounds are clear bilaterally, No wheezing, crackles or rhonchi  Cardiovascular: Regular, S1 and S2 nl, No murmurs, rubs, gallops or clicks  Gastrointestinal: Bowel Sounds present, soft, nontender.   Lymph: No lymphadenopathy. No peripheral edema.  Skin: No visible rashes or ulcers.  Psych:  Mood & affect appropriate    LABS: All Labs Reviewed:                        11.7   10.69 )-----------( 337      ( 2023 13:22 )             36.2                         13.1   10.35 )-----------( 394      ( 2023 18:20 )             41.5     2023 13:22    142    |  115    |  59     ----------------------------<  105    4.4     |  19     |  1.80   2023 18:20    141    |  112    |  82     ----------------------------<  168    5.4     |  19     |  2.30     Ca    8.3        2023 13:22  Ca    9.5        2023 18:20  Phos  2.6       2023 13:22  Mg     1.5       2023 13:22    TPro  6.0    /  Alb  2.0    /  TBili  0.3    /  DBili  x      /  AST  33     /  ALT  53     /  AlkPhos  59     2023 13:22  TPro  7.2    /  Alb  2.4    /  TBili  0.3    /  DBili  x      /  AST  30     /  ALT  56     /  AlkPhos  68     2023 18:20    PT/INR - ( 2023 18:20 )   PT: 14.5 sec;   INR: 1.25 ratio         PTT - ( 2023 18:20 )  PTT:28.3 sec         EC Lead ECG:   Ventricular Rate 119 BPM    QRS Duration 84 ms    Q-T Interval 282 ms    QTC Calculation(Bazett) 396 ms    R Axis 44 degrees    T Axis 244 degrees    Diagnosis Line Atrial fibrillation with rapid ventricular response  Possible Anterior infarct (cited on or before 2023)  ST & T wave abnormality, consider lateral ischemia  Abnormal ECG  When compared with ECG of 2023 18:39, (Unconfirmed)  No significant change was found  Confirmed by Abelardo Richardson MD ([id number]) on 2023 1:31:09 PM (23 @ 18:40)      ACC: [id number] EXAM:  ECHO TTE WO CON COMP W DOPP                          PROCEDURE DATE:  2022          INTERPRETATION:  INDICATION: Abnormal EKG  Sonographer KL    Blood Pressure 142/86    Height 158 cm     Weight 51.7 kg       BSA 1.5   sqm    Dimensions:  LA 3.6       Normal Values: 2.0 - 4.0 cm  Ao 3.2        Normal Values: 2.0 - 3.8 cm  SEPTUM 1.0       Normal Values: 0.6 - 1.2 cm  PWT 0.9       Normal Values: 0.6 - 1.1 cm  LVIDd 5.8         Normal Values: 3.0 - 5.6 cm  LVIDs 4.3      Normal Values: 1.8 - 4.0 cm      OBSERVATIONS:  Mitral Valve: Moderate MR.  Aortic Valve/Aorta: normal trileaflet aortic valve.  Tricuspid Valve: Mild TR.  Pulmonic Valve: Mild PI  Left Atrium: Enlarged  Right Atrium: Enlarged  Left Ventricle: Left ventricular enlargement with severe left ventricular   systolic dysfunction, estimated LVEF of 20-25%. The entire apex and   anterior walls appear akinetic. The inferior and inferolateral walls   appear hypokinetic. Remaining walls are not well-visualized  Right Ventricle: Grossly normal size and systolic function.  Pericardium: no significant pericardial effusion.  Pulmonary/RV Pressure: estimated PA systolic pressure of at least 35 mmHg   assuming an RA pressure of 3mmHg.        IMPRESSION:  Left ventricular enlargement with severe left ventricular systolic   dysfunction, estimated LVEF of 20-25%. The entire apex and anterior walls   appear akinetic. The inferior and inferolateral walls appear hypokinetic.  Grossly normal RV size and systolic function.  Biatrial enlargement  Normal trileaflet aortic valve, without AI.  Moderate MR  Mild TR.  No significant pericardial effusion.    --- End of Report ---            JOHN BAEZA MD; Attending Cardiologist  This document has been electronically signed. Dec 23 2022  4:40PM      Radiology:         [facility] -- Dyaln Hernandez Pannella, Patel, Savella Goodger, Cohen  Office # [phone]      Follow Up:    tachycardia   Subjective/Observations:   Seen earlier in no acute distress, remains on room air   unable to provide any meaningful information     REVIEW OF SYSTEMS: All other review of systems is negative unless indicated above    PAST MEDICAL & SURGICAL HISTORY:  Atrial fibrillation      Hypothyroidism      Mild Alzheimer's dementia      Diabetes mellitus      Acute on chronic systolic congestive heart failure      Hypertension      Hyperlipemia      Cardiac LV ejection fraction 21-30%        History of appendectomy      H/O hernia repair      History of cholecystectomy          MEDICATIONS  (STANDING):  apixaban 2.5 milliGRAM(s) Oral two times a day  cefTRIAXone   IVPB 1000 milliGRAM(s) IV Intermittent every 24 hours  cholecalciferol 2000 Unit(s) Oral daily  clotrimazole/betamethasone Cream 1 Application(s) Topical daily  dextrose 5%. 1000 milliLiter(s) (100 mL/Hr) IV Continuous <Continuous>  dextrose 5%. 1000 milliLiter(s) (50 mL/Hr) IV Continuous <Continuous>  dextrose 50% Injectable 12.5 Gram(s) IV Push once  dextrose 50% Injectable 25 Gram(s) IV Push once  dextrose 50% Injectable 25 Gram(s) IV Push once  digoxin     Tablet 125 MICROGram(s) Oral every other day  glucagon  Injectable 1 milliGRAM(s) IntraMuscular once  insulin lispro (ADMELOG) corrective regimen sliding scale   SubCutaneous three times a day before meals  insulin lispro (ADMELOG) corrective regimen sliding scale   SubCutaneous at bedtime  levothyroxine 75 MICROGram(s) Oral daily  melatonin 5 milliGRAM(s) Oral at bedtime  memantine 10 milliGRAM(s) Oral two times a day  metoprolol succinate  milliGRAM(s) Oral two times a day  saccharomyces boulardii 250 milliGRAM(s) Oral two times a day  sodium chloride 0.45%. 1000 milliLiter(s) (50 mL/Hr) IV Continuous <Continuous>    MEDICATIONS  (PRN):  acetaminophen     Tablet .. 650 milliGRAM(s) Oral every 6 hours PRN Temp greater or equal to 38C (100.4F), Mild Pain (1 - 3)  aluminum hydroxide/magnesium hydroxide/simethicone Suspension 30 milliLiter(s) Oral every 4 hours PRN Dyspepsia  dextrose Oral Gel 15 Gram(s) Oral once PRN Blood Glucose LESS THAN 70 milliGRAM(s)/deciliter  ondansetron Injectable 4 milliGRAM(s) IV Push every 8 hours PRN Nausea and/or Vomiting  oxyCODONE    IR 5 milliGRAM(s) Oral every 4 hours PRN Severe Pain (7 - 10)  oxyCODONE    IR 2.5 milliGRAM(s) Oral every 4 hours PRN Moderate Pain (4 - 6)      Allergies    No Known Allergies    Intolerances        Vital Signs Last 24 Hrs  T(C): 36.3 (2023 04:19), Max: 36.9 (2023 08:20)  T(F): 97.4 (2023 04:19), Max: 98.5 (2023 08:20)  HR: 97 (2023 06:25) (96 - 115)  BP: 136/87 (2023 06:25) (112/73 - 136/87)  BP(mean): --  RR: 18 (2023 06:25) (17 - 19)  SpO2: 96% (2023 06:25) (95% - 99%)    Parameters below as of 2023 06:25  Patient On (Oxygen Delivery Method): room air        I&O's Summary    2023 07:01  -  2023 07:00  --------------------------------------------------------  IN: 200 mL / OUT: 1200 mL / NET: -1000 mL          PHYSICAL EXAM:  TELE: af  Constitutional: NAD,frail  HEENT: Moist Mucous Membranes, Anicteric  Pulmonary: Non-labored, breath sounds are clear bilaterally, No wheezing, crackles or rhonchi  Cardiovascular: IRRegular, S1 and S2 nl, No murmurs, rubs, gallops or clicks  Gastrointestinal: Bowel Sounds present, soft, nontender.   Lymph: No lymphadenopathy. No peripheral edema.  Skin: No visible rashes or ulcers.      LABS: All Labs Reviewed:                        11.7   10.69 )-----------( 337      ( 2023 13:22 )             36.2                         13.1   10.35 )-----------( 394      ( 2023 18:20 )             41.5     2023 13:22    142    |  115    |  59     ----------------------------<  105    4.4     |  19     |  1.80   2023 18:20    141    |  112    |  82     ----------------------------<  168    5.4     |  19     |  2.30     Ca    8.3        2023 13:22  Ca    9.5        2023 18:20  Phos  2.6       2023 13:22  Mg     1.5       2023 13:22    TPro  6.0    /  Alb  2.0    /  TBili  0.3    /  DBili  x      /  AST  33     /  ALT  53     /  AlkPhos  59     2023 13:22  TPro  7.2    /  Alb  2.4    /  TBili  0.3    /  DBili  x      /  AST  30     /  ALT  56     /  AlkPhos  68     2023 18:20    PT/INR - ( 2023 18:20 )   PT: 14.5 sec;   INR: 1.25 ratio         PTT - ( 2023 18:20 )  PTT:28.3 sec         EC Lead ECG:   Ventricular Rate 119 BPM    QRS Duration 84 ms    Q-T Interval 282 ms    QTC Calculation(Bazett) 396 ms    R Axis 44 degrees    T Axis 244 degrees    Diagnosis Line Atrial fibrillation with rapid ventricular response  Possible Anterior infarct (cited on or before 2023)  ST & T wave abnormality, consider lateral ischemia  Abnormal ECG  When compared with ECG of 2023 18:39, (Unconfirmed)  No significant change was found  Confirmed by Abelardo Richardson MD ([id number]) on 2023 1:31:09 PM (23 @ 18:40)      ACC: [id number] EXAM:  ECHO TTE WO CON COMP W DOPP                          PROCEDURE DATE:  2022          INTERPRETATION:  INDICATION: Abnormal EKG  Sonographer KL    Blood Pressure 142/86    Height 158 cm     Weight 51.7 kg       BSA 1.5   sqm    Dimensions:  LA 3.6       Normal Values: 2.0 - 4.0 cm  Ao 3.2        Normal Values: 2.0 - 3.8 cm  SEPTUM 1.0       Normal Values: 0.6 - 1.2 cm  PWT 0.9       Normal Values: 0.6 - 1.1 cm  LVIDd 5.8         Normal Values: 3.0 - 5.6 cm  LVIDs 4.3      Normal Values: 1.8 - 4.0 cm      OBSERVATIONS:  Mitral Valve: Moderate MR.  Aortic Valve/Aorta: normal trileaflet aortic valve.  Tricuspid Valve: Mild TR.  Pulmonic Valve: Mild PI  Left Atrium: Enlarged  Right Atrium: Enlarged  Left Ventricle: Left ventricular enlargement with severe left ventricular   systolic dysfunction, estimated LVEF of 20-25%. The entire apex and   anterior walls appear akinetic. The inferior and inferolateral walls   appear hypokinetic. Remaining walls are not well-visualized  Right Ventricle: Grossly normal size and systolic function.  Pericardium: no significant pericardial effusion.  Pulmonary/RV Pressure: estimated PA systolic pressure of at least 35 mmHg   assuming an RA pressure of 3mmHg.        IMPRESSION:  Left ventricular enlargement with severe left ventricular systolic   dysfunction, estimated LVEF of 20-25%. The entire apex and anterior walls   appear akinetic. The inferior and inferolateral walls appear hypokinetic.  Grossly normal RV size and systolic function.  Biatrial enlargement  Normal trileaflet aortic valve, without AI.  Moderate MR  Mild TR.  No significant pericardial effusion.    --- End of Report ---            JOHN BAEZA MD; Attending Cardiologist  This document has been electronically signed. Dec 23 2022  4:40PM      Radiology:         [facility] -- Dylan Hernandez Pannella, Patel, Savella Goodger, Cohen  Office # [phone]      Follow Up:    tachycardia   Subjective/Observations:   Seen earlier in no acute distress, remains on room air   unable to provide any meaningful information     REVIEW OF SYSTEMS: All other review of systems is negative unless indicated above    PAST MEDICAL & SURGICAL HISTORY:  Atrial fibrillation      Hypothyroidism      Mild Alzheimer's dementia      Diabetes mellitus      Acute on chronic systolic congestive heart failure      Hypertension      Hyperlipemia      Cardiac LV ejection fraction 21-30%        History of appendectomy      H/O hernia repair      History of cholecystectomy          MEDICATIONS  (STANDING):  apixaban 2.5 milliGRAM(s) Oral two times a day  cefTRIAXone   IVPB 1000 milliGRAM(s) IV Intermittent every 24 hours  cholecalciferol 2000 Unit(s) Oral daily  clotrimazole/betamethasone Cream 1 Application(s) Topical daily  dextrose 5%. 1000 milliLiter(s) (100 mL/Hr) IV Continuous <Continuous>  dextrose 5%. 1000 milliLiter(s) (50 mL/Hr) IV Continuous <Continuous>  dextrose 50% Injectable 12.5 Gram(s) IV Push once  dextrose 50% Injectable 25 Gram(s) IV Push once  dextrose 50% Injectable 25 Gram(s) IV Push once  digoxin     Tablet 125 MICROGram(s) Oral every other day  glucagon  Injectable 1 milliGRAM(s) IntraMuscular once  insulin lispro (ADMELOG) corrective regimen sliding scale   SubCutaneous three times a day before meals  insulin lispro (ADMELOG) corrective regimen sliding scale   SubCutaneous at bedtime  levothyroxine 75 MICROGram(s) Oral daily  melatonin 5 milliGRAM(s) Oral at bedtime  memantine 10 milliGRAM(s) Oral two times a day  metoprolol succinate  milliGRAM(s) Oral two times a day  saccharomyces boulardii 250 milliGRAM(s) Oral two times a day  sodium chloride 0.45%. 1000 milliLiter(s) (50 mL/Hr) IV Continuous <Continuous>    MEDICATIONS  (PRN):  acetaminophen     Tablet .. 650 milliGRAM(s) Oral every 6 hours PRN Temp greater or equal to 38C (100.4F), Mild Pain (1 - 3)  aluminum hydroxide/magnesium hydroxide/simethicone Suspension 30 milliLiter(s) Oral every 4 hours PRN Dyspepsia  dextrose Oral Gel 15 Gram(s) Oral once PRN Blood Glucose LESS THAN 70 milliGRAM(s)/deciliter  ondansetron Injectable 4 milliGRAM(s) IV Push every 8 hours PRN Nausea and/or Vomiting  oxyCODONE    IR 5 milliGRAM(s) Oral every 4 hours PRN Severe Pain (7 - 10)  oxyCODONE    IR 2.5 milliGRAM(s) Oral every 4 hours PRN Moderate Pain (4 - 6)      Allergies    No Known Allergies    Intolerances        Vital Signs Last 24 Hrs  T(C): 36.3 (2023 04:19), Max: 36.9 (2023 08:20)  T(F): 97.4 (2023 04:19), Max: 98.5 (2023 08:20)  HR: 97 (2023 06:25) (96 - 115)  BP: 136/87 (2023 06:25) (112/73 - 136/87)  BP(mean): --  RR: 18 (2023 06:25) (17 - 19)  SpO2: 96% (2023 06:25) (95% - 99%)    Parameters below as of 2023 06:25  Patient On (Oxygen Delivery Method): room air        I&O's Summary    2023 07:01  -  2023 07:00  --------------------------------------------------------  IN: 200 mL / OUT: 1200 mL / NET: -1000 mL          PHYSICAL EXAM:  TELE: af  Constitutional: NAD,frail  HEENT: Moist Mucous Membranes, Anicteric  Pulmonary: Non-labored, breath sounds are clear bilaterally, No wheezing, crackles or rhonchi  Cardiovascular: IRRegular, S1 and S2 nl, No murmurs, rubs, gallops or clicks  Gastrointestinal: Bowel Sounds present, soft, nontender.   Lymph: No lymphadenopathy. No peripheral edema.  Skin: No visible rashes or ulcers.        LABS: All Labs Reviewed:                        11.7   10.69 )-----------( 337      ( 2023 13:22 )             36.2                         13.1   10.35 )-----------( 394      ( 2023 18:20 )             41.5     2023 13:22    142    |  115    |  59     ----------------------------<  105    4.4     |  19     |  1.80   2023 18:20    141    |  112    |  82     ----------------------------<  168    5.4     |  19     |  2.30     Ca    8.3        2023 13:22  Ca    9.5        2023 18:20  Phos  2.6       2023 13:22  Mg     1.5       2023 13:22    TPro  6.0    /  Alb  2.0    /  TBili  0.3    /  DBili  x      /  AST  33     /  ALT  53     /  AlkPhos  59     2023 13:22  TPro  7.2    /  Alb  2.4    /  TBili  0.3    /  DBili  x      /  AST  30     /  ALT  56     /  AlkPhos  68     2023 18:20    PT/INR - ( 2023 18:20 )   PT: 14.5 sec;   INR: 1.25 ratio         PTT - ( 2023 18:20 )  PTT:28.3 sec         EC Lead ECG:   Ventricular Rate 119 BPM    QRS Duration 84 ms    Q-T Interval 282 ms    QTC Calculation(Bazett) 396 ms    R Axis 44 degrees    T Axis 244 degrees    Diagnosis Line Atrial fibrillation with rapid ventricular response  Possible Anterior infarct (cited on or before 2023)  ST & T wave abnormality, consider lateral ischemia  Abnormal ECG  When compared with ECG of 2023 18:39, (Unconfirmed)  No significant change was found  Confirmed by Abelardo Richardson MD ([id number]) on 2023 1:31:09 PM (23 @ 18:40)      ACC: [id number] EXAM:  ECHO TTE WO CON COMP W DOPP                          PROCEDURE DATE:  2022          INTERPRETATION:  INDICATION: Abnormal EKG  Sonographer KL    Blood Pressure 142/86    Height 158 cm     Weight 51.7 kg       BSA 1.5   sqm    Dimensions:  LA 3.6       Normal Values: 2.0 - 4.0 cm  Ao 3.2        Normal Values: 2.0 - 3.8 cm  SEPTUM 1.0       Normal Values: 0.6 - 1.2 cm  PWT 0.9       Normal Values: 0.6 - 1.1 cm  LVIDd 5.8         Normal Values: 3.0 - 5.6 cm  LVIDs 4.3      Normal Values: 1.8 - 4.0 cm      OBSERVATIONS:  Mitral Valve: Moderate MR.  Aortic Valve/Aorta: normal trileaflet aortic valve.  Tricuspid Valve: Mild TR.  Pulmonic Valve: Mild PI  Left Atrium: Enlarged  Right Atrium: Enlarged  Left Ventricle: Left ventricular enlargement with severe left ventricular   systolic dysfunction, estimated LVEF of 20-25%. The entire apex and   anterior walls appear akinetic. The inferior and inferolateral walls   appear hypokinetic. Remaining walls are not well-visualized  Right Ventricle: Grossly normal size and systolic function.  Pericardium: no significant pericardial effusion.  Pulmonary/RV Pressure: estimated PA systolic pressure of at least 35 mmHg   assuming an RA pressure of 3mmHg.        IMPRESSION:  Left ventricular enlargement with severe left ventricular systolic   dysfunction, estimated LVEF of 20-25%. The entire apex and anterior walls   appear akinetic. The inferior and inferolateral walls appear hypokinetic.  Grossly normal RV size and systolic function.  Biatrial enlargement  Normal trileaflet aortic valve, without AI.  Moderate MR  Mild TR.  No significant pericardial effusion.    --- End of Report ---            JOHN BAEZA MD; Attending Cardiologist  This document has been electronically signed. Dec 23 2022  4:40PM      Radiology:

## 2023-11-29 NOTE — PHYSICAL THERAPY INITIAL EVALUATION ADULT - GAIT TRAINING, PT EVAL
Patient will ambulate x 50 feet with RW with min A x 1 in 2 weeks in order to increase mobility at home

## 2023-11-29 NOTE — PROGRESS NOTE ADULT - ASSESSMENT
88yF with a PMH of CHFrEF (LVEF 20-25% 2022), afib on eliquis, T2DM, HTN, HLD, alzheimers dementia, hypothyroidism, chronic sacral and heel ulcers, admitted for acute cystitis, metabolic encephalopathy likely secondary to infection/metabolic acidosis and YVETTE on CKD. Per patients daughters, she has not had any recent complaints of chest pain, palpitations or increased SOB. Patient follows with Cardio Dr. Grider outpatient and has a long standing hx of afib. She is rate controlled on her current home meds, and baseline HR is usually <100bpm.       - No clear evidence of acute ischemia  - Troponin negative   - EKbpm, afib w/ rvr, ST depressions in leads 2, avf, V4-V6 with early repolarization  - TTE (2022): EF 20-25%, apex and anterior wall akinesis, inferior wall hypokinesis, mod MR, mild TR  - Presumed Hx of CAD: LV dysfunction on TTE likely from a missed MI --> Given her frailty, increased creatinine, decision was made by family and outpatioent Cardio to manage her medically   - Continue to monitor for signs or symptoms of ischemia     ia  - EKbpm, afib w/ rvr, ST depressions in leads 2, avf, V4-V6 with early repolarization  -dig level 0.5 ,. was resumed yesterday   - Continue Metoprolol daily   - Continue Eliquis daily    - Monitor and replete lytes, keep K>4, Mg>2.    -Bp stable   - Continue home Metoprolol  - Hold home Losartan in the setting of YVETTE   - Continue to monitor hemodynamics     - TTE (2022): EF 20-25%, apex and anterior wall akinesis, inferior wall hypokinesis, mod MR, mild TR  - Hold home Lasix in the setting of YVETTE, FU AM labs pending   - reassess volume status daily   - Strict I/Os, daily weights    - Other cardiovascular workup will depend on clinical course.  - All other workup per primary team.  - Will continue to follow.     88yF with a PMH of CHFrEF (LVEF 20-25% 2022), afib on eliquis, T2DM, HTN, HLD, alzheimers dementia, hypothyroidism, chronic sacral and heel ulcers, admitted for acute cystitis, metabolic encephalopathy likely secondary to infection/metabolic acidosis and YVETTE on CKD. Per patients daughters, she has not had any recent complaints of chest pain, palpitations or increased SOB. Patient follows with Cardio Dr. Grider outpatient and has a long standing hx of afib. She is rate controlled on her current home meds, and baseline HR is usually <100bpm.       - No clear evidence of acute ischemia  - Troponin negative   - EKbpm, afib w/ rvr, ST depressions in leads 2, avf, V4-V6 with early repolarization  - TTE (2022): EF 20-25%, apex and anterior wall akinesis, inferior wall hypokinesis, mod MR, mild TR  - Presumed Hx of CAD: LV dysfunction on TTE likely from a missed MI --> Given her frailty, increased creatinine, decision was made by family and outpatioent Cardio to manage her medically   - Continue to monitor for signs or symptoms of ischemia     -tele afib 80's   -dig level 0.5 ,. was resumed yesterday   - Continue Metoprolol daily   - Continue Eliquis daily    - Monitor and replete lytes, keep K>4, Mg>2.    -Bp stable   - Continue home Metoprolol  - Hold home Losartan in the setting of YVETTE   - Continue to monitor hemodynamics     - TTE (2022): EF 20-25%, apex and anterior wall akinesis, inferior wall hypokinesis, mod MR, mild TR  - Hold home Lasix in the setting of YVETTE,   - reassess volume status daily   - Strict I/Os, daily weights    - Other cardiovascular workup will depend on clinical course.  - All other workup per primary team.  - Will continue to follow.

## 2023-11-29 NOTE — PROGRESS NOTE ADULT - SUBJECTIVE AND OBJECTIVE BOX
Patient is a 88y old  Female who presents with a chief complaint of UTI/pyelo (29 Nov 2023 10:02)    Patient seen in follow up for YVETTE.        PAST MEDICAL HISTORY:  Atrial fibrillation    Hypothyroidism    Mild Alzheimer's dementia    Diabetes mellitus    Acute on chronic systolic congestive heart failure    Hypertension    Hyperlipemia    Cardiac LV ejection fraction 21-30%      MEDICATIONS  (STANDING):  apixaban 2.5 milliGRAM(s) Oral two times a day  cefTRIAXone   IVPB 1000 milliGRAM(s) IV Intermittent every 24 hours  cholecalciferol 2000 Unit(s) Oral daily  clotrimazole/betamethasone Cream 1 Application(s) Topical daily  dextrose 5%. 1000 milliLiter(s) (100 mL/Hr) IV Continuous <Continuous>  dextrose 5%. 1000 milliLiter(s) (50 mL/Hr) IV Continuous <Continuous>  dextrose 50% Injectable 25 Gram(s) IV Push once  dextrose 50% Injectable 25 Gram(s) IV Push once  dextrose 50% Injectable 12.5 Gram(s) IV Push once  digoxin     Tablet 125 MICROGram(s) Oral every other day  glucagon  Injectable 1 milliGRAM(s) IntraMuscular once  insulin lispro (ADMELOG) corrective regimen sliding scale   SubCutaneous three times a day before meals  insulin lispro (ADMELOG) corrective regimen sliding scale   SubCutaneous at bedtime  levothyroxine 75 MICROGram(s) Oral daily  melatonin 5 milliGRAM(s) Oral at bedtime  memantine 10 milliGRAM(s) Oral two times a day  metoprolol succinate  milliGRAM(s) Oral two times a day  saccharomyces boulardii 250 milliGRAM(s) Oral two times a day  sodium chloride 0.45%. 1000 milliLiter(s) (50 mL/Hr) IV Continuous <Continuous>    MEDICATIONS  (PRN):  acetaminophen     Tablet .. 650 milliGRAM(s) Oral every 6 hours PRN Temp greater or equal to 38C (100.4F), Mild Pain (1 - 3)  aluminum hydroxide/magnesium hydroxide/simethicone Suspension 30 milliLiter(s) Oral every 4 hours PRN Dyspepsia  dextrose Oral Gel 15 Gram(s) Oral once PRN Blood Glucose LESS THAN 70 milliGRAM(s)/deciliter  ondansetron Injectable 4 milliGRAM(s) IV Push every 8 hours PRN Nausea and/or Vomiting  oxyCODONE    IR 2.5 milliGRAM(s) Oral every 4 hours PRN Moderate Pain (4 - 6)  oxyCODONE    IR 5 milliGRAM(s) Oral every 4 hours PRN Severe Pain (7 - 10)    T(C): 36.3 (11-29-23 @ 04:19), Max: 36.9 (11-28-23 @ 08:20)  HR: 97 (11-29-23 @ 06:25) (96 - 115)  BP: 136/87 (11-29-23 @ 06:25) (112/73 - 136/87)  RR: 18 (11-29-23 @ 06:25) (17 - 19)  SpO2: 96% (11-29-23 @ 06:25) (95% - 99%)  Wt(kg): --  I&O's Detail    28 Nov 2023 07:01  -  29 Nov 2023 07:00  --------------------------------------------------------  IN:    sodium chloride 0.45%: 350 mL  Total IN: 350 mL    OUT:    Indwelling Catheter - Urethral (mL): 1200 mL  Total OUT: 1200 mL    Total NET: -850 mL          PHYSICAL EXAM:  General: No distress  Respiratory: b/l air entry  Cardiovascular: S1 S2  Gastrointestinal: soft  Extremities:  no edema                              12.4   12.23 )-----------( 303      ( 29 Nov 2023 07:47 )             39.1     11-29    139  |  112<H>  |  45<H>  ----------------------------<  164<H>  4.6   |  21<L>  |  1.50<H>    Ca    8.4<L>      29 Nov 2023 07:47  Phos  2.4     11-29  Mg     2.6     11-29    TPro  6.3  /  Alb  2.1<L>  /  TBili  0.2  /  DBili  x   /  AST  31  /  ALT  53  /  AlkPhos  67  11-29        LIVER FUNCTIONS - ( 29 Nov 2023 07:47 )  Alb: 2.1 g/dL / Pro: 6.3 g/dL / ALK PHOS: 67 U/L / ALT: 53 U/L / AST: 31 U/L / GGT: x           Urinalysis Basic - ( 29 Nov 2023 07:47 )    Color: x / Appearance: x / SG: x / pH: x  Gluc: 164 mg/dL / Ketone: x  / Bili: x / Urobili: x   Blood: x / Protein: x / Nitrite: x   Leuk Esterase: x / RBC: x / WBC x   Sq Epi: x / Non Sq Epi: x / Bacteria: x      ABG - ( 28 Nov 2023 06:55 )  pH, Arterial: 7.37  pH, Blood: x     /  pCO2: 23    /  pO2: 122   / HCO3: 13    / Base Excess: -12.0 /  SaO2: 99.8              Sodium, Serum: 139 (11-29 @ 07:47)  Sodium, Serum: 142 (11-28 @ 13:22)  Sodium, Serum: 141 (11-27 @ 18:20)    Creatinine, Serum: 1.50 (11-29 @ 07:47)  Creatinine, Serum: 1.80 (11-28 @ 13:22)  Creatinine, Serum: 2.30 (11-27 @ 18:20)    Potassium, Serum: 4.6 (11-29 @ 07:47)  Potassium, Serum: 4.4 (11-28 @ 13:22)  Potassium, Serum: 5.4 (11-27 @ 18:20)    Hemoglobin: 12.4 (11-29 @ 07:47)  Hemoglobin: 11.7 (11-28 @ 13:22)  Hemoglobin: 13.1 (11-27 @ 18:20)

## 2023-11-29 NOTE — PROGRESS NOTE ADULT - SUBJECTIVE AND OBJECTIVE BOX
[facility]  INFECTIOUS DISEASES - [address], [place] [address]  Tel: [phone]     Fax: [phone]  =======================================================    LEXY EISENBERG [id number]    Follow up: No fevers.  Per daughter at bedside patient still weak, but no diarrhea overnight. She is sitting up in chair. Complains of pain on suprapubic area and from Clancy but no other pain. Denies any SOB.    Allergies:  No Known Allergies      Antibiotics:  acetaminophen     Tablet .. 650 milliGRAM(s) Oral every 6 hours PRN  aluminum hydroxide/magnesium hydroxide/simethicone Suspension 30 milliLiter(s) Oral every 4 hours PRN  apixaban 2.5 milliGRAM(s) Oral two times a day  cefTRIAXone   IVPB 1000 milliGRAM(s) IV Intermittent every 24 hours  cholecalciferol 2000 Unit(s) Oral daily  clotrimazole/betamethasone Cream 1 Application(s) Topical daily  dextrose 5%. 1000 milliLiter(s) IV Continuous <Continuous>  dextrose 5%. 1000 milliLiter(s) IV Continuous <Continuous>  dextrose 50% Injectable 25 Gram(s) IV Push once  dextrose 50% Injectable 25 Gram(s) IV Push once  dextrose 50% Injectable 12.5 Gram(s) IV Push once  dextrose Oral Gel 15 Gram(s) Oral once PRN  digoxin     Tablet 125 MICROGram(s) Oral every other day  glucagon  Injectable 1 milliGRAM(s) IntraMuscular once  insulin lispro (ADMELOG) corrective regimen sliding scale   SubCutaneous three times a day before meals  insulin lispro (ADMELOG) corrective regimen sliding scale   SubCutaneous at bedtime  levothyroxine 75 MICROGram(s) Oral daily  lidocaine 5% Ointment 1 Application(s) Topical two times a day  melatonin 5 milliGRAM(s) Oral at bedtime  memantine 10 milliGRAM(s) Oral two times a day  metoprolol succinate  milliGRAM(s) Oral two times a day  ondansetron Injectable 4 milliGRAM(s) IV Push every 8 hours PRN  oxyCODONE    IR 2.5 milliGRAM(s) Oral every 4 hours PRN  oxyCODONE    IR 5 milliGRAM(s) Oral every 4 hours PRN  saccharomyces boulardii 250 milliGRAM(s) Oral two times a day       REVIEW OF SYSTEMS:  *limited 2/2 dementia*  CONSTITUTIONAL:  No Fevers  CARDIOVASCULAR:  No chest pain or SOB.  RESPIRATORY:  No cough, shortness of breath  GASTROINTESTINAL:  No nausea or vomiting  GENITOURINARY:  (+) discomfort from Clancy catheter  MUSCULOSKELETAL:  no back pain.  NEUROLOGIC:  No headache        Physical Exam:  ICU Vital Signs Last 24 Hrs  T(C): 36.4 (29 Nov 2023 11:45), Max: 36.4 (28 Nov 2023 21:51)  T(F): 97.5 (29 Nov 2023 11:45), Max: 97.5 (28 Nov 2023 21:51)  HR: 102 (29 Nov 2023 12:15) (88 - 115)  BP: 123/83 (29 Nov 2023 12:15) (112/73 - 136/87)  BP(mean): --  ABP: --  ABP(mean): --  RR: 18 (29 Nov 2023 11:45) (18 - 19)  SpO2: 95% (29 Nov 2023 12:15) (95% - 98%)    O2 Parameters below as of 29 Nov 2023 12:15  Patient On (Oxygen Delivery Method): room air        GEN: Appears weak but not in acute distress  HEENT: normocephalic and atraumatic.   NECK: Supple.   LUNGS: Normal respiratory effort  HEART: Regular rate and rhythm   ABDOMEN: Soft, nontender, and nondistended.    : (+) Clancy with light yellow urine  EXTREMITIES: No leg edema.  NEUROLOGIC: Answering some simple questions      Labs:  11-29    139  |  112<H>  |  45<H>  ----------------------------<  164<H>  4.6   |  21<L>  |  1.50<H>    Ca    8.4<L>      29 Nov 2023 07:47  Phos  2.4     11-29  Mg     2.6     11-29    TPro  6.3  /  Alb  2.1<L>  /  TBili  0.2  /  DBili  x   /  AST  31  /  ALT  53  /  AlkPhos  67  11-29                          12.4   12.23 )-----------( 303      ( 29 Nov 2023 07:47 )             39.1     PT/INR - ( 27 Nov 2023 18:20 )   PT: 14.5 sec;   INR: 1.25 ratio         PTT - ( 27 Nov 2023 18:20 )  PTT:28.3 sec  Urinalysis Basic - ( 29 Nov 2023 07:47 )    Color: x / Appearance: x / SG: x / pH: x  Gluc: 164 mg/dL / Ketone: x  / Bili: x / Urobili: x   Blood: x / Protein: x / Nitrite: x   Leuk Esterase: x / RBC: x / WBC x   Sq Epi: x / Non Sq Epi: x / Bacteria: x      LIVER FUNCTIONS - ( 29 Nov 2023 07:47 )  Alb: 2.1 g/dL / Pro: 6.3 g/dL / ALK PHOS: 67 U/L / ALT: 53 U/L / AST: 31 U/L / GGT: x             RECENT CULTURES:  11-27 @ 21:40 Clean Catch Clean Catch (Midstream)     >100,000 CFU/ml Gram Negative Rods        11-27 @ 18:20 .Blood Blood-Peripheral     No growth at 24 hours        11-27 @ 18:10 .Blood Blood-Peripheral     No growth at 24 hours              All imaging and data are reviewed.

## 2023-11-29 NOTE — PROGRESS NOTE ADULT - SUBJECTIVE AND OBJECTIVE BOX
Patient is a 88y old  Female who presents with a chief complaint of UTI/pyelo (29 Nov 2023 07:48)      INTERVAL HPI/OVERNIGHT EVENTS: Patient seen and examined at bedside. No overnight events. Tolerating diet.     MEDICATIONS  (STANDING):  apixaban 2.5 milliGRAM(s) Oral two times a day  cefTRIAXone   IVPB 1000 milliGRAM(s) IV Intermittent every 24 hours  cholecalciferol 2000 Unit(s) Oral daily  clotrimazole/betamethasone Cream 1 Application(s) Topical daily  dextrose 5%. 1000 milliLiter(s) (100 mL/Hr) IV Continuous <Continuous>  dextrose 5%. 1000 milliLiter(s) (50 mL/Hr) IV Continuous <Continuous>  dextrose 50% Injectable 25 Gram(s) IV Push once  dextrose 50% Injectable 25 Gram(s) IV Push once  dextrose 50% Injectable 12.5 Gram(s) IV Push once  digoxin     Tablet 125 MICROGram(s) Oral every other day  glucagon  Injectable 1 milliGRAM(s) IntraMuscular once  insulin lispro (ADMELOG) corrective regimen sliding scale   SubCutaneous three times a day before meals  insulin lispro (ADMELOG) corrective regimen sliding scale   SubCutaneous at bedtime  levothyroxine 75 MICROGram(s) Oral daily  melatonin 5 milliGRAM(s) Oral at bedtime  memantine 10 milliGRAM(s) Oral two times a day  metoprolol succinate  milliGRAM(s) Oral two times a day  saccharomyces boulardii 250 milliGRAM(s) Oral two times a day  sodium chloride 0.45%. 1000 milliLiter(s) (50 mL/Hr) IV Continuous <Continuous>    MEDICATIONS  (PRN):  acetaminophen     Tablet .. 650 milliGRAM(s) Oral every 6 hours PRN Temp greater or equal to 38C (100.4F), Mild Pain (1 - 3)  aluminum hydroxide/magnesium hydroxide/simethicone Suspension 30 milliLiter(s) Oral every 4 hours PRN Dyspepsia  dextrose Oral Gel 15 Gram(s) Oral once PRN Blood Glucose LESS THAN 70 milliGRAM(s)/deciliter  ondansetron Injectable 4 milliGRAM(s) IV Push every 8 hours PRN Nausea and/or Vomiting  oxyCODONE    IR 2.5 milliGRAM(s) Oral every 4 hours PRN Moderate Pain (4 - 6)  oxyCODONE    IR 5 milliGRAM(s) Oral every 4 hours PRN Severe Pain (7 - 10)      Allergies    No Known Allergies    Intolerances        REVIEW OF SYSTEMS:  Unable to obtain 2/2 mental status    Vital Signs Last 24 Hrs  T(C): 36.3 (29 Nov 2023 04:19), Max: 36.9 (28 Nov 2023 13:11)  T(F): 97.4 (29 Nov 2023 04:19), Max: 98.5 (28 Nov 2023 13:11)  HR: 97 (29 Nov 2023 06:25) (96 - 115)  BP: 136/87 (29 Nov 2023 06:25) (112/73 - 136/87)  BP(mean): --  RR: 18 (29 Nov 2023 06:25) (17 - 19)  SpO2: 96% (29 Nov 2023 06:25) (95% - 99%)    Parameters below as of 29 Nov 2023 06:25  Patient On (Oxygen Delivery Method): room air        PHYSICAL EXAM:  GENERAL: NAD  HEENT:  anicteric, moist mucous membranes  CHEST/LUNG:  CTA b/l, no rales, wheezes, or rhonchi  HEART:  RRR, S1, S2  ABDOMEN:  BS+, soft, nontender, nondistended  EXTREMITIES: no edema, cyanosis, or calf tenderness  NERVOUS SYSTEM: awake, alert    LABS:                        12.4   12.23 )-----------( 303      ( 29 Nov 2023 07:47 )             39.1     CBC Full  -  ( 29 Nov 2023 07:47 )  WBC Count : 12.23 K/uL  Hemoglobin : 12.4 g/dL  Hematocrit : 39.1 %  Platelet Count - Automated : 303 K/uL  Mean Cell Volume : 90.1 fl  Mean Cell Hemoglobin : 28.6 pg  Mean Cell Hemoglobin Concentration : 31.7 gm/dL  Auto Neutrophil # : 8.60 K/uL  Auto Lymphocyte # : 1.88 K/uL  Auto Monocyte # : 1.10 K/uL  Auto Eosinophil # : 0.17 K/uL  Auto Basophil # : 0.04 K/uL  Auto Neutrophil % : 70.3 %  Auto Lymphocyte % : 15.4 %  Auto Monocyte % : 9.0 %  Auto Eosinophil % : 1.4 %  Auto Basophil % : 0.3 %    29 Nov 2023 07:47    139    |  112    |  45     ----------------------------<  164    4.6     |  21     |  1.50     Ca    8.4        29 Nov 2023 07:47  Phos  2.4       29 Nov 2023 07:47  Mg     2.6       29 Nov 2023 07:47    TPro  6.3    /  Alb  2.1    /  TBili  0.2    /  DBili  x      /  AST  31     /  ALT  53     /  AlkPhos  67     29 Nov 2023 07:47    PT/INR - ( 27 Nov 2023 18:20 )   PT: 14.5 sec;   INR: 1.25 ratio         PTT - ( 27 Nov 2023 18:20 )  PTT:28.3 sec  Urinalysis Basic - ( 29 Nov 2023 07:47 )    Color: x / Appearance: x / SG: x / pH: x  Gluc: 164 mg/dL / Ketone: x  / Bili: x / Urobili: x   Blood: x / Protein: x / Nitrite: x   Leuk Esterase: x / RBC: x / WBC x   Sq Epi: x / Non Sq Epi: x / Bacteria: x      CAPILLARY BLOOD GLUCOSE      POCT Blood Glucose.: 149 mg/dL (29 Nov 2023 07:43)  POCT Blood Glucose.: 191 mg/dL (28 Nov 2023 23:43)  POCT Blood Glucose.: 146 mg/dL (28 Nov 2023 17:39)  POCT Blood Glucose.: 123 mg/dL (28 Nov 2023 12:42)        Culture - Urine (collected 11-27-23 @ 21:40)  Source: Clean Catch Clean Catch (Midstream)  Preliminary Report (11-29-23 @ 00:41):    >100,000 CFU/ml Gram Negative Rods    Culture - Blood (collected 11-27-23 @ 18:20)  Source: .Blood Blood-Peripheral  Preliminary Report (11-29-23 @ 01:03):    No growth at 24 hours    Culture - Blood (collected 11-27-23 @ 18:10)  Source: .Blood Blood-Peripheral  Preliminary Report (11-29-23 @ 01:03):    No growth at 24 hours        RADIOLOGY & ADDITIONAL TESTS:    Personally reviewed.     Consultant(s) Notes Reviewed:  [x] YES  [ ] NO

## 2023-11-29 NOTE — PROGRESS NOTE ADULT - ASSESSMENT
88yF pmhx CHFrEF (LVEF 20-25% 12/2022), afib on eliquis, T2DM, HTN, HLD, alzheimers dementia, hypothyroidism, chronic sacral and heel ulcer, who presented with 1 week of generalized weakness, decreased PO intake, generalized body aches followed by dysuria.     Found to have left sided pyeloureteritis and cystitis. Urine culture growing gram negative rods. WBC increased to 12 but no fevers and blood cultures currently no growth. No reported diarrhea overnight per family. Evaluated by wound care.    #Left pyeloureteritis and cystitis  #Leukocytosis  #Diarrhea    -continue ceftriaxone pending urine cultures  -follow cultures to completion  -monitor WBC  -wound care  -discussed with family at bedside  -discussed with Dr. Fer Laughlin MD  Division of Infectious Diseases   Cell [phone] between 8am and 6pm   After 6pm and weekends please call ID service at [phone].     35 minutes spent on total encounter assessing patient, examination, chart review, counseling and coordinating care by the attending physician/nurse/care manager.

## 2023-11-29 NOTE — PHYSICAL THERAPY INITIAL EVALUATION ADULT - PERTINENT HX OF CURRENT PROBLEM, REHAB EVAL
88yF pmhx CHFrEF (LVEF 20-25% 12/2022), afib on eliquis, T2DM, HTN, HLD, alzheimers dementia, hypothyroidism, chronic sacral and heel ulcer brought to the hospital by children for 1 week of generalized weakness, decreased PO intake, generalized body aches followed by dysuria onset today. As per family, patient was seen by PM Dr. Shukla 1 month ago and had elevated LFTs with normal abdominal sono. Crestor was stopped and she was seen by GI Dr. Wilder and found to have yeast in stool and completed course of fluconazole. Then she developed a cough/URI and completed course of doxy+medrol dose guerline from Dr. Shukla PMD. Home Tmax 98.2F. No recent cp, sob, abd pain, n/b/d. She is lethargic and AAOx2 on admission. Daughter states it is normal for her not to know the year but the real change is her lethargy.

## 2023-11-29 NOTE — PATIENT PROFILE ADULT - FALL HARM RISK - HARM RISK INTERVENTIONS

## 2023-11-29 NOTE — PROGRESS NOTE ADULT - ASSESSMENT
YVETTE: Prerenal azotemia, Urinary retention  Hypotension  Diabetes    11/28/23: Will follow creatinine trend on IV hydration. BP improving. s/p CT with IV contrast. Monitor blood sugar levels. Insulin coverage as needed.  Encourage PO intake as tolerated. Avoid nephrotoxic meds as possible. Avoid ACEI, ARB, NSAIDs and IV contrast. Will follow electrolytes and renal function trend. D/w patients family at bedside.   11/29/23: Improving renal indices. Will d/c IVF. Encourage PO intake as tolerated. D/w pt's daughter at bedside.

## 2023-11-29 NOTE — PATIENT PROFILE ADULT - FLU SEASON?
SPEECH PATHOLOGY NOTE:  Modified Barium Swallow study complete. Patient with premature spillage of all consistencies with swallows of thin and nectar triggered at pyriform sinuses and swallows of pudding and solids triggered at valleculae. Deep laryngeal penetration without aspiration observed with thin liquids. No laryngeal penetration or aspiration observed with nectar liquids by cup or straw, pudding, mixed consistency or cracker. Patient with significant right oral residue of solids. Recommend initiate pureed diet and nectar liquids. Crush meds in applesauce. Results/recommendations called to floor. Spoke with Russel Phalen, RN, who reported MD was with her and she would relay information. Full report to follow.   Brien Austin MA, CCC-SLP Yes...

## 2023-11-30 LAB
-  AMOXICILLIN/CLAVULANIC ACID: SIGNIFICANT CHANGE UP
-  AMOXICILLIN/CLAVULANIC ACID: SIGNIFICANT CHANGE UP
-  AMPICILLIN/SULBACTAM: SIGNIFICANT CHANGE UP
-  AMPICILLIN/SULBACTAM: SIGNIFICANT CHANGE UP
-  AMPICILLIN: SIGNIFICANT CHANGE UP
-  AMPICILLIN: SIGNIFICANT CHANGE UP
-  AZTREONAM: SIGNIFICANT CHANGE UP
-  AZTREONAM: SIGNIFICANT CHANGE UP
-  CEFAZOLIN: SIGNIFICANT CHANGE UP
-  CEFAZOLIN: SIGNIFICANT CHANGE UP
-  CEFEPIME: SIGNIFICANT CHANGE UP
-  CEFEPIME: SIGNIFICANT CHANGE UP
-  CEFOXITIN: SIGNIFICANT CHANGE UP
-  CEFOXITIN: SIGNIFICANT CHANGE UP
-  CEFTRIAXONE: SIGNIFICANT CHANGE UP
-  CEFTRIAXONE: SIGNIFICANT CHANGE UP
-  CEFUROXIME: SIGNIFICANT CHANGE UP
-  CEFUROXIME: SIGNIFICANT CHANGE UP
-  CIPROFLOXACIN: SIGNIFICANT CHANGE UP
-  CIPROFLOXACIN: SIGNIFICANT CHANGE UP
-  ERTAPENEM: SIGNIFICANT CHANGE UP
-  ERTAPENEM: SIGNIFICANT CHANGE UP
-  GENTAMICIN: SIGNIFICANT CHANGE UP
-  GENTAMICIN: SIGNIFICANT CHANGE UP
-  IMIPENEM: SIGNIFICANT CHANGE UP
-  IMIPENEM: SIGNIFICANT CHANGE UP
-  LEVOFLOXACIN: SIGNIFICANT CHANGE UP
-  LEVOFLOXACIN: SIGNIFICANT CHANGE UP
-  MEROPENEM: SIGNIFICANT CHANGE UP
-  MEROPENEM: SIGNIFICANT CHANGE UP
-  NITROFURANTOIN: SIGNIFICANT CHANGE UP
-  NITROFURANTOIN: SIGNIFICANT CHANGE UP
-  PIPERACILLIN/TAZOBACTAM: SIGNIFICANT CHANGE UP
-  PIPERACILLIN/TAZOBACTAM: SIGNIFICANT CHANGE UP
-  TOBRAMYCIN: SIGNIFICANT CHANGE UP
-  TOBRAMYCIN: SIGNIFICANT CHANGE UP
-  TRIMETHOPRIM/SULFAMETHOXAZOLE: SIGNIFICANT CHANGE UP
-  TRIMETHOPRIM/SULFAMETHOXAZOLE: SIGNIFICANT CHANGE UP
ALBUMIN SERPL ELPH-MCNC: 2.1 G/DL — LOW (ref 3.3–5)
ALBUMIN SERPL ELPH-MCNC: 2.1 G/DL — LOW (ref 3.3–5)
ALP SERPL-CCNC: 69 U/L — SIGNIFICANT CHANGE UP (ref 40–120)
ALP SERPL-CCNC: 69 U/L — SIGNIFICANT CHANGE UP (ref 40–120)
ALT FLD-CCNC: 52 U/L — SIGNIFICANT CHANGE UP (ref 12–78)
ALT FLD-CCNC: 52 U/L — SIGNIFICANT CHANGE UP (ref 12–78)
ANION GAP SERPL CALC-SCNC: 5 MMOL/L — SIGNIFICANT CHANGE UP (ref 5–17)
ANION GAP SERPL CALC-SCNC: 5 MMOL/L — SIGNIFICANT CHANGE UP (ref 5–17)
AST SERPL-CCNC: 26 U/L — SIGNIFICANT CHANGE UP (ref 15–37)
AST SERPL-CCNC: 26 U/L — SIGNIFICANT CHANGE UP (ref 15–37)
BASOPHILS # BLD AUTO: 0.04 K/UL — SIGNIFICANT CHANGE UP (ref 0–0.2)
BASOPHILS # BLD AUTO: 0.04 K/UL — SIGNIFICANT CHANGE UP (ref 0–0.2)
BASOPHILS NFR BLD AUTO: 0.3 % — SIGNIFICANT CHANGE UP (ref 0–2)
BASOPHILS NFR BLD AUTO: 0.3 % — SIGNIFICANT CHANGE UP (ref 0–2)
BILIRUB SERPL-MCNC: 0.2 MG/DL — SIGNIFICANT CHANGE UP (ref 0.2–1.2)
BILIRUB SERPL-MCNC: 0.2 MG/DL — SIGNIFICANT CHANGE UP (ref 0.2–1.2)
BUN SERPL-MCNC: 34 MG/DL — HIGH (ref 7–23)
BUN SERPL-MCNC: 34 MG/DL — HIGH (ref 7–23)
CALCIUM SERPL-MCNC: 8.4 MG/DL — LOW (ref 8.5–10.1)
CALCIUM SERPL-MCNC: 8.4 MG/DL — LOW (ref 8.5–10.1)
CHLORIDE SERPL-SCNC: 112 MMOL/L — HIGH (ref 96–108)
CHLORIDE SERPL-SCNC: 112 MMOL/L — HIGH (ref 96–108)
CO2 SERPL-SCNC: 22 MMOL/L — SIGNIFICANT CHANGE UP (ref 22–31)
CO2 SERPL-SCNC: 22 MMOL/L — SIGNIFICANT CHANGE UP (ref 22–31)
CREAT SERPL-MCNC: 1.4 MG/DL — HIGH (ref 0.5–1.3)
CREAT SERPL-MCNC: 1.4 MG/DL — HIGH (ref 0.5–1.3)
CULTURE RESULTS: ABNORMAL
CULTURE RESULTS: ABNORMAL
EGFR: 36 ML/MIN/1.73M2 — LOW
EGFR: 36 ML/MIN/1.73M2 — LOW
EOSINOPHIL # BLD AUTO: 0.21 K/UL — SIGNIFICANT CHANGE UP (ref 0–0.5)
EOSINOPHIL # BLD AUTO: 0.21 K/UL — SIGNIFICANT CHANGE UP (ref 0–0.5)
EOSINOPHIL NFR BLD AUTO: 1.7 % — SIGNIFICANT CHANGE UP (ref 0–6)
EOSINOPHIL NFR BLD AUTO: 1.7 % — SIGNIFICANT CHANGE UP (ref 0–6)
GLUCOSE SERPL-MCNC: 173 MG/DL — HIGH (ref 70–99)
GLUCOSE SERPL-MCNC: 173 MG/DL — HIGH (ref 70–99)
HCT VFR BLD CALC: 36.7 % — SIGNIFICANT CHANGE UP (ref 34.5–45)
HCT VFR BLD CALC: 36.7 % — SIGNIFICANT CHANGE UP (ref 34.5–45)
HGB BLD-MCNC: 11.9 G/DL — SIGNIFICANT CHANGE UP (ref 11.5–15.5)
HGB BLD-MCNC: 11.9 G/DL — SIGNIFICANT CHANGE UP (ref 11.5–15.5)
IMM GRANULOCYTES NFR BLD AUTO: 2 % — HIGH (ref 0–0.9)
IMM GRANULOCYTES NFR BLD AUTO: 2 % — HIGH (ref 0–0.9)
LYMPHOCYTES # BLD AUTO: 1.67 K/UL — SIGNIFICANT CHANGE UP (ref 1–3.3)
LYMPHOCYTES # BLD AUTO: 1.67 K/UL — SIGNIFICANT CHANGE UP (ref 1–3.3)
LYMPHOCYTES # BLD AUTO: 13.2 % — SIGNIFICANT CHANGE UP (ref 13–44)
LYMPHOCYTES # BLD AUTO: 13.2 % — SIGNIFICANT CHANGE UP (ref 13–44)
MCHC RBC-ENTMCNC: 29 PG — SIGNIFICANT CHANGE UP (ref 27–34)
MCHC RBC-ENTMCNC: 29 PG — SIGNIFICANT CHANGE UP (ref 27–34)
MCHC RBC-ENTMCNC: 32.4 GM/DL — SIGNIFICANT CHANGE UP (ref 32–36)
MCHC RBC-ENTMCNC: 32.4 GM/DL — SIGNIFICANT CHANGE UP (ref 32–36)
MCV RBC AUTO: 89.3 FL — SIGNIFICANT CHANGE UP (ref 80–100)
MCV RBC AUTO: 89.3 FL — SIGNIFICANT CHANGE UP (ref 80–100)
METHOD TYPE: SIGNIFICANT CHANGE UP
METHOD TYPE: SIGNIFICANT CHANGE UP
MONOCYTES # BLD AUTO: 1.09 K/UL — HIGH (ref 0–0.9)
MONOCYTES # BLD AUTO: 1.09 K/UL — HIGH (ref 0–0.9)
MONOCYTES NFR BLD AUTO: 8.6 % — SIGNIFICANT CHANGE UP (ref 2–14)
MONOCYTES NFR BLD AUTO: 8.6 % — SIGNIFICANT CHANGE UP (ref 2–14)
NEUTROPHILS # BLD AUTO: 9.39 K/UL — HIGH (ref 1.8–7.4)
NEUTROPHILS # BLD AUTO: 9.39 K/UL — HIGH (ref 1.8–7.4)
NEUTROPHILS NFR BLD AUTO: 74.2 % — SIGNIFICANT CHANGE UP (ref 43–77)
NEUTROPHILS NFR BLD AUTO: 74.2 % — SIGNIFICANT CHANGE UP (ref 43–77)
NRBC # BLD: 0 /100 WBCS — SIGNIFICANT CHANGE UP (ref 0–0)
NRBC # BLD: 0 /100 WBCS — SIGNIFICANT CHANGE UP (ref 0–0)
ORGANISM # SPEC MICROSCOPIC CNT: ABNORMAL
ORGANISM # SPEC MICROSCOPIC CNT: ABNORMAL
ORGANISM # SPEC MICROSCOPIC CNT: SIGNIFICANT CHANGE UP
ORGANISM # SPEC MICROSCOPIC CNT: SIGNIFICANT CHANGE UP
PLATELET # BLD AUTO: 306 K/UL — SIGNIFICANT CHANGE UP (ref 150–400)
PLATELET # BLD AUTO: 306 K/UL — SIGNIFICANT CHANGE UP (ref 150–400)
POTASSIUM SERPL-MCNC: 4.9 MMOL/L — SIGNIFICANT CHANGE UP (ref 3.5–5.3)
POTASSIUM SERPL-MCNC: 4.9 MMOL/L — SIGNIFICANT CHANGE UP (ref 3.5–5.3)
POTASSIUM SERPL-SCNC: 4.9 MMOL/L — SIGNIFICANT CHANGE UP (ref 3.5–5.3)
POTASSIUM SERPL-SCNC: 4.9 MMOL/L — SIGNIFICANT CHANGE UP (ref 3.5–5.3)
PROT SERPL-MCNC: 6.3 G/DL — SIGNIFICANT CHANGE UP (ref 6–8.3)
PROT SERPL-MCNC: 6.3 G/DL — SIGNIFICANT CHANGE UP (ref 6–8.3)
RBC # BLD: 4.11 M/UL — SIGNIFICANT CHANGE UP (ref 3.8–5.2)
RBC # BLD: 4.11 M/UL — SIGNIFICANT CHANGE UP (ref 3.8–5.2)
RBC # FLD: 18.3 % — HIGH (ref 10.3–14.5)
RBC # FLD: 18.3 % — HIGH (ref 10.3–14.5)
SODIUM SERPL-SCNC: 139 MMOL/L — SIGNIFICANT CHANGE UP (ref 135–145)
SODIUM SERPL-SCNC: 139 MMOL/L — SIGNIFICANT CHANGE UP (ref 135–145)
SPECIMEN SOURCE: SIGNIFICANT CHANGE UP
SPECIMEN SOURCE: SIGNIFICANT CHANGE UP
WBC # BLD: 12.65 K/UL — HIGH (ref 3.8–10.5)
WBC # BLD: 12.65 K/UL — HIGH (ref 3.8–10.5)
WBC # FLD AUTO: 12.65 K/UL — HIGH (ref 3.8–10.5)
WBC # FLD AUTO: 12.65 K/UL — HIGH (ref 3.8–10.5)

## 2023-11-30 PROCEDURE — 99232 SBSQ HOSP IP/OBS MODERATE 35: CPT

## 2023-11-30 PROCEDURE — 99233 SBSQ HOSP IP/OBS HIGH 50: CPT

## 2023-11-30 RX ORDER — RIVASTIGMINE 4.6 MG/24H
1 PATCH, EXTENDED RELEASE TRANSDERMAL EVERY 24 HOURS
Refills: 0 | Status: DISCONTINUED | OUTPATIENT
Start: 2023-11-30 | End: 2023-12-02

## 2023-11-30 RX ADMIN — Medication 1: at 11:55

## 2023-11-30 RX ADMIN — Medication 100 MILLIGRAM(S): at 05:52

## 2023-11-30 RX ADMIN — MEMANTINE HYDROCHLORIDE 10 MILLIGRAM(S): 10 TABLET ORAL at 17:08

## 2023-11-30 RX ADMIN — Medication 250 MILLIGRAM(S): at 05:52

## 2023-11-30 RX ADMIN — Medication 5 MILLIGRAM(S): at 21:39

## 2023-11-30 RX ADMIN — RIVASTIGMINE 1 PATCH: 4.6 PATCH, EXTENDED RELEASE TRANSDERMAL at 21:39

## 2023-11-30 RX ADMIN — APIXABAN 2.5 MILLIGRAM(S): 2.5 TABLET, FILM COATED ORAL at 05:52

## 2023-11-30 RX ADMIN — Medication 125 MICROGRAM(S): at 11:53

## 2023-11-30 RX ADMIN — Medication 1: at 07:54

## 2023-11-30 RX ADMIN — Medication 100 MILLIGRAM(S): at 17:11

## 2023-11-30 RX ADMIN — CLOTRIMAZOLE AND BETAMETHASONE DIPROPIONATE 1 APPLICATION(S): 10; .5 CREAM TOPICAL at 12:10

## 2023-11-30 RX ADMIN — CEFTRIAXONE 100 MILLIGRAM(S): 500 INJECTION, POWDER, FOR SOLUTION INTRAMUSCULAR; INTRAVENOUS at 23:29

## 2023-11-30 RX ADMIN — LIDOCAINE 1 APPLICATION(S): 4 CREAM TOPICAL at 17:02

## 2023-11-30 RX ADMIN — Medication 75 MICROGRAM(S): at 05:52

## 2023-11-30 RX ADMIN — Medication 1: at 17:08

## 2023-11-30 RX ADMIN — Medication 250 MILLIGRAM(S): at 17:08

## 2023-11-30 RX ADMIN — MEMANTINE HYDROCHLORIDE 10 MILLIGRAM(S): 10 TABLET ORAL at 05:52

## 2023-11-30 RX ADMIN — APIXABAN 2.5 MILLIGRAM(S): 2.5 TABLET, FILM COATED ORAL at 17:08

## 2023-11-30 RX ADMIN — Medication 2000 UNIT(S): at 11:53

## 2023-11-30 RX ADMIN — LIDOCAINE 1 APPLICATION(S): 4 CREAM TOPICAL at 06:00

## 2023-11-30 NOTE — PROGRESS NOTE ADULT - SUBJECTIVE AND OBJECTIVE BOX
Patient is a 88y old  Female who presents with a chief complaint of UTI/pyelo (29 Nov 2023 10:02)    Patient seen in follow up for YVETTE.        PAST MEDICAL HISTORY:  Atrial fibrillation    Hypothyroidism    Mild Alzheimer's dementia    Diabetes mellitus    Acute on chronic systolic congestive heart failure    Hypertension    Hyperlipemia    Cardiac LV ejection fraction 21-30%      MEDICATIONS  (STANDING):  apixaban 2.5 milliGRAM(s) Oral two times a day  cefTRIAXone   IVPB 1000 milliGRAM(s) IV Intermittent every 24 hours  cholecalciferol 2000 Unit(s) Oral daily  clotrimazole/betamethasone Cream 1 Application(s) Topical daily  dextrose 5%. 1000 milliLiter(s) (100 mL/Hr) IV Continuous <Continuous>  dextrose 5%. 1000 milliLiter(s) (50 mL/Hr) IV Continuous <Continuous>  dextrose 50% Injectable 25 Gram(s) IV Push once  dextrose 50% Injectable 25 Gram(s) IV Push once  dextrose 50% Injectable 12.5 Gram(s) IV Push once  digoxin     Tablet 125 MICROGram(s) Oral every other day  glucagon  Injectable 1 milliGRAM(s) IntraMuscular once  insulin lispro (ADMELOG) corrective regimen sliding scale   SubCutaneous at bedtime  insulin lispro (ADMELOG) corrective regimen sliding scale   SubCutaneous three times a day before meals  levothyroxine 75 MICROGram(s) Oral daily  lidocaine 5% Ointment 1 Application(s) Topical two times a day  melatonin 5 milliGRAM(s) Oral at bedtime  memantine 10 milliGRAM(s) Oral two times a day  metoprolol succinate  milliGRAM(s) Oral two times a day  saccharomyces boulardii 250 milliGRAM(s) Oral two times a day    MEDICATIONS  (PRN):  acetaminophen     Tablet .. 650 milliGRAM(s) Oral every 6 hours PRN Temp greater or equal to 38C (100.4F), Mild Pain (1 - 3)  aluminum hydroxide/magnesium hydroxide/simethicone Suspension 30 milliLiter(s) Oral every 4 hours PRN Dyspepsia  dextrose Oral Gel 15 Gram(s) Oral once PRN Blood Glucose LESS THAN 70 milliGRAM(s)/deciliter  ondansetron Injectable 4 milliGRAM(s) IV Push every 8 hours PRN Nausea and/or Vomiting  oxyCODONE    IR 2.5 milliGRAM(s) Oral every 4 hours PRN Moderate Pain (4 - 6)  oxyCODONE    IR 5 milliGRAM(s) Oral every 4 hours PRN Severe Pain (7 - 10)    T(C): 36.4 (11-30-23 @ 11:43), Max: 36.5 (11-29-23 @ 19:54)  HR: 81 (11-30-23 @ 11:43) (80 - 115)  BP: 134/87 (11-30-23 @ 11:43) (112/73 - 138/93)  RR: 18 (11-30-23 @ 11:43)  SpO2: 95% (11-30-23 @ 11:43)  Wt(kg): --  I&O's Detail    29 Nov 2023 07:01  -  30 Nov 2023 07:00  --------------------------------------------------------  IN:  Total IN: 0 mL    OUT:    Indwelling Catheter - Urethral (mL): 1950 mL  Total OUT: 1950 mL    Total NET: -1950 mL      30 Nov 2023 07:01  -  30 Nov 2023 13:19  --------------------------------------------------------  IN:  Total IN: 0 mL    OUT:    Indwelling Catheter - Urethral (mL): 300 mL  Total OUT: 300 mL    Total NET: -300 mL              PHYSICAL EXAM:  General: No distress  Respiratory: b/l air entry  Cardiovascular: S1 S2  Gastrointestinal: soft  Extremities:  no edema                          LABORATORY:                        11.9   12.65 )-----------( 306      ( 30 Nov 2023 07:21 )             36.7     11-30    139  |  112<H>  |  34<H>  ----------------------------<  173<H>  4.9   |  22  |  1.40<H>    Ca    8.4<L>      30 Nov 2023 07:21  Phos  2.4     11-29  Mg     2.6     11-29    TPro  6.3  /  Alb  2.1<L>  /  TBili  0.2  /  DBili  x   /  AST  26  /  ALT  52  /  AlkPhos  69  11-30    Sodium: 139 mmol/L (11-30 @ 07:21)  Sodium: 139 mmol/L (11-29 @ 07:47)  Sodium: 142 mmol/L (11-28 @ 13:22)    Potassium: 4.9 mmol/L (11-30 @ 07:21)  Potassium: 4.6 mmol/L (11-29 @ 07:47)  Potassium: 4.4 mmol/L (11-28 @ 13:22)    Hemoglobin: 11.9 g/dL (11-30 @ 07:21)  Hemoglobin: 12.4 g/dL (11-29 @ 07:47)  Hemoglobin: 11.7 g/dL (11-28 @ 13:22)  Hemoglobin: 13.1 g/dL (11-27 @ 18:20)    Creatinine, Serum 1.40 (11-30 @ 07:21)  Creatinine, Serum 1.50 (11-29 @ 07:47)  Creatinine, Serum 1.80 (11-28 @ 13:22)  Creatinine, Serum 2.30 (11-27 @ 18:20)        LIVER FUNCTIONS - ( 30 Nov 2023 07:21 )  Alb: 2.1 g/dL / Pro: 6.3 g/dL / ALK PHOS: 69 U/L / ALT: 52 U/L / AST: 26 U/L / GGT: x           Urinalysis Basic - ( 30 Nov 2023 07:21 )    Color: x / Appearance: x / SG: x / pH: x  Gluc: 173 mg/dL / Ketone: x  / Bili: x / Urobili: x   Blood: x / Protein: x / Nitrite: x   Leuk Esterase: x / RBC: x / WBC x   Sq Epi: x / Non Sq Epi: x / Bacteria: x

## 2023-11-30 NOTE — PROGRESS NOTE ADULT - SUBJECTIVE AND OBJECTIVE BOX
Patient is a 88y old  Female who presents with a chief complaint of UTI/pyelo (30 Nov 2023 13:18)      INTERVAL HPI/OVERNIGHT EVENTS: Patient seen and examined at bedside. No overnight events.    MEDICATIONS  (STANDING):  apixaban 2.5 milliGRAM(s) Oral two times a day  cefTRIAXone   IVPB 1000 milliGRAM(s) IV Intermittent every 24 hours  cholecalciferol 2000 Unit(s) Oral daily  clotrimazole/betamethasone Cream 1 Application(s) Topical daily  dextrose 5%. 1000 milliLiter(s) (100 mL/Hr) IV Continuous <Continuous>  dextrose 5%. 1000 milliLiter(s) (50 mL/Hr) IV Continuous <Continuous>  dextrose 50% Injectable 25 Gram(s) IV Push once  dextrose 50% Injectable 25 Gram(s) IV Push once  dextrose 50% Injectable 12.5 Gram(s) IV Push once  digoxin     Tablet 125 MICROGram(s) Oral every other day  glucagon  Injectable 1 milliGRAM(s) IntraMuscular once  insulin lispro (ADMELOG) corrective regimen sliding scale   SubCutaneous at bedtime  insulin lispro (ADMELOG) corrective regimen sliding scale   SubCutaneous three times a day before meals  levothyroxine 75 MICROGram(s) Oral daily  lidocaine 5% Ointment 1 Application(s) Topical two times a day  melatonin 5 milliGRAM(s) Oral at bedtime  memantine 10 milliGRAM(s) Oral two times a day  metoprolol succinate  milliGRAM(s) Oral two times a day  saccharomyces boulardii 250 milliGRAM(s) Oral two times a day    MEDICATIONS  (PRN):  acetaminophen     Tablet .. 650 milliGRAM(s) Oral every 6 hours PRN Temp greater or equal to 38C (100.4F), Mild Pain (1 - 3)  aluminum hydroxide/magnesium hydroxide/simethicone Suspension 30 milliLiter(s) Oral every 4 hours PRN Dyspepsia  dextrose Oral Gel 15 Gram(s) Oral once PRN Blood Glucose LESS THAN 70 milliGRAM(s)/deciliter  ondansetron Injectable 4 milliGRAM(s) IV Push every 8 hours PRN Nausea and/or Vomiting  oxyCODONE    IR 5 milliGRAM(s) Oral every 4 hours PRN Severe Pain (7 - 10)  oxyCODONE    IR 2.5 milliGRAM(s) Oral every 4 hours PRN Moderate Pain (4 - 6)      Allergies    No Known Allergies    Intolerances        REVIEW OF SYSTEMS:  CONSTITUTIONAL: No fever or chills  CARDIOVASCULAR: No chest pain, palpitations    Vital Signs Last 24 Hrs  T(C): 36.4 (30 Nov 2023 11:43), Max: 36.5 (29 Nov 2023 19:54)  T(F): 97.6 (30 Nov 2023 11:43), Max: 97.7 (29 Nov 2023 19:54)  HR: 81 (30 Nov 2023 11:43) (80 - 107)  BP: 134/87 (30 Nov 2023 11:43) (124/71 - 138/93)  BP(mean): --  RR: 18 (30 Nov 2023 11:43) (18 - 20)  SpO2: 95% (30 Nov 2023 11:43) (95% - 97%)    Parameters below as of 30 Nov 2023 11:43  Patient On (Oxygen Delivery Method): room air      I&O's Summary    29 Nov 2023 07:01  -  30 Nov 2023 07:00  --------------------------------------------------------  IN: 0 mL / OUT: 1950 mL / NET: -1950 mL    30 Nov 2023 07:01  -  30 Nov 2023 14:39  --------------------------------------------------------  IN: 0 mL / OUT: 300 mL / NET: -300 mL      BMI (kg/m2): 19.9 (11-27-23 @ 17:47)    PHYSICAL EXAM:  GENERAL: NAD  HEENT:  AT/NC, anicteric, moist mucous membranes, EOMI, PERRL, no lid-lag, conjunctiva and sclera clear  CHEST/LUNG:  CTA b/l, no rales, wheezes, or rhonchi,  normal respiratory effort, no intercostal retractions  HEART:  RRR, S1, S2, no murmurs; no pitting edema  ABDOMEN:  BS+, soft, nontender, nondistended  MSK/EXTREMITIES: palpable peripheral pulses, no clubbing or cyanosis  NERVOUS SYSTEM: AO1-2, follows simple commands, grossly moves all extremities  PSYCH: Appropriate affect, Alert & Awake; poor judgement  SKIN: sacral decub     LABS: Personally reviewed  CBC                        11.9   12.65 )-----------( 306      ( 30 Nov 2023 07:21 )             36.7     CMP  11-30    139  |  112  |  34  ----------------------------<  173  4.9   |  22  |  1.40    Ca    8.4      30 Nov 2023 07:21  Phos  2.4     11-29  Mg     2.6     11-29    TPro  6.3  /  Alb  2.1  /  TBili  0.2  /  DBili  x   /  AST  26  /  ALT  52  /  AlkPhos  69  11-30          PT/INR - ( 27 Nov 2023 18:20 )   PT: 14.5 sec;   INR: 1.25 ratio         PTT - ( 27 Nov 2023 18:20 )  PTT:28.3 sec      CARDIAC MARKERS ( 28 Nov 2023 13:22 )  x     / 25.3 ng/L / x     / x     / x      CARDIAC MARKERS ( 27 Nov 2023 18:20 )  x     / 26.9 ng/L / x     / x     / x                ABG - ( 28 Nov 2023 06:55 )  pH, Arterial: 7.37  pH, Blood: x     /  pCO2: 23    /  pO2: 122   / HCO3: 13    / Base Excess: -12.0 /  SaO2: 99.8                    POCT Blood Glucose.: 194 mg/dL (30 Nov 2023 11:40)  POCT Blood Glucose.: 151 mg/dL (30 Nov 2023 07:16)  POCT Blood Glucose.: 173 mg/dL (29 Nov 2023 21:04)  POCT Blood Glucose.: 133 mg/dL (29 Nov 2023 16:26)      Urinalysis Basic - ( 30 Nov 2023 07:21 )    Color: x / Appearance: x / SG: x / pH: x  Gluc: 173 mg/dL / Ketone: x  / Bili: x / Urobili: x   Blood: x / Protein: x / Nitrite: x   Leuk Esterase: x / RBC: x / WBC x   Sq Epi: x / Non Sq Epi: x / Bacteria: x        Culture - Urine (collected 27 Nov 2023 21:40)  Source: Clean Catch Clean Catch (Midstream)  Preliminary Report (29 Nov 2023 14:50):    >100,000 CFU/ml Klebsiella pneumoniae    Culture - Blood (collected 27 Nov 2023 18:20)  Source: .Blood Blood-Peripheral  Preliminary Report (30 Nov 2023 01:02):    No growth at 48 Hours    Culture - Blood (collected 27 Nov 2023 18:10)  Source: .Blood Blood-Peripheral  Preliminary Report (30 Nov 2023 01:02):    No growth at 48 Hours            Culture - Urine (collected 11-27-23 @ 21:40)  Source: Clean Catch Clean Catch (Midstream)  Preliminary Report (11-29-23 @ 14:50):    >100,000 CFU/ml Klebsiella pneumoniae    Culture - Blood (collected 11-27-23 @ 18:20)  Source: .Blood Blood-Peripheral  Preliminary Report (11-30-23 @ 01:02):    No growth at 48 Hours    Culture - Blood (collected 11-27-23 @ 18:10)  Source: .Blood Blood-Peripheral  Preliminary Report (11-30-23 @ 01:02):    No growth at 48 Hours        RADIOLOGY & ADDITIONAL TESTS: Personally reviewed.     Consultant(s) Notes Reviewed:  [x] YES  [ ] NO   Discussed with JOSE L/MERON, RN

## 2023-11-30 NOTE — PROGRESS NOTE ADULT - SUBJECTIVE AND OBJECTIVE BOX
[facility] -- Dylan Hernandez Pannella, Patel, Savella, Goodger, Cohen  Office # [phone]      Follow Up:  tachycardia     Subjective/Observations: Seen and examined.  Lying in bed resting comfortably awake and alert with family at bedside with no signs of orthopnea or PND.  Tele reviewed AF- . NAD      REVIEW OF SYSTEMS: All other review of systems is negative unless indicated above    PAST MEDICAL & SURGICAL HISTORY:  Atrial fibrillation      Hypothyroidism      Mild Alzheimer's dementia      Diabetes mellitus      Acute on chronic systolic congestive heart failure      Hypertension      Hyperlipemia      Cardiac LV ejection fraction 21-30%  12/22      History of appendectomy      H/O hernia repair      History of cholecystectomy          MEDICATIONS  (STANDING):  apixaban 2.5 milliGRAM(s) Oral two times a day  cefTRIAXone   IVPB 1000 milliGRAM(s) IV Intermittent every 24 hours  cholecalciferol 2000 Unit(s) Oral daily  clotrimazole/betamethasone Cream 1 Application(s) Topical daily  dextrose 5%. 1000 milliLiter(s) (50 mL/Hr) IV Continuous <Continuous>  dextrose 5%. 1000 milliLiter(s) (100 mL/Hr) IV Continuous <Continuous>  dextrose 50% Injectable 12.5 Gram(s) IV Push once  dextrose 50% Injectable 25 Gram(s) IV Push once  dextrose 50% Injectable 25 Gram(s) IV Push once  digoxin     Tablet 125 MICROGram(s) Oral every other day  glucagon  Injectable 1 milliGRAM(s) IntraMuscular once  insulin lispro (ADMELOG) corrective regimen sliding scale   SubCutaneous at bedtime  insulin lispro (ADMELOG) corrective regimen sliding scale   SubCutaneous three times a day before meals  levothyroxine 75 MICROGram(s) Oral daily  lidocaine 5% Ointment 1 Application(s) Topical two times a day  melatonin 5 milliGRAM(s) Oral at bedtime  memantine 10 milliGRAM(s) Oral two times a day  metoprolol succinate  milliGRAM(s) Oral two times a day  saccharomyces boulardii 250 milliGRAM(s) Oral two times a day    MEDICATIONS  (PRN):  acetaminophen     Tablet .. 650 milliGRAM(s) Oral every 6 hours PRN Temp greater or equal to 38C (100.4F), Mild Pain (1 - 3)  aluminum hydroxide/magnesium hydroxide/simethicone Suspension 30 milliLiter(s) Oral every 4 hours PRN Dyspepsia  dextrose Oral Gel 15 Gram(s) Oral once PRN Blood Glucose LESS THAN 70 milliGRAM(s)/deciliter  ondansetron Injectable 4 milliGRAM(s) IV Push every 8 hours PRN Nausea and/or Vomiting  oxyCODONE    IR 5 milliGRAM(s) Oral every 4 hours PRN Severe Pain (7 - 10)  oxyCODONE    IR 2.5 milliGRAM(s) Oral every 4 hours PRN Moderate Pain (4 - 6)      Allergies    No Known Allergies    Intolerances            Vital Signs Last 24 Hrs  T(C): 36.3 (30 Nov 2023 08:33), Max: 36.5 (29 Nov 2023 19:54)  T(F): 97.3 (30 Nov 2023 08:33), Max: 97.7 (29 Nov 2023 19:54)  HR: 80 (30 Nov 2023 08:33) (80 - 107)  BP: 138/93 (30 Nov 2023 08:33) (121/79 - 138/93)  BP(mean): --  RR: 20 (30 Nov 2023 08:33) (18 - 20)  SpO2: 96% (30 Nov 2023 08:33) (95% - 97%)    Parameters below as of 30 Nov 2023 08:33  Patient On (Oxygen Delivery Method): room air        I&O's Summary    29 Nov 2023 07:01  -  30 Nov 2023 07:00  --------------------------------------------------------  IN: 0 mL / OUT: 1950 mL / NET: -1950 mL          PHYSICAL EXAM:  TELE: AF   Constitutional: NAD, awake and alert, well-developed  HEENT: Moist Mucous Membranes, Anicteric  Pulmonary: Non-labored, breath sounds are clear bilaterally, No wheezing, rales or rhonchi  Cardiovascular: Irregular, S1 and S2, No murmurs, rubs, gallops or clicks  Gastrointestinal: Bowel Sounds present, soft, nontender.   Lymph: No peripheral edema. No lymphadenopathy.  Skin: No visible rashes or ulcers.  Psych:  Mood & affect appropriate, pleasant    LABS: All Labs Reviewed:                        11.9   12.65 )-----------( 306      ( 30 Nov 2023 07:21 )             36.7                         12.4   12.23 )-----------( 303      ( 29 Nov 2023 07:47 )             39.1                         11.7   10.69 )-----------( 337      ( 28 Nov 2023 13:22 )             36.2     30 Nov 2023 07:21    139    |  112    |  34     ----------------------------<  173    4.9     |  22     |  1.40   29 Nov 2023 07:47    139    |  112    |  45     ----------------------------<  164    4.6     |  21     |  1.50   28 Nov 2023 13:22    142    |  115    |  59     ----------------------------<  105    4.4     |  19     |  1.80     Ca    8.4        30 Nov 2023 07:21  Ca    8.4        29 Nov 2023 07:47  Ca    8.3        28 Nov 2023 13:22  Phos  2.4       29 Nov 2023 07:47  Phos  2.6       28 Nov 2023 13:22  Mg     2.6       29 Nov 2023 07:47  Mg     1.5       28 Nov 2023 13:22    TPro  6.3    /  Alb  2.1    /  TBili  0.2    /  DBili  x      /  AST  26     /  ALT  52     /  AlkPhos  69     30 Nov 2023 07:21  TPro  6.3    /  Alb  2.1    /  TBili  0.2    /  DBili  x      /  AST  31     /  ALT  53     /  AlkPhos  67     29 Nov 2023 07:47  TPro  6.0    /  Alb  2.0    /  TBili  0.3    /  DBili  x      /  AST  33     /  ALT  53     /  AlkPhos  59     28 Nov 2023 13:22    Ventricular Rate 119 BPM    QRS Duration 84 ms    Q-T Interval 282 ms    QTC Calculation(Bazett) 396 ms    R Axis 44 degrees    T Axis 244 degrees    Diagnosis Line Atrial fibrillation with rapid ventricular response  Possible Anterior infarct (cited on or before 27-NOV-2023)  ST & T wave abnormality, consider lateral ischemia  Abnormal ECG  When compared with ECG of 27-NOV-2023 18:39, (Unconfirmed)  No significant change was found  Confirmed by Abelardo Richardson MD ([id number]) on 11/28/2023 1:31:09 PM    ACC: [id number] EXAM:  ECHO TTE WO CON COMP W DOPP                          PROCEDURE DATE:  12/22/2022          INTERPRETATION:  INDICATION: Abnormal EKG  Sonographer KL    Blood Pressure 142/86    Height 158 cm     Weight 51.7 kg       BSA 1.5   sqm    Dimensions:  LA 3.6       Normal Values: 2.0 - 4.0 cm  Ao 3.2        Normal Values: 2.0 - 3.8 cm  SEPTUM 1.0       Normal Values: 0.6 - 1.2 cm  PWT 0.9       Normal Values: 0.6 - 1.1 cm  LVIDd 5.8         Normal Values: 3.0 - 5.6 cm  LVIDs 4.3      Normal Values: 1.8 - 4.0 cm      OBSERVATIONS:  Mitral Valve: Moderate MR.  Aortic Valve/Aorta: normal trileaflet aortic valve.  Tricuspid Valve: Mild TR.  Pulmonic Valve: Mild PI  Left Atrium: Enlarged  Right Atrium: Enlarged  Left Ventricle: Left ventricular enlargement with severe left ventricular   systolic dysfunction, estimated LVEF of 20-25%. The entire apex and   anterior walls appear akinetic. The inferior and inferolateral walls   appear hypokinetic. Remaining walls are not well-visualized  Right Ventricle: Grossly normal size and systolic function.  Pericardium: no significant pericardial effusion.  Pulmonary/RV Pressure: estimated PA systolic pressure of at least 35 mmHg   assuming an RA pressure of 3mmHg.        IMPRESSION:  Left ventricular enlargement with severe left ventricular systolic   dysfunction, estimated LVEF of 20-25%. The entire apex and anterior walls   appear akinetic. The inferior and inferolateral walls appear hypokinetic.  Grossly normal RV size and systolic function.  Biatrial enlargement  Normal trileaflet aortic valve, without AI.  Moderate MR  Mild TR.  No significant pericardial effusion.    --- End of Report ---            JOHN BAEZA MD; Attending Cardiologist  This document has been electronically signed. Dec 23 2022  4:40PM       ACC: [id number] EXAM:  CT ABDOMEN AND PELVIS IC   ORDERED BY:  NICOLE INMAN     PROCEDURE DATE:  11/27/2023          INTERPRETATION:  CLINICAL INFORMATION: Abdominal pain, dysuria    COMPARISON: CT abdomen and pelvis 1/25/2023    CONTRAST/COMPLICATIONS:  IV Contrast: Omnipaque 350  90 cc administered   10 cc discarded  Oral Contrast: NONE  Complications: None reported at time of study completion    PROCEDURE:  CT of the Abdomen and Pelvis was performed.  Sagittal and coronal reformats were performed.    FINDINGS:  LOWER CHEST: Coronary artery calcification.    LIVER: Within normal limits.  BILE DUCTS: Biliary dilatation compatible with postcholecystectomy state  GALLBLADDER: Cholecystectomy.  SPLEEN: Within normal limits.  PANCREAS: Diffusely atrophic. Subcentimeter cystic lesion pancreatic   head. Correlate with pancreatic MRI.  ADRENALS: Thickened left adrenal similar to prior  KIDNEYS/URETERS: Bilateral renal cysts. Nonobstructive 5 mm left renal   calculus left cortical thinning. Mild left hydroureteronephrosis down to   the bladder level without obstructing calculus. This may be due to recent   spell calculus and/or markedly distended bladder. There is mild augmented   urothelial enhancement involving the left renal pelvis and ureter with   perinephric and periureteral stranding suggestive of pyeloureteritis    BLADDER: Markedly distended. Mild diffuse bladder wall thickening   suggestive of cystitis..  REPRODUCTIVE ORGANS: No mass    BOWEL: No bowel obstruction. Colonic diverticula. Appendix no appendicitis  PERITONEUM: No ascites.  VESSELS: Within normal limits.  RETROPERITONEUM/LYMPH NODES: No lymphadenopathy.  ABDOMINAL WALL: Small fat-containing umbilical hernia.  BONES: Degenerative changes. Stable chronic T11 compression deformity.    IMPRESSION:  Mild left hydronephrosis without obstructing calculus may be related to   recently expelled calculus and/or bladder distention.    Nonobstructive left nephrolithiasis.    Suggestion of left pyeloureteritis  and cystitis. Correlate with   symptomatology and urinalysis    Subcentimeter cystic lesion pancreatic head. Correlate with pancreatic MRI    Additional findings as discussed      --- End of Report ---

## 2023-11-30 NOTE — PROGRESS NOTE ADULT - ASSESSMENT
YVETTE: Prerenal azotemia, Urinary retention  Hypotension  Diabetes    Improved renal indices. To continue current meds. Encourage PO intake as tolerated.   ? TOV. Will follow electrolytes and renal function trend.

## 2023-11-30 NOTE — PROGRESS NOTE ADULT - ASSESSMENT
88yF with a PMH of CHFrEF (LVEF 20-25% 2022), afib on eliquis, T2DM, HTN, HLD, alzheimers dementia, hypothyroidism, chronic sacral and heel ulcers, admitted for acute cystitis, metabolic encephalopathy likely secondary to infection/metabolic acidosis and YVETTE on CKD. Per patients daughters, she has not had any recent complaints of chest pain, palpitations or increased SOB. Patient follows with Cardio Dr. Grider outpatient and has a long standing hx of afib. She is rate controlled on her current home meds, and baseline HR is usually <100bpm.       - No clear evidence of acute ischemia  - Troponin negative   - EKbpm, afib w/ rvr, ST depressions in leads 2, avf, V4-V6 with early repolarization  - TTE (2022): EF 20-25%, apex and anterior wall akinesis, inferior wall hypokinesis, mod MR, mild TR  - Presumed Hx of CAD: LV dysfunction on TTE likely from a missed MI --> Given her frailty, increased creatinine, decision was made by family and outpatioent Cardio to manage her medically   - Continue to monitor for signs or symptoms of ischemia     -tele afib 80's - 1002  -dig level 0.5    - Digoxin 125mcg was resumed ever other day.  Watch renal function and electrolytes closely  - Continue Metoprolol daily   - Continue Eliquis daily    - Monitor and replete lytes, keep K>4, Mg>2.    -SBP stable 120-130s  - Continue home Metoprolol  - Hold home Losartan in the setting of YVETTE   - Continue to monitor hemodynamics     - TTE (2022): EF 20-25%, apex and anterior wall akinesis, inferior wall hypokinesis, mod MR, mild TR  - Hold home Lasix in the setting of YVETTE.  Net negative 1950 in 24 hrs with YVETTE improving Cr 1.4 from 1.8  - Nephro following  - reassess volume status daily   - Strict I/Os, daily weights    - Other cardiovascular workup will depend on clinical course.  - All other workup per primary team.  - Will continue to follow.    Gabrielle Bullard, NP-C, AGACNP-BC  Cardiology NP  Call or text TEAMS

## 2023-11-30 NOTE — PROGRESS NOTE ADULT - ASSESSMENT
88yF pmhx CHFrEF (LVEF 20-25% 12/2022), afib on eliquis, T2DM, HTN, HLD, alzheimers dementia, hypothyroidism, chronic sacral and heel ulcer, who presented with 1 week of generalized weakness, decreased PO intake, generalized body aches followed by dysuria.     Found to have left sided pyeloureteritis and cystitis. Urine culture growing klebsiella. Remains afebrile and WBC stable at 12. Blood cultures remain no growth. Evaluated by wound care.    #Left pyeloureteritis and cystitis  #Leukocytosis  #Diarrhea    -continue ceftriaxone pending klebsiella sensitivities  -follow cultures to completion  -monitor WBC  -wound care  -discussed with daughter at bedside    Andreina Laughlin MD  Division of Infectious Diseases   Cell [phone] between 8am and 6pm   After 6pm and weekends please call ID service at [phone].     35 minutes spent on total encounter assessing patient, examination, chart review, counseling and coordinating care by the attending physician/nurse/care manager.

## 2023-11-30 NOTE — PROGRESS NOTE ADULT - SUBJECTIVE AND OBJECTIVE BOX
[facility]  INFECTIOUS DISEASES - [address], [place] [address]  Tel: [phone]     Fax: [phone]  =======================================================    GASTONLEXY YADAV [id number]    Follow up: No fevers. Has some discomfort from Clancy but denies any pain elsewhere. Had a soft bowel movement yesterday, but none this AM so far.    Allergies:  No Known Allergies      Antibiotics:  acetaminophen     Tablet .. 650 milliGRAM(s) Oral every 6 hours PRN  aluminum hydroxide/magnesium hydroxide/simethicone Suspension 30 milliLiter(s) Oral every 4 hours PRN  apixaban 2.5 milliGRAM(s) Oral two times a day  cefTRIAXone   IVPB 1000 milliGRAM(s) IV Intermittent every 24 hours  cholecalciferol 2000 Unit(s) Oral daily  clotrimazole/betamethasone Cream 1 Application(s) Topical daily  dextrose 5%. 1000 milliLiter(s) IV Continuous <Continuous>  dextrose 5%. 1000 milliLiter(s) IV Continuous <Continuous>  dextrose 50% Injectable 25 Gram(s) IV Push once  dextrose 50% Injectable 25 Gram(s) IV Push once  dextrose 50% Injectable 12.5 Gram(s) IV Push once  dextrose Oral Gel 15 Gram(s) Oral once PRN  digoxin     Tablet 125 MICROGram(s) Oral every other day  glucagon  Injectable 1 milliGRAM(s) IntraMuscular once  insulin lispro (ADMELOG) corrective regimen sliding scale   SubCutaneous three times a day before meals  insulin lispro (ADMELOG) corrective regimen sliding scale   SubCutaneous at bedtime  levothyroxine 75 MICROGram(s) Oral daily  lidocaine 5% Ointment 1 Application(s) Topical two times a day  melatonin 5 milliGRAM(s) Oral at bedtime  memantine 10 milliGRAM(s) Oral two times a day  metoprolol succinate  milliGRAM(s) Oral two times a day  ondansetron Injectable 4 milliGRAM(s) IV Push every 8 hours PRN  oxyCODONE    IR 2.5 milliGRAM(s) Oral every 4 hours PRN  oxyCODONE    IR 5 milliGRAM(s) Oral every 4 hours PRN  saccharomyces boulardii 250 milliGRAM(s) Oral two times a day       REVIEW OF SYSTEMS:  *limited 2/2 dementia*  CONSTITUTIONAL:  No Fevers  CARDIOVASCULAR:  No chest pain or SOB.  RESPIRATORY:  No cough, shortness of breath  GASTROINTESTINAL:  No nausea or vomiting  GENITOURINARY:  (+) discomfort from Clancy catheter  MUSCULOSKELETAL:  no back pain.  NEUROLOGIC:  No headache        Physical Exam:  ICU Vital Signs Last 24 Hrs  T(C): 36.4 (30 Nov 2023 11:43), Max: 36.5 (29 Nov 2023 19:54)  T(F): 97.6 (30 Nov 2023 11:43), Max: 97.7 (29 Nov 2023 19:54)  HR: 81 (30 Nov 2023 11:43) (80 - 107)  BP: 134/87 (30 Nov 2023 11:43) (124/71 - 138/93)  BP(mean): --  ABP: --  ABP(mean): --  RR: 18 (30 Nov 2023 11:43) (18 - 20)  SpO2: 95% (30 Nov 2023 11:43) (95% - 97%)    O2 Parameters below as of 30 Nov 2023 11:43  Patient On (Oxygen Delivery Method): room air           GEN: NAD  HEENT: normocephalic and atraumatic.   NECK: Supple.   LUNGS: Normal respiratory effort  HEART: Regular rate and rhythm   ABDOMEN: Soft, nontender, and nondistended.    : (+) Clancy with light yellow urine  EXTREMITIES: No leg edema.  NEUROLOGIC: Answering simple questions    Labs:  11-30    139  |  112<H>  |  34<H>  ----------------------------<  173<H>  4.9   |  22  |  1.40<H>    Ca    8.4<L>      30 Nov 2023 07:21  Phos  2.4     11-29  Mg     2.6     11-29    TPro  6.3  /  Alb  2.1<L>  /  TBili  0.2  /  DBili  x   /  AST  26  /  ALT  52  /  AlkPhos  69  11-30                          11.9   12.65 )-----------( 306      ( 30 Nov 2023 07:21 )             36.7       Urinalysis Basic - ( 30 Nov 2023 07:21 )    Color: x / Appearance: x / SG: x / pH: x  Gluc: 173 mg/dL / Ketone: x  / Bili: x / Urobili: x   Blood: x / Protein: x / Nitrite: x   Leuk Esterase: x / RBC: x / WBC x   Sq Epi: x / Non Sq Epi: x / Bacteria: x      LIVER FUNCTIONS - ( 30 Nov 2023 07:21 )  Alb: 2.1 g/dL / Pro: 6.3 g/dL / ALK PHOS: 69 U/L / ALT: 52 U/L / AST: 26 U/L / GGT: x             RECENT CULTURES:  11-27 @ 21:40 Clean Catch Clean Catch (Midstream)     >100,000 CFU/ml Klebsiella pneumoniae        11-27 @ 18:20 .Blood Blood-Peripheral     No growth at 48 Hours        11-27 @ 18:10 .Blood Blood-Peripheral     No growth at 48 Hours              All imaging and data are reviewed.

## 2023-11-30 NOTE — PROVIDER CONTACT NOTE (OTHER) - ASSESSMENT
Patient was in the btahroom with daughter having a bm. denies chest pain or SOB. ONce place to bed HR doen to 90"s

## 2023-12-01 LAB
ANION GAP SERPL CALC-SCNC: 7 MMOL/L — SIGNIFICANT CHANGE UP (ref 5–17)
ANION GAP SERPL CALC-SCNC: 7 MMOL/L — SIGNIFICANT CHANGE UP (ref 5–17)
BUN SERPL-MCNC: 34 MG/DL — HIGH (ref 7–23)
BUN SERPL-MCNC: 34 MG/DL — HIGH (ref 7–23)
CALCIUM SERPL-MCNC: 8.3 MG/DL — LOW (ref 8.5–10.1)
CALCIUM SERPL-MCNC: 8.3 MG/DL — LOW (ref 8.5–10.1)
CHLORIDE SERPL-SCNC: 110 MMOL/L — HIGH (ref 96–108)
CHLORIDE SERPL-SCNC: 110 MMOL/L — HIGH (ref 96–108)
CO2 SERPL-SCNC: 21 MMOL/L — LOW (ref 22–31)
CO2 SERPL-SCNC: 21 MMOL/L — LOW (ref 22–31)
CREAT SERPL-MCNC: 1.3 MG/DL — SIGNIFICANT CHANGE UP (ref 0.5–1.3)
CREAT SERPL-MCNC: 1.3 MG/DL — SIGNIFICANT CHANGE UP (ref 0.5–1.3)
EGFR: 40 ML/MIN/1.73M2 — LOW
EGFR: 40 ML/MIN/1.73M2 — LOW
GLUCOSE SERPL-MCNC: 168 MG/DL — HIGH (ref 70–99)
GLUCOSE SERPL-MCNC: 168 MG/DL — HIGH (ref 70–99)
HCT VFR BLD CALC: 37.4 % — SIGNIFICANT CHANGE UP (ref 34.5–45)
HCT VFR BLD CALC: 37.4 % — SIGNIFICANT CHANGE UP (ref 34.5–45)
HGB BLD-MCNC: 11.9 G/DL — SIGNIFICANT CHANGE UP (ref 11.5–15.5)
HGB BLD-MCNC: 11.9 G/DL — SIGNIFICANT CHANGE UP (ref 11.5–15.5)
MCHC RBC-ENTMCNC: 28.7 PG — SIGNIFICANT CHANGE UP (ref 27–34)
MCHC RBC-ENTMCNC: 28.7 PG — SIGNIFICANT CHANGE UP (ref 27–34)
MCHC RBC-ENTMCNC: 31.8 GM/DL — LOW (ref 32–36)
MCHC RBC-ENTMCNC: 31.8 GM/DL — LOW (ref 32–36)
MCV RBC AUTO: 90.1 FL — SIGNIFICANT CHANGE UP (ref 80–100)
MCV RBC AUTO: 90.1 FL — SIGNIFICANT CHANGE UP (ref 80–100)
NRBC # BLD: 0 /100 WBCS — SIGNIFICANT CHANGE UP (ref 0–0)
NRBC # BLD: 0 /100 WBCS — SIGNIFICANT CHANGE UP (ref 0–0)
PLATELET # BLD AUTO: 293 K/UL — SIGNIFICANT CHANGE UP (ref 150–400)
PLATELET # BLD AUTO: 293 K/UL — SIGNIFICANT CHANGE UP (ref 150–400)
POTASSIUM SERPL-MCNC: 4.5 MMOL/L — SIGNIFICANT CHANGE UP (ref 3.5–5.3)
POTASSIUM SERPL-MCNC: 4.5 MMOL/L — SIGNIFICANT CHANGE UP (ref 3.5–5.3)
POTASSIUM SERPL-SCNC: 4.5 MMOL/L — SIGNIFICANT CHANGE UP (ref 3.5–5.3)
POTASSIUM SERPL-SCNC: 4.5 MMOL/L — SIGNIFICANT CHANGE UP (ref 3.5–5.3)
RBC # BLD: 4.15 M/UL — SIGNIFICANT CHANGE UP (ref 3.8–5.2)
RBC # BLD: 4.15 M/UL — SIGNIFICANT CHANGE UP (ref 3.8–5.2)
RBC # FLD: 18.1 % — HIGH (ref 10.3–14.5)
RBC # FLD: 18.1 % — HIGH (ref 10.3–14.5)
SODIUM SERPL-SCNC: 138 MMOL/L — SIGNIFICANT CHANGE UP (ref 135–145)
SODIUM SERPL-SCNC: 138 MMOL/L — SIGNIFICANT CHANGE UP (ref 135–145)
WBC # BLD: 10.68 K/UL — HIGH (ref 3.8–10.5)
WBC # BLD: 10.68 K/UL — HIGH (ref 3.8–10.5)
WBC # FLD AUTO: 10.68 K/UL — HIGH (ref 3.8–10.5)
WBC # FLD AUTO: 10.68 K/UL — HIGH (ref 3.8–10.5)

## 2023-12-01 PROCEDURE — 99232 SBSQ HOSP IP/OBS MODERATE 35: CPT

## 2023-12-01 RX ORDER — TAMSULOSIN HYDROCHLORIDE 0.4 MG/1
0.4 CAPSULE ORAL AT BEDTIME
Refills: 0 | Status: DISCONTINUED | OUTPATIENT
Start: 2023-12-01 | End: 2023-12-02

## 2023-12-01 RX ORDER — CEFTRIAXONE 500 MG/1
1000 INJECTION, POWDER, FOR SOLUTION INTRAMUSCULAR; INTRAVENOUS EVERY 24 HOURS
Refills: 0 | Status: DISCONTINUED | OUTPATIENT
Start: 2023-12-02 | End: 2023-12-02

## 2023-12-01 RX ADMIN — Medication 100 MILLIGRAM(S): at 17:51

## 2023-12-01 RX ADMIN — Medication 1: at 08:06

## 2023-12-01 RX ADMIN — Medication 2000 UNIT(S): at 12:17

## 2023-12-01 RX ADMIN — Medication 100 MILLIGRAM(S): at 05:46

## 2023-12-01 RX ADMIN — Medication 250 MILLIGRAM(S): at 17:51

## 2023-12-01 RX ADMIN — LIDOCAINE 1 APPLICATION(S): 4 CREAM TOPICAL at 09:02

## 2023-12-01 RX ADMIN — MEMANTINE HYDROCHLORIDE 10 MILLIGRAM(S): 10 TABLET ORAL at 05:46

## 2023-12-01 RX ADMIN — RIVASTIGMINE 1 PATCH: 4.6 PATCH, EXTENDED RELEASE TRANSDERMAL at 21:35

## 2023-12-01 RX ADMIN — TAMSULOSIN HYDROCHLORIDE 0.4 MILLIGRAM(S): 0.4 CAPSULE ORAL at 21:36

## 2023-12-01 RX ADMIN — Medication 5 MILLIGRAM(S): at 21:36

## 2023-12-01 RX ADMIN — Medication 75 MICROGRAM(S): at 05:46

## 2023-12-01 RX ADMIN — APIXABAN 2.5 MILLIGRAM(S): 2.5 TABLET, FILM COATED ORAL at 05:47

## 2023-12-01 RX ADMIN — RIVASTIGMINE 1 PATCH: 4.6 PATCH, EXTENDED RELEASE TRANSDERMAL at 13:40

## 2023-12-01 RX ADMIN — RIVASTIGMINE 1 PATCH: 4.6 PATCH, EXTENDED RELEASE TRANSDERMAL at 19:22

## 2023-12-01 RX ADMIN — Medication 2: at 12:16

## 2023-12-01 RX ADMIN — Medication 1: at 16:55

## 2023-12-01 RX ADMIN — CLOTRIMAZOLE AND BETAMETHASONE DIPROPIONATE 1 APPLICATION(S): 10; .5 CREAM TOPICAL at 13:39

## 2023-12-01 RX ADMIN — TAMSULOSIN HYDROCHLORIDE 0.4 MILLIGRAM(S): 0.4 CAPSULE ORAL at 09:18

## 2023-12-01 RX ADMIN — MEMANTINE HYDROCHLORIDE 10 MILLIGRAM(S): 10 TABLET ORAL at 17:51

## 2023-12-01 RX ADMIN — CEFTRIAXONE 100 MILLIGRAM(S): 500 INJECTION, POWDER, FOR SOLUTION INTRAMUSCULAR; INTRAVENOUS at 23:30

## 2023-12-01 RX ADMIN — Medication 250 MILLIGRAM(S): at 05:47

## 2023-12-01 RX ADMIN — APIXABAN 2.5 MILLIGRAM(S): 2.5 TABLET, FILM COATED ORAL at 17:54

## 2023-12-01 NOTE — PROGRESS NOTE ADULT - SUBJECTIVE AND OBJECTIVE BOX
[facility]  INFECTIOUS DISEASES - Nasrin Amezcua, [place] [address]  Tel: [phone]     Fax: [phone]  =======================================================    GASTONLEXY YADAV [id number]    Follow up: No fevers. Walked with PT earlier. Feels tired and has some discomfort from Clancy but denies any other pain or SOB. Denies any nausea or diarrhea.    Allergies:  No Known Allergies      Antibiotics:  acetaminophen     Tablet .. 650 milliGRAM(s) Oral every 6 hours PRN  aluminum hydroxide/magnesium hydroxide/simethicone Suspension 30 milliLiter(s) Oral every 4 hours PRN  apixaban 2.5 milliGRAM(s) Oral two times a day  cefTRIAXone   IVPB 1000 milliGRAM(s) IV Intermittent every 24 hours  cholecalciferol 2000 Unit(s) Oral daily  clotrimazole/betamethasone Cream 1 Application(s) Topical daily  dextrose 5%. 1000 milliLiter(s) IV Continuous <Continuous>  dextrose 5%. 1000 milliLiter(s) IV Continuous <Continuous>  dextrose 50% Injectable 12.5 Gram(s) IV Push once  dextrose 50% Injectable 25 Gram(s) IV Push once  dextrose 50% Injectable 25 Gram(s) IV Push once  dextrose Oral Gel 15 Gram(s) Oral once PRN  digoxin     Tablet 125 MICROGram(s) Oral every other day  glucagon  Injectable 1 milliGRAM(s) IntraMuscular once  insulin lispro (ADMELOG) corrective regimen sliding scale   SubCutaneous three times a day before meals  insulin lispro (ADMELOG) corrective regimen sliding scale   SubCutaneous at bedtime  levothyroxine 75 MICROGram(s) Oral daily  melatonin 5 milliGRAM(s) Oral at bedtime  memantine 10 milliGRAM(s) Oral two times a day  metoprolol succinate  milliGRAM(s) Oral two times a day  ondansetron Injectable 4 milliGRAM(s) IV Push every 8 hours PRN  oxyCODONE    IR 5 milliGRAM(s) Oral every 4 hours PRN  oxyCODONE    IR 2.5 milliGRAM(s) Oral every 4 hours PRN  rivastigmine patch  9.5 mG/24 Hr(s) 1 Patch Transdermal every 24 hours  saccharomyces boulardii 250 milliGRAM(s) Oral two times a day  tamsulosin 0.4 milliGRAM(s) Oral at bedtime       REVIEW OF SYSTEMS:  *limited 2/2 dementia*  CONSTITUTIONAL:  No Fevers  CARDIOVASCULAR:  No chest pain or SOB.  RESPIRATORY:  No cough, shortness of breath  GASTROINTESTINAL:  No nausea or vomiting  GENITOURINARY:  (+) discomfort from Clancy catheter  MUSCULOSKELETAL:  no back pain.  NEUROLOGIC:  No headache      Physical Exam:  ICU Vital Signs Last 24 Hrs  T(C): 36.3 (01 Dec 2023 12:46), Max: 36.7 (30 Nov 2023 19:29)  T(F): 97.3 (01 Dec 2023 12:46), Max: 98 (30 Nov 2023 19:29)  HR: 100 (01 Dec 2023 12:46) (88 - 103)  BP: 97/63 (01 Dec 2023 12:46) (97/63 - 137/93)  BP(mean): --  ABP: --  ABP(mean): --  RR: 18 (01 Dec 2023 12:46) (18 - 18)  SpO2: 95% (01 Dec 2023 12:46) (95% - 98%)    O2 Parameters below as of 01 Dec 2023 12:46  Patient On (Oxygen Delivery Method): room air        GEN: NAD  HEENT: normocephalic and atraumatic.   NECK: Supple.   LUNGS: Normal respiratory effort  HEART: Regular rate and rhythm   ABDOMEN: Soft, nontender, and nondistended.    : (+) Clancy   EXTREMITIES: No leg edema.  NEUROLOGIC: Answering simple questions    Labs:  12-01    138  |  110<H>  |  34<H>  ----------------------------<  168<H>  4.5   |  21<L>  |  1.30    Ca    8.3<L>      01 Dec 2023 07:35    TPro  6.3  /  Alb  2.1<L>  /  TBili  0.2  /  DBili  x   /  AST  26  /  ALT  52  /  AlkPhos  69  11-30                          11.9   10.68 )-----------( 293      ( 01 Dec 2023 07:35 )             37.4       Urinalysis Basic - ( 01 Dec 2023 07:35 )    Color: x / Appearance: x / SG: x / pH: x  Gluc: 168 mg/dL / Ketone: x  / Bili: x / Urobili: x   Blood: x / Protein: x / Nitrite: x   Leuk Esterase: x / RBC: x / WBC x   Sq Epi: x / Non Sq Epi: x / Bacteria: x      LIVER FUNCTIONS - ( 30 Nov 2023 07:21 )  Alb: 2.1 g/dL / Pro: 6.3 g/dL / ALK PHOS: 69 U/L / ALT: 52 U/L / AST: 26 U/L / GGT: x             RECENT CULTURES:  11-27 @ 21:40 Clean Catch Clean Catch (Midstream) Klebsiella pneumoniae    >100,000 CFU/ml Klebsiella pneumoniae        11-27 @ 18:20 .Blood Blood-Peripheral     No growth at 72 Hours        11-27 @ 18:10 .Blood Blood-Peripheral     No growth at 72 Hours              All imaging and data are reviewed.

## 2023-12-01 NOTE — PROGRESS NOTE ADULT - PROBLEM SELECTOR PLAN 4
Bicarb 19, Chloride 112  - in the past has had metabolic acidosis resolved after sodium bicarb infusion and attributed to metformin/CKD  - will give sodium bicarb now @ 50cc/hr x 9 hours and f/u am metabolic panel + ABG  - monitor closely while in IVF given hx of CHFrEF

## 2023-12-01 NOTE — DIETITIAN INITIAL EVALUATION ADULT - NSFNSADHERENCEPTAFT_GEN_A_CORE
patient does not use salt and avoids high sodium foods, they are compliant with frequent weights due to CHF. Her blood glucose levels are pretty well controlled, they check every other day or so. She limits/avoids concentrated sweets. They decline need for therapeutic diet instruction at home. They cut foods up for her for easier tolerance, but no need for purees at home.

## 2023-12-01 NOTE — DIETITIAN NUTRITION RISK NOTIFICATION - TREATMENT: THE FOLLOWING DIET HAS BEEN RECOMMENDED
Diet, Soft and Bite Sized:   Low Sodium  Supplement Feeding Modality:  Oral  Glucerna Shake Cans or Servings Per Day:  1       Frequency:  Daily (12-01-23 @ 11:10) [Pending Verification By Attending]  Diet, Soft and Bite Sized (11-27-23 @ 22:23) [Active]

## 2023-12-01 NOTE — PROGRESS NOTE ADULT - PROBLEM SELECTOR PLAN 9
Hold home januvia and metformin  - Low dose ISS while inpatient  - f/u a1c
Hold home januvia and metformin  - Low dose ISS while inpatient  - f/u a1c
A1C 7.2  Hold home januvia and metformin  Low dose ISS while inpatient
A1C 7.2  Hold home januvia and metformin  Low dose ISS while inpatient

## 2023-12-01 NOTE — PROGRESS NOTE ADULT - PROBLEM SELECTOR PLAN 10
Continue rivastigmine patch qd (daughter will bring from home in am) + memanting 10mg BID
Continue rivastigmine patch qd (daughter will bring from home in am) + memantine 10mg BID
Continue rivastigmine patch qd (daughter will bring from home in am) + memantine 10mg BID
Continue rivastigmine patch qd (daughter will bring from home in am) + memanting 10mg BID

## 2023-12-01 NOTE — PROGRESS NOTE ADULT - PROBLEM SELECTOR PLAN 2
BUN 82, Cr 2.3, GFR 20   - with metabolic acidosis and lethargy - f/u am blood gas s/p bicarb gtt  - BLCR ~1.4-1.6 as per chart review  - YVETTE likely 2/2 recent poor PO intake vs urinary retention  - chaney placed in ED for urinary retention  - continue IVF (sodium bicarb for metabolic acidosis) and f/u am metabolic panel
BUN 82, Cr 2.3, GFR 20   - with metabolic acidosis and lethargy - f/u am blood gas s/p bicarb gtt  - BLCR ~1.4-1.6 as per chart review  - YVETTE likely 2/2 recent poor PO intake vs urinary retention  - chaney placed in ED for urinary retention - DC Chaney. TOV  - Off IVF (sodium bicarb for metabolic acidosis) -
BUN 82, Cr 2.3, GFR 20   - with metabolic acidosis and lethargy - f/u am blood gas s/p bicarb gtt  - BLCR ~1.4-1.6 as per chart review  - YVETTE likely 2/2 recent poor PO intake vs urinary retention  - chaney placed in ED for urinary retention - failed TOV chaney placed 12/1  - Off IVF (sodium bicarb for metabolic acidosis) -
BUN 82, Cr 2.3, GFR 20   - with metabolic acidosis and lethargy - f/u am blood gas s/p bicarb gtt  - BLCR ~1.4-1.6 as per chart review  - YVETTE likely 2/2 recent poor PO intake vs urinary retention  - chaney placed in ED for urinary retention  - continue IVF (sodium bicarb for metabolic acidosis) and f/u am metabolic panel

## 2023-12-01 NOTE — DIETITIAN INITIAL EVALUATION ADULT - ADD RECOMMEND
1) encourage PO intake  2) recommend Glucerna BID  3) at this time, given poor PO Intake and acute malnutrition, would hold off on adding consistent CHO restriction at this time to allow for further food variety

## 2023-12-01 NOTE — DIETITIAN INITIAL EVALUATION ADULT - PROBLEM SELECTOR PLAN 1
Presenting with dysuria and generalized weakness  - UA: turbid, positive nitrites, large LE, TNTC WBC, many bacteria  - CT abdomen: Mild left hydronephrosis without obstructing calculus may be related to recently expelled calculus and/or bladder distention. Nonobstructive left nephrolithiasis. Suggestion of left pyeloureteritis  and cystitis.   - did not meet sepsis criteria on admission  - continue IV ceftriaxone and f/u blood and urine cx   - f/u ID Consult Dr. Laughlin

## 2023-12-01 NOTE — DIETITIAN INITIAL EVALUATION ADULT - PROBLEM SELECTOR PLAN 2
BUN 82, Cr 2.3, GFR 20   - with metabolic acidosis and lethargy - f/u am blood gas s/p bicarb gtt  - BLCR ~1.4-1.6 as per chart review  - YVETTE likely 2/2 recent poor PO intake vs urinary retention  - chaney placed in ED for urinary retention  - continue IVF (sodium bicarb for metabolic acidosis) and f/u am metabolic panel

## 2023-12-01 NOTE — PROGRESS NOTE ADULT - PROBLEM SELECTOR PLAN 3
ST depressions in leads 2, avf, V4-V6 with early repolarization  - not seen on ecg from 1/2023 which was also afib with rvr  - troponin negative in ED  - Cardio Dr. Richardson consulted
ST depressions in leads 2, avf, V4-V6 with early repolarization  - not seen on ecg from 1/2023 which was also afib with rvr  - troponin negative in ED  - Cardio Dr. Richardson consulted recommendations appreciated
ST depressions in leads 2, avf, V4-V6 with early repolarization  - not seen on ecg from 1/2023 which was also afib with rvr  - troponin negative in ED  - Cardio Dr. Richardson consulted recommendations appreciated
ST depressions in leads 2, avf, V4-V6 with early repolarization  - not seen on ecg from 1/2023 which was also afib with rvr  - troponin negative in ED  - Cardio Dr. Richardson consulted

## 2023-12-01 NOTE — DIETITIAN INITIAL EVALUATION ADULT - LAB (SPECIFY)
continue to monitor blood glucose levels, if noted to be significantly elevated, would recommend adding consistent CHO restriction

## 2023-12-01 NOTE — DIETITIAN INITIAL EVALUATION ADULT - PROBLEM SELECTOR PLAN 5
ECG afib witih RVR 119bpm  - continue home toprol 100mg BID (pm dose now)  - continue digoxin 62.5mcg qod given GFR  - continue home eliquis 2.5mg BID  - avoid CCB given systolic CHF  - remote tele monitoring  - f/u am digoxin level

## 2023-12-01 NOTE — DIETITIAN INITIAL EVALUATION ADULT - PROBLEM SELECTOR PLAN 6
LVEF 20-25% 12/2022  - hold home lasix 2/2 rick   - continue home toprol 100mg BID  - no signs/sx of volume overload  - monitor routine hemodynamics especially while on IVF

## 2023-12-01 NOTE — DIETITIAN INITIAL EVALUATION ADULT - ENERGY INTAKE
Fair (50-75%) generally good, but noticeable decrease in PO intake over past 6 weeks, resulting in 10 lb weight loss

## 2023-12-01 NOTE — PROGRESS NOTE ADULT - SUBJECTIVE AND OBJECTIVE BOX
Patient is a 88y old  Female who presents with a chief complaint of UTI/pyelo (29 Nov 2023 10:02)    Patient seen in follow up for YVETTE.        PAST MEDICAL HISTORY:  Atrial fibrillation    Hypothyroidism    Mild Alzheimer's dementia    Diabetes mellitus    Acute on chronic systolic congestive heart failure    Hypertension    Hyperlipemia    Cardiac LV ejection fraction 21-30%        MEDICATIONS  (STANDING):  apixaban 2.5 milliGRAM(s) Oral two times a day  cefTRIAXone   IVPB 1000 milliGRAM(s) IV Intermittent every 24 hours  cholecalciferol 2000 Unit(s) Oral daily  clotrimazole/betamethasone Cream 1 Application(s) Topical daily  dextrose 5%. 1000 milliLiter(s) (100 mL/Hr) IV Continuous <Continuous>  dextrose 5%. 1000 milliLiter(s) (50 mL/Hr) IV Continuous <Continuous>  dextrose 50% Injectable 25 Gram(s) IV Push once  dextrose 50% Injectable 25 Gram(s) IV Push once  dextrose 50% Injectable 12.5 Gram(s) IV Push once  digoxin     Tablet 125 MICROGram(s) Oral every other day  glucagon  Injectable 1 milliGRAM(s) IntraMuscular once  insulin lispro (ADMELOG) corrective regimen sliding scale   SubCutaneous three times a day before meals  insulin lispro (ADMELOG) corrective regimen sliding scale   SubCutaneous at bedtime  levothyroxine 75 MICROGram(s) Oral daily  melatonin 5 milliGRAM(s) Oral at bedtime  memantine 10 milliGRAM(s) Oral two times a day  metoprolol succinate  milliGRAM(s) Oral two times a day  rivastigmine patch  9.5 mG/24 Hr(s) 1 Patch Transdermal every 24 hours  saccharomyces boulardii 250 milliGRAM(s) Oral two times a day  tamsulosin 0.4 milliGRAM(s) Oral at bedtime    MEDICATIONS  (PRN):  acetaminophen     Tablet .. 650 milliGRAM(s) Oral every 6 hours PRN Temp greater or equal to 38C (100.4F), Mild Pain (1 - 3)  aluminum hydroxide/magnesium hydroxide/simethicone Suspension 30 milliLiter(s) Oral every 4 hours PRN Dyspepsia  dextrose Oral Gel 15 Gram(s) Oral once PRN Blood Glucose LESS THAN 70 milliGRAM(s)/deciliter  ondansetron Injectable 4 milliGRAM(s) IV Push every 8 hours PRN Nausea and/or Vomiting  oxyCODONE    IR 2.5 milliGRAM(s) Oral every 4 hours PRN Moderate Pain (4 - 6)  oxyCODONE    IR 5 milliGRAM(s) Oral every 4 hours PRN Severe Pain (7 - 10)    T(C): 36.3 (12-01-23 @ 12:46), Max: 36.7 (11-30-23 @ 19:29)  HR: 100 (12-01-23 @ 12:46) (80 - 107)  BP: 97/63 (12-01-23 @ 12:46) (97/63 - 138/93)  RR: 18 (12-01-23 @ 12:46)  SpO2: 95% (12-01-23 @ 12:46)  Wt(kg): --  I&O's Detail    30 Nov 2023 07:01  -  01 Dec 2023 07:00  --------------------------------------------------------  IN:  Total IN: 0 mL    OUT:    Indwelling Catheter - Urethral (mL): 500 mL    Voided (mL): 300 mL  Total OUT: 800 mL    Total NET: -800 mL        PHYSICAL EXAM:  General: No distress  Respiratory: b/l air entry  Cardiovascular: S1 S2  Gastrointestinal: soft  Extremities:  no edema                    LABORATORY:                        11.9   10.68 )-----------( 293      ( 01 Dec 2023 07:35 )             37.4     12-01    138  |  110<H>  |  34<H>  ----------------------------<  168<H>  4.5   |  21<L>  |  1.30    Ca    8.3<L>      01 Dec 2023 07:35    TPro  6.3  /  Alb  2.1<L>  /  TBili  0.2  /  DBili  x   /  AST  26  /  ALT  52  /  AlkPhos  69  11-30    Sodium: 138 mmol/L (12-01 @ 07:35)  Sodium: 139 mmol/L (11-30 @ 07:21)    Potassium: 4.5 mmol/L (12-01 @ 07:35)  Potassium: 4.9 mmol/L (11-30 @ 07:21)    Hemoglobin: 11.9 g/dL (12-01 @ 07:35)  Hemoglobin: 11.9 g/dL (11-30 @ 07:21)  Hemoglobin: 12.4 g/dL (11-29 @ 07:47)  Hemoglobin: 11.7 g/dL (11-28 @ 13:22)    Creatinine, Serum 1.30 (12-01 @ 07:35)  Creatinine, Serum 1.40 (11-30 @ 07:21)  Creatinine, Serum 1.50 (11-29 @ 07:47)  Creatinine, Serum 1.80 (11-28 @ 13:22)        LIVER FUNCTIONS - ( 30 Nov 2023 07:21 )  Alb: 2.1 g/dL / Pro: 6.3 g/dL / ALK PHOS: 69 U/L / ALT: 52 U/L / AST: 26 U/L / GGT: x           Urinalysis Basic - ( 01 Dec 2023 07:35 )    Color: x / Appearance: x / SG: x / pH: x  Gluc: 168 mg/dL / Ketone: x  / Bili: x / Urobili: x   Blood: x / Protein: x / Nitrite: x   Leuk Esterase: x / RBC: x / WBC x   Sq Epi: x / Non Sq Epi: x / Bacteria: x

## 2023-12-01 NOTE — PROGRESS NOTE ADULT - ASSESSMENT
88yF pmhx CHFrEF (LVEF 20-25% 12/2022), afib on eliquis, T2DM, HTN, HLD, alzheimers dementia, hypothyroidism, chronic sacral and heel ulcer, who presented with 1 week of generalized weakness, decreased PO intake, generalized body aches followed by dysuria.     Found to have left sided pyeloureteritis and cystitis. Urine culture growing klebsiella, sensitivities reviewed. Remains afebrile and leukocytosis improved. Blood cultures remain no growth. Evaluated by wound care.    #Left pyeloureteritis and cystitis  #Leukocytosis    -continue ceftriaxone until tomorrow 12/2, then starting 12/3 can switch to cefpodoxime 200mg PO BID for 4 more days  -follow cultures to completion  -wound care  -discussed with daughter at bedside  -discussed with Dr. Templeton  -will sign off, please call ID if any further questions. Thank you.    Andreina Laughlin MD  Division of Infectious Diseases   Cell [phone] between 8am and 6pm   After 6pm and weekends please call ID service at [phone].     35 minutes spent on total encounter assessing patient, examination, chart review, counseling and coordinating care by the attending physician/nurse/care manager.

## 2023-12-01 NOTE — DIETITIAN INITIAL EVALUATION ADULT - ORAL INTAKE PTA/DIET HISTORY
patient generally with good appetite and PO intake, but per patient's daughter at bedside, over the past 6 weeks, her PO Intake has decreased as she has been treated for a few medical conditions. At that time she was supplementing with Ensure twice daily.  Typical food intake when feeling well:  Breakfast: can sometimes be oatmeal, cheerios, or scrambled eggs, sometimes toast , lunch is lighter sometimes a yogurt with Ensure, or a ham and cheese sandwich, dinner varies but is a well balanced hot meal

## 2023-12-01 NOTE — PROGRESS NOTE ADULT - PROBLEM SELECTOR PLAN 5
ECG afib witih RVR 119bpm  - continue home toprol 100mg BID (pm dose now)  - continue digoxin 62.5mcg qod given GFR  - continue home eliquis 2.5mg BID  - avoid CCB given systolic CHF  - remote tele monitoring  - digoxin level
ECG afib witih RVR 119bpm  - continue home toprol 100mg BID (pm dose now)  - continue digoxin 62.5mcg qod given GFR  - continue home eliquis 2.5mg BID  - avoid CCB given systolic CHF  - remote tele monitoring  - digoxin level
ECG afib witih RVR 119bpm  - continue home toprol 100mg BID (pm dose now)  - continue digoxin 62.5mcg qod given GFR  - continue home eliquis 2.5mg BID  - avoid CCB given systolic CHF  - remote tele monitoring  - f/u am digoxin level
ECG afib witih RVR 119bpm  - continue home toprol 100mg BID (pm dose now)  - continue digoxin 62.5mcg qod given GFR  - continue home eliquis 2.5mg BID  - avoid CCB given systolic CHF  - remote tele monitoring  - f/u am digoxin level

## 2023-12-01 NOTE — DIETITIAN INITIAL EVALUATION ADULT - NSFNSPHYEXAMSKINFT_GEN_A_CORE
Pressure Injury 1: sacrum, Unstageable  Pressure Injury 2: Right:, butt, Unstageable  Pressure Injury 3: Right:, upper butt, Stage II  Pressure Injury 4: Right:, inner buttocks, Stage II  Pressure Injury 5: Right:, butt, Suspected deep tissue injury  Pressure Injury 6: Right:, heel, Unstageable  Pressure Injury 7: none, none  Pressure Injury 8: none, none  Pressure Injury 9: none, none  Pressure Injury 10: none, none  Pressure Injury 11: none, none, Pressure Injury 1: sacrum, Unstageable  Pressure Injury 2: buttocks, Right:, Unstageable  Pressure Injury 3: Right:, upper, buttocks, Stage II  Pressure Injury 4: Right:, inner, buttocks, Stage II  Pressure Injury 5: none, none  Pressure Injury 6: Right:, heel, Unstageable  Pressure Injury 7: none, none  Pressure Injury 8: none, none  Pressure Injury 9: none, none  Pressure Injury 10: none, none  Pressure Injury 11: none, none

## 2023-12-01 NOTE — PROGRESS NOTE ADULT - ASSESSMENT
YVETTE: Prerenal azotemia, Urinary retention  Hypotension  Diabetes    Improved and stable renal indices. To continue current meds. Encourage PO intake as tolerated.   Failed TOV. Will follow electrolytes and renal function trend. D/w pt's daughter at bedside.

## 2023-12-01 NOTE — DIETITIAN INITIAL EVALUATION ADULT - REASON FOR ADMISSION
Acute renal failure  per H&P:  88yF pmhx CHFrEF (LVEF 20-25% 12/2022), afib on eliquis, T2DM, HTN, HLD, alzheimers dementia, hypothyroidism, chronic sacral and heel ulcer brought to the hospital by children for 1 week of generalized weakness, decreased PO intake, generalized body aches followed by dysuria onset today. As per family, patient was seen by PM Dr. Shukla 1 month ago and had elevated LFTs with normal abdominal sono. Crestor was stopped and she was seen by GI Dr. Wilder and found to have yeast in stool and completed course of fluconazole. Then she developed a cough/URI and completed course of doxy+medrol dose guerline from Dr. Shukla PMD. Home Tmax 98.2F. No recent cp, sob, abd pain, n/b/d. She is lethargic and AAOx2 on admission. Daughter states it is normal for her not to know the year but the real change is her lethargy.

## 2023-12-01 NOTE — DIETITIAN INITIAL EVALUATION ADULT - PERTINENT MEDS FT
MEDICATIONS  (STANDING):  apixaban 2.5 milliGRAM(s) Oral two times a day  cefTRIAXone   IVPB 1000 milliGRAM(s) IV Intermittent every 24 hours  cholecalciferol 2000 Unit(s) Oral daily  clotrimazole/betamethasone Cream 1 Application(s) Topical daily  dextrose 5%. 1000 milliLiter(s) (100 mL/Hr) IV Continuous <Continuous>  dextrose 5%. 1000 milliLiter(s) (50 mL/Hr) IV Continuous <Continuous>  dextrose 50% Injectable 12.5 Gram(s) IV Push once  dextrose 50% Injectable 25 Gram(s) IV Push once  dextrose 50% Injectable 25 Gram(s) IV Push once  digoxin     Tablet 125 MICROGram(s) Oral every other day  glucagon  Injectable 1 milliGRAM(s) IntraMuscular once  insulin lispro (ADMELOG) corrective regimen sliding scale   SubCutaneous at bedtime  insulin lispro (ADMELOG) corrective regimen sliding scale   SubCutaneous three times a day before meals  levothyroxine 75 MICROGram(s) Oral daily  melatonin 5 milliGRAM(s) Oral at bedtime  memantine 10 milliGRAM(s) Oral two times a day  metoprolol succinate  milliGRAM(s) Oral two times a day  rivastigmine patch  9.5 mG/24 Hr(s) 1 Patch Transdermal every 24 hours  saccharomyces boulardii 250 milliGRAM(s) Oral two times a day  tamsulosin 0.4 milliGRAM(s) Oral at bedtime    MEDICATIONS  (PRN):  acetaminophen     Tablet .. 650 milliGRAM(s) Oral every 6 hours PRN Temp greater or equal to 38C (100.4F), Mild Pain (1 - 3)  aluminum hydroxide/magnesium hydroxide/simethicone Suspension 30 milliLiter(s) Oral every 4 hours PRN Dyspepsia  dextrose Oral Gel 15 Gram(s) Oral once PRN Blood Glucose LESS THAN 70 milliGRAM(s)/deciliter  ondansetron Injectable 4 milliGRAM(s) IV Push every 8 hours PRN Nausea and/or Vomiting  oxyCODONE    IR 5 milliGRAM(s) Oral every 4 hours PRN Severe Pain (7 - 10)  oxyCODONE    IR 2.5 milliGRAM(s) Oral every 4 hours PRN Moderate Pain (4 - 6)

## 2023-12-01 NOTE — PROGRESS NOTE ADULT - PROBLEM SELECTOR PLAN 11
Continue home synthroid 75mcg/d

## 2023-12-01 NOTE — DIETITIAN INITIAL EVALUATION ADULT - PERTINENT LABORATORY DATA
12-01    138  |  110<H>  |  34<H>  ----------------------------<  168<H>  4.5   |  21<L>  |  1.30    Ca    8.3<L>      01 Dec 2023 07:35    TPro  6.3  /  Alb  2.1<L>  /  TBili  0.2  /  DBili  x   /  AST  26  /  ALT  52  /  AlkPhos  69  11-30  POCT Blood Glucose.: 164 mg/dL (12-01-23 @ 07:33)  A1C with Estimated Average Glucose Result: 7.2 % (11-28-23 @ 13:22)  A1C with Estimated Average Glucose Result: 7.8 % (12-22-22 @ 06:25)

## 2023-12-01 NOTE — PROGRESS NOTE ADULT - PROBLEM SELECTOR PLAN 8
Not on statin (recently discontinued) 2/2 transaminitis that resolved with discontinuation of statin   - check lipid panel
Not on statin (recently discontinued) 2/2 transaminitis that resolved with discontinuation of statin   - check lipid panel
Not on statin (recently discontinued) 2/2 transaminitis that resolved with discontinuation of statin
Not on statin (recently discontinued) 2/2 transaminitis that resolved with discontinuation of statin   - check lipid panel

## 2023-12-01 NOTE — PROGRESS NOTE ADULT - TIME BILLING
Note written by attending. meds, labs, vitals, chart reviewed. Plan d/w both daughters at bedside. All questions answered
Reviewing chart notes and data, reviewing telemetry monitor records, face to face time counseling the patient and daughter, communicating with Dr. Oleary renal, [facility].
Reviewing chart notes and data, reviewing telemetry monitor records, face to face time counseling the patient and daughter, coordinating care with SW/CM at [facility].

## 2023-12-01 NOTE — PROGRESS NOTE ADULT - SUBJECTIVE AND OBJECTIVE BOX
Patient is a 88y old  Female who presents with a chief complaint of UTI/pyelo (01 Dec 2023 13:19)      INTERVAL HPI/OVERNIGHT EVENTS: Patient seen and examined at bedside. No overnight events.  Failed TOV - straight cath 2x with large volumes  Having BM    MEDICATIONS  (STANDING):  apixaban 2.5 milliGRAM(s) Oral two times a day  cefTRIAXone   IVPB 1000 milliGRAM(s) IV Intermittent every 24 hours  cholecalciferol 2000 Unit(s) Oral daily  clotrimazole/betamethasone Cream 1 Application(s) Topical daily  dextrose 5%. 1000 milliLiter(s) (100 mL/Hr) IV Continuous <Continuous>  dextrose 5%. 1000 milliLiter(s) (50 mL/Hr) IV Continuous <Continuous>  dextrose 50% Injectable 25 Gram(s) IV Push once  dextrose 50% Injectable 25 Gram(s) IV Push once  dextrose 50% Injectable 12.5 Gram(s) IV Push once  digoxin     Tablet 125 MICROGram(s) Oral every other day  glucagon  Injectable 1 milliGRAM(s) IntraMuscular once  insulin lispro (ADMELOG) corrective regimen sliding scale   SubCutaneous three times a day before meals  insulin lispro (ADMELOG) corrective regimen sliding scale   SubCutaneous at bedtime  levothyroxine 75 MICROGram(s) Oral daily  melatonin 5 milliGRAM(s) Oral at bedtime  memantine 10 milliGRAM(s) Oral two times a day  metoprolol succinate  milliGRAM(s) Oral two times a day  rivastigmine patch  9.5 mG/24 Hr(s) 1 Patch Transdermal every 24 hours  saccharomyces boulardii 250 milliGRAM(s) Oral two times a day  tamsulosin 0.4 milliGRAM(s) Oral at bedtime    MEDICATIONS  (PRN):  acetaminophen     Tablet .. 650 milliGRAM(s) Oral every 6 hours PRN Temp greater or equal to 38C (100.4F), Mild Pain (1 - 3)  aluminum hydroxide/magnesium hydroxide/simethicone Suspension 30 milliLiter(s) Oral every 4 hours PRN Dyspepsia  dextrose Oral Gel 15 Gram(s) Oral once PRN Blood Glucose LESS THAN 70 milliGRAM(s)/deciliter  ondansetron Injectable 4 milliGRAM(s) IV Push every 8 hours PRN Nausea and/or Vomiting  oxyCODONE    IR 2.5 milliGRAM(s) Oral every 4 hours PRN Moderate Pain (4 - 6)  oxyCODONE    IR 5 milliGRAM(s) Oral every 4 hours PRN Severe Pain (7 - 10)      Allergies    No Known Allergies    Intolerances        REVIEW OF SYSTEMS:  CONSTITUTIONAL: No fever or chills  CARDIOVASCULAR: No chest pain, palpitations  GASTROINTESTINAL: No abd pain, nausea, vomiting    Vital Signs Last 24 Hrs  T(C): 36.3 (01 Dec 2023 12:46), Max: 36.7 (30 Nov 2023 19:29)  T(F): 97.3 (01 Dec 2023 12:46), Max: 98 (30 Nov 2023 19:29)  HR: 100 (01 Dec 2023 12:46) (88 - 103)  BP: 97/63 (01 Dec 2023 12:46) (97/63 - 137/93)  BP(mean): --  RR: 18 (01 Dec 2023 12:46) (18 - 18)  SpO2: 95% (01 Dec 2023 12:46) (95% - 98%)    Parameters below as of 01 Dec 2023 12:46  Patient On (Oxygen Delivery Method): room air      I&O's Summary    30 Nov 2023 07:01  -  01 Dec 2023 07:00  --------------------------------------------------------  IN: 0 mL / OUT: 800 mL / NET: -800 mL      BMI (kg/m2): 19.9 (11-27-23 @ 17:47)    PHYSICAL EXAM:  GENERAL: NAD  HEENT:  AT/NC, anicteric, moist mucous membranes, EOMI, PERRL, no lid-lag, conjunctiva and sclera clear  CHEST/LUNG:  CTA b/l, no rales, wheezes, or rhonchi,  normal respiratory effort, no intercostal retractions  HEART:  RRR, S1, S2, no murmurs; no pitting edema  ABDOMEN:  BS+, soft, nontender, nondistended  MSK/EXTREMITIES: palpable peripheral pulses, no clubbing or cyanosis  NERVOUS SYSTEM: AO1-2, follows simple commands, grossly moves all extremities  PSYCH: Appropriate affect, Alert & Awake; poor judgement  SKIN: sacral decub       LABS: Personally reviewed  CBC                        11.9   10.68 )-----------( 293      ( 01 Dec 2023 07:35 )             37.4     CMP  12-01    138  |  110  |  34  ----------------------------<  168  4.5   |  21  |  1.30    Ca    8.3      01 Dec 2023 07:35  Phos  2.4     11-29  Mg     2.6     11-29    TPro  6.3  /  Alb  2.1  /  TBili  0.2  /  DBili  x   /  AST  26  /  ALT  52  /  AlkPhos  69  11-30                                  POCT Blood Glucose.: 214 mg/dL (01 Dec 2023 11:42)  POCT Blood Glucose.: 164 mg/dL (01 Dec 2023 07:33)  POCT Blood Glucose.: 177 mg/dL (30 Nov 2023 21:00)  POCT Blood Glucose.: 156 mg/dL (30 Nov 2023 16:41)      Urinalysis Basic - ( 01 Dec 2023 07:35 )    Color: x / Appearance: x / SG: x / pH: x  Gluc: 168 mg/dL / Ketone: x  / Bili: x / Urobili: x   Blood: x / Protein: x / Nitrite: x   Leuk Esterase: x / RBC: x / WBC x   Sq Epi: x / Non Sq Epi: x / Bacteria: x        Culture - Urine (collected 27 Nov 2023 21:40)  Source: Clean Catch Clean Catch (Midstream)  Final Report (30 Nov 2023 22:49):    >100,000 CFU/ml Klebsiella pneumoniae  Organism: Klebsiella pneumoniae (30 Nov 2023 22:49)  Organism: Klebsiella pneumoniae (30 Nov 2023 22:49)      Method Type: SARAH      -  Amoxicillin/Clavulanic Acid: S <=8/4      -  Ampicillin: R >16 These ampicillin results predict results for amoxicillin      -  Ampicillin/Sulbactam: S 8/4      -  Aztreonam: S <=4      -  Cefazolin: S <=2 For uncomplicated UTI with K. pneumoniae, E. coli, or P. mirablis: SARAH <=16 is sensitive and SARAH >=32 is resistant. This also predicts results for oral agents cefaclor, cefdinir, cefpodoxime, cefprozil, cefuroxime axetil, cephalexin and locarbef for uncomplicated UTI. Note that some isolates may be susceptible to these agents while testing resistant to cefazolin.      -  Cefepime: S <=2      -  Cefoxitin: R >16      -  Ceftriaxone: S <=1      -  Cefuroxime: R >16      -  Ciprofloxacin: R >2      -  Ertapenem: S <=0.5      -  Gentamicin: S <=2      -  Imipenem: S <=1      -  Levofloxacin: R 4      -  Meropenem: S <=1      -  Nitrofurantoin: I 64 Should not be used to treat pyelonephritis      -  Piperacillin/Tazobactam: S <=8      -  Tobramycin: S <=2      -  Trimethoprim/Sulfamethoxazole: R >2/38    Culture - Blood (collected 27 Nov 2023 18:20)  Source: .Blood Blood-Peripheral  Preliminary Report (01 Dec 2023 01:01):    No growth at 72 Hours    Culture - Blood (collected 27 Nov 2023 18:10)  Source: .Blood Blood-Peripheral  Preliminary Report (01 Dec 2023 01:01):    No growth at 72 Hours            Culture - Urine (collected 11-27-23 @ 21:40)  Source: Clean Catch Clean Catch (Midstream)  Final Report (11-30-23 @ 22:49):    >100,000 CFU/ml Klebsiella pneumoniae  Organism: Klebsiella pneumoniae (11-30-23 @ 22:49)  Organism: Klebsiella pneumoniae (11-30-23 @ 22:49)      Method Type: SARAH      -  Amoxicillin/Clavulanic Acid: S <=8/4      -  Ampicillin: R >16 These ampicillin results predict results for amoxicillin      -  Ampicillin/Sulbactam: S 8/4      -  Aztreonam: S <=4      -  Cefazolin: S <=2 For uncomplicated UTI with K. pneumoniae, E. coli, or P. mirablis: SARAH <=16 is sensitive and SARAH >=32 is resistant. This also predicts results for oral agents cefaclor, cefdinir, cefpodoxime, cefprozil, cefuroxime axetil, cephalexin and locarbef for uncomplicated UTI. Note that some isolates may be susceptible to these agents while testing resistant to cefazolin.      -  Cefepime: S <=2      -  Cefoxitin: R >16      -  Ceftriaxone: S <=1      -  Cefuroxime: R >16      -  Ciprofloxacin: R >2      -  Ertapenem: S <=0.5      -  Gentamicin: S <=2      -  Imipenem: S <=1      -  Levofloxacin: R 4      -  Meropenem: S <=1      -  Nitrofurantoin: I 64 Should not be used to treat pyelonephritis      -  Piperacillin/Tazobactam: S <=8      -  Tobramycin: S <=2      -  Trimethoprim/Sulfamethoxazole: R >2/38    Culture - Blood (collected 11-27-23 @ 18:20)  Source: .Blood Blood-Peripheral  Preliminary Report (12-01-23 @ 01:01):    No growth at 72 Hours    Culture - Blood (collected 11-27-23 @ 18:10)  Source: .Blood Blood-Peripheral  Preliminary Report (12-01-23 @ 01:01):    No growth at 72 Hours        RADIOLOGY & ADDITIONAL TESTS: Personally reviewed.     Consultant(s) Notes Reviewed:  [x] YES  [ ] NO   Discussed with JOSE L/MERON, RN

## 2023-12-01 NOTE — PROGRESS NOTE ADULT - PROBLEM SELECTOR PLAN 1
Presenting with dysuria and generalized weakness  - UA: turbid, positive nitrites, large LE, TNTC WBC, many bacteria  - CT abdomen: Mild left hydronephrosis without obstructing calculus may be related to recently expelled calculus and/or bladder distention. Nonobstructive left nephrolithiasis. Suggestion of left pyeloureteritis  and cystitis.   - did not meet sepsis criteria on admission  - continue IV ceftriaxone and f/u blood and urine cx   - f/u ID Consult Dr. Laughlin  -f/u cultures
Presenting with dysuria and generalized weakness  - UA: turbid, positive nitrites, large LE, TNTC WBC, many bacteria  - CT abdomen: Mild left hydronephrosis without obstructing calculus may be related to recently expelled calculus and/or bladder distention. Nonobstructive left nephrolithiasis. Suggestion of left pyeloureteritis  and cystitis.   - did not meet sepsis criteria on admission  - continue IV ceftriaxone and f/u blood and urine cx   - f/u ID Consult Dr. Laughlin  -f/u cultures
Presenting with dysuria and generalized weakness  UA: turbid, positive nitrites, large LE, TNTC WBC, many bacteria  CT abdomen: Mild left hydronephrosis without obstructing calculus may be related to recently expelled calculus and/or bladder distention. Nonobstructive left nephrolithiasis. Suggestion of left pyeloureteritis  and cystitis.   - continue IV ceftriaxone   - ID Consult Dr. Laughlin recommendations appreciated  -blood cultures no growth  UCx reviewed    Leukocytosis uptrending - continue to monitor
Presenting with dysuria and generalized weakness  UA: turbid, positive nitrites, large LE, TNTC WBC, many bacteria  CT abdomen: Mild left hydronephrosis without obstructing calculus may be related to recently expelled calculus and/or bladder distention. Nonobstructive left nephrolithiasis. Suggestion of left pyeloureteritis  and cystitis.   - continue IV ceftriaxone - plan to switch to PO in AM  - ID Consult Dr. Laughlin recommendations appreciated  -blood cultures no growth  UCx reviewed    Leukocytosis downtrending- continue to monitor  Failed TOV - start Flomax. Will dc with [facility]ancy and outpatient TOV

## 2023-12-01 NOTE — PROGRESS NOTE ADULT - PROBLEM SELECTOR PLAN 12
Patient follows with Dr. Vaughn and Dr. Nuno for chronic sacral and heel wounds  - Podiatry and wound care Dr. Vaughn consulted, spoke to wound center     DVT ppx: eliquis 2.5mg BID    Dispo: DNR/DNI MOLST in chart
Patient follows with Dr. Vaughn and Dr. Nuno for chronic sacral and heel wounds  - Podiatry and wound care Dr. Vaughn consulted, spoke to wound center     DVT ppx: eliquis 2.5mg BID    Dispo: DNR/DNI MOLST in chart
Patient follows with Dr. Vaughn and Dr. Nuno for chronic sacral and heel wounds  - Podiatry and wound care Dr. Vaughn consulted, spoke to wound center     DVT ppx: eliquis 2.5mg BID    Dispo: DNR/DNI MOLST in chart      Discussed with daughter at bedside aware and in agreement with above. Likely dc tomorrow home with chaney and PO abx.
Patient follows with Dr. Vaughn and Dr. Nuno for chronic sacral and heel wounds  - Podiatry and wound care Dr. Vaughn consulted, spoke to wound center     DVT ppx: eliquis 2.5mg BID    Dispo: DNR/DNI MOLST in chart

## 2023-12-01 NOTE — PROGRESS NOTE ADULT - PROBLEM SELECTOR PLAN 7
Continue home toprol 100mg BID  Hold home losartan 25mg/d in the setting of YVETTE
Continue home toprol 100mg BID  Hold home losartan 25mg/d in the setting of YVETTE  monitor vitals
Continue home toprol 100mg BID  Hold home losartan 25mg/d in the setting of YVETTE  monitor vitals
Continue home toprol 100mg BID  Hold home losartan 25mg/d in the setting of YVETTE

## 2023-12-02 ENCOUNTER — TRANSCRIPTION ENCOUNTER (OUTPATIENT)
Age: 88
End: 2023-12-02

## 2023-12-02 VITALS
HEART RATE: 98 BPM | TEMPERATURE: 97 F | OXYGEN SATURATION: 95 % | DIASTOLIC BLOOD PRESSURE: 76 MMHG | RESPIRATION RATE: 18 BRPM | SYSTOLIC BLOOD PRESSURE: 127 MMHG

## 2023-12-02 LAB
ANION GAP SERPL CALC-SCNC: 7 MMOL/L — SIGNIFICANT CHANGE UP (ref 5–17)
ANION GAP SERPL CALC-SCNC: 7 MMOL/L — SIGNIFICANT CHANGE UP (ref 5–17)
BUN SERPL-MCNC: 34 MG/DL — HIGH (ref 7–23)
BUN SERPL-MCNC: 34 MG/DL — HIGH (ref 7–23)
CALCIUM SERPL-MCNC: 8.3 MG/DL — LOW (ref 8.5–10.1)
CALCIUM SERPL-MCNC: 8.3 MG/DL — LOW (ref 8.5–10.1)
CHLORIDE SERPL-SCNC: 112 MMOL/L — HIGH (ref 96–108)
CHLORIDE SERPL-SCNC: 112 MMOL/L — HIGH (ref 96–108)
CO2 SERPL-SCNC: 21 MMOL/L — LOW (ref 22–31)
CO2 SERPL-SCNC: 21 MMOL/L — LOW (ref 22–31)
CREAT SERPL-MCNC: 1.3 MG/DL — SIGNIFICANT CHANGE UP (ref 0.5–1.3)
CREAT SERPL-MCNC: 1.3 MG/DL — SIGNIFICANT CHANGE UP (ref 0.5–1.3)
EGFR: 40 ML/MIN/1.73M2 — LOW
EGFR: 40 ML/MIN/1.73M2 — LOW
GLUCOSE SERPL-MCNC: 213 MG/DL — HIGH (ref 70–99)
GLUCOSE SERPL-MCNC: 213 MG/DL — HIGH (ref 70–99)
HCT VFR BLD CALC: 34.4 % — LOW (ref 34.5–45)
HCT VFR BLD CALC: 34.4 % — LOW (ref 34.5–45)
HGB BLD-MCNC: 11.3 G/DL — LOW (ref 11.5–15.5)
HGB BLD-MCNC: 11.3 G/DL — LOW (ref 11.5–15.5)
MCHC RBC-ENTMCNC: 29.3 PG — SIGNIFICANT CHANGE UP (ref 27–34)
MCHC RBC-ENTMCNC: 29.3 PG — SIGNIFICANT CHANGE UP (ref 27–34)
MCHC RBC-ENTMCNC: 32.8 GM/DL — SIGNIFICANT CHANGE UP (ref 32–36)
MCHC RBC-ENTMCNC: 32.8 GM/DL — SIGNIFICANT CHANGE UP (ref 32–36)
MCV RBC AUTO: 89.1 FL — SIGNIFICANT CHANGE UP (ref 80–100)
MCV RBC AUTO: 89.1 FL — SIGNIFICANT CHANGE UP (ref 80–100)
NRBC # BLD: 0 /100 WBCS — SIGNIFICANT CHANGE UP (ref 0–0)
NRBC # BLD: 0 /100 WBCS — SIGNIFICANT CHANGE UP (ref 0–0)
PLATELET # BLD AUTO: 272 K/UL — SIGNIFICANT CHANGE UP (ref 150–400)
PLATELET # BLD AUTO: 272 K/UL — SIGNIFICANT CHANGE UP (ref 150–400)
POTASSIUM SERPL-MCNC: 4.6 MMOL/L — SIGNIFICANT CHANGE UP (ref 3.5–5.3)
POTASSIUM SERPL-MCNC: 4.6 MMOL/L — SIGNIFICANT CHANGE UP (ref 3.5–5.3)
POTASSIUM SERPL-SCNC: 4.6 MMOL/L — SIGNIFICANT CHANGE UP (ref 3.5–5.3)
POTASSIUM SERPL-SCNC: 4.6 MMOL/L — SIGNIFICANT CHANGE UP (ref 3.5–5.3)
RBC # BLD: 3.86 M/UL — SIGNIFICANT CHANGE UP (ref 3.8–5.2)
RBC # BLD: 3.86 M/UL — SIGNIFICANT CHANGE UP (ref 3.8–5.2)
RBC # FLD: 18 % — HIGH (ref 10.3–14.5)
RBC # FLD: 18 % — HIGH (ref 10.3–14.5)
SODIUM SERPL-SCNC: 140 MMOL/L — SIGNIFICANT CHANGE UP (ref 135–145)
SODIUM SERPL-SCNC: 140 MMOL/L — SIGNIFICANT CHANGE UP (ref 135–145)
WBC # BLD: 11.16 K/UL — HIGH (ref 3.8–10.5)
WBC # BLD: 11.16 K/UL — HIGH (ref 3.8–10.5)
WBC # FLD AUTO: 11.16 K/UL — HIGH (ref 3.8–10.5)
WBC # FLD AUTO: 11.16 K/UL — HIGH (ref 3.8–10.5)

## 2023-12-02 PROCEDURE — 99232 SBSQ HOSP IP/OBS MODERATE 35: CPT

## 2023-12-02 PROCEDURE — 99239 HOSP IP/OBS DSCHRG MGMT >30: CPT

## 2023-12-02 RX ORDER — PHENAZOPYRIDINE HCL 100 MG
100 TABLET ORAL EVERY 8 HOURS
Refills: 0 | Status: DISCONTINUED | OUTPATIENT
Start: 2023-12-02 | End: 2023-12-02

## 2023-12-02 RX ORDER — LOSARTAN POTASSIUM 100 MG/1
1 TABLET, FILM COATED ORAL
Refills: 0 | DISCHARGE

## 2023-12-02 RX ORDER — CEFPODOXIME PROXETIL 100 MG
1 TABLET ORAL
Qty: 8 | Refills: 0
Start: 2023-12-02 | End: 2023-12-05

## 2023-12-02 RX ORDER — LIDOCAINE 4 G/100G
1 CREAM TOPICAL
Qty: 1 | Refills: 0
Start: 2023-12-02 | End: 2023-12-15

## 2023-12-02 RX ORDER — SACCHAROMYCES BOULARDII 250 MG
1 POWDER IN PACKET (EA) ORAL
Qty: 28 | Refills: 0
Start: 2023-12-02 | End: 2023-12-15

## 2023-12-02 RX ORDER — ESCITALOPRAM OXALATE 10 MG/1
1 TABLET, FILM COATED ORAL
Qty: 30 | Refills: 0
Start: 2023-12-02 | End: 2023-12-31

## 2023-12-02 RX ORDER — TAMSULOSIN HYDROCHLORIDE 0.4 MG/1
1 CAPSULE ORAL
Qty: 30 | Refills: 0
Start: 2023-12-02 | End: 2023-12-31

## 2023-12-02 RX ADMIN — Medication 2: at 08:05

## 2023-12-02 RX ADMIN — APIXABAN 2.5 MILLIGRAM(S): 2.5 TABLET, FILM COATED ORAL at 06:19

## 2023-12-02 RX ADMIN — Medication 125 MICROGRAM(S): at 11:45

## 2023-12-02 RX ADMIN — Medication 100 MILLIGRAM(S): at 06:19

## 2023-12-02 RX ADMIN — Medication 75 MICROGRAM(S): at 06:18

## 2023-12-02 RX ADMIN — Medication 250 MILLIGRAM(S): at 06:19

## 2023-12-02 RX ADMIN — CEFTRIAXONE 100 MILLIGRAM(S): 500 INJECTION, POWDER, FOR SOLUTION INTRAMUSCULAR; INTRAVENOUS at 11:45

## 2023-12-02 RX ADMIN — Medication 2000 UNIT(S): at 11:45

## 2023-12-02 RX ADMIN — MEMANTINE HYDROCHLORIDE 10 MILLIGRAM(S): 10 TABLET ORAL at 06:19

## 2023-12-02 RX ADMIN — CLOTRIMAZOLE AND BETAMETHASONE DIPROPIONATE 1 APPLICATION(S): 10; .5 CREAM TOPICAL at 12:01

## 2023-12-02 RX ADMIN — Medication 5: at 11:45

## 2023-12-02 NOTE — DISCHARGE NOTE PROVIDER - NSDCMRMEDTOKEN_GEN_ALL_CORE_FT
cefpodoxime 200 mg oral tablet: 1 tab(s) orally 2 times a day START 12/3  digoxin 125 mcg (0.125 mg) oral tablet: 1 tab(s) orally once a day   digoxin 125 mcg (0.125 mg) oral tablet: 1 tab(s) orally every other day  Eliquis 2.5 mg oral tablet: 1 tab(s) orally 2 times a day  Januvia 25 mg oral tablet: 1 tab(s) orally once a day   levothyroxine 75 mcg (0.075 mg) oral tablet: 1 tab(s) orally once a day  Lexapro 10 mg oral tablet: 1 tab(s) orally once a day  Melatonin 10 mg oral capsule: 1 cap(s) orally once a day (at bedtime)  memantine 10 mg oral tablet: 1 tab(s) orally 2 times a day  metFORMIN 1000 mg oral tablet: 1 tab(s) orally 2 times a day  metoprolol succinate 100 mg oral tablet, extended release: 1 tab(s) orally 2 times a day   rivastigmine 9.5 mg/24 hr transdermal film, extended release: 1 patch transdermal once a day  saccharomyces boulardii lyo 250 mg oral capsule: 1 cap(s) orally 2 times a day  tamsulosin 0.4 mg oral capsule: 1 cap(s) orally once a day (at bedtime)  Vitamin D3 50 mcg (2000 intl units) oral tablet: 1 tab(s) orally once a day

## 2023-12-02 NOTE — DISCHARGE NOTE NURSING/CASE MANAGEMENT/SOCIAL WORK - PATIENT PORTAL LINK FT
You can access the FollowMyHealth Patient Portal offered by [facility] by registering at the following website: [email or web address]. By joining [facility]’s FollowMyHealth portal, you will also be able to view your health information using other applications (apps) compatible with our system.

## 2023-12-02 NOTE — DISCHARGE NOTE PROVIDER - NSDCCPCAREPLAN_GEN_ALL_CORE_FT
PRINCIPAL DISCHARGE DIAGNOSIS  Diagnosis: Acute cystitis  Assessment and Plan of Treatment: You were admitted to the hospital for urinary tract infection  -you were started on iv antibiotics and switched to oral   -Take Cefpodoxime 200mg twice daily START TOMORROW for 4 more days  -You were not able to urinate on your own. A chaney was placed. Follow up with urology within the week for trial of void  -Take Flomax daily to help with urination  -STOP LOSARTAN AND LASIX FOR NOW  -Repeat blood work in 1 week to check your kidney function and resume home medications  -Check your blood pressure and bring to your next appointment

## 2023-12-02 NOTE — PROGRESS NOTE ADULT - SUBJECTIVE AND OBJECTIVE BOX
[facility] -- David Le Wachsman, Siddhartha Grider Savella, Goodger, Cohen  Office # [phone]    Follow Up:  tachycardia     Subjective/Observations: Seen and examined.  Lying in bed resting comfortable. NAD. no signs of orthopnea or PND.    REVIEW OF SYSTEMS: All other review of systems is negative unless indicated above  PAST MEDICAL & SURGICAL HISTORY:  Atrial fibrillation      Hypothyroidism      Mild Alzheimer's dementia      Diabetes mellitus      Acute on chronic systolic congestive heart failure      Hypertension      Hyperlipemia      Cardiac LV ejection fraction 21-30%  12/22      History of appendectomy      H/O hernia repair      History of cholecystectomy        MEDICATIONS  (STANDING):  apixaban 2.5 milliGRAM(s) Oral two times a day  cefTRIAXone   IVPB 1000 milliGRAM(s) IV Intermittent every 24 hours  cholecalciferol 2000 Unit(s) Oral daily  clotrimazole/betamethasone Cream 1 Application(s) Topical daily  dextrose 5%. 1000 milliLiter(s) (100 mL/Hr) IV Continuous <Continuous>  dextrose 5%. 1000 milliLiter(s) (50 mL/Hr) IV Continuous <Continuous>  dextrose 50% Injectable 25 Gram(s) IV Push once  dextrose 50% Injectable 25 Gram(s) IV Push once  dextrose 50% Injectable 12.5 Gram(s) IV Push once  digoxin     Tablet 125 MICROGram(s) Oral every other day  glucagon  Injectable 1 milliGRAM(s) IntraMuscular once  insulin lispro (ADMELOG) corrective regimen sliding scale   SubCutaneous three times a day before meals  insulin lispro (ADMELOG) corrective regimen sliding scale   SubCutaneous at bedtime  levothyroxine 75 MICROGram(s) Oral daily  melatonin 5 milliGRAM(s) Oral at bedtime  memantine 10 milliGRAM(s) Oral two times a day  metoprolol succinate  milliGRAM(s) Oral two times a day  rivastigmine patch  9.5 mG/24 Hr(s) 1 Patch Transdermal every 24 hours  saccharomyces boulardii 250 milliGRAM(s) Oral two times a day  tamsulosin 0.4 milliGRAM(s) Oral at bedtime    MEDICATIONS  (PRN):  acetaminophen     Tablet .. 650 milliGRAM(s) Oral every 6 hours PRN Temp greater or equal to 38C (100.4F), Mild Pain (1 - 3)  aluminum hydroxide/magnesium hydroxide/simethicone Suspension 30 milliLiter(s) Oral every 4 hours PRN Dyspepsia  dextrose Oral Gel 15 Gram(s) Oral once PRN Blood Glucose LESS THAN 70 milliGRAM(s)/deciliter  ondansetron Injectable 4 milliGRAM(s) IV Push every 8 hours PRN Nausea and/or Vomiting  oxyCODONE    IR 5 milliGRAM(s) Oral every 4 hours PRN Severe Pain (7 - 10)  oxyCODONE    IR 2.5 milliGRAM(s) Oral every 4 hours PRN Moderate Pain (4 - 6)    Allergies    No Known Allergies    Intolerances      Vital Signs Last 24 Hrs  T(C): 36.4 (02 Dec 2023 05:02), Max: 36.4 (01 Dec 2023 20:07)  T(F): 97.5 (02 Dec 2023 05:02), Max: 97.5 (01 Dec 2023 20:07)  HR: 90 (02 Dec 2023 05:02) (90 - 100)  BP: 123/79 (02 Dec 2023 05:02) (109/75 - 123/79)  BP(mean): --  RR: 18 (02 Dec 2023 05:02) (18 - 18)  SpO2: 95% (02 Dec 2023 05:02) (95% - 97%)    Parameters below as of 02 Dec 2023 05:02  Patient On (Oxygen Delivery Method): room air      I&O's Summary    01 Dec 2023 07:01  -  02 Dec 2023 07:00  --------------------------------------------------------  IN: 50 mL / OUT: 1500 mL / NET: -1450 mL        TELE:   PHYSICAL EXAM:  Constitutional: NAD  HEENT: Moist Mucous Membranes, Anicteric  Pulmonary: Non-labored, breath sounds are clear bilaterally, No wheezing, rales or rhonchi  Cardiovascular: IRRegular, S1 and S2, No murmurs, rubs, gallops or clicks  Gastrointestinal: Bowel Sounds present, soft, nontender.   Lymph: No peripheral edema. No lymphadenopathy.  Skin: No visible rashes or ulcers.  Psych:  Mood & affect appropriate  LABS: All Labs Reviewed:                        11.3   11.16 )-----------( 272      ( 02 Dec 2023 07:00 )             34.4                         11.9   10.68 )-----------( 293      ( 01 Dec 2023 07:35 )             37.4                         11.9   12.65 )-----------( 306      ( 30 Nov 2023 07:21 )             36.7     02 Dec 2023 07:00    140    |  112    |  34     ----------------------------<  213    4.6     |  21     |  1.30   01 Dec 2023 07:35    138    |  110    |  34     ----------------------------<  168    4.5     |  21     |  1.30   30 Nov 2023 07:21    139    |  112    |  34     ----------------------------<  173    4.9     |  22     |  1.40     Ca    8.3        02 Dec 2023 07:00  Ca    8.3        01 Dec 2023 07:35  Ca    8.4        30 Nov 2023 07:21    TPro  6.3    /  Alb  2.1    /  TBili  0.2    /  DBili  x      /  AST  26     /  ALT  52     /  AlkPhos  69     30 Nov 2023 07:21          12 Lead ECG:   Ventricular Rate 119 BPM    QRS Duration 84 ms    Q-T Interval 282 ms    QTC Calculation(Bazett) 396 ms    R Axis 44 degrees    T Axis 244 degrees    Diagnosis Line Atrial fibrillation with rapid ventricular response  Possible Anterior infarct (cited on or before 27-NOV-2023)  ST & T wave abnormality, consider lateral ischemia  Abnormal ECG  When compared with ECG of 27-NOV-2023 18:39, (Unconfirmed)  No significant change was found  Confirmed by Abelardo Richardson MD ([id number]) on 11/28/2023 1:31:09 PM (11-27-23 @ 18:40)      ACC: [id number] EXAM:  ECHO TTE WO CON COMP W DOPP                          PROCEDURE DATE:  12/22/2022          INTERPRETATION:  INDICATION: Abnormal EKG  Sonographer KL    Blood Pressure 142/86    Height 158 cm     Weight 51.7 kg       BSA 1.5   sqm    Dimensions:  LA 3.6       Normal Values: 2.0 - 4.0 cm  Ao 3.2        Normal Values: 2.0 - 3.8 cm  SEPTUM 1.0       Normal Values: 0.6 - 1.2 cm  PWT 0.9       Normal Values: 0.6 - 1.1 cm  LVIDd 5.8         Normal Values: 3.0 - 5.6 cm  LVIDs 4.3      Normal Values: 1.8 - 4.0 cm      OBSERVATIONS:  Mitral Valve: Moderate MR.  Aortic Valve/Aorta: normal trileaflet aortic valve.  Tricuspid Valve: Mild TR.  Pulmonic Valve: Mild PI  Left Atrium: Enlarged  Right Atrium: Enlarged  Left Ventricle: Left ventricular enlargement with severe left ventricular   systolic dysfunction, estimated LVEF of 20-25%. The entire apex and   anterior walls appear akinetic. The inferior and inferolateral walls   appear hypokinetic. Remaining walls are not well-visualized  Right Ventricle: Grossly normal size and systolic function.  Pericardium: no significant pericardial effusion.  Pulmonary/RV Pressure: estimated PA systolic pressure of at least 35 mmHg   assuming an RA pressure of 3mmHg.        IMPRESSION:  Left ventricular enlargement with severe left ventricular systolic   dysfunction, estimated LVEF of 20-25%. The entire apex and anterior walls   appear akinetic. The inferior and inferolateral walls appear hypokinetic.  Grossly normal RV size and systolic function.  Biatrial enlargement  Normal trileaflet aortic valve, without AI.  Moderate MR  Mild TR.  No significant pericardial effusion.    --- End of Report ---            JOHN BAEZA MD; Attending Cardiologist  This document has been electronically signed. Dec 23 2022  4:40PM

## 2023-12-02 NOTE — PROGRESS NOTE ADULT - NUTRITIONAL ASSESSMENT
This patient has been assessed with a concern for Malnutrition and has been determined to have a diagnosis/diagnoses of Severe protein-calorie malnutrition and Underweight (BMI < 19).    This patient is being managed with:   Diet Soft and Bite Sized-  Low Sodium  Supplement Feeding Modality:  Oral  Glucerna Shake Cans or Servings Per Day:  1       Frequency:  Daily  Entered: Dec  1 2023 11:10AM    Diet Soft and Bite Sized-  Entered: Nov 27 2023 10:21PM    The following pending diet order is being considered for treatment of Severe protein-calorie malnutrition and Underweight (BMI < 19):null
This patient has been assessed with a concern for Malnutrition and has been determined to have a diagnosis/diagnoses of Severe protein-calorie malnutrition and Underweight (BMI < 19).    This patient is being managed with:   Diet Soft and Bite Sized-  Low Sodium  Supplement Feeding Modality:  Oral  Glucerna Shake Cans or Servings Per Day:  1       Frequency:  Daily  Entered: Dec  1 2023 11:10AM    Diet Soft and Bite Sized-  Entered: Nov 27 2023 10:21PM    The following pending diet order is being considered for treatment of Severe protein-calorie malnutrition and Underweight (BMI < 19):null

## 2023-12-02 NOTE — PROGRESS NOTE ADULT - SUBJECTIVE AND OBJECTIVE BOX
Patient is a 88y old  Female who presents with a chief complaint of UTI/pyelo (01 Dec 2023 15:57)      INTERVAL HPI/OVERNIGHT EVENTS: Patient seen and examined at bedside. No overnight events.  DC planning    MEDICATIONS  (STANDING):  apixaban 2.5 milliGRAM(s) Oral two times a day  cefTRIAXone   IVPB 1000 milliGRAM(s) IV Intermittent every 24 hours  cholecalciferol 2000 Unit(s) Oral daily  clotrimazole/betamethasone Cream 1 Application(s) Topical daily  dextrose 5%. 1000 milliLiter(s) (100 mL/Hr) IV Continuous <Continuous>  dextrose 5%. 1000 milliLiter(s) (50 mL/Hr) IV Continuous <Continuous>  dextrose 50% Injectable 12.5 Gram(s) IV Push once  dextrose 50% Injectable 25 Gram(s) IV Push once  dextrose 50% Injectable 25 Gram(s) IV Push once  digoxin     Tablet 125 MICROGram(s) Oral every other day  glucagon  Injectable 1 milliGRAM(s) IntraMuscular once  insulin lispro (ADMELOG) corrective regimen sliding scale   SubCutaneous at bedtime  insulin lispro (ADMELOG) corrective regimen sliding scale   SubCutaneous three times a day before meals  levothyroxine 75 MICROGram(s) Oral daily  melatonin 5 milliGRAM(s) Oral at bedtime  memantine 10 milliGRAM(s) Oral two times a day  metoprolol succinate  milliGRAM(s) Oral two times a day  rivastigmine patch  9.5 mG/24 Hr(s) 1 Patch Transdermal every 24 hours  saccharomyces boulardii 250 milliGRAM(s) Oral two times a day  tamsulosin 0.4 milliGRAM(s) Oral at bedtime    MEDICATIONS  (PRN):  acetaminophen     Tablet .. 650 milliGRAM(s) Oral every 6 hours PRN Temp greater or equal to 38C (100.4F), Mild Pain (1 - 3)  aluminum hydroxide/magnesium hydroxide/simethicone Suspension 30 milliLiter(s) Oral every 4 hours PRN Dyspepsia  dextrose Oral Gel 15 Gram(s) Oral once PRN Blood Glucose LESS THAN 70 milliGRAM(s)/deciliter  ondansetron Injectable 4 milliGRAM(s) IV Push every 8 hours PRN Nausea and/or Vomiting  oxyCODONE    IR 2.5 milliGRAM(s) Oral every 4 hours PRN Moderate Pain (4 - 6)  oxyCODONE    IR 5 milliGRAM(s) Oral every 4 hours PRN Severe Pain (7 - 10)      Allergies    No Known Allergies    Intolerances      Vital Signs Last 24 Hrs  T(C): 36.4 (02 Dec 2023 05:02), Max: 36.4 (01 Dec 2023 20:07)  T(F): 97.5 (02 Dec 2023 05:02), Max: 97.5 (01 Dec 2023 20:07)  HR: 90 (02 Dec 2023 05:02) (90 - 100)  BP: 123/79 (02 Dec 2023 05:02) (97/63 - 123/79)  BP(mean): --  RR: 18 (02 Dec 2023 05:02) (18 - 18)  SpO2: 95% (02 Dec 2023 05:02) (95% - 97%)    Parameters below as of 02 Dec 2023 05:02  Patient On (Oxygen Delivery Method): room air      I&O's Summary    01 Dec 2023 07:01  -  02 Dec 2023 07:00  --------------------------------------------------------  IN: 50 mL / OUT: 1500 mL / NET: -1450 mL      BMI (kg/m2): 19.9 (11-27-23 @ 17:47)    PHYSICAL EXAM:  GENERAL: NAD  HEENT:  AT/NC, anicteric, moist mucous membranes, EOMI, PERRL, no lid-lag, conjunctiva and sclera clear  CHEST/LUNG:  CTA b/l, no rales, wheezes, or rhonchi,  normal respiratory effort, no intercostal retractions  HEART:  RRR, S1, S2, no murmurs; no pitting edema  ABDOMEN:  BS+, soft, nontender, nondistended  MSK/EXTREMITIES: palpable peripheral pulses, no clubbing or cyanosis  NERVOUS SYSTEM: AO1-2, follows simple commands, grossly moves all extremities  PSYCH: Appropriate affect, Alert & Awake; poor judgement      LABS: Personally reviewed  CBC                        11.3   11.16 )-----------( 272      ( 02 Dec 2023 07:00 )             34.4     CMP  12-02    140  |  112  |  34  ----------------------------<  213  4.6   |  21  |  1.30    Ca    8.3      02 Dec 2023 07:00    TPro  6.3  /  Alb  2.1  /  TBili  0.2  /  DBili  x   /  AST  26  /  ALT  52  /  AlkPhos  69  11-30                                  POCT Blood Glucose.: 356 mg/dL (02 Dec 2023 11:35)  POCT Blood Glucose.: 214 mg/dL (02 Dec 2023 07:36)  POCT Blood Glucose.: 236 mg/dL (01 Dec 2023 21:12)  POCT Blood Glucose.: 190 mg/dL (01 Dec 2023 16:32)      Urinalysis Basic - ( 02 Dec 2023 07:00 )    Color: x / Appearance: x / SG: x / pH: x  Gluc: 213 mg/dL / Ketone: x  / Bili: x / Urobili: x   Blood: x / Protein: x / Nitrite: x   Leuk Esterase: x / RBC: x / WBC x   Sq Epi: x / Non Sq Epi: x / Bacteria: x        Culture - Urine (collected 27 Nov 2023 21:40)  Source: Clean Catch Clean Catch (Midstream)  Final Report (30 Nov 2023 22:49):    >100,000 CFU/ml Klebsiella pneumoniae  Organism: Klebsiella pneumoniae (30 Nov 2023 22:49)  Organism: Klebsiella pneumoniae (30 Nov 2023 22:49)      Method Type: SARAH      -  Amoxicillin/Clavulanic Acid: S <=8/4      -  Ampicillin: R >16 These ampicillin results predict results for amoxicillin      -  Ampicillin/Sulbactam: S 8/4      -  Aztreonam: S <=4      -  Cefazolin: S <=2 For uncomplicated UTI with K. pneumoniae, E. coli, or P. mirablis: SARAH <=16 is sensitive and SARAH >=32 is resistant. This also predicts results for oral agents cefaclor, cefdinir, cefpodoxime, cefprozil, cefuroxime axetil, cephalexin and locarbef for uncomplicated UTI. Note that some isolates may be susceptible to these agents while testing resistant to cefazolin.      -  Cefepime: S <=2      -  Cefoxitin: R >16      -  Ceftriaxone: S <=1      -  Cefuroxime: R >16      -  Ciprofloxacin: R >2      -  Ertapenem: S <=0.5      -  Gentamicin: S <=2      -  Imipenem: S <=1      -  Levofloxacin: R 4      -  Meropenem: S <=1      -  Nitrofurantoin: I 64 Should not be used to treat pyelonephritis      -  Piperacillin/Tazobactam: S <=8      -  Tobramycin: S <=2      -  Trimethoprim/Sulfamethoxazole: R >2/38    Culture - Blood (collected 27 Nov 2023 18:20)  Source: .Blood Blood-Peripheral  Preliminary Report (02 Dec 2023 01:02):    No growth at 4 days    Culture - Blood (collected 27 Nov 2023 18:10)  Source: .Blood Blood-Peripheral  Preliminary Report (02 Dec 2023 01:02):    No growth at 4 days            Culture - Urine (collected 11-27-23 @ 21:40)  Source: Clean Catch Clean Catch (Midstream)  Final Report (11-30-23 @ 22:49):    >100,000 CFU/ml Klebsiella pneumoniae  Organism: Klebsiella pneumoniae (11-30-23 @ 22:49)  Organism: Klebsiella pneumoniae (11-30-23 @ 22:49)      Method Type: SARAH      -  Amoxicillin/Clavulanic Acid: S <=8/4      -  Ampicillin: R >16 These ampicillin results predict results for amoxicillin      -  Ampicillin/Sulbactam: S 8/4      -  Aztreonam: S <=4      -  Cefazolin: S <=2 For uncomplicated UTI with K. pneumoniae, E. coli, or P. mirablis: SARAH <=16 is sensitive and SARAH >=32 is resistant. This also predicts results for oral agents cefaclor, cefdinir, cefpodoxime, cefprozil, cefuroxime axetil, cephalexin and locarbef for uncomplicated UTI. Note that some isolates may be susceptible to these agents while testing resistant to cefazolin.      -  Cefepime: S <=2      -  Cefoxitin: R >16      -  Ceftriaxone: S <=1      -  Cefuroxime: R >16      -  Ciprofloxacin: R >2      -  Ertapenem: S <=0.5      -  Gentamicin: S <=2      -  Imipenem: S <=1      -  Levofloxacin: R 4      -  Meropenem: S <=1      -  Nitrofurantoin: I 64 Should not be used to treat pyelonephritis      -  Piperacillin/Tazobactam: S <=8      -  Tobramycin: S <=2      -  Trimethoprim/Sulfamethoxazole: R >2/38    Culture - Blood (collected 11-27-23 @ 18:20)  Source: .Blood Blood-Peripheral  Preliminary Report (12-02-23 @ 01:02):    No growth at 4 days    Culture - Blood (collected 11-27-23 @ 18:10)  Source: .Blood Blood-Peripheral  Preliminary Report (12-02-23 @ 01:02):    No growth at 4 days        RADIOLOGY & ADDITIONAL TESTS: Personally reviewed.     Consultant(s) Notes Reviewed:  [x] YES  [ ] NO   Discussed with SW/MERON, RN

## 2023-12-02 NOTE — PROGRESS NOTE ADULT - ASSESSMENT
88yF with a PMH of CHFrEF (LVEF 20-25% 12/2022), afib on eliquis, T2DM, HTN, HLD, alzheimers dementia, hypothyroidism, chronic sacral and heel ulcers, admitted for acute cystitis, metabolic encephalopathy likely secondary to infection/metabolic acidosis and YVETTE on CKD. Per patients daughters, she has not had any recent complaints of chest pain, palpitations or increased SOB. Patient follows with Cardio Dr. Grider outpatient and has a long standing hx of afib. She is rate controlled on her current home meds, and baseline HR is usually <100bpm.      Tachycardia  - improved pe flowsheet, 80-90s bpm  - known afib  - continue bb, digoxin and ac    - EKG afib w/ rvr, ST depressions in leads 2, avf, V4-V6 with early repolarization  - ST depressions likely 2/2 Digoxin use though ischemia cannot be excluded  - TTE (12/2022): EF 20-25%, apex and anterior wall akinesis, inferior wall hypokinesis, mod MR, mild TR  - Presumed Hx of CAD: LV dysfunction on TTE likely from a missed MI --> Given her frailty, increased creatinine, decision was made by family and outpt cardio to manage her medically    - hold losartan, lasix in setting of yvette resume when able and necessary    - Monitor and replete lytes, keep K>4, Mg>2.  - Will continue to follow.    Samuel Alcazar NP  Nurse Practitioner- Cardiology   Call TEAMS

## 2023-12-02 NOTE — DISCHARGE NOTE PROVIDER - NSDCFUADDINST_GEN_ALL_CORE_FT
Supplement Feeding Modality:  Oral  Glucerna Shake Cans or Servings Per Day:  1       Frequency:  Daily (12-01-23 @ 11:10)

## 2023-12-02 NOTE — DISCHARGE NOTE PROVIDER - NSDCFUSCHEDAPPT_GEN_ALL_CORE_FT
Harpal Grider  [facility]  CARDIOLOGY [address] [facility] [address]  Scheduled Appointment: 12/19/2023

## 2023-12-02 NOTE — DISCHARGE NOTE NURSING/CASE MANAGEMENT/SOCIAL WORK - NSPROMEDSBROUGHTTOHOSP_GEN_A_NUR
Recommending surgery be scheduled between whenever u have schd. :  - Procedure(s): Robotic assisted laparoscopic Hysterectomy with bilateral salpingectomy possible supracervical possible ovarian cystectomy/drainage right/left or both possible oopherectomy bilateral or unilateral if cancer/cancer staging possible laparotomy    - Facility: [facility]  - Length of Procedure: 2 hour  - Assist: Surgical Assist  - Type of Admit: Day Surgery  - PreOp Visit: Yes, if patient desires  - Medical Clearance from PMD: Yes  - Bowel Prep Needed? yes - Gatorade  - Special Equipment: No  - Post-op visit: 7-10 days dunia Banda RN and with me in 4 wks unless issues     no

## 2023-12-02 NOTE — CASE MANAGEMENT PROGRESS NOTE - NSCMPROGRESSNOTE_GEN_ALL_CORE
Patient is ready for DC home. She has a new chaney daughter will help with care and [facility] referral sent for SN. The patient will also need PT at home. The patient as a safe DC plan home.   71

## 2023-12-02 NOTE — PROGRESS NOTE ADULT - ASSESSMENT
88F PMH CHFrEF (LVEF 20-25% 12/2022), afib on eliquis, T2DM, HTN, HLD, Alzeihmer’s  dementia, hypothyroidism, chronic sacral and heel ulcers, admitted for acute cystitis. Metabolic encephalopathy likely secondary to infection/metabolic acidosis and YVETTE on CKD.    #Metabolic encephalopathy 2/2 UTI and pyeloureteritis   -Presenting with dysuria and generalized weakness  -UA: turbid, positive nitrites, large LE, TNTC WBC, many bacteria  -CT abdomen: Mild left hydronephrosis without obstructing calculus may be related to recently expelled calculus and/or bladder distention. Nonobstructive left nephrolithiasis. Suggestion of left pyeloureteritis and cystitis.   -Continue IV ceftriaxone - plan to switch to PO for dc - Cefpodoxime 200mg PO BID for 4 more days discussed with ID Dr Laughlin  - ID Consult Dr. Laughlin recommendations appreciated  -blood cultures no growth  -UCx reviewed    #Leukocytosis   -Downtrending  -Continue abx   -Follow up AM CBC    #Acute urinary retention  -Start Flomax  -Failed TOV  -Mobilize, avoid constipation   -Urology consulted for outpatient TOV    #YVETTE on CKD 3  -with metabolic acidosis and lethargy - f/u am blood gas s/p bicarb gtt  -Baseline Cr ~1.4-1.6 as per chart review  -YVETTE likely 2/2 recent poor PO intake vs urinary retention  -Chaney placed in ED for urinary retention - failed TOV chaney placed 12/1  -Off IVF (sodium bicarb for metabolic acidosis) -.    #ST segment depression.   ST depressions in leads 2, avf, V4-V6 with early repolarization  -Troponin negative in ED  -Cardio Dr. Richardson consulted recommendations appreciated.    #Metabolic acidosis.   s/p sodium bicarb now @ 50cc/hr x 9 hours   -Resolved  -Follow up AM BMP    #Chronic a fib  -Continue Toprol, Digoxin   -Continue AC with Eliquis    #Chronic systolic congestive heart failure.   LVEF 20-25% 12/2022  -Hold home lasix 2/2 yvette   -Continue home toprol 100mg BID  -No signs/sx of volume overload  -Monitor vitals    #HTN  -Continue Toprol  -Hold home Losartan 2/2 YVETTE  -Monitor vitals    #HLD  -Not on statin (recently discontinued) 2/2 transaminitis that resolved with discontinuation of statin.  #Type 2 DM  A1C 7.2  -Hold home januvia and metformin  -Low dose ISS while inpatient.    #Alzheimer's dementia.   -Continue rivastigmine patch qd + memantine 10mg BID.    #Hypothyroidism   -Continue Synthroid    #Prophylactic measure  -DVT ppx: Eliquis   -PT eval - home PT  DNR/DNI MOLST in chart    Discussed with daughter at bedside aware and in agreement with above. DC today. Requesting to start Lexapro for anxiety/depression.   Discussed with PCP Dr Shukla

## 2023-12-02 NOTE — DISCHARGE NOTE PROVIDER - HOSPITAL COURSE
Hospital Course  88F PMH CHFrEF (LVEF 20-25% 12/2022), afib on eliquis, T2DM, HTN, HLD, Alzeihmer’s  dementia, hypothyroidism, chronic sacral and heel ulcers, admitted for acute cystitis. Metabolic encephalopathy likely secondary to infection/metabolic acidosis and YVETTE on CKD.  CT abdomen: Mild left hydronephrosis without obstructing calculus may be related to recently expelled calculus and/or bladder distention. Nonobstructive left nephrolithiasis. Suggestion of left pyeloureteritis and cystitis.   Continue IV ceftriaxone - plan to switch to PO for dc - Cefpodoxime 200mg PO BID for 4 more days discussed with ID Dr Laughlin      Failed TOV -started on Flomax. Clancy in place. Uro following recommendations appreciated  Cr improved back to baseline    Discharging Provider:  Tree Templeton DO  Contact Info: Cell [phone] Please call with any questions or concerns.      Time spent: 35 minutes.   
benjamin # ! in [employer] has pt. belonging-clothing

## 2023-12-02 NOTE — PROGRESS NOTE ADULT - REASON FOR ADMISSION
UTI/pyelo

## 2023-12-02 NOTE — DISCHARGE NOTE PROVIDER - CARE PROVIDER_API CALL
Amauri Shukla  [address], [address]  [place] [address]  Phone: [phone]  Fax: [phone]  Follow Up Time:     Mohan Amezcua  Urology  [address] [place] [id number]  Phone: [phone]  Fax: [phone]  Follow Up Time:

## 2023-12-02 NOTE — PROGRESS NOTE ADULT - NS ATTEND AMEND GEN_ALL_CORE FT
I have personally seen and examined the patient in detail.  I have spoken to the provider regarding the assessment and plan of care.  I have made changes to the note accordingly.
Patient is an 88yF with a PMH of CHFrEF (LVEF 20-25% 12/2022), afib on eliquis, T2DM, HTN, HLD, alzheimers dementia, hypothyroidism, chronic sacral and heel ulcers, admitted for acute cystitis, metabolic encephalopathy likely secondary to infection/metabolic acidosis and YVETTE on CKD. Per patients daughters, she has not had any recent complaints of chest pain, palpitations or increased SOB. Patient follows with Cardio Dr. Grider outpatient and has a long standing hx of afib. She is rate controlled on her current home meds, and baseline HR is usually <100bpm.      - No clear evidence of acute ischemia   - EKG afib w/ rvr, ST depressions in leads 2, avf, V4-V6 with early repolarization  - ST depressions likely 2/2 Digoxin use though ischemia cannot be excluded  - TTE (12/2022): EF 20-25%, apex and anterior wall akinesis, inferior wall hypokinesis, mod MR, mild TR  - Presumed Hx of CAD: LV dysfunction on TTE likely from a missed MI --> Given her frailty, increased creatinine, decision was made by family and outpt cardio to manage her medically      -followup dig level, and would hold dig until level available and cr improved.    -cont bb and ac  -hold losartan, lasix in setting of yvette resume when able and necessary  - Further cardiac workup will depend on clinical course.
Patient is an 88yF with a PMH of CHFrEF (LVEF 20-25% 12/2022), afib on eliquis, T2DM, HTN, HLD, alzheimers dementia, hypothyroidism, chronic sacral and heel ulcers, admitted for acute cystitis, metabolic encephalopathy likely secondary to infection/metabolic acidosis and YVETTE on CKD. Per patients daughters, she has not had any recent complaints of chest pain, palpitations or increased SOB. Patient follows with Cardio Dr. Grider outpatient and has a long standing hx of afib. She is rate controlled on her current home meds, and baseline HR is usually <100bpm.      - No clear evidence of acute ischemia   - EKG afib w/ rvr, ST depressions in leads 2, avf, V4-V6 with early repolarization  - ST depressions likely 2/2 Digoxin use though ischemia cannot be excluded  - TTE (12/2022): EF 20-25%, apex and anterior wall akinesis, inferior wall hypokinesis, mod MR, mild TR  - Presumed Hx of CAD: LV dysfunction on TTE likely from a missed MI --> Given her frailty, increased creatinine, decision was made by family and outpt cardio to manage her medically      - cont bb and ac  - cont digoxin  - hold losartan, lasix in setting of yvette resume when able and necessary  - Further cardiac workup will depend on clinical course.

## 2023-12-02 NOTE — PROGRESS NOTE ADULT - PROVIDER SPECIALTY LIST ADULT
Nephrology
Nephrology
Hospitalist
Infectious Disease
Cardiology
Infectious Disease
Nephrology
Infectious Disease
Hospitalist
Will hold off lovenox for now  SCDs while in bed  Ambulate with assistance
due to worsening acute on chronic renal insuffieciency in the setting of CKD vs suspected GI bleed  -visible pallor and decreased cap refill on exam  -receiving 2 units pRBC  -last colonoscopy /EGD 2017 WNL as per patient  -f/up consult with Dr. Patton GI  -f/up AM CBC, CMP, Mag , Phos  -monitored bed +    -f/up stool guaiac  -serum ferritin 183, transferrin low 179 likely 2/2 anemia of chronic disease with component of iron deficiency with total iron low at 18, possible malnutrition component  -Protonix 40 IV daily for GI prophylaxis  -fall risk protocol, ambulate with assistance

## 2023-12-02 NOTE — DISCHARGE NOTE PROVIDER - DETAILS OF MALNUTRITION DIAGNOSIS/DIAGNOSES
This patient has been assessed with a concern for Malnutrition and was treated during this hospitalization for the following Nutrition diagnosis/diagnoses:     -  12/01/2023: Severe protein-calorie malnutrition   -  12/01/2023: Underweight (BMI < 19)

## 2023-12-03 LAB
CULTURE RESULTS: SIGNIFICANT CHANGE UP
SPECIMEN SOURCE: SIGNIFICANT CHANGE UP

## 2023-12-07 ENCOUNTER — APPOINTMENT (OUTPATIENT)
Dept: UROLOGY | Facility: CLINIC | Age: 88
End: 2023-12-07
Payer: MEDICARE

## 2023-12-07 VITALS
SYSTOLIC BLOOD PRESSURE: 88 MMHG | BODY MASS INDEX: 20.22 KG/M2 | DIASTOLIC BLOOD PRESSURE: 60 MMHG | HEIGHT: 60 IN | WEIGHT: 103 LBS | HEART RATE: 87 BPM | OXYGEN SATURATION: 100 %

## 2023-12-07 PROCEDURE — 99203 OFFICE O/P NEW LOW 30 MIN: CPT

## 2023-12-11 ENCOUNTER — APPOINTMENT (OUTPATIENT)
Dept: UROLOGY | Facility: CLINIC | Age: 88
End: 2023-12-11
Payer: MEDICARE

## 2023-12-11 ENCOUNTER — APPOINTMENT (OUTPATIENT)
Dept: WOUND CARE | Facility: HOSPITAL | Age: 88
End: 2023-12-11
Payer: MEDICARE

## 2023-12-11 ENCOUNTER — OUTPATIENT (OUTPATIENT)
Dept: OUTPATIENT SERVICES | Facility: HOSPITAL | Age: 88
LOS: 1 days | Discharge: HOME | End: 2023-12-11
Payer: MEDICARE

## 2023-12-11 VITALS
OXYGEN SATURATION: 99 % | WEIGHT: 103 LBS | SYSTOLIC BLOOD PRESSURE: 121 MMHG | HEART RATE: 95 BPM | BODY MASS INDEX: 20.22 KG/M2 | TEMPERATURE: 98.1 F | RESPIRATION RATE: 18 BRPM | DIASTOLIC BLOOD PRESSURE: 80 MMHG | HEIGHT: 60 IN

## 2023-12-11 DIAGNOSIS — E11.9 TYPE 2 DIABETES MELLITUS W/OUT COMPLICATIONS: ICD-10-CM

## 2023-12-11 DIAGNOSIS — Z98.890 OTHER SPECIFIED POSTPROCEDURAL STATES: Chronic | ICD-10-CM

## 2023-12-11 DIAGNOSIS — L89.150 PRESSURE ULCER OF SACRAL REGION, UNSTAGEABLE: ICD-10-CM

## 2023-12-11 DIAGNOSIS — Z90.49 ACQUIRED ABSENCE OF OTHER SPECIFIED PARTS OF DIGESTIVE TRACT: Chronic | ICD-10-CM

## 2023-12-11 DIAGNOSIS — R33.9 RETENTION OF URINE, UNSPECIFIED: ICD-10-CM

## 2023-12-11 PROCEDURE — 99213 OFFICE O/P EST LOW 20 MIN: CPT

## 2023-12-11 PROCEDURE — 99214 OFFICE O/P EST MOD 30 MIN: CPT | Mod: 25

## 2023-12-11 PROCEDURE — G0463: CPT

## 2023-12-11 PROCEDURE — 76775 US EXAM ABDO BACK WALL LIM: CPT

## 2023-12-11 RX ORDER — TAMSULOSIN HYDROCHLORIDE 0.4 MG/1
0.4 CAPSULE ORAL
Refills: 0 | Status: ACTIVE | COMMUNITY

## 2023-12-11 RX ORDER — ESCITALOPRAM OXALATE 20 MG/1
20 TABLET, FILM COATED ORAL
Refills: 0 | Status: ACTIVE | COMMUNITY

## 2023-12-11 RX ORDER — LOSARTAN POTASSIUM 25 MG/1
25 TABLET, FILM COATED ORAL DAILY
Qty: 30 | Refills: 3 | Status: DISCONTINUED | COMMUNITY
Start: 2023-02-14 | End: 2023-12-11

## 2023-12-11 RX ORDER — FUROSEMIDE 20 MG/1
20 TABLET ORAL DAILY
Qty: 30 | Refills: 0 | Status: DISCONTINUED | COMMUNITY
Start: 2023-02-06 | End: 2023-12-11

## 2023-12-13 DIAGNOSIS — L89.312 PRESSURE ULCER OF RIGHT BUTTOCK, STAGE 2: ICD-10-CM

## 2023-12-13 DIAGNOSIS — I48.91 UNSPECIFIED ATRIAL FIBRILLATION: ICD-10-CM

## 2023-12-13 DIAGNOSIS — Z87.891 PERSONAL HISTORY OF NICOTINE DEPENDENCE: ICD-10-CM

## 2023-12-13 DIAGNOSIS — I11.0 HYPERTENSIVE HEART DISEASE WITH HEART FAILURE: ICD-10-CM

## 2023-12-13 DIAGNOSIS — E11.621 TYPE 2 DIABETES MELLITUS WITH FOOT ULCER: ICD-10-CM

## 2023-12-13 DIAGNOSIS — Z79.84 LONG TERM (CURRENT) USE OF ORAL HYPOGLYCEMIC DRUGS: ICD-10-CM

## 2023-12-13 DIAGNOSIS — Z98.890 OTHER SPECIFIED POSTPROCEDURAL STATES: ICD-10-CM

## 2023-12-13 DIAGNOSIS — Z90.49 ACQUIRED ABSENCE OF OTHER SPECIFIED PARTS OF DIGESTIVE TRACT: ICD-10-CM

## 2023-12-13 DIAGNOSIS — I50.9 HEART FAILURE, UNSPECIFIED: ICD-10-CM

## 2023-12-13 DIAGNOSIS — E11.59 TYPE 2 DIABETES MELLITUS WITH OTHER CIRCULATORY COMPLICATIONS: ICD-10-CM

## 2023-12-13 DIAGNOSIS — L97.411 NON-PRESSURE CHRONIC ULCER OF RIGHT HEEL AND MIDFOOT LIMITED TO BREAKDOWN OF SKIN: ICD-10-CM

## 2023-12-13 DIAGNOSIS — E03.9 HYPOTHYROIDISM, UNSPECIFIED: ICD-10-CM

## 2023-12-13 DIAGNOSIS — E78.5 HYPERLIPIDEMIA, UNSPECIFIED: ICD-10-CM

## 2023-12-13 DIAGNOSIS — Z79.899 OTHER LONG TERM (CURRENT) DRUG THERAPY: ICD-10-CM

## 2023-12-13 DIAGNOSIS — Z79.01 LONG TERM (CURRENT) USE OF ANTICOAGULANTS: ICD-10-CM

## 2023-12-13 PROCEDURE — 71045 X-RAY EXAM CHEST 1 VIEW: CPT

## 2023-12-13 PROCEDURE — 84484 ASSAY OF TROPONIN QUANT: CPT

## 2023-12-13 PROCEDURE — 70450 CT HEAD/BRAIN W/O DYE: CPT | Mod: MA

## 2023-12-13 PROCEDURE — 84100 ASSAY OF PHOSPHORUS: CPT

## 2023-12-13 PROCEDURE — 85025 COMPLETE CBC W/AUTO DIFF WBC: CPT

## 2023-12-13 PROCEDURE — 97162 PT EVAL MOD COMPLEX 30 MIN: CPT

## 2023-12-13 PROCEDURE — 36415 COLL VENOUS BLD VENIPUNCTURE: CPT

## 2023-12-13 PROCEDURE — 74177 CT ABD & PELVIS W/CONTRAST: CPT | Mod: MA

## 2023-12-13 PROCEDURE — 80053 COMPREHEN METABOLIC PANEL: CPT

## 2023-12-13 PROCEDURE — 85610 PROTHROMBIN TIME: CPT

## 2023-12-13 PROCEDURE — 80162 ASSAY OF DIGOXIN TOTAL: CPT

## 2023-12-13 PROCEDURE — 87040 BLOOD CULTURE FOR BACTERIA: CPT

## 2023-12-13 PROCEDURE — 83036 HEMOGLOBIN GLYCOSYLATED A1C: CPT

## 2023-12-13 PROCEDURE — 82962 GLUCOSE BLOOD TEST: CPT

## 2023-12-13 PROCEDURE — 93005 ELECTROCARDIOGRAM TRACING: CPT

## 2023-12-13 PROCEDURE — 87077 CULTURE AEROBIC IDENTIFY: CPT

## 2023-12-13 PROCEDURE — 83735 ASSAY OF MAGNESIUM: CPT

## 2023-12-13 PROCEDURE — 87086 URINE CULTURE/COLONY COUNT: CPT

## 2023-12-13 PROCEDURE — 81001 URINALYSIS AUTO W/SCOPE: CPT

## 2023-12-13 PROCEDURE — 87186 SC STD MICRODIL/AGAR DIL: CPT

## 2023-12-13 PROCEDURE — 85730 THROMBOPLASTIN TIME PARTIAL: CPT

## 2023-12-13 PROCEDURE — 96374 THER/PROPH/DIAG INJ IV PUSH: CPT

## 2023-12-13 PROCEDURE — 99285 EMERGENCY DEPT VISIT HI MDM: CPT

## 2023-12-13 PROCEDURE — 97530 THERAPEUTIC ACTIVITIES: CPT

## 2023-12-13 PROCEDURE — 82803 BLOOD GASES ANY COMBINATION: CPT

## 2023-12-13 PROCEDURE — 97116 GAIT TRAINING THERAPY: CPT

## 2023-12-13 PROCEDURE — 80048 BASIC METABOLIC PNL TOTAL CA: CPT

## 2023-12-13 PROCEDURE — 85027 COMPLETE CBC AUTOMATED: CPT

## 2023-12-19 ENCOUNTER — APPOINTMENT (OUTPATIENT)
Dept: CARDIOLOGY | Facility: CLINIC | Age: 88
End: 2023-12-19
Payer: MEDICARE

## 2023-12-19 ENCOUNTER — NON-APPOINTMENT (OUTPATIENT)
Age: 88
End: 2023-12-19

## 2023-12-19 VITALS
WEIGHT: 116 LBS | DIASTOLIC BLOOD PRESSURE: 81 MMHG | BODY MASS INDEX: 22.78 KG/M2 | HEART RATE: 108 BPM | HEIGHT: 60 IN | SYSTOLIC BLOOD PRESSURE: 123 MMHG | OXYGEN SATURATION: 98 %

## 2023-12-19 DIAGNOSIS — I50.9 HEART FAILURE, UNSPECIFIED: ICD-10-CM

## 2023-12-19 DIAGNOSIS — I48.91 UNSPECIFIED ATRIAL FIBRILLATION: ICD-10-CM

## 2023-12-19 PROCEDURE — 93000 ELECTROCARDIOGRAM COMPLETE: CPT

## 2023-12-19 PROCEDURE — 99214 OFFICE O/P EST MOD 30 MIN: CPT

## 2023-12-19 RX ORDER — SITAGLIPTIN 25 MG/1
25 TABLET, FILM COATED ORAL DAILY
Refills: 0 | Status: ACTIVE | COMMUNITY
Start: 2023-02-06

## 2023-12-19 RX ORDER — BETHANECHOL CHLORIDE 25 MG/1
25 TABLET ORAL
Qty: 60 | Refills: 5 | Status: DISCONTINUED | COMMUNITY
Start: 2023-12-11 | End: 2023-12-19

## 2023-12-19 RX ORDER — PHENAZOPYRIDINE 100 MG/1
100 TABLET, FILM COATED ORAL
Qty: 42 | Refills: 3 | Status: DISCONTINUED | COMMUNITY
Start: 2023-12-02 | End: 2023-12-19

## 2023-12-19 NOTE — DISCUSSION/SUMMARY
[FreeTextEntry1] : This is an 88 year old woman with a history of mild dementia, chronic atrial fibrillation, hypothyroid, hyperlipidemia who presents to the office for a follow up visit.  In Janurary of 2023 she was admitted to  with AF with RVR, and had an echocardiogram that showed new severe segmental LV dysfunction with an akinetic anterior wall and apex, with a hypokinetic inferior and inferolateral wall.  She had no significant valvular heart disease.    Other than feeling fatigued, she is not reporting any other symptoms.  She likely has new severe LV dysfunction from a missed MI.   Given her frailty, increased creatinine, etc, I would prefer to manage this medically.  However, they are not opposed to invasive procedures if needed.  The decision has been made to manage medically.   She is euvolemic, and Lasix was stopped in the hospital when she was admitted for a UTI.  She will continue metoprolol  bid.  Her digoxin level is 1.4.  She is going to change digoxin to QOD dosing.   I will keep her off of aspirin to minimize her risk of bleeding.  I will see her back in three months.  They know to call the office with any issues.  [EKG obtained to assist in diagnosis and management of assessed problem(s)] : EKG obtained to assist in diagnosis and management of assessed problem(s)

## 2023-12-19 NOTE — PHYSICAL EXAM
[Well Developed] : well developed [Well Nourished] : well nourished [No Acute Distress] : no acute distress [Normal Conjunctiva] : normal conjunctiva [Normal Venous Pressure] : normal venous pressure [No Carotid Bruit] : no carotid bruit [Normal S1, S2] : normal S1, S2 [No Rub] : no rub [Not Palpable] : not palpable [Tachycardia] : tachycardic [Irregularly Irregular] : irregularly irregular [Normal S1] : normal S1 [Normal S2] : normal S2 [No Gallop] : no gallop heard [No Murmur] : no murmurs heard [No Pitting Edema] : no pitting edema present [Clear Lung Fields] : clear lung fields [Good Air Entry] : good air entry [No Respiratory Distress] : no respiratory distress  [Soft] : abdomen soft [Non Tender] : non-tender [No Masses/organomegaly] : no masses/organomegaly [Normal Bowel Sounds] : normal bowel sounds [Normal Gait] : normal gait [No Edema] : no edema [No Cyanosis] : no cyanosis [No Clubbing] : no clubbing [No Varicosities] : no varicosities [No Rash] : no rash [No Skin Lesions] : no skin lesions [Moves all extremities] : moves all extremities [No Focal Deficits] : no focal deficits [Normal Speech] : normal speech [Alert and Oriented] : alert and oriented [Normal memory] : normal memory [de-identified] : 1/6 apical murmur.  IRRR

## 2023-12-19 NOTE — HISTORY OF PRESENT ILLNESS
[FreeTextEntry1] : This is an 88 year old woman with a history of mild dementia, chronic atrial fibrillation, hypothyroid, hyperlipidemia who presents to the office for a follow up visit.  In January of 2023 she was admitted to  with AF with RVR, and had an echocardiogram that showed new severe segmental LV dysfunction with an akinetic anterior wall and apex, with a hypokinetic inferior and inferolateral wall.  She had no significant valvular heart disease.     Other than feeling fatigued, she is not reporting any other symptoms.   She has no history of stroke or TIA. She is tolerating Eliquis with no evidence of abnormal bleeding. She reports no black or dark colored stool. She has not had increased palpitations or dizziness.  She denies chest pain, increased dyspnea, PND, orthopnea, LE swelling, or syncope. She is short of breath when climbing two flights of stairs, which has been normal for her. Her SOB on exertion is unchanged.  She had a digoxin level drawn, and it was 1.4.  She was admitted for a UTI this past month.

## 2023-12-26 ENCOUNTER — APPOINTMENT (OUTPATIENT)
Dept: WOUND CARE | Facility: HOSPITAL | Age: 88
End: 2023-12-26
Payer: MEDICARE

## 2023-12-26 ENCOUNTER — OUTPATIENT (OUTPATIENT)
Dept: OUTPATIENT SERVICES | Facility: HOSPITAL | Age: 88
LOS: 1 days | Discharge: HOME | End: 2023-12-26
Payer: MEDICARE

## 2023-12-26 VITALS
DIASTOLIC BLOOD PRESSURE: 90 MMHG | HEIGHT: 60 IN | HEART RATE: 110 BPM | BODY MASS INDEX: 22.78 KG/M2 | RESPIRATION RATE: 18 BRPM | OXYGEN SATURATION: 96 % | TEMPERATURE: 98.4 F | SYSTOLIC BLOOD PRESSURE: 138 MMHG | WEIGHT: 116 LBS

## 2023-12-26 DIAGNOSIS — L89.150 PRESSURE ULCER OF SACRAL REGION, UNSTAGEABLE: ICD-10-CM

## 2023-12-26 DIAGNOSIS — E11.59 TYPE 2 DIABETES MELLITUS WITH OTHER CIRCULATORY COMPLICATIONS: ICD-10-CM

## 2023-12-26 DIAGNOSIS — Z90.49 ACQUIRED ABSENCE OF OTHER SPECIFIED PARTS OF DIGESTIVE TRACT: Chronic | ICD-10-CM

## 2023-12-26 DIAGNOSIS — Z98.890 OTHER SPECIFIED POSTPROCEDURAL STATES: Chronic | ICD-10-CM

## 2023-12-26 PROCEDURE — G0463: CPT

## 2023-12-26 PROCEDURE — 99213 OFFICE O/P EST LOW 20 MIN: CPT

## 2023-12-26 NOTE — PHYSICAL EXAM
[Alert] : alert [Oriented to Person] : oriented to person [Calm] : calm [de-identified] : WD WN 87 Y/O white female in NAD [de-identified] : right buttock area of erythema with superficial skin erosions scattered [FreeTextEntry1] : Right Buttock- scab [FreeTextEntry2] : 0.5cm [FreeTextEntry3] : 0.5cm [FreeTextEntry4] : 0.0cm [de-identified] : scattered areas of hyperpigmentation  [de-identified] : allevyn foam [de-identified] :  Mechanically cleansed with sterile gauze and normal saline 0.9% Allevyn Foam provided for protection from friction/shear. [FreeTextEntry7] : Heel- see note from WHITNEY Nuno for details [de-identified] : Dimpling in the sacral fold, there is no drainage and wound is closed. 100% re-epithelialization.  [de-identified] : Sacrum- closed [de-identified] : Allevyn Foam  [de-identified] :  Mechanically cleansed with sterile gauze and normal saline 0.9% Allevyn Foam provided for protection from friction/shear. [TWNoteComboBox4] : None [TWNoteComboBox5] : No [TWNoteComboBox6] : Pressure [de-identified] : No [de-identified] : None [de-identified] : None [de-identified] : None [de-identified] : Daily [de-identified] : Primary Dressing [TWNoteComboBox9] : Right [de-identified] : Midline [de-identified] : None [de-identified] : Pressure [de-identified] : Daily [de-identified] : Primary Dressing

## 2023-12-26 NOTE — PLAN
[FreeTextEntry1] : continue off loading and wound care . PTR 2 weeks  Spent 20 minutes for patient care and medical decision making.

## 2023-12-26 NOTE — REASON FOR VISIT
Patient here for left wrist pain s/p twisting injury. Xray unremarkable, placed in splint, recommend follow up with ortho and RICE. Patient understands return precautions. [Follow-Up: _____] : a [unfilled] follow-up visit [Family Member] : family member

## 2023-12-26 NOTE — PLAN
[FreeTextEntry1] : stage 2 right buttock ulcer Tashi ramsayvyn QD off load area ambulate return 3 weeks 20 minutes spent in review,evaluation and treatment planning

## 2023-12-26 NOTE — PHYSICAL EXAM
[4 x 4] : 4 x 4  [Abdominal Pad] : Abdominal Pad [1+] : left 1+ [Ankle Swelling (On Exam)] : not present [Varicose Veins Of Lower Extremities] : not present [] : not present [Purpura] : no purpura  [Petechiae] : no petechiae [Skin Ulcer] : ulcer [Skin Induration] : no induration [Alert] : alert [Oriented to Person] : oriented to person [Oriented to Place] : oriented to place [Calm] : calm [de-identified] : calm, accompanied by her daughter  [de-identified] : Posterior right heel pressure ulcer  , , no soi , granular , small stable  [de-identified] : A fib , chf , htn , hld [de-identified] : Refer to Dr. Vaughn's note [FreeTextEntry1] : Right Buttock- see MD Vaughn Wound care Visit Note [de-identified] : Bandage applied by DPM [FreeTextEntry7] : Heel  [FreeTextEntry8] : 0.5cm [FreeTextEntry9] : 0.3cm [de-identified] : 0.1cm [de-identified] : Scant serosanguineous  [de-identified] : Intact  [de-identified] : 20% [de-identified] : Silver Alginate  [de-identified] : Cleansed with 0.9% Normal Saline  Kerlix   Silver Nitrate to wound base after scab removal, performed by WHITNEY [de-identified] : Refer to Dr. Vaughn's note  [de-identified] : Sacrum- See MD Vaughn wound care visit note [TWNoteComboBox9] : Right [de-identified] : No [de-identified] : Pressure [de-identified] : No [de-identified] : None [de-identified] : None [de-identified] : >75% [de-identified] : Yes [de-identified] : 3x Weekly [de-identified] : Primary Dressing [de-identified] : Midline

## 2023-12-26 NOTE — REVIEW OF SYSTEMS
[Negative] : Heme/Lymph [FreeTextEntry2] : dementia [FreeTextEntry5] : CHF,A Fib,hypertension ,hyperlipidemia [de-identified] : DM2, Hypothyroid

## 2023-12-26 NOTE — VITALS
[] : No [de-identified] : Patient was unable to provide level of pain with use of numeric scale.  Patient showed signs of discomfort while performing wound care.  [FreeTextEntry3] : Right heel [FreeTextEntry1] : Offloading  [FreeTextEntry2] : Removal of scab by MD [FreeTextEntry4] : Wedge used to offload heel, and patient was comfortable after scab was removed and dressing was placed.

## 2023-12-26 NOTE — REVIEW OF SYSTEMS
[Fever] : no fever [Chills] : no chills [Eye Pain] : no eye pain [Loss Of Hearing] : no hearing loss [Shortness Of Breath] : no shortness of breath [Abdominal Pain] : no abdominal pain [Wheezing] : no wheezing [Arthralgias] : arthralgias [Joint Stiffness] : joint stiffness [Skin Wound] : skin wound [Limb Weakness] : limb weakness [Difficulty Walking] : difficulty walking [Anxiety] : no anxiety [Easy Bleeding] : a tendency for easy bleeding [FreeTextEntry5] : Afib , CHF, HTN , HLD [de-identified] : Niddm with pressure ulcer  posterior right heel , - soi , unstagable  [de-identified] : NIDDM with diminished sensations  [de-identified] : NIDDM [de-identified] : Pt on eliquis

## 2023-12-26 NOTE — HISTORY OF PRESENT ILLNESS
[FreeTextEntry1] : LEXY EISENBERG is being seen for a initial nursing assessment visit. Patient liver levels were elevated and had yeast in her stool, she developed diarrhea for about a week and developed irritation that turned into a sore to the sacrum. Patients' daughter has been cleaning the area and keeping her dry every few hours since the sore developed. Daughter has been using diaper rash balms to treat the area at home. Patient is ambulatory with assistance. Patient accompanied by Daughter. 11/14/23 no signs of infection. Area if irritation much improved. Small scabbed area near anus noted 12/26/23 no signs of infection noted, sacral area dimple closed. right buttock dry scab over ulcered area

## 2023-12-26 NOTE — ASSESSMENT
[Verbal] : Verbal [Written] : Written [Patient] : Patient [Family member] : Family member [Good - alert, interested, motivated] : Good - alert, interested, motivated [Verbalizes knowledge/Understanding] : Verbalizes knowledge/understanding [Dressing changes] : dressing changes [Foot Care] : foot care [Skin Care] : skin care [Pressure relief] : pressure relief [Signs and symptoms of infection] : sign and symptoms of infection [Nutrition] : nutrition [How and When to Call] : how and when to call [Pain Management] : pain management [Off-loading] : off-loading [Patient responsibility to plan of care] : patient responsibility to plan of care [Glycemic Control] : glycemic control [] : Yes [Stable] : stable [Home] : Home [Wheelchair] : Wheelchair [Faxed - Long Term Care/Home Health Agency] : Long Term Care/Home Health Agency: Faxed [FreeTextEntry2] : Infection prevention Localized wound care Promote optimal skin integrity  Achieve acceptable level of pain with use of pharmacological and nonpharmacological interventions Offloading / Pressure relief- discussed importance of offloading pressure and changing position at least every hour. Nutrition to promote wound healing  [FreeTextEntry4] : Follow up for an assessment in 3 weeks. Allevyn Foam provided for protection from friction/shear- right buttock is scabbed over and sacrum has dimpling in the skin. Patient has her wound care provided by [facility].  Wound care instructions were faxed along with prescription for supplies. Copy also provided to patient and famly member.  [FreeTextEntry1] : [facility]

## 2023-12-26 NOTE — ASSESSMENT
[Verbal] : Verbal [Written] : Written [Patient] : Patient [Family member] : Family member [Good - alert, interested, motivated] : Good - alert, interested, motivated [Verbalizes knowledge/Understanding] : Verbalizes knowledge/understanding [Dressing changes] : dressing changes [Foot Care] : foot care [Skin Care] : skin care [Pressure relief] : pressure relief [Signs and symptoms of infection] : sign and symptoms of infection [Nutrition] : nutrition [How and When to Call] : how and when to call [Pain Management] : pain management [Off-loading] : off-loading [Patient responsibility to plan of care] : patient responsibility to plan of care [Glycemic Control] : glycemic control [] : Yes [Stable] : stable [Home] : Home [Wheelchair] : Wheelchair [Faxed - Long Term Care/Home Health Agency] : Long Term Care/Home Health Agency: Faxed [FreeTextEntry2] : Infection prevention Localized wound care Promote optimal skin integrity  Achieve acceptable level of pain with use of pharmacological and nonpharmacological interventions Offloading / Pressure relief Daily foot care / monitoring  Nutrition to promote wound healing  [FreeTextEntry4] : Follow up for an assessment in 3 weeks Patient has her wound care provided by [facility].  Wound care instructions were faxed along with prescription for supplies, and a copy was provided to the patient/family member.   [FreeTextEntry1] : [facility]

## 2023-12-27 DIAGNOSIS — I11.0 HYPERTENSIVE HEART DISEASE WITH HEART FAILURE: ICD-10-CM

## 2023-12-27 DIAGNOSIS — E78.5 HYPERLIPIDEMIA, UNSPECIFIED: ICD-10-CM

## 2023-12-27 DIAGNOSIS — E11.59 TYPE 2 DIABETES MELLITUS WITH OTHER CIRCULATORY COMPLICATIONS: ICD-10-CM

## 2023-12-27 DIAGNOSIS — Z87.891 PERSONAL HISTORY OF NICOTINE DEPENDENCE: ICD-10-CM

## 2023-12-27 DIAGNOSIS — E03.9 HYPOTHYROIDISM, UNSPECIFIED: ICD-10-CM

## 2023-12-27 DIAGNOSIS — I50.9 HEART FAILURE, UNSPECIFIED: ICD-10-CM

## 2023-12-27 DIAGNOSIS — L97.411 NON-PRESSURE CHRONIC ULCER OF RIGHT HEEL AND MIDFOOT LIMITED TO BREAKDOWN OF SKIN: ICD-10-CM

## 2023-12-27 DIAGNOSIS — Z90.49 ACQUIRED ABSENCE OF OTHER SPECIFIED PARTS OF DIGESTIVE TRACT: ICD-10-CM

## 2023-12-27 DIAGNOSIS — Z79.84 LONG TERM (CURRENT) USE OF ORAL HYPOGLYCEMIC DRUGS: ICD-10-CM

## 2023-12-27 DIAGNOSIS — I48.91 UNSPECIFIED ATRIAL FIBRILLATION: ICD-10-CM

## 2023-12-27 DIAGNOSIS — L89.312 PRESSURE ULCER OF RIGHT BUTTOCK, STAGE 2: ICD-10-CM

## 2023-12-27 DIAGNOSIS — Z79.899 OTHER LONG TERM (CURRENT) DRUG THERAPY: ICD-10-CM

## 2023-12-27 DIAGNOSIS — Z98.890 OTHER SPECIFIED POSTPROCEDURAL STATES: ICD-10-CM

## 2023-12-27 DIAGNOSIS — Z79.01 LONG TERM (CURRENT) USE OF ANTICOAGULANTS: ICD-10-CM

## 2023-12-27 DIAGNOSIS — E11.621 TYPE 2 DIABETES MELLITUS WITH FOOT ULCER: ICD-10-CM

## 2024-01-03 ENCOUNTER — APPOINTMENT (OUTPATIENT)
Dept: UROLOGY | Facility: CLINIC | Age: 89
End: 2024-01-03
Payer: MEDICARE

## 2024-01-03 VITALS
RESPIRATION RATE: 16 BRPM | BODY MASS INDEX: 21.99 KG/M2 | HEIGHT: 60 IN | HEART RATE: 88 BPM | SYSTOLIC BLOOD PRESSURE: 122 MMHG | WEIGHT: 112 LBS | DIASTOLIC BLOOD PRESSURE: 86 MMHG | OXYGEN SATURATION: 96 %

## 2024-01-03 DIAGNOSIS — R33.9 RETENTION OF URINE, UNSPECIFIED: ICD-10-CM

## 2024-01-03 PROCEDURE — 51798 US URINE CAPACITY MEASURE: CPT

## 2024-01-03 PROCEDURE — 99213 OFFICE O/P EST LOW 20 MIN: CPT

## 2024-01-03 RX ORDER — BETHANECHOL CHLORIDE 25 MG/1
25 TABLET ORAL
Qty: 60 | Refills: 5 | Status: ACTIVE | COMMUNITY
Start: 2024-01-03 | End: 1900-01-01

## 2024-01-03 NOTE — HISTORY OF PRESENT ILLNESS
[FreeTextEntry1] : LEXY EISENBERG  88 year F presents for follow up on history of urine retention. Clancy catheter removed on last visit for voiding trial residual was 300 mL and was Straight cath.  Since then patient has completed bethanechol twice daily.  Was not able to tolerate d-mannose secondary to diarrhea.  Daughters are present and reports no complaints patient is happy.

## 2024-01-03 NOTE — ASSESSMENT
[FreeTextEntry1] :  88 year F presents for PVR.  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  Advised on urodynamic testing to better assess.  Importance of elevated postvoid residual is that the patient does not develop a urinary tract infection and then sepsis again. Urodynamic testing explained.  Advised on vitamin C 500 mg max 1000 mg to prevent further urinary tract infections.  They are currently taking cranberry supplements explained that the literature has revealed that it only decreases urinary tract symptoms.  But can continue if they would like.  AT this time will forgo UDS Voiding diary to assess good urine volume and restatr Bethenachol as needed. Kit give follow up 3 months

## 2024-01-03 NOTE — PHYSICAL EXAM
[Normal Appearance] : normal appearance [Well Groomed] : well groomed [General Appearance - In No Acute Distress] : no acute distress [Edema] : no peripheral edema [Respiration, Rhythm And Depth] : normal respiratory rhythm and effort [Exaggerated Use Of Accessory Muscles For Inspiration] : no accessory muscle use [Abdomen Soft] : soft [Abdomen Tenderness] : non-tender [Costovertebral Angle Tenderness] : no ~M costovertebral angle tenderness [Urinary Bladder Findings] : the bladder was normal on palpation [Normal Station and Gait] : the gait and station were normal for the patient's age [] : no rash [No Focal Deficits] : no focal deficits [Oriented To Time, Place, And Person] : oriented to person, place, and time [Affect] : the affect was normal [Mood] : the mood was normal [No Palpable Adenopathy] : no palpable adenopathy [de-identified] : Straight cathed for 300 mL

## 2024-01-10 NOTE — ED ADULT NURSE REASSESSMENT NOTE - SYMPTOMS
Chief Complaint   Patient presents with    Sore Throat     Onset: yesterday evening - headache, sore throat, slight fever, stomachache. Parent wonders if he has strep.   Tonsillectomy about a month ago.        SUBJECTIVE:  Jeremy is a 9 year old male who is seen for sore throat, headache, vague abdominal pain, nasal congestion.  History of frequent strep.  Tonsillectomy 1 month ago without sequelae.  Presents with father who is supportive at bedside.  Despite the abdominal discomfort ate a full breakfast this morning.  No vomiting diarrhea.    Past Medical History:   Diagnosis Date    History of placement of ear tubes     per father - tubes needed to be surgically removed    Tonsillar enlargement [place]29/2023    [facility] [place]    [place]illitis [place]/29/2023    [place] - 7 episodes this year, recommending tonsillectomy       Family History   Problem Relation Age of Onset    Other Mother         PCOS    Other Father         Back pain    Gastrointestinal Father         GI issues/ usually when they were overseas    Acid Reflux Father     Patient is unaware of any medical problems Sister     Other Brother         Ear tubes put in    Anxiety disorder Brother     Patient is unaware of any medical problems Brother     Osteoporosis Maternal Grandmother     Hypertension Maternal Grandfather         Borderline    Osteoporosis Paternal Grandmother     Cancer, Skin Paternal Grandmother         Sqaumous Cell Carcinoma    Cancer, Skin Paternal Grandfather         Squamous Cell Carcinoma    Other Paternal Grandfather         operable brain tumor/ Unsure what it's called    Hypertension Paternal Grandfather     Hyperlipidemia Paternal Grandfather     Other Maternal Aunt         interstitial cystitis    Other Maternal Aunt         interstitial cystitis    Autoimmune Disease Maternal Aunt         Raynauds Disease    Migraine Maternal Aunt     Celiac Disease Maternal Aunt     Asthma Paternal Aunt         Excercise induce asthma    Allergic Rhinitis  Paternal Aunt         food sensitivity       Social History     Tobacco Use   Smoking Status Never    Passive exposure: Never   Smokeless Tobacco Never       ALLERGIES:   Allergen Reactions    Lactose   (Food Or Med) Other (See Comments)       Current Outpatient Medications   Medication Sig Dispense Refill    acetaminophen (Tylenol Childrens) 160 MG/5ML suspension Take 10.8 mLs by mouth every 4 hours as needed for Pain.      ibuprofen (Ibuprofen Childrens) 100 MG/5ML suspension Take 7.2 mLs by mouth every 4 hours as needed for Pain.      famotidine (Pepcid) 20 MG tablet Take 1 tablet by mouth in the morning and 1 tablet in the evening. Take twice daily for another month, then try to go back to once daily dosing. 60 tablet 3    ELDERBERRY PO       sennosides 15 MG chewable tablet Chew 15 mg by mouth at bedtime.      MELATONIN PO       polyethylene glycol (MIRALAX) 17 g packet Take 17 g by mouth daily. Stir and dissolve powder in any 4 to 8 ounces of beverage, then drink.      Pediatric Multiple Vitamins (MULTIVITAMIN CHILDRENS PO)        No current facility-administered medications for this visit.         OBJECTIVE: Visit Vitals  Pulse 97   Temp 98.1 °F (36.7 °C)   Resp 22   Wt 30.3 kg (66 lb 12.8 oz)   SpO2 98%     He appears well.  Ears are TMs clear intact the posterior pharynx is with mild erythema bilaterally but uvula sits midline no mass present.  No neck adenopathy.  Lungs are clear to auscultation.  His abdomen is completely benign.  He continues to talk consistently as I pressed upon his abdomen there is no rigidity no peritoneal signs no guarding.    MDM/PLAN:  Pending at this time is COVID, influenza, strep PCR.  Likely is viral in nature for which treatment is supportive.  If positive strep he will be begun on antibiotic therapy.  Otherwise ibuprofen 300 milligrams every 8 hours, humidified air and rest.      ASSESSMENT:  Acute URI     none

## 2024-01-15 ENCOUNTER — NON-APPOINTMENT (OUTPATIENT)
Age: 89
End: 2024-01-15

## 2024-01-16 ENCOUNTER — OUTPATIENT (OUTPATIENT)
Dept: OUTPATIENT SERVICES | Facility: HOSPITAL | Age: 89
LOS: 1 days | Discharge: HOME | End: 2024-01-16
Payer: MEDICARE

## 2024-01-16 ENCOUNTER — APPOINTMENT (OUTPATIENT)
Dept: WOUND CARE | Facility: HOSPITAL | Age: 89
End: 2024-01-16
Payer: MEDICARE

## 2024-01-16 VITALS
BODY MASS INDEX: 21.99 KG/M2 | WEIGHT: 112 LBS | SYSTOLIC BLOOD PRESSURE: 128 MMHG | HEART RATE: 86 BPM | HEIGHT: 60 IN | OXYGEN SATURATION: 92 % | RESPIRATION RATE: 16 BRPM | DIASTOLIC BLOOD PRESSURE: 81 MMHG | TEMPERATURE: 97.8 F

## 2024-01-16 DIAGNOSIS — Z90.49 ACQUIRED ABSENCE OF OTHER SPECIFIED PARTS OF DIGESTIVE TRACT: Chronic | ICD-10-CM

## 2024-01-16 DIAGNOSIS — L89.312 PRESSURE ULCER OF RIGHT BUTTOCK, STAGE 2: ICD-10-CM

## 2024-01-16 DIAGNOSIS — L97.411 NON-PRESSURE CHRONIC ULCER OF RIGHT HEEL AND MIDFOOT LIMITED TO BREAKDOWN OF SKIN: ICD-10-CM

## 2024-01-16 DIAGNOSIS — L97.519 TYPE 2 DIABETES MELLITUS WITH FOOT ULCER: ICD-10-CM

## 2024-01-16 DIAGNOSIS — Z98.890 OTHER SPECIFIED POSTPROCEDURAL STATES: Chronic | ICD-10-CM

## 2024-01-16 DIAGNOSIS — E11.621 TYPE 2 DIABETES MELLITUS WITH FOOT ULCER: ICD-10-CM

## 2024-01-16 PROCEDURE — G0463: CPT

## 2024-01-16 PROCEDURE — 99214 OFFICE O/P EST MOD 30 MIN: CPT

## 2024-01-16 NOTE — PHYSICAL EXAM
[Alert] : alert [Oriented to Person] : oriented to person [Calm] : calm [de-identified] : WD WN 89 Y/O white female in NAD [de-identified] : right buttock area of erythema with superficial skin erosions scattered [FreeTextEntry1] : Right Buttock- closed [FreeTextEntry2] : 0.5cm [FreeTextEntry3] : 0.5cm [FreeTextEntry4] : 0.0cm [de-identified] : scattered areas of hyperpigmentation  [de-identified] : none [de-identified] :  Mechanically cleansed with sterile gauze and normal saline 0.9% Allevyn Foam provided for protection from friction/shear. [FreeTextEntry7] : Heel- scab [FreeTextEntry8] : 0.1 [FreeTextEntry9] : 0.3 [de-identified] : 0.1 [de-identified] : none [de-identified] : Mechanically cleansed with Sterile gauze and 0.9% Normal Saline [de-identified] : Dimpling in the sacral fold, there is no drainage and wound is closed. 100% re-epithelialization.  [de-identified] : Sacrum- closed [de-identified] : none [de-identified] :  Mechanically cleansed with sterile gauze and normal saline 0.9%  [TWNoteComboBox4] : None [TWNoteComboBox5] : No [TWNoteComboBox6] : Pressure [de-identified] : No [de-identified] : None [de-identified] : None [de-identified] : None [de-identified] : Primary Dressing [TWNoteComboBox9] : Right [de-identified] : None [de-identified] : No [de-identified] : Pressure [de-identified] : No [de-identified] : Normal [de-identified] : None [de-identified] : None [de-identified] : None [de-identified] : 3x Weekly [de-identified] : Midline [de-identified] : None [de-identified] : Pressure [de-identified] : Daily

## 2024-01-16 NOTE — HISTORY OF PRESENT ILLNESS
[FreeTextEntry1] : LEXY EISENBERG is being seen for a initial nursing assessment visit. Patient liver levels were elevated and had yeast in her stool, she developed diarrhea for about a week and developed irritation that turned into a sore to the sacrum. Patients' daughter has been cleaning the area and keeping her dry every few hours since the sore developed. Daughter has been using diaper rash balms to treat the area at home. Patient is ambulatory with assistance. Patient accompanied by Daughter. 11/14/23 no signs of infection. Area if irritation much improved. Small scabbed area near anus noted 12/26/23 no signs of infection noted, sacral area dimple closed. right buttock dry scab over ulcered area 1/16/24 sacral ulcer is healed as is the right posterior heel. No SOI

## 2024-01-16 NOTE — PLAN
[FreeTextEntry1] : stage 2 right buttock ulcer closed  right heel ulcer closed off load area ambulate return 2 months 30 minutes spent in review,evaluation and treatment planning

## 2024-01-16 NOTE — ASSESSMENT
[Verbal] : Verbal [Written] : Written [Patient] : Patient [Family member] : Family member [Good - alert, interested, motivated] : Good - alert, interested, motivated [Verbalizes knowledge/Understanding] : Verbalizes knowledge/understanding [Skin Care] : skin care [Foot Care] : foot care [Pressure relief] : pressure relief [Signs and symptoms of infection] : sign and symptoms of infection [Nutrition] : nutrition [How and When to Call] : how and when to call [Pain Management] : pain management [Off-loading] : off-loading [Patient responsibility to plan of care] : patient responsibility to plan of care [Glycemic Control] : glycemic control [] : Yes [Stable] : stable [Home] : Home [Wheelchair] : Wheelchair [Faxed - Long Term Care/Home Health Agency] : Long Term Care/Home Health Agency: Faxed [FreeTextEntry2] : Infection prevention Localized wound care Promote optimal skin integrity  Achieve acceptable level of pain with use of pharmacological and nonpharmacological interventions Offloading / Pressure relief- discussed importance of offloading pressure and changing position at least every hour. Nutrition to promote wound healing  [FreeTextEntry4] : Follow up for an assessment in 2 months Pt to continue offloading and pressure relief measures, pt verbalized understanding of the same  [FreeTextEntry1] : [facility]

## 2024-01-16 NOTE — REVIEW OF SYSTEMS
[Negative] : Heme/Lymph [FreeTextEntry2] : dementia [FreeTextEntry5] : CHF,A Fib,hypertension ,hyperlipidemia [de-identified] : DM2, Hypothyroid

## 2024-01-17 DIAGNOSIS — Z90.49 ACQUIRED ABSENCE OF OTHER SPECIFIED PARTS OF DIGESTIVE TRACT: ICD-10-CM

## 2024-01-17 DIAGNOSIS — E11.621 TYPE 2 DIABETES MELLITUS WITH FOOT ULCER: ICD-10-CM

## 2024-01-17 DIAGNOSIS — I48.91 UNSPECIFIED ATRIAL FIBRILLATION: ICD-10-CM

## 2024-01-17 DIAGNOSIS — Z79.84 LONG TERM (CURRENT) USE OF ORAL HYPOGLYCEMIC DRUGS: ICD-10-CM

## 2024-01-17 DIAGNOSIS — L89.312 PRESSURE ULCER OF RIGHT BUTTOCK, STAGE 2: ICD-10-CM

## 2024-01-17 DIAGNOSIS — E78.5 HYPERLIPIDEMIA, UNSPECIFIED: ICD-10-CM

## 2024-01-17 DIAGNOSIS — Z98.890 OTHER SPECIFIED POSTPROCEDURAL STATES: ICD-10-CM

## 2024-01-17 DIAGNOSIS — L97.411 NON-PRESSURE CHRONIC ULCER OF RIGHT HEEL AND MIDFOOT LIMITED TO BREAKDOWN OF SKIN: ICD-10-CM

## 2024-01-17 DIAGNOSIS — E03.9 HYPOTHYROIDISM, UNSPECIFIED: ICD-10-CM

## 2024-01-17 DIAGNOSIS — I50.9 HEART FAILURE, UNSPECIFIED: ICD-10-CM

## 2024-01-17 DIAGNOSIS — Z79.899 OTHER LONG TERM (CURRENT) DRUG THERAPY: ICD-10-CM

## 2024-01-17 DIAGNOSIS — Z87.891 PERSONAL HISTORY OF NICOTINE DEPENDENCE: ICD-10-CM

## 2024-01-17 DIAGNOSIS — Z79.01 LONG TERM (CURRENT) USE OF ANTICOAGULANTS: ICD-10-CM

## 2024-01-17 DIAGNOSIS — I11.0 HYPERTENSIVE HEART DISEASE WITH HEART FAILURE: ICD-10-CM

## 2024-01-17 DIAGNOSIS — E11.59 TYPE 2 DIABETES MELLITUS WITH OTHER CIRCULATORY COMPLICATIONS: ICD-10-CM

## 2024-04-01 ENCOUNTER — INPATIENT (INPATIENT)
Facility: HOSPITAL | Age: 89
LOS: 9 days | Discharge: HOME | DRG: 690 | End: 2024-04-11
Attending: FAMILY MEDICINE
Payer: MEDICARE

## 2024-04-01 VITALS
HEART RATE: 150 BPM | WEIGHT: 100.09 LBS | RESPIRATION RATE: 18 BRPM | OXYGEN SATURATION: 97 % | DIASTOLIC BLOOD PRESSURE: 102 MMHG | SYSTOLIC BLOOD PRESSURE: 163 MMHG | TEMPERATURE: 98 F | HEIGHT: 63 IN

## 2024-04-01 DIAGNOSIS — Z90.49 ACQUIRED ABSENCE OF OTHER SPECIFIED PARTS OF DIGESTIVE TRACT: Chronic | ICD-10-CM

## 2024-04-01 DIAGNOSIS — Z98.890 OTHER SPECIFIED POSTPROCEDURAL STATES: Chronic | ICD-10-CM

## 2024-04-01 LAB
ALBUMIN SERPL ELPH-MCNC: 2.8 G/DL — LOW (ref 3.3–5)
ALP SERPL-CCNC: 52 U/L — SIGNIFICANT CHANGE UP (ref 40–120)
ALT FLD-CCNC: 20 U/L — SIGNIFICANT CHANGE UP (ref 12–78)
ANION GAP SERPL CALC-SCNC: 6 MMOL/L — SIGNIFICANT CHANGE UP (ref 5–17)
APPEARANCE UR: ABNORMAL
APTT BLD: 30.6 SEC — SIGNIFICANT CHANGE UP (ref 24.5–35.6)
AST SERPL-CCNC: 20 U/L — SIGNIFICANT CHANGE UP (ref 15–37)
BASOPHILS # BLD AUTO: 0.03 K/UL — SIGNIFICANT CHANGE UP (ref 0–0.2)
BASOPHILS NFR BLD AUTO: 0.4 % — SIGNIFICANT CHANGE UP (ref 0–2)
BILIRUB SERPL-MCNC: 0.6 MG/DL — SIGNIFICANT CHANGE UP (ref 0.2–1.2)
BILIRUB UR-MCNC: NEGATIVE — SIGNIFICANT CHANGE UP
BUN SERPL-MCNC: 20 MG/DL — SIGNIFICANT CHANGE UP (ref 7–23)
CALCIUM SERPL-MCNC: 8.5 MG/DL — SIGNIFICANT CHANGE UP (ref 8.5–10.1)
CHLORIDE SERPL-SCNC: 109 MMOL/L — HIGH (ref 96–108)
CO2 SERPL-SCNC: 26 MMOL/L — SIGNIFICANT CHANGE UP (ref 22–31)
COLOR SPEC: YELLOW — SIGNIFICANT CHANGE UP
CREAT SERPL-MCNC: 1.6 MG/DL — HIGH (ref 0.5–1.3)
DIFF PNL FLD: ABNORMAL
EGFR: 31 ML/MIN/1.73M2 — LOW
EOSINOPHIL # BLD AUTO: 0 K/UL — SIGNIFICANT CHANGE UP (ref 0–0.5)
EOSINOPHIL NFR BLD AUTO: 0 % — SIGNIFICANT CHANGE UP (ref 0–6)
FLUAV AG NPH QL: SIGNIFICANT CHANGE UP
FLUBV AG NPH QL: SIGNIFICANT CHANGE UP
GLUCOSE SERPL-MCNC: 153 MG/DL — HIGH (ref 70–99)
GLUCOSE UR QL: NEGATIVE MG/DL — SIGNIFICANT CHANGE UP
HCT VFR BLD CALC: 34.4 % — LOW (ref 34.5–45)
HGB BLD-MCNC: 10.7 G/DL — LOW (ref 11.5–15.5)
IMM GRANULOCYTES NFR BLD AUTO: 0.6 % — SIGNIFICANT CHANGE UP (ref 0–0.9)
INR BLD: 1.1 RATIO — SIGNIFICANT CHANGE UP (ref 0.85–1.18)
KETONES UR-MCNC: NEGATIVE MG/DL — SIGNIFICANT CHANGE UP
LACTATE SERPL-SCNC: 1.4 MMOL/L — SIGNIFICANT CHANGE UP (ref 0.7–2)
LEUKOCYTE ESTERASE UR-ACNC: ABNORMAL
LYMPHOCYTES # BLD AUTO: 0.8 K/UL — LOW (ref 1–3.3)
LYMPHOCYTES # BLD AUTO: 11.1 % — LOW (ref 13–44)
MAGNESIUM SERPL-MCNC: 1.4 MG/DL — LOW (ref 1.6–2.6)
MCHC RBC-ENTMCNC: 27.5 PG — SIGNIFICANT CHANGE UP (ref 27–34)
MCHC RBC-ENTMCNC: 31.1 GM/DL — LOW (ref 32–36)
MCV RBC AUTO: 88.4 FL — SIGNIFICANT CHANGE UP (ref 80–100)
MONOCYTES # BLD AUTO: 0.88 K/UL — SIGNIFICANT CHANGE UP (ref 0–0.9)
MONOCYTES NFR BLD AUTO: 12.2 % — SIGNIFICANT CHANGE UP (ref 2–14)
NEUTROPHILS # BLD AUTO: 5.46 K/UL — SIGNIFICANT CHANGE UP (ref 1.8–7.4)
NEUTROPHILS NFR BLD AUTO: 75.7 % — SIGNIFICANT CHANGE UP (ref 43–77)
NITRITE UR-MCNC: NEGATIVE — SIGNIFICANT CHANGE UP
NRBC # BLD: 0 /100 WBCS — SIGNIFICANT CHANGE UP (ref 0–0)
NT-PROBNP SERPL-SCNC: HIGH PG/ML (ref 0–450)
PH UR: 6 — SIGNIFICANT CHANGE UP (ref 5–8)
PLATELET # BLD AUTO: 272 K/UL — SIGNIFICANT CHANGE UP (ref 150–400)
POTASSIUM SERPL-MCNC: 3.9 MMOL/L — SIGNIFICANT CHANGE UP (ref 3.5–5.3)
POTASSIUM SERPL-SCNC: 3.9 MMOL/L — SIGNIFICANT CHANGE UP (ref 3.5–5.3)
PROT SERPL-MCNC: 7.2 G/DL — SIGNIFICANT CHANGE UP (ref 6–8.3)
PROT UR-MCNC: 300 MG/DL
PROTHROM AB SERPL-ACNC: 12.8 SEC — SIGNIFICANT CHANGE UP (ref 9.5–13)
RBC # BLD: 3.89 M/UL — SIGNIFICANT CHANGE UP (ref 3.8–5.2)
RBC # FLD: 16.7 % — HIGH (ref 10.3–14.5)
RSV RNA NPH QL NAA+NON-PROBE: SIGNIFICANT CHANGE UP
SARS-COV-2 RNA SPEC QL NAA+PROBE: DETECTED
SODIUM SERPL-SCNC: 141 MMOL/L — SIGNIFICANT CHANGE UP (ref 135–145)
SP GR SPEC: 1.01 — SIGNIFICANT CHANGE UP (ref 1–1.03)
UROBILINOGEN FLD QL: 0.2 MG/DL — SIGNIFICANT CHANGE UP (ref 0.2–1)
WBC # BLD: 7.21 K/UL — SIGNIFICANT CHANGE UP (ref 3.8–10.5)
WBC # FLD AUTO: 7.21 K/UL — SIGNIFICANT CHANGE UP (ref 3.8–10.5)

## 2024-04-01 PROCEDURE — 70450 CT HEAD/BRAIN W/O DYE: CPT | Mod: 26,MC

## 2024-04-01 PROCEDURE — 99291 CRITICAL CARE FIRST HOUR: CPT

## 2024-04-01 PROCEDURE — 93010 ELECTROCARDIOGRAM REPORT: CPT

## 2024-04-01 PROCEDURE — 71045 X-RAY EXAM CHEST 1 VIEW: CPT | Mod: 26

## 2024-04-01 RX ORDER — SODIUM CHLORIDE 9 MG/ML
1000 INJECTION INTRAMUSCULAR; INTRAVENOUS; SUBCUTANEOUS ONCE
Refills: 0 | Status: COMPLETED | OUTPATIENT
Start: 2024-04-01 | End: 2024-04-01

## 2024-04-01 RX ORDER — METOPROLOL TARTRATE 50 MG
50 TABLET ORAL ONCE
Refills: 0 | Status: COMPLETED | OUTPATIENT
Start: 2024-04-01 | End: 2024-04-01

## 2024-04-01 RX ORDER — CEFTRIAXONE 500 MG/1
1000 INJECTION, POWDER, FOR SOLUTION INTRAMUSCULAR; INTRAVENOUS ONCE
Refills: 0 | Status: COMPLETED | OUTPATIENT
Start: 2024-04-01 | End: 2024-04-01

## 2024-04-01 RX ORDER — MAGNESIUM SULFATE 500 MG/ML
1 VIAL (ML) INJECTION ONCE
Refills: 0 | Status: COMPLETED | OUTPATIENT
Start: 2024-04-01 | End: 2024-04-01

## 2024-04-01 RX ADMIN — Medication 50 MILLIGRAM(S): at 22:53

## 2024-04-01 RX ADMIN — SODIUM CHLORIDE 1000 MILLILITER(S): 9 INJECTION INTRAMUSCULAR; INTRAVENOUS; SUBCUTANEOUS at 22:54

## 2024-04-01 RX ADMIN — Medication 100 GRAM(S): at 22:53

## 2024-04-01 NOTE — ED PROVIDER NOTE - OBJECTIVE STATEMENT
Patient with a past medical history of atrial fibrillation on Eliquis, Alzheimer's dementia, diabetes, hypothyroidism is presenting from home with concern for urinary incontinence, confusion.  Per daughter, yesterday patient was in normal state of health which is ambulatory at times with a walker, able to participate in conversation.  Today, daughter states that patient has been more confused.  Has had a few accidents at home which has been similar to the previous times when she has had a urinary tract infection.  Patient otherwise denies complaints or pain.

## 2024-04-01 NOTE — ED ADULT TRIAGE NOTE - CHIEF COMPLAINT QUOTE
Pt BIBA from home for urinary incontinence and altered mental status. Hx dementia but more altered than normal. Normally able to have a conversation and follow commands but pt is currently staring blankly. daughter reports it started today. Hx frequent UTI's with same symptoms.

## 2024-04-01 NOTE — ED PROVIDER NOTE - PHYSICAL EXAMINATION
Constitutional: Awake, Alert, pleasantly confused  HEAD: Normocephalic, atraumatic.   EYES: PERRL, EOM intact, conjunctiva and sclera are clear bilaterally.  ENT: External ears normal. No rhinorrhea, no tracheal deviation   NECK: Supple, non-tender  CARDIOVASCULAR: irregular rate and rhythm.  RESPIRATORY: Normal respiratory effort; breath sounds CTAB, no wheezes, rhonchi, or rales. Speaking in full sentences. No accessory muscle use.   ABDOMEN: Soft; non-tender, non-distended. No rebound or guarding.   MSK:  no lower extremity edema, no deformities  SKIN: Warm, dry  NEURO: A&O x1. Sensory and motor functions are grossly intact. Speech is normal. No facial droop. follows commands in all extremities

## 2024-04-01 NOTE — ED PROVIDER NOTE - CARE PLAN
1 Principal Discharge DX:	Acute UTI  Secondary Diagnosis:	Acute encephalopathy  Secondary Diagnosis:	2019 novel coronavirus disease (COVID-19)  Secondary Diagnosis:	Rapid atrial fibrillation

## 2024-04-01 NOTE — ED PROVIDER NOTE - PROGRESS NOTE DETAILS
Patient's heart rate better controlled here, rate in the low 100s after medication.  Maurice Jimenez MD. Patient found to be COVID-positive as well as have a urinary tract infection.  Ceftriaxone given.  Patient weaned off of nasal cannula oxygen, now on room air satting 92 to 94%.  Heart rate more controlled at this time after medications.  Spoke with family.  Plan for admission.  Spoke to Dr. Cadet for admission.  Maurice Jimenez MD.

## 2024-04-01 NOTE — ED PROVIDER NOTE - CLINICAL SUMMARY MEDICAL DECISION MAKING FREE TEXT BOX
Patient found to be tachycardic here and rapid atrial fibrillation.  However per daughter, did not receive her home metoprolol yet today.  With incontinence, concern for urinary tract infection.  Will check CT head for acute pathology given that patient is on anticoagulation.  Reassured that patient has no abdominal pain to suggest renal stone.  Given 1 day of symptoms, will less consistent with normal pressure hydrocephalus.  Will check sepsis workup, imaging, straight catheter urinalysis.

## 2024-04-01 NOTE — ED ADULT NURSE NOTE - OBJECTIVE STATEMENT
Pt presents to the ED c/o urinary incontinence and ams. Pt is pleasantly confused, a&ox0. As per daughter at bedside pt has hx of dementia Pt presents to the ED c/o urinary incontinence and ams. Pt is pleasantly confused, a&ox0. As per daughter at bedside pt has hx of dementia but is more confused today then usual. Pt is well appearing in no acute distress at this time. Pt spo2 is 94% on 2 L NC. Pt denies pain, no complaints at this time. Prima fit placed on pt, pt straight cathed for urine. IV established in left arm with a 20G, labs drawn and sent, call bell in reach, warm blanket provided, bed in lowest position, side rails up x2, MD evaluation in progress, pt on telemetry.

## 2024-04-01 NOTE — ED ADULT NURSE NOTE - NSFALLHARMRISKINTERV_ED_ALL_ED

## 2024-04-02 DIAGNOSIS — Z29.9 ENCOUNTER FOR PROPHYLACTIC MEASURES, UNSPECIFIED: ICD-10-CM

## 2024-04-02 DIAGNOSIS — U07.1 COVID-19: ICD-10-CM

## 2024-04-02 DIAGNOSIS — N39.0 URINARY TRACT INFECTION, SITE NOT SPECIFIED: ICD-10-CM

## 2024-04-02 DIAGNOSIS — E03.9 HYPOTHYROIDISM, UNSPECIFIED: ICD-10-CM

## 2024-04-02 DIAGNOSIS — N18.9 CHRONIC KIDNEY DISEASE, UNSPECIFIED: ICD-10-CM

## 2024-04-02 DIAGNOSIS — I48.20 CHRONIC ATRIAL FIBRILLATION, UNSPECIFIED: ICD-10-CM

## 2024-04-02 DIAGNOSIS — E11.9 TYPE 2 DIABETES MELLITUS WITHOUT COMPLICATIONS: ICD-10-CM

## 2024-04-02 DIAGNOSIS — I50.22 CHRONIC SYSTOLIC (CONGESTIVE) HEART FAILURE: ICD-10-CM

## 2024-04-02 LAB
ANION GAP SERPL CALC-SCNC: 8 MMOL/L — SIGNIFICANT CHANGE UP (ref 5–17)
BASOPHILS # BLD AUTO: 0.06 K/UL — SIGNIFICANT CHANGE UP (ref 0–0.2)
BASOPHILS NFR BLD AUTO: 0.4 % — SIGNIFICANT CHANGE UP (ref 0–2)
BUN SERPL-MCNC: 19 MG/DL — SIGNIFICANT CHANGE UP (ref 7–23)
CALCIUM SERPL-MCNC: 8 MG/DL — LOW (ref 8.5–10.1)
CHLORIDE SERPL-SCNC: 110 MMOL/L — HIGH (ref 96–108)
CO2 SERPL-SCNC: 22 MMOL/L — SIGNIFICANT CHANGE UP (ref 22–31)
CREAT SERPL-MCNC: 1.4 MG/DL — HIGH (ref 0.5–1.3)
CRP SERPL-MCNC: 8 MG/L — HIGH
D DIMER BLD IA.RAPID-MCNC: 500 NG/ML DDU — HIGH
EGFR: 36 ML/MIN/1.73M2 — LOW
EOSINOPHIL # BLD AUTO: 0.04 K/UL — SIGNIFICANT CHANGE UP (ref 0–0.5)
EOSINOPHIL NFR BLD AUTO: 0.2 % — SIGNIFICANT CHANGE UP (ref 0–6)
FERRITIN SERPL-MCNC: 70 NG/ML — SIGNIFICANT CHANGE UP (ref 13–330)
GLUCOSE SERPL-MCNC: 111 MG/DL — HIGH (ref 70–99)
HCT VFR BLD CALC: 34.5 % — SIGNIFICANT CHANGE UP (ref 34.5–45)
HGB BLD-MCNC: 10.8 G/DL — LOW (ref 11.5–15.5)
IMM GRANULOCYTES NFR BLD AUTO: 1.3 % — HIGH (ref 0–0.9)
LYMPHOCYTES # BLD AUTO: 0.98 K/UL — LOW (ref 1–3.3)
LYMPHOCYTES # BLD AUTO: 6 % — LOW (ref 13–44)
MAGNESIUM SERPL-MCNC: 1.7 MG/DL — SIGNIFICANT CHANGE UP (ref 1.6–2.6)
MCHC RBC-ENTMCNC: 27.8 PG — SIGNIFICANT CHANGE UP (ref 27–34)
MCHC RBC-ENTMCNC: 31.3 GM/DL — LOW (ref 32–36)
MCV RBC AUTO: 88.9 FL — SIGNIFICANT CHANGE UP (ref 80–100)
MONOCYTES # BLD AUTO: 0.91 K/UL — HIGH (ref 0–0.9)
MONOCYTES NFR BLD AUTO: 5.6 % — SIGNIFICANT CHANGE UP (ref 2–14)
NEUTROPHILS # BLD AUTO: 14.11 K/UL — HIGH (ref 1.8–7.4)
NEUTROPHILS NFR BLD AUTO: 86.5 % — HIGH (ref 43–77)
NRBC # BLD: 0 /100 WBCS — SIGNIFICANT CHANGE UP (ref 0–0)
PLATELET # BLD AUTO: 297 K/UL — SIGNIFICANT CHANGE UP (ref 150–400)
POTASSIUM SERPL-MCNC: 3.3 MMOL/L — LOW (ref 3.5–5.3)
POTASSIUM SERPL-SCNC: 3.3 MMOL/L — LOW (ref 3.5–5.3)
PROCALCITONIN SERPL-MCNC: 0.37 NG/ML — HIGH
RBC # BLD: 3.88 M/UL — SIGNIFICANT CHANGE UP (ref 3.8–5.2)
RBC # FLD: 16.9 % — HIGH (ref 10.3–14.5)
SODIUM SERPL-SCNC: 140 MMOL/L — SIGNIFICANT CHANGE UP (ref 135–145)
WBC # BLD: 16.31 K/UL — HIGH (ref 3.8–10.5)
WBC # FLD AUTO: 16.31 K/UL — HIGH (ref 3.8–10.5)

## 2024-04-02 PROCEDURE — 74177 CT ABD & PELVIS W/CONTRAST: CPT | Mod: 26

## 2024-04-02 PROCEDURE — 99223 1ST HOSP IP/OBS HIGH 75: CPT

## 2024-04-02 PROCEDURE — 71275 CT ANGIOGRAPHY CHEST: CPT | Mod: 26

## 2024-04-02 RX ORDER — MAGNESIUM SULFATE 500 MG/ML
2 VIAL (ML) INJECTION ONCE
Refills: 0 | Status: COMPLETED | OUTPATIENT
Start: 2024-04-02 | End: 2024-04-02

## 2024-04-02 RX ORDER — DEXTROSE 50 % IN WATER 50 %
25 SYRINGE (ML) INTRAVENOUS ONCE
Refills: 0 | Status: DISCONTINUED | OUTPATIENT
Start: 2024-04-02 | End: 2024-04-11

## 2024-04-02 RX ORDER — METFORMIN HYDROCHLORIDE 850 MG/1
1 TABLET ORAL
Qty: 0 | Refills: 0 | DISCHARGE

## 2024-04-02 RX ORDER — RIVASTIGMINE 4.6 MG/24H
1 PATCH, EXTENDED RELEASE TRANSDERMAL
Qty: 0 | Refills: 0 | DISCHARGE

## 2024-04-02 RX ORDER — INSULIN LISPRO 100/ML
VIAL (ML) SUBCUTANEOUS AT BEDTIME
Refills: 0 | Status: DISCONTINUED | OUTPATIENT
Start: 2024-04-02 | End: 2024-04-11

## 2024-04-02 RX ORDER — MEMANTINE HYDROCHLORIDE 10 MG/1
10 TABLET ORAL
Refills: 0 | Status: DISCONTINUED | OUTPATIENT
Start: 2024-04-02 | End: 2024-04-11

## 2024-04-02 RX ORDER — DEXAMETHASONE 0.5 MG/5ML
6 ELIXIR ORAL DAILY
Refills: 0 | Status: DISCONTINUED | OUTPATIENT
Start: 2024-04-02 | End: 2024-04-03

## 2024-04-02 RX ORDER — SODIUM CHLORIDE 9 MG/ML
1000 INJECTION, SOLUTION INTRAVENOUS
Refills: 0 | Status: DISCONTINUED | OUTPATIENT
Start: 2024-04-02 | End: 2024-04-11

## 2024-04-02 RX ORDER — LEVOTHYROXINE SODIUM 125 MCG
75 TABLET ORAL DAILY
Refills: 0 | Status: DISCONTINUED | OUTPATIENT
Start: 2024-04-02 | End: 2024-04-11

## 2024-04-02 RX ORDER — ACETAMINOPHEN 500 MG
650 TABLET ORAL EVERY 6 HOURS
Refills: 0 | Status: DISCONTINUED | OUTPATIENT
Start: 2024-04-02 | End: 2024-04-11

## 2024-04-02 RX ORDER — DEXAMETHASONE 0.5 MG/5ML
6 ELIXIR ORAL ONCE
Refills: 0 | Status: COMPLETED | OUTPATIENT
Start: 2024-04-02 | End: 2024-04-02

## 2024-04-02 RX ORDER — METOPROLOL TARTRATE 50 MG
5 TABLET ORAL ONCE
Refills: 0 | Status: COMPLETED | OUTPATIENT
Start: 2024-04-02 | End: 2024-04-02

## 2024-04-02 RX ORDER — RIVASTIGMINE 4.6 MG/24H
1 PATCH, EXTENDED RELEASE TRANSDERMAL EVERY 24 HOURS
Refills: 0 | Status: DISCONTINUED | OUTPATIENT
Start: 2024-04-02 | End: 2024-04-11

## 2024-04-02 RX ORDER — REMDESIVIR 5 MG/ML
100 INJECTION INTRAVENOUS EVERY 24 HOURS
Refills: 0 | Status: DISCONTINUED | OUTPATIENT
Start: 2024-04-02 | End: 2024-04-02

## 2024-04-02 RX ORDER — FUROSEMIDE 40 MG
20 TABLET ORAL ONCE
Refills: 0 | Status: COMPLETED | OUTPATIENT
Start: 2024-04-02 | End: 2024-04-02

## 2024-04-02 RX ORDER — TAMSULOSIN HYDROCHLORIDE 0.4 MG/1
0.4 CAPSULE ORAL AT BEDTIME
Refills: 0 | Status: DISCONTINUED | OUTPATIENT
Start: 2024-04-02 | End: 2024-04-11

## 2024-04-02 RX ORDER — LANOLIN ALCOHOL/MO/W.PET/CERES
1 CREAM (GRAM) TOPICAL
Qty: 0 | Refills: 0 | DISCHARGE

## 2024-04-02 RX ORDER — METOPROLOL TARTRATE 50 MG
100 TABLET ORAL
Refills: 0 | Status: DISCONTINUED | OUTPATIENT
Start: 2024-04-02 | End: 2024-04-04

## 2024-04-02 RX ORDER — IPRATROPIUM/ALBUTEROL SULFATE 18-103MCG
3 AEROSOL WITH ADAPTER (GRAM) INHALATION EVERY 6 HOURS
Refills: 0 | Status: DISCONTINUED | OUTPATIENT
Start: 2024-04-02 | End: 2024-04-11

## 2024-04-02 RX ORDER — REMDESIVIR 5 MG/ML
100 INJECTION INTRAVENOUS EVERY 24 HOURS
Refills: 0 | Status: COMPLETED | OUTPATIENT
Start: 2024-04-03 | End: 2024-04-04

## 2024-04-02 RX ORDER — DEXTROSE 50 % IN WATER 50 %
12.5 SYRINGE (ML) INTRAVENOUS ONCE
Refills: 0 | Status: DISCONTINUED | OUTPATIENT
Start: 2024-04-02 | End: 2024-04-11

## 2024-04-02 RX ORDER — INSULIN LISPRO 100/ML
VIAL (ML) SUBCUTANEOUS
Refills: 0 | Status: DISCONTINUED | OUTPATIENT
Start: 2024-04-02 | End: 2024-04-11

## 2024-04-02 RX ORDER — LANOLIN ALCOHOL/MO/W.PET/CERES
3 CREAM (GRAM) TOPICAL AT BEDTIME
Refills: 0 | Status: DISCONTINUED | OUTPATIENT
Start: 2024-04-02 | End: 2024-04-11

## 2024-04-02 RX ORDER — POTASSIUM CHLORIDE 20 MEQ
40 PACKET (EA) ORAL EVERY 4 HOURS
Refills: 0 | Status: COMPLETED | OUTPATIENT
Start: 2024-04-02 | End: 2024-04-02

## 2024-04-02 RX ORDER — APIXABAN 2.5 MG/1
2.5 TABLET, FILM COATED ORAL EVERY 12 HOURS
Refills: 0 | Status: DISCONTINUED | OUTPATIENT
Start: 2024-04-02 | End: 2024-04-11

## 2024-04-02 RX ORDER — ONDANSETRON 8 MG/1
4 TABLET, FILM COATED ORAL EVERY 8 HOURS
Refills: 0 | Status: DISCONTINUED | OUTPATIENT
Start: 2024-04-02 | End: 2024-04-11

## 2024-04-02 RX ORDER — GLUCAGON INJECTION, SOLUTION 0.5 MG/.1ML
1 INJECTION, SOLUTION SUBCUTANEOUS ONCE
Refills: 0 | Status: DISCONTINUED | OUTPATIENT
Start: 2024-04-02 | End: 2024-04-11

## 2024-04-02 RX ORDER — REMDESIVIR 5 MG/ML
INJECTION INTRAVENOUS
Refills: 0 | Status: COMPLETED | OUTPATIENT
Start: 2024-04-02 | End: 2024-04-04

## 2024-04-02 RX ORDER — DEXTROSE 50 % IN WATER 50 %
15 SYRINGE (ML) INTRAVENOUS ONCE
Refills: 0 | Status: DISCONTINUED | OUTPATIENT
Start: 2024-04-02 | End: 2024-04-11

## 2024-04-02 RX ORDER — REMDESIVIR 5 MG/ML
200 INJECTION INTRAVENOUS EVERY 24 HOURS
Refills: 0 | Status: COMPLETED | OUTPATIENT
Start: 2024-04-02 | End: 2024-04-02

## 2024-04-02 RX ORDER — PANTOPRAZOLE SODIUM 20 MG/1
40 TABLET, DELAYED RELEASE ORAL
Refills: 0 | Status: DISCONTINUED | OUTPATIENT
Start: 2024-04-02 | End: 2024-04-11

## 2024-04-02 RX ORDER — CEFTRIAXONE 500 MG/1
1000 INJECTION, POWDER, FOR SOLUTION INTRAMUSCULAR; INTRAVENOUS EVERY 24 HOURS
Refills: 0 | Status: COMPLETED | OUTPATIENT
Start: 2024-04-02 | End: 2024-04-04

## 2024-04-02 RX ORDER — DIGOXIN 250 MCG
125 TABLET ORAL EVERY OTHER DAY
Refills: 0 | Status: DISCONTINUED | OUTPATIENT
Start: 2024-04-02 | End: 2024-04-11

## 2024-04-02 RX ORDER — POTASSIUM CHLORIDE 20 MEQ
10 PACKET (EA) ORAL
Refills: 0 | Status: DISCONTINUED | OUTPATIENT
Start: 2024-04-02 | End: 2024-04-02

## 2024-04-02 RX ORDER — CHOLECALCIFEROL (VITAMIN D3) 125 MCG
1 CAPSULE ORAL
Qty: 0 | Refills: 0 | DISCHARGE

## 2024-04-02 RX ORDER — ESCITALOPRAM OXALATE 10 MG/1
10 TABLET, FILM COATED ORAL DAILY
Refills: 0 | Status: DISCONTINUED | OUTPATIENT
Start: 2024-04-02 | End: 2024-04-11

## 2024-04-02 RX ADMIN — Medication 100 MILLIGRAM(S): at 17:35

## 2024-04-02 RX ADMIN — Medication 125 MICROGRAM(S): at 12:11

## 2024-04-02 RX ADMIN — Medication 5 MILLIGRAM(S): at 08:27

## 2024-04-02 RX ADMIN — Medication 75 MICROGRAM(S): at 06:27

## 2024-04-02 RX ADMIN — Medication 40 MILLIEQUIVALENT(S): at 10:36

## 2024-04-02 RX ADMIN — Medication 2: at 12:10

## 2024-04-02 RX ADMIN — Medication 100 MILLIGRAM(S): at 06:27

## 2024-04-02 RX ADMIN — MEMANTINE HYDROCHLORIDE 10 MILLIGRAM(S): 10 TABLET ORAL at 06:27

## 2024-04-02 RX ADMIN — Medication 4: at 08:50

## 2024-04-02 RX ADMIN — APIXABAN 2.5 MILLIGRAM(S): 2.5 TABLET, FILM COATED ORAL at 06:26

## 2024-04-02 RX ADMIN — Medication 40 MILLIEQUIVALENT(S): at 14:09

## 2024-04-02 RX ADMIN — RIVASTIGMINE 1 PATCH: 4.6 PATCH, EXTENDED RELEASE TRANSDERMAL at 07:22

## 2024-04-02 RX ADMIN — MEMANTINE HYDROCHLORIDE 10 MILLIGRAM(S): 10 TABLET ORAL at 17:35

## 2024-04-02 RX ADMIN — Medication 20 MILLIGRAM(S): at 14:39

## 2024-04-02 RX ADMIN — Medication 5 MILLIGRAM(S): at 21:59

## 2024-04-02 RX ADMIN — Medication 5 MILLIGRAM(S): at 08:15

## 2024-04-02 RX ADMIN — ONDANSETRON 4 MILLIGRAM(S): 8 TABLET, FILM COATED ORAL at 07:57

## 2024-04-02 RX ADMIN — APIXABAN 2.5 MILLIGRAM(S): 2.5 TABLET, FILM COATED ORAL at 17:36

## 2024-04-02 RX ADMIN — Medication 5 MILLIGRAM(S): at 23:58

## 2024-04-02 RX ADMIN — Medication 6 MILLIGRAM(S): at 15:56

## 2024-04-02 RX ADMIN — ESCITALOPRAM OXALATE 10 MILLIGRAM(S): 10 TABLET, FILM COATED ORAL at 12:11

## 2024-04-02 RX ADMIN — Medication 25 GRAM(S): at 08:50

## 2024-04-02 RX ADMIN — Medication 5 MILLIGRAM(S): at 20:36

## 2024-04-02 RX ADMIN — RIVASTIGMINE 1 PATCH: 4.6 PATCH, EXTENDED RELEASE TRANSDERMAL at 06:23

## 2024-04-02 RX ADMIN — CEFTRIAXONE 100 MILLIGRAM(S): 500 INJECTION, POWDER, FOR SOLUTION INTRAMUSCULAR; INTRAVENOUS at 00:07

## 2024-04-02 RX ADMIN — TAMSULOSIN HYDROCHLORIDE 0.4 MILLIGRAM(S): 0.4 CAPSULE ORAL at 21:59

## 2024-04-02 RX ADMIN — Medication 2: at 17:35

## 2024-04-02 RX ADMIN — REMDESIVIR 200 MILLIGRAM(S): 5 INJECTION INTRAVENOUS at 14:39

## 2024-04-02 NOTE — CONSULT NOTE ADULT - ASSESSMENT
CKD 3  Hypertension  Dyspnea: Pneumonia, COVID 19+, CHF  Diabetes  Hypokalemia    Renal indices close to baseline. Potassium supplementation. Monitor creatinine trend post IV contrast study. IV abx, ID follow up.   Monitor blood sugar levels. Insulin coverage as needed. Monitor BP trend. Titrate BP meds as needed. Avoid nephrotoxic meds as possible.   Will follow electrolytes and renal function trend.     Further recommendations pending clinical course. Thank you for the courtesy of this referral.

## 2024-04-02 NOTE — OCCUPATIONAL THERAPY INITIAL EVALUATION ADULT - RANGE OF MOTION EXAMINATION, UPPER EXTREMITY
BUE shoulder 0-90* AROM. BUE elbow/wrist/digits AROM was WFL./bilateral UE Passive ROM was WFL  (within functional limits)

## 2024-04-02 NOTE — H&P ADULT - NSHPPHYSICALEXAM_GEN_ALL_CORE
constitutional: NAD  HEENT: AT/NC  CV: irregularly irregular rhythm, normal S1S2  Respi: CTA B/L ,No rhonchi, rales or wheezing   Abd: soft, NT, ND, Pos BS  Ext: no edema  Neuro: A and O X 3

## 2024-04-02 NOTE — OCCUPATIONAL THERAPY INITIAL EVALUATION ADULT - GENERAL OBSERVATIONS, REHAB EVAL
Pt found semi-supine in bed +Hiflow O2 +purewick +contact precautions (+Covid) +family present at bedside

## 2024-04-02 NOTE — OCCUPATIONAL THERAPY INITIAL EVALUATION ADULT - PERTINENT HX OF CURRENT PROBLEM, REHAB EVAL
As per H&P: "87 y/o female with PMH of HFrEF of 20-25 %, dementia, Afib, hypothyroidism, DM who presented to ED to be evaluated for weakness. pt lives home with her daughter who mentions that pt is usually able to ambulate with help of walker on her own. this morning pt felt tired and weak. not being able to stand up due to weakness in her legs. pt was diagnosed with UTI in past where she had same symptoms and daughter decided to  bring her to ED. Upon arrival pt was noted to be in Afib w RVR. recieved evening dose of her metoprolol in ED with returning to rate controlled Afib  tested positive for COVID."  CT Abdomen/Pelvis & CT Angio Chest: "Multilobar pneumonia right lung. Septal thickening which may reflect interstitial edema. B/l pleural effusions with compressive atelectasis lower lobes. No acute pulmonary embolism. Cystitis & left pyeloureteritis; correlate for ascending UTI.  Distended rectum with fecal impaction."  CT Head: "No acute intracranial bleeding.  Central volume loss and chronic microvascular ischemic changes."

## 2024-04-02 NOTE — PHYSICAL THERAPY INITIAL EVALUATION ADULT - PERTINENT HX OF CURRENT PROBLEM, REHAB EVAL
89 y/o female with PMH of HFrEF of 20-25 %, dementia, Afib, hypothyroidism, DM who presented to ED to be evaluated for weakness. pt lives home with her daughter who mentions that pt is usually able to ambulate with help of walker on her own. this morning pt felt tired and weak. not being able to stand up due to weakness in her legs. pt was diagnosed with UTI in past where she had same symptoms and daughter decided to  bring her to ED

## 2024-04-02 NOTE — PROGRESS NOTE ADULT - PROBLEM SELECTOR PLAN 4
HFrEF:  -BNP: 56079  -CXR: w/o any congestion/p. effusion  -appears euvolemic HFrEF:  -BNP: 96600  -CXR: w/o any congestion/p. effusion  -likely had component of acute on chronic systolic CHF contributing to hypoxia today -- given lasix 20mg IV x1 -- monitor closely  -f/up BMP  -cardio consulted (Dr. Harvey group), recs appreciated

## 2024-04-02 NOTE — OCCUPATIONAL THERAPY INITIAL EVALUATION ADULT - PRECAUTIONS/LIMITATIONS, REHAB EVAL
80% HiFlow O2, +Isolation precautions (+Covid-19)/fall precautions/isolation precautions/oxygen therapy device and L/min

## 2024-04-02 NOTE — OCCUPATIONAL THERAPY INITIAL EVALUATION ADULT - PATIENT/FAMILY/SIGNIFICANT OTHER GOALS STATEMENT, OT EVAL
Pt with impaired cognition, unable to state goal at this time. Per pt's family; goal is to bring pt home

## 2024-04-02 NOTE — OCCUPATIONAL THERAPY INITIAL EVALUATION ADULT - ADDITIONAL COMMENTS
Pt is poor hx, with hx of dementia. Per pt's family at bedside, pt lives in a private home with 2 JASSON and all needs met on first floor. Pt's family reports daughter is never alone and someone from her family is always with pt 24/7 to assist/supervise. Pt was ambulating short distance PTA with a RW and supervision/assist from family. Pt required assist for all ADLs and was completely dependent with bathing.  Pt completed side-stepping at bedside +RW with MinAx1 +cueing for safety within RW.

## 2024-04-02 NOTE — PATIENT CHOICE NOTE. - NSPTCHOICESTATE_GEN_ALL_CORE

## 2024-04-02 NOTE — OCCUPATIONAL THERAPY INITIAL EVALUATION ADULT - NSOTDISCHREC_GEN_A_CORE
Recommend 24/7 supervision & assist with functional activities which pt's family reports they can provide. Pt's family in agreement with d/c plans. CM notified./No skilled OT needs

## 2024-04-02 NOTE — PROGRESS NOTE ADULT - PROBLEM SELECTOR PLAN 3
-C/w ceftriaxone 1 gm qd  -F/u urine cx and sensitivity and tailor antibiotics accordingly  -avoid IV hydration  -PT/OT -C/w ceftriaxone 1000mg IV daily   -F/u urine cx and sensitivity and tailor antibiotics accordingly  -avoid IV hydration  -PT/OT

## 2024-04-02 NOTE — OCCUPATIONAL THERAPY INITIAL EVALUATION ADULT - NS ASR FOLLOW COMMAND OT EVAL
Pt benefits from frequent cueing for command follow./100% of the time/able to follow single-step instructions

## 2024-04-02 NOTE — CONSULT NOTE ADULT - SUBJECTIVE AND OBJECTIVE BOX
CHIEF COMPLAINT: Patient is a 88y old  Female who presents with a chief complaint of weakness (02 Apr 2024 10:57)      HPI:  89 y/o female with PMH of HFrEF of 20-25 %, dementia, Afib, hypothyroidism, DM who presented to ED to be evaluated for weakness. pt lives home with her daughter who mentions that pt is usually able to ambulate with help of walker on her own. this morning pt felt tired and weak. not being able to stand up due to weakness in her legs. pt was diagnosed with UTI in past where she had same symptoms and daughter decided to  bring her to ED    ED course:  upon arrival pt was noted to be in Afib w RVR. recieved evening dose of her metoprolol in ED with returning to rate controlled Afib  tested positive for COVID. denies any shortness of breath or upper respiratory symptoms. pt do not require supplemental o2  Mg 1.4 s/p 1 gm mgso4 in ED      (02 Apr 2024 01:33)      EKG: Afib with RVR    REVIEW OF SYSTEMS:   All other review of systems are negative unless indicated above    PAST MEDICAL & SURGICAL HISTORY:  Atrial fibrillation      Hypothyroidism      Mild Alzheimer's dementia      Diabetes mellitus      Acute on chronic systolic congestive heart failure      Hypertension      Hyperlipemia      Cardiac LV ejection fraction 21-30%  12/22      History of appendectomy      H/O hernia repair      History of cholecystectomy          SOCIAL HISTORY:  No tobacco, ethanol, or drug abuse.    FAMILY HISTORY:  Family history of cerebral hemorrhage (Mother)    FH: renal failure (Father)      No family history of acute MI or sudden cardiac death.    MEDICATIONS  (STANDING):  apixaban 2.5 milliGRAM(s) Oral every 12 hours  cefTRIAXone   IVPB 1000 milliGRAM(s) IV Intermittent every 24 hours  dexAMETHasone  Injectable 6 milliGRAM(s) IV Push daily  dexAMETHasone  Injectable 6 milliGRAM(s) IV Push once  dextrose 5%. 1000 milliLiter(s) (100 mL/Hr) IV Continuous <Continuous>  dextrose 5%. 1000 milliLiter(s) (50 mL/Hr) IV Continuous <Continuous>  dextrose 50% Injectable 12.5 Gram(s) IV Push once  dextrose 50% Injectable 25 Gram(s) IV Push once  dextrose 50% Injectable 25 Gram(s) IV Push once  digoxin     Tablet 125 MICROGram(s) Oral every other day  escitalopram 10 milliGRAM(s) Oral daily  glucagon  Injectable 1 milliGRAM(s) IntraMuscular once  insulin lispro (ADMELOG) corrective regimen sliding scale   SubCutaneous three times a day before meals  insulin lispro (ADMELOG) corrective regimen sliding scale   SubCutaneous at bedtime  levothyroxine 75 MICROGram(s) Oral daily  memantine 10 milliGRAM(s) Oral two times a day  metoprolol succinate  milliGRAM(s) Oral two times a day  potassium chloride   Powder 40 milliEquivalent(s) Oral every 4 hours  remdesivir  IVPB 200 milliGRAM(s) IV Intermittent every 24 hours  remdesivir  IVPB   IV Intermittent   rivastigmine patch  9.5 mG/24 Hr(s) 1 Patch Transdermal every 24 hours  tamsulosin 0.4 milliGRAM(s) Oral at bedtime    MEDICATIONS  (PRN):  acetaminophen     Tablet .. 650 milliGRAM(s) Oral every 6 hours PRN Temp greater or equal to 38C (100.4F), Mild Pain (1 - 3)  albuterol/ipratropium for Nebulization 3 milliLiter(s) Nebulizer every 6 hours PRN Shortness of Breath  aluminum hydroxide/magnesium hydroxide/simethicone Suspension 30 milliLiter(s) Oral every 4 hours PRN Dyspepsia  benzonatate 100 milliGRAM(s) Oral three times a day PRN Cough  dextrose Oral Gel 15 Gram(s) Oral once PRN Blood Glucose LESS THAN 70 milliGRAM(s)/deciliter  melatonin 3 milliGRAM(s) Oral at bedtime PRN Insomnia  ondansetron Injectable 4 milliGRAM(s) IV Push every 8 hours PRN Nausea and/or Vomiting      Allergies    No Known Allergies    Intolerances        Home meds:  Home Medications:  digoxin 125 mcg (0.125 mg) oral tablet: 1 tab(s) orally every other day (02 Apr 2024 01:24)  Eliquis 2.5 mg oral tablet: 1 tab(s) orally 2 times a day (02 Apr 2024 01:24)  levothyroxine 75 mcg (0.075 mg) oral tablet: 1 tab(s) orally once a day (02 Apr 2024 01:24)  Melatonin 10 mg oral capsule: 1 cap(s) orally once a day (at bedtime) (02 Apr 2024 01:24)  memantine 10 mg oral tablet: 1 tab(s) orally 2 times a day (02 Apr 2024 01:24)  metFORMIN 1000 mg oral tablet: 1 tab(s) orally 2 times a day (02 Apr 2024 01:24)  rivastigmine 9.5 mg/24 hr transdermal film, extended release: 1 patch transdermal once a day (02 Apr 2024 01:24)  Vitamin D3 50 mcg (2000 intl units) oral tablet: 1 tab(s) orally once a day (02 Apr 2024 01:24)        VITAL SIGNS:   Vital Signs Last 24 Hrs  T(C): 37.1 (02 Apr 2024 06:40), Max: 37.1 (02 Apr 2024 06:40)  T(F): 98.7 (02 Apr 2024 06:40), Max: 98.7 (02 Apr 2024 06:40)  HR: 125 (02 Apr 2024 07:35) (82 - 150)  BP: 160/105 (02 Apr 2024 07:35) (121/75 - 165/91)  BP(mean): --  RR: 16 (02 Apr 2024 07:35) (16 - 18)  SpO2: 92% (02 Apr 2024 07:35) (62% - 97%)    Parameters below as of 02 Apr 2024 07:35  Patient On (Oxygen Delivery Method): mask, nonrebreather        I&O's Summary    01 Apr 2024 07:01  -  02 Apr 2024 07:00  --------------------------------------------------------  IN: 0 mL / OUT: 200 mL / NET: -200 mL        On Exam:  TELE: AFib 100's   Constitutional: NAD, awake and alert  HEENT: Moist Mucous Membranes, Anicteric  Pulmonary: Non-labored, breath sounds are clear bilaterally, No wheezing, rales or rhonchi  Cardiovascular: IRRegular, S1 and S2, No murmurs, rubs, gallops or clicks  Gastrointestinal: Bowel Sounds present, soft, nontender.   Lymph: No peripheral edema. No lymphadenopathy.  Skin: No visible rashes or ulcers.  Psych:  Mood & affect appropriate for situation    LABS: All Labs Reviewed:                        10.8   16.31 )-----------( 297      ( 02 Apr 2024 10:33 )             34.5                         10.7   7.21  )-----------( 272      ( 01 Apr 2024 20:30 )             34.4     02 Apr 2024 05:50    140    |  110    |  19     ----------------------------<  111    3.3     |  22     |  1.40   01 Apr 2024 20:30    141    |  109    |  20     ----------------------------<  153    3.9     |  26     |  1.60     Ca    8.0        02 Apr 2024 05:50  Ca    8.5        01 Apr 2024 20:30  Mg     1.7       02 Apr 2024 05:50  Mg     1.4       01 Apr 2024 20:30    TPro  7.2    /  Alb  2.8    /  TBili  0.6    /  DBili  x      /  AST  20     /  ALT  20     /  AlkPhos  52     01 Apr 2024 20:30    PT/INR - ( 01 Apr 2024 20:30 )   PT: 12.8 sec;   INR: 1.10 ratio         PTT - ( 01 Apr 2024 20:30 )  PTT:30.6 sec      Blood Culture:         RADIOLOGY:                CHIEF COMPLAINT: Patient is a 88y old  Female who presents with a chief complaint of weakness (02 Apr 2024 10:57)      HPI:  87 y/o female with PMH of HFrEF of 20-25 %, dementia, Afib, hypothyroidism, DM who presented to ED to be evaluated for weakness. pt lives home with her daughter who mentions that pt is usually able to ambulate with help of walker on her own. this morning pt felt tired and weak. not being able to stand up due to weakness in her legs. pt was diagnosed with UTI in past where she had same symptoms and daughter decided to  bring her to ED    ED course:  upon arrival pt was noted to be in Afib w RVR. recieved evening dose of her metoprolol in ED with returning to rate controlled Afib  tested positive for COVID. denies any shortness of breath or upper respiratory symptoms. pt do not require supplemental o2  Mg 1.4 s/p 1 gm mgso4 in ED      (02 Apr 2024 01:33)      EKG: Afib with RVR    REVIEW OF SYSTEMS:   All other review of systems are negative unless indicated above    PAST MEDICAL & SURGICAL HISTORY:  Atrial fibrillation      Hypothyroidism      Mild Alzheimer's dementia      Diabetes mellitus      Acute on chronic systolic congestive heart failure      Hypertension      Hyperlipemia      Cardiac LV ejection fraction 21-30%  12/22      History of appendectomy      H/O hernia repair      History of cholecystectomy          SOCIAL HISTORY:  No tobacco, ethanol, or drug abuse.    FAMILY HISTORY:  Family history of cerebral hemorrhage (Mother)    FH: renal failure (Father)      No family history of acute MI or sudden cardiac death.    MEDICATIONS  (STANDING):  apixaban 2.5 milliGRAM(s) Oral every 12 hours  cefTRIAXone   IVPB 1000 milliGRAM(s) IV Intermittent every 24 hours  dexAMETHasone  Injectable 6 milliGRAM(s) IV Push daily  dexAMETHasone  Injectable 6 milliGRAM(s) IV Push once  dextrose 5%. 1000 milliLiter(s) (100 mL/Hr) IV Continuous <Continuous>  dextrose 5%. 1000 milliLiter(s) (50 mL/Hr) IV Continuous <Continuous>  dextrose 50% Injectable 12.5 Gram(s) IV Push once  dextrose 50% Injectable 25 Gram(s) IV Push once  dextrose 50% Injectable 25 Gram(s) IV Push once  digoxin     Tablet 125 MICROGram(s) Oral every other day  escitalopram 10 milliGRAM(s) Oral daily  glucagon  Injectable 1 milliGRAM(s) IntraMuscular once  insulin lispro (ADMELOG) corrective regimen sliding scale   SubCutaneous three times a day before meals  insulin lispro (ADMELOG) corrective regimen sliding scale   SubCutaneous at bedtime  levothyroxine 75 MICROGram(s) Oral daily  memantine 10 milliGRAM(s) Oral two times a day  metoprolol succinate  milliGRAM(s) Oral two times a day  potassium chloride   Powder 40 milliEquivalent(s) Oral every 4 hours  remdesivir  IVPB 200 milliGRAM(s) IV Intermittent every 24 hours  remdesivir  IVPB   IV Intermittent   rivastigmine patch  9.5 mG/24 Hr(s) 1 Patch Transdermal every 24 hours  tamsulosin 0.4 milliGRAM(s) Oral at bedtime    MEDICATIONS  (PRN):  acetaminophen     Tablet .. 650 milliGRAM(s) Oral every 6 hours PRN Temp greater or equal to 38C (100.4F), Mild Pain (1 - 3)  albuterol/ipratropium for Nebulization 3 milliLiter(s) Nebulizer every 6 hours PRN Shortness of Breath  aluminum hydroxide/magnesium hydroxide/simethicone Suspension 30 milliLiter(s) Oral every 4 hours PRN Dyspepsia  benzonatate 100 milliGRAM(s) Oral three times a day PRN Cough  dextrose Oral Gel 15 Gram(s) Oral once PRN Blood Glucose LESS THAN 70 milliGRAM(s)/deciliter  melatonin 3 milliGRAM(s) Oral at bedtime PRN Insomnia  ondansetron Injectable 4 milliGRAM(s) IV Push every 8 hours PRN Nausea and/or Vomiting      Allergies    No Known Allergies    Intolerances        Home meds:  Home Medications:  digoxin 125 mcg (0.125 mg) oral tablet: 1 tab(s) orally every other day (02 Apr 2024 01:24)  Eliquis 2.5 mg oral tablet: 1 tab(s) orally 2 times a day (02 Apr 2024 01:24)  levothyroxine 75 mcg (0.075 mg) oral tablet: 1 tab(s) orally once a day (02 Apr 2024 01:24)  Melatonin 10 mg oral capsule: 1 cap(s) orally once a day (at bedtime) (02 Apr 2024 01:24)  memantine 10 mg oral tablet: 1 tab(s) orally 2 times a day (02 Apr 2024 01:24)  metFORMIN 1000 mg oral tablet: 1 tab(s) orally 2 times a day (02 Apr 2024 01:24)  rivastigmine 9.5 mg/24 hr transdermal film, extended release: 1 patch transdermal once a day (02 Apr 2024 01:24)  Vitamin D3 50 mcg (2000 intl units) oral tablet: 1 tab(s) orally once a day (02 Apr 2024 01:24)        VITAL SIGNS:   Vital Signs Last 24 Hrs  T(C): 37.1 (02 Apr 2024 06:40), Max: 37.1 (02 Apr 2024 06:40)  T(F): 98.7 (02 Apr 2024 06:40), Max: 98.7 (02 Apr 2024 06:40)  HR: 125 (02 Apr 2024 07:35) (82 - 150)  BP: 160/105 (02 Apr 2024 07:35) (121/75 - 165/91)  BP(mean): --  RR: 16 (02 Apr 2024 07:35) (16 - 18)  SpO2: 92% (02 Apr 2024 07:35) (62% - 97%)    Parameters below as of 02 Apr 2024 07:35  Patient On (Oxygen Delivery Method): mask, nonrebreather        I&O's Summary    01 Apr 2024 07:01  -  02 Apr 2024 07:00  --------------------------------------------------------  IN: 0 mL / OUT: 200 mL / NET: -200 mL        On Exam:  TELE: AFib 100's   Constitutional: NAD, awake and alert  HEENT: Moist Mucous Membranes, Anicteric  Pulmonary: Non-labored, decreased breath sounds bilaterally, No wheezing, rales or rhonchi  Cardiovascular: IRRegular, S1 and S2, No murmurs, rubs, gallops or clicks  Gastrointestinal: Bowel Sounds present, soft, nontender.   Lymph: No peripheral edema. No lymphadenopathy.  Skin: No visible rashes or ulcers.  Psych:  Mood & affect appropriate for situation    LABS: All Labs Reviewed:                        10.8   16.31 )-----------( 297      ( 02 Apr 2024 10:33 )             34.5                         10.7   7.21  )-----------( 272      ( 01 Apr 2024 20:30 )             34.4     02 Apr 2024 05:50    140    |  110    |  19     ----------------------------<  111    3.3     |  22     |  1.40   01 Apr 2024 20:30    141    |  109    |  20     ----------------------------<  153    3.9     |  26     |  1.60     Ca    8.0        02 Apr 2024 05:50  Ca    8.5        01 Apr 2024 20:30  Mg     1.7       02 Apr 2024 05:50  Mg     1.4       01 Apr 2024 20:30    TPro  7.2    /  Alb  2.8    /  TBili  0.6    /  DBili  x      /  AST  20     /  ALT  20     /  AlkPhos  52     01 Apr 2024 20:30    PT/INR - ( 01 Apr 2024 20:30 )   PT: 12.8 sec;   INR: 1.10 ratio         PTT - ( 01 Apr 2024 20:30 )  PTT:30.6 sec      Blood Culture:         RADIOLOGY:

## 2024-04-02 NOTE — H&P ADULT - HISTORY OF PRESENT ILLNESS
89 y/o female with PMH of HFrEF of 20-25 %, dementia, Afib, hypothyroidism, DM who presented to ED to be evaluated for weakness. pt lives home with her daughter who mentions that pt is usually able to ambulate with help of walker on her own. this morning pt felt tired and weak. not being able to stand up due to weakness in her legs. pt was diagnosed with UTI in past where she had same symptoms and daughter decided to  bring her to ED    ED course:  upon arrival pt was noted to be in Afib w RVR. recieved evening dose of her metoprolol in ED with returning to rate controlled Afib  tested positive for COVID. denies any shortness of breath or upper respiratory symptoms. pt do not require supplemental o2  Mg 1.4 s/p 1 gm mgso4 in ED

## 2024-04-02 NOTE — PROGRESS NOTE ADULT - SUBJECTIVE AND OBJECTIVE BOX
Patient is a 88y old  Female who presents with a chief complaint of weakness (02 Apr 2024 11:18)      TELE: afib with RVR to the 150s --> 120s after lopressor 5mg IVP x2    INTERVAL HPI/OVERNIGHT EVENTS: Pt in afib w/ RVR to 150s and hypoxic in 80s this morning. Pt seen and examined at the bedside this AM. Pt w/ mild increased work of breathing. Pt c/o diffuse abdominal pain and nausea. Pt denies fever, cp, vomiting.    MEDICATIONS  (STANDING):  apixaban 2.5 milliGRAM(s) Oral every 12 hours  cefTRIAXone   IVPB 1000 milliGRAM(s) IV Intermittent every 24 hours  dexAMETHasone  Injectable 6 milliGRAM(s) IV Push once  dexAMETHasone  Injectable 6 milliGRAM(s) IV Push daily  dextrose 5%. 1000 milliLiter(s) (50 mL/Hr) IV Continuous <Continuous>  dextrose 5%. 1000 milliLiter(s) (100 mL/Hr) IV Continuous <Continuous>  dextrose 50% Injectable 12.5 Gram(s) IV Push once  dextrose 50% Injectable 25 Gram(s) IV Push once  dextrose 50% Injectable 25 Gram(s) IV Push once  digoxin     Tablet 125 MICROGram(s) Oral every other day  escitalopram 10 milliGRAM(s) Oral daily  glucagon  Injectable 1 milliGRAM(s) IntraMuscular once  insulin lispro (ADMELOG) corrective regimen sliding scale   SubCutaneous three times a day before meals  insulin lispro (ADMELOG) corrective regimen sliding scale   SubCutaneous at bedtime  levothyroxine 75 MICROGram(s) Oral daily  memantine 10 milliGRAM(s) Oral two times a day  metoprolol succinate  milliGRAM(s) Oral two times a day  potassium chloride   Powder 40 milliEquivalent(s) Oral every 4 hours  remdesivir  IVPB 200 milliGRAM(s) IV Intermittent every 24 hours  remdesivir  IVPB   IV Intermittent   rivastigmine patch  9.5 mG/24 Hr(s) 1 Patch Transdermal every 24 hours  tamsulosin 0.4 milliGRAM(s) Oral at bedtime    MEDICATIONS  (PRN):  acetaminophen     Tablet .. 650 milliGRAM(s) Oral every 6 hours PRN Temp greater or equal to 38C (100.4F), Mild Pain (1 - 3)  albuterol/ipratropium for Nebulization 3 milliLiter(s) Nebulizer every 6 hours PRN Shortness of Breath  aluminum hydroxide/magnesium hydroxide/simethicone Suspension 30 milliLiter(s) Oral every 4 hours PRN Dyspepsia  benzonatate 100 milliGRAM(s) Oral three times a day PRN Cough  dextrose Oral Gel 15 Gram(s) Oral once PRN Blood Glucose LESS THAN 70 milliGRAM(s)/deciliter  melatonin 3 milliGRAM(s) Oral at bedtime PRN Insomnia  ondansetron Injectable 4 milliGRAM(s) IV Push every 8 hours PRN Nausea and/or Vomiting      Allergies    No Known Allergies    Intolerances        REVIEW OF SYSTEMS:  CONSTITUTIONAL: No fever or chills  HEENT:  No headache, no sore throat  RESPIRATORY: +shortness of breath  CARDIOVASCULAR: No chest pain, palpitations  GASTROINTESTINAL: + abd pain, + nausea, denies vomiting or diarrhea  GENITOURINARY: No dysuria, frequency, or hematuria  NEUROLOGICAL: no focal weakness or dizziness  MUSCULOSKELETAL: no myalgias     Vital Signs Last 24 Hrs  T(C): 36.7 (02 Apr 2024 11:55), Max: 37.1 (02 Apr 2024 06:40)  T(F): 98.1 (02 Apr 2024 11:55), Max: 98.7 (02 Apr 2024 06:40)  HR: 92 (02 Apr 2024 11:55) (82 - 150)  BP: 126/84 (02 Apr 2024 11:55) (121/75 - 165/91)  BP(mean): --  RR: 16 (02 Apr 2024 11:55) (16 - 18)  SpO2: 94% (02 Apr 2024 11:55) (62% - 97%)    Parameters below as of 02 Apr 2024 11:55  Patient On (Oxygen Delivery Method): nasal cannula, high flow  O2 Flow (L/min): 40  O2 Concentration (%): 80    PHYSICAL EXAM:  GENERAL: mildly in distress, placed on HFNC  HEENT:  anicteric, moist mucous membranes  CHEST/LUNG:  diffuse crackles b/l  HEART:  tachy, irregularly irregular, S1, S2  ABDOMEN:  soft, +TTP worst in LUQ, mildly distended  EXTREMITIES: no edema, cyanosis, or calf tenderness  NERVOUS SYSTEM: answers questions and follows commands appropriately    LABS:                        10.8   16.31 )-----------( 297      ( 02 Apr 2024 10:33 )             34.5     CBC Full  -  ( 02 Apr 2024 10:33 )  WBC Count : 16.31 K/uL  Hemoglobin : 10.8 g/dL  Hematocrit : 34.5 %  Platelet Count - Automated : 297 K/uL  Mean Cell Volume : 88.9 fl  Mean Cell Hemoglobin : 27.8 pg  Mean Cell Hemoglobin Concentration : 31.3 gm/dL  Auto Neutrophil # : 14.11 K/uL  Auto Lymphocyte # : 0.98 K/uL  Auto Monocyte # : 0.91 K/uL  Auto Eosinophil # : 0.04 K/uL  Auto Basophil # : 0.06 K/uL  Auto Neutrophil % : 86.5 %  Auto Lymphocyte % : 6.0 %  Auto Monocyte % : 5.6 %  Auto Eosinophil % : 0.2 %  Auto Basophil % : 0.4 %    02 Apr 2024 05:50    140    |  110    |  19     ----------------------------<  111    3.3     |  22     |  1.40     Ca    8.0        02 Apr 2024 05:50  Mg     1.7       02 Apr 2024 05:50    TPro  7.2    /  Alb  2.8    /  TBili  0.6    /  DBili  x      /  AST  20     /  ALT  20     /  AlkPhos  52     01 Apr 2024 20:30    PT/INR - ( 01 Apr 2024 20:30 )   PT: 12.8 sec;   INR: 1.10 ratio         PTT - ( 01 Apr 2024 20:30 )  PTT:30.6 sec  Urinalysis Basic - ( 02 Apr 2024 05:50 )    Color: x / Appearance: x / SG: x / pH: x  Gluc: 111 mg/dL / Ketone: x  / Bili: x / Urobili: x   Blood: x / Protein: x / Nitrite: x   Leuk Esterase: x / RBC: x / WBC x   Sq Epi: x / Non Sq Epi: x / Bacteria: x      CAPILLARY BLOOD GLUCOSE      POCT Blood Glucose.: 189 mg/dL (02 Apr 2024 11:59)  POCT Blood Glucose.: 206 mg/dL (02 Apr 2024 08:31)          RADIOLOGY & ADDITIONAL TESTS:  Personally reviewed.     Consultant(s) Notes Reviewed:  [x] YES  [ ] NO Patient is a 88y old  Female who presents with a chief complaint of generalized weakness.       TELE: afib with RVR to the 150s --> 120s after lopressor 5mg IVP x2    INTERVAL HPI/OVERNIGHT EVENTS: Pt in afib w/ RVR to 150s and hypoxic in 80s this morning. Pt seen and examined at the bedside and had increased work of breathing. Pt c/o diffuse abdominal pain and nausea. Pt denies fever, CP, vomiting.    MEDICATIONS  (STANDING):  apixaban 2.5 milliGRAM(s) Oral every 12 hours  cefTRIAXone   IVPB 1000 milliGRAM(s) IV Intermittent every 24 hours  dexAMETHasone  Injectable 6 milliGRAM(s) IV Push once  dexAMETHasone  Injectable 6 milliGRAM(s) IV Push daily  dextrose 5%. 1000 milliLiter(s) (50 mL/Hr) IV Continuous <Continuous>  dextrose 5%. 1000 milliLiter(s) (100 mL/Hr) IV Continuous <Continuous>  dextrose 50% Injectable 12.5 Gram(s) IV Push once  dextrose 50% Injectable 25 Gram(s) IV Push once  dextrose 50% Injectable 25 Gram(s) IV Push once  digoxin     Tablet 125 MICROGram(s) Oral every other day  escitalopram 10 milliGRAM(s) Oral daily  glucagon  Injectable 1 milliGRAM(s) IntraMuscular once  insulin lispro (ADMELOG) corrective regimen sliding scale   SubCutaneous three times a day before meals  insulin lispro (ADMELOG) corrective regimen sliding scale   SubCutaneous at bedtime  levothyroxine 75 MICROGram(s) Oral daily  memantine 10 milliGRAM(s) Oral two times a day  metoprolol succinate  milliGRAM(s) Oral two times a day  potassium chloride   Powder 40 milliEquivalent(s) Oral every 4 hours  remdesivir  IVPB 200 milliGRAM(s) IV Intermittent every 24 hours  remdesivir  IVPB   IV Intermittent   rivastigmine patch  9.5 mG/24 Hr(s) 1 Patch Transdermal every 24 hours  tamsulosin 0.4 milliGRAM(s) Oral at bedtime    MEDICATIONS  (PRN):  acetaminophen     Tablet .. 650 milliGRAM(s) Oral every 6 hours PRN Temp greater or equal to 38C (100.4F), Mild Pain (1 - 3)  albuterol/ipratropium for Nebulization 3 milliLiter(s) Nebulizer every 6 hours PRN Shortness of Breath  aluminum hydroxide/magnesium hydroxide/simethicone Suspension 30 milliLiter(s) Oral every 4 hours PRN Dyspepsia  benzonatate 100 milliGRAM(s) Oral three times a day PRN Cough  dextrose Oral Gel 15 Gram(s) Oral once PRN Blood Glucose LESS THAN 70 milliGRAM(s)/deciliter  melatonin 3 milliGRAM(s) Oral at bedtime PRN Insomnia  ondansetron Injectable 4 milliGRAM(s) IV Push every 8 hours PRN Nausea and/or Vomiting      Allergies    No Known Allergies    Intolerances        REVIEW OF SYSTEMS:  CONSTITUTIONAL: No fever or chills  HEENT:  No headache, no sore throat  RESPIRATORY: +shortness of breath and cough  CARDIOVASCULAR: No chest pain, palpitations  GASTROINTESTINAL: + abd pain, + nausea, denies vomiting or diarrhea  GENITOURINARY: No dysuria, frequency, or hematuria  NEUROLOGICAL: no focal weakness or dizziness  MUSCULOSKELETAL: no myalgias     Vital Signs Last 24 Hrs  T(C): 36.7 (02 Apr 2024 11:55), Max: 37.1 (02 Apr 2024 06:40)  T(F): 98.1 (02 Apr 2024 11:55), Max: 98.7 (02 Apr 2024 06:40)  HR: 92 (02 Apr 2024 11:55) (82 - 150)  BP: 126/84 (02 Apr 2024 11:55) (121/75 - 165/91)  BP(mean): --  RR: 16 (02 Apr 2024 11:55) (16 - 18)  SpO2: 94% (02 Apr 2024 11:55) (62% - 97%)    Parameters below as of 02 Apr 2024 11:55  Patient On (Oxygen Delivery Method): nasal cannula, high flow  O2 Flow (L/min): 40  O2 Concentration (%): 80    PHYSICAL EXAM:  GENERAL: mild respiratory distress on NC --> placed on HFNC  HEENT:  anicteric, moist mucous membranes  CHEST/LUNG:  coarse breath sounds b/l  HEART:  tachy, irregularly irregular, S1, S2  ABDOMEN:  BS+, soft, +TTP worst in LUQ, mildly distended  EXTREMITIES: no edema, cyanosis, or calf tenderness  NERVOUS SYSTEM: answers questions and follows commands appropriately    LABS:                        10.8   16.31 )-----------( 297      ( 02 Apr 2024 10:33 )             34.5     CBC Full  -  ( 02 Apr 2024 10:33 )  WBC Count : 16.31 K/uL  Hemoglobin : 10.8 g/dL  Hematocrit : 34.5 %  Platelet Count - Automated : 297 K/uL  Mean Cell Volume : 88.9 fl  Mean Cell Hemoglobin : 27.8 pg  Mean Cell Hemoglobin Concentration : 31.3 gm/dL  Auto Neutrophil # : 14.11 K/uL  Auto Lymphocyte # : 0.98 K/uL  Auto Monocyte # : 0.91 K/uL  Auto Eosinophil # : 0.04 K/uL  Auto Basophil # : 0.06 K/uL  Auto Neutrophil % : 86.5 %  Auto Lymphocyte % : 6.0 %  Auto Monocyte % : 5.6 %  Auto Eosinophil % : 0.2 %  Auto Basophil % : 0.4 %    02 Apr 2024 05:50    140    |  110    |  19     ----------------------------<  111    3.3     |  22     |  1.40     Ca    8.0        02 Apr 2024 05:50  Mg     1.7       02 Apr 2024 05:50    TPro  7.2    /  Alb  2.8    /  TBili  0.6    /  DBili  x      /  AST  20     /  ALT  20     /  AlkPhos  52     01 Apr 2024 20:30    PT/INR - ( 01 Apr 2024 20:30 )   PT: 12.8 sec;   INR: 1.10 ratio         PTT - ( 01 Apr 2024 20:30 )  PTT:30.6 sec  Urinalysis Basic - ( 02 Apr 2024 05:50 )    Color: x / Appearance: x / SG: x / pH: x  Gluc: 111 mg/dL / Ketone: x  / Bili: x / Urobili: x   Blood: x / Protein: x / Nitrite: x   Leuk Esterase: x / RBC: x / WBC x   Sq Epi: x / Non Sq Epi: x / Bacteria: x      CAPILLARY BLOOD GLUCOSE      POCT Blood Glucose.: 189 mg/dL (02 Apr 2024 11:59)  POCT Blood Glucose.: 206 mg/dL (02 Apr 2024 08:31)          RADIOLOGY & ADDITIONAL TESTS:  Personally reviewed.     Consultant(s) Notes Reviewed:  [x] YES  [ ] NO

## 2024-04-02 NOTE — PHYSICAL THERAPY INITIAL EVALUATION ADULT - ADDITIONAL COMMENTS
Patient lives in private home, +JASSON then resides on main floor.  Patient requires assistance for all ADLs and ambulates with RW with supervision, assist as needed. Patient lives in private home with daughter, +JASSON then resides on main floor.  Patient requires assistance for all ADLs and ambulates with RW with supervision, assist as needed.

## 2024-04-02 NOTE — OCCUPATIONAL THERAPY INITIAL EVALUATION ADULT - DIAGNOSIS, OT EVAL
Pt with impaired cognition, impaired b/l UE ROM, impaired balance, and generalized weakness impacting ability to complete ADLs, IADLs, functional mobility/transfers.

## 2024-04-02 NOTE — PATIENT PROFILE ADULT - FALL HARM RISK - TYPE OF ASSESSMENT
I made an order for pt's next mammogram to include tomosynthesis, and confirmed that my order is correct per Dr Harsha Coulter communication. Please print and mail to pt. Provided pastoral care visit to [facility] patient. Did not include sacramental care.     [facility] Admission

## 2024-04-02 NOTE — H&P ADULT - ASSESSMENT
89 y/o female presented to ED due to weakness associated with UTI:  -C/w ceftriaxone 1 gm qd  -F/u urine cx and sensitivity and tailor antibiotics accordingly  -avoid IV hydration  -PT/OT    Covid pneumonia:  -reassuring vitals including o2 sats  -duoneb PRN  -Tessalon pearls PRN for cough  -isolation for 10 days from 4/1.     Afib:  -C/w metoprolol succinate 100 mg bid  -C/w digoxin 125 mcg every other day  -C/w Eliquis 2.5 mg bid    HFrEF:  -BNP: 67613  -CXR: w/o any congestion/p. effusion  -appears euvolemic    DM:  -C/w insulin SS     Hypothyroidism:  -C/w synthroid 75 mcg daily    CKD:  -? Cardiorenal  -Cr at baseline       89 y/o female presented to ED due to weakness associated with UTI:  -C/w ceftriaxone 1 gm qd  -F/u urine cx and sensitivity and tailor antibiotics accordingly  -avoid IV hydration  -PT/OT    Covid pneumonia:  -reassuring vitals including o2 sats  -duoneb PRN  -Tessalon pearls PRN for cough  -isolation for 10 days from 4/1.     Afib:  -C/w metoprolol succinate 100 mg bid  -C/w digoxin 125 mcg every other day  -C/w Eliquis 2.5 mg bid    HFrEF:  -BNP: 51150  -CXR: w/o any congestion/p. effusion  -appears euvolemic    DM:  -C/w insulin SS     Hypothyroidism:  -C/w synthroid 75 mcg daily    CKD:  -? Cardiorenal  -Cr at baseline    hypomagnesemia:  -presented with Mg of 1.4 s/p 1 gm mag sulfate  -given CKD. F/u r/p in AM and replenished as needed

## 2024-04-02 NOTE — CONSULT NOTE ADULT - ASSESSMENT
89 y/o female with PMH of HFrEF of 20-25 %, dementia, Afib, hypothyroidism, DM who presented to ED to be evaluated for weakness, found to have +UTI and Covid positive     Atrial Fibrillation  - Afib RVR up to 140's this morning, s/p metoprolol IVP 's    - she has known hx of Afib, with RVR episode in the setting of catecholamine surge 2/2 recent acute stressor (UTI, COVID)   - telemetry : afib 100's up0 to 140's, continue tele for now   - continue BB, Digoxin, and AC  - D Dimer : 500  - follow up CT chest r/o PE    - EKG Afib w/ RVR, unchanged from prior   - no anginal complaints     - TTE (12/2022): EF 20-25%, apex and anterior wall akinesis, inferior wall hypokinesis, mod MR, mild TR  - known to our service, from last admission, she has known LV Dysfunction from TTE likely from a missed MI and opt for medical management given her frailty, increased creatinine at that time    - Monitor and replete Lytes. Keep K > 4 and Mg > 2  - Will continue to follow.    ODILON SantiagoSPENSER  Nurse Practitioner - Cardiology   call TEAMS 89 y/o female with PMH of HFrEF of 20-25 %, dementia, Afib, hypothyroidism, DM who presented to ED to be evaluated for weakness, found to have +UTI and Covid positive     Atrial Fibrillation  - Afib RVR up to 140's this morning, s/p metoprolol IVP 's    - she has known hx of Afib, with RVR episode in the setting of catecholamine surge 2/2 recent acute stressor (UTI, COVID)   - telemetry : Afib 100's up to 140's, continue tele for now   - - BP labile 120-160's   - continue toprol xl 100 mg PO daily   - continue Digoxin  - continue Eliquis   - D Dimer : 500  - follow up CT chest r/o PE  - Monitor and replete Lytes. Keep K > 4 and Mg > 2    - EKG Afib w/ RVR, unchanged from prior   - no anginal complaints     - on HFNC, wean as tolerated  - TTE (12/2022): EF 20-25%, apex and anterior wall akinesis, inferior wall hypokinesis, mod MR, mild TR  - known to our service, from last admission, she has known LV Dysfunction from TTE likely from a missed MI and opt for medical management given her frailty, increased creatinine at that time    - +COVID iso and management per primary   - Will continue to follow.    ODILON SantiagoCNP  Nurse Practitioner - Cardiology   call TEAMS 89 y/o female with PMH of HFrEF of 20-25 %, dementia, Afib, hypothyroidism, DM who presented to ED to be evaluated for weakness, found to have +UTI and Covid positive     Atrial Fibrillation  - Afib RVR up to 140's this morning, s/p metoprolol IVP 's    - she has known hx of Afib, with RVR episode in the setting of catecholamine surge 2/2 recent acute stressor (UTI, COVID)   - telemetry : Afib 100's up to 140's, continue tele for now   - - BP labile 120-160's   - continue toprol xl 100 mg PObid  - continue Digoxin  - continue Eliquis   - D Dimer : 500  - follow up CT chest r/o PE  - Monitor and replete Lytes. Keep K > 4 and Mg > 2    - EKG Afib w/ RVR, unchanged from prior   - no anginal complaints     - on HFNC, wean as tolerated  - TTE (12/2022): EF 20-25%, apex and anterior wall akinesis, inferior wall hypokinesis, mod MR, mild TR  - known to our service, from last admission, she has known LV Dysfunction from TTE likely from a missed MI and opt for medical management given her frailty, increased creatinine at that time    - +COVID iso and management per primary   - Will continue to follow.    Lola Garza [facility]  Nurse Practitioner - Cardiology   call TEAMS

## 2024-04-02 NOTE — OCCUPATIONAL THERAPY INITIAL EVALUATION ADULT - PERSONAL SAFETY AND JUDGMENT, REHAB EVAL
Pt requires verbal/tactile cueing to maintain safety with hospital line management and to maintain safety within RW/impaired

## 2024-04-02 NOTE — PHYSICAL THERAPY INITIAL EVALUATION ADULT - GAIT TRAINING, PT EVAL
Patient will ambulate x 75 feet with RW with supervision in 2 weeks in order to increase mobility at home

## 2024-04-02 NOTE — PROGRESS NOTE ADULT - ASSESSMENT
87 y/o female presented to ED due to weakness. Admitted for afib w/rvr, covid pna, and UTI.         89yo F w/ PMH of HFrEF of 20-25%, dementia, Afib (on low-dose Eliquis), hypothyroidism, T2DM, CKD3b, p/w generalized weakness admitted with sepsis due to UTI and COVID, afib w/ RVR, found to have acute hypoxic respiratory failure and likely L-sided pyelonephritis.

## 2024-04-02 NOTE — CARE COORDINATION ASSESSMENT. - CURRENT MENTAL STATUS/COGNITIVE FUNCTIONING
alert/oriented to person/oriented to place/oriented to time/oriented to situation alert/oriented to person/oriented to place

## 2024-04-02 NOTE — CARE COORDINATION ASSESSMENT. - NSCAREPROVIDERS_GEN_ALL_CORE_FT
CARE PROVIDERS:  Accepting Physician: Marcel Cadet  Administration: Regina Astudillo  Administration: Johan Shaw  Administration: Andrei Naidu  Administration: Jasiel Taylor  Admitting: Doctor, Unknown  Attending: , Wilmer  Consultant: Abelardo Cast  ED Attending: Maurice Jimenez  ED Nurse: Darby Small  HIM/Billing & Coding: Loida Cruz  Infection Control: Dilcia Bernal  Nurse: Ana Lala  Nurse: Isaiah Bass  Nurse: Konstantin Dow  Nurse: Adela Sharam  Nurse: Karli An  Occupational Therapy: Yarelis Lindo  Ordered: ServiceAccount, SCMMLM  Ordered: ADM, User  Outpatient Provider: Ashkan Whittaker  Outpatient Provider: Harpal Grider  Outpatient Provider: Saravanan Oleary  Override: Rizwan Campbell  Override: Adela Sharma  PCA/Nursing Assistant: Corine Brody  PCA/Nursing Assistant: Milton Farmer  Primary Team: Joann Gale  Primary Team: Yamini Lares  Primary Team: Arron Burns  Primary Team: Kati Boswell  Registered Dietitian: Peggy Lechuag  Respiratory Therapy: Dimitry Hope  : Sarah Menendez  Team: [facility]  Hospitalists, Team   CARE PROVIDERS:  Accepting Physician: Marcel Cadet  Administration: Andrei Naidu  Administration: Jasiel Taylor  Administration: Johan Shaw  Administration: Regina Astudillo  Admitting: Doctor, Unknown  Attending: Doctor, Wilmer  Consultant: Abelardo Cast  Consultant: Elbert Amezcua ED Attending: Maurice Jimenez  ED Nurse: Darby Small  HIM/Billing & Coding: Loida Cruz  Infection Control: Dilcia Bernal  Nurse: Ana Lala  Nurse: Isaiah Bass  Nurse: Karli An  Nurse: Adela Sharma  Nurse: Konstantin Dow  Occupational Therapy: Yarelis Lindo  Ordered: ServiceAccount, [facility] Center[facility]  Ordered: ADM, User  Outpatient Provider: Ashkan Whittaker  Outpatient Provider: Harpal Grider  Outpatient Provider: Saravanan Oleary  Override: Adela Sharma  Override: Rizwan Campbell  PCA/Nursing Assistant: Corine Brody  PCA/Nursing Assistant: Milton Farmer  Primary Team: Joann Gale  Primary Team: Yamini Lares  Primary Team: Arron Burns  Primary Team: Kati Boswell  Registered Dietitian: Peggy Lechuga  Respiratory Therapy: Dimitry Hope  : Sarah Menendez  Team: [facility]  Hospitalists, Team

## 2024-04-02 NOTE — CONSULT NOTE ADULT - SUBJECTIVE AND OBJECTIVE BOX
Patient is a 88y old  Female who presents with a chief complaint of weakness (02 Apr 2024 12:31)    HPI:  89 y/o female with PMH of HFrEF of 20-25 %, dementia, Afib, hypothyroidism, DM who presented to ED to be evaluated for weakness. pt lives home with her daughter who mentions that pt is usually able to ambulate with help of walker on her own. this morning pt felt tired and weak. not being able to stand up due to weakness in her legs. pt was diagnosed with UTI in past where she had same symptoms and daughter decided to  bring her to ED    ED course:  upon arrival pt was noted to be in Afib w RVR. recieved evening dose of her metoprolol in ED with returning to rate controlled Afib  tested positive for COVID. denies any shortness of breath or upper respiratory symptoms. pt do not require supplemental o2  Mg 1.4 s/p 1 gm mgso4 in ED. (02 Apr 2024 01:33)    Renal consult called for chronic kidney disease stage 3.       PAST MEDICAL HISTORY:  Atrial fibrillation    Hypothyroidism    Mild Alzheimer's dementia    Diabetes mellitus    Acute on chronic systolic congestive heart failure    Hypertension    Hyperlipemia    Cardiac LV ejection fraction 21-30%        PAST SURGICAL HISTORY:  History of appendectomy    H/O hernia repair    History of cholecystectomy        FAMILY HISTORY:  Family history of cerebral hemorrhage (Mother)    FH: renal failure (Father)        SOCIAL HISTORY: No smoking or alcohol use     Allergies    No Known Allergies    Intolerances      Home Medications:  digoxin 125 mcg (0.125 mg) oral tablet: 1 tab(s) orally every other day (02 Apr 2024 01:24)  Eliquis 2.5 mg oral tablet: 1 tab(s) orally 2 times a day (02 Apr 2024 01:24)  levothyroxine 75 mcg (0.075 mg) oral tablet: 1 tab(s) orally once a day (02 Apr 2024 01:24)  Melatonin 10 mg oral capsule: 1 cap(s) orally once a day (at bedtime) (02 Apr 2024 01:24)  memantine 10 mg oral tablet: 1 tab(s) orally 2 times a day (02 Apr 2024 01:24)  metFORMIN 1000 mg oral tablet: 1 tab(s) orally 2 times a day (02 Apr 2024 01:24)  rivastigmine 9.5 mg/24 hr transdermal film, extended release: 1 patch transdermal once a day (02 Apr 2024 01:24)  Vitamin D3 50 mcg (2000 intl units) oral tablet: 1 tab(s) orally once a day (02 Apr 2024 01:24)    MEDICATIONS  (STANDING):  apixaban 2.5 milliGRAM(s) Oral every 12 hours  cefTRIAXone   IVPB 1000 milliGRAM(s) IV Intermittent every 24 hours  dexAMETHasone  Injectable 6 milliGRAM(s) IV Push daily  dextrose 5%. 1000 milliLiter(s) (50 mL/Hr) IV Continuous <Continuous>  dextrose 5%. 1000 milliLiter(s) (100 mL/Hr) IV Continuous <Continuous>  dextrose 50% Injectable 12.5 Gram(s) IV Push once  dextrose 50% Injectable 25 Gram(s) IV Push once  dextrose 50% Injectable 25 Gram(s) IV Push once  digoxin     Tablet 125 MICROGram(s) Oral every other day  escitalopram 10 milliGRAM(s) Oral daily  glucagon  Injectable 1 milliGRAM(s) IntraMuscular once  insulin lispro (ADMELOG) corrective regimen sliding scale   SubCutaneous three times a day before meals  insulin lispro (ADMELOG) corrective regimen sliding scale   SubCutaneous at bedtime  levothyroxine 75 MICROGram(s) Oral daily  memantine 10 milliGRAM(s) Oral two times a day  metoprolol succinate  milliGRAM(s) Oral two times a day  pantoprazole    Tablet 40 milliGRAM(s) Oral before breakfast  potassium chloride   Powder 40 milliEquivalent(s) Oral every 4 hours  remdesivir  IVPB   IV Intermittent   rivastigmine patch  9.5 mG/24 Hr(s) 1 Patch Transdermal every 24 hours  tamsulosin 0.4 milliGRAM(s) Oral at bedtime    MEDICATIONS  (PRN):  acetaminophen     Tablet .. 650 milliGRAM(s) Oral every 6 hours PRN Temp greater or equal to 38C (100.4F), Mild Pain (1 - 3)  albuterol/ipratropium for Nebulization 3 milliLiter(s) Nebulizer every 6 hours PRN Shortness of Breath  aluminum hydroxide/magnesium hydroxide/simethicone Suspension 30 milliLiter(s) Oral every 4 hours PRN Dyspepsia  benzonatate 100 milliGRAM(s) Oral three times a day PRN Cough  dextrose Oral Gel 15 Gram(s) Oral once PRN Blood Glucose LESS THAN 70 milliGRAM(s)/deciliter  melatonin 3 milliGRAM(s) Oral at bedtime PRN Insomnia  ondansetron Injectable 4 milliGRAM(s) IV Push every 8 hours PRN Nausea and/or Vomiting      REVIEW OF SYSTEMS:  General: no distress  Respiratory: + SOB  Cardiovascular: No CP or Palpitations	  Gastrointestinal: No nausea, Vomiting. No diarrhea  Genitourinary: No urinary complaints	  Musculoskeletal: No new rash or lesions	  all other systems negative    T(F): 98.1 (04-02-24 @ 11:55), Max: 98.7 (04-02-24 @ 06:40)  HR: 92 (04-02-24 @ 11:55) (82 - 150)  BP: 126/84 (04-02-24 @ 11:55) (121/75 - 165/91)  RR: 16 (04-02-24 @ 11:55) (16 - 18)  SpO2: 94% (04-02-24 @ 11:55) (62% - 97%)  Wt(kg): --    PHYSICAL EXAM:  General: NAD  Respiratory: b/l air entry  Cardiovascular: S1 S2  Gastrointestinal: soft  Extremities: no edema        04-02    140  |  110<H>  |  19  ----------------------------<  111<H>  3.3<L>   |  22  |  1.40<H>    Ca    8.0<L>      02 Apr 2024 05:50  Mg     1.7     04-02    TPro  7.2  /  Alb  2.8<L>  /  TBili  0.6  /  DBili  x   /  AST  20  /  ALT  20  /  AlkPhos  52  04-01                          10.8   16.31 )-----------( 297      ( 02 Apr 2024 10:33 )             34.5       Hemoglobin: 10.8 g/dL (04-02 @ 10:33)  Hematocrit: 34.5 % (04-02 @ 10:33)  Potassium: 3.3 mmol/L (04-02 @ 05:50)  Blood Urea Nitrogen: 19 mg/dL (04-02 @ 05:50)      Creatinine, Serum: 1.40 (04-02 @ 05:50)  Creatinine, Serum: 1.60 (04-01 @ 20:30)      Urinalysis Basic - ( 02 Apr 2024 05:50 )    Color: x / Appearance: x / SG: x / pH: x  Gluc: 111 mg/dL / Ketone: x  / Bili: x / Urobili: x   Blood: x / Protein: x / Nitrite: x   Leuk Esterase: x / RBC: x / WBC x   Sq Epi: x / Non Sq Epi: x / Bacteria: x      LIVER FUNCTIONS - ( 01 Apr 2024 20:30 )  Alb: 2.8 g/dL / Pro: 7.2 g/dL / ALK PHOS: 52 U/L / ALT: 20 U/L / AST: 20 U/L / GGT: x               I&O's Detail    01 Apr 2024 07:01  -  02 Apr 2024 07:00  --------------------------------------------------------  IN:  Total IN: 0 mL    OUT:    Voided (mL): 200 mL  Total OUT: 200 mL    Total NET: -200 mL          < from: CT Angio Chest PE Protocol w/ IV Cont (04.02.24 @ 11:31) >    ACC: [id number] EXAM:  CT ABDOMEN AND PELVIS IC   ORDERED BY: ARABELLA TIM     ACC: [id number] EXAM:  CT ANGIO CHEST PULM [facility]   ORDERED BY: ARABELLA TIM     PROCEDURE DATE:  04/02/2024          INTERPRETATION:  CLINICAL INFORMATION: Weakness.  Atrial fibrillation. Covid Positive.  Abdominal pain.    COMPARISON: CT scan chest 1/25/2023 and CT scan abdomen and pelvis   11/27/2023.    CONTRAST/COMPLICATIONS:  IV Contrast: Omnipaque 350 (accession [id number]), IV contrast documented   in unlinked concurrent exam (accession [id number])  90 cc administered   10   cc discarded  Oral Contrast: NONE  Complications: None reported at time of study completion    PROCEDURE:  CT Angiography of the Chest was performed followed by portal venous phase   imaging of the Abdomen and Pelvis.  Sagittal and coronal reformats were performed as well as 3D (MIP)   reconstructions.    FINDINGS:    CHEST:    LUNGS AND LARGE AIRWAYS: PLEURA:  There are patchy groundglass and airspace consolidations involving the   posterior segment right upper lobe and dependent and basal segments of   the right lower lobe.  Mild interlobular septal thickening in the bilateral upper lobes, could   reflect interstitial edema.  There are moderate-sized bilateral pleuraleffusions and associated   compressive atelectasis bilateral lower lobes.    The central airways are patent.    VESSELS:  There is no CT evidence for acute pulmonary embolism.    Atherosclerotic changes thoracic aorta and coronary artery calcification.    HEART:  Cardiomegaly.   No pericardial effusion.    MEDIASTINUM AND FATOUMATA:  No enlarged lymphadenopathy.    CHEST WALL AND LOWER NECK:  Mild heterogeneous thyroid gland, similar to prior exam.    ABDOMEN AND PELVIS:    LIVER: Within normal limits.  BILE DUCTS: Biliary ductal prominence similar to prior exam.  GALLBLADDER: Cholecystectomy.  SPLEEN: Within normal limits.  PANCREAS:  Atrophic.  Pancreatic ductal dilatation.  Fluid filled periampullary duodenal diverticulum and air-filled duodenal   diverticulum proximal transverse portion of duodenum.  ADRENALS: Stable left adrenal thickening.  KIDNEYS/URETERS:  4 mm nonobstructing intrarenal calcifications lower pole left kidney.  No hydronephrosis.  Left pelvocalyceal urothelial thickeningwith urothelial thickening of   the left ureter.  No hydronephrosis.    Bilateral renal cysts.  Additional subcentimeter hypodense lesions are too small for   characterization.    BLADDER: Diffuse bladder wall thickening.  REPRODUCTIVE ORGANS:  The uterus and adnexa appear within normal limits.    BOWEL:  Distended, fecal impacted rectum.  No bowel obstruction.  Colonic diverticulosis, without CT evidence of diverticulitis.  PERITONEUM: No ascites.    VESSELS: Atherosclerotic changes.  RETROPERITONEUM/LYMPH NODES:  No enlarged lymphadenopathy.    ABDOMINAL WALL:  Fat-containing umbilical hernia.    BONES: No acute osseous abnormality.    IMPRESSION:    Multilobar pneumonia right lung.    Septal thickening which may reflect interstitial edema.    Bilateral pleural effusions with compressive atelectasis lower lobes.    No acute pulmonary embolism.    Cystitis and left pyeloureteritis; correlate for ascending urinary tract   infection.    Distended rectum with fecal impaction.    Other findings as discussed above.        --- End of Report ---            MAX JULES MD; Attending Radiologist  This document has been electronically signed. Apr 2 2024  1:23PM    < end of copied text >  < from: Xray Chest 1 View- PORTABLE-Urgent (04.01.24 @ 20:44) >    ACC: [id number] EXAM:  XR CHEST PORTABLE URGENT 1V   ORDERED BY:  BRIAN RUIZ     PROCEDURE DATE:  04/01/2024          INTERPRETATION:  TIME OF EXAM: April 1, 2024 at 8:40 PM.    CLINICAL INFORMATION: Sepsis.    COMPARISON:  November 27, 2023.    TECHNIQUE:   AP Portable chest x-ray.    INTERPRETATION:    Heart size and the mediastinum cannot be accurately evaluated on this   projection. Calcified thoracic aorta.  There is linear atelectasis versus scar in the right lower lung periphery.  Question ill-defined right lower lung opacity versus superimposition of   bony and vascular structures.  Left basilar opacity suggests the small left pleural effusion with likely   associated passive atelectasis. The remainder of the left lung is clear.  No right pleural effusion is seen.  No pneumothorax is seen.  There is osteoarthritic degenerative change of the spine.      IMPRESSION:  Right lower lung linear atelectasis versus scar.    Question ill-defined right lower lung opacity versus superimposition of   bony and vascular structures.    Left basilar opacity suggesting a small left pleural effusion with likely   associated passive atelectasis.    A CTA of the chest is scheduled.    --- End of Report ---            CLEVELAND ARCHULETA MD; AttendingRadiologist  This document has been electronically signed. Apr 2 2024 11:03AM    < end of copied text >

## 2024-04-02 NOTE — PATIENT PROFILE ADULT - NSPROPOAPRESSUREINJURYCT_GEN_A_NUR
3 Alternatives Discussed Intro (Do Not Add Period): I discussed alternative treatments to Mohs surgery and specifically discussed the risks and benefits of

## 2024-04-02 NOTE — OCCUPATIONAL THERAPY INITIAL EVALUATION ADULT - ADL RETRAINING, OT EVAL
Pt will complete LB dressing with MinAx1 +set-up while seated by 2-3 sessions. Pt will complete grooming activity while standing at the sink +RW with supervision by 2-3 sessions.

## 2024-04-02 NOTE — PROGRESS NOTE ADULT - PROBLEM SELECTOR PLAN 2
Pt with afib w/ RVR to the 150s this AM  -given lopressor 5mg IV x2 with HRs in the 120s.  -C/w metoprolol succinate 100 mg bid  -C/w digoxin 125 mcg every other day  -C/w Eliquis 2.5 mg bid  -Cardio consulted (Dr. Savella) -Pt with chronic afib now w/ RVR to the 150s this AM  -given lopressor 5mg IV x2 with HR improving to 110s now  -C/w metoprolol succinate 100 mg bid  -C/w digoxin 125 mcg every other day  -C/w Eliquis 2.5 mg bid  -cardio consulted (Dr. Harvey group), recs appreciated

## 2024-04-02 NOTE — CONSULT NOTE ADULT - NS ATTEND AMEND GEN_ALL_CORE FT
89 y/o female with PMH of HFrEF of 20-25 %, dementia, Afib, hypothyroidism, DM who presented to ED to be evaluated for weakness, found to have +UTI and Covid positive     - in af with rvr, in the setting of covid  - requiring hf 02, now on 80%  - does not appear volume overloaded with exam more suggestive of viral illness, though bnp elevated and CT with danielle effusions/congestion  - can give a one time dose of iv lasix today  - in the setting of known lv dysfunction, will try to optimize hr control with metoprolol and digoxin.  - cont to monitor on telemetry  - cont ac  - cont 02 supplementation  - Rx for COVID per primary/ID  - will follow with you

## 2024-04-02 NOTE — PHYSICAL THERAPY INITIAL EVALUATION ADULT - GENERAL OBSERVATIONS, REHAB EVAL
Patient received supine in bed, cooperative with physical therapy, + O2 via high flow, heart monitor

## 2024-04-02 NOTE — PROGRESS NOTE ADULT - PROBLEM SELECTOR PLAN 1
-Pt with acute hypoxia this AM -- placed on HFNC -- wean as tolerated  -Concern for possible PE or flash pulmonary edema  -f/u d-dimer,ferritin, procal, CRP  -CT Angio chest, and CT abdomen and pelvis w/ IV contrast ordered  -duoneb PRN  -Tessalon pearls PRN for cough  -Started on Remdesivir and Decadron  -ID consulted (Dr. Amezcua), recs appreciated  -isolation precautions -Pt with acute hypoxia this AM -- placed on HFNC -- wean as tolerated  -acute hypoxic respiratory failure likely multifactorial including pulmonary edema, COVID, suspected overlying bacterial PNA  -Concern for possible PE or flash pulmonary edema as an acute decompensation  -d-dimer elevated at 500  -ferritin 70, procal 0.37, CRP 8  -CT Angio chest, and CT abdomen and pelvis w/ IV contrast performed which showed: Multilobar pneumonia right lung. Septal thickening which may reflect interstitial edema. Bilateral pleural effusions with compressive atelectasis lower lobes. No acute pulmonary embolism.  Cystitis and left pyeloureteritis; correlate for ascending urinary tract infection. Distended rectum with fecal impaction.  -given lasix 20mg IV x1 -- discussed with cardio consult (Dr. Harvey group), recs appreciated  -duoneb PRN  -Tessalon pearls PRN for cough  -Started on Remdesivir and Decadron per ID recs  -c/w ceftriaxone to cover PNA as well as pyelonephritis   -ID consulted (Dr. Amezcua), recs appreciated  -isolation precautions

## 2024-04-02 NOTE — CONSULT NOTE ADULT - SUBJECTIVE AND OBJECTIVE BOX
[facility]  INFECTIOUS DISEASES   [address] [place] [address]  Tel: [phone]     Fax: [phone]  ========================================================  MD Nasrin Marinelli Kaushal, MD Cho, Michelle, MD Sunjit, Jaspal, MD  ========================================================    [id number]  LEXY EISENBERG     CC: Patient is a 88y old  Female who presents with a chief complaint of weakness (02 Apr 2024 01:33)    HPI:  87 y/o female with PMH of HFrEF of 20-25 %, dementia, Afib, hypothyroidism, DM who presented to ED to be evaluated for weakness. pt lives home with her daughter who mentions that pt is usually able to ambulate with help of walker on her own. this morning pt felt tired and weak. not being able to stand up due to weakness in her legs. pt was diagnosed with UTI in past where she had same symptoms and daughter decided to  bring her to ED    ED course:  upon arrival pt was noted to be in Afib w RVR. recieved evening dose of her metoprolol in ED with returning to rate controlled Afib  tested positive for COVID. denies any shortness of breath or upper respiratory symptoms. pt do not require supplemental o2  Mg 1.4 s/p 1 gm mgso4 in ED     PAST MEDICAL & SURGICAL HISTORY:  Atrial fibrillation  Hypothyroidism  Mild Alzheimer's dementia  Diabetes mellitus  Acute on chronic systolic congestive heart failure  Hypertension  Hyperlipemia  Cardiac LV ejection fraction 21-30%  12/22  History of appendectomy  H/O hernia repair  History of cholecystectomy    Social Hx: No current smoking, EtOH or drugs     FAMILY HISTORY:  Family history of cerebral hemorrhage (Mother)    FH: renal failure (Father)    Allergies  No Known Allergies    MEDICATIONS  (STANDING):  apixaban 2.5 milliGRAM(s) Oral every 12 hours  cefTRIAXone   IVPB 1000 milliGRAM(s) IV Intermittent every 24 hours  dexAMETHasone  Injectable 6 milliGRAM(s) IV Push once  dexAMETHasone  Injectable 6 milliGRAM(s) IV Push daily  dextrose 5%. 1000 milliLiter(s) (50 mL/Hr) IV Continuous <Continuous>  dextrose 5%. 1000 milliLiter(s) (100 mL/Hr) IV Continuous <Continuous>  dextrose 50% Injectable 12.5 Gram(s) IV Push once  dextrose 50% Injectable 25 Gram(s) IV Push once  dextrose 50% Injectable 25 Gram(s) IV Push once  digoxin     Tablet 125 MICROGram(s) Oral every other day  escitalopram 10 milliGRAM(s) Oral daily  glucagon  Injectable 1 milliGRAM(s) IntraMuscular once  insulin lispro (ADMELOG) corrective regimen sliding scale   SubCutaneous three times a day before meals  insulin lispro (ADMELOG) corrective regimen sliding scale   SubCutaneous at bedtime  levothyroxine 75 MICROGram(s) Oral daily  memantine 10 milliGRAM(s) Oral two times a day  metoprolol succinate  milliGRAM(s) Oral two times a day  potassium chloride   Powder 40 milliEquivalent(s) Oral every 4 hours  remdesivir  IVPB 200 milliGRAM(s) IV Intermittent every 24 hours  remdesivir  IVPB   IV Intermittent   rivastigmine patch  9.5 mG/24 Hr(s) 1 Patch Transdermal every 24 hours  tamsulosin 0.4 milliGRAM(s) Oral at bedtime    MEDICATIONS  (PRN):  acetaminophen     Tablet .. 650 milliGRAM(s) Oral every 6 hours PRN Temp greater or equal to 38C (100.4F), Mild Pain (1 - 3)  albuterol/ipratropium for Nebulization 3 milliLiter(s) Nebulizer every 6 hours PRN Shortness of Breath  aluminum hydroxide/magnesium hydroxide/simethicone Suspension 30 milliLiter(s) Oral every 4 hours PRN Dyspepsia  benzonatate 100 milliGRAM(s) Oral three times a day PRN Cough  dextrose Oral Gel 15 Gram(s) Oral once PRN Blood Glucose LESS THAN 70 milliGRAM(s)/deciliter  melatonin 3 milliGRAM(s) Oral at bedtime PRN Insomnia  ondansetron Injectable 4 milliGRAM(s) IV Push every 8 hours PRN Nausea and/or Vomiting     REVIEW OF SYSTEMS:  CONSTITUTIONAL:  No Fever or chills  HEENT:  No diplopia or blurred vision.  No sore throat or runny nose.  CARDIOVASCULAR:  No chest pain   RESPIRATORY:  +cough, +shortness of breath  GASTROINTESTINAL:  No nausea, vomiting or diarrhea.  GENITOURINARY:  No dysuria, frequency or urgency. No Blood in urine  MUSCULOSKELETAL:  no joint aches, no muscle pain  SKIN:  No change in skin, hair or nails.    Physical Exam:  Vital Signs Last 24 Hrs  T(C): 37.1 (02 Apr 2024 06:40), Max: 37.1 (02 Apr 2024 06:40)  T(F): 98.7 (02 Apr 2024 06:40), Max: 98.7 (02 Apr 2024 06:40)  HR: 93 (02 Apr 2024 06:40) (82 - 150)  BP: 165/91 (02 Apr 2024 06:40) (121/75 - 165/91)  RR: 16 (02 Apr 2024 06:40) (16 - 18)  SpO2: 62% (02 Apr 2024 06:40) (62% - 97%)  Parameters below as of 02 Apr 2024 06:40  Patient On (Oxygen Delivery Method): room air  Height (cm): 160 (04-01 @ 19:00)  Weight (kg): 45.4 (04-01 @ 19:00)  BMI (kg/m2): 17.7 (04-01 @ 19:00)  BSA (m2): 1.44 (04-01 @ 19:00)  GEN: NAD  HEENT: normocephalic and atraumatic. EOMI. PERRL.    NECK: Supple.  No lymphadenopathy   LUNGS: Clear to auscultation.  HEART: Regular rate and rhythm   ABDOMEN: Soft, nontender, and nondistended.  Positive bowel sounds.    EXTREMITIES: Without edema.  NEUROLOGIC: grossly intact.  PSYCHIATRIC: Appropriate affect .  SKIN: No rash    Labs:  04-02    140  |  110<H>  |  19  ----------------------------<  111<H>  3.3<L>   |  22  |  1.40<H>    Ca    8.0<L>      02 Apr 2024 05:50  Mg     1.7     04-02    TPro  7.2  /  Alb  2.8<L>  /  TBili  0.6  /  DBili  x   /  AST  20  /  ALT  20  /  AlkPhos  52  04-01                        10.7   7.21  )-----------( 272      ( 01 Apr 2024 20:30 )             34.4     PT/INR - ( 01 Apr 2024 20:30 )   PT: 12.8 sec;   INR: 1.10 ratio    PTT - ( 01 Apr 2024 20:30 )  PTT:30.6 sec  Urinalysis Basic - ( 02 Apr 2024 05:50 )    Color: x / Appearance: x / SG: x / pH: x  Gluc: 111 mg/dL / Ketone: x  / Bili: x / Urobili: x   Blood: x / Protein: x / Nitrite: x   Leuk Esterase: x / RBC: x / WBC x   Sq Epi: x / Non Sq Epi: x / Bacteria: x    LIVER FUNCTIONS - ( 01 Apr 2024 20:30 )  Alb: 2.8 g/dL / Pro: 7.2 g/dL / ALK PHOS: 52 U/L / ALT: 20 U/L / AST: 20 U/L / GGT: x           All imaging and other data have been reviewed.  < from: Xray Chest 1 View- PORTABLE-Urgent (04.01.24 @ 20:44) >  IMPRESSION:  Right lower lung linear atelectasis versus scar.  Question ill-defined right lower lung opacity versus superimposition of   bony and vascular structures.  Left basilar opacity suggesting a small left pleural effusion with likely   associated passive atelectasis.    Assessment and Plan:    87 y/o female with PMH of HFrEF of 20-25 %, dementia, Afib, hypothyroidism, DM who presented to ED to be evaluated for weakness.   In ED her COVID test is back positive.     # COVID 19   # Acute respiratory failure   # UTI    - BMP, CBC w diff, NLR. Procalcitonin, Ferritin, CRP, LDH and D dimer for the start and periodically can be repeated.   - CXR with possible opacities, CTA pending   - Start Remdesivir 200mg stat and 100mg daily for 4 more days (total 5days)   - Follow Creat and LFTs daily while on Remdesivir.    - Start Dexamethasone 6mg daily for 10days  - Watch O2 sat closely and taper as tolerated. Currently on HFNC  - Anticoagulation as per protocol  - Ceftriaxone 1hm daily to cover for UTI for now until culture is back  - Hold on Tocilizumab for now since steroid was just started     Thank you for courtesy of this consult.     Will follow.   Discussed with primary team.    Elbert Amezcua MD  Division of Infectious Diseases   Please call ID service at [phone] with any question.      75 minutes spent on total encounter assessing patient, examination, chart review, counseling and coordinating care by the attending physician/nurse/care manager.   [facility]  INFECTIOUS DISEASES   [address] [place] [address]  Tel: [phone]     Fax: [phone]  ========================================================  MD Nasrin Marinelli Kaushal, MD Cho, Michelle, MD Sunjit, Jaspal, MD  ========================================================    [id number]  LEXY EISENBERG     CC: Patient is a 88y old  Female who presents with a chief complaint of weakness (02 Apr 2024 01:33)    HPI:  87 y/o female with PMH of HFrEF of 20-25 %, dementia, Afib, hypothyroidism, DM who presented to ED to be evaluated for weakness. pt lives home with her daughter who mentions that pt is usually able to ambulate with help of walker on her own. this morning pt felt tired and weak. not being able to stand up due to weakness in her legs. pt was diagnosed with UTI in past where she had same symptoms and daughter decided to  bring her to ED    ED course:  upon arrival pt was noted to be in Afib w RVR. recieved evening dose of her metoprolol in ED with returning to rate controlled Afib  tested positive for COVID. denies any shortness of breath or upper respiratory symptoms. pt do not require supplemental o2  Mg 1.4 s/p 1 gm mgso4 in ED     PAST MEDICAL & SURGICAL HISTORY:  Atrial fibrillation  Hypothyroidism  Mild Alzheimer's dementia  Diabetes mellitus  Acute on chronic systolic congestive heart failure  Hypertension  Hyperlipemia  Cardiac LV ejection fraction 21-30%  12/22  History of appendectomy  H/O hernia repair  History of cholecystectomy    Social Hx: No current smoking, EtOH or drugs     FAMILY HISTORY:  Family history of cerebral hemorrhage (Mother)    FH: renal failure (Father)    Allergies  No Known Allergies    MEDICATIONS  (STANDING):  apixaban 2.5 milliGRAM(s) Oral every 12 hours  cefTRIAXone   IVPB 1000 milliGRAM(s) IV Intermittent every 24 hours  dexAMETHasone  Injectable 6 milliGRAM(s) IV Push once  dexAMETHasone  Injectable 6 milliGRAM(s) IV Push daily  dextrose 5%. 1000 milliLiter(s) (50 mL/Hr) IV Continuous <Continuous>  dextrose 5%. 1000 milliLiter(s) (100 mL/Hr) IV Continuous <Continuous>  dextrose 50% Injectable 12.5 Gram(s) IV Push once  dextrose 50% Injectable 25 Gram(s) IV Push once  dextrose 50% Injectable 25 Gram(s) IV Push once  digoxin     Tablet 125 MICROGram(s) Oral every other day  escitalopram 10 milliGRAM(s) Oral daily  glucagon  Injectable 1 milliGRAM(s) IntraMuscular once  insulin lispro (ADMELOG) corrective regimen sliding scale   SubCutaneous three times a day before meals  insulin lispro (ADMELOG) corrective regimen sliding scale   SubCutaneous at bedtime  levothyroxine 75 MICROGram(s) Oral daily  memantine 10 milliGRAM(s) Oral two times a day  metoprolol succinate  milliGRAM(s) Oral two times a day  potassium chloride   Powder 40 milliEquivalent(s) Oral every 4 hours  remdesivir  IVPB 200 milliGRAM(s) IV Intermittent every 24 hours  remdesivir  IVPB   IV Intermittent   rivastigmine patch  9.5 mG/24 Hr(s) 1 Patch Transdermal every 24 hours  tamsulosin 0.4 milliGRAM(s) Oral at bedtime    MEDICATIONS  (PRN):  acetaminophen     Tablet .. 650 milliGRAM(s) Oral every 6 hours PRN Temp greater or equal to 38C (100.4F), Mild Pain (1 - 3)  albuterol/ipratropium for Nebulization 3 milliLiter(s) Nebulizer every 6 hours PRN Shortness of Breath  aluminum hydroxide/magnesium hydroxide/simethicone Suspension 30 milliLiter(s) Oral every 4 hours PRN Dyspepsia  benzonatate 100 milliGRAM(s) Oral three times a day PRN Cough  dextrose Oral Gel 15 Gram(s) Oral once PRN Blood Glucose LESS THAN 70 milliGRAM(s)/deciliter  melatonin 3 milliGRAM(s) Oral at bedtime PRN Insomnia  ondansetron Injectable 4 milliGRAM(s) IV Push every 8 hours PRN Nausea and/or Vomiting     REVIEW OF SYSTEMS:  CONSTITUTIONAL:  No Fever or chills  HEENT:  No diplopia or blurred vision.  No sore throat or runny nose.  CARDIOVASCULAR:  No chest pain   RESPIRATORY:  +cough, +shortness of breath  GASTROINTESTINAL:  No nausea, vomiting or diarrhea.  GENITOURINARY:  No dysuria, frequency or urgency. No Blood in urine  MUSCULOSKELETAL:  no joint aches, no muscle pain  SKIN:  No change in skin, hair or nails.    Physical Exam:  Vital Signs Last 24 Hrs  T(C): 37.1 (02 Apr 2024 06:40), Max: 37.1 (02 Apr 2024 06:40)  T(F): 98.7 (02 Apr 2024 06:40), Max: 98.7 (02 Apr 2024 06:40)  HR: 93 (02 Apr 2024 06:40) (82 - 150)  BP: 165/91 (02 Apr 2024 06:40) (121/75 - 165/91)  RR: 16 (02 Apr 2024 06:40) (16 - 18)  SpO2: 62% (02 Apr 2024 06:40) (62% - 97%)  Parameters below as of 02 Apr 2024 06:40  Patient On (Oxygen Delivery Method): room air  Height (cm): 160 (04-01 @ 19:00)  Weight (kg): 45.4 (04-01 @ 19:00)  BMI (kg/m2): 17.7 (04-01 @ 19:00)  BSA (m2): 1.44 (04-01 @ 19:00)  GEN: NAD  HEENT: normocephalic and atraumatic. EOMI. PERRL.    NECK: Supple.  No lymphadenopathy   LUNGS: Crackles both bases   HEART: Irregular rate and rhythm   ABDOMEN: Soft, nontender, and nondistended.  Positive bowel sounds.    EXTREMITIES: Without edema.  NEUROLOGIC: grossly intact.  PSYCHIATRIC: Appropriate affect .  SKIN: No rash    Labs:  04-02    140  |  110<H>  |  19  ----------------------------<  111<H>  3.3<L>   |  22  |  1.40<H>    Ca    8.0<L>      02 Apr 2024 05:50  Mg     1.7     04-02    TPro  7.2  /  Alb  2.8<L>  /  TBili  0.6  /  DBili  x   /  AST  20  /  ALT  20  /  AlkPhos  52  04-01                        10.7   7.21  )-----------( 272      ( 01 Apr 2024 20:30 )             34.4     PT/INR - ( 01 Apr 2024 20:30 )   PT: 12.8 sec;   INR: 1.10 ratio    PTT - ( 01 Apr 2024 20:30 )  PTT:30.6 sec  Urinalysis Basic - ( 02 Apr 2024 05:50 )    Color: x / Appearance: x / SG: x / pH: x  Gluc: 111 mg/dL / Ketone: x  / Bili: x / Urobili: x   Blood: x / Protein: x / Nitrite: x   Leuk Esterase: x / RBC: x / WBC x   Sq Epi: x / Non Sq Epi: x / Bacteria: x    LIVER FUNCTIONS - ( 01 Apr 2024 20:30 )  Alb: 2.8 g/dL / Pro: 7.2 g/dL / ALK PHOS: 52 U/L / ALT: 20 U/L / AST: 20 U/L / GGT: x           All imaging and other data have been reviewed.  < from: Xray Chest 1 View- PORTABLE-Urgent (04.01.24 @ 20:44) >  IMPRESSION:  Right lower lung linear atelectasis versus scar.  Question ill-defined right lower lung opacity versus superimposition of   bony and vascular structures.  Left basilar opacity suggesting a small left pleural effusion with likely   associated passive atelectasis.    Assessment and Plan:    87 y/o female with PMH of HFrEF of 20-25 %, dementia, Afib, hypothyroidism, DM who presented to ED to be evaluated for weakness.   In ED her COVID test is back positive.     # COVID 19   # Acute respiratory failure   # UTI    - BMP, CBC w diff, NLR. Procalcitonin, Ferritin, CRP, LDH and D dimer for the start and periodically can be repeated.   - CXR with possible opacities, CTA pending   - Start Remdesivir 200mg stat and 100mg daily for 4 more days (total 5days)   - Follow Creat and LFTs daily while on Remdesivir.    - Start Dexamethasone 6mg daily for 10days  - Watch O2 sat closely and taper as tolerated. Currently on HFNC  - Anticoagulation as per protocol  - Ceftriaxone 1hm daily to cover for UTI for now until culture is back  - Hold on Tocilizumab for now since steroid was just started and there is concern for UTI/pneumonia     Thank you for courtesy of this consult.     Will follow.   Discussed with primary team.    Elbert Amezcua MD  Division of Infectious Diseases   Please call ID service at [phone] with any question.      75 minutes spent on total encounter assessing patient, examination, chart review, counseling and coordinating care by the attending physician/nurse/care manager.

## 2024-04-02 NOTE — PATIENT PROFILE ADULT - [id number]_GEN_A_NUR
I have received the results of her MRI with contrast lumbar spine from [facility].  Revealing lobulated mass in the left psoas to be a schwannoma and not metastatic disease.      I called the patient to explain this.  Schwannomas are benin nerve tumors and this one is on the left side.  She has right sided back pain; therefore I would not expect the schwannoma to be related to her pain at this time.    broderick requests PT for back pain and I will refer to [facility] per her request.  
suspected deep tissue injury

## 2024-04-03 ENCOUNTER — TRANSCRIPTION ENCOUNTER (OUTPATIENT)
Age: 89
End: 2024-04-03

## 2024-04-03 LAB
A1C WITH ESTIMATED AVERAGE GLUCOSE RESULT: 6.5 % — HIGH (ref 4–5.6)
ALBUMIN SERPL ELPH-MCNC: 2.5 G/DL — LOW (ref 3.3–5)
ALP SERPL-CCNC: 116 U/L — SIGNIFICANT CHANGE UP (ref 40–120)
ALT FLD-CCNC: 108 U/L — HIGH (ref 12–78)
ANION GAP SERPL CALC-SCNC: 12 MMOL/L — SIGNIFICANT CHANGE UP (ref 5–17)
AST SERPL-CCNC: 153 U/L — HIGH (ref 15–37)
BASOPHILS # BLD AUTO: 0.02 K/UL — SIGNIFICANT CHANGE UP (ref 0–0.2)
BASOPHILS NFR BLD AUTO: 0.1 % — SIGNIFICANT CHANGE UP (ref 0–2)
BILIRUB SERPL-MCNC: 0.4 MG/DL — SIGNIFICANT CHANGE UP (ref 0.2–1.2)
BUN SERPL-MCNC: 28 MG/DL — HIGH (ref 7–23)
CALCIUM SERPL-MCNC: 8.2 MG/DL — LOW (ref 8.5–10.1)
CHLORIDE SERPL-SCNC: 105 MMOL/L — SIGNIFICANT CHANGE UP (ref 96–108)
CO2 SERPL-SCNC: 19 MMOL/L — LOW (ref 22–31)
CREAT SERPL-MCNC: 1.7 MG/DL — HIGH (ref 0.5–1.3)
EGFR: 29 ML/MIN/1.73M2 — LOW
EOSINOPHIL # BLD AUTO: 0 K/UL — SIGNIFICANT CHANGE UP (ref 0–0.5)
EOSINOPHIL NFR BLD AUTO: 0 % — SIGNIFICANT CHANGE UP (ref 0–6)
ESTIMATED AVERAGE GLUCOSE: 140 MG/DL — HIGH (ref 68–114)
GLUCOSE BLDC GLUCOMTR-MCNC: 136 MG/DL — HIGH (ref 70–99)
GLUCOSE BLDC GLUCOMTR-MCNC: 175 MG/DL — HIGH (ref 70–99)
GLUCOSE BLDC GLUCOMTR-MCNC: 297 MG/DL — HIGH (ref 70–99)
GLUCOSE SERPL-MCNC: 211 MG/DL — HIGH (ref 70–99)
HCT VFR BLD CALC: 32.4 % — LOW (ref 34.5–45)
HGB BLD-MCNC: 10.3 G/DL — LOW (ref 11.5–15.5)
IMM GRANULOCYTES NFR BLD AUTO: 0.8 % — SIGNIFICANT CHANGE UP (ref 0–0.9)
LDH SERPL L TO P-CCNC: 321 U/L — HIGH (ref 50–242)
LYMPHOCYTES # BLD AUTO: 0.98 K/UL — LOW (ref 1–3.3)
LYMPHOCYTES # BLD AUTO: 7.1 % — LOW (ref 13–44)
MAGNESIUM SERPL-MCNC: 2.3 MG/DL — SIGNIFICANT CHANGE UP (ref 1.6–2.6)
MCHC RBC-ENTMCNC: 27.2 PG — SIGNIFICANT CHANGE UP (ref 27–34)
MCHC RBC-ENTMCNC: 31.8 GM/DL — LOW (ref 32–36)
MCV RBC AUTO: 85.7 FL — SIGNIFICANT CHANGE UP (ref 80–100)
MONOCYTES # BLD AUTO: 0.8 K/UL — SIGNIFICANT CHANGE UP (ref 0–0.9)
MONOCYTES NFR BLD AUTO: 5.8 % — SIGNIFICANT CHANGE UP (ref 2–14)
NEUTROPHILS # BLD AUTO: 11.87 K/UL — HIGH (ref 1.8–7.4)
NEUTROPHILS NFR BLD AUTO: 86.2 % — HIGH (ref 43–77)
NRBC # BLD: 0 /100 WBCS — SIGNIFICANT CHANGE UP (ref 0–0)
PHOSPHATE SERPL-MCNC: 3.3 MG/DL — SIGNIFICANT CHANGE UP (ref 2.5–4.5)
PLATELET # BLD AUTO: 293 K/UL — SIGNIFICANT CHANGE UP (ref 150–400)
POTASSIUM SERPL-MCNC: 4.6 MMOL/L — SIGNIFICANT CHANGE UP (ref 3.5–5.3)
POTASSIUM SERPL-SCNC: 4.6 MMOL/L — SIGNIFICANT CHANGE UP (ref 3.5–5.3)
PROT SERPL-MCNC: 6.7 G/DL — SIGNIFICANT CHANGE UP (ref 6–8.3)
RBC # BLD: 3.78 M/UL — LOW (ref 3.8–5.2)
RBC # FLD: 17.2 % — HIGH (ref 10.3–14.5)
SODIUM SERPL-SCNC: 136 MMOL/L — SIGNIFICANT CHANGE UP (ref 135–145)
WBC # BLD: 13.78 K/UL — HIGH (ref 3.8–10.5)
WBC # FLD AUTO: 13.78 K/UL — HIGH (ref 3.8–10.5)

## 2024-04-03 PROCEDURE — 99233 SBSQ HOSP IP/OBS HIGH 50: CPT | Mod: GC

## 2024-04-03 PROCEDURE — 99232 SBSQ HOSP IP/OBS MODERATE 35: CPT

## 2024-04-03 PROCEDURE — 99291 CRITICAL CARE FIRST HOUR: CPT

## 2024-04-03 RX ORDER — METOPROLOL TARTRATE 50 MG
25 TABLET ORAL ONCE
Refills: 0 | Status: COMPLETED | OUTPATIENT
Start: 2024-04-03 | End: 2024-04-03

## 2024-04-03 RX ORDER — INSULIN LISPRO 100/ML
2 VIAL (ML) SUBCUTANEOUS
Refills: 0 | Status: DISCONTINUED | OUTPATIENT
Start: 2024-04-03 | End: 2024-04-05

## 2024-04-03 RX ORDER — DEXAMETHASONE 0.5 MG/5ML
10 ELIXIR ORAL DAILY
Refills: 0 | Status: DISCONTINUED | OUTPATIENT
Start: 2024-04-04 | End: 2024-04-07

## 2024-04-03 RX ORDER — DEXAMETHASONE 0.5 MG/5ML
10 ELIXIR ORAL DAILY
Refills: 0 | Status: DISCONTINUED | OUTPATIENT
Start: 2024-04-03 | End: 2024-04-03

## 2024-04-03 RX ORDER — SENNA PLUS 8.6 MG/1
2 TABLET ORAL AT BEDTIME
Refills: 0 | Status: DISCONTINUED | OUTPATIENT
Start: 2024-04-03 | End: 2024-04-10

## 2024-04-03 RX ORDER — POLYETHYLENE GLYCOL 3350 17 G/17G
17 POWDER, FOR SOLUTION ORAL DAILY
Refills: 0 | Status: DISCONTINUED | OUTPATIENT
Start: 2024-04-03 | End: 2024-04-10

## 2024-04-03 RX ADMIN — APIXABAN 2.5 MILLIGRAM(S): 2.5 TABLET, FILM COATED ORAL at 06:01

## 2024-04-03 RX ADMIN — APIXABAN 2.5 MILLIGRAM(S): 2.5 TABLET, FILM COATED ORAL at 17:34

## 2024-04-03 RX ADMIN — TAMSULOSIN HYDROCHLORIDE 0.4 MILLIGRAM(S): 0.4 CAPSULE ORAL at 22:28

## 2024-04-03 RX ADMIN — ESCITALOPRAM OXALATE 10 MILLIGRAM(S): 10 TABLET, FILM COATED ORAL at 12:46

## 2024-04-03 RX ADMIN — RIVASTIGMINE 1 PATCH: 4.6 PATCH, EXTENDED RELEASE TRANSDERMAL at 08:00

## 2024-04-03 RX ADMIN — Medication 2 UNIT(S): at 17:32

## 2024-04-03 RX ADMIN — Medication 6: at 12:46

## 2024-04-03 RX ADMIN — Medication 75 MICROGRAM(S): at 06:01

## 2024-04-03 RX ADMIN — Medication 2: at 17:32

## 2024-04-03 RX ADMIN — Medication 4: at 08:22

## 2024-04-03 RX ADMIN — RIVASTIGMINE 1 PATCH: 4.6 PATCH, EXTENDED RELEASE TRANSDERMAL at 19:00

## 2024-04-03 RX ADMIN — REMDESIVIR 200 MILLIGRAM(S): 5 INJECTION INTRAVENOUS at 16:30

## 2024-04-03 RX ADMIN — MEMANTINE HYDROCHLORIDE 10 MILLIGRAM(S): 10 TABLET ORAL at 06:01

## 2024-04-03 RX ADMIN — Medication 6 MILLIGRAM(S): at 06:00

## 2024-04-03 RX ADMIN — Medication 100 MILLIGRAM(S): at 17:33

## 2024-04-03 RX ADMIN — MEMANTINE HYDROCHLORIDE 10 MILLIGRAM(S): 10 TABLET ORAL at 17:34

## 2024-04-03 RX ADMIN — RIVASTIGMINE 1 PATCH: 4.6 PATCH, EXTENDED RELEASE TRANSDERMAL at 06:23

## 2024-04-03 RX ADMIN — RIVASTIGMINE 1 PATCH: 4.6 PATCH, EXTENDED RELEASE TRANSDERMAL at 05:59

## 2024-04-03 RX ADMIN — POLYETHYLENE GLYCOL 3350 17 GRAM(S): 17 POWDER, FOR SOLUTION ORAL at 12:46

## 2024-04-03 RX ADMIN — CEFTRIAXONE 100 MILLIGRAM(S): 500 INJECTION, POWDER, FOR SOLUTION INTRAMUSCULAR; INTRAVENOUS at 01:40

## 2024-04-03 RX ADMIN — SENNA PLUS 2 TABLET(S): 8.6 TABLET ORAL at 22:28

## 2024-04-03 RX ADMIN — Medication 25 MILLIGRAM(S): at 01:39

## 2024-04-03 RX ADMIN — Medication 100 MILLIGRAM(S): at 06:01

## 2024-04-03 RX ADMIN — PANTOPRAZOLE SODIUM 40 MILLIGRAM(S): 20 TABLET, DELAYED RELEASE ORAL at 06:00

## 2024-04-03 NOTE — DISCHARGE NOTE PROVIDER - DETAILS OF MALNUTRITION DIAGNOSIS/DIAGNOSES
This patient has been assessed with a concern for Malnutrition and was treated during this hospitalization for the following Nutrition diagnosis/diagnoses:     -  04/07/2024: Severe protein-calorie malnutrition   -  04/07/2024: Underweight (BMI < 19)

## 2024-04-03 NOTE — DISCHARGE NOTE PROVIDER - PROVIDER TOKENS
PROVIDER:[TOKEN:[[id number]],FOLLOWUP:[1 week]] PROVIDER:[TOKEN:[[id number]],FOLLOWUP:[1 week]],PROVIDER:[TOKEN:[[id number]],FOLLOWUP:[1 week]],PROVIDER:[TOKEN:[[id number]],FOLLOWUP:[2 weeks]]

## 2024-04-03 NOTE — DISCHARGE NOTE PROVIDER - NSDCFUSCHEDAPPT_GEN_ALL_CORE_FT
Evelina Kraft  [facility]  UROLOGY [address] [facility]  Scheduled Appointment: 04/08/2024    Harpal Grider  [facility]  CARDIOLOGY [address] [facility]  Scheduled Appointment: 04/12/2024     Harpal Grider  [facility]  CARDIOLOGY [address] [facility]  Scheduled Appointment: 04/12/2024

## 2024-04-03 NOTE — PROGRESS NOTE ADULT - CRITICAL CARE ATTENDING COMMENT
87 y/o female with PMH of HFrEF of 20-25 %, dementia, Afib, hypothyroidism, DM who presented to ED to be evaluated for weakness, found to have +UTI and Covid positive     rapid af in the setting of covid with severe resp failure  wean hfnc as able but remains dependent for now  cont bb and dig  avoiding ccb in setting of decr ef  cont ac  no clear vol ol on exam but has effs on ct, can try addl lasix when creat stabilizes  at risk of decompensation    Upon my evaluation, this patient is at high risk for imminent or life threatening deterioration due to resp failure, rapid af and other active medical issues which require my direct attention, intervention, and personal management.  I have personally spent >35 minutes  of critical care time exclusive of time spent on separate billing procedures. This includes review of laboratory data, radiology results, discussion with primary team\patient, and monitoring for potential decompensation Interventions were performed as documented above.

## 2024-04-03 NOTE — DISCHARGE NOTE PROVIDER - CARE PROVIDER_API CALL
Amauri Shukla.  [address], [address]  [place] [address]  Phone: [phone]  Fax: [phone]  Follow Up Time: 1 week   Amauri Shukla  [facility]  [address] [facility], [address]  [place] [address]  Phone: [phone]  Fax: [phone]  Follow Up Time: 1 week    Harpal Grider  Cardiology  [address] [place] [address]  Phone: [phone]  Fax: [phone]  Follow Up Time: 1 week    Saravanan Oleary  Nephrology  [address] [facility], [address]  [place] [address]  Phone: [phone]  Fax: [phone]  Follow Up Time: 2 weeks

## 2024-04-03 NOTE — DISCHARGE NOTE PROVIDER - NSDCMRMEDTOKEN_GEN_ALL_CORE_FT
digoxin 125 mcg (0.125 mg) oral tablet: 1 tab(s) orally every other day  Eliquis 2.5 mg oral tablet: 1 tab(s) orally 2 times a day  Januvia 25 mg oral tablet: 1 tab(s) orally once a day   levothyroxine 75 mcg (0.075 mg) oral tablet: 1 tab(s) orally once a day  Lexapro 10 mg oral tablet: 1 tab(s) orally once a day  Melatonin 10 mg oral capsule: 1 cap(s) orally once a day (at bedtime)  memantine 10 mg oral tablet: 1 tab(s) orally 2 times a day  metFORMIN 1000 mg oral tablet: 1 tab(s) orally 2 times a day  metoprolol succinate 100 mg oral tablet, extended release: 1 tab(s) orally 2 times a day   rivastigmine 9.5 mg/24 hr transdermal film, extended release: 1 patch transdermal once a day  saccharomyces boulardii lyo 250 mg oral capsule: 1 cap(s) orally 2 times a day  tamsulosin 0.4 mg oral capsule: 1 cap(s) orally once a day (at bedtime)  Vitamin D3 50 mcg (2000 intl units) oral tablet: 1 tab(s) orally once a day   apixaban 2.5 mg oral tablet: 1 tab(s) orally every 12 hours  digoxin 125 mcg (0.125 mg) oral tablet: 1 tab(s) orally every other day  escitalopram 10 mg oral tablet: 1 tab(s) orally once a day  Januvia 25 mg oral tablet: 1 tab(s) orally once a day   levothyroxine 75 mcg (0.075 mg) oral tablet: 1 tab(s) orally once a day  Melatonin 10 mg oral capsule: 1 cap(s) orally once a day (at bedtime)  memantine 10 mg oral tablet: 1 tab(s) orally 2 times a day  metFORMIN 1000 mg oral tablet: 1 tab(s) orally 2 times a day  metoprolol succinate 100 mg oral tablet, extended release: 1 tab(s) orally every 12 hours  rivastigmine 9.5 mg/24 hr transdermal film, extended release: 1 patch transdermal once a day  saccharomyces boulardii lyo 250 mg oral capsule: 1 cap(s) orally 2 times a day  tamsulosin 0.4 mg oral capsule: 1 cap(s) orally once a day (at bedtime)  Vitamin D3 50 mcg (2000 intl units) oral tablet: 1 tab(s) orally once a day   apixaban 2.5 mg oral tablet: 1 tab(s) orally every 12 hours  digoxin 125 mcg (0.125 mg) oral tablet: 1 tab(s) orally every other day  escitalopram 10 mg oral tablet: 1 tab(s) orally once a day  hydrALAZINE 25 mg oral tablet: 1 tab(s) orally every 8 hours  isosorbide dinitrate 30 mg oral tablet: 1 tab(s) orally once a day  Januvia 25 mg oral tablet: 1 tab(s) orally once a day   Lasix 20 mg oral tablet: 1 tab(s) orally once a day  levothyroxine 75 mcg (0.075 mg) oral tablet: 1 tab(s) orally once a day  Melatonin 10 mg oral capsule: 1 cap(s) orally once a day (at bedtime)  memantine 10 mg oral tablet: 1 tab(s) orally 2 times a day  metFORMIN 1000 mg oral tablet: 1 tab(s) orally 2 times a day  metoprolol succinate 100 mg oral tablet, extended release: 1 tab(s) orally every 12 hours  polyethylene glycol 3350 oral powder for reconstitution: 17 gram(s) orally once a day as needed for  constipation  rivastigmine 9.5 mg/24 hr transdermal film, extended release: 1 patch transdermal once a day  saccharomyces boulardii lyo 250 mg oral capsule: 1 cap(s) orally 2 times a day  tamsulosin 0.4 mg oral capsule: 1 cap(s) orally once a day (at bedtime)  Vitamin D3 50 mcg (2000 intl units) oral tablet: 1 tab(s) orally once a day   apixaban 2.5 mg oral tablet: 1 tab(s) orally every 12 hours  digoxin 125 mcg (0.125 mg) oral tablet: 1 tab(s) orally every other day  diphenhydramine/lidocaine/aluminum hydroxide/magnesium hydroxide/simethicone mucous membrane suspension: 5 milliliter(s) mucous membrane 3 times a day swish and spit  escitalopram 10 mg oral tablet: 1 tab(s) orally once a day  hydrALAZINE 25 mg oral tablet: 1 tab(s) orally every 8 hours  isosorbide dinitrate 30 mg oral tablet: 1 tab(s) orally once a day  Januvia 25 mg oral tablet: 1 tab(s) orally once a day   Lasix 20 mg oral tablet: 1 tab(s) orally once a day  levothyroxine 75 mcg (0.075 mg) oral tablet: 1 tab(s) orally once a day  Melatonin 10 mg oral capsule: 1 cap(s) orally once a day (at bedtime)  memantine 10 mg oral tablet: 1 tab(s) orally 2 times a day  metFORMIN 1000 mg oral tablet: 1 tab(s) orally 2 times a day  metoprolol succinate 100 mg oral tablet, extended release: 1 tab(s) orally every 12 hours  polyethylene glycol 3350 oral powder for reconstitution: 17 gram(s) orally once a day as needed for  constipation  rivastigmine 9.5 mg/24 hr transdermal film, extended release: 1 patch transdermal once a day  saccharomyces boulardii lyo 250 mg oral capsule: 1 cap(s) orally 2 times a day  tamsulosin 0.4 mg oral capsule: 1 cap(s) orally once a day (at bedtime)  Vitamin D3 50 mcg (2000 intl units) oral tablet: 1 tab(s) orally once a day

## 2024-04-03 NOTE — PROGRESS NOTE ADULT - SUBJECTIVE AND OBJECTIVE BOX
Patient is a 88y old  Female who presents with a chief complaint of weakness (03 Apr 2024 16:36)    Patient seen in follow up for CKD.        PAST MEDICAL HISTORY:  Atrial fibrillation    Hypothyroidism    Mild Alzheimer's dementia    Diabetes mellitus    Acute on chronic systolic congestive heart failure    Hypertension    Hyperlipemia    Cardiac LV ejection fraction 21-30%      MEDICATIONS  (STANDING):  apixaban 2.5 milliGRAM(s) Oral every 12 hours  bisacodyl Suppository 10 milliGRAM(s) Rectal once  cefTRIAXone   IVPB 1000 milliGRAM(s) IV Intermittent every 24 hours  dextrose 5%. 1000 milliLiter(s) (50 mL/Hr) IV Continuous <Continuous>  dextrose 5%. 1000 milliLiter(s) (100 mL/Hr) IV Continuous <Continuous>  dextrose 50% Injectable 12.5 Gram(s) IV Push once  dextrose 50% Injectable 25 Gram(s) IV Push once  dextrose 50% Injectable 25 Gram(s) IV Push once  digoxin     Tablet 125 MICROGram(s) Oral every other day  escitalopram 10 milliGRAM(s) Oral daily  glucagon  Injectable 1 milliGRAM(s) IntraMuscular once  insulin lispro (ADMELOG) corrective regimen sliding scale   SubCutaneous three times a day before meals  insulin lispro (ADMELOG) corrective regimen sliding scale   SubCutaneous at bedtime  insulin lispro Injectable (ADMELOG) 2 Unit(s) SubCutaneous three times a day before meals  levothyroxine 75 MICROGram(s) Oral daily  memantine 10 milliGRAM(s) Oral two times a day  metoprolol succinate  milliGRAM(s) Oral two times a day  pantoprazole    Tablet 40 milliGRAM(s) Oral before breakfast  polyethylene glycol 3350 17 Gram(s) Oral daily  remdesivir  IVPB 100 milliGRAM(s) IV Intermittent every 24 hours  remdesivir  IVPB   IV Intermittent   rivastigmine patch  9.5 mG/24 Hr(s) 1 Patch Transdermal every 24 hours  senna 2 Tablet(s) Oral at bedtime  tamsulosin 0.4 milliGRAM(s) Oral at bedtime    MEDICATIONS  (PRN):  acetaminophen     Tablet .. 650 milliGRAM(s) Oral every 6 hours PRN Temp greater or equal to 38C (100.4F), Mild Pain (1 - 3)  albuterol/ipratropium for Nebulization 3 milliLiter(s) Nebulizer every 6 hours PRN Shortness of Breath  aluminum hydroxide/magnesium hydroxide/simethicone Suspension 30 milliLiter(s) Oral every 4 hours PRN Dyspepsia  benzonatate 100 milliGRAM(s) Oral three times a day PRN Cough  dextrose Oral Gel 15 Gram(s) Oral once PRN Blood Glucose LESS THAN 70 milliGRAM(s)/deciliter  melatonin 3 milliGRAM(s) Oral at bedtime PRN Insomnia  ondansetron Injectable 4 milliGRAM(s) IV Push every 8 hours PRN Nausea and/or Vomiting    T(C): 37.7 (04-03-24 @ 13:30), Max: 37.7 (04-03-24 @ 13:30)  HR: 100 (04-03-24 @ 12:05) (92 - 140)  BP: 117/82 (04-03-24 @ 12:05) (117/82 - 165/91)  RR: 18 (04-03-24 @ 12:05) (16 - 18)  SpO2: 95% (04-03-24 @ 14:25) (62% - 95%)  Wt(kg): --  I&O's Detail    02 Apr 2024 07:01  -  03 Apr 2024 07:00  --------------------------------------------------------  IN:  Total IN: 0 mL    OUT:    Voided (mL): 600 mL  Total OUT: 600 mL    Total NET: -600 mL      03 Apr 2024 07:01  -  03 Apr 2024 16:57  --------------------------------------------------------  IN:    Oral Fluid: 460 mL  Total IN: 460 mL    OUT:    Voided (mL): 300 mL  Total OUT: 300 mL    Total NET: 160 mL          PHYSICAL EXAM:  General: No distress  Respiratory: b/l air entry  Cardiovascular: S1 S2  Gastrointestinal: soft  Extremities:  edema                              10.3   13.78 )-----------( 293      ( 03 Apr 2024 07:40 )             32.4     04-03    136  |  105  |  28<H>  ----------------------------<  211<H>  4.6   |  19<L>  |  1.70<H>    Ca    8.2<L>      03 Apr 2024 07:40  Phos  3.3     04-03  Mg     2.3     04-03    TPro  6.7  /  Alb  2.5<L>  /  TBili  0.4  /  DBili  x   /  AST  153<H>  /  ALT  108<H>  /  AlkPhos  116  04-03        LIVER FUNCTIONS - ( 03 Apr 2024 07:40 )  Alb: 2.5 g/dL / Pro: 6.7 g/dL / ALK PHOS: 116 U/L / ALT: 108 U/L / AST: 153 U/L / GGT: x           Urinalysis Basic - ( 03 Apr 2024 07:40 )    Color: x / Appearance: x / SG: x / pH: x  Gluc: 211 mg/dL / Ketone: x  / Bili: x / Urobili: x   Blood: x / Protein: x / Nitrite: x   Leuk Esterase: x / RBC: x / WBC x   Sq Epi: x / Non Sq Epi: x / Bacteria: x        Sodium, Serum: 136 (04-03 @ 07:40)  Sodium, Serum: 140 (04-02 @ 05:50)  Sodium, Serum: 141 (04-01 @ 20:30)    Creatinine, Serum: 1.70 (04-03 @ 07:40)  Creatinine, Serum: 1.40 (04-02 @ 05:50)  Creatinine, Serum: 1.60 (04-01 @ 20:30)    Potassium, Serum: 4.6 (04-03 @ 07:40)  Potassium, Serum: 3.3 (04-02 @ 05:50)  Potassium, Serum: 3.9 (04-01 @ 20:30)    Hemoglobin: 10.3 (04-03 @ 07:40)  Hemoglobin: 10.8 (04-02 @ 10:33)  Hemoglobin: 10.7 (04-01 @ 20:30)

## 2024-04-03 NOTE — PROGRESS NOTE ADULT - SUBJECTIVE AND OBJECTIVE BOX
[facility] -- Dylan Hernandez Pannella, Patel, Savella Goodger, Cohen  Office # [phone]      Follow Up:  Af    Subjective/Observations:     Seen bedside, remains on high flow  daughter at bedside  overnight with afib with rvr  currently with no complaints      REVIEW OF SYSTEMS: All other review of systems is negative unless indicated above    PAST MEDICAL & SURGICAL HISTORY:  Atrial fibrillation      Hypothyroidism      Mild Alzheimer's dementia      Diabetes mellitus      Acute on chronic systolic congestive heart failure      Hypertension      Hyperlipemia      Cardiac LV ejection fraction 21-30%        History of appendectomy      H/O hernia repair      History of cholecystectomy          MEDICATIONS  (STANDING):  apixaban 2.5 milliGRAM(s) Oral every 12 hours  bisacodyl Suppository 10 milliGRAM(s) Rectal once  cefTRIAXone   IVPB 1000 milliGRAM(s) IV Intermittent every 24 hours  dexAMETHasone  Injectable 6 milliGRAM(s) IV Push daily  dextrose 5%. 1000 milliLiter(s) (50 mL/Hr) IV Continuous <Continuous>  dextrose 5%. 1000 milliLiter(s) (100 mL/Hr) IV Continuous <Continuous>  dextrose 50% Injectable 25 Gram(s) IV Push once  dextrose 50% Injectable 12.5 Gram(s) IV Push once  dextrose 50% Injectable 25 Gram(s) IV Push once  digoxin     Tablet 125 MICROGram(s) Oral every other day  escitalopram 10 milliGRAM(s) Oral daily  glucagon  Injectable 1 milliGRAM(s) IntraMuscular once  insulin lispro (ADMELOG) corrective regimen sliding scale   SubCutaneous three times a day before meals  insulin lispro (ADMELOG) corrective regimen sliding scale   SubCutaneous at bedtime  levothyroxine 75 MICROGram(s) Oral daily  memantine 10 milliGRAM(s) Oral two times a day  metoprolol succinate  milliGRAM(s) Oral two times a day  pantoprazole    Tablet 40 milliGRAM(s) Oral before breakfast  remdesivir  IVPB   IV Intermittent   remdesivir  IVPB 100 milliGRAM(s) IV Intermittent every 24 hours  rivastigmine patch  9.5 mG/24 Hr(s) 1 Patch Transdermal every 24 hours  tamsulosin 0.4 milliGRAM(s) Oral at bedtime    MEDICATIONS  (PRN):  acetaminophen     Tablet .. 650 milliGRAM(s) Oral every 6 hours PRN Temp greater or equal to 38C (100.4F), Mild Pain (1 - 3)  albuterol/ipratropium for Nebulization 3 milliLiter(s) Nebulizer every 6 hours PRN Shortness of Breath  aluminum hydroxide/magnesium hydroxide/simethicone Suspension 30 milliLiter(s) Oral every 4 hours PRN Dyspepsia  benzonatate 100 milliGRAM(s) Oral three times a day PRN Cough  dextrose Oral Gel 15 Gram(s) Oral once PRN Blood Glucose LESS THAN 70 milliGRAM(s)/deciliter  melatonin 3 milliGRAM(s) Oral at bedtime PRN Insomnia  ondansetron Injectable 4 milliGRAM(s) IV Push every 8 hours PRN Nausea and/or Vomiting      Allergies    No Known Allergies    Intolerances        Vital Signs Last 24 Hrs  T(C): 36.3 (2024 05:15), Max: 36.7 (2024 11:55)  T(F): 97.3 (2024 05:15), Max: 98.1 (2024 11:55)  HR: 110 (2024 05:15) (92 - 128)  BP: 127/83 (2024 05:15) (121/92 - 128/86)  BP(mean): --  RR: 17 (2024 05:15) (16 - 18)  SpO2: 92% (2024 08:25) (91% - 95%)    Parameters below as of 2024 08:25  Patient On (Oxygen Delivery Method): nasal cannula, high flow        I&O's Summary    2024 07:01  -  2024 07:00  --------------------------------------------------------  IN: 0 mL / OUT: 600 mL / NET: -600 mL          PHYSICAL EXAM:  TELE: Af  Constitutional: NAD, awake and alert, frail   HEENT: Moist Mucous Membranes, Anicteric  Pulmonary: Non-labored, breath sounds are Dim   Cardiovascular: IRRegular, S1 and S2 nl, No murmurs, rubs, gallops or clicks  Gastrointestinal: Bowel Sounds present, soft, nontender.   Lymph: No lymphadenopathy. No peripheral edema.  Skin: No visible rashes or ulcers.  Psych:  Mood & affect appropriate    LABS: All Labs Reviewed:                        10.3   13.78 )-----------( 293      ( 2024 07:40 )             32.4                         10.8   16.31 )-----------( 297      ( 2024 10:33 )             34.5                         10.7   7.21  )-----------( 272      ( 2024 20:30 )             34.4     2024 05:50    140    |  110    |  19     ----------------------------<  111    3.3     |  22     |  1.40   2024 20:30    141    |  109    |  20     ----------------------------<  153    3.9     |  26     |  1.60     Ca    8.0        2024 05:50  Ca    8.5        2024 20:30  Mg     1.7       2024 05:50  Mg     1.4       2024 20:30    TPro  7.2    /  Alb  2.8    /  TBili  0.6    /  DBili  x      /  AST  20     /  ALT  20     /  AlkPhos  52     2024 20:30    PT/INR - ( 2024 20:30 )   PT: 12.8 sec;   INR: 1.10 ratio         PTT - ( 2024 20:30 )  PTT:30.6 sec         EC Lead ECG:   Ventricular Rate 127 BPM    QRS Duration 84 ms    Q-T Interval 278 ms    QTC Calculation(Bazett) 404 ms    R Axis 28 degrees    T Axis -61 degrees    Diagnosis Line Atrial fibrillation with rapid ventricular response  Low voltage QRS  Septal infarct , age undetermined  Abnormal ECG  No previous ECGs available  Confirmed by rogerio Harvey ([id number]) on 2024 2:48:52 PM (24 @ 19:24)      ACC: [id number] EXAM:  ECHO TTE WO CON COMP W DOPP                          PROCEDURE DATE:  2022          INTERPRETATION:  INDICATION: Abnormal EKG  Sonographer KL    Blood Pressure 142/86    Height 158 cm     Weight 51.7 kg       BSA 1.5   sqm    Dimensions:  LA 3.6       Normal Values: 2.0 - 4.0 cm  Ao 3.2        Normal Values: 2.0 - 3.8 cm  SEPTUM 1.0       Normal Values: 0.6 - 1.2 cm  PWT 0.9       Normal Values: 0.6 - 1.1 cm  LVIDd 5.8         Normal Values: 3.0 - 5.6 cm  LVIDs 4.3      Normal Values: 1.8 - 4.0 cm      OBSERVATIONS:  Mitral Valve: Moderate MR.  Aortic Valve/Aorta: normal trileaflet aortic valve.  Tricuspid Valve: Mild TR.  Pulmonic Valve: Mild PI  Left Atrium: Enlarged  Right Atrium: Enlarged  Left Ventricle: Left ventricular enlargement with severe left ventricular   systolic dysfunction, estimated LVEF of 20-25%. The entire apex and   anterior walls appear akinetic. The inferior and inferolateral walls   appear hypokinetic. Remaining walls are not well-visualized  Right Ventricle: Grossly normal size and systolic function.  Pericardium: no significant pericardial effusion.  Pulmonary/RV Pressure: estimated PA systolic pressure of at least 35 mmHg   assuming an RA pressure of 3mmHg.        IMPRESSION:  Left ventricular enlargement with severe left ventricular systolic   dysfunction, estimated LVEF of 20-25%. The entire apex and anterior walls   appear akinetic. The inferior and inferolateral walls appear hypokinetic.  Grossly normal RV size and systolic function.  Biatrial enlargement  Normal trileaflet aortic valve, without AI.  Moderate MR  Mild TR.  No significant pericardial effusion.    --- End of Report ---            JOHN BAEZA MD; Attending Cardiologist  This document has been electronically signed. Dec 23 2022  4:40PM      Radiology:

## 2024-04-03 NOTE — PROGRESS NOTE ADULT - SUBJECTIVE AND OBJECTIVE BOX
Patient is a 88y old  Female who presents with a chief complaint of weakness (03 Apr 2024 12:53)      TELE: afib 98, overnight HR 120s-130s    INTERVAL HPI/OVERNIGHT EVENTS: Overnight experiencing afib wRVR -- pt given lopressor 5mg IVP x3 and lopressor 25mg po x1. Pt seen and examined at the bedside this AM. Pt feeling improved from yesterday. Pt reporting improvement in her breathing. Denies fevers, chills, abdominal, n/v. Pt had BM this AM.    MEDICATIONS  (STANDING):  apixaban 2.5 milliGRAM(s) Oral every 12 hours  bisacodyl Suppository 10 milliGRAM(s) Rectal once  cefTRIAXone   IVPB 1000 milliGRAM(s) IV Intermittent every 24 hours  dextrose 5%. 1000 milliLiter(s) (50 mL/Hr) IV Continuous <Continuous>  dextrose 5%. 1000 milliLiter(s) (100 mL/Hr) IV Continuous <Continuous>  dextrose 50% Injectable 25 Gram(s) IV Push once  dextrose 50% Injectable 12.5 Gram(s) IV Push once  dextrose 50% Injectable 25 Gram(s) IV Push once  digoxin     Tablet 125 MICROGram(s) Oral every other day  escitalopram 10 milliGRAM(s) Oral daily  glucagon  Injectable 1 milliGRAM(s) IntraMuscular once  insulin lispro (ADMELOG) corrective regimen sliding scale   SubCutaneous three times a day before meals  insulin lispro (ADMELOG) corrective regimen sliding scale   SubCutaneous at bedtime  insulin lispro Injectable (ADMELOG) 2 Unit(s) SubCutaneous three times a day before meals  levothyroxine 75 MICROGram(s) Oral daily  memantine 10 milliGRAM(s) Oral two times a day  metoprolol succinate  milliGRAM(s) Oral two times a day  pantoprazole    Tablet 40 milliGRAM(s) Oral before breakfast  polyethylene glycol 3350 17 Gram(s) Oral daily  remdesivir  IVPB 100 milliGRAM(s) IV Intermittent every 24 hours  remdesivir  IVPB   IV Intermittent   rivastigmine patch  9.5 mG/24 Hr(s) 1 Patch Transdermal every 24 hours  senna 2 Tablet(s) Oral at bedtime  tamsulosin 0.4 milliGRAM(s) Oral at bedtime    MEDICATIONS  (PRN):  acetaminophen     Tablet .. 650 milliGRAM(s) Oral every 6 hours PRN Temp greater or equal to 38C (100.4F), Mild Pain (1 - 3)  albuterol/ipratropium for Nebulization 3 milliLiter(s) Nebulizer every 6 hours PRN Shortness of Breath  aluminum hydroxide/magnesium hydroxide/simethicone Suspension 30 milliLiter(s) Oral every 4 hours PRN Dyspepsia  benzonatate 100 milliGRAM(s) Oral three times a day PRN Cough  dextrose Oral Gel 15 Gram(s) Oral once PRN Blood Glucose LESS THAN 70 milliGRAM(s)/deciliter  melatonin 3 milliGRAM(s) Oral at bedtime PRN Insomnia  ondansetron Injectable 4 milliGRAM(s) IV Push every 8 hours PRN Nausea and/or Vomiting      Allergies    No Known Allergies    Intolerances        REVIEW OF SYSTEMS:  CONSTITUTIONAL: No fever or chills  HEENT:  No headache, no sore throat  RESPIRATORY: + cough, No wheezing or shortness of breath  CARDIOVASCULAR: No chest pain, palpitations  GASTROINTESTINAL: No abd pain, nausea, vomiting, or diarrhea  GENITOURINARY: No dysuria, frequency, or hematuria  NEUROLOGICAL: no focal weakness or dizziness  MUSCULOSKELETAL: no myalgias     Vital Signs Last 24 Hrs  T(C): 36.3 (03 Apr 2024 05:15), Max: 36.6 (02 Apr 2024 20:15)  T(F): 97.3 (03 Apr 2024 05:15), Max: 97.9 (02 Apr 2024 21:21)  HR: 110 (03 Apr 2024 05:15) (110 - 128)  BP: 127/83 (03 Apr 2024 05:15) (121/92 - 128/86)  BP(mean): --  RR: 17 (03 Apr 2024 05:15) (16 - 18)  SpO2: 92% (03 Apr 2024 08:25) (91% - 95%)    Parameters below as of 03 Apr 2024 08:25  Patient On (Oxygen Delivery Method): nasal cannula, high flow        PHYSICAL EXAM:  GENERAL: NAD, on HFNC  HEENT:  anicteric, moist mucous membranes  CHEST/LUNG:  diffused crackles b/l  HEART:  irregularly irregualr, S1, S2  ABDOMEN:  BS+, soft, nontender, nondistended  EXTREMITIES: no edema, cyanosis, or calf tenderness  NERVOUS SYSTEM: answers questions and follows commands appropriately    LABS:                        10.3   13.78 )-----------( 293      ( 03 Apr 2024 07:40 )             32.4     CBC Full  -  ( 03 Apr 2024 07:40 )  WBC Count : 13.78 K/uL  Hemoglobin : 10.3 g/dL  Hematocrit : 32.4 %  Platelet Count - Automated : 293 K/uL  Mean Cell Volume : 85.7 fl  Mean Cell Hemoglobin : 27.2 pg  Mean Cell Hemoglobin Concentration : 31.8 gm/dL  Auto Neutrophil # : 11.87 K/uL  Auto Lymphocyte # : 0.98 K/uL  Auto Monocyte # : 0.80 K/uL  Auto Eosinophil # : 0.00 K/uL  Auto Basophil # : 0.02 K/uL  Auto Neutrophil % : 86.2 %  Auto Lymphocyte % : 7.1 %  Auto Monocyte % : 5.8 %  Auto Eosinophil % : 0.0 %  Auto Basophil % : 0.1 %    03 Apr 2024 07:40    136    |  105    |  28     ----------------------------<  211    4.6     |  19     |  1.70     Ca    8.2        03 Apr 2024 07:40  Phos  3.3       03 Apr 2024 07:40  Mg     2.3       03 Apr 2024 07:40    TPro  6.7    /  Alb  2.5    /  TBili  0.4    /  DBili  x      /  AST  153    /  ALT  108    /  AlkPhos  116    03 Apr 2024 07:40    PT/INR - ( 01 Apr 2024 20:30 )   PT: 12.8 sec;   INR: 1.10 ratio         PTT - ( 01 Apr 2024 20:30 )  PTT:30.6 sec  Urinalysis Basic - ( 03 Apr 2024 07:40 )    Color: x / Appearance: x / SG: x / pH: x  Gluc: 211 mg/dL / Ketone: x  / Bili: x / Urobili: x   Blood: x / Protein: x / Nitrite: x   Leuk Esterase: x / RBC: x / WBC x   Sq Epi: x / Non Sq Epi: x / Bacteria: x      CAPILLARY BLOOD GLUCOSE      POCT Blood Glucose.: 297 mg/dL (03 Apr 2024 12:00)  POCT Blood Glucose.: 207 mg/dL (03 Apr 2024 08:03)  POCT Blood Glucose.: 185 mg/dL (02 Apr 2024 21:17)  POCT Blood Glucose.: 177 mg/dL (02 Apr 2024 17:21)        Culture - Urine (collected 04-01-24 @ 23:07)  Source: Clean Catch Clean Catch (Midstream)  Preliminary Report (04-03-24 @ 07:18):    10,000 - 49,000 CFU/mL Gram Negative Rods    Culture - Blood (collected 04-01-24 @ 20:30)  Source: .Blood Blood-Peripheral  Preliminary Report (04-03-24 @ 01:02):    No growth at 24 hours    Culture - Blood (collected 04-01-24 @ 20:20)  Source: .Blood Blood-Peripheral  Preliminary Report (04-03-24 @ 01:02):    No growth at 24 hours        RADIOLOGY & ADDITIONAL TESTS:  Personally reviewed.     Consultant(s) Notes Reviewed:  [x] YES  [ ] NO Patient is a 88y old  Female who presents with a chief complaint of weakness (03 Apr 2024 12:53)      TELE: afib 98, overnight HR 120s-130s    INTERVAL HPI/OVERNIGHT EVENTS: Overnight experiencing afib wRVR -- pt given lopressor 5mg IVP x3 and lopressor 25mg po x1. Pt seen and examined at the bedside this AM. Pt feeling improved from yesterday. Pt reporting improvement in her breathing. Denies fevers, chills, abdominal, n/v. Pt had BM this AM.    MEDICATIONS  (STANDING):  apixaban 2.5 milliGRAM(s) Oral every 12 hours  bisacodyl Suppository 10 milliGRAM(s) Rectal once  cefTRIAXone   IVPB 1000 milliGRAM(s) IV Intermittent every 24 hours  dextrose 5%. 1000 milliLiter(s) (50 mL/Hr) IV Continuous <Continuous>  dextrose 5%. 1000 milliLiter(s) (100 mL/Hr) IV Continuous <Continuous>  dextrose 50% Injectable 25 Gram(s) IV Push once  dextrose 50% Injectable 12.5 Gram(s) IV Push once  dextrose 50% Injectable 25 Gram(s) IV Push once  digoxin     Tablet 125 MICROGram(s) Oral every other day  escitalopram 10 milliGRAM(s) Oral daily  glucagon  Injectable 1 milliGRAM(s) IntraMuscular once  insulin lispro (ADMELOG) corrective regimen sliding scale   SubCutaneous three times a day before meals  insulin lispro (ADMELOG) corrective regimen sliding scale   SubCutaneous at bedtime  insulin lispro Injectable (ADMELOG) 2 Unit(s) SubCutaneous three times a day before meals  levothyroxine 75 MICROGram(s) Oral daily  memantine 10 milliGRAM(s) Oral two times a day  metoprolol succinate  milliGRAM(s) Oral two times a day  pantoprazole    Tablet 40 milliGRAM(s) Oral before breakfast  polyethylene glycol 3350 17 Gram(s) Oral daily  remdesivir  IVPB 100 milliGRAM(s) IV Intermittent every 24 hours  remdesivir  IVPB   IV Intermittent   rivastigmine patch  9.5 mG/24 Hr(s) 1 Patch Transdermal every 24 hours  senna 2 Tablet(s) Oral at bedtime  tamsulosin 0.4 milliGRAM(s) Oral at bedtime    MEDICATIONS  (PRN):  acetaminophen     Tablet .. 650 milliGRAM(s) Oral every 6 hours PRN Temp greater or equal to 38C (100.4F), Mild Pain (1 - 3)  albuterol/ipratropium for Nebulization 3 milliLiter(s) Nebulizer every 6 hours PRN Shortness of Breath  aluminum hydroxide/magnesium hydroxide/simethicone Suspension 30 milliLiter(s) Oral every 4 hours PRN Dyspepsia  benzonatate 100 milliGRAM(s) Oral three times a day PRN Cough  dextrose Oral Gel 15 Gram(s) Oral once PRN Blood Glucose LESS THAN 70 milliGRAM(s)/deciliter  melatonin 3 milliGRAM(s) Oral at bedtime PRN Insomnia  ondansetron Injectable 4 milliGRAM(s) IV Push every 8 hours PRN Nausea and/or Vomiting      Allergies    No Known Allergies    Intolerances        REVIEW OF SYSTEMS:  CONSTITUTIONAL: No fever or chills  HEENT:  No headache, no sore throat  RESPIRATORY: + cough, No wheezing or shortness of breath  CARDIOVASCULAR: No chest pain, palpitations  GASTROINTESTINAL: No abd pain, nausea, vomiting, or diarrhea  GENITOURINARY: No dysuria, frequency, or hematuria  NEUROLOGICAL: no focal weakness or dizziness  MUSCULOSKELETAL: no myalgias     Vital Signs Last 24 Hrs  T(C): 36.3 (03 Apr 2024 05:15), Max: 36.6 (02 Apr 2024 20:15)  T(F): 97.3 (03 Apr 2024 05:15), Max: 97.9 (02 Apr 2024 21:21)  HR: 110 (03 Apr 2024 05:15) (110 - 128)  BP: 127/83 (03 Apr 2024 05:15) (121/92 - 128/86)  BP(mean): --  RR: 17 (03 Apr 2024 05:15) (16 - 18)  SpO2: 92% (03 Apr 2024 08:25) (91% - 95%)    Parameters below as of 03 Apr 2024 08:25  Patient On (Oxygen Delivery Method): nasal cannula, high flow        PHYSICAL EXAM:  GENERAL: NAD, on HFNC  HEENT:  anicteric, moist mucous membranes  CHEST/LUNG:  diffused crackles b/l  HEART:  irregularly irregular S1, S2  ABDOMEN:  BS+, soft, nontender, nondistended  EXTREMITIES: no edema, cyanosis, or calf tenderness  NERVOUS SYSTEM: aa0/3 sensory /motor intact   gu intact   LABS:                        10.3   13.78 )-----------( 293      ( 03 Apr 2024 07:40 )             32.4     CBC Full  -  ( 03 Apr 2024 07:40 )  WBC Count : 13.78 K/uL  Hemoglobin : 10.3 g/dL  Hematocrit : 32.4 %  Platelet Count - Automated : 293 K/uL  Mean Cell Volume : 85.7 fl  Mean Cell Hemoglobin : 27.2 pg  Mean Cell Hemoglobin Concentration : 31.8 gm/dL  Auto Neutrophil # : 11.87 K/uL  Auto Lymphocyte # : 0.98 K/uL  Auto Monocyte # : 0.80 K/uL  Auto Eosinophil # : 0.00 K/uL  Auto Basophil # : 0.02 K/uL  Auto Neutrophil % : 86.2 %  Auto Lymphocyte % : 7.1 %  Auto Monocyte % : 5.8 %  Auto Eosinophil % : 0.0 %  Auto Basophil % : 0.1 %    03 Apr 2024 07:40    136    |  105    |  28     ----------------------------<  211    4.6     |  19     |  1.70     Ca    8.2        03 Apr 2024 07:40  Phos  3.3       03 Apr 2024 07:40  Mg     2.3       03 Apr 2024 07:40    TPro  6.7    /  Alb  2.5    /  TBili  0.4    /  DBili  x      /  AST  153    /  ALT  108    /  AlkPhos  116    03 Apr 2024 07:40    PT/INR - ( 01 Apr 2024 20:30 )   PT: 12.8 sec;   INR: 1.10 ratio         PTT - ( 01 Apr 2024 20:30 )  PTT:30.6 sec  Urinalysis Basic - ( 03 Apr 2024 07:40 )    Color: x / Appearance: x / SG: x / pH: x  Gluc: 211 mg/dL / Ketone: x  / Bili: x / Urobili: x   Blood: x / Protein: x / Nitrite: x   Leuk Esterase: x / RBC: x / WBC x   Sq Epi: x / Non Sq Epi: x / Bacteria: x      CAPILLARY BLOOD GLUCOSE      POCT Blood Glucose.: 297 mg/dL (03 Apr 2024 12:00)  POCT Blood Glucose.: 207 mg/dL (03 Apr 2024 08:03)  POCT Blood Glucose.: 185 mg/dL (02 Apr 2024 21:17)  POCT Blood Glucose.: 177 mg/dL (02 Apr 2024 17:21)        Culture - Urine (collected 04-01-24 @ 23:07)  Source: Clean Catch Clean Catch (Midstream)  Preliminary Report (04-03-24 @ 07:18):    10,000 - 49,000 CFU/mL Gram Negative Rods    Culture - Blood (collected 04-01-24 @ 20:30)  Source: .Blood Blood-Peripheral  Preliminary Report (04-03-24 @ 01:02):    No growth at 24 hours    Culture - Blood (collected 04-01-24 @ 20:20)  Source: .Blood Blood-Peripheral  Preliminary Report (04-03-24 @ 01:02):    No growth at 24 hours        RADIOLOGY & ADDITIONAL TESTS:  Personally reviewed.     Consultant(s) Notes Reviewed:  [x] YES  [ ] NO

## 2024-04-03 NOTE — CHART NOTE - NSCHARTNOTEFT_GEN_A_CORE
Provider was called for afib -140s and lethargy. Pt hr was elevated on telemetry throuhgout the day with pushes of lopressor given in the AM. Pt now with rates rising to 140. Pt reporting lower abdominal pain 2/2 UTI and chills/sweats.     T(C): 36.3 (04-03-24 @ 01:18), Max: 37.1 (04-02-24 @ 06:40)  HR: 127 (04-03-24 @ 01:18) (82 - 140)  BP: 121/92 (04-03-24 @ 01:18) (121/75 - 165/91)  RR: 18 (04-03-24 @ 01:18) (16 - 18)  SpO2: 91% (04-03-24 @ 01:18) (62% - 95%)    GENERAL: patient appears tired, ill  LUNGS: diffuse crackles b/l  HEART: tachycardic, irregular, soft S1/S2, no lower extremity edema  GASTROINTESTINAL: abdomen is soft, tender to palpaiton of suprapubic area. nondistended, normoactive bowel sounds  INTEGUMENT: good skin turgor, warm skin, appears well perfused  NEUROLOGIC: awake, alert, oriented x2-3, good muscle tone in all 4 extremities      Plan  - lopressor 5mg x3, metoprolol tartrate 25mg PO given with rates now maintaining in 120s with nonsustained spikes to 130-140s  - given Heart failure and hypothyroidism did not want to use cardizem or amiodarone  - rest of rate control per primary team in AM Provider was called for afib -140s and lethargy. Pt hr was elevated on telemetry throuhgout the day with pushes of lopressor given in the AM. Pt now with rates rising to 140. Pt reporting lower abdominal pain 2/2 UTI and chills/sweats.     T(C): 36.3 (04-03-24 @ 01:18), Max: 37.1 (04-02-24 @ 06:40)  HR: 127 (04-03-24 @ 01:18) (82 - 140)  BP: 121/92 (04-03-24 @ 01:18) (121/75 - 165/91)  RR: 18 (04-03-24 @ 01:18) (16 - 18)  SpO2: 91% (04-03-24 @ 01:18) (62% - 95%)    GENERAL: patient appears tired, ill  LUNGS: diffuse crackles b/l  HEART: tachycardic, irregular, soft S1/S2, no lower extremity edema  GASTROINTESTINAL: abdomen is soft, tender to palpaiton of suprapubic area. nondistended, normoactive bowel sounds  INTEGUMENT: good skin turgor, warm skin, appears well perfused  NEUROLOGIC: awake, alert, oriented x2-3, good muscle tone in all 4 extremities      Plan  - lopressor 5mg x3, metoprolol tartrate 25mg PO given with rates now decreased to 100-110s  - given Heart failure and hypothyroidism did not want to use cardizem or amiodarone  - rest of rate control per primary team in AM

## 2024-04-03 NOTE — PROGRESS NOTE ADULT - ASSESSMENT
87yo F w/ PMH of HFrEF of 20-25%, dementia, Afib (on low-dose Eliquis), hypothyroidism, T2DM, CKD3b, p/w generalized weakness admitted with sepsis due to UTI and COVID, afib w/ RVR, found to have acute hypoxic respiratory failure and likely L-sided pyelonephritis.

## 2024-04-03 NOTE — PROGRESS NOTE ADULT - PROBLEM SELECTOR PLAN 4
HFrEF:  -BNP: [id number]  -CXR: w/o any congestion/p. effusion  -likely had component of acute on chronic systolic CHF contributing to hypoxia today -- given lasix 20mg IV x1 -- monitor closely  -f/up BMP  -cardio consulted (Dr. Harvey group), recs appreciated

## 2024-04-03 NOTE — PROGRESS NOTE ADULT - SUBJECTIVE AND OBJECTIVE BOX
[facility]  INFECTIOUS DISEASES   [address] [place] [address]  Tel: [phone]     Fax: [phone]  ========================================================  MD Nasrin Marinelli Kaushal, MD Cho, Michelle, MD Sunjit, Jaspal, MD  ========================================================    [id number]  LEXY EISENBERG     Follow up: COVID, Pneumonia and UTI    Patient is doing better, daughter at the bedside. States that she has good appetite.   No fever. No new complaint or overnight event.     PAST MEDICAL & SURGICAL HISTORY:  Atrial fibrillation  Hypothyroidism  Mild Alzheimer's dementia  Diabetes mellitus  Acute on chronic systolic congestive heart failure  Hypertension  Hyperlipemia  Cardiac LV ejection fraction 21-30%  12/22  History of appendectomy  H/O hernia repair  History of cholecystectomy    Social Hx: No current smoking, EtOH or drugs     FAMILY HISTORY:  Family history of cerebral hemorrhage (Mother)    FH: renal failure (Father)    Allergies  No Known Allergies    MEDICATIONS  (STANDING):  apixaban 2.5 milliGRAM(s) Oral every 12 hours  cefTRIAXone   IVPB 1000 milliGRAM(s) IV Intermittent every 24 hours  dexAMETHasone  Injectable 6 milliGRAM(s) IV Push once  dexAMETHasone  Injectable 6 milliGRAM(s) IV Push daily  dextrose 5%. 1000 milliLiter(s) (50 mL/Hr) IV Continuous <Continuous>  dextrose 5%. 1000 milliLiter(s) (100 mL/Hr) IV Continuous <Continuous>  dextrose 50% Injectable 12.5 Gram(s) IV Push once  dextrose 50% Injectable 25 Gram(s) IV Push once  dextrose 50% Injectable 25 Gram(s) IV Push once  digoxin     Tablet 125 MICROGram(s) Oral every other day  escitalopram 10 milliGRAM(s) Oral daily  glucagon  Injectable 1 milliGRAM(s) IntraMuscular once  insulin lispro (ADMELOG) corrective regimen sliding scale   SubCutaneous three times a day before meals  insulin lispro (ADMELOG) corrective regimen sliding scale   SubCutaneous at bedtime  levothyroxine 75 MICROGram(s) Oral daily  memantine 10 milliGRAM(s) Oral two times a day  metoprolol succinate  milliGRAM(s) Oral two times a day  potassium chloride   Powder 40 milliEquivalent(s) Oral every 4 hours  remdesivir  IVPB 200 milliGRAM(s) IV Intermittent every 24 hours  remdesivir  IVPB   IV Intermittent   rivastigmine patch  9.5 mG/24 Hr(s) 1 Patch Transdermal every 24 hours  tamsulosin 0.4 milliGRAM(s) Oral at bedtime    MEDICATIONS  (PRN):  acetaminophen     Tablet .. 650 milliGRAM(s) Oral every 6 hours PRN Temp greater or equal to 38C (100.4F), Mild Pain (1 - 3)  albuterol/ipratropium for Nebulization 3 milliLiter(s) Nebulizer every 6 hours PRN Shortness of Breath  aluminum hydroxide/magnesium hydroxide/simethicone Suspension 30 milliLiter(s) Oral every 4 hours PRN Dyspepsia  benzonatate 100 milliGRAM(s) Oral three times a day PRN Cough  dextrose Oral Gel 15 Gram(s) Oral once PRN Blood Glucose LESS THAN 70 milliGRAM(s)/deciliter  melatonin 3 milliGRAM(s) Oral at bedtime PRN Insomnia  ondansetron Injectable 4 milliGRAM(s) IV Push every 8 hours PRN Nausea and/or Vomiting     REVIEW OF SYSTEMS:  CONSTITUTIONAL:  No Fever or chills  HEENT:  No diplopia or blurred vision.  No sore throat or runny nose.  CARDIOVASCULAR:  No chest pain   RESPIRATORY:  +cough, +shortness of breath  GASTROINTESTINAL:  No nausea, vomiting or diarrhea.  GENITOURINARY:  No dysuria, frequency or urgency. No Blood in urine  MUSCULOSKELETAL:  no joint aches, no muscle pain  SKIN:  No change in skin, hair or nails.    Physical Exam:  Vital Signs Last 24 Hrs  T(C): 36.3 (03 Apr 2024 05:15), Max: 36.6 (02 Apr 2024 20:15)  T(F): 97.3 (03 Apr 2024 05:15), Max: 97.9 (02 Apr 2024 21:21)  HR: 110 (03 Apr 2024 05:15) (110 - 128)  BP: 127/83 (03 Apr 2024 05:15) (121/92 - 128/86)  BP(mean): --  RR: 17 (03 Apr 2024 05:15) (16 - 18)  SpO2: 92% (03 Apr 2024 08:25) (91% - 95%)  Parameters below as of 03 Apr 2024 08:25  Patient On (Oxygen Delivery Method): nasal cannula, high flow  GEN: NAD  HEENT: normocephalic and atraumatic. EOMI. PERRL.    NECK: Supple.  No lymphadenopathy   LUNGS: Crackles both bases   HEART: Irregular rate and rhythm   ABDOMEN: Soft, nontender, and nondistended.  Positive bowel sounds.    EXTREMITIES: Without edema.  NEUROLOGIC: grossly intact.  PSYCHIATRIC: Appropriate affect .  SKIN: No rash    Labs:                        10.3   13.78 )-----------( 293      ( 03 Apr 2024 07:40 )             32.4     04-03    136  |  105  |  28<H>  ----------------------------<  211<H>  4.6   |  19<L>  |  1.70<H>    Ca    8.2<L>      03 Apr 2024 07:40  Phos  3.3     04-03  Mg     2.3     04-03    TPro  6.7  /  Alb  2.5<L>  /  TBili  0.4  /  DBili  x   /  AST  153<H>  /  ALT  108<H>  /  AlkPhos  116  04-03    Culture - Urine (collected 04-01-24 @ 23:07)  Source: Clean Catch Clean Catch (Midstream)  Preliminary Report (04-03-24 @ 07:18):    10,000 - 49,000 CFU/mL Gram Negative Rods    Culture - Blood (collected 04-01-24 @ 20:30)  Source: .Blood Blood-Peripheral  Preliminary Report (04-03-24 @ 01:02):    No growth at 24 hours    Culture - Blood (collected 04-01-24 @ 20:20)  Source: .Blood Blood-Peripheral  Preliminary Report (04-03-24 @ 01:02):    No growth at 24 hours    Culture - Urine (collected 11-27-23 @ 21:40)  Source: Clean Catch Clean Catch (Midstream)  Final Report (11-30-23 @ 22:49):    >100,000 CFU/ml Klebsiella pneumoniae  Organism: Klebsiella pneumoniae (11-30-23 @ 22:49)  Organism: Klebsiella pneumoniae (11-30-23 @ 22:49)    Sensitivities:      Method Type: SARAH      -  Amoxicillin/Clavulanic Acid: S <=8/4      -  Ampicillin: R >16 These ampicillin results predict results for amoxicillin      -  Ampicillin/Sulbactam: S 8/4      -  Aztreonam: S <=4      -  Cefazolin: S <=2 For uncomplicated UTI with K. pneumoniae, E. coli, or P. mirablis: SARAH <=16 is sensitive and SARAH >=32 is resistant. This also predicts results for oral agents cefaclor, cefdinir, cefpodoxime, cefprozil, cefuroxime axetil, cephalexin and locarbef for uncomplicated UTI. Note that some isolates may be susceptible to these agents while testing resistant to cefazolin.      -  Cefepime: S <=2      -  Cefoxitin: R >16      -  Ceftriaxone: S <=1      -  Cefuroxime: R >16      -  Ciprofloxacin: R >2      -  Ertapenem: S <=0.5      -  Gentamicin: S <=2      -  Imipenem: S <=1      -  Levofloxacin: R 4      -  Meropenem: S <=1      -  Nitrofurantoin: I 64 Should not be used to treat pyelonephritis      -  Piperacillin/Tazobactam: S <=8      -  Tobramycin: S <=2      -  Trimethoprim/Sulfamethoxazole: R >2/38    Culture - Blood (collected 11-27-23 @ 18:20)  Source: .Blood Blood-Peripheral  Final Report (12-03-23 @ 01:01):    No growth at 5 days    Culture - Blood (collected 11-27-23 @ 18:10)  Source: .Blood Blood-Peripheral  Final Report (12-03-23 @ 01:01):    No growth at 5 days    Culture - Fungal, Body Fluid (collected 01-27-23 @ 11:54)  Source: Pleural Fl Pleural Fluid  Final Report (02-25-23 @ 15:01):    No fungus isolated at 4 weeks.    Culture - Body Fluid with Gram Stain (collected 01-27-23 @ 11:54)  Source: Pleural Fl Pleural Fluid  Gram Stain (01-28-23 @ 03:08):    polymorphonuclear leukocytes seen    No organisms seen    by cytocentrifuge  Final Report (02-01-23 @ 17:31):    No growth at 5 days    Culture - Blood (collected 01-26-23 @ 01:15)  Source: .Blood Blood-Venous  Final Report (01-31-23 @ 09:00):    No Growth Final    Culture - Blood (collected 01-26-23 @ 01:15)  Source: .Blood Blood-Venous  Final Report (01-31-23 @ 09:00):    No Growth Final    WBC Count: 13.78 K/uL (04-03-24 @ 07:40)  WBC Count: 16.31 K/uL (04-02-24 @ 10:33)  WBC Count: 7.21 K/uL (04-01-24 @ 20:30)    Creatinine: 1.70 mg/dL (04-03-24 @ 07:40)  Creatinine: 1.40 mg/dL (04-02-24 @ 05:50)  Creatinine: 1.60 mg/dL (04-01-24 @ 20:30)    C-Reactive Protein, Serum: 8 mg/L (04-02-24 @ 10:33)    Ferritin: 70 ng/mL (04-02-24 @ 10:33)    Procalcitonin, Serum: 0.37 ng/mL (04-02-24 @ 10:33)     SARS-CoV-2 Result: Detected (04-01-24 @ 20:30)         All imaging and other data have been reviewed.  < from: Xray Chest 1 View- PORTABLE-Urgent (04.01.24 @ 20:44) >  IMPRESSION:  Right lower lung linear atelectasis versus scar.  Question ill-defined right lower lung opacity versus superimposition of   bony and vascular structures.  Left basilar opacity suggesting a small left pleural effusion with likely   associated passive atelectasis.    < from: CT Angio Chest PE Protocol w/ IV Cont (04.02.24 @ 11:31) >  IMPRESSION:  Multilobar pneumonia right lung.  Septal thickening which may reflect interstitial edema.  Bilateral pleural effusions with compressive atelectasis lower lobes.  No acute pulmonary embolism.  Cystitis and left pyeloureteritis; correlate for ascending urinary tract   infection.  Distended rectum with fecal impaction.      Assessment and Plan:    89 y/o female with PMH of HFrEF of 20-25 %, dementia, Afib, hypothyroidism, DM who presented to ED to be evaluated for weakness.   In ED her COVID test is back positive.     # COVID 19   # Acute respiratory failure   # UTI    - Continue Remdesivir to complete 5days  - Follow Creat and LFTs daily while on Remdesivir.    - Continue Dexamethasone 6mg daily can change to oral and stop when ready for discharge   - Watch O2 sat closely and taper as tolerated. Currently on HFNC  - Anticoagulation as per protocol  - Ceftriaxone 1gm daily to cover for UTI/pneumonia for now until culture is back  - Hold on Tocilizumab due to active infection (UTI/pneumonia)  Thank you for courtesy of this consult.     Will follow.   Discussed with daughter at the bedside.     Elbert Amezcua MD  Division of Infectious Diseases   Please call ID service at [phone] with any question.      50 minutes spent on total encounter assessing patient, examination, chart review, counseling and coordinating care by the attending physician/nurse/care manager.   [facility]  INFECTIOUS DISEASES   [address] [place] [address]  Tel: [phone]     Fax: [phone]  ========================================================  MD aNsrin Marinelli Kaushal, MD Cho, Michelle, MD Sunjit, Jaspal, MD  ========================================================    [id number]  LEXY EISENBERG     Follow up: COVID, Pneumonia and UTI    Patient is doing better, daughter at the bedside. States that she has good appetite.   No fever. No new complaint or overnight event.     PAST MEDICAL & SURGICAL HISTORY:  Atrial fibrillation  Hypothyroidism  Mild Alzheimer's dementia  Diabetes mellitus  Acute on chronic systolic congestive heart failure  Hypertension  Hyperlipemia  Cardiac LV ejection fraction 21-30%  12/22  History of appendectomy  H/O hernia repair  History of cholecystectomy    Social Hx: No current smoking, EtOH or drugs     FAMILY HISTORY:  Family history of cerebral hemorrhage (Mother)    FH: renal failure (Father)    Allergies  No Known Allergies    MEDICATIONS  (STANDING):  apixaban 2.5 milliGRAM(s) Oral every 12 hours  cefTRIAXone   IVPB 1000 milliGRAM(s) IV Intermittent every 24 hours  dexAMETHasone  Injectable 6 milliGRAM(s) IV Push once  dexAMETHasone  Injectable 6 milliGRAM(s) IV Push daily  dextrose 5%. 1000 milliLiter(s) (50 mL/Hr) IV Continuous <Continuous>  dextrose 5%. 1000 milliLiter(s) (100 mL/Hr) IV Continuous <Continuous>  dextrose 50% Injectable 12.5 Gram(s) IV Push once  dextrose 50% Injectable 25 Gram(s) IV Push once  dextrose 50% Injectable 25 Gram(s) IV Push once  digoxin     Tablet 125 MICROGram(s) Oral every other day  escitalopram 10 milliGRAM(s) Oral daily  glucagon  Injectable 1 milliGRAM(s) IntraMuscular once  insulin lispro (ADMELOG) corrective regimen sliding scale   SubCutaneous three times a day before meals  insulin lispro (ADMELOG) corrective regimen sliding scale   SubCutaneous at bedtime  levothyroxine 75 MICROGram(s) Oral daily  memantine 10 milliGRAM(s) Oral two times a day  metoprolol succinate  milliGRAM(s) Oral two times a day  potassium chloride   Powder 40 milliEquivalent(s) Oral every 4 hours  remdesivir  IVPB 200 milliGRAM(s) IV Intermittent every 24 hours  remdesivir  IVPB   IV Intermittent   rivastigmine patch  9.5 mG/24 Hr(s) 1 Patch Transdermal every 24 hours  tamsulosin 0.4 milliGRAM(s) Oral at bedtime    MEDICATIONS  (PRN):  acetaminophen     Tablet .. 650 milliGRAM(s) Oral every 6 hours PRN Temp greater or equal to 38C (100.4F), Mild Pain (1 - 3)  albuterol/ipratropium for Nebulization 3 milliLiter(s) Nebulizer every 6 hours PRN Shortness of Breath  aluminum hydroxide/magnesium hydroxide/simethicone Suspension 30 milliLiter(s) Oral every 4 hours PRN Dyspepsia  benzonatate 100 milliGRAM(s) Oral three times a day PRN Cough  dextrose Oral Gel 15 Gram(s) Oral once PRN Blood Glucose LESS THAN 70 milliGRAM(s)/deciliter  melatonin 3 milliGRAM(s) Oral at bedtime PRN Insomnia  ondansetron Injectable 4 milliGRAM(s) IV Push every 8 hours PRN Nausea and/or Vomiting     REVIEW OF SYSTEMS:  CONSTITUTIONAL:  No Fever or chills  HEENT:  No diplopia or blurred vision.  No sore throat or runny nose.  CARDIOVASCULAR:  No chest pain   RESPIRATORY:  +cough, +shortness of breath  GASTROINTESTINAL:  No nausea, vomiting or diarrhea.  GENITOURINARY:  No dysuria, frequency or urgency. No Blood in urine  MUSCULOSKELETAL:  no joint aches, no muscle pain  SKIN:  No change in skin, hair or nails.    Physical Exam:  Vital Signs Last 24 Hrs  T(C): 36.3 (03 Apr 2024 05:15), Max: 36.6 (02 Apr 2024 20:15)  T(F): 97.3 (03 Apr 2024 05:15), Max: 97.9 (02 Apr 2024 21:21)  HR: 110 (03 Apr 2024 05:15) (110 - 128)  BP: 127/83 (03 Apr 2024 05:15) (121/92 - 128/86)  BP(mean): --  RR: 17 (03 Apr 2024 05:15) (16 - 18)  SpO2: 92% (03 Apr 2024 08:25) (91% - 95%)  Parameters below as of 03 Apr 2024 08:25  Patient On (Oxygen Delivery Method): nasal cannula, high flow  GEN: NAD  HEENT: normocephalic and atraumatic. EOMI. PERRL.    NECK: Supple.  No lymphadenopathy   LUNGS: Crackles both bases   HEART: Irregular rate and rhythm   ABDOMEN: Soft, nontender, and nondistended.  Positive bowel sounds.    EXTREMITIES: Without edema.  NEUROLOGIC: grossly intact.  PSYCHIATRIC: Appropriate affect .  SKIN: No rash    Labs:                        10.3   13.78 )-----------( 293      ( 03 Apr 2024 07:40 )             32.4     04-03    136  |  105  |  28<H>  ----------------------------<  211<H>  4.6   |  19<L>  |  1.70<H>    Ca    8.2<L>      03 Apr 2024 07:40  Phos  3.3     04-03  Mg     2.3     04-03    TPro  6.7  /  Alb  2.5<L>  /  TBili  0.4  /  DBili  x   /  AST  153<H>  /  ALT  108<H>  /  AlkPhos  116  04-03    Culture - Urine (collected 04-01-24 @ 23:07)  Source: Clean Catch Clean Catch (Midstream)  Preliminary Report (04-03-24 @ 07:18):    10,000 - 49,000 CFU/mL Gram Negative Rods    Culture - Blood (collected 04-01-24 @ 20:30)  Source: .Blood Blood-Peripheral  Preliminary Report (04-03-24 @ 01:02):    No growth at 24 hours    Culture - Blood (collected 04-01-24 @ 20:20)  Source: .Blood Blood-Peripheral  Preliminary Report (04-03-24 @ 01:02):    No growth at 24 hours    Culture - Urine (collected 11-27-23 @ 21:40)  Source: Clean Catch Clean Catch (Midstream)  Final Report (11-30-23 @ 22:49):    >100,000 CFU/ml Klebsiella pneumoniae  Organism: Klebsiella pneumoniae (11-30-23 @ 22:49)  Organism: Klebsiella pneumoniae (11-30-23 @ 22:49)    Sensitivities:      Method Type: SARAH      -  Amoxicillin/Clavulanic Acid: S <=8/4      -  Ampicillin: R >16 These ampicillin results predict results for amoxicillin      -  Ampicillin/Sulbactam: S 8/4      -  Aztreonam: S <=4      -  Cefazolin: S <=2 For uncomplicated UTI with K. pneumoniae, E. coli, or P. mirablis: SARAH <=16 is sensitive and SARAH >=32 is resistant. This also predicts results for oral agents cefaclor, cefdinir, cefpodoxime, cefprozil, cefuroxime axetil, cephalexin and locarbef for uncomplicated UTI. Note that some isolates may be susceptible to these agents while testing resistant to cefazolin.      -  Cefepime: S <=2      -  Cefoxitin: R >16      -  Ceftriaxone: S <=1      -  Cefuroxime: R >16      -  Ciprofloxacin: R >2      -  Ertapenem: S <=0.5      -  Gentamicin: S <=2      -  Imipenem: S <=1      -  Levofloxacin: R 4      -  Meropenem: S <=1      -  Nitrofurantoin: I 64 Should not be used to treat pyelonephritis      -  Piperacillin/Tazobactam: S <=8      -  Tobramycin: S <=2      -  Trimethoprim/Sulfamethoxazole: R >2/38    WBC Count: 13.78 K/uL (04-03-24 @ 07:40)  WBC Count: 16.31 K/uL (04-02-24 @ 10:33)  WBC Count: 7.21 K/uL (04-01-24 @ 20:30)    Creatinine: 1.70 mg/dL (04-03-24 @ 07:40)  Creatinine: 1.40 mg/dL (04-02-24 @ 05:50)  Creatinine: 1.60 mg/dL (04-01-24 @ 20:30)    C-Reactive Protein, Serum: 8 mg/L (04-02-24 @ 10:33)    Ferritin: 70 ng/mL (04-02-24 @ 10:33)    Procalcitonin, Serum: 0.37 ng/mL (04-02-24 @ 10:33)     SARS-CoV-2 Result: Detected (04-01-24 @ 20:30)         All imaging and other data have been reviewed.  < from: Xray Chest 1 View- PORTABLE-Urgent (04.01.24 @ 20:44) >  IMPRESSION:  Right lower lung linear atelectasis versus scar.  Question ill-defined right lower lung opacity versus superimposition of   bony and vascular structures.  Left basilar opacity suggesting a small left pleural effusion with likely   associated passive atelectasis.    < from: CT Angio Chest PE Protocol w/ IV Cont (04.02.24 @ 11:31) >  IMPRESSION:  Multilobar pneumonia right lung.  Septal thickening which may reflect interstitial edema.  Bilateral pleural effusions with compressive atelectasis lower lobes.  No acute pulmonary embolism.  Cystitis and left pyeloureteritis; correlate for ascending urinary tract   infection.  Distended rectum with fecal impaction.      Assessment and Plan:    89 y/o female with PMH of HFrEF of 20-25 %, dementia, Afib, hypothyroidism, DM who presented to ED to be evaluated for weakness.   In ED her COVID test is back positive.     # COVID 19   # Acute respiratory failure   # UTI    - Blood cultures NGTD   - Follow UC with Low CFU GNR  - Continue Remdesivir to complete 5days  - Follow Creat and LFTs daily while on Remdesivir.    - Continue Dexamethasone 6mg daily can change to oral and stop when ready for discharge   - Will monitor WBC 16-->13 but on steroid   - Watch O2 sat closely and taper as tolerated. Currently on HFNC  - Anticoagulation as per protocol  - Ceftriaxone 1gm daily to cover for UTI/pneumonia for now until culture is back  - Hold on Tocilizumab due to active infection (UTI/pneumonia)    Will follow.   Discussed with daughter at the bedside.     Elbert Amezcua MD  Division of Infectious Diseases   Please call ID service at [phone] with any question.      50 minutes spent on total encounter assessing patient, examination, chart review, counseling and coordinating care by the attending physician/nurse/care manager.

## 2024-04-03 NOTE — DISCHARGE NOTE PROVIDER - CARE PROVIDERS DIRECT ADDRESSES
[email or web address]

## 2024-04-03 NOTE — PROGRESS NOTE ADULT - ASSESSMENT
CKD 3  Hypertension  Dyspnea: Pneumonia, COVID 19+, CHF  Diabetes  Hypokalemia    04/02/24: Renal indices close to baseline. Potassium supplementation. Monitor creatinine trend post IV contrast study. IV abx, ID follow up.   Monitor blood sugar levels. Insulin coverage as needed. Monitor BP trend. Titrate BP meds as needed. Avoid nephrotoxic meds as possible.   Will follow electrolytes and renal function trend.   04/03/24: Mild increase in creatinine. ? lasix/IV contrast effect. Will follow trend.

## 2024-04-03 NOTE — DISCHARGE NOTE PROVIDER - NSDCCPCAREPLAN_GEN_ALL_CORE_FT
PRINCIPAL DISCHARGE DIAGNOSIS  Diagnosis: Acute UTI  Assessment and Plan of Treatment: A urinary tract infection (UTI) is an infection of any part of the urinary tract, which includes the kidneys, ureters, bladder, and urethra. Risk factors include ignoring your need to urinate, wiping back to front if female, being an uncircumcised male, and having diabetes or a weak immune system. Symptoms include frequent urination, pain or burning with urination, foul smelling urine, cloudy urine, pain in the lower abdomen, blood in the urine, and fever. Continue to take _________. Do not stop taking the antibiotic even if you start to feel better.  SEEK IMMEDIATE MEDICAL CARE IF YOU HAVE ANY OF THE FOLLOWING SYMPTOMS: severe back or abdominal pain, fever, inability to keep fluids or medicine down, dizziness/lightheadedness, or a change in mental status.      SECONDARY DISCHARGE DIAGNOSES  Diagnosis: 2019 novel coronavirus disease (COVID-19)  Assessment and Plan of Treatment: You were admitted for COVID- 19 pneumonia and were treated with Remdesivir and Decadron with improvement in your symptoms.   Please quarantine yourself for 2 weeks after hospital discharge or until directed by PCP.   You can take Tylenol 650 mg every 6 hours as needed for fever or pain. You can take over the counter Robitussin or Mucinex as needed for cough. Return to the emergency department if your symptoms worsen or if you notice your oxygen levels are below 90% on a pulse oximeter. Try to rest and keep hydrated. Please call your primary care doctor within 3-4 days after discharge.    Continue to practice social distancing. Wear a mask and wash your hands. Get the COVID vaccine and booster.      Diagnosis: Rapid atrial fibrillation  Assessment and Plan of Treatment: You have a known diagnosis of atrial fibrillation prior to your admission. This condition, if not treated, increases your risk of stroke or heart attack.   - Please continue to take _________. Please make sure to continue taking these medications to avoid developing blood clots and to lower your risk of stroke.   - Additionally, please follow up with your PCP (and/or cardiologist) on a regular basis to ensure that this condition does not require changes to the dose or type of medication.       PRINCIPAL DISCHARGE DIAGNOSIS  Diagnosis: 2019 novel coronavirus disease (COVID-19)  Assessment and Plan of Treatment: You were admitted for COVID- 19 pneumonia and were treated with Remdesivir and Decadron with improvement in your symptoms.   You can take Tylenol 650 mg every 6 hours as needed for fever or pain. You can take over the counter Robitussin or Mucinex as needed for cough. Return to the emergency department if your symptoms worsen or if you notice your oxygen levels are below 90% on a pulse oximeter. Try to rest and keep hydrated. Please call your primary care doctor within 3-4 days after discharge.    Continue to practice social distancing. Wear a mask and wash your hands. Get the COVID vaccine and booster.  - Follow up with your PCP in 1 week      SECONDARY DISCHARGE DIAGNOSES  Diagnosis: Acute UTI  Assessment and Plan of Treatment: You presented with generalized weakness. Your urine tests demonstrated urinary tract infection. CT abdomen demonstrated Left pyeloureteritis. You were started on a course of antibiotics with improvement in your symptoms and resolution of your infection.   - Follow up with your PCP in 1 week    Diagnosis: Rapid atrial fibrillation  Assessment and Plan of Treatment: You have a known diagnosis of atrial fibrillation prior to your admission. This condition, if not treated, increases your risk of stroke or heart attack.   - Continue Eliquis 2.5 mg twice/day  - Continue digoxin 125 mcg every other day  - Continue Toprol XL 100mg twice/day  - Follow up with your PCP in 1 week  - Follow up with your Cardiologist in 1 week     PRINCIPAL DISCHARGE DIAGNOSIS  Diagnosis: 2019 novel coronavirus disease (COVID-19)  Assessment and Plan of Treatment: You were admitted for COVID- 19 pneumonia and were treated with Remdesivir and Decadron with improvement in your symptoms.   You can take Tylenol 650 mg every 6 hours as needed for fever or pain. You can take over the counter Robitussin or Mucinex as needed for cough. Return to the emergency department if your symptoms worsen or if you notice your oxygen levels are below 90% on a pulse oximeter. Try to rest and keep hydrated. Please call your primary care doctor within 3-4 days after discharge.    Continue to practice social distancing. Wear a mask and wash your hands. Get the COVID vaccine and booster.  - Follow up with your PCP in 1 week      SECONDARY DISCHARGE DIAGNOSES  Diagnosis: Chronic HFrEF (heart failure with reduced ejection fraction)  Assessment and Plan of Treatment: TTE 4/8/24 demonstrated Left ventricular systolic function is severely decreased with an ejection fraction of 23 % with Large left pleural effusion. Follow up CT chest demonstrated ***    Diagnosis: Acute UTI  Assessment and Plan of Treatment: You presented with generalized weakness. Your urine tests demonstrated urinary tract infection. CT abdomen demonstrated Left pyeloureteritis. You were started on a course of antibiotics with improvement in your symptoms and resolution of your infection.   - Follow up with your PCP in 1 week    Diagnosis: Rapid atrial fibrillation  Assessment and Plan of Treatment: You have a known diagnosis of atrial fibrillation prior to your admission. This condition, if not treated, increases your risk of stroke or heart attack.   - Continue Eliquis 2.5 mg twice/day  - Continue digoxin 125 mcg every other day  - Continue Toprol XL 100mg twice/day  - Follow up with your PCP in 1 week  - Follow up with your Cardiologist in 1 week     PRINCIPAL DISCHARGE DIAGNOSIS  Diagnosis: 2019 novel coronavirus disease (COVID-19)  Assessment and Plan of Treatment: You were admitted for COVID- 19 pneumonia and were treated with Remdesivir and Decadron with improvement in your symptoms.   You can take Tylenol 650 mg every 6 hours as needed for fever or pain. You can take over the counter Robitussin or Mucinex as needed for cough. Return to the emergency department if your symptoms worsen or if you notice your oxygen levels are below 90% on a pulse oximeter. Try to rest and keep hydrated. Please call your primary care doctor within 3-4 days after discharge.    Continue to practice social distancing. Wear a mask and wash your hands. Get the COVID vaccine and booster.  - Follow up with your PCP in 1 week      SECONDARY DISCHARGE DIAGNOSES  Diagnosis: Rapid atrial fibrillation  Assessment and Plan of Treatment: You have a known diagnosis of atrial fibrillation prior to your admission. This condition, if not treated, increases your risk of stroke or heart attack.   - Continue Eliquis 2.5 mg twice/day  - Continue digoxin 125 mcg every other day  - Continue Toprol XL 100mg twice/day  - Follow up with your PCP in 1 week  - Follow up with your Cardiologist in 1 week    Diagnosis: Acute UTI  Assessment and Plan of Treatment: You presented with generalized weakness. Your urine tests demonstrated urinary tract infection. CT abdomen demonstrated Left pyeloureteritis. You were started on a course of antibiotics with improvement in your symptoms and resolution of your infection.   - Follow up with your PCP in 1 week    Diagnosis: Chronic HFrEF (heart failure with reduced ejection fraction)  Assessment and Plan of Treatment: TTE 4/8/24 demonstrated Left ventricular systolic function is severely decreased with an ejection fraction of 23 % with Large left pleural effusion. Follow up CT chest demonstrated Persistent moderate bilateral pleural effusions and associated compressive atelectasis and Mild septal thickening, may reflect interstitial edema. Pulmonology recommended conservative management as effusions were not sizable enough for thoracentesis  - Follow up with your PCP in 1 week      Diagnosis: Hypertension  Assessment and Plan of Treatment: Your blood pressure was elevated. You were started on   - Continue   - Follow up with your PCP in 1 week     PRINCIPAL DISCHARGE DIAGNOSIS  Diagnosis: 2019 novel coronavirus disease (COVID-19)  Assessment and Plan of Treatment: You were admitted for COVID- 19 pneumonia and were treated with Remdesivir and Decadron with improvement in your symptoms.   You can take Tylenol 650 mg every 6 hours as needed for fever or pain. You can take over the counter Robitussin or Mucinex as needed for cough. Return to the emergency department if your symptoms worsen or if you notice your oxygen levels are below 90% on a pulse oximeter. Try to rest and keep hydrated. Please call your primary care doctor within 3-4 days after discharge.    Continue to practice social distancing. Wear a mask and wash your hands. Get the COVID vaccine and booster.  - Follow up with your PCP in 1 week      SECONDARY DISCHARGE DIAGNOSES  Diagnosis: Hypertension  Assessment and Plan of Treatment: Your blood pressure was elevated. You were started on several medications with improvement in your blood pressure  - Continue hydralazine 25mg every 8 hours daily  - Continue isordil 10mg every 8 hours daily  - Continue furosemide 20mg daily  - Follow up with your PCP in 1 week    Diagnosis: Chronic HFrEF (heart failure with reduced ejection fraction)  Assessment and Plan of Treatment: TTE 4/8/24 demonstrated Left ventricular systolic function is severely decreased with an ejection fraction of 23 % with Large left pleural effusion. Follow up CT chest demonstrated Persistent moderate bilateral pleural effusions and associated compressive atelectasis and Mild septal thickening, may reflect interstitial edema. Pulmonology recommended conservative management as effusions were not sizable enough for thoracentesis  - Follow up with your PCP in 1 week      Diagnosis: Rapid atrial fibrillation  Assessment and Plan of Treatment: You have a known diagnosis of atrial fibrillation prior to your admission. This condition, if not treated, increases your risk of stroke or heart attack.   - Continue Eliquis 2.5 mg twice/day  - Continue digoxin 125 mcg every other day  - Continue Toprol XL 100mg twice/day  - Follow up with your PCP in 1 week  - Follow up with your Cardiologist in 1 week    Diagnosis: Acute UTI  Assessment and Plan of Treatment: You presented with generalized weakness. Your urine tests demonstrated urinary tract infection. CT abdomen demonstrated Left pyeloureteritis. You were started on a course of antibiotics with improvement in your symptoms and resolution of your infection.   - Follow up with your PCP in 1 week     PRINCIPAL DISCHARGE DIAGNOSIS  Diagnosis: 2019 novel coronavirus disease (COVID-19)  Assessment and Plan of Treatment: You were admitted for COVID- 19 pneumonia and were treated with Remdesivir and Decadron with improvement in your symptoms.   You can take Tylenol 650 mg every 6 hours as needed for fever or pain. You can take over the counter Robitussin or Mucinex as needed for cough. Return to the emergency department if your symptoms worsen or if you notice your oxygen levels are below 90% on a pulse oximeter. Try to rest and keep hydrated. Please call your primary care doctor within 3-4 days after discharge.    Continue to practice social distancing. Wear a mask and wash your hands. Get the COVID vaccine and booster.  - Follow up with your PCP in 1 week      SECONDARY DISCHARGE DIAGNOSES  Diagnosis: Hypertension  Assessment and Plan of Treatment: Your blood pressure was elevated. You were started on several medications with improvement in your blood pressure  - Continue hydralazine 25mg every 8 hours daily  - Continue isordil 30mg daily, starting 4/12/24  - Follow up with your PCP in 1 week    Diagnosis: Chronic HFrEF (heart failure with reduced ejection fraction)  Assessment and Plan of Treatment: TTE 4/8/24 demonstrated Left ventricular systolic function is severely decreased with an ejection fraction of 23 % with Large left pleural effusion. Follow up CT chest demonstrated Persistent moderate bilateral pleural effusions and associated compressive atelectasis and Mild septal thickening, may reflect interstitial edema. Pulmonology recommended conservative management as effusions were not sizable enough for thoracentesis  - Continue furosemide 20mg daily  - Follow up with your PCP in 1 week      Diagnosis: Rapid atrial fibrillation  Assessment and Plan of Treatment: You have a known diagnosis of atrial fibrillation prior to your admission. This condition, if not treated, increases your risk of stroke or heart attack.   - Continue Eliquis 2.5 mg twice/day  - Continue digoxin 125 mcg every other day  - Continue Toprol XL 100mg twice/day  - Follow up with your PCP in 1 week  - Follow up with your Cardiologist in 1 week    Diagnosis: Acute UTI  Assessment and Plan of Treatment: You presented with generalized weakness. Your urine tests demonstrated urinary tract infection. CT abdomen demonstrated Left pyeloureteritis. You were started on a course of antibiotics with improvement in your symptoms and resolution of your infection.   - Follow up with your PCP in 1 week     PRINCIPAL DISCHARGE DIAGNOSIS  Diagnosis: 2019 novel coronavirus disease (COVID-19)  Assessment and Plan of Treatment: You were admitted for COVID- 19 pneumonia and were treated with Remdesivir and Decadron with improvement in your symptoms.   You can take Tylenol 650 mg every 6 hours as needed for fever or pain. You can take over the counter Robitussin or Mucinex as needed for cough. Return to the emergency department if your symptoms worsen or if you notice your oxygen levels are below 90% on a pulse oximeter. Try to rest and keep hydrated. Please call your primary care doctor within 3-4 days after discharge.    Continue to practice social distancing. Wear a mask and wash your hands. Get the COVID vaccine and booster.  - Follow up with your PCP in 1 week      SECONDARY DISCHARGE DIAGNOSES  Diagnosis: Rapid atrial fibrillation  Assessment and Plan of Treatment: You have a known diagnosis of atrial fibrillation prior to your admission. This condition, if not treated, increases your risk of stroke or heart attack.   - Continue Eliquis 2.5 mg twice/day  - Continue digoxin 125 mcg every other day  - Continue Toprol XL 100mg twice/day  - Follow up with your PCP in 1 week  - Follow up with your Cardiologist in 1 week    Diagnosis: Acute UTI  Assessment and Plan of Treatment: You presented with generalized weakness. Your urine tests demonstrated urinary tract infection. CT abdomen demonstrated Left pyeloureteritis. You were started on a course of antibiotics with improvement in your symptoms and resolution of your infection.   - Follow up with your PCP in 1 week    Diagnosis: Chronic HFrEF (heart failure with reduced ejection fraction)  Assessment and Plan of Treatment: TTE 4/8/24 demonstrated Left ventricular systolic function is severely decreased with an ejection fraction of 23 % with Large left pleural effusion. Follow up CT chest demonstrated Persistent moderate bilateral pleural effusions and associated compressive atelectasis and Mild septal thickening, may reflect interstitial edema. Pulmonology recommended conservative management as effusions were not sizable enough for thoracentesis  - Continue furosemide 20mg daily  - Follow up with your PCP in 1 week      Diagnosis: Hypertension  Assessment and Plan of Treatment: Your blood pressure was elevated. You were started on several medications with improvement in your blood pressure  - Continue hydralazine 25mg every 8 hours daily, hold if SBP <110  - Continue isordil 30mg daily, starting 4/12/24  - Follow up with your PCP in 1 week

## 2024-04-03 NOTE — PROGRESS NOTE ADULT - ASSESSMENT
89 y/o female with PMH of HFrEF of 20-25 %, dementia, Afib, hypothyroidism, DM who presented to ED to be evaluated for weakness, found to have +UTI and Covid positive     Atrial Fibrillation  -tele afib 80's with episodes up to 144 given lopressor overnight   - she has known hx of Afib, with RVR episode in the setting of catecholamine surge 2/2 recent acute stressor (UTI, COVID)   - continue toprol xl 100 mg PObid  - continue Digoxin  -avoid CCB given reduced ef   - continue Eliquis    - EKG Afib w/ RVR, unchanged from prior   - no anginal complaints     - does not appear significantly volume overload on exam but remains on HF and CT with danielle effusions  -sp 20mg iv lasix yesterday - would give iv lasix today if Cr stable- AM labs pending   - TTE (12/2022): EF 20-25%, apex and anterior wall akinesis, inferior wall hypokinesis, mod MR, mild TR  - known to our service, from last admission, she has known LV Dysfunction from TTE likely from a missed MI was decided for medical management given her frailty, increased creatinine at that time  -GDMT on toprol, unable to start ace/arb given YVETTE     -bp stable     - +COVID management per primary   - Will continue to follow.

## 2024-04-03 NOTE — CASE MANAGEMENT PROGRESS NOTE - NSCMPROGRESSNOTE_GEN_ALL_CORE
Discussed pt in rounds, remains acute, on high flow oxygen. Receiving IV antibiotics and IV remdesivir.

## 2024-04-03 NOTE — PROGRESS NOTE ADULT - PROBLEM SELECTOR PLAN 3
-C/w ceftriaxone 1000mg IV daily   -F/u urine cx and sensitivity and tailor antibiotics accordingly  -avoid IV hydration  -PT/OT -C/w ceftriaxone 1000mg IV daily / lt pyelonephritis   -F/u urine cx and sensitivity and tailor antibiotics accordingly  -avoid IV hydration  -PT/OT

## 2024-04-03 NOTE — PROGRESS NOTE ADULT - PROBLEM SELECTOR PLAN 2
-Pt with chronic afib again w/ RVR to the 130s overnight  -given lopressor 5mg IV x3 and lopressor 25mg po x1 with HR improving to 90s  -C/w metoprolol succinate 100 mg bid  -C/w digoxin 125 mcg every other day  -C/w Eliquis 2.5 mg bid  -cardio consulted (Dr. Harvey group), recs appreciated -Pt with chronic afib again w/ RVR to the 130s overnight  -given lopressor 5mg IV x3 and lopressor 25mg po x1 with HR improving to 90s  -C/w metoprolol succinate 100 mg bid  -C/w digoxin 125 mcg every other day  -C/w Eliquis 2.5 mg bid  -cardio consulted (Dr. Harvey group),

## 2024-04-03 NOTE — DISCHARGE NOTE PROVIDER - HOSPITAL COURSE
HPI:  87 y/o female with PMH of HFrEF of 20-25 %, dementia, Afib, hypothyroidism, DM who presented to ED to be evaluated for weakness. pt lives home with her daughter who mentions that pt is usually able to ambulate with help of walker on her own. this morning pt felt tired and weak. not being able to stand up due to weakness in her legs. pt was diagnosed with UTI in past where she had same symptoms and daughter decided to  bring her to ED    ED course:  upon arrival pt was noted to be in Afib w RVR. recieved evening dose of her metoprolol in ED with returning to rate controlled Afib  tested positive for COVID. denies any shortness of breath or upper respiratory symptoms. pt do not require supplemental o2  Mg 1.4 s/p 1 gm mgso4 in ED      (02 Apr 2024 01:33)      ---  HOSPITAL COURSE: Admitted with weakness due to UTI, found to have COVID PNA. Started on IV abx Rocephin. Urine culture growing ***. Blood cultures NGTD. Patient developed rapid afib requiring IV Lopressor. Became hypoxic on nasal cannula, switched to high flow nasal cannula and tolerated well, weaned down as tolerated. Started on Remdesivir and decadron for COVID. CTA chest/abd/pelvis obtained showing no evidence of PE, b/l pleural effusions, right multilobar PNA, interstitial edema, cystitis, left pyeloureteritis and rectal fecal impaction.    ---  CONSULTANTS:   ID: Dr. Amezcua  Cardio: Dr. Harvey  Nephro: Dr. Oleary    ---  TIME SPENT:  I, the attending physician, was physically present for the key portions of the evaluation and management (E/M) service provided. The total amount of time spent reviewing the hospital notes, laboratory values, imaging findings, assessing/counseling the patient, discussing with consultant physicians, social work, nursing staff was -- minutes    ---  Primary care provider was made aware of plan for discharge:      [  ] NO     [  ] YES   HPI:  87 y/o female with PMH of HFrEF of 20-25 %, dementia, Afib, hypothyroidism, DM who presented to ED to be evaluated for weakness. pt lives home with her daughter who mentions that pt is usually able to ambulate with help of walker on her own. this morning pt felt tired and weak. not being able to stand up due to weakness in her legs. pt was diagnosed with UTI in past where she had same symptoms and daughter decided to  bring her to ED    ED course:  upon arrival pt was noted to be in Afib w RVR. recieved evening dose of her metoprolol in ED with returning to rate controlled Afib  tested positive for COVID. denies any shortness of breath or upper respiratory symptoms. pt do not require supplemental o2  Mg 1.4 s/p 1 gm mgso4 in ED      (02 Apr 2024 01:33)      ---  HOSPITAL COURSE: Admitted with weakness due to UTI, found to have COVID PNA. Started on IV abx Rocephin. Urine culture growing Klebsiella. Blood cultures NGTD. Patient developed rapid afib requiring IV Lopressor. Switched to PO Lopressor q6h for better rate control (as opposed to home BID dosing). Became hypoxic on nasal cannula, switched to high flow nasal cannula and tolerated well, weaned down to nasal cannula as tolerated. Started on Remdesivir and decadron for COVID. Received 3/5 days Remdesivir, then discontinued due to rising transaminitis. Started on PO decadron 10 day course. CTA chest/abd/pelvis obtained showing no evidence of PE, b/l pleural effusions, right multilobar PNA, interstitial edema, cystitis, left pyeloureteritis and rectal fecal impaction. Started on IV abx Rocephin which was escalated to Zosyn.    ---  CONSULTANTS:   ID: Dr. Amezcua  Cardio: Dr. Harvey  Nephro: Dr. Oleary    ---  TIME SPENT:  I, the attending physician, was physically present for the key portions of the evaluation and management (E/M) service provided. The total amount of time spent reviewing the hospital notes, laboratory values, imaging findings, assessing/counseling the patient, discussing with consultant physicians, social work, nursing staff was -- minutes    ---  Primary care provider was made aware of plan for discharge:      [  ] NO     [  ] YES   HPI:  89 y/o female with PMH of HFrEF of 20-25 %, dementia, Afib, hypothyroidism, DM who presented to ED to be evaluated for weakness. pt lives home with her daughter who mentions that pt is usually able to ambulate with help of walker on her own. this morning pt felt tired and weak. not being able to stand up due to weakness in her legs. pt was diagnosed with UTI in past where she had same symptoms and daughter decided to  bring her to ED    ED course:  upon arrival pt was noted to be in Afib w RVR. recieved evening dose of her metoprolol in ED with returning to rate controlled Afib  tested positive for COVID. denies any shortness of breath or upper respiratory symptoms. pt do not require supplemental o2  Mg 1.4 s/p 1 gm mgso4 in ED      (02 Apr 2024 01:33)      ---  HOSPITAL COURSE: Admitted with weakness due to UTI, found to have COVID PNA. Started on IV abx Rocephin. Urine culture growing Klebsiella. Blood cultures NGTD. Patient developed rapid afib requiring IV Lopressor. Switched to PO Lopressor q6h for better rate control (as opposed to home BID dosing). Became hypoxic on nasal cannula, switched to high flow nasal cannula and tolerated well, weaned down to nasal cannula as tolerated. Started on Remdesivir and decadron for COVID. Received 3/5 days Remdesivir, then discontinued due to rising transaminitis. Started on PO decadron, but discontinued due to hallucinations. CTA chest/abd/pelvis obtained showing no evidence of PE, b/l pleural effusions, right multilobar PNA, interstitial edema, cystitis, left pyeloureteritis and rectal fecal impaction. Started on IV abx Rocephin which was escalated to 5-day course Zosyn.     ---  CONSULTANTS:   ID: Dr. Amezcua  Cardio: Dr. Harvey  Nephro: Dr. Oleary    ---  TIME SPENT:  I, the attending physician, was physically present for the key portions of the evaluation and management (E/M) service provided. The total amount of time spent reviewing the hospital notes, laboratory values, imaging findings, assessing/counseling the patient, discussing with consultant physicians, social work, nursing staff was -- minutes    ---  Primary care provider was made aware of plan for discharge:      [  ] NO     [  ] YES   ADMISSION DATE:  04-02-24    ---  FROM ADMISSION H+P:   HPI:  89 y/o female with PMH of HFrEF of 20-25 %, dementia, Afib, hypothyroidism, DM who presented to ED to be evaluated for weakness. pt lives home with her daughter who mentions that pt is usually able to ambulate with help of walker on her own. this morning pt felt tired and weak. not being able to stand up due to weakness in her legs. pt was diagnosed with UTI in past where she had same symptoms and daughter decided to  bring her to ED    ED course:  upon arrival pt was noted to be in Afib w RVR. recieved evening dose of her metoprolol in ED with returning to rate controlled Afib  tested positive for COVID. denies any shortness of breath or upper respiratory symptoms. pt do not require supplemental o2  Mg 1.4 s/p 1 gm mgso4 in ED      (02 Apr 2024 01:33)      ---  HOSPITAL COURSE/PERTINENT LABS/PROCEDURES PERFORMED/PENDING TESTS:   Admitted with weakness due to UTI, found to have COVID PNA. Started on IV abx Rocephin. Urine culture growing Klebsiella. Blood cultures NGTD. Patient developed rapid afib requiring IV Lopressor. Switched to PO Lopressor q6h for better rate control (as opposed to home BID dosing). Became hypoxic on nasal cannula, switched to high flow nasal cannula and tolerated well, weaned down to nasal cannula as tolerated. Started on Remdesivir and decadron for COVID. Received 3/5 days Remdesivir, then discontinued due to rising transaminitis. Started on PO decadron, but discontinued due to hallucinations. CTA chest/abd/pelvis obtained showing no evidence of PE, b/l pleural effusions, right multilobar PNA, interstitial edema, cystitis, left pyeloureteritis and rectal fecal impaction. Started on IV abx Rocephin which was escalated to 5-day course Zosyn.     Patient showed improvement throughout hospitalization. Patient was seen and examined on day of discharge. Patient was medically optimized for discharge with close outpatient follow up.    --  VITALS:   T(C): 36.5 (04-09-24 @ 05:30), Max: 36.5 (04-09-24 @ 05:30)  HR: 73 (04-09-24 @ 05:30) (69 - 91)  BP: 153/77 (04-09-24 @ 05:30) (153/77 - 173/113)  RR: 18 (04-09-24 @ 05:30) (18 - 18)  SpO2: 91% (04-09-24 @ 05:30) (91% - 93%)    PHYSICAL EXAM ON DAY OF DISCHARGE:  GENERAL: NAD, on room air  HEENT:  anicteric, moist mucous membranes, + oral thrush  CHEST/LUNG:  CTA b/l, no rales, wheezes, or rhonchi  HEART:  irregularly irregular, S1, S2 , no tachy   ABDOMEN:  BS+, soft, nontender, nondistended  EXTREMITIES: no edema, cyanosis, or calf tenderness  NERVOUS SYSTEM: A&O x2, answers questions and follows commands appropriately    ---  CONSULTANTS:   ID: Dr. Amezcua  Cardio: Dr. Harvey  Nephro: Dr. Oleary    ---  TIME SPENT:  I, the attending physician, was physically present for the key portions of the evaluation and management (E/M) service provided. The total amount of time spent reviewing the hospital notes, laboratory values, imaging findings, assessing/counseling the patient, discussing with consultant physicians, social work, nursing staff was -- minutes       ADMISSION DATE:  04-02-24    ---  FROM ADMISSION H+P:   HPI:  87 y/o female with PMH of HFrEF of 20-25 %, dementia, Afib, hypothyroidism, DM who presented to ED to be evaluated for weakness. pt lives home with her daughter who mentions that pt is usually able to ambulate with help of walker on her own. this morning pt felt tired and weak. not being able to stand up due to weakness in her legs. pt was diagnosed with UTI in past where she had same symptoms and daughter decided to  bring her to ED    ED course:  upon arrival pt was noted to be in Afib w RVR. recieved evening dose of her metoprolol in ED with returning to rate controlled Afib  tested positive for COVID. denies any shortness of breath or upper respiratory symptoms. pt do not require supplemental o2  Mg 1.4 s/p 1 gm mgso4 in ED      (02 Apr 2024 01:33)      ---  HOSPITAL COURSE/PERTINENT LABS/PROCEDURES PERFORMED/PENDING TESTS:   Admitted with weakness due to UTI, found to have COVID PNA. Started on IV abx Rocephin. Urine culture growing Klebsiella. Blood cultures NGTD. Patient developed rapid afib requiring IV Lopressor. Switched to PO Lopressor q6h for better rate control (as opposed to home BID dosing). Became hypoxic on nasal cannula, switched to high flow nasal cannula and tolerated well, weaned down to nasal cannula as tolerated. Started on Remdesivir and decadron for COVID. Received 3/5 days Remdesivir, then discontinued due to rising transaminitis. Started on PO decadron, but discontinued due to hallucinations. CTA chest/abd/pelvis obtained showing no evidence of PE, b/l pleural effusions, right multilobar PNA, interstitial edema, cystitis, left pyeloureteritis and rectal fecal impaction. Started on IV abx Rocephin which was escalated to 5-day course Zosyn. TTE severely decreased LV EF 23%, large left pleural effusion.    Patient showed improvement throughout hospitalization. Patient was seen and examined on day of discharge. Patient was medically optimized for discharge with close outpatient follow up.    --  VITALS:   T(C): 36.5 (04-09-24 @ 05:30), Max: 36.5 (04-09-24 @ 05:30)  HR: 73 (04-09-24 @ 05:30) (69 - 91)  BP: 153/77 (04-09-24 @ 05:30) (153/77 - 173/113)  RR: 18 (04-09-24 @ 05:30) (18 - 18)  SpO2: 91% (04-09-24 @ 05:30) (91% - 93%)    PHYSICAL EXAM ON DAY OF DISCHARGE:  GENERAL: NAD, on room air  HEENT:  anicteric, moist mucous membranes, + oral thrush  CHEST/LUNG:  CTA b/l, no rales, wheezes, or rhonchi  HEART:  irregularly irregular, S1, S2 , no tachy   ABDOMEN:  BS+, soft, nontender, nondistended  EXTREMITIES: no edema, cyanosis, or calf tenderness  NERVOUS SYSTEM: A&O x2, answers questions and follows commands appropriately    ---  CONSULTANTS:   ID: Dr. Amezcua  Cardio: Dr. Harvey  Nephro: Dr. Oleary    ---  TIME SPENT:  I, the attending physician, was physically present for the key portions of the evaluation and management (E/M) service provided. The total amount of time spent reviewing the hospital notes, laboratory values, imaging findings, assessing/counseling the patient, discussing with consultant physicians, social work, nursing staff was -- minutes       ADMISSION DATE:  04-02-24    ---  FROM ADMISSION H+P:   HPI:  87 y/o female with PMH of HFrEF of 20-25 %, dementia, Afib, hypothyroidism, DM who presented to ED to be evaluated for weakness. pt lives home with her daughter who mentions that pt is usually able to ambulate with help of walker on her own. this morning pt felt tired and weak. not being able to stand up due to weakness in her legs. pt was diagnosed with UTI in past where she had same symptoms and daughter decided to  bring her to ED    ED course:  upon arrival pt was noted to be in Afib w RVR. recieved evening dose of her metoprolol in ED with returning to rate controlled Afib  tested positive for COVID. denies any shortness of breath or upper respiratory symptoms. pt do not require supplemental o2  Mg 1.4 s/p 1 gm mgso4 in ED      (02 Apr 2024 01:33)      ---  HOSPITAL COURSE/PERTINENT LABS/PROCEDURES PERFORMED/PENDING TESTS:   Admitted with weakness due to UTI, found to have COVID PNA. Started on IV abx Rocephin. Urine culture growing Klebsiella. Blood cultures NGTD. Patient developed rapid afib requiring IV Lopressor. Switched to PO Lopressor q6h for better rate control (as opposed to home BID dosing). Became hypoxic on nasal cannula, switched to high flow nasal cannula and tolerated well, weaned down to nasal cannula as tolerated. Started on Remdesivir and decadron for COVID. Received 3/5 days Remdesivir, then discontinued due to rising transaminitis. Started on PO decadron, but discontinued due to hallucinations. CTA chest/abd/pelvis obtained showing no evidence of PE, b/l pleural effusions, right multilobar PNA, interstitial edema, cystitis, left pyeloureteritis and rectal fecal impaction. Started on IV abx Rocephin which was escalated to 5-day course Zosyn. TTE with severely decreased LV EF 23%, large left pleural effusion. Follow up CT Chest ordered --     Patient showed improvement throughout hospitalization. Patient was seen and examined on day of discharge. Patient was medically optimized for discharge with close outpatient follow up.    --  VITALS:   T(C): 36.5 (04-09-24 @ 05:30), Max: 36.5 (04-09-24 @ 05:30)  HR: 73 (04-09-24 @ 05:30) (69 - 91)  BP: 153/77 (04-09-24 @ 05:30) (153/77 - 173/113)  RR: 18 (04-09-24 @ 05:30) (18 - 18)  SpO2: 91% (04-09-24 @ 05:30) (91% - 93%)    PHYSICAL EXAM ON DAY OF DISCHARGE:  GENERAL: NAD, on room air  HEENT:  anicteric, moist mucous membranes, + oral thrush  CHEST/LUNG:  CTA b/l, no rales, wheezes, or rhonchi  HEART:  irregularly irregular, S1, S2 , no tachy   ABDOMEN:  BS+, soft, nontender, nondistended  EXTREMITIES: no edema, cyanosis, or calf tenderness  NERVOUS SYSTEM: A&O x2, answers questions and follows commands appropriately    ---  CONSULTANTS:   ID: Dr. Amezcua  Cardio: Dr. Harvey  Nephro: Dr. Oleary    ---  TIME SPENT:  I, the attending physician, was physically present for the key portions of the evaluation and management (E/M) service provided. The total amount of time spent reviewing the hospital notes, laboratory values, imaging findings, assessing/counseling the patient, discussing with consultant physicians, social work, nursing staff was -- minutes       ADMISSION DATE:  04-02-24    ---  FROM ADMISSION H+P:   HPI:  89 y/o female with PMH of HFrEF of 20-25 %, dementia, Afib, hypothyroidism, DM who presented to ED to be evaluated for weakness. pt lives home with her daughter who mentions that pt is usually able to ambulate with help of walker on her own. this morning pt felt tired and weak. not being able to stand up due to weakness in her legs. pt was diagnosed with UTI in past where she had same symptoms and daughter decided to  bring her to ED    ED course:  upon arrival pt was noted to be in Afib w RVR. recieved evening dose of her metoprolol in ED with returning to rate controlled Afib  tested positive for COVID. denies any shortness of breath or upper respiratory symptoms. pt do not require supplemental o2  Mg 1.4 s/p 1 gm mgso4 in ED      (02 Apr 2024 01:33)      ---  HOSPITAL COURSE/PERTINENT LABS/PROCEDURES PERFORMED/PENDING TESTS:   Admitted with weakness due to UTI, found to have COVID PNA. Started on IV abx Rocephin. Urine culture growing Klebsiella. Blood cultures NGTD. Patient developed rapid afib requiring IV Lopressor. Switched to PO Lopressor q6h for better rate control (as opposed to home BID dosing). Became hypoxic on nasal cannula, switched to high flow nasal cannula and tolerated well, weaned down to nasal cannula as tolerated. Started on Remdesivir and decadron for COVID. Received 3/5 days Remdesivir, then discontinued due to rising transaminitis. Started on PO decadron, but discontinued due to hallucinations. CTA chest/abd/pelvis obtained showing no evidence of PE, b/l pleural effusions, right multilobar PNA, interstitial edema, cystitis, left pyeloureteritis and rectal fecal impaction. Started on IV abx Rocephin which was escalated to 5-day course Zosyn. TTE with severely decreased LV EF 23%, large left pleural effusion. Follow up CT Chest ordered Persistent moderate bilateral pleural effusions and associated compressive atelectasis, Mild septal thickening, may reflect interstitial edema. Pulm consulted, recommended continued optimization of [facility] regimen as effusions are not sizeable enough for thoracentesis.    Patient showed improvement throughout hospitalization. Patient was seen and examined on day of discharge. Patient was medically optimized for discharge with close outpatient follow up.    --  Vital Signs Last 24 Hrs  T(C): 36.3 (10 Apr 2024 05:16), Max: 37.1 (10 Apr 2024 01:04)  T(F): 97.4 (10 Apr 2024 05:16), Max: 98.7 (10 Apr 2024 01:04)  HR: 87 (10 Apr 2024 05:16) (80 - 93)  BP: 163/85 (10 Apr 2024 05:16) (160/81 - 163/85)  BP(mean): --  RR: 18 (10 Apr 2024 05:16) (18 - 18)  SpO2: 96% (10 Apr 2024 05:16) (96% - 97%)    Parameters below as of 10 Apr 2024 05:16  Patient On (Oxygen Delivery Method): room air        PHYSICAL EXAM ON DAY OF DISCHARGE:  GENERAL: NAD, on room air  HEENT:  anicteric, moist mucous membranes, + oral thrush  CHEST/LUNG:  CTA b/l, no rales, wheezes, or rhonchi  HEART:  irregularly irregular, S1, S2 , no tachy   ABDOMEN:  BS+, soft, nontender, nondistended  EXTREMITIES: no edema, cyanosis, or calf tenderness  NERVOUS SYSTEM: A&O x2, answers questions and follows commands appropriately    ---  CONSULTANTS:   ID: Dr. Amezcua  Cardio: Dr. Harvey  Nephro: Dr. Oleary  Pulm: Dr Isaac    ---  TIME SPENT:  I, the attending physician, was physically present for the key portions of the evaluation and management (E/M) service provided. The total amount of time spent reviewing the hospital notes, laboratory values, imaging findings, assessing/counseling the patient, discussing with consultant physicians, social work, nursing staff was -- minutes       ADMISSION DATE:  04-02-24    ---  FROM ADMISSION H+P:   HPI:  87 y/o female with PMH of HFrEF of 20-25 %, dementia, Afib, hypothyroidism, DM who presented to ED to be evaluated for weakness. pt lives home with her daughter who mentions that pt is usually able to ambulate with help of walker on her own. this morning pt felt tired and weak. not being able to stand up due to weakness in her legs. pt was diagnosed with UTI in past where she had same symptoms and daughter decided to  bring her to ED    ED course:  upon arrival pt was noted to be in Afib w RVR. recieved evening dose of her metoprolol in ED with returning to rate controlled Afib  tested positive for COVID. denies any shortness of breath or upper respiratory symptoms. pt do not require supplemental o2  Mg 1.4 s/p 1 gm mgso4 in ED      (02 Apr 2024 01:33)      ---  HOSPITAL COURSE/PERTINENT LABS/PROCEDURES PERFORMED/PENDING TESTS:   Admitted with weakness due to UTI, found to have COVID PNA. Started on IV abx Rocephin. Urine culture growing Klebsiella. Blood cultures NGTD. Patient developed rapid afib requiring IV Lopressor. Switched to PO Lopressor q6h for better rate control (as opposed to home BID dosing). Became hypoxic on nasal cannula, switched to high flow nasal cannula and tolerated well, weaned down to nasal cannula as tolerated. Started on Remdesivir and decadron for COVID. Received 3/5 days Remdesivir, then discontinued due to rising transaminitis. Started on PO decadron, but discontinued due to hallucinations. CTA chest/abd/pelvis obtained showing no evidence of PE, b/l pleural effusions, right multilobar PNA, interstitial edema, cystitis, left pyeloureteritis and rectal fecal impaction. Started on IV abx Rocephin which was escalated to 5-day course Zosyn. TTE with severely decreased LV EF 23%, large left pleural effusion. Follow up CT Chest ordered Persistent moderate bilateral pleural effusions and associated compressive atelectasis, Mild septal thickening, may reflect interstitial edema. Pulm consulted, recommended continued optimization of [facility] regimen as effusions are not sizeable enough for thoracentesis. Blood pressure medications were started for optimal BP control.    Patient showed improvement throughout hospitalization. Patient was seen and examined on day of discharge. Patient was medically optimized for discharge with close outpatient follow up.    --  Vital Signs Last 24 Hrs  T(C): 36.3 (10 Apr 2024 05:16), Max: 37.1 (10 Apr 2024 01:04)  T(F): 97.4 (10 Apr 2024 05:16), Max: 98.7 (10 Apr 2024 01:04)  HR: 87 (10 Apr 2024 05:16) (80 - 93)  BP: 163/85 (10 Apr 2024 05:16) (160/81 - 163/85)  BP(mean): --  RR: 18 (10 Apr 2024 05:16) (18 - 18)  SpO2: 96% (10 Apr 2024 05:16) (96% - 97%)    Parameters below as of 10 Apr 2024 05:16  Patient On (Oxygen Delivery Method): room air        PHYSICAL EXAM ON DAY OF DISCHARGE:  GENERAL: NAD, on room air  HEENT:  anicteric, moist mucous membranes, + oral thrush  CHEST/LUNG:  CTA b/l, no rales, wheezes, or rhonchi  HEART:  irregularly irregular, S1, S2 , no tachy   ABDOMEN:  BS+, soft, nontender, nondistended  EXTREMITIES: no edema, cyanosis, or calf tenderness  NERVOUS SYSTEM: A&O x2, answers questions and follows commands appropriately    ---  CONSULTANTS:   ID: Dr. Amezcua  Cardio: Dr. Harvey  Nephro: Dr. Oleary  Pulm: Dr Isaac    ---  TIME SPENT:  I, the attending physician, was physically present for the key portions of the evaluation and management (E/M) service provided. The total amount of time spent reviewing the hospital notes, laboratory values, imaging findings, assessing/counseling the patient, discussing with consultant physicians, social work, nursing staff was -- minutes       ADMISSION DATE:  04-02-24    ---  FROM ADMISSION H+P:   HPI:  87 y/o female with PMH of HFrEF of 20-25 %, dementia, Afib, hypothyroidism, DM who presented to ED to be evaluated for weakness. pt lives home with her daughter who mentions that pt is usually able to ambulate with help of walker on her own. this morning pt felt tired and weak. not being able to stand up due to weakness in her legs. pt was diagnosed with UTI in past where she had same symptoms and daughter decided to  bring her to ED    ED course:  upon arrival pt was noted to be in Afib w RVR. recieved evening dose of her metoprolol in ED with returning to rate controlled Afib  tested positive for COVID. denies any shortness of breath or upper respiratory symptoms. pt do not require supplemental o2  Mg 1.4 s/p 1 gm mgso4 in ED      (02 Apr 2024 01:33)      ---  HOSPITAL COURSE/PERTINENT LABS/PROCEDURES PERFORMED/PENDING TESTS:   Admitted with weakness due to UTI, found to have COVID PNA. Started on IV abx Rocephin. Urine culture growing Klebsiella. Blood cultures NGTD. Patient developed rapid afib requiring IV Lopressor. Switched to PO Lopressor q6h for better rate control (as opposed to home BID dosing). Became hypoxic on nasal cannula, switched to high flow nasal cannula and tolerated well, weaned down to nasal cannula as tolerated. Started on Remdesivir and decadron for COVID. Received 3/5 days Remdesivir, then discontinued due to rising transaminitis. Started on PO decadron, but discontinued due to hallucinations. CTA chest/abd/pelvis obtained showing no evidence of PE, b/l pleural effusions, right multilobar PNA, interstitial edema, cystitis, left pyeloureteritis and rectal fecal impaction. Started on IV abx Rocephin which was escalated to 5-day course Zosyn. TTE with severely decreased LV EF 23%, large left pleural effusion. Follow up CT Chest ordered Persistent moderate bilateral pleural effusions and associated compressive atelectasis, Mild septal thickening, may reflect interstitial edema. Pulm consulted, recommended continued optimization of [facility] rx regimen as effusions are not sizeable enough for thoracentesis. Blood pressure medications were started for optimal BP control.    Patient showed improvement throughout hospitalization. Patient was seen and examined on day of discharge. Patient was medically optimized for discharge with close outpatient follow up.    --  Vital Signs Last 24 Hrs  T(C): 36.3 (11 Apr 2024 10:16), Max: 36.8 (10 Apr 2024 21:49)  T(F): 97.4 (11 Apr 2024 10:16), Max: 98.2 (10 Apr 2024 21:49)  HR: 89 (11 Apr 2024 10:16) (78 - 91)  BP: 136/80 (11 Apr 2024 10:16) (136/80 - 173/96)  BP(mean): --  RR: 17 (11 Apr 2024 10:16) (17 - 20)  SpO2: 95% (11 Apr 2024 10:16) (94% - 99%)    Parameters below as of 11 Apr 2024 10:16  Patient On (Oxygen Delivery Method): room air        PHYSICAL EXAM ON DAY OF DISCHARGE:  GENERAL: NAD, on room air  HEENT:  anicteric, moist mucous membranes, + oral thrush  CHEST/LUNG:  CTA b/l, no rales, wheezes, or rhonchi  HEART:  irregularly irregular, S1, S2 , no tachy   ABDOMEN:  BS+, soft, nontender, nondistended  EXTREMITIES: no edema, cyanosis, or calf tenderness  NERVOUS SYSTEM: A&O x2, answers questions and follows commands appropriately    ---  CONSULTANTS:   ID: Dr. Amezcua  Cardio: Dr. Harvey  Nephro: Dr. Oleary  Pulm: Dr Isaac    ---  TIME SPENT:  I, the attending physician, was physically present for the key portions of the evaluation and management (E/M) service provided. The total amount of time spent reviewing the hospital notes, laboratory values, imaging findings, assessing/counseling the patient, discussing with consultant physicians, social work, nursing staff was -- minutes       ADMISSION DATE:  04-02-24    ---  FROM ADMISSION H+P:   HPI:  89 y/o female with PMH of HFrEF of 20-25 %, dementia, Afib, hypothyroidism, DM who presented to ED to be evaluated for weakness. pt lives home with her daughter who mentions that pt is usually able to ambulate with help of walker on her own. this morning pt felt tired and weak. not being able to stand up due to weakness in her legs. pt was diagnosed with UTI in past where she had same symptoms and daughter decided to  bring her to ED    ED course:  upon arrival pt was noted to be in Afib w RVR. recieved evening dose of her metoprolol in ED with returning to rate controlled Afib  tested positive for COVID. denies any shortness of breath or upper respiratory symptoms. pt do not require supplemental o2  Mg 1.4 s/p 1 gm mgso4 in ED      (02 Apr 2024 01:33)      ---  HOSPITAL COURSE/PERTINENT LABS/PROCEDURES PERFORMED/PENDING TESTS:   Admitted with weakness due to UTI, found to have COVID PNA. Started on IV abx Rocephin. Urine culture growing Klebsiella. Blood cultures NGTD. Patient developed rapid afib requiring IV Lopressor. Switched to PO Lopressor q6h for better rate control (as opposed to home BID dosing). Became hypoxic on nasal cannula, switched to high flow nasal cannula and tolerated well, weaned down to nasal cannula as tolerated. Started on Remdesivir and decadron for COVID. Received 3/5 days Remdesivir, then discontinued due to rising transaminitis. Started on PO decadron, but discontinued due to hallucinations. CTA chest/abd/pelvis obtained showing no evidence of PE, b/l pleural effusions, right multilobar PNA, interstitial edema, cystitis, left pyeloureteritis and rectal fecal impaction. Started on IV abx Rocephin which was escalated to 5-day course Zosyn. TTE with severely decreased LV EF 23%, large left pleural effusion. Follow up CT Chest ordered Persistent moderate bilateral pleural effusions and associated compressive atelectasis, Mild septal thickening, may reflect interstitial edema. Pulm consulted, recommended continued optimization of [facility] rx regimen as effusions are not sizeable enough for thoracentesis. Blood pressure medications were started for optimal BP control.    Patient showed improvement throughout hospitalization. Patient was seen and examined on day of discharge. Patient was medically optimized for discharge with close outpatient follow up.    --  Vital Signs Last 24 Hrs  T(C): 36.3 (11 Apr 2024 10:16), Max: 36.8 (10 Apr 2024 21:49)  T(F): 97.4 (11 Apr 2024 10:16), Max: 98.2 (10 Apr 2024 21:49)  HR: 89 (11 Apr 2024 10:16) (78 - 91)  BP: 136/80 (11 Apr 2024 10:16) (136/80 - 173/96)  BP(mean): --  RR: 17 (11 Apr 2024 10:16) (17 - 20)  SpO2: 95% (11 Apr 2024 10:16) (94% - 99%)    Parameters below as of 11 Apr 2024 10:16  Patient On (Oxygen Delivery Method): room air        PHYSICAL EXAM ON DAY OF DISCHARGE:  GENERAL: NAD, on room air  HEENT:  anicteric, moist mucous membranes, + oral thrush  CHEST/LUNG:  CTA b/l, no rales, wheezes, or rhonchi  HEART:  irregularly irregular, S1, S2 , no tachy   ABDOMEN:  BS+, soft, nontender, nondistended  EXTREMITIES: no edema, cyanosis, or calf tenderness  NERVOUS SYSTEM: A&O x2, answers questions and follows commands appropriately    ---  CONSULTANTS:   ID: Dr. Amezcua  Cardio: Dr. Harvey  Nephro: Dr. Oleary  Pulm: Dr Isaac

## 2024-04-03 NOTE — PROGRESS NOTE ADULT - PROBLEM SELECTOR PLAN 1
-Pt with acute hypoxia this AM -- placed on HFNC -- wean as tolerated  -acute hypoxic respiratory failure likely multifactorial including pulmonary edema, COVID, suspected overlying bacterial PNA  -Concern for possible PE or flash pulmonary edema as an acute decompensation  -d-dimer elevated at 500  -ferritin 70, procal 0.37, CRP 8 -- repeat again in AM  -CT Angio chest, and CT abdomen and pelvis w/ IV contrast performed which showed: Multilobar pneumonia right lung. Septal thickening which may reflect interstitial edema. Bilateral pleural effusions with compressive atelectasis lower lobes. No acute pulmonary embolism.  Cystitis and left pyeloureteritis; correlate for ascending urinary tract infection. Distended rectum with fecal impaction.  -given lasix 20mg IV x1 -- discussed with cardio consult (Dr. Candice campuzano), recs appreciated  -duoneb PRN  -Tessalon pearls PRN for cough  - c/w Remdesivir Day 2/5 -- monitor for rising LFTs  - increased Decadron to 10mg IV  -c/w ceftriaxone to cover PNA as well as pyelonephritis   -ID consulted (Dr. Amezcua), recs appreciated  -isolation precautions -Pt with acute hypoxia this AM -- placed on HFNC -- wean as tolerated  -acute hypoxic respiratory failure likely multifactorial including pulmonary edema, COVID, suspected overlying bacterial PNA  -Concern for possible PE or flash pulmonary edema as an acute decompensation  -d-dimer elevated at 500  -ferritin 70, procal 0.37, CRP 8 -- repeat again in AM  -CT Angio chest, and CT abdomen and pelvis w/ IV contrast performed which showed: Multilobar pneumonia right lung. Septal thickening which may reflect interstitial edema. Bilateral pleural effusions with compressive atelectasis lower lobes. No acute pulmonary embolism.  Cystitis and left pyeloureteritis; correlate for ascending urinary tract infection. Distended rectum with fecal impaction.  -given lasix 20mg IV x1 -- discussed with cardio consult (Dr. Candice campuzano), recs appreciated  -duoneb PRN  -Tessalon pearls PRN for cough  - c/w Remdesivir Day 2/5 -- monitor for rising LFTs  - increased Decadron to 10mg po  -c/w ceftriaxone to cover PNA as well as pyelonephritis   -ID consulted (Dr. Amezcua),   -isolation precautions

## 2024-04-04 LAB
-  AMOXICILLIN/CLAVULANIC ACID: SIGNIFICANT CHANGE UP
-  AMPICILLIN/SULBACTAM: SIGNIFICANT CHANGE UP
-  AMPICILLIN: SIGNIFICANT CHANGE UP
-  AZTREONAM: SIGNIFICANT CHANGE UP
-  CEFAZOLIN: SIGNIFICANT CHANGE UP
-  CEFEPIME: SIGNIFICANT CHANGE UP
-  CEFOXITIN: SIGNIFICANT CHANGE UP
-  CEFTRIAXONE: SIGNIFICANT CHANGE UP
-  CEFUROXIME: SIGNIFICANT CHANGE UP
-  CIPROFLOXACIN: SIGNIFICANT CHANGE UP
-  ERTAPENEM: SIGNIFICANT CHANGE UP
-  GENTAMICIN: SIGNIFICANT CHANGE UP
-  IMIPENEM: SIGNIFICANT CHANGE UP
-  LEVOFLOXACIN: SIGNIFICANT CHANGE UP
-  MEROPENEM: SIGNIFICANT CHANGE UP
-  NITROFURANTOIN: SIGNIFICANT CHANGE UP
-  PIPERACILLIN/TAZOBACTAM: SIGNIFICANT CHANGE UP
-  TOBRAMYCIN: SIGNIFICANT CHANGE UP
-  TRIMETHOPRIM/SULFAMETHOXAZOLE: SIGNIFICANT CHANGE UP
ALBUMIN SERPL ELPH-MCNC: 2.7 G/DL — LOW (ref 3.3–5)
ALP SERPL-CCNC: 122 U/L — HIGH (ref 40–120)
ALT FLD-CCNC: 155 U/L — HIGH (ref 12–78)
ANION GAP SERPL CALC-SCNC: 12 MMOL/L — SIGNIFICANT CHANGE UP (ref 5–17)
AST SERPL-CCNC: 207 U/L — HIGH (ref 15–37)
BASOPHILS # BLD AUTO: 0.03 K/UL — SIGNIFICANT CHANGE UP (ref 0–0.2)
BASOPHILS NFR BLD AUTO: 0.2 % — SIGNIFICANT CHANGE UP (ref 0–2)
BILIRUB SERPL-MCNC: 0.7 MG/DL — SIGNIFICANT CHANGE UP (ref 0.2–1.2)
BUN SERPL-MCNC: 46 MG/DL — HIGH (ref 7–23)
CALCIUM SERPL-MCNC: 8 MG/DL — LOW (ref 8.5–10.1)
CHLORIDE SERPL-SCNC: 101 MMOL/L — SIGNIFICANT CHANGE UP (ref 96–108)
CO2 SERPL-SCNC: 20 MMOL/L — LOW (ref 22–31)
CREAT SERPL-MCNC: 2.2 MG/DL — HIGH (ref 0.5–1.3)
CRP SERPL-MCNC: 31 MG/L — HIGH
CULTURE RESULTS: ABNORMAL
DIGOXIN SERPL-MCNC: 1.1 NG/ML — SIGNIFICANT CHANGE UP (ref 0.8–2)
EGFR: 21 ML/MIN/1.73M2 — LOW
EOSINOPHIL # BLD AUTO: 0 K/UL — SIGNIFICANT CHANGE UP (ref 0–0.5)
EOSINOPHIL NFR BLD AUTO: 0 % — SIGNIFICANT CHANGE UP (ref 0–6)
ERYTHROCYTE [SEDIMENTATION RATE] IN BLOOD: 19 MM/HR — SIGNIFICANT CHANGE UP (ref 0–20)
FERRITIN SERPL-MCNC: 127 NG/ML — SIGNIFICANT CHANGE UP (ref 13–330)
GLUCOSE BLDC GLUCOMTR-MCNC: 248 MG/DL — HIGH (ref 70–99)
GLUCOSE BLDC GLUCOMTR-MCNC: 249 MG/DL — HIGH (ref 70–99)
GLUCOSE BLDC GLUCOMTR-MCNC: 265 MG/DL — HIGH (ref 70–99)
GLUCOSE BLDC GLUCOMTR-MCNC: 325 MG/DL — HIGH (ref 70–99)
GLUCOSE SERPL-MCNC: 250 MG/DL — HIGH (ref 70–99)
HCT VFR BLD CALC: 33.1 % — LOW (ref 34.5–45)
HGB BLD-MCNC: 10.7 G/DL — LOW (ref 11.5–15.5)
IMM GRANULOCYTES NFR BLD AUTO: 1 % — HIGH (ref 0–0.9)
LYMPHOCYTES # BLD AUTO: 0.54 K/UL — LOW (ref 1–3.3)
LYMPHOCYTES # BLD AUTO: 2.9 % — LOW (ref 13–44)
MCHC RBC-ENTMCNC: 27.7 PG — SIGNIFICANT CHANGE UP (ref 27–34)
MCHC RBC-ENTMCNC: 32.3 GM/DL — SIGNIFICANT CHANGE UP (ref 32–36)
MCV RBC AUTO: 85.8 FL — SIGNIFICANT CHANGE UP (ref 80–100)
METHOD TYPE: SIGNIFICANT CHANGE UP
MONOCYTES # BLD AUTO: 0.59 K/UL — SIGNIFICANT CHANGE UP (ref 0–0.9)
MONOCYTES NFR BLD AUTO: 3.1 % — SIGNIFICANT CHANGE UP (ref 2–14)
NEUTROPHILS # BLD AUTO: 17.42 K/UL — HIGH (ref 1.8–7.4)
NEUTROPHILS NFR BLD AUTO: 92.8 % — HIGH (ref 43–77)
NRBC # BLD: 0 /100 WBCS — SIGNIFICANT CHANGE UP (ref 0–0)
ORGANISM # SPEC MICROSCOPIC CNT: ABNORMAL
ORGANISM # SPEC MICROSCOPIC CNT: SIGNIFICANT CHANGE UP
PLATELET # BLD AUTO: 302 K/UL — SIGNIFICANT CHANGE UP (ref 150–400)
POTASSIUM SERPL-MCNC: 4.7 MMOL/L — SIGNIFICANT CHANGE UP (ref 3.5–5.3)
POTASSIUM SERPL-SCNC: 4.7 MMOL/L — SIGNIFICANT CHANGE UP (ref 3.5–5.3)
PROCALCITONIN SERPL-MCNC: 6.3 NG/ML — HIGH
PROT SERPL-MCNC: 7 G/DL — SIGNIFICANT CHANGE UP (ref 6–8.3)
RBC # BLD: 3.86 M/UL — SIGNIFICANT CHANGE UP (ref 3.8–5.2)
RBC # FLD: 17.2 % — HIGH (ref 10.3–14.5)
SODIUM SERPL-SCNC: 133 MMOL/L — LOW (ref 135–145)
SPECIMEN SOURCE: SIGNIFICANT CHANGE UP
WBC # BLD: 18.77 K/UL — HIGH (ref 3.8–10.5)
WBC # FLD AUTO: 18.77 K/UL — HIGH (ref 3.8–10.5)

## 2024-04-04 PROCEDURE — 71045 X-RAY EXAM CHEST 1 VIEW: CPT | Mod: 26

## 2024-04-04 PROCEDURE — 99233 SBSQ HOSP IP/OBS HIGH 50: CPT | Mod: GC

## 2024-04-04 PROCEDURE — 99233 SBSQ HOSP IP/OBS HIGH 50: CPT

## 2024-04-04 PROCEDURE — 99291 CRITICAL CARE FIRST HOUR: CPT

## 2024-04-04 RX ORDER — METOPROLOL TARTRATE 50 MG
50 TABLET ORAL EVERY 6 HOURS
Refills: 0 | Status: DISCONTINUED | OUTPATIENT
Start: 2024-04-04 | End: 2024-04-06

## 2024-04-04 RX ORDER — PIPERACILLIN AND TAZOBACTAM 4; .5 G/20ML; G/20ML
3.38 INJECTION, POWDER, LYOPHILIZED, FOR SOLUTION INTRAVENOUS ONCE
Refills: 0 | Status: COMPLETED | OUTPATIENT
Start: 2024-04-05 | End: 2024-04-05

## 2024-04-04 RX ORDER — PIPERACILLIN AND TAZOBACTAM 4; .5 G/20ML; G/20ML
3.38 INJECTION, POWDER, LYOPHILIZED, FOR SOLUTION INTRAVENOUS ONCE
Refills: 0 | Status: COMPLETED | OUTPATIENT
Start: 2024-04-04 | End: 2024-04-04

## 2024-04-04 RX ORDER — SACCHAROMYCES BOULARDII 250 MG
250 POWDER IN PACKET (EA) ORAL
Refills: 0 | Status: DISCONTINUED | OUTPATIENT
Start: 2024-04-04 | End: 2024-04-11

## 2024-04-04 RX ORDER — PIPERACILLIN AND TAZOBACTAM 4; .5 G/20ML; G/20ML
3.38 INJECTION, POWDER, LYOPHILIZED, FOR SOLUTION INTRAVENOUS EVERY 12 HOURS
Refills: 0 | Status: DISCONTINUED | OUTPATIENT
Start: 2024-04-05 | End: 2024-04-10

## 2024-04-04 RX ORDER — METOPROLOL TARTRATE 50 MG
5 TABLET ORAL ONCE
Refills: 0 | Status: COMPLETED | OUTPATIENT
Start: 2024-04-04 | End: 2024-04-04

## 2024-04-04 RX ORDER — CEFTRIAXONE 500 MG/1
1000 INJECTION, POWDER, FOR SOLUTION INTRAMUSCULAR; INTRAVENOUS EVERY 24 HOURS
Refills: 0 | Status: DISCONTINUED | OUTPATIENT
Start: 2024-04-04 | End: 2024-04-04

## 2024-04-04 RX ORDER — INSULIN GLARGINE 100 [IU]/ML
5 INJECTION, SOLUTION SUBCUTANEOUS AT BEDTIME
Refills: 0 | Status: DISCONTINUED | OUTPATIENT
Start: 2024-04-04 | End: 2024-04-11

## 2024-04-04 RX ADMIN — Medication 4: at 08:52

## 2024-04-04 RX ADMIN — Medication 250 MILLIGRAM(S): at 17:54

## 2024-04-04 RX ADMIN — Medication 50 MILLIGRAM(S): at 12:42

## 2024-04-04 RX ADMIN — INSULIN GLARGINE 5 UNIT(S): 100 INJECTION, SOLUTION SUBCUTANEOUS at 22:15

## 2024-04-04 RX ADMIN — CEFTRIAXONE 100 MILLIGRAM(S): 500 INJECTION, POWDER, FOR SOLUTION INTRAMUSCULAR; INTRAVENOUS at 00:24

## 2024-04-04 RX ADMIN — Medication 2 UNIT(S): at 12:40

## 2024-04-04 RX ADMIN — MEMANTINE HYDROCHLORIDE 10 MILLIGRAM(S): 10 TABLET ORAL at 17:54

## 2024-04-04 RX ADMIN — Medication 1: at 22:15

## 2024-04-04 RX ADMIN — Medication 125 MICROGRAM(S): at 12:42

## 2024-04-04 RX ADMIN — POLYETHYLENE GLYCOL 3350 17 GRAM(S): 17 POWDER, FOR SOLUTION ORAL at 12:42

## 2024-04-04 RX ADMIN — Medication 4: at 12:39

## 2024-04-04 RX ADMIN — RIVASTIGMINE 1 PATCH: 4.6 PATCH, EXTENDED RELEASE TRANSDERMAL at 07:29

## 2024-04-04 RX ADMIN — Medication 100 MILLIGRAM(S): at 05:02

## 2024-04-04 RX ADMIN — Medication 2 UNIT(S): at 08:53

## 2024-04-04 RX ADMIN — Medication 2 UNIT(S): at 17:53

## 2024-04-04 RX ADMIN — PANTOPRAZOLE SODIUM 40 MILLIGRAM(S): 20 TABLET, DELAYED RELEASE ORAL at 05:03

## 2024-04-04 RX ADMIN — Medication 8: at 17:52

## 2024-04-04 RX ADMIN — ESCITALOPRAM OXALATE 10 MILLIGRAM(S): 10 TABLET, FILM COATED ORAL at 12:42

## 2024-04-04 RX ADMIN — REMDESIVIR 200 MILLIGRAM(S): 5 INJECTION INTRAVENOUS at 15:59

## 2024-04-04 RX ADMIN — PIPERACILLIN AND TAZOBACTAM 200 GRAM(S): 4; .5 INJECTION, POWDER, LYOPHILIZED, FOR SOLUTION INTRAVENOUS at 15:59

## 2024-04-04 RX ADMIN — Medication 50 MILLIGRAM(S): at 17:54

## 2024-04-04 RX ADMIN — TAMSULOSIN HYDROCHLORIDE 0.4 MILLIGRAM(S): 0.4 CAPSULE ORAL at 22:28

## 2024-04-04 RX ADMIN — MEMANTINE HYDROCHLORIDE 10 MILLIGRAM(S): 10 TABLET ORAL at 05:03

## 2024-04-04 RX ADMIN — RIVASTIGMINE 1 PATCH: 4.6 PATCH, EXTENDED RELEASE TRANSDERMAL at 05:05

## 2024-04-04 RX ADMIN — Medication 75 MICROGRAM(S): at 05:03

## 2024-04-04 RX ADMIN — Medication 10 MILLIGRAM(S): at 05:03

## 2024-04-04 RX ADMIN — APIXABAN 2.5 MILLIGRAM(S): 2.5 TABLET, FILM COATED ORAL at 17:54

## 2024-04-04 RX ADMIN — RIVASTIGMINE 1 PATCH: 4.6 PATCH, EXTENDED RELEASE TRANSDERMAL at 04:57

## 2024-04-04 RX ADMIN — Medication 5 MILLIGRAM(S): at 11:10

## 2024-04-04 RX ADMIN — Medication 5 MILLIGRAM(S): at 06:41

## 2024-04-04 RX ADMIN — APIXABAN 2.5 MILLIGRAM(S): 2.5 TABLET, FILM COATED ORAL at 05:03

## 2024-04-04 NOTE — PROGRESS NOTE ADULT - ASSESSMENT
CKD 3  Hypertension  Dyspnea: Pneumonia, COVID 19+, CHF  Diabetes  Hypokalemia  Urinary retention    04/02/24: Renal indices close to baseline. Potassium supplementation. Monitor creatinine trend post IV contrast study. IV abx, ID follow up.   Monitor blood sugar levels. Insulin coverage as needed. Monitor BP trend. Titrate BP meds as needed. Avoid nephrotoxic meds as possible.   Will follow electrolytes and renal function trend.   04/03/24: Mild increase in creatinine. ? lasix/IV contrast effect. Will follow trend.   04/04/24: Worsening renal indices. Hold diuretics and may use PRN. Encourage PO intake as tolerated. Straight cath PRN for urinary retention.

## 2024-04-04 NOTE — PROGRESS NOTE ADULT - SUBJECTIVE AND OBJECTIVE BOX
Patient is a 88y old  Female who presents with a chief complaint of weakness (04 Apr 2024 13:48)      TELE: afib 110s-140s.    INTERVAL HPI/OVERNIGHT EVENTS: Overnight, pt requiring lopressor 5mg IVP x1 this am for afib with RVR. Pt seen and examined at the bedside this AM. Pt reports feeling tired and fatigued today. Pt tolerating o2 HFNC at 40L/50%. Denies fever, chills, cp, sob.    MEDICATIONS  (STANDING):  apixaban 2.5 milliGRAM(s) Oral every 12 hours  bisacodyl Suppository 10 milliGRAM(s) Rectal once  dexAMETHasone     Tablet 10 milliGRAM(s) Oral daily  dextrose 5%. 1000 milliLiter(s) (100 mL/Hr) IV Continuous <Continuous>  dextrose 5%. 1000 milliLiter(s) (50 mL/Hr) IV Continuous <Continuous>  dextrose 50% Injectable 12.5 Gram(s) IV Push once  dextrose 50% Injectable 25 Gram(s) IV Push once  dextrose 50% Injectable 25 Gram(s) IV Push once  digoxin     Tablet 125 MICROGram(s) Oral every other day  escitalopram 10 milliGRAM(s) Oral daily  glucagon  Injectable 1 milliGRAM(s) IntraMuscular once  insulin glargine Injectable (LANTUS) 5 Unit(s) SubCutaneous at bedtime  insulin lispro (ADMELOG) corrective regimen sliding scale   SubCutaneous three times a day before meals  insulin lispro (ADMELOG) corrective regimen sliding scale   SubCutaneous at bedtime  insulin lispro Injectable (ADMELOG) 2 Unit(s) SubCutaneous three times a day before meals  levothyroxine 75 MICROGram(s) Oral daily  memantine 10 milliGRAM(s) Oral two times a day  metoprolol tartrate 50 milliGRAM(s) Oral every 6 hours  pantoprazole    Tablet 40 milliGRAM(s) Oral before breakfast  piperacillin/tazobactam IVPB. 3.375 Gram(s) IV Intermittent once  piperacillin/tazobactam IVPB.- 3.375 Gram(s) IV Intermittent once  polyethylene glycol 3350 17 Gram(s) Oral daily  remdesivir  IVPB 100 milliGRAM(s) IV Intermittent every 24 hours  remdesivir  IVPB   IV Intermittent   rivastigmine patch  9.5 mG/24 Hr(s) 1 Patch Transdermal every 24 hours  senna 2 Tablet(s) Oral at bedtime  tamsulosin 0.4 milliGRAM(s) Oral at bedtime    MEDICATIONS  (PRN):  acetaminophen     Tablet .. 650 milliGRAM(s) Oral every 6 hours PRN Temp greater or equal to 38C (100.4F), Mild Pain (1 - 3)  albuterol/ipratropium for Nebulization 3 milliLiter(s) Nebulizer every 6 hours PRN Shortness of Breath  aluminum hydroxide/magnesium hydroxide/simethicone Suspension 30 milliLiter(s) Oral every 4 hours PRN Dyspepsia  benzonatate 100 milliGRAM(s) Oral three times a day PRN Cough  bisacodyl Suppository 10 milliGRAM(s) Rectal daily PRN Constipation  dextrose Oral Gel 15 Gram(s) Oral once PRN Blood Glucose LESS THAN 70 milliGRAM(s)/deciliter  melatonin 3 milliGRAM(s) Oral at bedtime PRN Insomnia  ondansetron Injectable 4 milliGRAM(s) IV Push every 8 hours PRN Nausea and/or Vomiting      Allergies    No Known Allergies    Intolerances        REVIEW OF SYSTEMS:  CONSTITUTIONAL: No fever or chills  HEENT:  No headache, no sore throat  RESPIRATORY: + cough, No wheezing or shortness of breath  CARDIOVASCULAR: No chest pain, palpitations  GASTROINTESTINAL: No abd pain, nausea, vomiting, or diarrhea  GENITOURINARY: No dysuria, frequency, or hematuria  NEUROLOGICAL: no focal weakness or dizziness  MUSCULOSKELETAL: no myalgias     Vital Signs Last 24 Hrs  T(C): 36.5 (04 Apr 2024 12:15), Max: 37.9 (04 Apr 2024 09:45)  T(F): 97.7 (04 Apr 2024 12:15), Max: 100.3 (04 Apr 2024 09:45)  HR: 101 (04 Apr 2024 12:15) (101 - 123)  BP: 104/75 (04 Apr 2024 12:15) (104/75 - 117/87)  BP(mean): --  RR: 20 (04 Apr 2024 12:15) (17 - 20)  SpO2: 96% (04 Apr 2024 12:15) (93% - 96%)    Parameters below as of 04 Apr 2024 12:15  Patient On (Oxygen Delivery Method): nasal cannula, high flow  O2 Flow (L/min): 40  O2 Concentration (%): 50    PHYSICAL EXAM:  GENERAL: NAD, on HFNC  HEENT:  anicteric, moist mucous membranes  CHEST/LUNG:  diffused crackles b/l  HEART:  irregularly irregular S1, S2  ABDOMEN:  BS+, soft, nontender, nondistended  EXTREMITIES: no edema, cyanosis, or calf tenderness  NERVOUS SYSTEM: aa0/3 sensory /motor intact   gu intact       LABS:                        10.7   18.77 )-----------( 302      ( 04 Apr 2024 08:55 )             33.1     CBC Full  -  ( 04 Apr 2024 08:55 )  WBC Count : 18.77 K/uL  Hemoglobin : 10.7 g/dL  Hematocrit : 33.1 %  Platelet Count - Automated : 302 K/uL  Mean Cell Volume : 85.8 fl  Mean Cell Hemoglobin : 27.7 pg  Mean Cell Hemoglobin Concentration : 32.3 gm/dL  Auto Neutrophil # : 17.42 K/uL  Auto Lymphocyte # : 0.54 K/uL  Auto Monocyte # : 0.59 K/uL  Auto Eosinophil # : 0.00 K/uL  Auto Basophil # : 0.03 K/uL  Auto Neutrophil % : 92.8 %  Auto Lymphocyte % : 2.9 %  Auto Monocyte % : 3.1 %  Auto Eosinophil % : 0.0 %  Auto Basophil % : 0.2 %    04 Apr 2024 08:55    133    |  101    |  46     ----------------------------<  250    4.7     |  20     |  2.20     Ca    8.0        04 Apr 2024 08:55    TPro  7.0    /  Alb  2.7    /  TBili  0.7    /  DBili  x      /  AST  207    /  ALT  155    /  AlkPhos  122    04 Apr 2024 08:55      Urinalysis Basic - ( 04 Apr 2024 08:55 )    Color: x / Appearance: x / SG: x / pH: x  Gluc: 250 mg/dL / Ketone: x  / Bili: x / Urobili: x   Blood: x / Protein: x / Nitrite: x   Leuk Esterase: x / RBC: x / WBC x   Sq Epi: x / Non Sq Epi: x / Bacteria: x      CAPILLARY BLOOD GLUCOSE      POCT Blood Glucose.: 248 mg/dL (04 Apr 2024 12:15)  POCT Blood Glucose.: 249 mg/dL (04 Apr 2024 08:05)  POCT Blood Glucose.: 136 mg/dL (03 Apr 2024 20:37)  POCT Blood Glucose.: 175 mg/dL (03 Apr 2024 17:06)        Culture - Urine (collected 04-01-24 @ 23:07)  Source: Clean Catch Clean Catch (Midstream)  Preliminary Report (04-03-24 @ 20:03):    10,000 - 49,000 CFU/mL Klebsiella pneumoniae    Culture - Blood (collected 04-01-24 @ 20:30)  Source: .Blood Blood-Peripheral  Preliminary Report (04-04-24 @ 01:02):    No growth at 48 Hours    Culture - Blood (collected 04-01-24 @ 20:20)  Source: .Blood Blood-Peripheral  Preliminary Report (04-04-24 @ 01:02):    No growth at 48 Hours        RADIOLOGY & ADDITIONAL TESTS:  Personally reviewed.     Consultant(s) Notes Reviewed:  [x] YES  [ ] NO Patient is a 88y old  Female who presents with a chief complaint of weakness (04 Apr 2024 13:48)      TELE: afib 110s-140s.    INTERVAL HPI/OVERNIGHT EVENTS: Overnight, pt requiring lopressor 5mg IVP x1 this am for afib with RVR. Pt seen and examined at the bedside this AM. Pt reports feeling tired and fatigued today. Pt tolerating o2 HFNC at 40L/50%. Denies fever, chills, cp, sob.    MEDICATIONS  (STANDING):  apixaban 2.5 milliGRAM(s) Oral every 12 hours  bisacodyl Suppository 10 milliGRAM(s) Rectal once  dexAMETHasone     Tablet 10 milliGRAM(s) Oral daily  dextrose 5%. 1000 milliLiter(s) (100 mL/Hr) IV Continuous <Continuous>  dextrose 5%. 1000 milliLiter(s) (50 mL/Hr) IV Continuous <Continuous>  dextrose 50% Injectable 12.5 Gram(s) IV Push once  dextrose 50% Injectable 25 Gram(s) IV Push once  dextrose 50% Injectable 25 Gram(s) IV Push once  digoxin     Tablet 125 MICROGram(s) Oral every other day  escitalopram 10 milliGRAM(s) Oral daily  glucagon  Injectable 1 milliGRAM(s) IntraMuscular once  insulin glargine Injectable (LANTUS) 5 Unit(s) SubCutaneous at bedtime  insulin lispro (ADMELOG) corrective regimen sliding scale   SubCutaneous three times a day before meals  insulin lispro (ADMELOG) corrective regimen sliding scale   SubCutaneous at bedtime  insulin lispro Injectable (ADMELOG) 2 Unit(s) SubCutaneous three times a day before meals  levothyroxine 75 MICROGram(s) Oral daily  memantine 10 milliGRAM(s) Oral two times a day  metoprolol tartrate 50 milliGRAM(s) Oral every 6 hours  pantoprazole    Tablet 40 milliGRAM(s) Oral before breakfast  piperacillin/tazobactam IVPB. 3.375 Gram(s) IV Intermittent once  piperacillin/tazobactam IVPB.- 3.375 Gram(s) IV Intermittent once  polyethylene glycol 3350 17 Gram(s) Oral daily  remdesivir  IVPB 100 milliGRAM(s) IV Intermittent every 24 hours  remdesivir  IVPB   IV Intermittent   rivastigmine patch  9.5 mG/24 Hr(s) 1 Patch Transdermal every 24 hours  senna 2 Tablet(s) Oral at bedtime  tamsulosin 0.4 milliGRAM(s) Oral at bedtime    MEDICATIONS  (PRN):  acetaminophen     Tablet .. 650 milliGRAM(s) Oral every 6 hours PRN Temp greater or equal to 38C (100.4F), Mild Pain (1 - 3)  albuterol/ipratropium for Nebulization 3 milliLiter(s) Nebulizer every 6 hours PRN Shortness of Breath  aluminum hydroxide/magnesium hydroxide/simethicone Suspension 30 milliLiter(s) Oral every 4 hours PRN Dyspepsia  benzonatate 100 milliGRAM(s) Oral three times a day PRN Cough  bisacodyl Suppository 10 milliGRAM(s) Rectal daily PRN Constipation  dextrose Oral Gel 15 Gram(s) Oral once PRN Blood Glucose LESS THAN 70 milliGRAM(s)/deciliter  melatonin 3 milliGRAM(s) Oral at bedtime PRN Insomnia  ondansetron Injectable 4 milliGRAM(s) IV Push every 8 hours PRN Nausea and/or Vomiting      Allergies    No Known Allergies    Intolerances        REVIEW OF SYSTEMS:  CONSTITUTIONAL: No fever or chills  HEENT:  No headache, no sore throat  RESPIRATORY: + cough, No wheezing or shortness of breath  CARDIOVASCULAR: No chest pain, palpitations  GASTROINTESTINAL: No abd pain, nausea, vomiting, or diarrhea  GENITOURINARY: No dysuria, frequency, or hematuria  NEUROLOGICAL: no focal weakness or dizziness  MUSCULOSKELETAL: no myalgias     Vital Signs Last 24 Hrs  T(C): 36.5 (04 Apr 2024 12:15), Max: 37.9 (04 Apr 2024 09:45)  T(F): 97.7 (04 Apr 2024 12:15), Max: 100.3 (04 Apr 2024 09:45)  HR: 101 (04 Apr 2024 12:15) (101 - 123)  BP: 104/75 (04 Apr 2024 12:15) (104/75 - 117/87)  BP(mean): --  RR: 20 (04 Apr 2024 12:15) (17 - 20)  SpO2: 96% (04 Apr 2024 12:15) (93% - 96%)    Parameters below as of 04 Apr 2024 12:15  Patient On (Oxygen Delivery Method): nasal cannula, high flow  O2 Flow (L/min): 40  O2 Concentration (%): 50    PHYSICAL EXAM:  GENERAL: NAD, on HFNC  HEENT:  anicteric, moist mucous membranes  CHEST/LUNG:  diffused crackles rt more than lt   HEART:  irregularly irregular S1, S2 tachy +  ABDOMEN:  BS+, soft, nontender, nondistended  EXTREMITIES: no edema, cyanosis, or calf tenderness  NERVOUS SYSTEM: aa0/3 sensory /motor intact   gu intact       LABS:                        10.7   18.77 )-----------( 302      ( 04 Apr 2024 08:55 )             33.1     CBC Full  -  ( 04 Apr 2024 08:55 )  WBC Count : 18.77 K/uL  Hemoglobin : 10.7 g/dL  Hematocrit : 33.1 %  Platelet Count - Automated : 302 K/uL  Mean Cell Volume : 85.8 fl  Mean Cell Hemoglobin : 27.7 pg  Mean Cell Hemoglobin Concentration : 32.3 gm/dL  Auto Neutrophil # : 17.42 K/uL  Auto Lymphocyte # : 0.54 K/uL  Auto Monocyte # : 0.59 K/uL  Auto Eosinophil # : 0.00 K/uL  Auto Basophil # : 0.03 K/uL  Auto Neutrophil % : 92.8 %  Auto Lymphocyte % : 2.9 %  Auto Monocyte % : 3.1 %  Auto Eosinophil % : 0.0 %  Auto Basophil % : 0.2 %    04 Apr 2024 08:55    133    |  101    |  46     ----------------------------<  250    4.7     |  20     |  2.20     Ca    8.0        04 Apr 2024 08:55    TPro  7.0    /  Alb  2.7    /  TBili  0.7    /  DBili  x      /  AST  207    /  ALT  155    /  AlkPhos  122    04 Apr 2024 08:55      Urinalysis Basic - ( 04 Apr 2024 08:55 )    Color: x / Appearance: x / SG: x / pH: x  Gluc: 250 mg/dL / Ketone: x  / Bili: x / Urobili: x   Blood: x / Protein: x / Nitrite: x   Leuk Esterase: x / RBC: x / WBC x   Sq Epi: x / Non Sq Epi: x / Bacteria: x      CAPILLARY BLOOD GLUCOSE      POCT Blood Glucose.: 248 mg/dL (04 Apr 2024 12:15)  POCT Blood Glucose.: 249 mg/dL (04 Apr 2024 08:05)  POCT Blood Glucose.: 136 mg/dL (03 Apr 2024 20:37)  POCT Blood Glucose.: 175 mg/dL (03 Apr 2024 17:06)        Culture - Urine (collected 04-01-24 @ 23:07)  Source: Clean Catch Clean Catch (Midstream)  Preliminary Report (04-03-24 @ 20:03):    10,000 - 49,000 CFU/mL Klebsiella pneumoniae    Culture - Blood (collected 04-01-24 @ 20:30)  Source: .Blood Blood-Peripheral  Preliminary Report (04-04-24 @ 01:02):    No growth at 48 Hours    Culture - Blood (collected 04-01-24 @ 20:20)  Source: .Blood Blood-Peripheral  Preliminary Report (04-04-24 @ 01:02):    No growth at 48 Hours        RADIOLOGY & ADDITIONAL TESTS:  Personally reviewed.     Consultant(s) Notes Reviewed:  [x] YES  [ ] NO

## 2024-04-04 NOTE — DIETITIAN INITIAL EVALUATION ADULT - FACTORS AFF FOOD INTAKE
change in mental status/difficulty feeding self Carac Counseling:  I discussed with the patient the risks of Carac including but not limited to erythema, scaling, itching, weeping, crusting, and pain.

## 2024-04-04 NOTE — CASE MANAGEMENT PROGRESS NOTE - NSCMPROGRESSNOTE_GEN_ALL_CORE
Discussed pt in rounds, pt remains acute on high flow oxygen. Receiving IV Remdesivir, IV Rocephin. Pt with elevated HR 13 this am, lopresor IVP administered.

## 2024-04-04 NOTE — DIETITIAN INITIAL EVALUATION ADULT - OTHER INFO
87yo F w/ PMH of HFrEF of 20-25%, dementia, Afib (on low-dose Eliquis), hypothyroidism, T2DM, CKD3b, p/w generalized weakness admitted with sepsis due to UTI and COVID, afib w/ RVR, found to have acute hypoxic respiratory failure and likely L-sided pyelonephritis. Pt with acute hypoxia this AM -- placed on HFNC -- wean as tolerated.acute hypoxic respiratory failure likely multifactorial including pulmonary edema, COVID, suspected overlying bacterial PNA There is concern for possible PE or flash pulmonary edema as an acute decompensation .CT  Angio chest, and CT abdomen and pelvis w/ IV contrast performed which showed: Multilobar pneumonia right lung. Septal thickening which may reflect interstitial edema. Bilateral pleural effusions with compressive atelectasis lower lobes. No acute pulmonary embolism. Cystitis and left pyeloureteritis; correlate for ascending urinary tract infection. Distended rectum with fecal impaction.    Due to complexity of nsg care at this time, unable to do a nutrition focused physical exam. per nsg, ate < 25% of breakfast, is taking po fluids , small  amount smeary BM . Based on BMI highly suspicious pt will meet criteria for maln when a nutr focused physical exam can be completed.

## 2024-04-04 NOTE — PROGRESS NOTE ADULT - PROBLEM SELECTOR PLAN 3
-d/c ceftriaxone --> switched to zosyn for left pyelonephritis   -F/u urine cx and sensitivity and tailor antibiotics accordingly  -avoid IV hydration  -PT/OT left pyelonephritis - rocephin switch Zosyn high pro lela 6.4   -F/u urine cx and sensitivity and tailor antibiotics accordingly  -avoid IV hydration  -PT/OT

## 2024-04-04 NOTE — DIETITIAN INITIAL EVALUATION ADULT - ETIOLOGY
multiple medical problems  ^ needs for wound healing  multiple medical problems ( chronic and acute, covid)

## 2024-04-04 NOTE — PROGRESS NOTE ADULT - SUBJECTIVE AND OBJECTIVE BOX
Patient is a 88y old  Female who presents with a chief complaint of weakness (03 Apr 2024 16:36)  Patient seen in follow up for CKD.        PAST MEDICAL HISTORY:  Atrial fibrillation    Hypothyroidism    Mild Alzheimer's dementia    Diabetes mellitus    Acute on chronic systolic congestive heart failure    Hypertension    Hyperlipemia    Cardiac LV ejection fraction 21-30%      MEDICATIONS  (STANDING):  apixaban 2.5 milliGRAM(s) Oral every 12 hours  bisacodyl Suppository 10 milliGRAM(s) Rectal once  cefTRIAXone   IVPB 1000 milliGRAM(s) IV Intermittent every 24 hours  dexAMETHasone     Tablet 10 milliGRAM(s) Oral daily  dextrose 5%. 1000 milliLiter(s) (50 mL/Hr) IV Continuous <Continuous>  dextrose 5%. 1000 milliLiter(s) (100 mL/Hr) IV Continuous <Continuous>  dextrose 50% Injectable 12.5 Gram(s) IV Push once  dextrose 50% Injectable 25 Gram(s) IV Push once  dextrose 50% Injectable 25 Gram(s) IV Push once  digoxin     Tablet 125 MICROGram(s) Oral every other day  escitalopram 10 milliGRAM(s) Oral daily  glucagon  Injectable 1 milliGRAM(s) IntraMuscular once  insulin glargine Injectable (LANTUS) 5 Unit(s) SubCutaneous at bedtime  insulin lispro (ADMELOG) corrective regimen sliding scale   SubCutaneous three times a day before meals  insulin lispro (ADMELOG) corrective regimen sliding scale   SubCutaneous at bedtime  insulin lispro Injectable (ADMELOG) 2 Unit(s) SubCutaneous three times a day before meals  levothyroxine 75 MICROGram(s) Oral daily  memantine 10 milliGRAM(s) Oral two times a day  metoprolol tartrate 50 milliGRAM(s) Oral every 6 hours  pantoprazole    Tablet 40 milliGRAM(s) Oral before breakfast  polyethylene glycol 3350 17 Gram(s) Oral daily  remdesivir  IVPB 100 milliGRAM(s) IV Intermittent every 24 hours  remdesivir  IVPB   IV Intermittent   rivastigmine patch  9.5 mG/24 Hr(s) 1 Patch Transdermal every 24 hours  senna 2 Tablet(s) Oral at bedtime  tamsulosin 0.4 milliGRAM(s) Oral at bedtime    MEDICATIONS  (PRN):  acetaminophen     Tablet .. 650 milliGRAM(s) Oral every 6 hours PRN Temp greater or equal to 38C (100.4F), Mild Pain (1 - 3)  albuterol/ipratropium for Nebulization 3 milliLiter(s) Nebulizer every 6 hours PRN Shortness of Breath  aluminum hydroxide/magnesium hydroxide/simethicone Suspension 30 milliLiter(s) Oral every 4 hours PRN Dyspepsia  benzonatate 100 milliGRAM(s) Oral three times a day PRN Cough  bisacodyl Suppository 10 milliGRAM(s) Rectal daily PRN Constipation  dextrose Oral Gel 15 Gram(s) Oral once PRN Blood Glucose LESS THAN 70 milliGRAM(s)/deciliter  melatonin 3 milliGRAM(s) Oral at bedtime PRN Insomnia  ondansetron Injectable 4 milliGRAM(s) IV Push every 8 hours PRN Nausea and/or Vomiting    T(C): 36.5 (04-04-24 @ 12:15), Max: 37.9 (04-04-24 @ 09:45)  HR: 101 (04-04-24 @ 12:15) (100 - 128)  BP: 104/75 (04-04-24 @ 12:15) (104/75 - 128/86)  RR: 20 (04-04-24 @ 12:15)  SpO2: 96% (04-04-24 @ 12:15)  Wt(kg): --  I&O's Detail    03 Apr 2024 07:01  -  04 Apr 2024 07:00  --------------------------------------------------------  IN:    Oral Fluid: 460 mL  Total IN: 460 mL    OUT:    Voided (mL): 500 mL  Total OUT: 500 mL    Total NET: -40 mL      04 Apr 2024 07:01  -  04 Apr 2024 13:46  --------------------------------------------------------  IN:    Oral Fluid: 480 mL  Total IN: 480 mL    OUT:    Intermittent Catheterization - Urethral (mL): 400 mL  Total OUT: 400 mL    Total NET: 80 mL            PHYSICAL EXAM:  General: No distress  Respiratory: b/l air entry  Cardiovascular: S1 S2  Gastrointestinal: soft  Extremities:  no edema                     LABORATORY:                        10.7   18.77 )-----------( 302      ( 04 Apr 2024 08:55 )             33.1     04-04    133<L>  |  101  |  46<H>  ----------------------------<  250<H>  4.7   |  20<L>  |  2.20<H>    Ca    8.0<L>      04 Apr 2024 08:55  Phos  3.3     04-03  Mg     2.3     04-03    TPro  7.0  /  Alb  2.7<L>  /  TBili  0.7  /  DBili  x   /  AST  207<H>  /  ALT  155<H>  /  AlkPhos  122<H>  04-04    Sodium: 133 mmol/L (04-04 @ 08:55)  Sodium: 136 mmol/L (04-03 @ 07:40)    Potassium: 4.7 mmol/L (04-04 @ 08:55)  Potassium: 4.6 mmol/L (04-03 @ 07:40)    Hemoglobin: 10.7 g/dL (04-04 @ 08:55)  Hemoglobin: 10.3 g/dL (04-03 @ 07:40)  Hemoglobin: 10.8 g/dL (04-02 @ 10:33)  Hemoglobin: 10.7 g/dL (04-01 @ 20:30)    Creatinine, Serum 2.20 (04-04 @ 08:55)  Creatinine, Serum 1.70 (04-03 @ 07:40)  Creatinine, Serum 1.40 (04-02 @ 05:50)  Creatinine, Serum 1.60 (04-01 @ 20:30)        LIVER FUNCTIONS - ( 04 Apr 2024 08:55 )  Alb: 2.7 g/dL / Pro: 7.0 g/dL / ALK PHOS: 122 U/L / ALT: 155 U/L / AST: 207 U/L / GGT: x           Urinalysis Basic - ( 04 Apr 2024 08:55 )    Color: x / Appearance: x / SG: x / pH: x  Gluc: 250 mg/dL / Ketone: x  / Bili: x / Urobili: x   Blood: x / Protein: x / Nitrite: x   Leuk Esterase: x / RBC: x / WBC x   Sq Epi: x / Non Sq Epi: x / Bacteria: x

## 2024-04-04 NOTE — PROGRESS NOTE ADULT - PROBLEM SELECTOR PLAN 5
-C/w insulin SS  - added ADMELOG 2 units premeals  - added lantus 5 units at bedtime -C/w insulin SS  - added ADMELOG 2 units pre meals  - added Lantus 5 units at bedtime

## 2024-04-04 NOTE — PROGRESS NOTE ADULT - SUBJECTIVE AND OBJECTIVE BOX
[facility] -- Dylan Hernandez Pannella, Patel, Savella, Goodger, Cohen: Office # [phone]    Follow Up:  A fib, COVID    Subjective/Observations: Patient seen and examined. Patient awake, alert, resting in bed. No complaints of chest pain, dyspnea, palpitations or dizziness. Tolerating O2 via high flow nasal cannula. Pt rates slowly trending up, now in 110-140s     REVIEW OF SYSTEMS: All other review of systems are negative unless indicated above    PAST MEDICAL & SURGICAL HISTORY:  Atrial fibrillation      Hypothyroidism      Hypothyroidism      Mild Alzheimer's dementia      Diabetes mellitus      Acute on chronic systolic congestive heart failure      Hypertension      Hypertension      Hyperlipemia      Cardiac LV ejection fraction 21-30%  12/22      History of appendectomy      H/O hernia repair      History of cholecystectomy    MEDICATIONS  (STANDING):  apixaban 2.5 milliGRAM(s) Oral every 12 hours  bisacodyl Suppository 10 milliGRAM(s) Rectal once  cefTRIAXone   IVPB 1000 milliGRAM(s) IV Intermittent every 24 hours  dexAMETHasone     Tablet 10 milliGRAM(s) Oral daily  dextrose 5%. 1000 milliLiter(s) (50 mL/Hr) IV Continuous <Continuous>  dextrose 5%. 1000 milliLiter(s) (100 mL/Hr) IV Continuous <Continuous>  dextrose 50% Injectable 25 Gram(s) IV Push once  dextrose 50% Injectable 12.5 Gram(s) IV Push once  dextrose 50% Injectable 25 Gram(s) IV Push once  digoxin     Tablet 125 MICROGram(s) Oral every other day  escitalopram 10 milliGRAM(s) Oral daily  glucagon  Injectable 1 milliGRAM(s) IntraMuscular once  insulin glargine Injectable (LANTUS) 5 Unit(s) SubCutaneous at bedtime  insulin lispro (ADMELOG) corrective regimen sliding scale   SubCutaneous three times a day before meals  insulin lispro (ADMELOG) corrective regimen sliding scale   SubCutaneous at bedtime  insulin lispro Injectable (ADMELOG) 2 Unit(s) SubCutaneous three times a day before meals  levothyroxine 75 MICROGram(s) Oral daily  memantine 10 milliGRAM(s) Oral two times a day  metoprolol tartrate 50 milliGRAM(s) Oral every 6 hours  pantoprazole    Tablet 40 milliGRAM(s) Oral before breakfast  polyethylene glycol 3350 17 Gram(s) Oral daily  remdesivir  IVPB 100 milliGRAM(s) IV Intermittent every 24 hours  remdesivir  IVPB   IV Intermittent   rivastigmine patch  9.5 mG/24 Hr(s) 1 Patch Transdermal every 24 hours  senna 2 Tablet(s) Oral at bedtime  tamsulosin 0.4 milliGRAM(s) Oral at bedtime    MEDICATIONS  (PRN):  acetaminophen     Tablet .. 650 milliGRAM(s) Oral every 6 hours PRN Temp greater or equal to 38C (100.4F), Mild Pain (1 - 3)  albuterol/ipratropium for Nebulization 3 milliLiter(s) Nebulizer every 6 hours PRN Shortness of Breath  aluminum hydroxide/magnesium hydroxide/simethicone Suspension 30 milliLiter(s) Oral every 4 hours PRN Dyspepsia  benzonatate 100 milliGRAM(s) Oral three times a day PRN Cough  bisacodyl Suppository 10 milliGRAM(s) Rectal daily PRN Constipation  dextrose Oral Gel 15 Gram(s) Oral once PRN Blood Glucose LESS THAN 70 milliGRAM(s)/deciliter  melatonin 3 milliGRAM(s) Oral at bedtime PRN Insomnia  ondansetron Injectable 4 milliGRAM(s) IV Push every 8 hours PRN Nausea and/or Vomiting    Allergies    No Known Allergies    Intolerances      Vital Signs Last 24 Hrs  T(C): 37.9 (04 Apr 2024 09:45), Max: 37.9 (04 Apr 2024 09:45)  T(F): 100.3 (04 Apr 2024 09:45), Max: 100.3 (04 Apr 2024 09:45)  HR: 123 (04 Apr 2024 06:23) (100 - 123)  BP: 117/82 (04 Apr 2024 09:45) (108/74 - 117/87)  BP(mean): --  RR: 18 (04 Apr 2024 06:23) (17 - 18)  SpO2: 96% (04 Apr 2024 06:23) (93% - 96%)    Parameters below as of 04 Apr 2024 06:23  Patient On (Oxygen Delivery Method): nasal cannula, high flow      I&O's Summary    03 Apr 2024 07:01  -  04 Apr 2024 07:00  --------------------------------------------------------  IN: 460 mL / OUT: 500 mL / NET: -40 mL    04 Apr 2024 07:01  -  04 Apr 2024 11:37  --------------------------------------------------------  IN: 360 mL / OUT: 400 mL / NET: -40 mL          TELE: A fib 110-140s   PHYSICAL EXAM:  Constitutional: NAD, awake and alert, Frail   HEENT: Moist Mucous Membranes, Anicteric  Pulmonary: Non-labored, Diminished at bases, No wheezing, rales + basilar rhonchi  Cardiovascular: Irregular, S1 and S2, No murmurs, No rubs, gallops or clicks  Gastrointestinal:  soft, nontender, nondistended   Lymph: No peripheral edema. No lymphadenopathy.   Skin: No visible rashes or ulcers.  Psych:  Mood & affect appropriate      LABS: All Labs Reviewed:                        10.7   18.77 )-----------( 302      ( 04 Apr 2024 08:55 )             33.1                         10.3   13.78 )-----------( 293      ( 03 Apr 2024 07:40 )             32.4                         10.8   16.31 )-----------( 297      ( 02 Apr 2024 10:33 )             34.5     04 Apr 2024 08:55    133    |  101    |  46     ----------------------------<  250    4.7     |  20     |  2.20   03 Apr 2024 07:40    136    |  105    |  28     ----------------------------<  211    4.6     |  19     |  1.70   02 Apr 2024 05:50    140    |  110    |  19     ----------------------------<  111    3.3     |  22     |  1.40     Ca    8.0        04 Apr 2024 08:55  Ca    8.2        03 Apr 2024 07:40  Ca    8.0        02 Apr 2024 05:50  Phos  3.3       03 Apr 2024 07:40  Mg     2.3       03 Apr 2024 07:40  Mg     1.7       02 Apr 2024 05:50  Mg     1.4       01 Apr 2024 20:30    TPro  7.0    /  Alb  2.7    /  TBili  0.7    /  DBili  x      /  AST  207    /  ALT  155    /  AlkPhos  122    04 Apr 2024 08:55  TPro  6.7    /  Alb  2.5    /  TBili  0.4    /  DBili  x      /  AST  153    /  ALT  108    /  AlkPhos  116    03 Apr 2024 07:40  TPro  7.2    /  Alb  2.8    /  TBili  0.6    /  DBili  x      /  AST  20     /  ALT  20     /  AlkPhos  52     01 Apr 2024 20:30   LIVER FUNCTIONS - ( 04 Apr 2024 08:55 )  Alb: 2.7 g/dL / Pro: 7.0 g/dL / ALK PHOS: 122 U/L / ALT: 155 U/L / AST: 207 U/L / GGT: x           D-Dimer Assay, Quantitative: 500 ng/mL DDU (04-02-24 @ 10:33)  Lactate, Blood: 1.4 mmol/L (04-01-24 @ 20:30)    12 Lead ECG:   Ventricular Rate 127 BPM    QRS Duration 84 ms    Q-T Interval 278 ms    QTC Calculation(Bazett) 404 ms    R Axis 28 degrees    T Axis -61 degrees    Diagnosis Line Atrial fibrillation with rapid ventricular response  Low voltage QRS  Septal infarct , age undetermined  Abnormal ECG  No previous ECGs available  Confirmed by rogerio Harvey ([id number]) on 4/2/2024 2:48:52 PM (04-01-24 @ 19:24)      ACC: [id number] EXAM:  ECHO TTE WO CON COMP W DOPP                          PROCEDURE DATE:  12/22/2022          INTERPRETATION:  INDICATION: Abnormal EKG  Sonographer KL    Blood Pressure 142/86    Height 158 cm     Weight 51.7 kg       BSA 1.5   sqm    Dimensions:  LA 3.6       Normal Values: 2.0 - 4.0 cm  Ao 3.2        Normal Values: 2.0 - 3.8 cm  SEPTUM 1.0       Normal Values: 0.6 - 1.2 cm  PWT 0.9       Normal Values: 0.6 - 1.1 cm  LVIDd 5.8         Normal Values: 3.0 - 5.6 cm  LVIDs 4.3      Normal Values: 1.8 - 4.0 cm      OBSERVATIONS:  Mitral Valve: Moderate MR.  Aortic Valve/Aorta: normal trileaflet aortic valve.  Tricuspid Valve: Mild TR.  Pulmonic Valve: Mild PI  Left Atrium: Enlarged  Right Atrium: Enlarged  Left Ventricle: Left ventricular enlargement with severe left ventricular   systolic dysfunction, estimated LVEF of 20-25%. The entire apex and   anterior walls appear akinetic. The inferior and inferolateral walls   appear hypokinetic. Remaining walls are not well-visualized  Right Ventricle: Grossly normal size and systolic function.  Pericardium: no significant pericardial effusion.  Pulmonary/RV Pressure: estimated PA systolic pressure of at least 35 mmHg   assuming an RA pressure of 3mmHg.        IMPRESSION:  Left ventricular enlargement with severe left ventricular systolic   dysfunction, estimated LVEF of 20-25%. The entire apex and anterior walls   appear akinetic. The inferior and inferolateral walls appear hypokinetic.  Grossly normal RV size and systolic function.  Biatrial enlargement  Normal trileaflet aortic valve, without AI.  Moderate MR  Mild TR.  No significant pericardial effusion.    --- End of Report ---      JOHN BAEZA MD; Attending Cardiologist  This document has been electronically signed. Dec 23 2022  4:40PM   [facility] -- Dylan Hernandez Pannella, Patel, Savella, Goodger, Cohen: Office # [phone]    Follow Up:  A fib, COVID    Subjective/Observations: Patient seen and examined. Patient awake, alert, resting in bed. No complaints of chest pain, dyspnea, palpitations or dizziness. Tolerating O2 via high flow nasal cannula. Pt rates slowly trending up, now in 110-140s     REVIEW OF SYSTEMS: All other review of systems are negative unless indicated above    PAST MEDICAL & SURGICAL HISTORY:  Atrial fibrillation      Hypothyroidism      Hypothyroidism      Mild Alzheimer's dementia      Diabetes mellitus      Acute on chronic systolic congestive heart failure      Hypertension      Hypertension      Hyperlipemia      Cardiac LV ejection fraction 21-30%  12/22      History of appendectomy      H/O hernia repair      History of cholecystectomy    MEDICATIONS  (STANDING):  apixaban 2.5 milliGRAM(s) Oral every 12 hours  bisacodyl Suppository 10 milliGRAM(s) Rectal once  cefTRIAXone   IVPB 1000 milliGRAM(s) IV Intermittent every 24 hours  dexAMETHasone     Tablet 10 milliGRAM(s) Oral daily  dextrose 5%. 1000 milliLiter(s) (50 mL/Hr) IV Continuous <Continuous>  dextrose 5%. 1000 milliLiter(s) (100 mL/Hr) IV Continuous <Continuous>  dextrose 50% Injectable 25 Gram(s) IV Push once  dextrose 50% Injectable 12.5 Gram(s) IV Push once  dextrose 50% Injectable 25 Gram(s) IV Push once  digoxin     Tablet 125 MICROGram(s) Oral every other day  escitalopram 10 milliGRAM(s) Oral daily  glucagon  Injectable 1 milliGRAM(s) IntraMuscular once  insulin glargine Injectable (LANTUS) 5 Unit(s) SubCutaneous at bedtime  insulin lispro (ADMELOG) corrective regimen sliding scale   SubCutaneous three times a day before meals  insulin lispro (ADMELOG) corrective regimen sliding scale   SubCutaneous at bedtime  insulin lispro Injectable (ADMELOG) 2 Unit(s) SubCutaneous three times a day before meals  levothyroxine 75 MICROGram(s) Oral daily  memantine 10 milliGRAM(s) Oral two times a day  metoprolol tartrate 50 milliGRAM(s) Oral every 6 hours  pantoprazole    Tablet 40 milliGRAM(s) Oral before breakfast  polyethylene glycol 3350 17 Gram(s) Oral daily  remdesivir  IVPB 100 milliGRAM(s) IV Intermittent every 24 hours  remdesivir  IVPB   IV Intermittent   rivastigmine patch  9.5 mG/24 Hr(s) 1 Patch Transdermal every 24 hours  senna 2 Tablet(s) Oral at bedtime  tamsulosin 0.4 milliGRAM(s) Oral at bedtime    MEDICATIONS  (PRN):  acetaminophen     Tablet .. 650 milliGRAM(s) Oral every 6 hours PRN Temp greater or equal to 38C (100.4F), Mild Pain (1 - 3)  albuterol/ipratropium for Nebulization 3 milliLiter(s) Nebulizer every 6 hours PRN Shortness of Breath  aluminum hydroxide/magnesium hydroxide/simethicone Suspension 30 milliLiter(s) Oral every 4 hours PRN Dyspepsia  benzonatate 100 milliGRAM(s) Oral three times a day PRN Cough  bisacodyl Suppository 10 milliGRAM(s) Rectal daily PRN Constipation  dextrose Oral Gel 15 Gram(s) Oral once PRN Blood Glucose LESS THAN 70 milliGRAM(s)/deciliter  melatonin 3 milliGRAM(s) Oral at bedtime PRN Insomnia  ondansetron Injectable 4 milliGRAM(s) IV Push every 8 hours PRN Nausea and/or Vomiting    Allergies    No Known Allergies    Intolerances      Vital Signs Last 24 Hrs  T(C): 37.9 (04 Apr 2024 09:45), Max: 37.9 (04 Apr 2024 09:45)  T(F): 100.3 (04 Apr 2024 09:45), Max: 100.3 (04 Apr 2024 09:45)  HR: 123 (04 Apr 2024 06:23) (100 - 123)  BP: 117/82 (04 Apr 2024 09:45) (108/74 - 117/87)  BP(mean): --  RR: 18 (04 Apr 2024 06:23) (17 - 18)  SpO2: 96% (04 Apr 2024 06:23) (93% - 96%)    Parameters below as of 04 Apr 2024 06:23  Patient On (Oxygen Delivery Method): nasal cannula, high flow      I&O's Summary    03 Apr 2024 07:01  -  04 Apr 2024 07:00  --------------------------------------------------------  IN: 460 mL / OUT: 500 mL / NET: -40 mL    04 Apr 2024 07:01  -  04 Apr 2024 11:37  --------------------------------------------------------  IN: 360 mL / OUT: 400 mL / NET: -40 mL          TELE: A fib 110-140s   PHYSICAL EXAM:  Constitutional: NAD, awake and alert, Frail   HEENT: Moist Mucous Membranes, Anicteric  Pulmonary: Non-labored, Diminished at bases, No wheezing, rales + basilar rhonchi  Cardiovascular: Irregular, S1 and S2, No murmurs, No rubs, gallops or clicks  Gastrointestinal:  soft, nontender, nondistended   Lymph: No peripheral edema. No lymphadenopathy.   Skin: No visible rashes or ulcers.  Psych:  Mood & affect appropriate      LABS: All Labs Reviewed:                        10.7   18.77 )-----------( 302      ( 04 Apr 2024 08:55 )             33.1                         10.3   13.78 )-----------( 293      ( 03 Apr 2024 07:40 )             32.4                         10.8   16.31 )-----------( 297      ( 02 Apr 2024 10:33 )             34.5     04 Apr 2024 08:55    133    |  101    |  46     ----------------------------<  250    4.7     |  20     |  2.20   03 Apr 2024 07:40    136    |  105    |  28     ----------------------------<  211    4.6     |  19     |  1.70   02 Apr 2024 05:50    140    |  110    |  19     ----------------------------<  111    3.3     |  22     |  1.40     Ca    8.0        04 Apr 2024 08:55  Ca    8.2        03 Apr 2024 07:40  Ca    8.0        02 Apr 2024 05:50  Phos  3.3       03 Apr 2024 07:40  Mg     2.3       03 Apr 2024 07:40  Mg     1.7       02 Apr 2024 05:50  Mg     1.4       01 Apr 2024 20:30    TPro  7.0    /  Alb  2.7    /  TBili  0.7    /  DBili  x      /  AST  207    /  ALT  155    /  AlkPhos  122    04 Apr 2024 08:55  TPro  6.7    /  Alb  2.5    /  TBili  0.4    /  DBili  x      /  AST  153    /  ALT  108    /  AlkPhos  116    03 Apr 2024 07:40  TPro  7.2    /  Alb  2.8    /  TBili  0.6    /  DBili  x      /  AST  20     /  ALT  20     /  AlkPhos  52     01 Apr 2024 20:30   LIVER FUNCTIONS - ( 04 Apr 2024 08:55 )  Alb: 2.7 g/dL / Pro: 7.0 g/dL / ALK PHOS: 122 U/L / ALT: 155 U/L / AST: 207 U/L / GGT: x           D-Dimer Assay, Quantitative: 500 ng/mL DDU (04-02-24 @ 10:33)  Lactate, Blood: 1.4 mmol/L (04-01-24 @ 20:30)    12 Lead ECG:   Ventricular Rate 127 BPM    QRS Duration 84 ms    Q-T Interval 278 ms    QTC Calculation(Bazett) 404 ms    R Axis 28 degrees    T Axis -61 degrees    Diagnosis Line Atrial fibrillation with rapid ventricular response  Low voltage QRS  Septal infarct , age undetermined  Abnormal ECG  No previous ECGs available  Confirmed by rogerio Harvey ([id number]) on 4/2/2024 2:48:52 PM (04-01-24 @ 19:24)      ACC: [id number] EXAM:  ECHO TTE WO CON COMP W DOPP                          PROCEDURE DATE:  12/22/2022          INTERPRETATION:  INDICATION: Abnormal EKG  Sonographer KL    Blood Pressure 142/86    Height 158 cm     Weight 51.7 kg       BSA 1.5   sqm    Dimensions:  LA 3.6       Normal Values: 2.0 - 4.0 cm  Ao 3.2        Normal Values: 2.0 - 3.8 cm  SEPTUM 1.0       Normal Values: 0.6 - 1.2 cm  PWT 0.9       Normal Values: 0.6 - 1.1 cm  LVIDd 5.8         Normal Values: 3.0 - 5.6 cm  LVIDs 4.3      Normal Values: 1.8 - 4.0 cm      OBSERVATIONS:  Mitral Valve: Moderate MR.  Aortic Valve/Aorta: normal trileaflet aortic valve.  Tricuspid Valve: Mild TR.  Pulmonic Valve: Mild PI  Left Atrium: Enlarged  Right Atrium: Enlarged  Left Ventricle: Left ventricular enlargement with severe left ventricular   systolic dysfunction, estimated LVEF of 20-25%. The entire apex and   anterior walls appear akinetic. The inferior and inferolateral walls   appear hypokinetic. Remaining walls are not well-visualized  Right Ventricle: Grossly normal size and systolic function.  Pericardium: no significant pericardial effusion.  Pulmonary/RV Pressure: estimated PA systolic pressure of at least 35 mmHg   assuming an RA pressure of 3mmHg.        IMPRESSION:  Left ventricular enlargement with severe left ventricular systolic   dysfunction, estimated LVEF of 20-25%. The entire apex and anterior walls   appear akinetic. The inferior and inferolateral walls appear hypokinetic.  Grossly normal RV size and systolic function.  Biatrial enlargement  Normal trileaflet aortic valve, without AI.  Moderate MR  Mild TR.  No significant pericardial effusion.    --- End of Report ---      JOHN BAEZA MD; Attending Cardiologist  This document has been electronically signed. Dec 23 2022  4:40PM

## 2024-04-04 NOTE — PROGRESS NOTE ADULT - CRITICAL CARE ATTENDING COMMENT
Upon my evaluation, this patient is at high risk for imminent or life threatening deterioration due to hypoxemic resp failure and other active medical issues which require my direct attention, intervention, and personal management.  I have personally spent >30 minutes  of critical care time exclusive of time spent on separate billing procedures. This includes review of laboratory data, radiology results, discussion with primary team\patient, and monitoring for potential decompensation. Interventions were performed as documented above.

## 2024-04-04 NOTE — PROGRESS NOTE ADULT - PROBLEM SELECTOR PLAN 7
ckd 2 -Cr at baseline (1.20-1.40)  -pt with worsening with renal function -- Cr 2.20 today  -avoid nephrotoxic agents ckd 2 -Cr at baseline (1.20-1.40)  -pt with worsening with renal function  with urinary retention s/p cath  -- Cr 2.20 today  -avoid nephrotoxic agents

## 2024-04-04 NOTE — PROGRESS NOTE ADULT - PROBLEM SELECTOR PLAN 4
HFrEF:  -BNP: [id number]  -CXR: w/o any congestion/p. effusion  -likely had component of acute on chronic systolic CHF contributing to hypoxia -- given lasix 20mg IV x1 -- monitor closely  -f/up BMP  -cardio consulted (Dr. Harvey group), recs appreciated HFrEF:-BNP: [id number]  -CXR: w/o any congestion/p. effusion  -likely had component of acute on chronic systolic CHF contributing to hypoxia - s/p iv lasix

## 2024-04-04 NOTE — PROGRESS NOTE ADULT - ASSESSMENT
89 y/o female with PMH of HFrEF of 20-25 %, dementia, Afib, hypothyroidism, DM who presented to ED to be evaluated for weakness, found to have +UTI and Covid positive     Atrial Fibrillation, COVID  - Known hx of Afib, with RVR episode in the setting of catecholamine surge 2/2 recent acute stressor (UTI, COVID)   - Tele w/ A fib 110-140s   - Change Toprol 100 to Metoprolol 50 Q6H  - Continue Digoxin 0.125 QoD  - Avoid CCB given reduced ef   - Continue Eliquis    - EKG Afib w/ RVR, unchanged from prior   - no anginal complaints   - No evidence of any active ischemia     - TTE (12/2022): EF 20-25%, apex and anterior wall akinesis, inferior wall hypokinesis, mod MR, mild TR  - known to our service, from last admission, she has known LV Dysfunction from TTE likely from a missed MI was decided for medical management given her frailty, increased creatinine at that time  - BNP:  <--29724  - does not appear significantly volume overload on exam but remains on HF and CT with danielle effusions  - Received Lasix dosed intermittently   - Creatinine:  <--2.20,  <--1.70,  <--1.40,  <--1.60  - Would hold off today, reassess need for Lasix daily   - Continue GDMT, on BB, to be switched to Toprol eventually.   - Unable to start ace/arb given YVETTE     - BP stable and controlled    - +COVID management per primary  - Monitor and replete lytes, keep K>4, Mg>2.  - Will continue to follow.    Brittany Bass, MS FNP, AGACNP  Nurse Practitioner- Cardiology   Please call on TEAMS   89 y/o female with PMH of HFrEF of 20-25 %, dementia, Afib, hypothyroidism, DM who presented to ED to be evaluated for weakness, found to have +UTI and Covid positive     Atrial Fibrillation, COVID  - Known hx of Afib, with RVR episode in the setting of catecholamine surge 2/2 recent acute stressor (UTI, COVID)   - Tele w/ A fib 110-140s   - Change Toprol 100 to Metoprolol 50 Q6H  - Continue Digoxin 0.125 QoD  - Avoid CCB given reduced ef   - Continue Eliquis    - EKG Afib w/ RVR, unchanged from prior   - no anginal complaints   - No evidence of any active ischemia     - TTE (12/2022): EF 20-25%, apex and anterior wall akinesis, inferior wall hypokinesis, mod MR, mild TR  - known to our service, from last admission, she has known LV Dysfunction from TTE likely from a missed MI was decided for medical management given her frailty, increased creatinine at that time  - BNP:  <--68800  - does not appear significantly volume overload on exam but remains on HF and CT with danielle effusions  - Received Lasix dosed intermittently   - Creatinine:  <--2.20,  <--1.70,  <--1.40,  <--1.60  - Would hold off today, reassess need for Lasix daily   - Continue GDMT, on BB, to be switched to Toprol eventually.   - Unable to start ace/arb given YVETTE   - Needs repeat TTE    - BP stable and controlled    - +COVID management per primary  - Monitor and replete lytes, keep K>4, Mg>2.  - Will continue to follow.    Brittany Bass, MS FNP, AGACNP  Nurse Practitioner- Cardiology   Please call on TEAMS

## 2024-04-04 NOTE — PROGRESS NOTE ADULT - ASSESSMENT
89yo F w/ PMH of HFrEF of 20-25%, dementia, Afib (on low-dose Eliquis), hypothyroidism, T2DM, CKD3b, p/w generalized weakness admitted with sepsis due to UTI and COVID, afib w/ RVR, found to have acute hypoxic respiratory failure and likely L-sided pyelonephritis.

## 2024-04-04 NOTE — DIETITIAN INITIAL EVALUATION ADULT - ENERGY INTAKE
87yo F w/ PMH of HFrEF of 20-25%, dementia, Afib (on low-dose Eliquis), hypothyroidism, T2DM, CKD3b, p/w generalized weakness admitted with sepsis due to UTI and COVID, afib w/ RVR, found to have acute hypoxic respiratory failure and likely L-sided pyelonephritis.        Problem/Plan - 1:  ·  Problem: Acute hypoxemic respiratory failure due to COVID-19.   ·  Plan: -Pt with acute hypoxia this AM -- placed on HFNC -- wean as tolerated  -acute hypoxic respiratory failure likely multifactorial including pulmonary edema, COVID, suspected overlying bacterial PNA  -Concern for possible PE or flash pulmonary edema as an acute decompensation  -d-dimer elevated at 500  -ferritin 70, procal 0.37, CRP 8 -- repeat again in AM  -CT Angio chest, and CT abdomen and pelvis w/ IV contrast performed which showed: Multilobar pneumonia right lung. Septal thickening which may reflect interstitial edema. Bilateral pleural effusions with compressive atelectasis lower lobes. No acute pulmonary embolism.  Cystitis and left pyeloureteritis; correlate for ascending urinary tract infection. Distended rectum with fecal impaction.  -given lasix 20mg IV x1 -- discussed with cardio consult (Dr. Candice campuzano), recs appreciated  -duoneb PRN  -Tessalon pearls PRN for cough  - c/w Remdesivir Day 2/5 -- monitor for rising LFTs  - increased Decadron to 10mg po  -c/w ceftriaxone to cover PNA as well as pyelonephritis   -ID consulted (Dr. Amezcua),   -isolation precautions.     Problem/Plan - 2:  ·  Problem: Chronic atrial fibrillation.   ·  Plan: -Pt with chronic afib again w/ RVR to the 130s overnight  -given lopressor 5mg IV x3 and lopressor 25mg po x1 with HR improving to 90s  -C/w metoprolol succinate 100 mg bid  -C/w digoxin 125 mcg every other day  -C/w Eliquis 2.5 mg bid  -cardio consulted (Dr. Candice campuzano),     Problem/Plan - 3:  ·  Problem: Acute UTI.   ·  Plan: -C/w ceftriaxone 1000mg IV daily / lt pyelonephritis   -F/u urine cx and sensitivity and tailor antibiotics accordingly  -avoid IV hydration  -PT/OT.     Problem/Plan - 4:  ·  Problem: Chronic HFrEF (heart failure with reduced ejection fraction).   ·  Plan: HFrEF:  -BNP: 77316  -CXR: w/o any congestion/p. effusion  -likely had component of acute on chronic systolic CHF contributing to hypoxia today -- given lasix 20mg IV x1 -- monitor closely  -f/up BMP  -cardio consulted (Dr. [facility]), recs appreciated.     Problem/Plan - 5:  ·  Problem: T2DM (type 2 diabetes mellitus).   ·  Plan: -C/w insulin SS  - added ADMELOG 2 units premeals. 89yo F w/ PMH of HFrEF of 20-25%, dementia, Afib (on low-dose Eliquis), hypothyroidism, T2DM, CKD3b, p/w generalized weakness admitted with sepsis due to UTI and COVID, afib w/ RVR, found to have acute hypoxic respiratory failure and likely L-sided pyelonephritis.   oblem/Plan - 1:  ·  Problem: Acute hypoxemic respiratory failure due to COVID-19.   ·  Plan: -Pt with acute hypoxia this AM -- placed on HFNC -- wean as tolerated  -acute hypoxic respiratory failure likely multifactorial including pulmonary edema, COVID, suspected overlying bacterial PNA  -Concern for possible PE or flash pulmonary edema as an acute decompensation  -d-dimer elevated at 500  -ferritin 70, procal 0.37, CRP 8 -- repeat again in AM  -CT Angio chest, and CT abdomen and pelvis w/ IV contrast performed which showed: Multilobar pneumonia right lung. Septal thickening which may reflect interstitial edema. Bilateral pleural effusions with compressive atelectasis lower lobes. No acute pulmonary embolism.  Cystitis and left pyeloureteritis; correlate for ascending urinary tract infection. Distended rectum with fecal impaction.  -given lasix 20mg IV x1 -- discussed with cardio consult (Dr. Candice campuzano), recs appreciated  -duoneb PRN  -Tessalon pearls PRN for cough  - c/w Remdesivir Day 2/5 -- monitor for rising LFTs  - increased Decadron to 10mg po  -c/w ceftriaxone to cover PNA as well as pyelonephritis   -ID consulted (Dr. Amezcua),   -isolation precautions.     Problem/Plan - 2:  ·  Problem: Chronic atrial fibrillation.   ·  Plan: -Pt with chronic afib again w/ RVR to the 130s overnight  -given lopressor 5mg IV x3 and lopressor 25mg po x1 with HR improving to 90s  -C/w metoprolol succinate 100 mg bid  -C/w digoxin 125 mcg every other day  -C/w Eliquis 2.5 mg bid  -cardio consulted (Dr. Candice campuzano),     Problem/Plan - 3:  ·  Problem: Acute UTI.   ·  Plan: -C/w ceftriaxone 1000mg IV daily / lt pyelonephritis   -F/u urine cx and sensitivity and tailor antibiotics accordingly  -avoid IV hydration  -PT/OT.     Problem/Plan - 4:  ·  Problem: Chronic HFrEF (heart failure with reduced ejection fraction).   ·  Plan: HFrEF:  -BNP: 73611  -CXR: w/o any congestion/p. effusion  -likely had component of acute on chronic systolic CHF contributing to hypoxia today -- given lasix 20mg IV x1 -- monitor closely  -f/up BMP  -cardio consulted (Dr. Harvey [facility]), recs appreciated.     Problem/Plan - 5:  ·  Problem: T2DM (type 2 diabetes mellitus).   ·  Plan: -C/w insulin SS  - added ADMELOG 2 units premeals. Fair (50-75%) 87yo F w/ PMH of HFrEF of 20-25%, dementia, Afib (on low-dose Eliquis), hypothyroidism, T2DM, CKD3b, p/w generalized weakness admitted with sepsis due to UTI and COVID, afib w/ RVR, found to have acute hypoxic respiratory failure and likely L-sided pyelonephritis. Pt with acute hypoxia this AM -- placed on HFNC -- wean as tolerated.acute hypoxic respiratory failure likely multifactorial including pulmonary edema, COVID, suspected overlying bacterial PNA There is concern for possible PE or flash pulmonary edema as an acute decompensation .CT  Angio chest, and CT abdomen and pelvis w/ IV contrast performed which showed: Multilobar pneumonia right lung. Septal thickening which may reflect interstitial edema. Bilateral pleural effusions with compressive atelectasis lower lobes. No acute pulmonary embolism. Cystitis and left pyeloureteritis; correlate for ascending urinary tract infection. Distended rectum with fecal impaction.    Due to complexity of nsg care at this time, unable to do a nutrition focused physical exam. per nsg, ate < 25% of breakfast, is taking po fluids , small  amount smeary BM      Problem/Plan - 2:  ·  Problem: Chronic atrial fibrillation.   ·  Plan: -Pt with chronic afib again w/ RVR to the 130s overnight  -given lopressor 5mg IV x3 and lopressor 25mg po x1 with HR improving to 90s  -C/w metoprolol succinate 100 mg bid  -C/w digoxin 125 mcg every other day  -C/w Eliquis 2.5 mg bid  -cardio consulted (Dr. Candice campuzano),     Problem/Plan - 3:  ·  Problem: Acute UTI.   ·  Plan: -C/w ceftriaxone 1000mg IV daily / lt pyelonephritis   -F/u urine cx and sensitivity and tailor antibiotics accordingly  -avoid IV hydration  -PT/OT.     Problem/Plan - 4:  ·  Problem: Chronic HFrEF (heart failure with reduced ejection fraction).   ·  Plan: HFrEF:  -BNP: 21891  -CXR: w/o any congestion/p. effusion  -likely had component of acute on chronic systolic CHF contributing to hypoxia today -- given lasix 20mg IV x1 -- monitor closely  -f/up BMP  -cardio consulted (Dr. Candice campuzano), recs appreciated.     Problem/Plan - 5:  ·  Problem: T2DM (type 2 diabetes mellitus).   ·  Plan: -C/w insulin SS  - added ADMELOG 2 units premeals. Poor (<50%)

## 2024-04-04 NOTE — PROGRESS NOTE ADULT - PROBLEM SELECTOR PLAN 1
-Pt with acute hypoxia -- placed on HFNC -- wean as tolerated  -acute hypoxic respiratory failure likely multifactorial including pulmonary edema, COVID, suspected overlying bacterial PNA  -Concern for possible PE or flash pulmonary edema as an acute decompensation  -d-dimer elevated at 500  -ferritin 70, procal 0.37, CRP 8 -- repeat today procal 6.30, CRP 31  -CT Angio chest, and CT abdomen and pelvis w/ IV contrast performed which showed: Multilobar pneumonia right lung. Septal thickening which may reflect interstitial edema. Bilateral pleural effusions with compressive atelectasis lower lobes. No acute pulmonary embolism. Cystitis and left pyeloureteritis; correlate for ascending urinary tract infection. Distended rectum with fecal impaction.  -given lasix 20mg IV x1 -- discussed with cardio consult (Dr. Harvey group), recs appreciated  -duoneb PRN  -Tessalon pearls PRN for cough  -today Remdesivir Day 3/5 -- however will hold off further doses due to rising LFTs  -c/w Decadron to 10mg po  -started on Zosyn to cover possible superimposed bacterial PNA as well as pyelonephritis   -ID consulted (Dr. Amezcua), recs appreciated  -isolation precautions -Pt with acute hypoxia -- placed on HFNC -- wean as tolerated  -acute hypoxic respiratory failure likely multifactorial including pulmonary edema, COVID, suspected overlying bacterial PNA-Concern for flash pulmonary edema as an acute decompensation  -d-dimer elevated at 500  -ferritin 70, procal 0.37, CRP 8 -- repeat today procal 6.30, CRP 31  -CT Angio chest, and CT abdomen and pelvis w/ IV contrast performed which showed: Multilobar pneumonia right lung. Septal thickening which may reflect interstitial edema. Bilateral pleural effusions with compressive atelectasis lower lobes. No acute pulmonary embolism. Cystitis and left pyeloureteritis; correlate for ascending urinary tract infection. Distended rectum with fecal impaction.  -given lasix 20mg IV x1 -- discussed with cardio consult (Dr. Harvey group),   -duoneb PRN -Tessalon pearls PRN for cough  -today Remdesivir Day 3/5 -- however will hold off further doses due to rising LFTs  -c/w Decadron to 10mg po  -started on Zosyn to cover possible superimposed bacterial PNA as well as pyelonephritis - high procal 6.4   -ID consulted (Dr. Amezcua),   -isolation precautions

## 2024-04-04 NOTE — DIETITIAN INITIAL EVALUATION ADULT - PERTINENT LABORATORY DATA
04-03    136  |  105  |  28<H>  ----------------------------<  211<H>  4.6   |  19<L>  |  1.70<H>    Ca    8.2<L>      03 Apr 2024 07:40  Phos  3.3     04-03  Mg     2.3     04-03    TPro  6.7  /  Alb  2.5<L>  /  TBili  0.4  /  DBili  x   /  AST  153<H>  /  ALT  108<H>  /  AlkPhos  116  04-03  POCT Blood Glucose.: 249 mg/dL (04-04-24 @ 08:05)  A1C with Estimated Average Glucose Result: 6.5 % (04-03-24 @ 07:40)  A1C with Estimated Average Glucose Result: 7.2 % (11-28-23 @ 13:22)

## 2024-04-04 NOTE — DIETITIAN INITIAL EVALUATION ADULT - PERTINENT MEDS FT
MEDICATIONS  (STANDING):  apixaban 2.5 milliGRAM(s) Oral every 12 hours  bisacodyl Suppository 10 milliGRAM(s) Rectal once  dexAMETHasone     Tablet 10 milliGRAM(s) Oral daily  dextrose 5%. 1000 milliLiter(s) (100 mL/Hr) IV Continuous <Continuous>  dextrose 5%. 1000 milliLiter(s) (50 mL/Hr) IV Continuous <Continuous>  dextrose 50% Injectable 12.5 Gram(s) IV Push once  dextrose 50% Injectable 25 Gram(s) IV Push once  dextrose 50% Injectable 25 Gram(s) IV Push once  digoxin     Tablet 125 MICROGram(s) Oral every other day  escitalopram 10 milliGRAM(s) Oral daily  glucagon  Injectable 1 milliGRAM(s) IntraMuscular once  insulin lispro (ADMELOG) corrective regimen sliding scale   SubCutaneous three times a day before meals  insulin lispro (ADMELOG) corrective regimen sliding scale   SubCutaneous at bedtime  insulin lispro Injectable (ADMELOG) 2 Unit(s) SubCutaneous three times a day before meals  levothyroxine 75 MICROGram(s) Oral daily  memantine 10 milliGRAM(s) Oral two times a day  metoprolol succinate  milliGRAM(s) Oral two times a day  metoprolol tartrate Injectable 5 milliGRAM(s) IV Push once  pantoprazole    Tablet 40 milliGRAM(s) Oral before breakfast  polyethylene glycol 3350 17 Gram(s) Oral daily  remdesivir  IVPB 100 milliGRAM(s) IV Intermittent every 24 hours  remdesivir  IVPB   IV Intermittent   rivastigmine patch  9.5 mG/24 Hr(s) 1 Patch Transdermal every 24 hours  senna 2 Tablet(s) Oral at bedtime  tamsulosin 0.4 milliGRAM(s) Oral at bedtime    MEDICATIONS  (PRN):  acetaminophen     Tablet .. 650 milliGRAM(s) Oral every 6 hours PRN Temp greater or equal to 38C (100.4F), Mild Pain (1 - 3)  albuterol/ipratropium for Nebulization 3 milliLiter(s) Nebulizer every 6 hours PRN Shortness of Breath  aluminum hydroxide/magnesium hydroxide/simethicone Suspension 30 milliLiter(s) Oral every 4 hours PRN Dyspepsia  benzonatate 100 milliGRAM(s) Oral three times a day PRN Cough  dextrose Oral Gel 15 Gram(s) Oral once PRN Blood Glucose LESS THAN 70 milliGRAM(s)/deciliter  melatonin 3 milliGRAM(s) Oral at bedtime PRN Insomnia  ondansetron Injectable 4 milliGRAM(s) IV Push every 8 hours PRN Nausea and/or Vomiting

## 2024-04-04 NOTE — PROGRESS NOTE ADULT - PROBLEM SELECTOR PLAN 2
-Pt with chronic afib with episodes of RVR to the 130s overnight  -given lopressor 5mg IV x2 this AM  -Switched to lopressor 50mg q6h   -C/w digoxin 125 mcg every other day -- cannot go up due to worsening renal function  -C/w Eliquis 2.5 mg bid  - May need amio gtt if rates remain uncontrolled  -cardio consulted (Dr. Harvey group), recs appreciated -Pt with chronic afib with episodes of RVR to the 130s overnight  -given lopressor 5mg IV x2 this AM  -Switched to lopressor 50mg q6h   -C/w digoxin 125 mcg every other day -- dig wnl   -C/w Eliquis 2.5 mg bid  - May need amio gtt if rates remain uncontrolled  -cardio consulted (Dr. Harvey group),

## 2024-04-04 NOTE — PROGRESS NOTE ADULT - ASSESSMENT
87 y/o female with PMH of HFrEF of 20-25 %, dementia, Afib, hypothyroidism, DM who presented to ED to be evaluated for weakness. Found to have acute hypoxic respiratory failure likely 2/2 COVID, but cannot rule out underlying bacterial pneumonia. Also being treated for UTI, CT showed evidence of Cystitis and left pyeloureteritis.    WBC increased to 18--could be steroid related. Also with increasing LFT's, will hold off on remdesivir after today's dose and monitor. Had low grade temp but initial blood cultures currently no growth. Urine culture growing klebsiella.    # COVID 19   # Acute respiratory failure   # Pneumonia  # UTI  # Leukocytosis  # Transaminitis    - suggest Zosyn for now, hold off on ceftriaxone  - Continue Remdesivir until today then hold off  - Continue Dexamethasone 6mg daily   - Anticoagulation as per protocol  - follow cultures to completion  - suggest MRSA nasal PCR  - monitor WBC, creatinine, LFT's  - aspiration precautions  - Hold on Tocilizumab due to active infection (UTI/pneumonia)  - discussed with Dr. Pollo Laughlin MD  Division of infectious Diseases  Cell [phone] between 8am and 6pm  After 6pm and over the weekends please call ID service line at [phone].     35 minutes spent on total encounter assessing patient, examination, chart review, counseling and coordinating care by the attending physician/nurse/care manager.  87 y/o female with PMH of HFrEF of 20-25 %, dementia, Afib, hypothyroidism, DM who presented to ED to be evaluated for weakness. Found to have acute hypoxic respiratory failure likely 2/2 COVID, but cannot rule out underlying bacterial pneumonia. Also being treated for UTI, CT showed evidence of Cystitis and left pyeloureteritis.    WBC increased to 18--could be steroid related but will broaden antibiotic coverage for now. Also with increasing LFT's, will hold off on remdesivir after today's dose and monitor. Had low grade temp but initial blood cultures currently no growth. Urine culture growing klebsiella, sensitivities pending.    # COVID 19   # Acute respiratory failure   # Pneumonia  # UTI  # Leukocytosis  # Transaminitis    - suggest Zosyn (renally dosed) for now, hold off on ceftriaxone  - Continue Remdesivir until today then hold off  - Continue Dexamethasone 6mg daily   - Anticoagulation as per protocol  - follow cultures to completion  - suggest MRSA nasal PCR  - monitor WBC, creatinine, LFT's  - aspiration precautions  - Hold on Tocilizumab due to active infection (UTI/pneumonia)  - discussed with Dr. Pollo Laughlin MD  Division of infectious Diseases  Cell [phone] between 8am and 6pm  After 6pm and over the weekends please call ID service line at [phone].     35 minutes spent on total encounter assessing patient, examination, chart review, counseling and coordinating care by the attending physician/nurse/care manager.  89 y/o female with PMH of HFrEF of 20-25 %, dementia, Afib, hypothyroidism, DM who presented to ED to be evaluated for weakness. Found to have acute hypoxic respiratory failure likely 2/2 COVID, but cannot rule out underlying bacterial pneumonia. Also being treated for UTI, CT showed evidence of Cystitis and left pyeloureteritis.    WBC increased to 18--could be steroid related but will broaden antibiotic coverage for now. Also with increasing LFT's, will hold off on remdesivir after today's dose and monitor. Had low grade temp but initial blood cultures currently no growth. Urine culture growing klebsiella, sensitivities pending.    # COVID 19   # Acute respiratory failure   # Pneumonia  # UTI  # Leukocytosis  # Transaminitis    - suggest Zosyn (renally dosed) for now, hold off on ceftriaxone  - Continue Remdesivir until today then hold off  - Continue Dexamethasone 6mg daily   - Anticoagulation as per protocol  - follow cultures to completion  - suggest MRSA nasal PCR  - monitor WBC, creatinine, LFT's  - aspiration precautions  - Hold on Tocilizumab due to active infection (UTI/pneumonia)  - discussed with Dr. Pollo Laughlin MD  Division of infectious Diseases  Cell [phone] between 8am and 6pm  After 6pm and over the weekends please call ID service line at [phone].     55 minutes spent on total encounter assessing patient, examination, chart review, counseling and coordinating care by the attending physician/nurse/care manager.

## 2024-04-04 NOTE — PROGRESS NOTE ADULT - SUBJECTIVE AND OBJECTIVE BOX
[facility]  INFECTIOUS DISEASES - Nasrin Amezcua, [place] [address]  Tel: [phone]     Fax: [phone]  =======================================================    LEXY EISENBERG [id number]    Follow up: Tmax of 100.3. Remains on HFNC 40 L/50%. Denied any SOB at the time of my exam, but unable to provide more pertinent history.    Allergies:  No Known Allergies      Antibiotics:  acetaminophen     Tablet .. 650 milliGRAM(s) Oral every 6 hours PRN  albuterol/ipratropium for Nebulization 3 milliLiter(s) Nebulizer every 6 hours PRN  aluminum hydroxide/magnesium hydroxide/simethicone Suspension 30 milliLiter(s) Oral every 4 hours PRN  apixaban 2.5 milliGRAM(s) Oral every 12 hours  benzonatate 100 milliGRAM(s) Oral three times a day PRN  bisacodyl Suppository 10 milliGRAM(s) Rectal once  bisacodyl Suppository 10 milliGRAM(s) Rectal daily PRN  cefTRIAXone   IVPB 1000 milliGRAM(s) IV Intermittent every 24 hours  dexAMETHasone     Tablet 10 milliGRAM(s) Oral daily  dextrose 5%. 1000 milliLiter(s) IV Continuous <Continuous>  dextrose 5%. 1000 milliLiter(s) IV Continuous <Continuous>  dextrose 50% Injectable 25 Gram(s) IV Push once  dextrose 50% Injectable 12.5 Gram(s) IV Push once  dextrose 50% Injectable 25 Gram(s) IV Push once  dextrose Oral Gel 15 Gram(s) Oral once PRN  digoxin     Tablet 125 MICROGram(s) Oral every other day  escitalopram 10 milliGRAM(s) Oral daily  glucagon  Injectable 1 milliGRAM(s) IntraMuscular once  insulin glargine Injectable (LANTUS) 5 Unit(s) SubCutaneous at bedtime  insulin lispro (ADMELOG) corrective regimen sliding scale   SubCutaneous three times a day before meals  insulin lispro (ADMELOG) corrective regimen sliding scale   SubCutaneous at bedtime  insulin lispro Injectable (ADMELOG) 2 Unit(s) SubCutaneous three times a day before meals  levothyroxine 75 MICROGram(s) Oral daily  melatonin 3 milliGRAM(s) Oral at bedtime PRN  memantine 10 milliGRAM(s) Oral two times a day  metoprolol tartrate 50 milliGRAM(s) Oral every 6 hours  ondansetron Injectable 4 milliGRAM(s) IV Push every 8 hours PRN  pantoprazole    Tablet 40 milliGRAM(s) Oral before breakfast  polyethylene glycol 3350 17 Gram(s) Oral daily  remdesivir  IVPB   IV Intermittent   remdesivir  IVPB 100 milliGRAM(s) IV Intermittent every 24 hours  rivastigmine patch  9.5 mG/24 Hr(s) 1 Patch Transdermal every 24 hours  senna 2 Tablet(s) Oral at bedtime  tamsulosin 0.4 milliGRAM(s) Oral at bedtime       REVIEW OF SYSTEMS:  Limited 2/2 dementia, as per HPI     Physical Exam:  ICU Vital Signs Last 24 Hrs  T(C): 36.5 (04 Apr 2024 12:15), Max: 37.9 (04 Apr 2024 09:45)  T(F): 97.7 (04 Apr 2024 12:15), Max: 100.3 (04 Apr 2024 09:45)  HR: 101 (04 Apr 2024 12:15) (101 - 123)  BP: 104/75 (04 Apr 2024 12:15) (104/75 - 117/87)  BP(mean): --  ABP: --  ABP(mean): --  RR: 20 (04 Apr 2024 12:15) (17 - 20)  SpO2: 96% (04 Apr 2024 12:15) (93% - 96%)    O2 Parameters below as of 04 Apr 2024 12:15  Patient On (Oxygen Delivery Method): nasal cannula, high flow  O2 Flow (L/min): 40  O2 Concentration (%): 50       GEN: Appears chronically ill  HEENT: normocephalic and atraumatic.  NECK: Supple.    LUNGS: Normal respiratory effort  HEART: Tachycardic  ABDOMEN: Soft, nontender, and nondistended.    EXTREMITIES: No leg edema.  NEUROLOGIC: AO x 1  Labs:  04-04    133<L>  |  101  |  46<H>  ----------------------------<  250<H>  4.7   |  20<L>  |  2.20<H>    Ca    8.0<L>      04 Apr 2024 08:55  Phos  3.3     04-03  Mg     2.3     04-03    TPro  7.0  /  Alb  2.7<L>  /  TBili  0.7  /  DBili  x   /  AST  207<H>  /  ALT  155<H>  /  AlkPhos  122<H>  04-04                          10.7   18.77 )-----------( 302      ( 04 Apr 2024 08:55 )             33.1       Urinalysis Basic - ( 04 Apr 2024 08:55 )    Color: x / Appearance: x / SG: x / pH: x  Gluc: 250 mg/dL / Ketone: x  / Bili: x / Urobili: x   Blood: x / Protein: x / Nitrite: x   Leuk Esterase: x / RBC: x / WBC x   Sq Epi: x / Non Sq Epi: x / Bacteria: x      LIVER FUNCTIONS - ( 04 Apr 2024 08:55 )  Alb: 2.7 g/dL / Pro: 7.0 g/dL / ALK PHOS: 122 U/L / ALT: 155 U/L / AST: 207 U/L / GGT: x             RECENT CULTURES:  04-01 @ 23:07 Clean Catch Clean Catch (Midstream)     10,000 - 49,000 CFU/mL Klebsiella pneumoniae        04-01 @ 20:30 .Blood Blood-Peripheral     No growth at 48 Hours        04-01 @ 20:20 .Blood Blood-Peripheral     No growth at 48 Hours              All imaging and data are reviewed.

## 2024-04-05 LAB
ALBUMIN SERPL ELPH-MCNC: 2.5 G/DL — LOW (ref 3.3–5)
ALP SERPL-CCNC: 114 U/L — SIGNIFICANT CHANGE UP (ref 40–120)
ALT FLD-CCNC: 135 U/L — HIGH (ref 12–78)
ANION GAP SERPL CALC-SCNC: 13 MMOL/L — SIGNIFICANT CHANGE UP (ref 5–17)
AST SERPL-CCNC: 101 U/L — HIGH (ref 15–37)
BASOPHILS # BLD AUTO: 0.02 K/UL — SIGNIFICANT CHANGE UP (ref 0–0.2)
BASOPHILS NFR BLD AUTO: 0.1 % — SIGNIFICANT CHANGE UP (ref 0–2)
BILIRUB SERPL-MCNC: 0.4 MG/DL — SIGNIFICANT CHANGE UP (ref 0.2–1.2)
BUN SERPL-MCNC: 61 MG/DL — HIGH (ref 7–23)
CALCIUM SERPL-MCNC: 8 MG/DL — LOW (ref 8.5–10.1)
CHLORIDE SERPL-SCNC: 103 MMOL/L — SIGNIFICANT CHANGE UP (ref 96–108)
CO2 SERPL-SCNC: 20 MMOL/L — LOW (ref 22–31)
CREAT SERPL-MCNC: 2.3 MG/DL — HIGH (ref 0.5–1.3)
CRP SERPL-MCNC: 44 MG/L — HIGH
EGFR: 20 ML/MIN/1.73M2 — LOW
EOSINOPHIL # BLD AUTO: 0 K/UL — SIGNIFICANT CHANGE UP (ref 0–0.5)
EOSINOPHIL NFR BLD AUTO: 0 % — SIGNIFICANT CHANGE UP (ref 0–6)
FERRITIN SERPL-MCNC: 96 NG/ML — SIGNIFICANT CHANGE UP (ref 13–330)
GLUCOSE BLDC GLUCOMTR-MCNC: 168 MG/DL — HIGH (ref 70–99)
GLUCOSE BLDC GLUCOMTR-MCNC: 282 MG/DL — HIGH (ref 70–99)
GLUCOSE BLDC GLUCOMTR-MCNC: 305 MG/DL — HIGH (ref 70–99)
GLUCOSE BLDC GLUCOMTR-MCNC: 307 MG/DL — HIGH (ref 70–99)
GLUCOSE BLDC GLUCOMTR-MCNC: 325 MG/DL — HIGH (ref 70–99)
GLUCOSE SERPL-MCNC: 182 MG/DL — HIGH (ref 70–99)
HCT VFR BLD CALC: 33.3 % — LOW (ref 34.5–45)
HGB BLD-MCNC: 10.7 G/DL — LOW (ref 11.5–15.5)
IMM GRANULOCYTES NFR BLD AUTO: 0.9 % — SIGNIFICANT CHANGE UP (ref 0–0.9)
LYMPHOCYTES # BLD AUTO: 1 K/UL — SIGNIFICANT CHANGE UP (ref 1–3.3)
LYMPHOCYTES # BLD AUTO: 5.8 % — LOW (ref 13–44)
MCHC RBC-ENTMCNC: 27.3 PG — SIGNIFICANT CHANGE UP (ref 27–34)
MCHC RBC-ENTMCNC: 32.1 GM/DL — SIGNIFICANT CHANGE UP (ref 32–36)
MCV RBC AUTO: 84.9 FL — SIGNIFICANT CHANGE UP (ref 80–100)
MONOCYTES # BLD AUTO: 1.1 K/UL — HIGH (ref 0–0.9)
MONOCYTES NFR BLD AUTO: 6.3 % — SIGNIFICANT CHANGE UP (ref 2–14)
NEUTROPHILS # BLD AUTO: 15.1 K/UL — HIGH (ref 1.8–7.4)
NEUTROPHILS NFR BLD AUTO: 86.9 % — HIGH (ref 43–77)
NRBC # BLD: 0 /100 WBCS — SIGNIFICANT CHANGE UP (ref 0–0)
PLATELET # BLD AUTO: 280 K/UL — SIGNIFICANT CHANGE UP (ref 150–400)
POTASSIUM SERPL-MCNC: 4.7 MMOL/L — SIGNIFICANT CHANGE UP (ref 3.5–5.3)
POTASSIUM SERPL-SCNC: 4.7 MMOL/L — SIGNIFICANT CHANGE UP (ref 3.5–5.3)
PROCALCITONIN SERPL-MCNC: 2.71 NG/ML — HIGH
PROT SERPL-MCNC: 6.4 G/DL — SIGNIFICANT CHANGE UP (ref 6–8.3)
RBC # BLD: 3.92 M/UL — SIGNIFICANT CHANGE UP (ref 3.8–5.2)
RBC # FLD: 17.2 % — HIGH (ref 10.3–14.5)
SODIUM SERPL-SCNC: 136 MMOL/L — SIGNIFICANT CHANGE UP (ref 135–145)
WBC # BLD: 17.38 K/UL — HIGH (ref 3.8–10.5)
WBC # FLD AUTO: 17.38 K/UL — HIGH (ref 3.8–10.5)

## 2024-04-05 PROCEDURE — 99233 SBSQ HOSP IP/OBS HIGH 50: CPT | Mod: GC

## 2024-04-05 PROCEDURE — 99232 SBSQ HOSP IP/OBS MODERATE 35: CPT

## 2024-04-05 PROCEDURE — 99233 SBSQ HOSP IP/OBS HIGH 50: CPT

## 2024-04-05 RX ORDER — MULTIVIT-MIN/FERROUS GLUCONATE 9 MG/15 ML
1 LIQUID (ML) ORAL DAILY
Refills: 0 | Status: CANCELLED | OUTPATIENT
Start: 2024-04-05 | End: 2024-04-11

## 2024-04-05 RX ORDER — INSULIN LISPRO 100/ML
4 VIAL (ML) SUBCUTANEOUS
Refills: 0 | Status: DISCONTINUED | OUTPATIENT
Start: 2024-04-05 | End: 2024-04-07

## 2024-04-05 RX ADMIN — APIXABAN 2.5 MILLIGRAM(S): 2.5 TABLET, FILM COATED ORAL at 18:21

## 2024-04-05 RX ADMIN — Medication 2 UNIT(S): at 13:28

## 2024-04-05 RX ADMIN — PIPERACILLIN AND TAZOBACTAM 25 GRAM(S): 4; .5 INJECTION, POWDER, LYOPHILIZED, FOR SOLUTION INTRAVENOUS at 01:47

## 2024-04-05 RX ADMIN — Medication 250 MILLIGRAM(S): at 18:21

## 2024-04-05 RX ADMIN — Medication 2 UNIT(S): at 09:05

## 2024-04-05 RX ADMIN — Medication 8: at 18:22

## 2024-04-05 RX ADMIN — INSULIN GLARGINE 5 UNIT(S): 100 INJECTION, SOLUTION SUBCUTANEOUS at 22:04

## 2024-04-05 RX ADMIN — Medication 10 MILLIGRAM(S): at 06:28

## 2024-04-05 RX ADMIN — PIPERACILLIN AND TAZOBACTAM 25 GRAM(S): 4; .5 INJECTION, POWDER, LYOPHILIZED, FOR SOLUTION INTRAVENOUS at 23:45

## 2024-04-05 RX ADMIN — RIVASTIGMINE 1 PATCH: 4.6 PATCH, EXTENDED RELEASE TRANSDERMAL at 19:30

## 2024-04-05 RX ADMIN — Medication 8: at 13:27

## 2024-04-05 RX ADMIN — Medication 50 MILLIGRAM(S): at 13:26

## 2024-04-05 RX ADMIN — Medication 50 MILLIGRAM(S): at 06:28

## 2024-04-05 RX ADMIN — PIPERACILLIN AND TAZOBACTAM 25 GRAM(S): 4; .5 INJECTION, POWDER, LYOPHILIZED, FOR SOLUTION INTRAVENOUS at 13:26

## 2024-04-05 RX ADMIN — Medication 2: at 09:05

## 2024-04-05 RX ADMIN — Medication 4 UNIT(S): at 18:22

## 2024-04-05 RX ADMIN — RIVASTIGMINE 1 PATCH: 4.6 PATCH, EXTENDED RELEASE TRANSDERMAL at 06:33

## 2024-04-05 RX ADMIN — PANTOPRAZOLE SODIUM 40 MILLIGRAM(S): 20 TABLET, DELAYED RELEASE ORAL at 06:34

## 2024-04-05 RX ADMIN — Medication 50 MILLIGRAM(S): at 18:21

## 2024-04-05 RX ADMIN — Medication 75 MICROGRAM(S): at 06:28

## 2024-04-05 RX ADMIN — Medication 250 MILLIGRAM(S): at 06:29

## 2024-04-05 RX ADMIN — ESCITALOPRAM OXALATE 10 MILLIGRAM(S): 10 TABLET, FILM COATED ORAL at 13:27

## 2024-04-05 RX ADMIN — APIXABAN 2.5 MILLIGRAM(S): 2.5 TABLET, FILM COATED ORAL at 06:28

## 2024-04-05 RX ADMIN — Medication 50 MILLIGRAM(S): at 23:45

## 2024-04-05 RX ADMIN — TAMSULOSIN HYDROCHLORIDE 0.4 MILLIGRAM(S): 0.4 CAPSULE ORAL at 22:03

## 2024-04-05 RX ADMIN — Medication 50 MILLIGRAM(S): at 01:47

## 2024-04-05 RX ADMIN — MEMANTINE HYDROCHLORIDE 10 MILLIGRAM(S): 10 TABLET ORAL at 06:28

## 2024-04-05 RX ADMIN — Medication 1: at 22:03

## 2024-04-05 RX ADMIN — MEMANTINE HYDROCHLORIDE 10 MILLIGRAM(S): 10 TABLET ORAL at 18:21

## 2024-04-05 NOTE — PROGRESS NOTE ADULT - ASSESSMENT
87 y/o female with PMH of HFrEF of 20-25 %, dementia, Afib, hypothyroidism, DM who presented to ED to be evaluated for weakness, found to have +UTI and Covid positive     Atrial Fibrillation, COVID  - Known hx of Afib, with RVR episode in the setting of catecholamine surge 2/2 recent acute stressor (UTI, COVID)   - Tele  - Change Toprol 100 to Metoprolol 50 Q6H  - Continue Digoxin 0.125 QoD  - Avoid CCB given reduced ef   - Continue Eliquis    - EKG Afib w/ RVR, unchanged from prior   - no anginal complaints   - No evidence of any active ischemia     - TTE (12/2022): EF 20-25%, apex and anterior wall akinesis, inferior wall hypokinesis, mod MR, mild TR  - known to our service, from last admission, she has known LV Dysfunction from TTE likely from a missed MI was decided for medical management given her frailty, increased creatinine at that time  - Would hold on IV lasix for now given worsening renal function. Procal elevated in the setting of Covid PNA  no clear vol ol on exam but has effs on ct, can try IV lasix when creat stabilizes  May need amio gtt if rates remain uncontrolled. Currently on max beta blocker. do not    - Continue GDMT, on BB, to be switched to Toprol eventually.   - Unable to start ace/arb given YVETTE   - Needs repeat TTE    - BP stable and controlled    - +COVID management per primary  - Monitor and replete lytes, keep K>4, Mg>2.  - Will continue to follow.     89 y/o female with PMH of HFrEF of 20-25 %, dementia, Afib, hypothyroidism, DM who presented to ED to be evaluated for weakness, found to have +UTI and Covid positive     Atrial Fibrillation, COVID  - Known hx of Afib, with RVR episode in the setting of catecholamine surge 2/2 recent acute stressor (UTI, COVID)   - Tele af   continue BB   - Continue Digoxin 0.125 QoD  - Avoid CCB given reduced ef   - Continue Eliquis    - EKG Afib w/ RVR, unchanged from prior   - no anginal complaints   - No evidence of any active ischemia     - TTE (12/2022): EF 20-25%, apex and anterior wall akinesis, inferior wall hypokinesis, mod MR, mild TR  - known to our service, from last admission, she has known LV Dysfunction from TTE likely from a missed MI was decided for medical management given her frailty, increased creatinine at that time  - Would hold on IV lasix for now given worsening renal function. Procal elevated in the setting of Covid PNA  no clear vol ol on exam but has effs on ct, can try IV lasix when creat stabilizes    - GDMT change lopressor to toprol on dc    - Unable to start ace/arb given YVETTE   - Needs repeat TTE    - BP stable and controlled    - +COVID management per primary  - Monitor and replete lytes, keep K>4, Mg>2.  - Will continue to follow.

## 2024-04-05 NOTE — PROGRESS NOTE ADULT - PROBLEM SELECTOR PLAN 4
HFrEF:-BNP: [id number]  -CXR with vascular congestion and b/l pleural effusions  -likely component of acute on chronic systolic CHF contributing to hypoxia, will avoid IV diuresis in setting of worsening renal function  - Repeat TTE HFrEF:-BNP: [id number] (12/2022): EF 20-25%, apex and anterior wall akinesis, inferior wall hypokinesis, mod MR, mild TR  -CXR with vascular congestion and b/l pleural effusions  -likely component of acute on chronic systolic CHF contributing to hypoxia, will avoid IV diuresis in setting of worsening renal function  - Repeat TTE

## 2024-04-05 NOTE — PROGRESS NOTE ADULT - PROBLEM SELECTOR PLAN 1
-Pt with acute hypoxia -- placed on HFNC, now titrated down to 6LNC, saturating well  -acute hypoxic respiratory failure likely multifactorial including pulmonary edema, COVID, suspected overlying bacterial PNA-Concern for flash pulmonary edema as an acute decompensation  -d-dimer elevated at 500  - Worsening Procal 0.37 -> 6.3  -CT Angio chest, and CT abdomen and pelvis w/ IV contras: Multilobar pneumonia right lung. Septal thickening which may reflect interstitial edema. Bilateral pleural effusions with compressive atelectasis lower lobes. No acute pulmonary embolism. Cystitis and left pyeloureteritis. Distended rectum with fecal impaction.  -duoneb PRN -Tessalon pearls PRN for cough  -Completed 3/5 days Remdesivir, then discontinued due to worsening transaminitis  -c/w Decadron to 10mg po x10 days  -started on Zosyn to cover possible superimposed bacterial PNA as well as pyelonephritis  -ID consulted (Dr. Amezcua),  -isolation precautions -Pt with acute hypoxia -- placed on HFNC, now titrated down to 6LNC, saturating well  -acute hypoxic respiratory failure likely multifactorial including pulmonary edema, COVID, suspected overlying bacterial PNA-Concern for flash pulmonary edema as an acute decompensation  -d-dimer elevated at 500  - Procal 0.37 -> 6.3 -> 2.7  -CT Angio chest, and CT abdomen and pelvis w/ IV contras: Multilobar pneumonia right lung. Septal thickening which may reflect interstitial edema. Bilateral pleural effusions with compressive atelectasis lower lobes. No acute pulmonary embolism. Cystitis and left pyeloureteritis. Distended rectum with fecal impaction.  -duoneb PRN -Tessalon pearls PRN for cough  -Completed 3/5 days Remdesivir, then discontinued due to worsening transaminitis  -c/w Decadron to 10mg po x10 days  -started on Zosyn to cover possible superimposed bacterial PNA as well as pyelonephritis  -ID consulted (Dr. Amezcua),  -isolation precautions

## 2024-04-05 NOTE — PROGRESS NOTE ADULT - ASSESSMENT
89 y/o female with PMH of HFrEF of 20-25 %, dementia, Afib, hypothyroidism, DM who presented to ED to be evaluated for weakness. Found to have acute hypoxic respiratory failure likely 2/2 COVID, but cannot rule out underlying bacterial pneumonia. Also being treated for UTI, CT showed evidence of Cystitis and left pyeloureteritis. Urine culture grew klebsiella, sensitivities reviewed.    On 4/4, WBC increased to 18--could be steroid related but will antibiotic coverage broadened to Zosyn given minimal clinical improvement. Also with increasing LFT's, remdesivir stopped after 3rd dose.     On 4/5, O2 requirements improvement and off HFNC. No fever and WBC slightly improved to 17. LFT's also better but creatinine increasing, will hold off on remdesivir for now. Initial blood cultures remain no growth.    # COVID 19   # Acute respiratory failure   # Pneumonia  # UTI  # Leukocytosis  # Transaminitis    - continue Zosyn (renally dosed--if continues to improve plan for 5 day course until 4/9  - will hold off on remdesivir after 3rd dose  - Continue Dexamethasone 6mg daily to complete 10 days  - Anticoagulation as per protocol  - follow cultures to completion  - suggest MRSA nasal PCR  - monitor WBC, LFT's  - aspiration precautions  - Hold on Tocilizumab due to active infection (UTI/pneumonia)  - discussed with Dr. Pollo Laughlin MD  Division of infectious Diseases  Cell [phone] between 8am and 6pm  After 6pm and over the weekends please call ID service line at [phone].     35 minutes spent on total encounter assessing patient, examination, chart review, counseling and coordinating care by the attending physician/nurse/care manager.

## 2024-04-05 NOTE — PROGRESS NOTE ADULT - SUBJECTIVE AND OBJECTIVE BOX
[facility] -- Dylan Hernandez Pannella, Patel, Savella Goodger, Cohen  Office # [phone]      Follow Up:      Subjective/Observations:       REVIEW OF SYSTEMS: All other review of systems is negative unless indicated above    PAST MEDICAL & SURGICAL HISTORY:  Atrial fibrillation      Hypothyroidism      Mild Alzheimer's dementia      Diabetes mellitus      Acute on chronic systolic congestive heart failure      Hypertension      Hyperlipemia      Cardiac LV ejection fraction 21-30%        History of appendectomy      H/O hernia repair      History of cholecystectomy          MEDICATIONS  (STANDING):  apixaban 2.5 milliGRAM(s) Oral every 12 hours  bisacodyl Suppository 10 milliGRAM(s) Rectal once  dexAMETHasone     Tablet 10 milliGRAM(s) Oral daily  dextrose 5%. 1000 milliLiter(s) (100 mL/Hr) IV Continuous <Continuous>  dextrose 5%. 1000 milliLiter(s) (50 mL/Hr) IV Continuous <Continuous>  dextrose 50% Injectable 25 Gram(s) IV Push once  dextrose 50% Injectable 12.5 Gram(s) IV Push once  dextrose 50% Injectable 25 Gram(s) IV Push once  digoxin     Tablet 125 MICROGram(s) Oral every other day  escitalopram 10 milliGRAM(s) Oral daily  glucagon  Injectable 1 milliGRAM(s) IntraMuscular once  insulin glargine Injectable (LANTUS) 5 Unit(s) SubCutaneous at bedtime  insulin lispro (ADMELOG) corrective regimen sliding scale   SubCutaneous three times a day before meals  insulin lispro (ADMELOG) corrective regimen sliding scale   SubCutaneous at bedtime  insulin lispro Injectable (ADMELOG) 2 Unit(s) SubCutaneous three times a day before meals  levothyroxine 75 MICROGram(s) Oral daily  memantine 10 milliGRAM(s) Oral two times a day  metoprolol tartrate 50 milliGRAM(s) Oral every 6 hours  pantoprazole    Tablet 40 milliGRAM(s) Oral before breakfast  piperacillin/tazobactam IVPB.. 3.375 Gram(s) IV Intermittent every 12 hours  polyethylene glycol 3350 17 Gram(s) Oral daily  rivastigmine patch  9.5 mG/24 Hr(s) 1 Patch Transdermal every 24 hours  saccharomyces boulardii 250 milliGRAM(s) Oral two times a day  senna 2 Tablet(s) Oral at bedtime  tamsulosin 0.4 milliGRAM(s) Oral at bedtime    MEDICATIONS  (PRN):  acetaminophen     Tablet .. 650 milliGRAM(s) Oral every 6 hours PRN Temp greater or equal to 38C (100.4F), Mild Pain (1 - 3)  albuterol/ipratropium for Nebulization 3 milliLiter(s) Nebulizer every 6 hours PRN Shortness of Breath  aluminum hydroxide/magnesium hydroxide/simethicone Suspension 30 milliLiter(s) Oral every 4 hours PRN Dyspepsia  benzonatate 100 milliGRAM(s) Oral three times a day PRN Cough  bisacodyl Suppository 10 milliGRAM(s) Rectal daily PRN Constipation  dextrose Oral Gel 15 Gram(s) Oral once PRN Blood Glucose LESS THAN 70 milliGRAM(s)/deciliter  melatonin 3 milliGRAM(s) Oral at bedtime PRN Insomnia  ondansetron Injectable 4 milliGRAM(s) IV Push every 8 hours PRN Nausea and/or Vomiting      Allergies    No Known Allergies    Intolerances        Vital Signs Last 24 Hrs  T(C): 36.8 (2024 04:17), Max: 37.9 (2024 09:45)  T(F): 98.2 (2024 04:17), Max: 100.3 (2024 09:45)  HR: 94 (2024 04:17) (90 - 103)  BP: 116/79 (2024 04:17) (104/75 - 119/80)  BP(mean): --  RR: 18 (2024 04:17) (18 - 20)  SpO2: 96% (2024 04:17) (96% - 100%)    Parameters below as of 2024 04:17  Patient On (Oxygen Delivery Method): nasal cannula        I&O's Summary    2024 07:01  -  2024 07:00  --------------------------------------------------------  IN: 480 mL / OUT: 900 mL / NET: -420 mL          PHYSICAL EXAM:  TELE:   Constitutional: NAD, awake and alert, frail   HEENT: Moist Mucous Membranes, Anicteric  Pulmonary: Non-labored, breath sounds are clear bilaterally, No wheezing, crackles or rhonchi  Cardiovascular: IRRegular, S1 and S2 nl, Murmur   Gastrointestinal: Bowel Sounds present, soft, nontender.   Lymph: No lymphadenopathy. No peripheral edema.  Skin: No visible rashes or ulcers.  Psych:  Mood & affect appropriate    LABS: All Labs Reviewed:                        10.7   17.38 )-----------( 280      ( 2024 07:00 )             33.3                         10.7   18.77 )-----------( 302      ( 2024 08:55 )             33.1                         10.3   13.78 )-----------( 293      ( 2024 07:40 )             32.4     2024 08:55    133    |  101    |  46     ----------------------------<  250    4.7     |  20     |  2.20   2024 07:40    136    |  105    |  28     ----------------------------<  211    4.6     |  19     |  1.70     Ca    8.0        2024 08:55  Ca    8.2        2024 07:40  Phos  3.3       2024 07:40  Mg     2.3       2024 07:40    TPro  7.0    /  Alb  2.7    /  TBili  0.7    /  DBili  x      /  AST  207    /  ALT  155    /  AlkPhos  122    2024 08:55  TPro  6.7    /  Alb  2.5    /  TBili  0.4    /  DBili  x      /  AST  153    /  ALT  108    /  AlkPhos  116    2024 07:40             EC Lead ECG:   Ventricular Rate 127 BPM    QRS Duration 84 ms    Q-T Interval 278 ms    QTC Calculation(Bazett) 404 ms    R Axis 28 degrees    T Axis -61 degrees    Diagnosis Line Atrial fibrillation with rapid ventricular response  Low voltage QRS  Septal infarct , age undetermined  Abnormal ECG  No previous ECGs available  Confirmed by rogerio Harvey ([id number]) on 2024 2:48:52 PM (24 @ 19:24)      ACC: [id number] EXAM:  ECHO TTE WO CON COMP W DOPP                          PROCEDURE DATE:  2022          INTERPRETATION:  INDICATION: Abnormal EKG  Sonographer KL    Blood Pressure 142/86    Height 158 cm     Weight 51.7 kg       BSA 1.5   sqm    Dimensions:  LA 3.6       Normal Values: 2.0 - 4.0 cm  Ao 3.2        Normal Values: 2.0 - 3.8 cm  SEPTUM 1.0       Normal Values: 0.6 - 1.2 cm  PWT 0.9       Normal Values: 0.6 - 1.1 cm  LVIDd 5.8         Normal Values: 3.0 - 5.6 cm  LVIDs 4.3      Normal Values: 1.8 - 4.0 cm      OBSERVATIONS:  Mitral Valve: Moderate MR.  Aortic Valve/Aorta: normal trileaflet aortic valve.  Tricuspid Valve: Mild TR.  Pulmonic Valve: Mild PI  Left Atrium: Enlarged  Right Atrium: Enlarged  Left Ventricle: Left ventricular enlargement with severe left ventricular   systolic dysfunction, estimated LVEF of 20-25%. The entire apex and   anterior walls appear akinetic. The inferior and inferolateral walls   appear hypokinetic. Remaining walls are not well-visualized  Right Ventricle: Grossly normal size and systolic function.  Pericardium: no significant pericardial effusion.  Pulmonary/RV Pressure: estimated PA systolic pressure of at least 35 mmHg   assuming an RA pressure of 3mmHg.        IMPRESSION:  Left ventricular enlargement with severe left ventricular systolic   dysfunction, estimated LVEF of 20-25%. The entire apex and anterior walls   appear akinetic. The inferior and inferolateral walls appear hypokinetic.  Grossly normal RV size and systolic function.  Biatrial enlargement  Normal trileaflet aortic valve, without AI.  Moderate MR  Mild TR.  No significant pericardial effusion.    --- End of Report ---            JOHN BAEZA MD; Attending Cardiologist  This document has been electronically signed. Dec 23 2022  4:40PM      Radiology:         [facility] -- Dylan Hernandez Pannella, Patel, Savella Goodger, Cohen  Office # [phone]      Follow Up:      Subjective/Observations:     No events overnight resting comfortably in bed.  No complaints of chest pain, dyspnea, or palpitations reported. No signs of orthopnea or PND.  in chair now on NC feels much better overall     REVIEW OF SYSTEMS: All other review of systems is negative unless indicated above    PAST MEDICAL & SURGICAL HISTORY:  Atrial fibrillation      Hypothyroidism      Mild Alzheimer's dementia      Diabetes mellitus      Acute on chronic systolic congestive heart failure      Hypertension      Hyperlipemia      Cardiac LV ejection fraction 21-30%        History of appendectomy      H/O hernia repair      History of cholecystectomy          MEDICATIONS  (STANDING):  apixaban 2.5 milliGRAM(s) Oral every 12 hours  bisacodyl Suppository 10 milliGRAM(s) Rectal once  dexAMETHasone     Tablet 10 milliGRAM(s) Oral daily  dextrose 5%. 1000 milliLiter(s) (100 mL/Hr) IV Continuous <Continuous>  dextrose 5%. 1000 milliLiter(s) (50 mL/Hr) IV Continuous <Continuous>  dextrose 50% Injectable 25 Gram(s) IV Push once  dextrose 50% Injectable 12.5 Gram(s) IV Push once  dextrose 50% Injectable 25 Gram(s) IV Push once  digoxin     Tablet 125 MICROGram(s) Oral every other day  escitalopram 10 milliGRAM(s) Oral daily  glucagon  Injectable 1 milliGRAM(s) IntraMuscular once  insulin glargine Injectable (LANTUS) 5 Unit(s) SubCutaneous at bedtime  insulin lispro (ADMELOG) corrective regimen sliding scale   SubCutaneous three times a day before meals  insulin lispro (ADMELOG) corrective regimen sliding scale   SubCutaneous at bedtime  insulin lispro Injectable (ADMELOG) 2 Unit(s) SubCutaneous three times a day before meals  levothyroxine 75 MICROGram(s) Oral daily  memantine 10 milliGRAM(s) Oral two times a day  metoprolol tartrate 50 milliGRAM(s) Oral every 6 hours  pantoprazole    Tablet 40 milliGRAM(s) Oral before breakfast  piperacillin/tazobactam IVPB.. 3.375 Gram(s) IV Intermittent every 12 hours  polyethylene glycol 3350 17 Gram(s) Oral daily  rivastigmine patch  9.5 mG/24 Hr(s) 1 Patch Transdermal every 24 hours  saccharomyces boulardii 250 milliGRAM(s) Oral two times a day  senna 2 Tablet(s) Oral at bedtime  tamsulosin 0.4 milliGRAM(s) Oral at bedtime    MEDICATIONS  (PRN):  acetaminophen     Tablet .. 650 milliGRAM(s) Oral every 6 hours PRN Temp greater or equal to 38C (100.4F), Mild Pain (1 - 3)  albuterol/ipratropium for Nebulization 3 milliLiter(s) Nebulizer every 6 hours PRN Shortness of Breath  aluminum hydroxide/magnesium hydroxide/simethicone Suspension 30 milliLiter(s) Oral every 4 hours PRN Dyspepsia  benzonatate 100 milliGRAM(s) Oral three times a day PRN Cough  bisacodyl Suppository 10 milliGRAM(s) Rectal daily PRN Constipation  dextrose Oral Gel 15 Gram(s) Oral once PRN Blood Glucose LESS THAN 70 milliGRAM(s)/deciliter  melatonin 3 milliGRAM(s) Oral at bedtime PRN Insomnia  ondansetron Injectable 4 milliGRAM(s) IV Push every 8 hours PRN Nausea and/or Vomiting      Allergies    No Known Allergies    Intolerances        Vital Signs Last 24 Hrs  T(C): 36.8 (2024 04:17), Max: 37.9 (2024 09:45)  T(F): 98.2 (2024 04:17), Max: 100.3 (2024 09:45)  HR: 94 (2024 04:17) (90 - 103)  BP: 116/79 (2024 04:17) (104/75 - 119/80)  BP(mean): --  RR: 18 (2024 04:17) (18 - 20)  SpO2: 96% (2024 04:17) (96% - 100%)    Parameters below as of 2024 04:17  Patient On (Oxygen Delivery Method): nasal cannula        I&O's Summary    2024 07:01  -  2024 07:00  --------------------------------------------------------  IN: 480 mL / OUT: 900 mL / NET: -420 mL          PHYSICAL EXAM:  TELE: Arf  Constitutional: NAD, awake and alert, frail   HEENT: Moist Mucous Membranes, Anicteric  Pulmonary: Non-labored, breath sounds are clear bilaterally, No wheezing, crackles or rhonchi  Cardiovascular: IRRegular, S1 and S2 nl, Murmur   Gastrointestinal: Bowel Sounds present, soft, nontender.   Lymph: No lymphadenopathy. No peripheral edema.  Skin: No visible rashes or ulcers.  Psych:  Mood & affect appropriate    LABS: All Labs Reviewed:                        10.7   17.38 )-----------( 280      ( 2024 07:00 )             33.3                         10.7   18.77 )-----------( 302      ( 2024 08:55 )             33.1                         10.3   13.78 )-----------( 293      ( 2024 07:40 )             32.4     2024 08:55    133    |  101    |  46     ----------------------------<  250    4.7     |  20     |  2.20   2024 07:40    136    |  105    |  28     ----------------------------<  211    4.6     |  19     |  1.70     Ca    8.0        2024 08:55  Ca    8.2        2024 07:40  Phos  3.3       2024 07:40  Mg     2.3       2024 07:40    TPro  7.0    /  Alb  2.7    /  TBili  0.7    /  DBili  x      /  AST  207    /  ALT  155    /  AlkPhos  122    2024 08:55  TPro  6.7    /  Alb  2.5    /  TBili  0.4    /  DBili  x      /  AST  153    /  ALT  108    /  AlkPhos  116    2024 07:40             EC Lead ECG:   Ventricular Rate 127 BPM    QRS Duration 84 ms    Q-T Interval 278 ms    QTC Calculation(Bazett) 404 ms    R Axis 28 degrees    T Axis -61 degrees    Diagnosis Line Atrial fibrillation with rapid ventricular response  Low voltage QRS  Septal infarct , age undetermined  Abnormal ECG  No previous ECGs available  Confirmed by rogerio Harvey ([id number]) on 2024 2:48:52 PM (24 @ 19:24)      ACC: [id number] EXAM:  ECHO TTE WO CON COMP W DOPP                          PROCEDURE DATE:  2022          INTERPRETATION:  INDICATION: Abnormal EKG  Sonographer ARTI    Blood Pressure 142/86    Height 158 cm     Weight 51.7 kg       BSA 1.5   sqm    Dimensions:  LA 3.6       Normal Values: 2.0 - 4.0 cm  Ao 3.2        Normal Values: 2.0 - 3.8 cm  SEPTUM 1.0       Normal Values: 0.6 - 1.2 cm  PWT 0.9       Normal Values: 0.6 - 1.1 cm  LVIDd 5.8         Normal Values: 3.0 - 5.6 cm  LVIDs 4.3      Normal Values: 1.8 - 4.0 cm      OBSERVATIONS:  Mitral Valve: Moderate MR.  Aortic Valve/Aorta: normal trileaflet aortic valve.  Tricuspid Valve: Mild TR.  Pulmonic Valve: Mild PI  Left Atrium: Enlarged  Right Atrium: Enlarged  Left Ventricle: Left ventricular enlargement with severe left ventricular   systolic dysfunction, estimated LVEF of 20-25%. The entire apex and   anterior walls appear akinetic. The inferior and inferolateral walls   appear hypokinetic. Remaining walls are not well-visualized  Right Ventricle: Grossly normal size and systolic function.  Pericardium: no significant pericardial effusion.  Pulmonary/RV Pressure: estimated PA systolic pressure of at least 35 mmHg   assuming an RA pressure of 3mmHg.        IMPRESSION:  Left ventricular enlargement with severe left ventricular systolic   dysfunction, estimated LVEF of 20-25%. The entire apex and anterior walls   appear akinetic. The inferior and inferolateral walls appear hypokinetic.  Grossly normal RV size and systolic function.  Biatrial enlargement  Normal trileaflet aortic valve, without AI.  Moderate MR  Mild TR.  No significant pericardial effusion.    --- End of Report ---            JOHN BAEZA MD; Attending Cardiologist  This document has been electronically signed. Dec 23 2022  4:40PM      Radiology:

## 2024-04-05 NOTE — PROGRESS NOTE ADULT - PROBLEM SELECTOR PLAN 2
-Pt with chronic afib with episodes of RVR while hospitalized, likely due to infection  - Toprol XL switched from 100mg BID to 50mg q6h for better around the clock control  - Has been in afib 80s-90s  -C/w digoxin 125 mcg every other day -- dig level wnl   -C/w Eliquis 2.5 mg bid  - May need amio gtt if rates remain uncontrolled  -cardio consulted ([facility])

## 2024-04-05 NOTE — PROGRESS NOTE ADULT - PROBLEM SELECTOR PLAN 5
A1c 6.5%  - C/w insulin SS  - ADMELOG 2 units pre meals  - Lantus 5 units at bedtime A1c 6.5%  - C/w insulin SS  - ADMELOG 4 units pre meals  - Lantus 5 units at bedtime

## 2024-04-05 NOTE — PROGRESS NOTE ADULT - PROBLEM SELECTOR PLAN 7
YVETTE on CKD 2 -Cr at baseline (1.20-1.40)  -pt with worsening with renal function  with urinary retention s/p cath  -- Cr 2.3 today  -avoid nephrotoxic agents  - Nephro Dr. Oleary following

## 2024-04-05 NOTE — PROGRESS NOTE ADULT - SUBJECTIVE AND OBJECTIVE BOX
[facility]  INFECTIOUS DISEASES - [address], [place] [address]  Tel: [phone]     Fax: [phone]  =======================================================    ELGIN LEXY [id number]    Follow up: No fevers. Transitioned to O2 via nasal cannula. Reports feeling fine. Denies any pain or SOB.    Allergies:  No Known Allergies      Antibiotics:  acetaminophen     Tablet .. 650 milliGRAM(s) Oral every 6 hours PRN  albuterol/ipratropium for Nebulization 3 milliLiter(s) Nebulizer every 6 hours PRN  aluminum hydroxide/magnesium hydroxide/simethicone Suspension 30 milliLiter(s) Oral every 4 hours PRN  apixaban 2.5 milliGRAM(s) Oral every 12 hours  benzonatate 100 milliGRAM(s) Oral three times a day PRN  bisacodyl Suppository 10 milliGRAM(s) Rectal once  bisacodyl Suppository 10 milliGRAM(s) Rectal daily PRN  dexAMETHasone     Tablet 10 milliGRAM(s) Oral daily  dextrose 5%. 1000 milliLiter(s) IV Continuous <Continuous>  dextrose 5%. 1000 milliLiter(s) IV Continuous <Continuous>  dextrose 50% Injectable 25 Gram(s) IV Push once  dextrose 50% Injectable 12.5 Gram(s) IV Push once  dextrose 50% Injectable 25 Gram(s) IV Push once  dextrose Oral Gel 15 Gram(s) Oral once PRN  digoxin     Tablet 125 MICROGram(s) Oral every other day  escitalopram 10 milliGRAM(s) Oral daily  glucagon  Injectable 1 milliGRAM(s) IntraMuscular once  insulin glargine Injectable (LANTUS) 5 Unit(s) SubCutaneous at bedtime  insulin lispro (ADMELOG) corrective regimen sliding scale   SubCutaneous three times a day before meals  insulin lispro (ADMELOG) corrective regimen sliding scale   SubCutaneous at bedtime  insulin lispro Injectable (ADMELOG) 4 Unit(s) SubCutaneous three times a day before meals  levothyroxine 75 MICROGram(s) Oral daily  melatonin 3 milliGRAM(s) Oral at bedtime PRN  memantine 10 milliGRAM(s) Oral two times a day  metoprolol tartrate 50 milliGRAM(s) Oral every 6 hours  ondansetron Injectable 4 milliGRAM(s) IV Push every 8 hours PRN  pantoprazole    Tablet 40 milliGRAM(s) Oral before breakfast  piperacillin/tazobactam IVPB.. 3.375 Gram(s) IV Intermittent every 12 hours  polyethylene glycol 3350 17 Gram(s) Oral daily  rivastigmine patch  9.5 mG/24 Hr(s) 1 Patch Transdermal every 24 hours  saccharomyces boulardii 250 milliGRAM(s) Oral two times a day  senna 2 Tablet(s) Oral at bedtime  tamsulosin 0.4 milliGRAM(s) Oral at bedtime       REVIEW OF SYSTEMS:  Limited 2/2 dementia, as per HPI     Physical Exam:  ICU Vital Signs Last 24 Hrs  T(C): 37.3 (05 Apr 2024 12:20), Max: 37.3 (04 Apr 2024 20:12)  T(F): 99.2 (05 Apr 2024 12:20), Max: 99.2 (04 Apr 2024 20:12)  HR: 101 (05 Apr 2024 12:20) (90 - 101)  BP: 111/72 (05 Apr 2024 12:20) (111/72 - 119/80)  BP(mean): --  ABP: --  ABP(mean): --  RR: 18 (05 Apr 2024 12:20) (18 - 18)  SpO2: 99% (05 Apr 2024 12:20) (96% - 100%)    O2 Parameters below as of 05 Apr 2024 12:20  Patient On (Oxygen Delivery Method): nasal cannula  O2 Flow (L/min): 4         GEN: NAD, sitting up in chair  HEENT: normocephalic and atraumatic.  NECK: Supple.    LUNGS: Normal respiratory effort  HEART: Tachycardic  ABDOMEN: Soft, nontender, and nondistended.    EXTREMITIES: No leg edema.  NEUROLOGIC: Answering some simple questions, knows she is in [place]  Labs:  04-05    136  |  103  |  61<H>  ----------------------------<  182<H>  4.7   |  20<L>  |  2.30<H>    Ca    8.0<L>      05 Apr 2024 07:00    TPro  6.4  /  Alb  2.5<L>  /  TBili  0.4  /  DBili  x   /  AST  101<H>  /  ALT  135<H>  /  AlkPhos  114  04-05                          10.7   17.38 )-----------( 280      ( 05 Apr 2024 07:00 )             33.3       Urinalysis Basic - ( 05 Apr 2024 07:00 )    Color: x / Appearance: x / SG: x / pH: x  Gluc: 182 mg/dL / Ketone: x  / Bili: x / Urobili: x   Blood: x / Protein: x / Nitrite: x   Leuk Esterase: x / RBC: x / WBC x   Sq Epi: x / Non Sq Epi: x / Bacteria: x      LIVER FUNCTIONS - ( 05 Apr 2024 07:00 )  Alb: 2.5 g/dL / Pro: 6.4 g/dL / ALK PHOS: 114 U/L / ALT: 135 U/L / AST: 101 U/L / GGT: x             RECENT CULTURES:  04-01 @ 23:07 Clean Catch Clean Catch (Midstream) Klebsiella pneumoniae    10,000 - 49,000 CFU/mL Klebsiella pneumoniae        04-01 @ 20:30 .Blood Blood-Peripheral     No growth at 72 Hours        04-01 @ 20:20 .Blood Blood-Peripheral     No growth at 72 Hours              All imaging and data are reviewed.

## 2024-04-05 NOTE — CASE MANAGEMENT PROGRESS NOTE - NSCMPROGRESSNOTE_GEN_ALL_CORE
Discussed pt in rounds, Per MD no weekend discharge. Pt remains acute on oxygen 6L, receiving IV antibiotics.

## 2024-04-05 NOTE — PROGRESS NOTE ADULT - SUBJECTIVE AND OBJECTIVE BOX
Patient is a 88y old  Female who presents with a chief complaint of weakness (05 Apr 2024 12:41)      INTERVAL HPI/OVERNIGHT EVENTS: Patient seen and examined at bedside. Titrated off HFNC to 6LNC, saturating well. Patient has no complaints at this time, eating breakfast. Denies fevers, chills, headache, lightheadedness, chest pain, dyspnea, abdominal pain, n/v/d/c.    MEDICATIONS  (STANDING):  apixaban 2.5 milliGRAM(s) Oral every 12 hours  bisacodyl Suppository 10 milliGRAM(s) Rectal once  dexAMETHasone     Tablet 10 milliGRAM(s) Oral daily  dextrose 5%. 1000 milliLiter(s) (50 mL/Hr) IV Continuous <Continuous>  dextrose 5%. 1000 milliLiter(s) (100 mL/Hr) IV Continuous <Continuous>  dextrose 50% Injectable 12.5 Gram(s) IV Push once  dextrose 50% Injectable 25 Gram(s) IV Push once  dextrose 50% Injectable 25 Gram(s) IV Push once  digoxin     Tablet 125 MICROGram(s) Oral every other day  escitalopram 10 milliGRAM(s) Oral daily  glucagon  Injectable 1 milliGRAM(s) IntraMuscular once  insulin glargine Injectable (LANTUS) 5 Unit(s) SubCutaneous at bedtime  insulin lispro (ADMELOG) corrective regimen sliding scale   SubCutaneous three times a day before meals  insulin lispro (ADMELOG) corrective regimen sliding scale   SubCutaneous at bedtime  insulin lispro Injectable (ADMELOG) 2 Unit(s) SubCutaneous three times a day before meals  levothyroxine 75 MICROGram(s) Oral daily  memantine 10 milliGRAM(s) Oral two times a day  metoprolol tartrate 50 milliGRAM(s) Oral every 6 hours  pantoprazole    Tablet 40 milliGRAM(s) Oral before breakfast  piperacillin/tazobactam IVPB.. 3.375 Gram(s) IV Intermittent every 12 hours  polyethylene glycol 3350 17 Gram(s) Oral daily  rivastigmine patch  9.5 mG/24 Hr(s) 1 Patch Transdermal every 24 hours  saccharomyces boulardii 250 milliGRAM(s) Oral two times a day  senna 2 Tablet(s) Oral at bedtime  tamsulosin 0.4 milliGRAM(s) Oral at bedtime    MEDICATIONS  (PRN):  acetaminophen     Tablet .. 650 milliGRAM(s) Oral every 6 hours PRN Temp greater or equal to 38C (100.4F), Mild Pain (1 - 3)  albuterol/ipratropium for Nebulization 3 milliLiter(s) Nebulizer every 6 hours PRN Shortness of Breath  aluminum hydroxide/magnesium hydroxide/simethicone Suspension 30 milliLiter(s) Oral every 4 hours PRN Dyspepsia  benzonatate 100 milliGRAM(s) Oral three times a day PRN Cough  bisacodyl Suppository 10 milliGRAM(s) Rectal daily PRN Constipation  dextrose Oral Gel 15 Gram(s) Oral once PRN Blood Glucose LESS THAN 70 milliGRAM(s)/deciliter  melatonin 3 milliGRAM(s) Oral at bedtime PRN Insomnia  ondansetron Injectable 4 milliGRAM(s) IV Push every 8 hours PRN Nausea and/or Vomiting      Allergies    No Known Allergies    Intolerances        REVIEW OF SYSTEMS:  CONSTITUTIONAL: No fever or chills  HEENT:  No headache, no sore throat  RESPIRATORY: +cough, wheezing, or shortness of breath  CARDIOVASCULAR: No chest pain, palpitations  GASTROINTESTINAL: No abd pain, nausea, vomiting, or diarrhea  GENITOURINARY: No dysuria, frequency, or hematuria  NEUROLOGICAL: no focal weakness or dizziness  MUSCULOSKELETAL: no myalgias     Vital Signs Last 24 Hrs  T(C): 37.3 (05 Apr 2024 12:20), Max: 37.3 (04 Apr 2024 20:12)  T(F): 99.2 (05 Apr 2024 12:20), Max: 99.2 (04 Apr 2024 20:12)  HR: 101 (05 Apr 2024 12:20) (90 - 103)  BP: 111/72 (05 Apr 2024 12:20) (111/72 - 119/80)  BP(mean): --  RR: 18 (05 Apr 2024 12:20) (18 - 18)  SpO2: 99% (05 Apr 2024 12:20) (96% - 100%)    Parameters below as of 05 Apr 2024 12:20  Patient On (Oxygen Delivery Method): nasal cannula  O2 Flow (L/min): 4      PHYSICAL EXAM:  GENERAL: NAD  HEENT:  anicteric, moist mucous membranes  CHEST/LUNG:  bibasilar rales R > L, no wheezes or rhonchi  HEART:  irregular rhythm, rate controlled, S1, S2  ABDOMEN:  BS+, soft, nontender, nondistended  EXTREMITIES: no edema, cyanosis, or calf tenderness  NERVOUS SYSTEM: answers questions and follows commands appropriately    LABS:                        10.7   17.38 )-----------( 280      ( 05 Apr 2024 07:00 )             33.3     CBC Full  -  ( 05 Apr 2024 07:00 )  WBC Count : 17.38 K/uL  Hemoglobin : 10.7 g/dL  Hematocrit : 33.3 %  Platelet Count - Automated : 280 K/uL  Mean Cell Volume : 84.9 fl  Mean Cell Hemoglobin : 27.3 pg  Mean Cell Hemoglobin Concentration : 32.1 gm/dL  Auto Neutrophil # : 15.10 K/uL  Auto Lymphocyte # : 1.00 K/uL  Auto Monocyte # : 1.10 K/uL  Auto Eosinophil # : 0.00 K/uL  Auto Basophil # : 0.02 K/uL  Auto Neutrophil % : 86.9 %  Auto Lymphocyte % : 5.8 %  Auto Monocyte % : 6.3 %  Auto Eosinophil % : 0.0 %  Auto Basophil % : 0.1 %    05 Apr 2024 07:00    136    |  103    |  61     ----------------------------<  182    4.7     |  20     |  2.30     Ca    8.0        05 Apr 2024 07:00    TPro  6.4    /  Alb  2.5    /  TBili  0.4    /  DBili  x      /  AST  101    /  ALT  135    /  AlkPhos  114    05 Apr 2024 07:00      Urinalysis Basic - ( 05 Apr 2024 07:00 )    Color: x / Appearance: x / SG: x / pH: x  Gluc: 182 mg/dL / Ketone: x  / Bili: x / Urobili: x   Blood: x / Protein: x / Nitrite: x   Leuk Esterase: x / RBC: x / WBC x   Sq Epi: x / Non Sq Epi: x / Bacteria: x      CAPILLARY BLOOD GLUCOSE      POCT Blood Glucose.: 325 mg/dL (05 Apr 2024 13:25)  POCT Blood Glucose.: 307 mg/dL (05 Apr 2024 12:14)  POCT Blood Glucose.: 168 mg/dL (05 Apr 2024 08:07)  POCT Blood Glucose.: 265 mg/dL (04 Apr 2024 21:33)  POCT Blood Glucose.: 325 mg/dL (04 Apr 2024 17:14)        Culture - Urine (collected 04-01-24 @ 23:07)  Source: Clean Catch Clean Catch (Midstream)  Final Report (04-04-24 @ 18:21):    10,000 - 49,000 CFU/mL Klebsiella pneumoniae  Organism: Klebsiella pneumoniae (04-04-24 @ 18:21)  Organism: Klebsiella pneumoniae (04-04-24 @ 18:21)      Method Type: SARAH      -  Amoxicillin/Clavulanic Acid: S <=8/4      -  Ampicillin: R >16 These ampicillin results predict results for amoxicillin      -  Ampicillin/Sulbactam: S <=4/2      -  Aztreonam: S <=4      -  Cefazolin: S <=2 For uncomplicated UTI with K. pneumoniae, E. coli, or P. mirablis: SARAH <=16 is sensitive and SARAH >=32 is resistant. This also predicts results for oral agents cefaclor, cefdinir, cefpodoxime, cefprozil, cefuroxime axetil, cephalexin and locarbef for uncomplicated UTI. Note that some isolates may be susceptible to these agents while testing resistant to cefazolin.      -  Cefepime: S <=2      -  Cefoxitin: S <=8      -  Ceftriaxone: S <=1      -  Cefuroxime: S <=4      -  Ciprofloxacin: S <=0.25      -  Ertapenem: S <=0.5      -  Gentamicin: S <=2      -  Imipenem: S <=1      -  Levofloxacin: S <=0.5      -  Meropenem: S <=1      -  Nitrofurantoin: S <=32 Should not be used to treat pyelonephritis      -  Piperacillin/Tazobactam: S <=8      -  Tobramycin: S <=2      -  Trimethoprim/Sulfamethoxazole: R >2/38    Culture - Blood (collected 04-01-24 @ 20:30)  Source: .Blood Blood-Peripheral  Preliminary Report (04-05-24 @ 01:02):    No growth at 72 Hours    Culture - Blood (collected 04-01-24 @ 20:20)  Source: .Blood Blood-Peripheral  Preliminary Report (04-05-24 @ 01:02):    No growth at 72 Hours        RADIOLOGY & ADDITIONAL TESTS:    CXR 4/4/24:  IMPRESSION: No change heart mediastinum. Vascular congestion, bibasilar   airspace disease and small bibasilar pleural effusions. Findings may all   be on the basis of CHF. Correlate clinically for concomitant infection    Personally reviewed.     Consultant(s) Notes Reviewed:  [x] YES  [ ] NO     Patient is a 88y old  Female who presents with a chief complaint of weakness (05 Apr 2024 12:41)      INTERVAL HPI/OVERNIGHT EVENTS: Patient seen and examined at bedside. Titrated off HFNC to 4LNC, saturating well.   Patient has no complaints at this time, eating breakfast. Denies fevers, chills, headache, lightheadedness, chest pain, dyspnea, abdominal pain.     MEDICATIONS  (STANDING):  apixaban 2.5 milliGRAM(s) Oral every 12 hours  bisacodyl Suppository 10 milliGRAM(s) Rectal once  dexAMETHasone     Tablet 10 milliGRAM(s) Oral daily  dextrose 5%. 1000 milliLiter(s) (50 mL/Hr) IV Continuous <Continuous>  dextrose 5%. 1000 milliLiter(s) (100 mL/Hr) IV Continuous <Continuous>  dextrose 50% Injectable 12.5 Gram(s) IV Push once  dextrose 50% Injectable 25 Gram(s) IV Push once  dextrose 50% Injectable 25 Gram(s) IV Push once  digoxin     Tablet 125 MICROGram(s) Oral every other day  escitalopram 10 milliGRAM(s) Oral daily  glucagon  Injectable 1 milliGRAM(s) IntraMuscular once  insulin glargine Injectable (LANTUS) 5 Unit(s) SubCutaneous at bedtime  insulin lispro (ADMELOG) corrective regimen sliding scale   SubCutaneous three times a day before meals  insulin lispro (ADMELOG) corrective regimen sliding scale   SubCutaneous at bedtime  insulin lispro Injectable (ADMELOG) 2 Unit(s) SubCutaneous three times a day before meals  levothyroxine 75 MICROGram(s) Oral daily  memantine 10 milliGRAM(s) Oral two times a day  metoprolol tartrate 50 milliGRAM(s) Oral every 6 hours  pantoprazole    Tablet 40 milliGRAM(s) Oral before breakfast  piperacillin/tazobactam IVPB.. 3.375 Gram(s) IV Intermittent every 12 hours  polyethylene glycol 3350 17 Gram(s) Oral daily  rivastigmine patch  9.5 mG/24 Hr(s) 1 Patch Transdermal every 24 hours  saccharomyces boulardii 250 milliGRAM(s) Oral two times a day  senna 2 Tablet(s) Oral at bedtime  tamsulosin 0.4 milliGRAM(s) Oral at bedtime    MEDICATIONS  (PRN):  acetaminophen     Tablet .. 650 milliGRAM(s) Oral every 6 hours PRN Temp greater or equal to 38C (100.4F), Mild Pain (1 - 3)  albuterol/ipratropium for Nebulization 3 milliLiter(s) Nebulizer every 6 hours PRN Shortness of Breath  aluminum hydroxide/magnesium hydroxide/simethicone Suspension 30 milliLiter(s) Oral every 4 hours PRN Dyspepsia  benzonatate 100 milliGRAM(s) Oral three times a day PRN Cough  bisacodyl Suppository 10 milliGRAM(s) Rectal daily PRN Constipation  dextrose Oral Gel 15 Gram(s) Oral once PRN Blood Glucose LESS THAN 70 milliGRAM(s)/deciliter  melatonin 3 milliGRAM(s) Oral at bedtime PRN Insomnia  ondansetron Injectable 4 milliGRAM(s) IV Push every 8 hours PRN Nausea and/or Vomiting      Allergies    No Known Allergies    Intolerances        REVIEW OF SYSTEMS:  CONSTITUTIONAL: No fever or chills  HEENT:  No headache, no sore throat  RESPIRATORY: +cough, wheezing, or shortness of breath  CARDIOVASCULAR: No chest pain, palpitations  GASTROINTESTINAL: No abd pain, nausea, vomiting, or diarrhea  GENITOURINARY: No dysuria, frequency, or hematuria  NEUROLOGICAL: no focal weakness or dizziness  MUSCULOSKELETAL: no myalgias     Vital Signs Last 24 Hrs  T(C): 37.3 (05 Apr 2024 12:20), Max: 37.3 (04 Apr 2024 20:12)  T(F): 99.2 (05 Apr 2024 12:20), Max: 99.2 (04 Apr 2024 20:12)  HR: 101 (05 Apr 2024 12:20) (90 - 103)  BP: 111/72 (05 Apr 2024 12:20) (111/72 - 119/80)  BP(mean): --  RR: 18 (05 Apr 2024 12:20) (18 - 18)  SpO2: 99% (05 Apr 2024 12:20) (96% - 100%)    Parameters below as of 05 Apr 2024 12:20  Patient On (Oxygen Delivery Method): nasal cannula  O2 Flow (L/min): 4      PHYSICAL EXAM:  GENERAL: NAD  HEENT:  anicteric, moist mucous membranes  CHEST/LUNG:  bibasilar rales R > L, no wheezes or rhonchi  HEART:  irregular rhythm, rate controlled, S1, S2  ABDOMEN:  BS+, soft, nontender, nondistended  EXTREMITIES: no edema, cyanosis, or calf tenderness  NERVOUS SYSTEM: aao/3 sensory /motor intact   gu intact     LABS:                        10.7   17.38 )-----------( 280      ( 05 Apr 2024 07:00 )             33.3     CBC Full  -  ( 05 Apr 2024 07:00 )  WBC Count : 17.38 K/uL  Hemoglobin : 10.7 g/dL  Hematocrit : 33.3 %  Platelet Count - Automated : 280 K/uL  Mean Cell Volume : 84.9 fl  Mean Cell Hemoglobin : 27.3 pg  Mean Cell Hemoglobin Concentration : 32.1 gm/dL  Auto Neutrophil # : 15.10 K/uL  Auto Lymphocyte # : 1.00 K/uL  Auto Monocyte # : 1.10 K/uL  Auto Eosinophil # : 0.00 K/uL  Auto Basophil # : 0.02 K/uL  Auto Neutrophil % : 86.9 %  Auto Lymphocyte % : 5.8 %  Auto Monocyte % : 6.3 %  Auto Eosinophil % : 0.0 %  Auto Basophil % : 0.1 %    05 Apr 2024 07:00    136    |  103    |  61     ----------------------------<  182    4.7     |  20     |  2.30     Ca    8.0        05 Apr 2024 07:00    TPro  6.4    /  Alb  2.5    /  TBili  0.4    /  DBili  x      /  AST  101    /  ALT  135    /  AlkPhos  114    05 Apr 2024 07:00      Urinalysis Basic - ( 05 Apr 2024 07:00 )    Color: x / Appearance: x / SG: x / pH: x  Gluc: 182 mg/dL / Ketone: x  / Bili: x / Urobili: x   Blood: x / Protein: x / Nitrite: x   Leuk Esterase: x / RBC: x / WBC x   Sq Epi: x / Non Sq Epi: x / Bacteria: x      CAPILLARY BLOOD GLUCOSE      POCT Blood Glucose.: 325 mg/dL (05 Apr 2024 13:25)  POCT Blood Glucose.: 307 mg/dL (05 Apr 2024 12:14)  POCT Blood Glucose.: 168 mg/dL (05 Apr 2024 08:07)  POCT Blood Glucose.: 265 mg/dL (04 Apr 2024 21:33)  POCT Blood Glucose.: 325 mg/dL (04 Apr 2024 17:14)        Culture - Urine (collected 04-01-24 @ 23:07)  Source: Clean Catch Clean Catch (Midstream)  Final Report (04-04-24 @ 18:21):    10,000 - 49,000 CFU/mL Klebsiella pneumoniae  Organism: Klebsiella pneumoniae (04-04-24 @ 18:21)  Organism: Klebsiella pneumoniae (04-04-24 @ 18:21)      Method Type: SARAH      -  Amoxicillin/Clavulanic Acid: S <=8/4      -  Ampicillin: R >16 These ampicillin results predict results for amoxicillin      -  Ampicillin/Sulbactam: S <=4/2      -  Aztreonam: S <=4      -  Cefazolin: S <=2 For uncomplicated UTI with K. pneumoniae, E. coli, or P. mirablis: SARAH <=16 is sensitive and SARAH >=32 is resistant. This also predicts results for oral agents cefaclor, cefdinir, cefpodoxime, cefprozil, cefuroxime axetil, cephalexin and locarbef for uncomplicated UTI. Note that some isolates may be susceptible to these agents while testing resistant to cefazolin.      -  Cefepime: S <=2      -  Cefoxitin: S <=8      -  Ceftriaxone: S <=1      -  Cefuroxime: S <=4      -  Ciprofloxacin: S <=0.25      -  Ertapenem: S <=0.5      -  Gentamicin: S <=2      -  Imipenem: S <=1      -  Levofloxacin: S <=0.5      -  Meropenem: S <=1      -  Nitrofurantoin: S <=32 Should not be used to treat pyelonephritis      -  Piperacillin/Tazobactam: S <=8      -  Tobramycin: S <=2      -  Trimethoprim/Sulfamethoxazole: R >2/38    Culture - Blood (collected 04-01-24 @ 20:30)  Source: .Blood Blood-Peripheral  Preliminary Report (04-05-24 @ 01:02):    No growth at 72 Hours    Culture - Blood (collected 04-01-24 @ 20:20)  Source: .Blood Blood-Peripheral  Preliminary Report (04-05-24 @ 01:02):    No growth at 72 Hours        RADIOLOGY & ADDITIONAL TESTS:    CXR 4/4/24:  IMPRESSION: No change heart mediastinum. Vascular congestion, bibasilar   airspace disease and small bibasilar pleural effusions. Findings may all   be on the basis of CHF. Correlate clinically for concomitant infection    Personally reviewed.     Consultant(s) Notes Reviewed:  [x] YES  [ ] NO

## 2024-04-05 NOTE — PROGRESS NOTE ADULT - ASSESSMENT
CKD 3  Hypertension  Dyspnea: Pneumonia, COVID 19+, CHF  Diabetes  Hypokalemia  Urinary retention    04/02/24: Renal indices close to baseline. Potassium supplementation. Monitor creatinine trend post IV contrast study. IV abx, ID follow up.   Monitor blood sugar levels. Insulin coverage as needed. Monitor BP trend. Titrate BP meds as needed. Avoid nephrotoxic meds as possible.   Will follow electrolytes and renal function trend.   04/03/24: Mild increase in creatinine. ? lasix/IV contrast effect. Will follow trend.   04/04/24: Worsening renal indices. Hold diuretics and may use PRN. Encourage PO intake as tolerated. Straight cath PRN for urinary retention.   04/05/24: Creatinine seems to have plateaued. To continue current meds. Straight cath PRN. Avoid nephrotoxic meds as possible.

## 2024-04-05 NOTE — PROGRESS NOTE ADULT - SUBJECTIVE AND OBJECTIVE BOX
Patient is a 88y old  Female who presents with a chief complaint of weakness (03 Apr 2024 16:36)  Patient seen in follow up for CKD.        PAST MEDICAL HISTORY:  Atrial fibrillation    Hypothyroidism    Mild Alzheimer's dementia    Diabetes mellitus    Acute on chronic systolic congestive heart failure    Hypertension    Hyperlipemia    Cardiac LV ejection fraction 21-30%      MEDICATIONS  (STANDING):  apixaban 2.5 milliGRAM(s) Oral every 12 hours  bisacodyl Suppository 10 milliGRAM(s) Rectal once  dexAMETHasone     Tablet 10 milliGRAM(s) Oral daily  dextrose 5%. 1000 milliLiter(s) (100 mL/Hr) IV Continuous <Continuous>  dextrose 5%. 1000 milliLiter(s) (50 mL/Hr) IV Continuous <Continuous>  dextrose 50% Injectable 12.5 Gram(s) IV Push once  dextrose 50% Injectable 25 Gram(s) IV Push once  dextrose 50% Injectable 25 Gram(s) IV Push once  digoxin     Tablet 125 MICROGram(s) Oral every other day  escitalopram 10 milliGRAM(s) Oral daily  glucagon  Injectable 1 milliGRAM(s) IntraMuscular once  insulin glargine Injectable (LANTUS) 5 Unit(s) SubCutaneous at bedtime  insulin lispro (ADMELOG) corrective regimen sliding scale   SubCutaneous three times a day before meals  insulin lispro (ADMELOG) corrective regimen sliding scale   SubCutaneous at bedtime  insulin lispro Injectable (ADMELOG) 2 Unit(s) SubCutaneous three times a day before meals  levothyroxine 75 MICROGram(s) Oral daily  memantine 10 milliGRAM(s) Oral two times a day  metoprolol tartrate 50 milliGRAM(s) Oral every 6 hours  pantoprazole    Tablet 40 milliGRAM(s) Oral before breakfast  piperacillin/tazobactam IVPB.. 3.375 Gram(s) IV Intermittent every 12 hours  polyethylene glycol 3350 17 Gram(s) Oral daily  rivastigmine patch  9.5 mG/24 Hr(s) 1 Patch Transdermal every 24 hours  saccharomyces boulardii 250 milliGRAM(s) Oral two times a day  senna 2 Tablet(s) Oral at bedtime  tamsulosin 0.4 milliGRAM(s) Oral at bedtime    MEDICATIONS  (PRN):  acetaminophen     Tablet .. 650 milliGRAM(s) Oral every 6 hours PRN Temp greater or equal to 38C (100.4F), Mild Pain (1 - 3)  albuterol/ipratropium for Nebulization 3 milliLiter(s) Nebulizer every 6 hours PRN Shortness of Breath  aluminum hydroxide/magnesium hydroxide/simethicone Suspension 30 milliLiter(s) Oral every 4 hours PRN Dyspepsia  benzonatate 100 milliGRAM(s) Oral three times a day PRN Cough  bisacodyl Suppository 10 milliGRAM(s) Rectal daily PRN Constipation  dextrose Oral Gel 15 Gram(s) Oral once PRN Blood Glucose LESS THAN 70 milliGRAM(s)/deciliter  melatonin 3 milliGRAM(s) Oral at bedtime PRN Insomnia  ondansetron Injectable 4 milliGRAM(s) IV Push every 8 hours PRN Nausea and/or Vomiting    T(C): 36.8 (04-05-24 @ 04:17), Max: 37.9 (04-04-24 @ 09:45)  HR: 94 (04-05-24 @ 04:17) (90 - 123)  BP: 116/79 (04-05-24 @ 04:17) (104/75 - 119/80)  RR: 18 (04-05-24 @ 04:17)  SpO2: 96% (04-05-24 @ 04:17)  Wt(kg): --  I&O's Detail    04 Apr 2024 07:01  -  05 Apr 2024 07:00  --------------------------------------------------------  IN:    Oral Fluid: 480 mL  Total IN: 480 mL    OUT:    Intermittent Catheterization - Urethral (mL): 400 mL    Voided (mL): 500 mL  Total OUT: 900 mL    Total NET: -420 mL      05 Apr 2024 07:01  -  05 Apr 2024 12:41  --------------------------------------------------------  IN:    Oral Fluid: 240 mL  Total IN: 240 mL    OUT:  Total OUT: 0 mL    Total NET: 240 mL              PHYSICAL EXAM:    General: No distress  Respiratory: b/l air entry  Cardiovascular: S1 S2  Gastrointestinal: soft  Extremities:  no edema                   LABORATORY:                        10.7   17.38 )-----------( 280      ( 05 Apr 2024 07:00 )             33.3     04-05    136  |  103  |  61<H>  ----------------------------<  182<H>  4.7   |  20<L>  |  2.30<H>    Ca    8.0<L>      05 Apr 2024 07:00    TPro  6.4  /  Alb  2.5<L>  /  TBili  0.4  /  DBili  x   /  AST  101<H>  /  ALT  135<H>  /  AlkPhos  114  04-05    Sodium: 136 mmol/L (04-05 @ 07:00)  Sodium: 133 mmol/L (04-04 @ 08:55)    Potassium: 4.7 mmol/L (04-05 @ 07:00)  Potassium: 4.7 mmol/L (04-04 @ 08:55)    Hemoglobin: 10.7 g/dL (04-05 @ 07:00)  Hemoglobin: 10.7 g/dL (04-04 @ 08:55)  Hemoglobin: 10.3 g/dL (04-03 @ 07:40)    Creatinine, Serum 2.30 (04-05 @ 07:00)  Creatinine, Serum 2.20 (04-04 @ 08:55)  Creatinine, Serum 1.70 (04-03 @ 07:40)        LIVER FUNCTIONS - ( 05 Apr 2024 07:00 )  Alb: 2.5 g/dL / Pro: 6.4 g/dL / ALK PHOS: 114 U/L / ALT: 135 U/L / AST: 101 U/L / GGT: x           Urinalysis Basic - ( 05 Apr 2024 07:00 )    Color: x / Appearance: x / SG: x / pH: x  Gluc: 182 mg/dL / Ketone: x  / Bili: x / Urobili: x   Blood: x / Protein: x / Nitrite: x   Leuk Esterase: x / RBC: x / WBC x   Sq Epi: x / Non Sq Epi: x / Bacteria: x

## 2024-04-06 LAB
ALBUMIN SERPL ELPH-MCNC: 2.4 G/DL — LOW (ref 3.3–5)
ALP SERPL-CCNC: 96 U/L — SIGNIFICANT CHANGE UP (ref 40–120)
ALT FLD-CCNC: 97 U/L — HIGH (ref 12–78)
ANION GAP SERPL CALC-SCNC: 12 MMOL/L — SIGNIFICANT CHANGE UP (ref 5–17)
AST SERPL-CCNC: 42 U/L — HIGH (ref 15–37)
BASOPHILS # BLD AUTO: 0.03 K/UL — SIGNIFICANT CHANGE UP (ref 0–0.2)
BASOPHILS NFR BLD AUTO: 0.2 % — SIGNIFICANT CHANGE UP (ref 0–2)
BILIRUB SERPL-MCNC: 0.5 MG/DL — SIGNIFICANT CHANGE UP (ref 0.2–1.2)
BUN SERPL-MCNC: 68 MG/DL — HIGH (ref 7–23)
CALCIUM SERPL-MCNC: 8.4 MG/DL — LOW (ref 8.5–10.1)
CHLORIDE SERPL-SCNC: 108 MMOL/L — SIGNIFICANT CHANGE UP (ref 96–108)
CO2 SERPL-SCNC: 19 MMOL/L — LOW (ref 22–31)
CREAT SERPL-MCNC: 2.4 MG/DL — HIGH (ref 0.5–1.3)
EGFR: 19 ML/MIN/1.73M2 — LOW
EOSINOPHIL # BLD AUTO: 0 K/UL — SIGNIFICANT CHANGE UP (ref 0–0.5)
EOSINOPHIL NFR BLD AUTO: 0 % — SIGNIFICANT CHANGE UP (ref 0–6)
GLUCOSE BLDC GLUCOMTR-MCNC: 201 MG/DL — HIGH (ref 70–99)
GLUCOSE BLDC GLUCOMTR-MCNC: 268 MG/DL — HIGH (ref 70–99)
GLUCOSE BLDC GLUCOMTR-MCNC: 317 MG/DL — HIGH (ref 70–99)
GLUCOSE BLDC GLUCOMTR-MCNC: 352 MG/DL — HIGH (ref 70–99)
GLUCOSE SERPL-MCNC: 202 MG/DL — HIGH (ref 70–99)
HCT VFR BLD CALC: 32.9 % — LOW (ref 34.5–45)
HGB BLD-MCNC: 10.6 G/DL — LOW (ref 11.5–15.5)
IMM GRANULOCYTES NFR BLD AUTO: 0.6 % — SIGNIFICANT CHANGE UP (ref 0–0.9)
LYMPHOCYTES # BLD AUTO: 0.62 K/UL — LOW (ref 1–3.3)
LYMPHOCYTES # BLD AUTO: 3.4 % — LOW (ref 13–44)
MAGNESIUM SERPL-MCNC: 2.2 MG/DL — SIGNIFICANT CHANGE UP (ref 1.6–2.6)
MCHC RBC-ENTMCNC: 27.3 PG — SIGNIFICANT CHANGE UP (ref 27–34)
MCHC RBC-ENTMCNC: 32.2 GM/DL — SIGNIFICANT CHANGE UP (ref 32–36)
MCV RBC AUTO: 84.8 FL — SIGNIFICANT CHANGE UP (ref 80–100)
MONOCYTES # BLD AUTO: 0.56 K/UL — SIGNIFICANT CHANGE UP (ref 0–0.9)
MONOCYTES NFR BLD AUTO: 3.1 % — SIGNIFICANT CHANGE UP (ref 2–14)
NEUTROPHILS # BLD AUTO: 16.7 K/UL — HIGH (ref 1.8–7.4)
NEUTROPHILS NFR BLD AUTO: 92.7 % — HIGH (ref 43–77)
NRBC # BLD: 0 /100 WBCS — SIGNIFICANT CHANGE UP (ref 0–0)
PHOSPHATE SERPL-MCNC: 3.9 MG/DL — SIGNIFICANT CHANGE UP (ref 2.5–4.5)
PLATELET # BLD AUTO: 281 K/UL — SIGNIFICANT CHANGE UP (ref 150–400)
POTASSIUM SERPL-MCNC: 3.8 MMOL/L — SIGNIFICANT CHANGE UP (ref 3.5–5.3)
POTASSIUM SERPL-SCNC: 3.8 MMOL/L — SIGNIFICANT CHANGE UP (ref 3.5–5.3)
PROT SERPL-MCNC: 6.4 G/DL — SIGNIFICANT CHANGE UP (ref 6–8.3)
RBC # BLD: 3.88 M/UL — SIGNIFICANT CHANGE UP (ref 3.8–5.2)
RBC # FLD: 17.1 % — HIGH (ref 10.3–14.5)
SODIUM SERPL-SCNC: 139 MMOL/L — SIGNIFICANT CHANGE UP (ref 135–145)
WBC # BLD: 18.01 K/UL — HIGH (ref 3.8–10.5)
WBC # FLD AUTO: 18.01 K/UL — HIGH (ref 3.8–10.5)

## 2024-04-06 PROCEDURE — 99233 SBSQ HOSP IP/OBS HIGH 50: CPT

## 2024-04-06 PROCEDURE — 99233 SBSQ HOSP IP/OBS HIGH 50: CPT | Mod: GC

## 2024-04-06 RX ORDER — PHENYLEPHRINE-SHARK LIVER OIL-MINERAL OIL-PETROLATUM RECTAL OINTMENT
1 OINTMENT (GRAM) RECTAL
Refills: 0 | Status: DISCONTINUED | OUTPATIENT
Start: 2024-04-06 | End: 2024-04-11

## 2024-04-06 RX ORDER — PHENAZOPYRIDINE HCL 100 MG
200 TABLET ORAL ONCE
Refills: 0 | Status: COMPLETED | OUTPATIENT
Start: 2024-04-06 | End: 2024-04-06

## 2024-04-06 RX ORDER — METOPROLOL TARTRATE 50 MG
100 TABLET ORAL EVERY 12 HOURS
Refills: 0 | Status: DISCONTINUED | OUTPATIENT
Start: 2024-04-06 | End: 2024-04-11

## 2024-04-06 RX ADMIN — ESCITALOPRAM OXALATE 10 MILLIGRAM(S): 10 TABLET, FILM COATED ORAL at 11:57

## 2024-04-06 RX ADMIN — Medication 2: at 22:08

## 2024-04-06 RX ADMIN — Medication 200 MILLIGRAM(S): at 11:56

## 2024-04-06 RX ADMIN — Medication 75 MICROGRAM(S): at 05:26

## 2024-04-06 RX ADMIN — Medication 4 UNIT(S): at 09:30

## 2024-04-06 RX ADMIN — TAMSULOSIN HYDROCHLORIDE 0.4 MILLIGRAM(S): 0.4 CAPSULE ORAL at 22:09

## 2024-04-06 RX ADMIN — Medication 250 MILLIGRAM(S): at 17:35

## 2024-04-06 RX ADMIN — Medication 4: at 09:29

## 2024-04-06 RX ADMIN — Medication 10: at 17:34

## 2024-04-06 RX ADMIN — APIXABAN 2.5 MILLIGRAM(S): 2.5 TABLET, FILM COATED ORAL at 17:35

## 2024-04-06 RX ADMIN — Medication 125 MICROGRAM(S): at 11:56

## 2024-04-06 RX ADMIN — Medication 6: at 13:22

## 2024-04-06 RX ADMIN — RIVASTIGMINE 1 PATCH: 4.6 PATCH, EXTENDED RELEASE TRANSDERMAL at 05:39

## 2024-04-06 RX ADMIN — Medication 4 UNIT(S): at 17:35

## 2024-04-06 RX ADMIN — Medication 50 MILLIGRAM(S): at 05:26

## 2024-04-06 RX ADMIN — Medication 50 MILLIGRAM(S): at 11:57

## 2024-04-06 RX ADMIN — MEMANTINE HYDROCHLORIDE 10 MILLIGRAM(S): 10 TABLET ORAL at 05:26

## 2024-04-06 RX ADMIN — Medication 10 MILLIGRAM(S): at 05:27

## 2024-04-06 RX ADMIN — Medication 4 UNIT(S): at 13:22

## 2024-04-06 RX ADMIN — MEMANTINE HYDROCHLORIDE 10 MILLIGRAM(S): 10 TABLET ORAL at 17:35

## 2024-04-06 RX ADMIN — RIVASTIGMINE 1 PATCH: 4.6 PATCH, EXTENDED RELEASE TRANSDERMAL at 19:30

## 2024-04-06 RX ADMIN — Medication 250 MILLIGRAM(S): at 05:26

## 2024-04-06 RX ADMIN — RIVASTIGMINE 1 PATCH: 4.6 PATCH, EXTENDED RELEASE TRANSDERMAL at 05:37

## 2024-04-06 RX ADMIN — APIXABAN 2.5 MILLIGRAM(S): 2.5 TABLET, FILM COATED ORAL at 05:26

## 2024-04-06 RX ADMIN — Medication 100 MILLIGRAM(S): at 17:35

## 2024-04-06 RX ADMIN — PANTOPRAZOLE SODIUM 40 MILLIGRAM(S): 20 TABLET, DELAYED RELEASE ORAL at 05:26

## 2024-04-06 RX ADMIN — INSULIN GLARGINE 5 UNIT(S): 100 INJECTION, SOLUTION SUBCUTANEOUS at 22:08

## 2024-04-06 RX ADMIN — PIPERACILLIN AND TAZOBACTAM 25 GRAM(S): 4; .5 INJECTION, POWDER, LYOPHILIZED, FOR SOLUTION INTRAVENOUS at 11:56

## 2024-04-06 NOTE — PROGRESS NOTE ADULT - SUBJECTIVE AND OBJECTIVE BOX
Patient is a 88y old  Female who presents with a chief complaint of weakness (06 Apr 2024 11:54)      TELE: afib 84bpm with HR range in 70s-80s.    INTERVAL HPI/OVERNIGHT EVENTS: No acute overnight events. Pt seen and examined at the bedside this AM. Pt is much improved today. Pt is doing well on RA - no longer required O2 supplementation. Denies fever, chills, cough, cp, sob, abdominal pain, n/v. Pt endorses some dysuria. Pt having BMs.    MEDICATIONS  (STANDING):  apixaban 2.5 milliGRAM(s) Oral every 12 hours  bisacodyl Suppository 10 milliGRAM(s) Rectal once  dexAMETHasone     Tablet 10 milliGRAM(s) Oral daily  dextrose 5%. 1000 milliLiter(s) (50 mL/Hr) IV Continuous <Continuous>  dextrose 5%. 1000 milliLiter(s) (100 mL/Hr) IV Continuous <Continuous>  dextrose 50% Injectable 25 Gram(s) IV Push once  dextrose 50% Injectable 12.5 Gram(s) IV Push once  dextrose 50% Injectable 25 Gram(s) IV Push once  digoxin     Tablet 125 MICROGram(s) Oral every other day  escitalopram 10 milliGRAM(s) Oral daily  glucagon  Injectable 1 milliGRAM(s) IntraMuscular once  insulin glargine Injectable (LANTUS) 5 Unit(s) SubCutaneous at bedtime  insulin lispro (ADMELOG) corrective regimen sliding scale   SubCutaneous three times a day before meals  insulin lispro (ADMELOG) corrective regimen sliding scale   SubCutaneous at bedtime  insulin lispro Injectable (ADMELOG) 4 Unit(s) SubCutaneous three times a day before meals  levothyroxine 75 MICROGram(s) Oral daily  memantine 10 milliGRAM(s) Oral two times a day  metoprolol succinate  milliGRAM(s) Oral every 12 hours  pantoprazole    Tablet 40 milliGRAM(s) Oral before breakfast  piperacillin/tazobactam IVPB.. 3.375 Gram(s) IV Intermittent every 12 hours  polyethylene glycol 3350 17 Gram(s) Oral daily  rivastigmine patch  9.5 mG/24 Hr(s) 1 Patch Transdermal every 24 hours  saccharomyces boulardii 250 milliGRAM(s) Oral two times a day  senna 2 Tablet(s) Oral at bedtime  tamsulosin 0.4 milliGRAM(s) Oral at bedtime    MEDICATIONS  (PRN):  acetaminophen     Tablet .. 650 milliGRAM(s) Oral every 6 hours PRN Temp greater or equal to 38C (100.4F), Mild Pain (1 - 3)  albuterol/ipratropium for Nebulization 3 milliLiter(s) Nebulizer every 6 hours PRN Shortness of Breath  aluminum hydroxide/magnesium hydroxide/simethicone Suspension 30 milliLiter(s) Oral every 4 hours PRN Dyspepsia  benzonatate 100 milliGRAM(s) Oral three times a day PRN Cough  bisacodyl Suppository 10 milliGRAM(s) Rectal daily PRN Constipation  dextrose Oral Gel 15 Gram(s) Oral once PRN Blood Glucose LESS THAN 70 milliGRAM(s)/deciliter  melatonin 3 milliGRAM(s) Oral at bedtime PRN Insomnia  ondansetron Injectable 4 milliGRAM(s) IV Push every 8 hours PRN Nausea and/or Vomiting      Allergies    No Known Allergies    Intolerances        REVIEW OF SYSTEMS:  CONSTITUTIONAL: No fever or chills  HEENT:  No headache, no sore throat  RESPIRATORY: No cough, wheezing, or shortness of breath  CARDIOVASCULAR: No chest pain, palpitations  GASTROINTESTINAL: No abd pain, nausea, vomiting, or diarrhea  GENITOURINARY: +dysuria, No frequency or hematuria  NEUROLOGICAL: no focal weakness or dizziness  MUSCULOSKELETAL: no myalgias     Vital Signs Last 24 Hrs  T(C): 36.9 (06 Apr 2024 12:57), Max: 36.9 (06 Apr 2024 12:57)  T(F): 98.5 (06 Apr 2024 12:57), Max: 98.5 (06 Apr 2024 12:57)  HR: 67 (06 Apr 2024 12:57) (67 - 81)  BP: 116/76 (06 Apr 2024 12:57) (116/76 - 136/80)  BP(mean): --  RR: 17 (06 Apr 2024 12:57) (17 - 18)  SpO2: 93% (06 Apr 2024 12:57) (93% - 96%)    Parameters below as of 06 Apr 2024 12:57  Patient On (Oxygen Delivery Method): room air        PHYSICAL EXAM:  GENERAL: NAD  HEENT:  anicteric, moist mucous membranes  CHEST/LUNG:  bibasilar rales R > L, no wheezes or rhonchi  HEART:  irregular rhythm, rate controlled, S1, S2  ABDOMEN:  BS+, soft, nontender, nondistended  EXTREMITIES: no edema, cyanosis, or calf tenderness  NERVOUS SYSTEM: aao/3 sensory /motor intact   gu intact     LABS:                        10.6   18.01 )-----------( 281      ( 06 Apr 2024 09:45 )             32.9     CBC Full  -  ( 06 Apr 2024 09:45 )  WBC Count : 18.01 K/uL  Hemoglobin : 10.6 g/dL  Hematocrit : 32.9 %  Platelet Count - Automated : 281 K/uL  Mean Cell Volume : 84.8 fl  Mean Cell Hemoglobin : 27.3 pg  Mean Cell Hemoglobin Concentration : 32.2 gm/dL  Auto Neutrophil # : 16.70 K/uL  Auto Lymphocyte # : 0.62 K/uL  Auto Monocyte # : 0.56 K/uL  Auto Eosinophil # : 0.00 K/uL  Auto Basophil # : 0.03 K/uL  Auto Neutrophil % : 92.7 %  Auto Lymphocyte % : 3.4 %  Auto Monocyte % : 3.1 %  Auto Eosinophil % : 0.0 %  Auto Basophil % : 0.2 %    06 Apr 2024 09:45    139    |  108    |  68     ----------------------------<  202    3.8     |  19     |  2.40     Ca    8.4        06 Apr 2024 09:45  Phos  3.9       06 Apr 2024 09:45  Mg     2.2       06 Apr 2024 09:45    TPro  6.4    /  Alb  2.4    /  TBili  0.5    /  DBili  x      /  AST  42     /  ALT  97     /  AlkPhos  96     06 Apr 2024 09:45      Urinalysis Basic - ( 06 Apr 2024 09:45 )    Color: x / Appearance: x / SG: x / pH: x  Gluc: 202 mg/dL / Ketone: x  / Bili: x / Urobili: x   Blood: x / Protein: x / Nitrite: x   Leuk Esterase: x / RBC: x / WBC x   Sq Epi: x / Non Sq Epi: x / Bacteria: x      CAPILLARY BLOOD GLUCOSE      POCT Blood Glucose.: 268 mg/dL (06 Apr 2024 12:25)  POCT Blood Glucose.: 201 mg/dL (06 Apr 2024 08:52)  POCT Blood Glucose.: 282 mg/dL (05 Apr 2024 21:45)  POCT Blood Glucose.: 305 mg/dL (05 Apr 2024 18:01)  POCT Blood Glucose.: 325 mg/dL (05 Apr 2024 13:25)        Culture - Urine (collected 04-01-24 @ 23:07)  Source: Clean Catch Clean Catch (Midstream)  Final Report (04-04-24 @ 18:21):    10,000 - 49,000 CFU/mL Klebsiella pneumoniae  Organism: Klebsiella pneumoniae (04-04-24 @ 18:21)  Organism: Klebsiella pneumoniae (04-04-24 @ 18:21)      -  Levofloxacin: S <=0.5      -  Tobramycin: S <=2      -  Nitrofurantoin: S <=32 Should not be used to treat pyelonephritis      -  Aztreonam: S <=4      -  Gentamicin: S <=2      -  Cefazolin: S <=2 For uncomplicated UTI with K. pneumoniae, E. coli, or P. mirablis: SARAH <=16 is sensitive and SARAH >=32 is resistant. This also predicts results for oral agents cefaclor, cefdinir, cefpodoxime, cefprozil, cefuroxime axetil, cephalexin and locarbef for uncomplicated UTI. Note that some isolates may be susceptible to these agents while testing resistant to cefazolin.      -  Cefepime: S <=2      -  Piperacillin/Tazobactam: S <=8      -  Ciprofloxacin: S <=0.25      -  Imipenem: S <=1      -  Ceftriaxone: S <=1      -  Ampicillin: R >16 These ampicillin results predict results for amoxicillin      Method Type: SARAH      -  Meropenem: S <=1      -  Ampicillin/Sulbactam: S <=4/2      -  Cefoxitin: S <=8      -  Cefuroxime: S <=4      -  Amoxicillin/Clavulanic Acid: S <=8/4      -  Trimethoprim/Sulfamethoxazole: R >2/38      -  Ertapenem: S <=0.5    Culture - Blood (collected 04-01-24 @ 20:30)  Source: .Blood Blood-Peripheral  Preliminary Report (04-06-24 @ 01:02):    No growth at 4 days    Culture - Blood (collected 04-01-24 @ 20:20)  Source: .Blood Blood-Peripheral  Preliminary Report (04-06-24 @ 01:02):    No growth at 4 days        RADIOLOGY & ADDITIONAL TESTS:  Personally reviewed.     Consultant(s) Notes Reviewed:  [x] YES  [ ] NO

## 2024-04-06 NOTE — PROGRESS NOTE ADULT - PROBLEM SELECTOR PLAN 1
-Pt with acute hypoxia -- placed on HFNC,  titrated down to 6LNC, now saturating well on room air  -acute hypoxic respiratory failure likely multifactorial including pulmonary edema, COVID, suspected overlying bacterial PNA-Concern for flash pulmonary edema as an acute decompensation  -d-dimer elevated at 500  - Procal 0.37 -> 6.3 -> 2.7  -CT Angio chest, and CT abdomen and pelvis w/ IV contras: Multilobar pneumonia right lung. Septal thickening which may reflect interstitial edema. Bilateral pleural effusions with compressive atelectasis lower lobes. No acute pulmonary embolism. Cystitis and left pyeloureteritis. Distended rectum with fecal impaction.  -duoneb PRN -Tessalon pearls PRN for cough  -Completed 3/5 days Remdesivir, then discontinued due to worsening transaminitis  -c/w Decadron to 10mg po x10 days  -started on Zosyn to cover possible superimposed bacterial PNA as well as pyelonephritis  -ID consulted (Dr. Amezcua),  -isolation precautions

## 2024-04-06 NOTE — PROGRESS NOTE ADULT - SUBJECTIVE AND OBJECTIVE BOX
Subjective: no complaints. Off supplemental O2      MEDICATIONS  (STANDING):  apixaban 2.5 milliGRAM(s) Oral every 12 hours  bisacodyl Suppository 10 milliGRAM(s) Rectal once  dexAMETHasone     Tablet 10 milliGRAM(s) Oral daily  dextrose 5%. 1000 milliLiter(s) (50 mL/Hr) IV Continuous <Continuous>  dextrose 5%. 1000 milliLiter(s) (100 mL/Hr) IV Continuous <Continuous>  dextrose 50% Injectable 25 Gram(s) IV Push once  dextrose 50% Injectable 12.5 Gram(s) IV Push once  dextrose 50% Injectable 25 Gram(s) IV Push once  digoxin     Tablet 125 MICROGram(s) Oral every other day  escitalopram 10 milliGRAM(s) Oral daily  glucagon  Injectable 1 milliGRAM(s) IntraMuscular once  insulin glargine Injectable (LANTUS) 5 Unit(s) SubCutaneous at bedtime  insulin lispro (ADMELOG) corrective regimen sliding scale   SubCutaneous three times a day before meals  insulin lispro (ADMELOG) corrective regimen sliding scale   SubCutaneous at bedtime  insulin lispro Injectable (ADMELOG) 4 Unit(s) SubCutaneous three times a day before meals  levothyroxine 75 MICROGram(s) Oral daily  memantine 10 milliGRAM(s) Oral two times a day  metoprolol tartrate 50 milliGRAM(s) Oral every 6 hours  pantoprazole    Tablet 40 milliGRAM(s) Oral before breakfast  phenazopyridine 200 milliGRAM(s) Oral once  piperacillin/tazobactam IVPB.. 3.375 Gram(s) IV Intermittent every 12 hours  polyethylene glycol 3350 17 Gram(s) Oral daily  rivastigmine patch  9.5 mG/24 Hr(s) 1 Patch Transdermal every 24 hours  saccharomyces boulardii 250 milliGRAM(s) Oral two times a day  senna 2 Tablet(s) Oral at bedtime  tamsulosin 0.4 milliGRAM(s) Oral at bedtime    MEDICATIONS  (PRN):  acetaminophen     Tablet .. 650 milliGRAM(s) Oral every 6 hours PRN Temp greater or equal to 38C (100.4F), Mild Pain (1 - 3)  albuterol/ipratropium for Nebulization 3 milliLiter(s) Nebulizer every 6 hours PRN Shortness of Breath  aluminum hydroxide/magnesium hydroxide/simethicone Suspension 30 milliLiter(s) Oral every 4 hours PRN Dyspepsia  benzonatate 100 milliGRAM(s) Oral three times a day PRN Cough  bisacodyl Suppository 10 milliGRAM(s) Rectal daily PRN Constipation  dextrose Oral Gel 15 Gram(s) Oral once PRN Blood Glucose LESS THAN 70 milliGRAM(s)/deciliter  melatonin 3 milliGRAM(s) Oral at bedtime PRN Insomnia  ondansetron Injectable 4 milliGRAM(s) IV Push every 8 hours PRN Nausea and/or Vomiting          T(C): 36.3 (04-06-24 @ 05:12), Max: 37.3 (04-05-24 @ 12:20)  HR: 73 (04-06-24 @ 05:12) (73 - 101)  BP: 136/80 (04-06-24 @ 05:12) (111/72 - 136/80)  RR: 18 (04-06-24 @ 05:12) (18 - 18)  SpO2: 96% (04-05-24 @ 19:50) (96% - 99%)  Wt(kg): --        I&O's Detail    05 Apr 2024 07:01  -  06 Apr 2024 07:00  --------------------------------------------------------  IN:    Oral Fluid: 240 mL  Total IN: 240 mL    OUT:  Total OUT: 0 mL    Total NET: 240 mL               PHYSICAL EXAM:    GENERAL:   NECK: Supple, no inc in JVP  CHEST/LUNG: Clear  HEART: S1S2  ABDOMEN: Soft, Nontender, Nondistended; Bowel sounds present  EXTREMITIES:    NEURO: no asterixis      LABS:  CBC Full  -  ( 06 Apr 2024 09:45 )  WBC Count : 18.01 K/uL  RBC Count : 3.88 M/uL  Hemoglobin : 10.6 g/dL  Hematocrit : 32.9 %  Platelet Count - Automated : 281 K/uL  Mean Cell Volume : 84.8 fl  Mean Cell Hemoglobin : 27.3 pg  Mean Cell Hemoglobin Concentration : 32.2 gm/dL  Auto Neutrophil # : 16.70 K/uL  Auto Lymphocyte # : 0.62 K/uL  Auto Monocyte # : 0.56 K/uL  Auto Eosinophil # : 0.00 K/uL  Auto Basophil # : 0.03 K/uL  Auto Neutrophil % : 92.7 %  Auto Lymphocyte % : 3.4 %  Auto Monocyte % : 3.1 %  Auto Eosinophil % : 0.0 %  Auto Basophil % : 0.2 %    04-05    136  |  103  |  61<H>  ----------------------------<  182<H>  4.7   |  20<L>  |  2.30<H>    Ca    8.0<L>      05 Apr 2024 07:00  Mg     2.2     04-06    TPro  6.4  /  Alb  2.5<L>  /  TBili  0.4  /  DBili  x   /  AST  101<H>  /  ALT  135<H>  /  AlkPhos  114  04-05        Impression:  1.YVETTE on CKD 3-4 sec to contrast ATN  2.Systolic CM, EF 20-25%, mod MR  3.COVID 19  4.Hypertension  5.Diabetes    Recommendations:   * Cont to trend Cr   * Check bladder scan if Cr rises sharply.   * Off diuretic unless develops acute dyspnea.

## 2024-04-06 NOTE — PROGRESS NOTE ADULT - PROBLEM SELECTOR PLAN 2
-Pt with chronic afib with episodes of RVR while hospitalized, likely due to infection  - Toprol XL switched from 100mg BID to 50mg q6h for better around the clock control  - Has been in afib 70s-80s  -C/w digoxin 125 mcg every other day -- dig level wnl   -C/w Eliquis 2.5 mg bid  - May need amio gtt if rates remain uncontrolled  -cardio consulted ([facility])

## 2024-04-06 NOTE — PROGRESS NOTE ADULT - PROBLEM SELECTOR PLAN 7
YVETTE on CKD 2 -Cr at baseline (1.20-1.40)  -pt with worsening with renal function  with urinary retention s/p cath  -- Cr 2.4 today  -avoid nephrotoxic agents  -Nephro Dr. Oleary following YVETTE on CKD 2 -Cr at baseline (1.20-1.40)  -pt with worsening with renal function  with urinary retention s/p st cath  -- Cr 2.4 today  -avoid nephrotoxic agents  -Nephro Dr. Oleary following

## 2024-04-06 NOTE — PROGRESS NOTE ADULT - SUBJECTIVE AND OBJECTIVE BOX
[facility] -- Dylan Hernandez, Siddhartha Grider Savella, , Calvin Colmenares  Office # [phone]    Follow Up:      Subjective/Observations:     REVIEW OF SYSTEMS: All other review of systems is negative unless indicated above  PAST MEDICAL & SURGICAL HISTORY:  Atrial fibrillation      Hypothyroidism      Mild Alzheimer's dementia      Diabetes mellitus      Acute on chronic systolic congestive heart failure      Hypertension      Hyperlipemia      Cardiac LV ejection fraction 21-30%  12/22      History of appendectomy      H/O hernia repair      History of cholecystectomy        MEDICATIONS  (STANDING):  apixaban 2.5 milliGRAM(s) Oral every 12 hours  bisacodyl Suppository 10 milliGRAM(s) Rectal once  dexAMETHasone     Tablet 10 milliGRAM(s) Oral daily  dextrose 5%. 1000 milliLiter(s) (50 mL/Hr) IV Continuous <Continuous>  dextrose 5%. 1000 milliLiter(s) (100 mL/Hr) IV Continuous <Continuous>  dextrose 50% Injectable 25 Gram(s) IV Push once  dextrose 50% Injectable 12.5 Gram(s) IV Push once  dextrose 50% Injectable 25 Gram(s) IV Push once  digoxin     Tablet 125 MICROGram(s) Oral every other day  escitalopram 10 milliGRAM(s) Oral daily  glucagon  Injectable 1 milliGRAM(s) IntraMuscular once  insulin glargine Injectable (LANTUS) 5 Unit(s) SubCutaneous at bedtime  insulin lispro (ADMELOG) corrective regimen sliding scale   SubCutaneous three times a day before meals  insulin lispro (ADMELOG) corrective regimen sliding scale   SubCutaneous at bedtime  insulin lispro Injectable (ADMELOG) 4 Unit(s) SubCutaneous three times a day before meals  levothyroxine 75 MICROGram(s) Oral daily  memantine 10 milliGRAM(s) Oral two times a day  metoprolol tartrate 50 milliGRAM(s) Oral every 6 hours  pantoprazole    Tablet 40 milliGRAM(s) Oral before breakfast  piperacillin/tazobactam IVPB.. 3.375 Gram(s) IV Intermittent every 12 hours  polyethylene glycol 3350 17 Gram(s) Oral daily  rivastigmine patch  9.5 mG/24 Hr(s) 1 Patch Transdermal every 24 hours  saccharomyces boulardii 250 milliGRAM(s) Oral two times a day  senna 2 Tablet(s) Oral at bedtime  tamsulosin 0.4 milliGRAM(s) Oral at bedtime    MEDICATIONS  (PRN):  acetaminophen     Tablet .. 650 milliGRAM(s) Oral every 6 hours PRN Temp greater or equal to 38C (100.4F), Mild Pain (1 - 3)  albuterol/ipratropium for Nebulization 3 milliLiter(s) Nebulizer every 6 hours PRN Shortness of Breath  aluminum hydroxide/magnesium hydroxide/simethicone Suspension 30 milliLiter(s) Oral every 4 hours PRN Dyspepsia  benzonatate 100 milliGRAM(s) Oral three times a day PRN Cough  bisacodyl Suppository 10 milliGRAM(s) Rectal daily PRN Constipation  dextrose Oral Gel 15 Gram(s) Oral once PRN Blood Glucose LESS THAN 70 milliGRAM(s)/deciliter  melatonin 3 milliGRAM(s) Oral at bedtime PRN Insomnia  ondansetron Injectable 4 milliGRAM(s) IV Push every 8 hours PRN Nausea and/or Vomiting    Allergies    No Known Allergies    Intolerances      Vital Signs Last 24 Hrs  T(C): 36.3 (06 Apr 2024 05:12), Max: 37.3 (05 Apr 2024 12:20)  T(F): 97.4 (06 Apr 2024 05:12), Max: 99.2 (05 Apr 2024 12:20)  HR: 73 (06 Apr 2024 05:12) (73 - 101)  BP: 136/80 (06 Apr 2024 05:12) (111/72 - 136/80)  BP(mean): --  RR: 18 (06 Apr 2024 05:12) (18 - 18)  SpO2: 96% (05 Apr 2024 19:50) (96% - 99%)    Parameters below as of 06 Apr 2024 05:12  Patient On (Oxygen Delivery Method): nasal cannula  O2 Flow (L/min): 4    I&O's Summary    04 Apr 2024 07:01  -  05 Apr 2024 07:00  --------------------------------------------------------  IN: 480 mL / OUT: 900 mL / NET: -420 mL    05 Apr 2024 07:01  -  06 Apr 2024 06:52  --------------------------------------------------------  IN: 240 mL / OUT: 0 mL / NET: 240 mL        PHYSICAL EXAM:  TELE:   Constitutional: NAD, awake and alert, well-developed  HEENT: Moist Mucous Membranes, Anicteric  Pulmonary: Non-labored, breath sounds are clear bilaterally, No wheezing, rales or rhonchi  Cardiovascular: Regular, S1 and S2, No murmurs, rubs, gallops or clicks  Gastrointestinal: Bowel Sounds present, soft, nontender.   Lymph: No peripheral edema. No lymphadenopathy.  Skin: No visible rashes or ulcers.  Psych:  Mood & affect appropriate  LABS: All Labs Reviewed:                        10.7   17.38 )-----------( 280      ( 05 Apr 2024 07:00 )             33.3                         10.7   18.77 )-----------( 302      ( 04 Apr 2024 08:55 )             33.1                         10.3   13.78 )-----------( 293      ( 03 Apr 2024 07:40 )             32.4     05 Apr 2024 07:00    136    |  103    |  61     ----------------------------<  182    4.7     |  20     |  2.30   04 Apr 2024 08:55    133    |  101    |  46     ----------------------------<  250    4.7     |  20     |  2.20   03 Apr 2024 07:40    136    |  105    |  28     ----------------------------<  211    4.6     |  19     |  1.70     Ca    8.0        05 Apr 2024 07:00  Ca    8.0        04 Apr 2024 08:55  Ca    8.2        03 Apr 2024 07:40  Phos  3.3       03 Apr 2024 07:40  Mg     2.3       03 Apr 2024 07:40    TPro  6.4    /  Alb  2.5    /  TBili  0.4    /  DBili  x      /  AST  101    /  ALT  135    /  AlkPhos  114    05 Apr 2024 07:00  TPro  7.0    /  Alb  2.7    /  TBili  0.7    /  DBili  x      /  AST  207    /  ALT  155    /  AlkPhos  122    04 Apr 2024 08:55  TPro  6.7    /  Alb  2.5    /  TBili  0.4    /  DBili  x      /  AST  153    /  ALT  108    /  AlkPhos  116    03 Apr 2024 07:40          12 Lead ECG:   Ventricular Rate 127 BPM    QRS Duration 84 ms    Q-T Interval 278 ms    QTC Calculation(Bazett) 404 ms    R Axis 28 degrees    T Axis -61 degrees    Diagnosis Line Atrial fibrillation with rapid ventricular response  Low voltage QRS  Septal infarct , age undetermined  Abnormal ECG  No previous ECGs available  Confirmed by rogerio Harvey ([id number]) on 4/2/2024 2:48:52 PM (04-01-24 @ 19:24)      ACC: [id number] EXAM:  ECHO TTE WO CON COMP W DOPP                          PROCEDURE DATE:  12/22/2022          INTERPRETATION:  INDICATION: Abnormal EKG  Sonographer KL    Blood Pressure 142/86    Height 158 cm     Weight 51.7 kg       BSA 1.5   sqm    Dimensions:  LA 3.6       Normal Values: 2.0 - 4.0 cm  Ao 3.2        Normal Values: 2.0 - 3.8 cm  SEPTUM 1.0       Normal Values: 0.6 - 1.2 cm  PWT 0.9       Normal Values: 0.6 - 1.1 cm  LVIDd 5.8         Normal Values: 3.0 - 5.6 cm  LVIDs 4.3      Normal Values: 1.8 - 4.0 cm      OBSERVATIONS:  Mitral Valve: Moderate MR.  Aortic Valve/Aorta: normal trileaflet aortic valve.  Tricuspid Valve: Mild TR.  Pulmonic Valve: Mild PI  Left Atrium: Enlarged  Right Atrium: Enlarged  Left Ventricle: Left ventricular enlargement with severe left ventricular   systolic dysfunction, estimated LVEF of 20-25%. The entire apex and   anterior walls appear akinetic. The inferior and inferolateral walls   appear hypokinetic. Remaining walls are not well-visualized  Right Ventricle: Grossly normal size and systolic function.  Pericardium: no significant pericardial effusion.  Pulmonary/RV Pressure: estimated PA systolic pressure of at least 35 mmHg   assuming an RA pressure of 3mmHg.    IMPRESSION:  Left ventricular enlargement with severe left ventricular systolic   dysfunction, estimated LVEF of 20-25%. The entire apex and anterior walls   appear akinetic. The inferior and inferolateral walls appear hypokinetic.  Grossly normal RV size and systolic function.  Biatrial enlargement  Normal trileaflet aortic valve, without AI.  Moderate MR  Mild TR.  No significant pericardial effusion.    --- End of Report ---    JOHN BAEZA MD; Attending Cardiologist  This document has been electronically signed. Dec 23 2022  4:40PM      [facility] -- Dylan Hernandez, Siddhartha Grider Savella, , Calvin Colmenares  Office # [phone]    Follow Up: Systolic HF, Afib      Subjective/Observations: Awake and alert, non-orthopneic on RA.  Admits to cough, though, non-productive.  Denies CP or palpitations.  No tele events    REVIEW OF SYSTEMS: All other review of systems is negative unless indicated above  PAST MEDICAL & SURGICAL HISTORY:  Atrial fibrillation  Hypothyroidism  Mild Alzheimer's dementia  Diabetes mellitus  Acute on chronic systolic congestive heart failure  Hypertension  Hyperlipemia  Cardiac LV ejection fraction 21-30%  12/22  History of appendectomy  H/O hernia repair  History of cholecystectomy    MEDICATIONS  (STANDING):  apixaban 2.5 milliGRAM(s) Oral every 12 hours  bisacodyl Suppository 10 milliGRAM(s) Rectal once  dexAMETHasone     Tablet 10 milliGRAM(s) Oral daily  dextrose 5%. 1000 milliLiter(s) (50 mL/Hr) IV Continuous <Continuous>  dextrose 5%. 1000 milliLiter(s) (100 mL/Hr) IV Continuous <Continuous>  dextrose 50% Injectable 25 Gram(s) IV Push once  dextrose 50% Injectable 12.5 Gram(s) IV Push once  dextrose 50% Injectable 25 Gram(s) IV Push once  digoxin     Tablet 125 MICROGram(s) Oral every other day  escitalopram 10 milliGRAM(s) Oral daily  glucagon  Injectable 1 milliGRAM(s) IntraMuscular once  insulin glargine Injectable (LANTUS) 5 Unit(s) SubCutaneous at bedtime  insulin lispro (ADMELOG) corrective regimen sliding scale   SubCutaneous three times a day before meals  insulin lispro (ADMELOG) corrective regimen sliding scale   SubCutaneous at bedtime  insulin lispro Injectable (ADMELOG) 4 Unit(s) SubCutaneous three times a day before meals  levothyroxine 75 MICROGram(s) Oral daily  memantine 10 milliGRAM(s) Oral two times a day  metoprolol tartrate 50 milliGRAM(s) Oral every 6 hours  pantoprazole    Tablet 40 milliGRAM(s) Oral before breakfast  piperacillin/tazobactam IVPB.. 3.375 Gram(s) IV Intermittent every 12 hours  polyethylene glycol 3350 17 Gram(s) Oral daily  rivastigmine patch  9.5 mG/24 Hr(s) 1 Patch Transdermal every 24 hours  saccharomyces boulardii 250 milliGRAM(s) Oral two times a day  senna 2 Tablet(s) Oral at bedtime  tamsulosin 0.4 milliGRAM(s) Oral at bedtime    MEDICATIONS  (PRN):  acetaminophen     Tablet .. 650 milliGRAM(s) Oral every 6 hours PRN Temp greater or equal to 38C (100.4F), Mild Pain (1 - 3)  albuterol/ipratropium for Nebulization 3 milliLiter(s) Nebulizer every 6 hours PRN Shortness of Breath  aluminum hydroxide/magnesium hydroxide/simethicone Suspension 30 milliLiter(s) Oral every 4 hours PRN Dyspepsia  benzonatate 100 milliGRAM(s) Oral three times a day PRN Cough  bisacodyl Suppository 10 milliGRAM(s) Rectal daily PRN Constipation  dextrose Oral Gel 15 Gram(s) Oral once PRN Blood Glucose LESS THAN 70 milliGRAM(s)/deciliter  melatonin 3 milliGRAM(s) Oral at bedtime PRN Insomnia  ondansetron Injectable 4 milliGRAM(s) IV Push every 8 hours PRN Nausea and/or Vomiting    Allergies    No Known Allergies    Intolerances    Vital Signs Last 24 Hrs  T(C): 36.3 (06 Apr 2024 05:12), Max: 37.3 (05 Apr 2024 12:20)  T(F): 97.4 (06 Apr 2024 05:12), Max: 99.2 (05 Apr 2024 12:20)  HR: 73 (06 Apr 2024 05:12) (73 - 101)  BP: 136/80 (06 Apr 2024 05:12) (111/72 - 136/80)  BP(mean): --  RR: 18 (06 Apr 2024 05:12) (18 - 18)  SpO2: 96% (05 Apr 2024 19:50) (96% - 99%)    Parameters below as of 06 Apr 2024 05:12  Patient On (Oxygen Delivery Method): nasal cannula  O2 Flow (L/min): 4    I&O's Summary    04 Apr 2024 07:01  -  05 Apr 2024 07:00  --------------------------------------------------------  IN: 480 mL / OUT: 900 mL / NET: -420 mL    05 Apr 2024 07:01  -  06 Apr 2024 06:52  --------------------------------------------------------  IN: 240 mL / OUT: 0 mL / NET: 240 mL     PHYSICAL EXAM:  TELE: Afib  Constitutional: NAD, awake and alert  HEENT: Moist Mucous Membranes, Anicteric  Pulmonary: Non-labored, breath sounds are diminished  bilaterally, No wheezing, rales or rhonchi  Cardiovascular: Regular, S1 and S2, No murmurs, rubs, gallops or clicks  Gastrointestinal: Bowel Sounds present, soft, nontender.   Lymph: No peripheral edema. No lymphadenopathy.  Skin: No visible rashes or ulcers.  Psych:  Mood & affect appropriate  LABS: All Labs Reviewed:                        10.7   17.38 )-----------( 280      ( 05 Apr 2024 07:00 )             33.3                         10.7   18.77 )-----------( 302      ( 04 Apr 2024 08:55 )             33.1                         10.3   13.78 )-----------( 293      ( 03 Apr 2024 07:40 )             32.4     05 Apr 2024 07:00    136    |  103    |  61     ----------------------------<  182    4.7     |  20     |  2.30   04 Apr 2024 08:55    133    |  101    |  46     ----------------------------<  250    4.7     |  20     |  2.20   03 Apr 2024 07:40    136    |  105    |  28     ----------------------------<  211    4.6     |  19     |  1.70     Ca    8.0        05 Apr 2024 07:00  Ca    8.0        04 Apr 2024 08:55  Ca    8.2        03 Apr 2024 07:40  Phos  3.3       03 Apr 2024 07:40  Mg     2.3       03 Apr 2024 07:40    TPro  6.4    /  Alb  2.5    /  TBili  0.4    /  DBili  x      /  AST  101    /  ALT  135    /  AlkPhos  114    05 Apr 2024 07:00  TPro  7.0    /  Alb  2.7    /  TBili  0.7    /  DBili  x      /  AST  207    /  ALT  155    /  AlkPhos  122    04 Apr 2024 08:55  TPro  6.7    /  Alb  2.5    /  TBili  0.4    /  DBili  x      /  AST  153    /  ALT  108    /  AlkPhos  116    03 Apr 2024 07:40          12 Lead ECG:   Ventricular Rate 127 BPM    QRS Duration 84 ms    Q-T Interval 278 ms    QTC Calculation(Bazett) 404 ms    R Axis 28 degrees    T Axis -61 degrees    Diagnosis Line Atrial fibrillation with rapid ventricular response  Low voltage QRS  Septal infarct , age undetermined  Abnormal ECG  No previous ECGs available  Confirmed by rogerio Harvey ([id number]) on 4/2/2024 2:48:52 PM (04-01-24 @ 19:24)      ACC: [id number] EXAM:  ECHO TTE WO CON COMP W DOPP                          PROCEDURE DATE:  12/22/2022          INTERPRETATION:  INDICATION: Abnormal EKG  Sonographer KL    Blood Pressure 142/86    Height 158 cm     Weight 51.7 kg       BSA 1.5   sqm    Dimensions:  LA 3.6       Normal Values: 2.0 - 4.0 cm  Ao 3.2        Normal Values: 2.0 - 3.8 cm  SEPTUM 1.0       Normal Values: 0.6 - 1.2 cm  PWT 0.9       Normal Values: 0.6 - 1.1 cm  LVIDd 5.8         Normal Values: 3.0 - 5.6 cm  LVIDs 4.3      Normal Values: 1.8 - 4.0 cm      OBSERVATIONS:  Mitral Valve: Moderate MR.  Aortic Valve/Aorta: normal trileaflet aortic valve.  Tricuspid Valve: Mild TR.  Pulmonic Valve: Mild PI  Left Atrium: Enlarged  Right Atrium: Enlarged  Left Ventricle: Left ventricular enlargement with severe left ventricular   systolic dysfunction, estimated LVEF of 20-25%. The entire apex and   anterior walls appear akinetic. The inferior and inferolateral walls   appear hypokinetic. Remaining walls are not well-visualized  Right Ventricle: Grossly normal size and systolic function.  Pericardium: no significant pericardial effusion.  Pulmonary/RV Pressure: estimated PA systolic pressure of at least 35 mmHg   assuming an RA pressure of 3mmHg.    IMPRESSION:  Left ventricular enlargement with severe left ventricular systolic   dysfunction, estimated LVEF of 20-25%. The entire apex and anterior walls   appear akinetic. The inferior and inferolateral walls appear hypokinetic.  Grossly normal RV size and systolic function.  Biatrial enlargement  Normal trileaflet aortic valve, without AI.  Moderate MR  Mild TR.  No significant pericardial effusion.    --- End of Report ---    JOHN BAEZA MD; Attending Cardiologist  This document has been electronically signed. Dec 23 2022  4:40PM      [facility] -- Dylan Hernandez, Siddhartha Grider Savella, , Calvin Colmenares  Office # [phone]    Follow Up: Systolic HF, Afib      Subjective/Observations: Awake and alert, non-orthopneic on RA.  Admits to cough, though, non-productive.  Denies CP or palpitations.  No tele events    REVIEW OF SYSTEMS: All other review of systems is negative unless indicated above  PAST MEDICAL & SURGICAL HISTORY:  Atrial fibrillation  Hypothyroidism  Mild Alzheimer's dementia  Diabetes mellitus  Acute on chronic systolic congestive heart failure  Hypertension  Hyperlipemia  Cardiac LV ejection fraction 21-30%  12/22  History of appendectomy  H/O hernia repair  History of cholecystectomy    MEDICATIONS  (STANDING):  apixaban 2.5 milliGRAM(s) Oral every 12 hours  bisacodyl Suppository 10 milliGRAM(s) Rectal once  dexAMETHasone     Tablet 10 milliGRAM(s) Oral daily  dextrose 5%. 1000 milliLiter(s) (50 mL/Hr) IV Continuous <Continuous>  dextrose 5%. 1000 milliLiter(s) (100 mL/Hr) IV Continuous <Continuous>  dextrose 50% Injectable 25 Gram(s) IV Push once  dextrose 50% Injectable 12.5 Gram(s) IV Push once  dextrose 50% Injectable 25 Gram(s) IV Push once  digoxin     Tablet 125 MICROGram(s) Oral every other day  escitalopram 10 milliGRAM(s) Oral daily  glucagon  Injectable 1 milliGRAM(s) IntraMuscular once  insulin glargine Injectable (LANTUS) 5 Unit(s) SubCutaneous at bedtime  insulin lispro (ADMELOG) corrective regimen sliding scale   SubCutaneous three times a day before meals  insulin lispro (ADMELOG) corrective regimen sliding scale   SubCutaneous at bedtime  insulin lispro Injectable (ADMELOG) 4 Unit(s) SubCutaneous three times a day before meals  levothyroxine 75 MICROGram(s) Oral daily  memantine 10 milliGRAM(s) Oral two times a day  metoprolol tartrate 50 milliGRAM(s) Oral every 6 hours  pantoprazole    Tablet 40 milliGRAM(s) Oral before breakfast  piperacillin/tazobactam IVPB.. 3.375 Gram(s) IV Intermittent every 12 hours  polyethylene glycol 3350 17 Gram(s) Oral daily  rivastigmine patch  9.5 mG/24 Hr(s) 1 Patch Transdermal every 24 hours  saccharomyces boulardii 250 milliGRAM(s) Oral two times a day  senna 2 Tablet(s) Oral at bedtime  tamsulosin 0.4 milliGRAM(s) Oral at bedtime    MEDICATIONS  (PRN):  acetaminophen     Tablet .. 650 milliGRAM(s) Oral every 6 hours PRN Temp greater or equal to 38C (100.4F), Mild Pain (1 - 3)  albuterol/ipratropium for Nebulization 3 milliLiter(s) Nebulizer every 6 hours PRN Shortness of Breath  aluminum hydroxide/magnesium hydroxide/simethicone Suspension 30 milliLiter(s) Oral every 4 hours PRN Dyspepsia  benzonatate 100 milliGRAM(s) Oral three times a day PRN Cough  bisacodyl Suppository 10 milliGRAM(s) Rectal daily PRN Constipation  dextrose Oral Gel 15 Gram(s) Oral once PRN Blood Glucose LESS THAN 70 milliGRAM(s)/deciliter  melatonin 3 milliGRAM(s) Oral at bedtime PRN Insomnia  ondansetron Injectable 4 milliGRAM(s) IV Push every 8 hours PRN Nausea and/or Vomiting    Allergies    No Known Allergies    Intolerances    Vital Signs Last 24 Hrs  T(C): 36.3 (06 Apr 2024 05:12), Max: 37.3 (05 Apr 2024 12:20)  T(F): 97.4 (06 Apr 2024 05:12), Max: 99.2 (05 Apr 2024 12:20)  HR: 73 (06 Apr 2024 05:12) (73 - 101)  BP: 136/80 (06 Apr 2024 05:12) (111/72 - 136/80)  BP(mean): --  RR: 18 (06 Apr 2024 05:12) (18 - 18)  SpO2: 96% (05 Apr 2024 19:50) (96% - 99%)    Parameters below as of 06 Apr 2024 05:12  Patient On (Oxygen Delivery Method): nasal cannula  O2 Flow (L/min): 4    I&O's Summary    04 Apr 2024 07:01  -  05 Apr 2024 07:00  --------------------------------------------------------  IN: 480 mL / OUT: 900 mL / NET: -420 mL    05 Apr 2024 07:01  -  06 Apr 2024 06:52  --------------------------------------------------------  IN: 240 mL / OUT: 0 mL / NET: 240 mL     PHYSICAL EXAM:  TELE: Afib  Constitutional: NAD, awake and alert  HEENT: Moist Mucous Membranes, Anicteric  Pulmonary: Non-labored, breath sounds are diminished  bilaterally, No wheezing, rales +fine rhonchi  Cardiovascular: IRRR, S1 and S2, No murmurs, rubs, gallops or clicks  Gastrointestinal: Bowel Sounds present, soft, nontender.   Lymph: No peripheral edema. No lymphadenopathy.  Skin: No visible rashes or ulcers.  Psych:  Mood & affect appropriate  LABS: All Labs Reviewed:                        10.7   17.38 )-----------( 280      ( 05 Apr 2024 07:00 )             33.3                         10.7   18.77 )-----------( 302      ( 04 Apr 2024 08:55 )             33.1                         10.3   13.78 )-----------( 293      ( 03 Apr 2024 07:40 )             32.4     05 Apr 2024 07:00    136    |  103    |  61     ----------------------------<  182    4.7     |  20     |  2.30   04 Apr 2024 08:55    133    |  101    |  46     ----------------------------<  250    4.7     |  20     |  2.20   03 Apr 2024 07:40    136    |  105    |  28     ----------------------------<  211    4.6     |  19     |  1.70     Ca    8.0        05 Apr 2024 07:00  Ca    8.0        04 Apr 2024 08:55  Ca    8.2        03 Apr 2024 07:40  Phos  3.3       03 Apr 2024 07:40  Mg     2.3       03 Apr 2024 07:40    TPro  6.4    /  Alb  2.5    /  TBili  0.4    /  DBili  x      /  AST  101    /  ALT  135    /  AlkPhos  114    05 Apr 2024 07:00  TPro  7.0    /  Alb  2.7    /  TBili  0.7    /  DBili  x      /  AST  207    /  ALT  155    /  AlkPhos  122    04 Apr 2024 08:55  TPro  6.7    /  Alb  2.5    /  TBili  0.4    /  DBili  x      /  AST  153    /  ALT  108    /  AlkPhos  116    03 Apr 2024 07:40          12 Lead ECG:   Ventricular Rate 127 BPM    QRS Duration 84 ms    Q-T Interval 278 ms    QTC Calculation(Bazett) 404 ms    R Axis 28 degrees    T Axis -61 degrees    Diagnosis Line Atrial fibrillation with rapid ventricular response  Low voltage QRS  Septal infarct , age undetermined  Abnormal ECG  No previous ECGs available  Confirmed by rogerio Harvey ([id number]) on 4/2/2024 2:48:52 PM (04-01-24 @ 19:24)    ACC: [id number] EXAM:  ECHO TTE WO CON COMP W DOPP                          PROCEDURE DATE:  12/22/2022      INTERPRETATION:  INDICATION: Abnormal EKG  Sonographer KL    Blood Pressure 142/86    Height 158 cm     Weight 51.7 kg       BSA 1.5   sqm    Dimensions:  LA 3.6       Normal Values: 2.0 - 4.0 cm  Ao 3.2        Normal Values: 2.0 - 3.8 cm  SEPTUM 1.0       Normal Values: 0.6 - 1.2 cm  PWT 0.9       Normal Values: 0.6 - 1.1 cm  LVIDd 5.8         Normal Values: 3.0 - 5.6 cm  LVIDs 4.3      Normal Values: 1.8 - 4.0 cm      OBSERVATIONS:  Mitral Valve: Moderate MR.  Aortic Valve/Aorta: normal trileaflet aortic valve.  Tricuspid Valve: Mild TR.  Pulmonic Valve: Mild PI  Left Atrium: Enlarged  Right Atrium: Enlarged  Left Ventricle: Left ventricular enlargement with severe left ventricular   systolic dysfunction, estimated LVEF of 20-25%. The entire apex and   anterior walls appear akinetic. The inferior and inferolateral walls   appear hypokinetic. Remaining walls are not well-visualized  Right Ventricle: Grossly normal size and systolic function.  Pericardium: no significant pericardial effusion.  Pulmonary/RV Pressure: estimated PA systolic pressure of at least 35 mmHg   assuming an RA pressure of 3mmHg.    IMPRESSION:  Left ventricular enlargement with severe left ventricular systolic   dysfunction, estimated LVEF of 20-25%. The entire apex and anterior walls   appear akinetic. The inferior and inferolateral walls appear hypokinetic.  Grossly normal RV size and systolic function.  Biatrial enlargement  Normal trileaflet aortic valve, without AI.  Moderate MR  Mild TR.  No significant pericardial effusion.    --- End of Report ---    JOHN BAEZA MD; Attending Cardiologist  This document has been electronically signed. Dec 23 2022  4:40PM

## 2024-04-06 NOTE — PROGRESS NOTE ADULT - PROBLEM SELECTOR PLAN 4
HFrEF:-BNP: [id number] (12/2022): EF 20-25%, apex and anterior wall akinesis, inferior wall hypokinesis, mod MR, mild TR  -CXR with vascular congestion and b/l pleural effusions  -likely component of acute on chronic systolic CHF contributing to hypoxia, will avoid IV diuresis in setting of worsening renal function  - Repeat TTE

## 2024-04-06 NOTE — PROGRESS NOTE ADULT - ASSESSMENT
89 y/o female with PMH of HFrEF of 20-25 %, dementia, Afib, hypothyroidism, DM who presented to ED to be evaluated for weakness, found to have +UTI and Covid positive     Persistent Atrial Fibrillation, COVID  - Known hx of Afib, with RVR episode in the setting of catecholamine surge 2/2 recent acute stressor (UTI, COVID)   - Rate-controlled on tele  - Continue BB   - Continue Digoxin 0.125 QOD  - Avoid CCB given reduced ef   - Continue Eliquis    - EKG Afib w/ RVR, unchanged from prior   - No anginal complaints   - No evidence of any active ischemia     - TTE (12/2022): EF 20-25%, apex and anterior wall akinesis, inferior wall hypokinesis, mod MR, mild TR  - Known to our service, from last admission, she has known LV Dysfunction from TTE likely from a missed MI was decided for medical management given her frailty, increased creatinine at that time  - Would hold on IV Lasix for now given worsening renal function. Procal elevated in the setting of Covid PNA  - Intravascularly dry    - For GDMT, change lopressor to Toprol XL  - Unable to start ace/arb given YVETTE   - Needs repeat TTE    - BP stable and controlled  - +COVID management per primary  - Monitor and replete lytes, keep K>4, Mg>2.  - Will continue to follow.    Violette Mckeon DNP, NP-C, AGACNP-C  Cardiology   Call TEAMS

## 2024-04-07 LAB
ALBUMIN SERPL ELPH-MCNC: 2.4 G/DL — LOW (ref 3.3–5)
ALP SERPL-CCNC: 101 U/L — SIGNIFICANT CHANGE UP (ref 40–120)
ALT FLD-CCNC: 75 U/L — SIGNIFICANT CHANGE UP (ref 12–78)
ANION GAP SERPL CALC-SCNC: 13 MMOL/L — SIGNIFICANT CHANGE UP (ref 5–17)
AST SERPL-CCNC: 31 U/L — SIGNIFICANT CHANGE UP (ref 15–37)
BASOPHILS # BLD AUTO: 0.03 K/UL — SIGNIFICANT CHANGE UP (ref 0–0.2)
BASOPHILS NFR BLD AUTO: 0.2 % — SIGNIFICANT CHANGE UP (ref 0–2)
BILIRUB SERPL-MCNC: 0.5 MG/DL — SIGNIFICANT CHANGE UP (ref 0.2–1.2)
BUN SERPL-MCNC: 68 MG/DL — HIGH (ref 7–23)
CALCIUM SERPL-MCNC: 8.3 MG/DL — LOW (ref 8.5–10.1)
CHLORIDE SERPL-SCNC: 105 MMOL/L — SIGNIFICANT CHANGE UP (ref 96–108)
CO2 SERPL-SCNC: 20 MMOL/L — LOW (ref 22–31)
CREAT SERPL-MCNC: 2.2 MG/DL — HIGH (ref 0.5–1.3)
CULTURE RESULTS: SIGNIFICANT CHANGE UP
CULTURE RESULTS: SIGNIFICANT CHANGE UP
EGFR: 21 ML/MIN/1.73M2 — LOW
EOSINOPHIL # BLD AUTO: 0 K/UL — SIGNIFICANT CHANGE UP (ref 0–0.5)
EOSINOPHIL NFR BLD AUTO: 0 % — SIGNIFICANT CHANGE UP (ref 0–6)
GLUCOSE BLDC GLUCOMTR-MCNC: 196 MG/DL — HIGH (ref 70–99)
GLUCOSE BLDC GLUCOMTR-MCNC: 236 MG/DL — HIGH (ref 70–99)
GLUCOSE BLDC GLUCOMTR-MCNC: 244 MG/DL — HIGH (ref 70–99)
GLUCOSE BLDC GLUCOMTR-MCNC: 261 MG/DL — HIGH (ref 70–99)
GLUCOSE SERPL-MCNC: 190 MG/DL — HIGH (ref 70–99)
HCT VFR BLD CALC: 31.4 % — LOW (ref 34.5–45)
HGB BLD-MCNC: 10 G/DL — LOW (ref 11.5–15.5)
IMM GRANULOCYTES NFR BLD AUTO: 0.8 % — SIGNIFICANT CHANGE UP (ref 0–0.9)
LYMPHOCYTES # BLD AUTO: 0.84 K/UL — LOW (ref 1–3.3)
LYMPHOCYTES # BLD AUTO: 4.7 % — LOW (ref 13–44)
MCHC RBC-ENTMCNC: 27.2 PG — SIGNIFICANT CHANGE UP (ref 27–34)
MCHC RBC-ENTMCNC: 31.8 GM/DL — LOW (ref 32–36)
MCV RBC AUTO: 85.6 FL — SIGNIFICANT CHANGE UP (ref 80–100)
MONOCYTES # BLD AUTO: 0.95 K/UL — HIGH (ref 0–0.9)
MONOCYTES NFR BLD AUTO: 5.3 % — SIGNIFICANT CHANGE UP (ref 2–14)
MRSA PCR RESULT.: SIGNIFICANT CHANGE UP
NEUTROPHILS # BLD AUTO: 15.92 K/UL — HIGH (ref 1.8–7.4)
NEUTROPHILS NFR BLD AUTO: 89 % — HIGH (ref 43–77)
NRBC # BLD: 0 /100 WBCS — SIGNIFICANT CHANGE UP (ref 0–0)
PLATELET # BLD AUTO: 287 K/UL — SIGNIFICANT CHANGE UP (ref 150–400)
POTASSIUM SERPL-MCNC: 3.4 MMOL/L — LOW (ref 3.5–5.3)
POTASSIUM SERPL-SCNC: 3.4 MMOL/L — LOW (ref 3.5–5.3)
PROT SERPL-MCNC: 6.1 G/DL — SIGNIFICANT CHANGE UP (ref 6–8.3)
RBC # BLD: 3.67 M/UL — LOW (ref 3.8–5.2)
RBC # FLD: 16.8 % — HIGH (ref 10.3–14.5)
S AUREUS DNA NOSE QL NAA+PROBE: SIGNIFICANT CHANGE UP
SODIUM SERPL-SCNC: 138 MMOL/L — SIGNIFICANT CHANGE UP (ref 135–145)
SPECIMEN SOURCE: SIGNIFICANT CHANGE UP
SPECIMEN SOURCE: SIGNIFICANT CHANGE UP
WBC # BLD: 17.89 K/UL — HIGH (ref 3.8–10.5)
WBC # FLD AUTO: 17.89 K/UL — HIGH (ref 3.8–10.5)

## 2024-04-07 PROCEDURE — 99232 SBSQ HOSP IP/OBS MODERATE 35: CPT

## 2024-04-07 PROCEDURE — 99233 SBSQ HOSP IP/OBS HIGH 50: CPT

## 2024-04-07 PROCEDURE — 99233 SBSQ HOSP IP/OBS HIGH 50: CPT | Mod: GC

## 2024-04-07 RX ORDER — POTASSIUM CHLORIDE 20 MEQ
40 PACKET (EA) ORAL ONCE
Refills: 0 | Status: COMPLETED | OUTPATIENT
Start: 2024-04-07 | End: 2024-04-07

## 2024-04-07 RX ORDER — POTASSIUM CHLORIDE 20 MEQ
20 PACKET (EA) ORAL ONCE
Refills: 0 | Status: DISCONTINUED | OUTPATIENT
Start: 2024-04-07 | End: 2024-04-08

## 2024-04-07 RX ORDER — INSULIN LISPRO 100/ML
6 VIAL (ML) SUBCUTANEOUS
Refills: 0 | Status: DISCONTINUED | OUTPATIENT
Start: 2024-04-07 | End: 2024-04-11

## 2024-04-07 RX ORDER — DEXAMETHASONE 0.5 MG/5ML
6 ELIXIR ORAL DAILY
Refills: 0 | Status: DISCONTINUED | OUTPATIENT
Start: 2024-04-08 | End: 2024-04-08

## 2024-04-07 RX ADMIN — PHENYLEPHRINE-SHARK LIVER OIL-MINERAL OIL-PETROLATUM RECTAL OINTMENT 1 APPLICATION(S): at 17:25

## 2024-04-07 RX ADMIN — SENNA PLUS 2 TABLET(S): 8.6 TABLET ORAL at 22:32

## 2024-04-07 RX ADMIN — Medication 6 UNIT(S): at 17:16

## 2024-04-07 RX ADMIN — APIXABAN 2.5 MILLIGRAM(S): 2.5 TABLET, FILM COATED ORAL at 17:16

## 2024-04-07 RX ADMIN — PANTOPRAZOLE SODIUM 40 MILLIGRAM(S): 20 TABLET, DELAYED RELEASE ORAL at 05:03

## 2024-04-07 RX ADMIN — RIVASTIGMINE 1 PATCH: 4.6 PATCH, EXTENDED RELEASE TRANSDERMAL at 05:11

## 2024-04-07 RX ADMIN — RIVASTIGMINE 1 PATCH: 4.6 PATCH, EXTENDED RELEASE TRANSDERMAL at 05:10

## 2024-04-07 RX ADMIN — RIVASTIGMINE 1 PATCH: 4.6 PATCH, EXTENDED RELEASE TRANSDERMAL at 22:10

## 2024-04-07 RX ADMIN — Medication 10 MILLIGRAM(S): at 05:02

## 2024-04-07 RX ADMIN — PIPERACILLIN AND TAZOBACTAM 25 GRAM(S): 4; .5 INJECTION, POWDER, LYOPHILIZED, FOR SOLUTION INTRAVENOUS at 12:32

## 2024-04-07 RX ADMIN — PHENYLEPHRINE-SHARK LIVER OIL-MINERAL OIL-PETROLATUM RECTAL OINTMENT 1 APPLICATION(S): at 00:30

## 2024-04-07 RX ADMIN — INSULIN GLARGINE 5 UNIT(S): 100 INJECTION, SOLUTION SUBCUTANEOUS at 22:32

## 2024-04-07 RX ADMIN — Medication 100 MILLIGRAM(S): at 17:30

## 2024-04-07 RX ADMIN — PHENYLEPHRINE-SHARK LIVER OIL-MINERAL OIL-PETROLATUM RECTAL OINTMENT 1 APPLICATION(S): at 23:03

## 2024-04-07 RX ADMIN — Medication 1: at 22:01

## 2024-04-07 RX ADMIN — ESCITALOPRAM OXALATE 10 MILLIGRAM(S): 10 TABLET, FILM COATED ORAL at 12:26

## 2024-04-07 RX ADMIN — Medication 250 MILLIGRAM(S): at 05:03

## 2024-04-07 RX ADMIN — MEMANTINE HYDROCHLORIDE 10 MILLIGRAM(S): 10 TABLET ORAL at 17:16

## 2024-04-07 RX ADMIN — Medication 4: at 17:15

## 2024-04-07 RX ADMIN — Medication 2: at 08:41

## 2024-04-07 RX ADMIN — Medication 100 MILLIGRAM(S): at 05:02

## 2024-04-07 RX ADMIN — Medication 75 MICROGRAM(S): at 05:03

## 2024-04-07 RX ADMIN — PIPERACILLIN AND TAZOBACTAM 25 GRAM(S): 4; .5 INJECTION, POWDER, LYOPHILIZED, FOR SOLUTION INTRAVENOUS at 00:30

## 2024-04-07 RX ADMIN — Medication 4 UNIT(S): at 08:41

## 2024-04-07 RX ADMIN — Medication 40 MILLIEQUIVALENT(S): at 12:25

## 2024-04-07 RX ADMIN — Medication 250 MILLIGRAM(S): at 17:51

## 2024-04-07 RX ADMIN — PHENYLEPHRINE-SHARK LIVER OIL-MINERAL OIL-PETROLATUM RECTAL OINTMENT 1 APPLICATION(S): at 12:39

## 2024-04-07 RX ADMIN — TAMSULOSIN HYDROCHLORIDE 0.4 MILLIGRAM(S): 0.4 CAPSULE ORAL at 23:03

## 2024-04-07 RX ADMIN — APIXABAN 2.5 MILLIGRAM(S): 2.5 TABLET, FILM COATED ORAL at 05:03

## 2024-04-07 RX ADMIN — PHENYLEPHRINE-SHARK LIVER OIL-MINERAL OIL-PETROLATUM RECTAL OINTMENT 1 APPLICATION(S): at 05:03

## 2024-04-07 RX ADMIN — Medication 4: at 12:26

## 2024-04-07 RX ADMIN — MEMANTINE HYDROCHLORIDE 10 MILLIGRAM(S): 10 TABLET ORAL at 05:02

## 2024-04-07 RX ADMIN — Medication 6 UNIT(S): at 12:26

## 2024-04-07 NOTE — PROGRESS NOTE ADULT - PROBLEM SELECTOR PLAN 4
HFrEF:-BNP: [id number] (12/2022): EF 20-25%, apex and anterior wall akinesis, inferior wall hypokinesis, mod MR, mild TR  -CXR with vascular congestion and b/l pleural effusions  -likely component of acute on chronic systolic CHF contributing to hypoxia, will avoid IV diuresis in setting of worsening renal function  - Repeat TTE pending

## 2024-04-07 NOTE — PROGRESS NOTE ADULT - SUBJECTIVE AND OBJECTIVE BOX
[facility] -- Dylan Hernandez, Siddhartha Grider Savella, , Calvin Colmenares  Office # [phone]    Follow Up:      Subjective/Observations:     REVIEW OF SYSTEMS: All other review of systems is negative unless indicated above  PAST MEDICAL & SURGICAL HISTORY:  Atrial fibrillation      Hypothyroidism      Mild Alzheimer's dementia      Diabetes mellitus      Acute on chronic systolic congestive heart failure      Hypertension      Hyperlipemia      Cardiac LV ejection fraction 21-30%  12/22      History of appendectomy      H/O hernia repair      History of cholecystectomy        MEDICATIONS  (STANDING):  apixaban 2.5 milliGRAM(s) Oral every 12 hours  bisacodyl Suppository 10 milliGRAM(s) Rectal once  dexAMETHasone     Tablet 10 milliGRAM(s) Oral daily  dextrose 5%. 1000 milliLiter(s) (50 mL/Hr) IV Continuous <Continuous>  dextrose 5%. 1000 milliLiter(s) (100 mL/Hr) IV Continuous <Continuous>  dextrose 50% Injectable 12.5 Gram(s) IV Push once  dextrose 50% Injectable 25 Gram(s) IV Push once  dextrose 50% Injectable 25 Gram(s) IV Push once  digoxin     Tablet 125 MICROGram(s) Oral every other day  escitalopram 10 milliGRAM(s) Oral daily  glucagon  Injectable 1 milliGRAM(s) IntraMuscular once  hemorrhoidal Ointment 1 Application(s) Rectal four times a day  insulin glargine Injectable (LANTUS) 5 Unit(s) SubCutaneous at bedtime  insulin lispro (ADMELOG) corrective regimen sliding scale   SubCutaneous three times a day before meals  insulin lispro (ADMELOG) corrective regimen sliding scale   SubCutaneous at bedtime  insulin lispro Injectable (ADMELOG) 4 Unit(s) SubCutaneous three times a day before meals  levothyroxine 75 MICROGram(s) Oral daily  memantine 10 milliGRAM(s) Oral two times a day  metoprolol succinate  milliGRAM(s) Oral every 12 hours  pantoprazole    Tablet 40 milliGRAM(s) Oral before breakfast  piperacillin/tazobactam IVPB.. 3.375 Gram(s) IV Intermittent every 12 hours  polyethylene glycol 3350 17 Gram(s) Oral daily  rivastigmine patch  9.5 mG/24 Hr(s) 1 Patch Transdermal every 24 hours  saccharomyces boulardii 250 milliGRAM(s) Oral two times a day  senna 2 Tablet(s) Oral at bedtime  tamsulosin 0.4 milliGRAM(s) Oral at bedtime    MEDICATIONS  (PRN):  acetaminophen     Tablet .. 650 milliGRAM(s) Oral every 6 hours PRN Temp greater or equal to 38C (100.4F), Mild Pain (1 - 3)  albuterol/ipratropium for Nebulization 3 milliLiter(s) Nebulizer every 6 hours PRN Shortness of Breath  aluminum hydroxide/magnesium hydroxide/simethicone Suspension 30 milliLiter(s) Oral every 4 hours PRN Dyspepsia  benzonatate 100 milliGRAM(s) Oral three times a day PRN Cough  bisacodyl Suppository 10 milliGRAM(s) Rectal daily PRN Constipation  dextrose Oral Gel 15 Gram(s) Oral once PRN Blood Glucose LESS THAN 70 milliGRAM(s)/deciliter  melatonin 3 milliGRAM(s) Oral at bedtime PRN Insomnia  ondansetron Injectable 4 milliGRAM(s) IV Push every 8 hours PRN Nausea and/or Vomiting    Allergies    No Known Allergies    Intolerances      Vital Signs Last 24 Hrs  T(C): 36.2 (07 Apr 2024 05:13), Max: 36.9 (06 Apr 2024 12:57)  T(F): 97.1 (07 Apr 2024 05:13), Max: 98.5 (06 Apr 2024 12:57)  HR: 83 (07 Apr 2024 05:13) (67 - 83)  BP: 118/70 (07 Apr 2024 05:13) (116/76 - 130/72)  BP(mean): --  RR: 18 (07 Apr 2024 05:13) (17 - 18)  SpO2: 98% (07 Apr 2024 05:13) (93% - 98%)    Parameters below as of 07 Apr 2024 05:13  Patient On (Oxygen Delivery Method): room air      I&O's Summary    05 Apr 2024 07:01  -  06 Apr 2024 07:00  --------------------------------------------------------  IN: 240 mL / OUT: 0 mL / NET: 240 mL        PHYSICAL EXAM:  TELE:   Constitutional: NAD, awake and alert, well-developed  HEENT: Moist Mucous Membranes, Anicteric  Pulmonary: Non-labored, breath sounds are clear bilaterally, No wheezing, rales or rhonchi  Cardiovascular: Regular, S1 and S2, No murmurs, rubs, gallops or clicks  Gastrointestinal: Bowel Sounds present, soft, nontender.   Lymph: No peripheral edema. No lymphadenopathy.  Skin: No visible rashes or ulcers.  Psych:  Mood & affect appropriate  LABS: All Labs Reviewed:                        10.6   18.01 )-----------( 281      ( 06 Apr 2024 09:45 )             32.9                         10.7   17.38 )-----------( 280      ( 05 Apr 2024 07:00 )             33.3                         10.7   18.77 )-----------( 302      ( 04 Apr 2024 08:55 )             33.1     06 Apr 2024 09:45    139    |  108    |  68     ----------------------------<  202    3.8     |  19     |  2.40   05 Apr 2024 07:00    136    |  103    |  61     ----------------------------<  182    4.7     |  20     |  2.30   04 Apr 2024 08:55    133    |  101    |  46     ----------------------------<  250    4.7     |  20     |  2.20     Ca    8.4        06 Apr 2024 09:45  Ca    8.0        05 Apr 2024 07:00  Ca    8.0        04 Apr 2024 08:55  Phos  3.9       06 Apr 2024 09:45  Mg     2.2       06 Apr 2024 09:45    TPro  6.4    /  Alb  2.4    /  TBili  0.5    /  DBili  x      /  AST  42     /  ALT  97     /  AlkPhos  96     06 Apr 2024 09:45  TPro  6.4    /  Alb  2.5    /  TBili  0.4    /  DBili  x      /  AST  101    /  ALT  135    /  AlkPhos  114    05 Apr 2024 07:00  TPro  7.0    /  Alb  2.7    /  TBili  0.7    /  DBili  x      /  AST  207    /  ALT  155    /  AlkPhos  122    04 Apr 2024 08:55          12 Lead ECG:   Ventricular Rate 127 BPM    QRS Duration 84 ms    Q-T Interval 278 ms    QTC Calculation(Bazett) 404 ms    R Axis 28 degrees    T Axis -61 degrees    Diagnosis Line Atrial fibrillation with rapid ventricular response  Low voltage QRS  Septal infarct , age undetermined  Abnormal ECG  No previous ECGs available  Confirmed by rogerio Harvey ([id number]) on 4/2/2024 2:48:52 PM (04-01-24 @ 19:24)      ACC: [id number] EXAM:  ECHO TTE WO CON COMP W DOPP                          PROCEDURE DATE:  12/22/2022          INTERPRETATION:  INDICATION: Abnormal EKG  Sonographer KL    Blood Pressure 142/86    Height 158 cm     Weight 51.7 kg       BSA 1.5   sqm    Dimensions:  LA 3.6       Normal Values: 2.0 - 4.0 cm  Ao 3.2        Normal Values: 2.0 - 3.8 cm  SEPTUM 1.0       Normal Values: 0.6 - 1.2 cm  PWT 0.9       Normal Values: 0.6 - 1.1 cm  LVIDd 5.8         Normal Values: 3.0 - 5.6 cm  LVIDs 4.3      Normal Values: 1.8 - 4.0 cm      OBSERVATIONS:  Mitral Valve: Moderate MR.  Aortic Valve/Aorta: normal trileaflet aortic valve.  Tricuspid Valve: Mild TR.  Pulmonic Valve: Mild PI  Left Atrium: Enlarged  Right Atrium: Enlarged  Left Ventricle: Left ventricular enlargement with severe left ventricular   systolic dysfunction, estimated LVEF of 20-25%. The entire apex and   anterior walls appear akinetic. The inferior and inferolateral walls   appear hypokinetic. Remaining walls are not well-visualized  Right Ventricle: Grossly normal size and systolic function.  Pericardium: no significant pericardial effusion.  Pulmonary/RV Pressure: estimated PA systolic pressure of at least 35 mmHg   assuming an RA pressure of 3mmHg.    IMPRESSION:  Left ventricular enlargement with severe left ventricular systolic   dysfunction, estimated LVEF of 20-25%. The entire apex and anterior walls   appear akinetic. The inferior and inferolateral walls appear hypokinetic.  Grossly normal RV size and systolic function.  Biatrial enlargement  Normal trileaflet aortic valve, without AI.  Moderate MR  Mild TR.  No significant pericardial effusion.    --- End of Report ---            JOHN BAEZA MD; Attending Cardiologist  This document has been electronically signed. Dec 23 2022  4:40PM      [facility] -- Dylan Hernandez, Siddhartha Grider Savella, , Calvin Colmenares  Office # [phone]    Follow Up:   Systolic HF, Afib      Subjective/Observations: Remains on RA.  Denies cough, SOB, MORTON or orthopnea.  Denies CP or palpitations.  No tele events    REVIEW OF SYSTEMS: All other review of systems is negative unless indicated above  PAST MEDICAL & SURGICAL HISTORY:  Atrial fibrillation  Hypothyroidism  Mild Alzheimer's dementia  Diabetes mellitus  Acute on chronic systolic congestive heart failure  Hypertension  Hyperlipemia  Cardiac LV ejection fraction 21-30%  12/22  History of appendectomy  H/O hernia repair  History of cholecystectomy    MEDICATIONS  (STANDING):  apixaban 2.5 milliGRAM(s) Oral every 12 hours  bisacodyl Suppository 10 milliGRAM(s) Rectal once  dexAMETHasone     Tablet 10 milliGRAM(s) Oral daily  dextrose 5%. 1000 milliLiter(s) (50 mL/Hr) IV Continuous <Continuous>  dextrose 5%. 1000 milliLiter(s) (100 mL/Hr) IV Continuous <Continuous>  dextrose 50% Injectable 12.5 Gram(s) IV Push once  dextrose 50% Injectable 25 Gram(s) IV Push once  dextrose 50% Injectable 25 Gram(s) IV Push once  digoxin     Tablet 125 MICROGram(s) Oral every other day  escitalopram 10 milliGRAM(s) Oral daily  glucagon  Injectable 1 milliGRAM(s) IntraMuscular once  hemorrhoidal Ointment 1 Application(s) Rectal four times a day  insulin glargine Injectable (LANTUS) 5 Unit(s) SubCutaneous at bedtime  insulin lispro (ADMELOG) corrective regimen sliding scale   SubCutaneous three times a day before meals  insulin lispro (ADMELOG) corrective regimen sliding scale   SubCutaneous at bedtime  insulin lispro Injectable (ADMELOG) 4 Unit(s) SubCutaneous three times a day before meals  levothyroxine 75 MICROGram(s) Oral daily  memantine 10 milliGRAM(s) Oral two times a day  metoprolol succinate  milliGRAM(s) Oral every 12 hours  pantoprazole    Tablet 40 milliGRAM(s) Oral before breakfast  piperacillin/tazobactam IVPB.. 3.375 Gram(s) IV Intermittent every 12 hours  polyethylene glycol 3350 17 Gram(s) Oral daily  rivastigmine patch  9.5 mG/24 Hr(s) 1 Patch Transdermal every 24 hours  saccharomyces boulardii 250 milliGRAM(s) Oral two times a day  senna 2 Tablet(s) Oral at bedtime  tamsulosin 0.4 milliGRAM(s) Oral at bedtime    MEDICATIONS  (PRN):  acetaminophen     Tablet .. 650 milliGRAM(s) Oral every 6 hours PRN Temp greater or equal to 38C (100.4F), Mild Pain (1 - 3)  albuterol/ipratropium for Nebulization 3 milliLiter(s) Nebulizer every 6 hours PRN Shortness of Breath  aluminum hydroxide/magnesium hydroxide/simethicone Suspension 30 milliLiter(s) Oral every 4 hours PRN Dyspepsia  benzonatate 100 milliGRAM(s) Oral three times a day PRN Cough  bisacodyl Suppository 10 milliGRAM(s) Rectal daily PRN Constipation  dextrose Oral Gel 15 Gram(s) Oral once PRN Blood Glucose LESS THAN 70 milliGRAM(s)/deciliter  melatonin 3 milliGRAM(s) Oral at bedtime PRN Insomnia  ondansetron Injectable 4 milliGRAM(s) IV Push every 8 hours PRN Nausea and/or Vomiting    Allergies    No Known Allergies    Intolerances      Vital Signs Last 24 Hrs  T(C): 36.2 (07 Apr 2024 05:13), Max: 36.9 (06 Apr 2024 12:57)  T(F): 97.1 (07 Apr 2024 05:13), Max: 98.5 (06 Apr 2024 12:57)  HR: 83 (07 Apr 2024 05:13) (67 - 83)  BP: 118/70 (07 Apr 2024 05:13) (116/76 - 130/72)  BP(mean): --  RR: 18 (07 Apr 2024 05:13) (17 - 18)  SpO2: 98% (07 Apr 2024 05:13) (93% - 98%)    Parameters below as of 07 Apr 2024 05:13  Patient On (Oxygen Delivery Method): room air      I&O's Summary    05 Apr 2024 07:01  -  06 Apr 2024 07:00  --------------------------------------------------------  IN: 240 mL / OUT: 0 mL / NET: 240 mL  PHYSICAL EXAM:  TELE: Afib, rate-controlled  Constitutional: NAD, awake and alert, frail  HEENT: Moist Mucous Membranes, Anicteric  Pulmonary: Non-labored, breath sounds are diminished  bilaterally, No wheezing, rales +fine rhonchi  Cardiovascular: IRRR, S1 and S2, No murmurs, rubs, gallops or clicks  Gastrointestinal: Bowel Sounds present, soft, nontender.   Lymph: No peripheral edema. No lymphadenopathy.  Skin: No visible rashes or ulcers.  Psych:  Mood & affect appropriate      LABS: All Labs Reviewed:                        10.6 18.01 )-----------( 281      ( 06 Apr 2024 09:45 )             32.9                         10.7   17.38 )-----------( 280      ( 05 Apr 2024 07:00 )             33.3                         10.7   18.77 )-----------( 302      ( 04 Apr 2024 08:55 )             33.1     06 Apr 2024 09:45    139    |  108    |  68     ----------------------------<  202    3.8     |  19     |  2.40   05 Apr 2024 07:00    136    |  103    |  61     ----------------------------<  182    4.7     |  20     |  2.30   04 Apr 2024 08:55    133    |  101    |  46     ----------------------------<  250    4.7     |  20     |  2.20     Ca    8.4        06 Apr 2024 09:45  Ca    8.0        05 Apr 2024 07:00  Ca    8.0        04 Apr 2024 08:55  Phos  3.9       06 Apr 2024 09:45  Mg     2.2       06 Apr 2024 09:45    TPro  6.4    /  Alb  2.4    /  TBili  0.5    /  DBili  x      /  AST  42     /  ALT  97     /  AlkPhos  96     06 Apr 2024 09:45  TPro  6.4    /  Alb  2.5    /  TBili  0.4    /  DBili  x      /  AST  101    /  ALT  135    /  AlkPhos  114    05 Apr 2024 07:00  TPro  7.0    /  Alb  2.7    /  TBili  0.7    /  DBili  x      /  AST  207    /  ALT  155    /  AlkPhos  122    04 Apr 2024 08:55          12 Lead ECG:   Ventricular Rate 127 BPM    QRS Duration 84 ms    Q-T Interval 278 ms    QTC Calculation(Bazett) 404 ms    R Axis 28 degrees    T Axis -61 degrees    Diagnosis Line Atrial fibrillation with rapid ventricular response  Low voltage QRS  Septal infarct , age undetermined  Abnormal ECG  No previous ECGs available  Confirmed by rogerio Harvey ([id number]) on 4/2/2024 2:48:52 PM (04-01-24 @ 19:24)      ACC: [id number] EXAM:  ECHO TTE WO CON COMP W DOPP                          PROCEDURE DATE:  12/22/2022          INTERPRETATION:  INDICATION: Abnormal EKG  Sonographer KL    Blood Pressure 142/86    Height 158 cm     Weight 51.7 kg       BSA 1.5   sqm    Dimensions:  LA 3.6       Normal Values: 2.0 - 4.0 cm  Ao 3.2        Normal Values: 2.0 - 3.8 cm  SEPTUM 1.0       Normal Values: 0.6 - 1.2 cm  PWT 0.9       Normal Values: 0.6 - 1.1 cm  LVIDd 5.8         Normal Values: 3.0 - 5.6 cm  LVIDs 4.3      Normal Values: 1.8 - 4.0 cm      OBSERVATIONS:  Mitral Valve: Moderate MR.  Aortic Valve/Aorta: normal trileaflet aortic valve.  Tricuspid Valve: Mild TR.  Pulmonic Valve: Mild PI  Left Atrium: Enlarged  Right Atrium: Enlarged  Left Ventricle: Left ventricular enlargement with severe left ventricular   systolic dysfunction, estimated LVEF of 20-25%. The entire apex and   anterior walls appear akinetic. The inferior and inferolateral walls   appear hypokinetic. Remaining walls are not well-visualized  Right Ventricle: Grossly normal size and systolic function.  Pericardium: no significant pericardial effusion.  Pulmonary/RV Pressure: estimated PA systolic pressure of at least 35 mmHg   assuming an RA pressure of 3mmHg.    IMPRESSION:  Left ventricular enlargement with severe left ventricular systolic   dysfunction, estimated LVEF of 20-25%. The entire apex and anterior walls   appear akinetic. The inferior and inferolateral walls appear hypokinetic.  Grossly normal RV size and systolic function.  Biatrial enlargement  Normal trileaflet aortic valve, without AI.  Moderate MR  Mild TR.  No significant pericardial effusion.    --- End of Report ---            JOHN BAEZA MD; Attending Cardiologist  This document has been electronically signed. Dec 23 2022  4:40PM

## 2024-04-07 NOTE — PROGRESS NOTE ADULT - SUBJECTIVE AND OBJECTIVE BOX
Subjective: hallucinations on/off. Daughter at bed side.       MEDICATIONS  (STANDING):  apixaban 2.5 milliGRAM(s) Oral every 12 hours  bisacodyl Suppository 10 milliGRAM(s) Rectal once  dextrose 5%. 1000 milliLiter(s) (100 mL/Hr) IV Continuous <Continuous>  dextrose 5%. 1000 milliLiter(s) (50 mL/Hr) IV Continuous <Continuous>  dextrose 50% Injectable 12.5 Gram(s) IV Push once  dextrose 50% Injectable 25 Gram(s) IV Push once  dextrose 50% Injectable 25 Gram(s) IV Push once  digoxin     Tablet 125 MICROGram(s) Oral every other day  escitalopram 10 milliGRAM(s) Oral daily  glucagon  Injectable 1 milliGRAM(s) IntraMuscular once  hemorrhoidal Ointment 1 Application(s) Rectal four times a day  insulin glargine Injectable (LANTUS) 5 Unit(s) SubCutaneous at bedtime  insulin lispro (ADMELOG) corrective regimen sliding scale   SubCutaneous three times a day before meals  insulin lispro (ADMELOG) corrective regimen sliding scale   SubCutaneous at bedtime  insulin lispro Injectable (ADMELOG) 6 Unit(s) SubCutaneous three times a day before meals  levothyroxine 75 MICROGram(s) Oral daily  memantine 10 milliGRAM(s) Oral two times a day  metoprolol succinate  milliGRAM(s) Oral every 12 hours  pantoprazole    Tablet 40 milliGRAM(s) Oral before breakfast  piperacillin/tazobactam IVPB.. 3.375 Gram(s) IV Intermittent every 12 hours  polyethylene glycol 3350 17 Gram(s) Oral daily  potassium chloride    Tablet ER 20 milliEquivalent(s) Oral once  potassium chloride   Powder 40 milliEquivalent(s) Oral once  rivastigmine patch  9.5 mG/24 Hr(s) 1 Patch Transdermal every 24 hours  saccharomyces boulardii 250 milliGRAM(s) Oral two times a day  senna 2 Tablet(s) Oral at bedtime  tamsulosin 0.4 milliGRAM(s) Oral at bedtime    MEDICATIONS  (PRN):  acetaminophen     Tablet .. 650 milliGRAM(s) Oral every 6 hours PRN Temp greater or equal to 38C (100.4F), Mild Pain (1 - 3)  albuterol/ipratropium for Nebulization 3 milliLiter(s) Nebulizer every 6 hours PRN Shortness of Breath  aluminum hydroxide/magnesium hydroxide/simethicone Suspension 30 milliLiter(s) Oral every 4 hours PRN Dyspepsia  benzonatate 100 milliGRAM(s) Oral three times a day PRN Cough  bisacodyl Suppository 10 milliGRAM(s) Rectal daily PRN Constipation  dextrose Oral Gel 15 Gram(s) Oral once PRN Blood Glucose LESS THAN 70 milliGRAM(s)/deciliter  melatonin 3 milliGRAM(s) Oral at bedtime PRN Insomnia  ondansetron Injectable 4 milliGRAM(s) IV Push every 8 hours PRN Nausea and/or Vomiting          T(C): 36.2 (04-07-24 @ 05:13), Max: 36.9 (04-06-24 @ 12:57)  HR: 83 (04-07-24 @ 05:13) (67 - 83)  BP: 118/70 (04-07-24 @ 05:13) (116/76 - 130/72)  RR: 18 (04-07-24 @ 05:13) (17 - 18)  SpO2: 98% (04-07-24 @ 05:13) (93% - 98%)  Wt(kg): --        I&O's Detail           PHYSICAL EXAM:    GENERAL: NAD  NECK: Supple, no inc in JVP  CHEST/LUNG: Clear  HEART: S1S2  ABDOMEN: Soft, Nontender, Nondistended; Bowel sounds present  EXTREMITIES:  no edema.   NEURO: no asterixis      LABS:  CBC Full  -  ( 07 Apr 2024 06:35 )  WBC Count : 17.89 K/uL  RBC Count : 3.67 M/uL  Hemoglobin : 10.0 g/dL  Hematocrit : 31.4 %  Platelet Count - Automated : 287 K/uL  Mean Cell Volume : 85.6 fl  Mean Cell Hemoglobin : 27.2 pg  Mean Cell Hemoglobin Concentration : 31.8 gm/dL  Auto Neutrophil # : 15.92 K/uL  Auto Lymphocyte # : 0.84 K/uL  Auto Monocyte # : 0.95 K/uL  Auto Eosinophil # : 0.00 K/uL  Auto Basophil # : 0.03 K/uL  Auto Neutrophil % : 89.0 %  Auto Lymphocyte % : 4.7 %  Auto Monocyte % : 5.3 %  Auto Eosinophil % : 0.0 %  Auto Basophil % : 0.2 %    04-07    138  |  105  |  68<H>  ----------------------------<  190<H>  3.4<L>   |  20<L>  |  2.20<H>    Ca    8.3<L>      07 Apr 2024 06:35  Phos  3.9     04-06  Mg     2.2     04-06    TPro  6.1  /  Alb  2.4<L>  /  TBili  0.5  /  DBili  x   /  AST  31  /  ALT  75  /  AlkPhos  101  04-07      Impression:  1.YVETTE on CKD 3-4 sec to contrast ATN  2.Systolic CM, EF 20-25%, mod MR  3.COVID 19  4.Hypertension  5.Diabetes    Recommendations:   * Cont to trend Cr   * Check bladder scan if Cr rises sharply.   * Off diuretic unless develops acute dyspnea.

## 2024-04-07 NOTE — CHART NOTE - NSCHARTNOTEFT_GEN_A_CORE
Assessment:   Brief hx:   89yo F w/ PMH of HFrEF of 20-25%, dementia, Afib (on low-dose Eliquis), hypothyroidism, T2DM, CKD3b, p/w generalized weakness admitted with sepsis due to UTI and COVID, afib w/ RVR, found to have acute hypoxic respiratory failure and likely L-sided pyelonephritis. INTERVAL HPI/OVERNIGHT EVENTS: No acute overnight events. Pt seen and examined at the bedside this AM. Pt is much improved today. Pt is doing well on RA - no longer required O2 supplementation. Denies fever, chills, cough, cp, sob, abdominal pain, n/v. Pt endorses some dysuria. Pt having BMs."    Pt seen at bedside, ate her yogurt and about 50% of breakfast this am. No c/o N/V/D/C. No complaints of issues chewing or swallowing on current diet order. Skin impaired with multple PUI. Labs reviewed. Elevated renal indices, encourage po food and fluids. for hydration.GFR L. Tolerating diet as ordered, taking glucerna well without issues. NFPE conducted today with findings indicative of malnutrition. RD to monitor and f/u. RD to remain available as needed.     Factors impacting intake: [ ] none [ ] nausea  [ ] vomiting [ ] diarrhea [ ] constipation  [ ]chewing problems [ ] swallowing issues  [x ] other: recent respiratory distress 2/2 covid requiring supplemental oxygen.     Diet Presciption: Diet, Consistent Carbohydrate w/Evening Snack:   Low Sodium  Supplement Feeding Modality:  Oral  Glucerna Shake Cans or Servings Per Day:  1       Frequency:  Two Times a day (04-05-24 @ 10:45)    Intake: %    Current Weight: Weight (kg): 45.4 (04-01 @ 19:00); 4/5-117lbs      Pertinent Medications: MEDICATIONS  (STANDING):  apixaban 2.5 milliGRAM(s) Oral every 12 hours  bisacodyl Suppository 10 milliGRAM(s) Rectal once  dexAMETHasone     Tablet 10 milliGRAM(s) Oral daily  dextrose 5%. 1000 milliLiter(s) (50 mL/Hr) IV Continuous <Continuous>  dextrose 5%. 1000 milliLiter(s) (100 mL/Hr) IV Continuous <Continuous>  dextrose 50% Injectable 12.5 Gram(s) IV Push once  dextrose 50% Injectable 25 Gram(s) IV Push once  dextrose 50% Injectable 25 Gram(s) IV Push once  digoxin     Tablet 125 MICROGram(s) Oral every other day  escitalopram 10 milliGRAM(s) Oral daily  glucagon  Injectable 1 milliGRAM(s) IntraMuscular once  hemorrhoidal Ointment 1 Application(s) Rectal four times a day  insulin glargine Injectable (LANTUS) 5 Unit(s) SubCutaneous at bedtime  insulin lispro (ADMELOG) corrective regimen sliding scale   SubCutaneous three times a day before meals  insulin lispro (ADMELOG) corrective regimen sliding scale   SubCutaneous at bedtime  insulin lispro Injectable (ADMELOG) 4 Unit(s) SubCutaneous three times a day before meals  levothyroxine 75 MICROGram(s) Oral daily  memantine 10 milliGRAM(s) Oral two times a day  metoprolol succinate  milliGRAM(s) Oral every 12 hours  pantoprazole    Tablet 40 milliGRAM(s) Oral before breakfast  piperacillin/tazobactam IVPB.. 3.375 Gram(s) IV Intermittent every 12 hours  polyethylene glycol 3350 17 Gram(s) Oral daily  rivastigmine patch  9.5 mG/24 Hr(s) 1 Patch Transdermal every 24 hours  saccharomyces boulardii 250 milliGRAM(s) Oral two times a day  senna 2 Tablet(s) Oral at bedtime  tamsulosin 0.4 milliGRAM(s) Oral at bedtime    MEDICATIONS  (PRN):  acetaminophen     Tablet .. 650 milliGRAM(s) Oral every 6 hours PRN Temp greater or equal to 38C (100.4F), Mild Pain (1 - 3)  albuterol/ipratropium for Nebulization 3 milliLiter(s) Nebulizer every 6 hours PRN Shortness of Breath  aluminum hydroxide/magnesium hydroxide/simethicone Suspension 30 milliLiter(s) Oral every 4 hours PRN Dyspepsia  benzonatate 100 milliGRAM(s) Oral three times a day PRN Cough  bisacodyl Suppository 10 milliGRAM(s) Rectal daily PRN Constipation  dextrose Oral Gel 15 Gram(s) Oral once PRN Blood Glucose LESS THAN 70 milliGRAM(s)/deciliter  melatonin 3 milliGRAM(s) Oral at bedtime PRN Insomnia  ondansetron Injectable 4 milliGRAM(s) IV Push every 8 hours PRN Nausea and/or Vomiting    Pertinent Labs: BUN.Cr-H, GFR L  Skin: No edema, multiple pui ( stg2 to rt butt, coccyx stg 1, R heel DTI)    Estimated Needs:   [x ] no change since previous assessment  [ ] recalculated:     Previous Nutrition Diagnosis:     [x ] Underweight [ x] Increased Nutrient Needs     Nutrition Diagnosis is [x ] ongoing  [ ] resolved [ ] not applicable     New Nutrition Diagnosis: [x ] Severe malnutrition in the context of acute on chronic illness related to increased nutrient needs/inadequate po intake as evidenced by moderate muscle and fat wasting to orbitals, temples and buccal region.       Interventions:   Recommend  [ ] Change Diet To:  [ ] Nutrition Supplement  [ ] Nutrition Support  [x ] Other: mvi w/ minerals    Monitoring and Evaluation:   [x ] PO intake [ x ] Tolerance to diet prescription [ x ] weights [ x ] labs[ x ] follow up per protocol  [ x] other: skin, BM, s/sx GI distress.

## 2024-04-07 NOTE — PROGRESS NOTE ADULT - PROBLEM SELECTOR PLAN 7
YVETTE on CKD 2 -Cr at baseline (1.20-1.40)  -pt with worsening with renal function  with urinary retention s/p st cath  -avoid nephrotoxic agents  -Nephro Dr. Oleary following

## 2024-04-07 NOTE — PROGRESS NOTE ADULT - ASSESSMENT
89 y/o female with PMH of HFrEF of 20-25 %, dementia, Afib, hypothyroidism, DM who presented to ED to be evaluated for weakness, found to have +UTI and Covid positive     Persistent Atrial Fibrillation, COVID  - Known hx of Afib, with RVR episode in the setting of catecholamine surge 2/2 recent acute stressor (UTI, COVID)   - Remains rate-controlled on tele  - Continue BB   - Continue Digoxin 0.125 QOD.  Please, obtain level in am, 4/8  - Avoid CCB given reduced EF   - Continue Eliquis    - EKG Afib w/ RVR, unchanged from prior   - No anginal complaints   - No evidence of any active ischemia     - TTE (12/2022): EF 20-25%, apex and anterior wall akinesis, inferior wall hypokinesis, mod MR, mild TR  - Known to our service, from last admission, she has known LV Dysfunction from TTE likely from a missed MI was decided for medical management given her frailty, increased creatinine at that time  - Continue to hold on IV Lasix for now given YVETTE.  - Intravascularly dry, though, creatinine slowly improving    - For GDMT, change lopressor to Toprol XL  - Unable to start ace/arb given YVETTE   - Needs repeat TTE    - BP stable and controlled  - +COVID management per primary  - Monitor and replete lytes, keep K>4, Mg>2.  - Will continue to follow.    Violette Mckeon DNP, NP-C, AGACNP-C  Cardiology   Call TEAMS

## 2024-04-07 NOTE — PROGRESS NOTE ADULT - SUBJECTIVE AND OBJECTIVE BOX
[facility]  INFECTIOUS DISEASES - [address] Ame[address], [place] [address]  Tel: [phone]     Fax: [phone]  =======================================================    LEXY EISENBERG [id number]    Follow up: No fevers. On room air. Denies any pain or SOB.    Allergies:  No Known Allergies      Antibiotics:  acetaminophen     Tablet .. 650 milliGRAM(s) Oral every 6 hours PRN  albuterol/ipratropium for Nebulization 3 milliLiter(s) Nebulizer every 6 hours PRN  aluminum hydroxide/magnesium hydroxide/simethicone Suspension 30 milliLiter(s) Oral every 4 hours PRN  apixaban 2.5 milliGRAM(s) Oral every 12 hours  benzonatate 100 milliGRAM(s) Oral three times a day PRN  bisacodyl Suppository 10 milliGRAM(s) Rectal once  bisacodyl Suppository 10 milliGRAM(s) Rectal daily PRN  dextrose 5%. 1000 milliLiter(s) IV Continuous <Continuous>  dextrose 5%. 1000 milliLiter(s) IV Continuous <Continuous>  dextrose 50% Injectable 12.5 Gram(s) IV Push once  dextrose 50% Injectable 25 Gram(s) IV Push once  dextrose 50% Injectable 25 Gram(s) IV Push once  dextrose Oral Gel 15 Gram(s) Oral once PRN  digoxin     Tablet 125 MICROGram(s) Oral every other day  escitalopram 10 milliGRAM(s) Oral daily  glucagon  Injectable 1 milliGRAM(s) IntraMuscular once  hemorrhoidal Ointment 1 Application(s) Rectal four times a day  insulin glargine Injectable (LANTUS) 5 Unit(s) SubCutaneous at bedtime  insulin lispro (ADMELOG) corrective regimen sliding scale   SubCutaneous three times a day before meals  insulin lispro (ADMELOG) corrective regimen sliding scale   SubCutaneous at bedtime  insulin lispro Injectable (ADMELOG) 6 Unit(s) SubCutaneous three times a day before meals  levothyroxine 75 MICROGram(s) Oral daily  melatonin 3 milliGRAM(s) Oral at bedtime PRN  memantine 10 milliGRAM(s) Oral two times a day  metoprolol succinate  milliGRAM(s) Oral every 12 hours  ondansetron Injectable 4 milliGRAM(s) IV Push every 8 hours PRN  pantoprazole    Tablet 40 milliGRAM(s) Oral before breakfast  piperacillin/tazobactam IVPB.. 3.375 Gram(s) IV Intermittent every 12 hours  polyethylene glycol 3350 17 Gram(s) Oral daily  potassium chloride    Tablet ER 20 milliEquivalent(s) Oral once  rivastigmine patch  9.5 mG/24 Hr(s) 1 Patch Transdermal every 24 hours  saccharomyces boulardii 250 milliGRAM(s) Oral two times a day  senna 2 Tablet(s) Oral at bedtime  tamsulosin 0.4 milliGRAM(s) Oral at bedtime       REVIEW OF SYSTEMS:  Limited 2/2 dementia, as per HPI     Physical Exam:  ICU Vital Signs Last 24 Hrs  T(C): 36.4 (07 Apr 2024 13:01), Max: 36.4 (07 Apr 2024 13:01)  T(F): 97.5 (07 Apr 2024 13:01), Max: 97.5 (07 Apr 2024 13:01)  HR: 71 (07 Apr 2024 13:01) (71 - 83)  BP: 138/77 (07 Apr 2024 13:01) (118/70 - 138/77)  BP(mean): --  ABP: --  ABP(mean): --  RR: 18 (07 Apr 2024 13:01) (18 - 18)  SpO2: 92% (07 Apr 2024 13:01) (92% - 98%)    O2 Parameters below as of 07 Apr 2024 13:01  Patient On (Oxygen Delivery Method): room air      GEN: NAD, sitting up in chair  HEENT: normocephalic and atraumatic.  NECK: Supple.    LUNGS: Normal respiratory effort  HEART: Regular rate and rhythm  ABDOMEN: Soft, nontender, and nondistended.    EXTREMITIES: No leg edema.  NEUROLOGIC: Answering simple questions    Labs:  04-07    138  |  105  |  68<H>  ----------------------------<  190<H>  3.4<L>   |  20<L>  |  2.20<H>    Ca    8.3<L>      07 Apr 2024 06:35  Phos  3.9     04-06  Mg     2.2     04-06    TPro  6.1  /  Alb  2.4<L>  /  TBili  0.5  /  DBili  x   /  AST  31  /  ALT  75  /  AlkPhos  101  04-07                          10.0   17.89 )-----------( 287      ( 07 Apr 2024 06:35 )             31.4       Urinalysis Basic - ( 07 Apr 2024 06:35 )    Color: x / Appearance: x / SG: x / pH: x  Gluc: 190 mg/dL / Ketone: x  / Bili: x / Urobili: x   Blood: x / Protein: x / Nitrite: x   Leuk Esterase: x / RBC: x / WBC x   Sq Epi: x / Non Sq Epi: x / Bacteria: x      LIVER FUNCTIONS - ( 07 Apr 2024 06:35 )  Alb: 2.4 g/dL / Pro: 6.1 g/dL / ALK PHOS: 101 U/L / ALT: 75 U/L / AST: 31 U/L / GGT: x             RECENT CULTURES:  04-01 @ 23:07 Clean Catch Clean Catch (Midstream) Klebsiella pneumoniae    10,000 - 49,000 CFU/mL Klebsiella pneumoniae        04-01 @ 20:30 .Blood Blood-Peripheral     No growth at 5 days        04-01 @ 20:20 .Blood Blood-Peripheral     No growth at 5 days              All imaging and data are reviewed.

## 2024-04-07 NOTE — PROGRESS NOTE ADULT - PROBLEM SELECTOR PLAN 1
-Pt with acute hypoxia -- placed on HFNC,  titrated down to 6LNC, now saturating well on room air  -acute hypoxic respiratory failure likely multifactorial including pulmonary edema, COVID, suspected overlying bacterial PNA-Concern for flash pulmonary edema as an acute decompensation  -d-dimer elevated at 500  - Procal 0.37 -> 6.3 -> 2.7  -CT Angio chest, and CT abdomen and pelvis w/ IV contras: Multilobar pneumonia right lung. Septal thickening which may reflect interstitial edema. Bilateral pleural effusions with compressive atelectasis lower lobes. No acute pulmonary embolism. Cystitis and left pyeloureteritis. Distended rectum with fecal impaction.  -duoneb PRN -Tessalon pearls PRN for cough  -Completed 3/5 days Remdesivir, then discontinued due to worsening transaminitis  -c/w Decadron 6mg po x10 days  -continue Zosyn until 4/9  -ID consulted (Dr. Amezcua),  -isolation precautions -Pt with acute hypoxia -- placed on HFNC,  titrated down to 6LNC, now saturating well on room air  -acute hypoxic respiratory failure likely multifactorial including pulmonary edema, COVID, suspected overlying bacterial PNA-Concern for flash pulmonary edema as an acute decompensation  -d-dimer elevated at 500  - Procal 0.37 -> 6.3 -> 2.7  -CT Angio chest, and CT abdomen and pelvis w/ IV contras: Multilobar pneumonia right lung. Septal thickening which may reflect interstitial edema. Bilateral pleural effusions with compressive atelectasis lower lobes. No acute pulmonary embolism. Cystitis and left pyeloureteritis. Distended rectum with fecal impaction.  -duoneb PRN -Tessalon pearls PRN for cough  -Completed 3/5 days Remdesivir, then discontinued due to worsening transaminitis  -c/w Decadron 6mg po x10 days - Thursday last dose.  -continue Zosyn until 4/9  -ID consulted (Dr. Amezcua),  -isolation precautions

## 2024-04-07 NOTE — PROGRESS NOTE ADULT - ASSESSMENT
89 y/o female with PMH of HFrEF of 20-25 %, dementia, Afib, hypothyroidism, DM who presented to ED to be evaluated for weakness. Found to have acute hypoxic respiratory failure likely 2/2 COVID, but cannot rule out underlying bacterial pneumonia. Also being treated for UTI, CT showed evidence of Cystitis and left pyeloureteritis. Urine culture grew klebsiella, sensitivities reviewed.    On 4/4, WBC increased to 18--could be steroid related but will antibiotic coverage broadened to Zosyn given minimal clinical improvement. Also with increasing LFT's, remdesivir stopped after 3rd dose.     On 4/5, O2 requirements improvement and off HFNC. No fever and WBC slightly improved to 17. LFT's also better but creatinine increased.    On 4/7, no fevers and on room air. WBC stable at 17, suspect leukocytosis is steroid related. Transaminitis resolved. Initial blood cultures no growth.    # COVID 19   # Acute respiratory failure   # Pneumonia  # UTI/pyeloueteritis  # Leukocytosis  # Transaminitis    - continue Zosyn (renally dosed)--if continues to improve plan for 5 day course until 4/9 AM, if ready for discharge prior can switch to levofloxacin PO  - s/p remdesivir x 3 doses  - Continue Dexamethasone 6mg daily to complete 10 days  - Anticoagulation as per protocol  - monitor WBC  - aspiration precautions  - discussed with daughter at bedside  - discussed with Dr. Pollo Laughlin MD  Division of infectious Diseases  Cell [phone] between 8am and 6pm  After 6pm and over the weekends please call ID service line at [phone].     35 minutes spent on total encounter assessing patient, examination, chart review, counseling and coordinating care by the attending physician/nurse/care manager.

## 2024-04-07 NOTE — PROGRESS NOTE ADULT - SUBJECTIVE AND OBJECTIVE BOX
Patient is a 88y old  Female who presents with a chief complaint of weakness (07 Apr 2024 12:03)      TELE: afib 101bpm with HR ranges in the 80s-low 100s.    INTERVAL HPI/OVERNIGHT EVENTS: No acute overnight events. Pt seen and examined at the bedside this AM. Pt reports feeling tired. Pt with some confusion this morning. But overall, pt states she feels that she is improving. Denies any pain. Denies cp or SOB.    MEDICATIONS  (STANDING):  apixaban 2.5 milliGRAM(s) Oral every 12 hours  bisacodyl Suppository 10 milliGRAM(s) Rectal once  dextrose 5%. 1000 milliLiter(s) (50 mL/Hr) IV Continuous <Continuous>  dextrose 5%. 1000 milliLiter(s) (100 mL/Hr) IV Continuous <Continuous>  dextrose 50% Injectable 25 Gram(s) IV Push once  dextrose 50% Injectable 12.5 Gram(s) IV Push once  dextrose 50% Injectable 25 Gram(s) IV Push once  digoxin     Tablet 125 MICROGram(s) Oral every other day  escitalopram 10 milliGRAM(s) Oral daily  glucagon  Injectable 1 milliGRAM(s) IntraMuscular once  hemorrhoidal Ointment 1 Application(s) Rectal four times a day  insulin glargine Injectable (LANTUS) 5 Unit(s) SubCutaneous at bedtime  insulin lispro (ADMELOG) corrective regimen sliding scale   SubCutaneous three times a day before meals  insulin lispro (ADMELOG) corrective regimen sliding scale   SubCutaneous at bedtime  insulin lispro Injectable (ADMELOG) 6 Unit(s) SubCutaneous three times a day before meals  levothyroxine 75 MICROGram(s) Oral daily  memantine 10 milliGRAM(s) Oral two times a day  metoprolol succinate  milliGRAM(s) Oral every 12 hours  pantoprazole    Tablet 40 milliGRAM(s) Oral before breakfast  piperacillin/tazobactam IVPB.. 3.375 Gram(s) IV Intermittent every 12 hours  polyethylene glycol 3350 17 Gram(s) Oral daily  potassium chloride    Tablet ER 20 milliEquivalent(s) Oral once  rivastigmine patch  9.5 mG/24 Hr(s) 1 Patch Transdermal every 24 hours  saccharomyces boulardii 250 milliGRAM(s) Oral two times a day  senna 2 Tablet(s) Oral at bedtime  tamsulosin 0.4 milliGRAM(s) Oral at bedtime    MEDICATIONS  (PRN):  acetaminophen     Tablet .. 650 milliGRAM(s) Oral every 6 hours PRN Temp greater or equal to 38C (100.4F), Mild Pain (1 - 3)  albuterol/ipratropium for Nebulization 3 milliLiter(s) Nebulizer every 6 hours PRN Shortness of Breath  aluminum hydroxide/magnesium hydroxide/simethicone Suspension 30 milliLiter(s) Oral every 4 hours PRN Dyspepsia  benzonatate 100 milliGRAM(s) Oral three times a day PRN Cough  bisacodyl Suppository 10 milliGRAM(s) Rectal daily PRN Constipation  dextrose Oral Gel 15 Gram(s) Oral once PRN Blood Glucose LESS THAN 70 milliGRAM(s)/deciliter  melatonin 3 milliGRAM(s) Oral at bedtime PRN Insomnia  ondansetron Injectable 4 milliGRAM(s) IV Push every 8 hours PRN Nausea and/or Vomiting      Allergies    No Known Allergies    Intolerances        REVIEW OF SYSTEMS:  CONSTITUTIONAL: No fever or chills  HEENT:  No headache, no sore throat  RESPIRATORY: No cough, wheezing, or shortness of breath  CARDIOVASCULAR: No chest pain, palpitations  GASTROINTESTINAL: No abd pain, nausea, vomiting, or diarrhea  GENITOURINARY: No dysuria, frequency, or hematuria  NEUROLOGICAL: no focal weakness or dizziness  MUSCULOSKELETAL: no myalgias     Vital Signs Last 24 Hrs  T(C): 36.2 (07 Apr 2024 05:13), Max: 36.9 (06 Apr 2024 12:57)  T(F): 97.1 (07 Apr 2024 05:13), Max: 98.5 (06 Apr 2024 12:57)  HR: 83 (07 Apr 2024 05:13) (67 - 83)  BP: 118/70 (07 Apr 2024 05:13) (116/76 - 130/72)  BP(mean): --  RR: 18 (07 Apr 2024 05:13) (17 - 18)  SpO2: 98% (07 Apr 2024 05:13) (93% - 98%)    Parameters below as of 07 Apr 2024 05:13  Patient On (Oxygen Delivery Method): room air        PHYSICAL EXAM:  GENERAL: NAD, on room air, sitting up comfortably in chair  HEENT:  anicteric, moist mucous membranes  CHEST/LUNG:  CTA b/l, no rales, wheezes, or rhonchi  HEART:  irregularly irregular, S1, S2  ABDOMEN:  BS+, soft, nontender, nondistended  EXTREMITIES: no edema, cyanosis, or calf tenderness  NERVOUS SYSTEM: A&O x2, answers questions and follows commands appropriately    LABS:                        10.0   17.89 )-----------( 287      ( 07 Apr 2024 06:35 )             31.4     CBC Full  -  ( 07 Apr 2024 06:35 )  WBC Count : 17.89 K/uL  Hemoglobin : 10.0 g/dL  Hematocrit : 31.4 %  Platelet Count - Automated : 287 K/uL  Mean Cell Volume : 85.6 fl  Mean Cell Hemoglobin : 27.2 pg  Mean Cell Hemoglobin Concentration : 31.8 gm/dL  Auto Neutrophil # : 15.92 K/uL  Auto Lymphocyte # : 0.84 K/uL  Auto Monocyte # : 0.95 K/uL  Auto Eosinophil # : 0.00 K/uL  Auto Basophil # : 0.03 K/uL  Auto Neutrophil % : 89.0 %  Auto Lymphocyte % : 4.7 %  Auto Monocyte % : 5.3 %  Auto Eosinophil % : 0.0 %  Auto Basophil % : 0.2 %    07 Apr 2024 06:35    138    |  105    |  68     ----------------------------<  190    3.4     |  20     |  2.20     Ca    8.3        07 Apr 2024 06:35    TPro  6.1    /  Alb  2.4    /  TBili  0.5    /  DBili  x      /  AST  31     /  ALT  75     /  AlkPhos  101    07 Apr 2024 06:35      Urinalysis Basic - ( 07 Apr 2024 06:35 )    Color: x / Appearance: x / SG: x / pH: x  Gluc: 190 mg/dL / Ketone: x  / Bili: x / Urobili: x   Blood: x / Protein: x / Nitrite: x   Leuk Esterase: x / RBC: x / WBC x   Sq Epi: x / Non Sq Epi: x / Bacteria: x      CAPILLARY BLOOD GLUCOSE      POCT Blood Glucose.: 244 mg/dL (07 Apr 2024 11:33)  POCT Blood Glucose.: 196 mg/dL (07 Apr 2024 07:58)  POCT Blood Glucose.: 317 mg/dL (06 Apr 2024 21:47)  POCT Blood Glucose.: 352 mg/dL (06 Apr 2024 16:58)        Culture - Urine (collected 04-01-24 @ 23:07)  Source: Clean Catch Clean Catch (Midstream)  Final Report (04-04-24 @ 18:21):    10,000 - 49,000 CFU/mL Klebsiella pneumoniae  Organism: Klebsiella pneumoniae (04-04-24 @ 18:21)  Organism: Klebsiella pneumoniae (04-04-24 @ 18:21)      Method Type: SARAH      -  Amoxicillin/Clavulanic Acid: S <=8/4      -  Ampicillin: R >16 These ampicillin results predict results for amoxicillin      -  Ampicillin/Sulbactam: S <=4/2      -  Aztreonam: S <=4      -  Cefazolin: S <=2 For uncomplicated UTI with K. pneumoniae, E. coli, or P. mirablis: SARAH <=16 is sensitive and SARAH >=32 is resistant. This also predicts results for oral agents cefaclor, cefdinir, cefpodoxime, cefprozil, cefuroxime axetil, cephalexin and locarbef for uncomplicated UTI. Note that some isolates may be susceptible to these agents while testing resistant to cefazolin.      -  Cefepime: S <=2      -  Cefoxitin: S <=8      -  Ceftriaxone: S <=1      -  Cefuroxime: S <=4      -  Ciprofloxacin: S <=0.25      -  Ertapenem: S <=0.5      -  Gentamicin: S <=2      -  Imipenem: S <=1      -  Levofloxacin: S <=0.5      -  Meropenem: S <=1      -  Nitrofurantoin: S <=32 Should not be used to treat pyelonephritis      -  Piperacillin/Tazobactam: S <=8      -  Tobramycin: S <=2      -  Trimethoprim/Sulfamethoxazole: R >2/38    Culture - Blood (collected 04-01-24 @ 20:30)  Source: .Blood Blood-Peripheral  Final Report (04-07-24 @ 01:00):    No growth at 5 days    Culture - Blood (collected 04-01-24 @ 20:20)  Source: .Blood Blood-Peripheral  Final Report (04-07-24 @ 01:00):    No growth at 5 days        RADIOLOGY & ADDITIONAL TESTS:  Personally reviewed.     Consultant(s) Notes Reviewed:  [x] YES  [ ] NO Patient is a 88y old  Female who presents with a chief complaint of weakness (07 Apr 2024 12:03)      TELE: afib 101bpm with HR ranges in the 80s-low 100s.    INTERVAL HPI/OVERNIGHT EVENTS: No acute overnight events. Pt seen and examined at the bedside this AM. Pt reports feeling tired. Pt with some confusion this morning. But overall, pt states she feels that she is improving. Denies any pain. Denies cp or SOB.    MEDICATIONS  (STANDING):  apixaban 2.5 milliGRAM(s) Oral every 12 hours  bisacodyl Suppository 10 milliGRAM(s) Rectal once  dextrose 5%. 1000 milliLiter(s) (50 mL/Hr) IV Continuous <Continuous>  dextrose 5%. 1000 milliLiter(s) (100 mL/Hr) IV Continuous <Continuous>  dextrose 50% Injectable 25 Gram(s) IV Push once  dextrose 50% Injectable 12.5 Gram(s) IV Push once  dextrose 50% Injectable 25 Gram(s) IV Push once  digoxin     Tablet 125 MICROGram(s) Oral every other day  escitalopram 10 milliGRAM(s) Oral daily  glucagon  Injectable 1 milliGRAM(s) IntraMuscular once  hemorrhoidal Ointment 1 Application(s) Rectal four times a day  insulin glargine Injectable (LANTUS) 5 Unit(s) SubCutaneous at bedtime  insulin lispro (ADMELOG) corrective regimen sliding scale   SubCutaneous three times a day before meals  insulin lispro (ADMELOG) corrective regimen sliding scale   SubCutaneous at bedtime  insulin lispro Injectable (ADMELOG) 6 Unit(s) SubCutaneous three times a day before meals  levothyroxine 75 MICROGram(s) Oral daily  memantine 10 milliGRAM(s) Oral two times a day  metoprolol succinate  milliGRAM(s) Oral every 12 hours  pantoprazole    Tablet 40 milliGRAM(s) Oral before breakfast  piperacillin/tazobactam IVPB.. 3.375 Gram(s) IV Intermittent every 12 hours  polyethylene glycol 3350 17 Gram(s) Oral daily  potassium chloride    Tablet ER 20 milliEquivalent(s) Oral once  rivastigmine patch  9.5 mG/24 Hr(s) 1 Patch Transdermal every 24 hours  saccharomyces boulardii 250 milliGRAM(s) Oral two times a day  senna 2 Tablet(s) Oral at bedtime  tamsulosin 0.4 milliGRAM(s) Oral at bedtime    MEDICATIONS  (PRN):  acetaminophen     Tablet .. 650 milliGRAM(s) Oral every 6 hours PRN Temp greater or equal to 38C (100.4F), Mild Pain (1 - 3)  albuterol/ipratropium for Nebulization 3 milliLiter(s) Nebulizer every 6 hours PRN Shortness of Breath  aluminum hydroxide/magnesium hydroxide/simethicone Suspension 30 milliLiter(s) Oral every 4 hours PRN Dyspepsia  benzonatate 100 milliGRAM(s) Oral three times a day PRN Cough  bisacodyl Suppository 10 milliGRAM(s) Rectal daily PRN Constipation  dextrose Oral Gel 15 Gram(s) Oral once PRN Blood Glucose LESS THAN 70 milliGRAM(s)/deciliter  melatonin 3 milliGRAM(s) Oral at bedtime PRN Insomnia  ondansetron Injectable 4 milliGRAM(s) IV Push every 8 hours PRN Nausea and/or Vomiting      Allergies    No Known Allergies    Intolerances        REVIEW OF SYSTEMS:  CONSTITUTIONAL: No fever or chills  HEENT:  No headache, no sore throat  RESPIRATORY: No cough, wheezing, or shortness of breath  CARDIOVASCULAR: No chest pain, palpitations  GASTROINTESTINAL: No abd pain, nausea, vomiting, or diarrhea  GENITOURINARY: No dysuria, frequency, or hematuria  NEUROLOGICAL: no focal weakness or dizziness  MUSCULOSKELETAL: no myalgias     Vital Signs Last 24 Hrs  T(C): 36.2 (07 Apr 2024 05:13), Max: 36.9 (06 Apr 2024 12:57)  T(F): 97.1 (07 Apr 2024 05:13), Max: 98.5 (06 Apr 2024 12:57)  HR: 83 (07 Apr 2024 05:13) (67 - 83)  BP: 118/70 (07 Apr 2024 05:13) (116/76 - 130/72)  BP(mean): --  RR: 18 (07 Apr 2024 05:13) (17 - 18)  SpO2: 98% (07 Apr 2024 05:13) (93% - 98%)    Parameters below as of 07 Apr 2024 05:13  Patient On (Oxygen Delivery Method): room air        PHYSICAL EXAM:  GENERAL: NAD, on room air, sitting up comfortably in chair  HEENT:  anicteric, moist mucous membranes  CHEST/LUNG:  CTA b/l, no rales, wheezes, or rhonchi  HEART:  irregularly irregular, S1, S2  ABDOMEN:  BS+, soft, nontender, nondistended  EXTREMITIES: no edema, cyanosis, or calf tenderness  NERVOUS SYSTEM: A&O x2, answers questions and follows commands appropriately  : intact    LABS:                        10.0   17.89 )-----------( 287      ( 07 Apr 2024 06:35 )             31.4     CBC Full  -  ( 07 Apr 2024 06:35 )  WBC Count : 17.89 K/uL  Hemoglobin : 10.0 g/dL  Hematocrit : 31.4 %  Platelet Count - Automated : 287 K/uL  Mean Cell Volume : 85.6 fl  Mean Cell Hemoglobin : 27.2 pg  Mean Cell Hemoglobin Concentration : 31.8 gm/dL  Auto Neutrophil # : 15.92 K/uL  Auto Lymphocyte # : 0.84 K/uL  Auto Monocyte # : 0.95 K/uL  Auto Eosinophil # : 0.00 K/uL  Auto Basophil # : 0.03 K/uL  Auto Neutrophil % : 89.0 %  Auto Lymphocyte % : 4.7 %  Auto Monocyte % : 5.3 %  Auto Eosinophil % : 0.0 %  Auto Basophil % : 0.2 %    07 Apr 2024 06:35    138    |  105    |  68     ----------------------------<  190    3.4     |  20     |  2.20     Ca    8.3        07 Apr 2024 06:35    TPro  6.1    /  Alb  2.4    /  TBili  0.5    /  DBili  x      /  AST  31     /  ALT  75     /  AlkPhos  101    07 Apr 2024 06:35      Urinalysis Basic - ( 07 Apr 2024 06:35 )    Color: x / Appearance: x / SG: x / pH: x  Gluc: 190 mg/dL / Ketone: x  / Bili: x / Urobili: x   Blood: x / Protein: x / Nitrite: x   Leuk Esterase: x / RBC: x / WBC x   Sq Epi: x / Non Sq Epi: x / Bacteria: x      CAPILLARY BLOOD GLUCOSE      POCT Blood Glucose.: 244 mg/dL (07 Apr 2024 11:33)  POCT Blood Glucose.: 196 mg/dL (07 Apr 2024 07:58)  POCT Blood Glucose.: 317 mg/dL (06 Apr 2024 21:47)  POCT Blood Glucose.: 352 mg/dL (06 Apr 2024 16:58)        Culture - Urine (collected 04-01-24 @ 23:07)  Source: Clean Catch Clean Catch (Midstream)  Final Report (04-04-24 @ 18:21):    10,000 - 49,000 CFU/mL Klebsiella pneumoniae  Organism: Klebsiella pneumoniae (04-04-24 @ 18:21)  Organism: Klebsiella pneumoniae (04-04-24 @ 18:21)      Method Type: SARAH      -  Amoxicillin/Clavulanic Acid: S <=8/4      -  Ampicillin: R >16 These ampicillin results predict results for amoxicillin      -  Ampicillin/Sulbactam: S <=4/2      -  Aztreonam: S <=4      -  Cefazolin: S <=2 For uncomplicated UTI with K. pneumoniae, E. coli, or P. mirablis: SARAH <=16 is sensitive and SARAH >=32 is resistant. This also predicts results for oral agents cefaclor, cefdinir, cefpodoxime, cefprozil, cefuroxime axetil, cephalexin and locarbef for uncomplicated UTI. Note that some isolates may be susceptible to these agents while testing resistant to cefazolin.      -  Cefepime: S <=2      -  Cefoxitin: S <=8      -  Ceftriaxone: S <=1      -  Cefuroxime: S <=4      -  Ciprofloxacin: S <=0.25      -  Ertapenem: S <=0.5      -  Gentamicin: S <=2      -  Imipenem: S <=1      -  Levofloxacin: S <=0.5      -  Meropenem: S <=1      -  Nitrofurantoin: S <=32 Should not be used to treat pyelonephritis      -  Piperacillin/Tazobactam: S <=8      -  Tobramycin: S <=2      -  Trimethoprim/Sulfamethoxazole: R >2/38    Culture - Blood (collected 04-01-24 @ 20:30)  Source: .Blood Blood-Peripheral  Final Report (04-07-24 @ 01:00):    No growth at 5 days    Culture - Blood (collected 04-01-24 @ 20:20)  Source: .Blood Blood-Peripheral  Final Report (04-07-24 @ 01:00):    No growth at 5 days        RADIOLOGY & ADDITIONAL TESTS:  Personally reviewed.     Consultant(s) Notes Reviewed:  [x] YES  [ ] NO

## 2024-04-07 NOTE — PROGRESS NOTE ADULT - PROBLEM SELECTOR PLAN 2
-Pt with chronic afib with episodes of RVR while hospitalized, likely due to infection  - Toprol XL switched from 100mg BID to 50mg q6h for better around the clock control --> now back to 100mg BID  -C/w digoxin 125 mcg every other day -- dig level wnl   -C/w Eliquis 2.5 mg bid  -cardio consulted ([facility])

## 2024-04-07 NOTE — DIETITIAN NUTRITION RISK NOTIFICATION - TREATMENT: THE FOLLOWING DIET HAS BEEN RECOMMENDED
Diet, Consistent Carbohydrate w/Evening Snack:   Low Sodium  Supplement Feeding Modality:  Oral  Glucerna Shake Cans or Servings Per Day:  1       Frequency:  Two Times a day (04-05-24 @ 10:45) [Active]

## 2024-04-08 ENCOUNTER — APPOINTMENT (OUTPATIENT)
Dept: UROLOGY | Facility: CLINIC | Age: 89
End: 2024-04-08

## 2024-04-08 ENCOUNTER — RESULT REVIEW (OUTPATIENT)
Age: 89
End: 2024-04-08

## 2024-04-08 DIAGNOSIS — B37.0 CANDIDAL STOMATITIS: ICD-10-CM

## 2024-04-08 LAB
ALBUMIN SERPL ELPH-MCNC: 2.5 G/DL — LOW (ref 3.3–5)
ALP SERPL-CCNC: 80 U/L — SIGNIFICANT CHANGE UP (ref 40–120)
ALT FLD-CCNC: 66 U/L — SIGNIFICANT CHANGE UP (ref 12–78)
ANION GAP SERPL CALC-SCNC: 11 MMOL/L — SIGNIFICANT CHANGE UP (ref 5–17)
AST SERPL-CCNC: 27 U/L — SIGNIFICANT CHANGE UP (ref 15–37)
BASOPHILS # BLD AUTO: 0.03 K/UL — SIGNIFICANT CHANGE UP (ref 0–0.2)
BASOPHILS NFR BLD AUTO: 0.2 % — SIGNIFICANT CHANGE UP (ref 0–2)
BILIRUB SERPL-MCNC: 0.6 MG/DL — SIGNIFICANT CHANGE UP (ref 0.2–1.2)
BUN SERPL-MCNC: 63 MG/DL — HIGH (ref 7–23)
CALCIUM SERPL-MCNC: 8.5 MG/DL — SIGNIFICANT CHANGE UP (ref 8.5–10.1)
CHLORIDE SERPL-SCNC: 110 MMOL/L — HIGH (ref 96–108)
CO2 SERPL-SCNC: 18 MMOL/L — LOW (ref 22–31)
CREAT SERPL-MCNC: 2.1 MG/DL — HIGH (ref 0.5–1.3)
DIGOXIN SERPL-MCNC: 0.8 NG/ML — SIGNIFICANT CHANGE UP (ref 0.8–2)
EGFR: 22 ML/MIN/1.73M2 — LOW
EOSINOPHIL # BLD AUTO: 0.01 K/UL — SIGNIFICANT CHANGE UP (ref 0–0.5)
EOSINOPHIL NFR BLD AUTO: 0.1 % — SIGNIFICANT CHANGE UP (ref 0–6)
GLUCOSE BLDC GLUCOMTR-MCNC: 206 MG/DL — HIGH (ref 70–99)
GLUCOSE BLDC GLUCOMTR-MCNC: 208 MG/DL — HIGH (ref 70–99)
GLUCOSE BLDC GLUCOMTR-MCNC: 243 MG/DL — HIGH (ref 70–99)
GLUCOSE BLDC GLUCOMTR-MCNC: 268 MG/DL — HIGH (ref 70–99)
GLUCOSE SERPL-MCNC: 206 MG/DL — HIGH (ref 70–99)
HCT VFR BLD CALC: 29.2 % — LOW (ref 34.5–45)
HGB BLD-MCNC: 9.8 G/DL — LOW (ref 11.5–15.5)
IMM GRANULOCYTES NFR BLD AUTO: 1 % — HIGH (ref 0–0.9)
LYMPHOCYTES # BLD AUTO: 0.88 K/UL — LOW (ref 1–3.3)
LYMPHOCYTES # BLD AUTO: 5.1 % — LOW (ref 13–44)
MCHC RBC-ENTMCNC: 27.6 PG — SIGNIFICANT CHANGE UP (ref 27–34)
MCHC RBC-ENTMCNC: 33.6 GM/DL — SIGNIFICANT CHANGE UP (ref 32–36)
MCV RBC AUTO: 82.3 FL — SIGNIFICANT CHANGE UP (ref 80–100)
MONOCYTES # BLD AUTO: 0.94 K/UL — HIGH (ref 0–0.9)
MONOCYTES NFR BLD AUTO: 5.4 % — SIGNIFICANT CHANGE UP (ref 2–14)
NEUTROPHILS # BLD AUTO: 15.29 K/UL — HIGH (ref 1.8–7.4)
NEUTROPHILS NFR BLD AUTO: 88.2 % — HIGH (ref 43–77)
NRBC # BLD: 0 /100 WBCS — SIGNIFICANT CHANGE UP (ref 0–0)
PLATELET # BLD AUTO: 293 K/UL — SIGNIFICANT CHANGE UP (ref 150–400)
POTASSIUM SERPL-MCNC: 4.1 MMOL/L — SIGNIFICANT CHANGE UP (ref 3.5–5.3)
POTASSIUM SERPL-SCNC: 4.1 MMOL/L — SIGNIFICANT CHANGE UP (ref 3.5–5.3)
PROT SERPL-MCNC: 6.1 G/DL — SIGNIFICANT CHANGE UP (ref 6–8.3)
RBC # BLD: 3.55 M/UL — LOW (ref 3.8–5.2)
RBC # FLD: 16.8 % — HIGH (ref 10.3–14.5)
SODIUM SERPL-SCNC: 139 MMOL/L — SIGNIFICANT CHANGE UP (ref 135–145)
WBC # BLD: 17.32 K/UL — HIGH (ref 3.8–10.5)
WBC # FLD AUTO: 17.32 K/UL — HIGH (ref 3.8–10.5)

## 2024-04-08 PROCEDURE — 93306 TTE W/DOPPLER COMPLETE: CPT | Mod: 26

## 2024-04-08 PROCEDURE — 99232 SBSQ HOSP IP/OBS MODERATE 35: CPT

## 2024-04-08 PROCEDURE — 99233 SBSQ HOSP IP/OBS HIGH 50: CPT | Mod: GC

## 2024-04-08 RX ORDER — AMLODIPINE BESYLATE 2.5 MG/1
2.5 TABLET ORAL DAILY
Refills: 0 | Status: DISCONTINUED | OUTPATIENT
Start: 2024-04-08 | End: 2024-04-09

## 2024-04-08 RX ORDER — DIGOXIN 250 MCG
1 TABLET ORAL
Qty: 0 | Refills: 0 | DISCHARGE
Start: 2024-04-08

## 2024-04-08 RX ORDER — MEMANTINE HYDROCHLORIDE 10 MG/1
1 TABLET ORAL
Qty: 0 | Refills: 0 | DISCHARGE

## 2024-04-08 RX ORDER — APIXABAN 2.5 MG/1
1 TABLET, FILM COATED ORAL
Qty: 0 | Refills: 0 | DISCHARGE

## 2024-04-08 RX ORDER — ESCITALOPRAM OXALATE 10 MG/1
1 TABLET, FILM COATED ORAL
Qty: 0 | Refills: 0 | DISCHARGE
Start: 2024-04-08

## 2024-04-08 RX ORDER — DIPHENHYDRAMINE HYDROCHLORIDE AND LIDOCAINE HYDROCHLORIDE AND ALUMINUM HYDROXIDE AND MAGNESIUM HYDRO
15 KIT THREE TIMES A DAY
Refills: 0 | Status: DISCONTINUED | OUTPATIENT
Start: 2024-04-08 | End: 2024-04-11

## 2024-04-08 RX ORDER — MEMANTINE HYDROCHLORIDE 10 MG/1
1 TABLET ORAL
Qty: 0 | Refills: 0 | DISCHARGE
Start: 2024-04-08

## 2024-04-08 RX ORDER — LEVOTHYROXINE SODIUM 125 MCG
1 TABLET ORAL
Qty: 0 | Refills: 0 | DISCHARGE

## 2024-04-08 RX ORDER — AMLODIPINE BESYLATE 2.5 MG/1
2.5 TABLET ORAL ONCE
Refills: 0 | Status: COMPLETED | OUTPATIENT
Start: 2024-04-08 | End: 2024-04-08

## 2024-04-08 RX ORDER — LEVOTHYROXINE SODIUM 125 MCG
1 TABLET ORAL
Qty: 0 | Refills: 0 | DISCHARGE
Start: 2024-04-08

## 2024-04-08 RX ORDER — APIXABAN 2.5 MG/1
1 TABLET, FILM COATED ORAL
Qty: 0 | Refills: 0 | DISCHARGE
Start: 2024-04-08

## 2024-04-08 RX ORDER — METOPROLOL TARTRATE 50 MG
1 TABLET ORAL
Qty: 0 | Refills: 0 | DISCHARGE
Start: 2024-04-08

## 2024-04-08 RX ADMIN — MEMANTINE HYDROCHLORIDE 10 MILLIGRAM(S): 10 TABLET ORAL at 17:41

## 2024-04-08 RX ADMIN — APIXABAN 2.5 MILLIGRAM(S): 2.5 TABLET, FILM COATED ORAL at 17:40

## 2024-04-08 RX ADMIN — PIPERACILLIN AND TAZOBACTAM 25 GRAM(S): 4; .5 INJECTION, POWDER, LYOPHILIZED, FOR SOLUTION INTRAVENOUS at 02:06

## 2024-04-08 RX ADMIN — MEMANTINE HYDROCHLORIDE 10 MILLIGRAM(S): 10 TABLET ORAL at 05:18

## 2024-04-08 RX ADMIN — Medication 4: at 17:41

## 2024-04-08 RX ADMIN — Medication 250 MILLIGRAM(S): at 05:50

## 2024-04-08 RX ADMIN — DIPHENHYDRAMINE HYDROCHLORIDE AND LIDOCAINE HYDROCHLORIDE AND ALUMINUM HYDROXIDE AND MAGNESIUM HYDRO 15 MILLILITER(S): KIT at 21:33

## 2024-04-08 RX ADMIN — Medication 250 MILLIGRAM(S): at 17:40

## 2024-04-08 RX ADMIN — PIPERACILLIN AND TAZOBACTAM 25 GRAM(S): 4; .5 INJECTION, POWDER, LYOPHILIZED, FOR SOLUTION INTRAVENOUS at 12:43

## 2024-04-08 RX ADMIN — Medication 6 UNIT(S): at 17:41

## 2024-04-08 RX ADMIN — DIPHENHYDRAMINE HYDROCHLORIDE AND LIDOCAINE HYDROCHLORIDE AND ALUMINUM HYDROXIDE AND MAGNESIUM HYDRO 15 MILLILITER(S): KIT at 15:35

## 2024-04-08 RX ADMIN — Medication 4: at 08:35

## 2024-04-08 RX ADMIN — ESCITALOPRAM OXALATE 10 MILLIGRAM(S): 10 TABLET, FILM COATED ORAL at 12:42

## 2024-04-08 RX ADMIN — AMLODIPINE BESYLATE 2.5 MILLIGRAM(S): 2.5 TABLET ORAL at 21:33

## 2024-04-08 RX ADMIN — RIVASTIGMINE 1 PATCH: 4.6 PATCH, EXTENDED RELEASE TRANSDERMAL at 04:13

## 2024-04-08 RX ADMIN — Medication 6 UNIT(S): at 08:35

## 2024-04-08 RX ADMIN — RIVASTIGMINE 1 PATCH: 4.6 PATCH, EXTENDED RELEASE TRANSDERMAL at 08:34

## 2024-04-08 RX ADMIN — APIXABAN 2.5 MILLIGRAM(S): 2.5 TABLET, FILM COATED ORAL at 05:18

## 2024-04-08 RX ADMIN — PHENYLEPHRINE-SHARK LIVER OIL-MINERAL OIL-PETROLATUM RECTAL OINTMENT 1 APPLICATION(S): at 21:31

## 2024-04-08 RX ADMIN — PHENYLEPHRINE-SHARK LIVER OIL-MINERAL OIL-PETROLATUM RECTAL OINTMENT 1 APPLICATION(S): at 05:19

## 2024-04-08 RX ADMIN — Medication 125 MICROGRAM(S): at 12:43

## 2024-04-08 RX ADMIN — Medication 6: at 12:43

## 2024-04-08 RX ADMIN — Medication 100 MILLIGRAM(S): at 17:40

## 2024-04-08 RX ADMIN — PANTOPRAZOLE SODIUM 40 MILLIGRAM(S): 20 TABLET, DELAYED RELEASE ORAL at 17:40

## 2024-04-08 RX ADMIN — Medication 6 UNIT(S): at 12:43

## 2024-04-08 RX ADMIN — Medication 75 MICROGRAM(S): at 05:18

## 2024-04-08 RX ADMIN — PHENYLEPHRINE-SHARK LIVER OIL-MINERAL OIL-PETROLATUM RECTAL OINTMENT 1 APPLICATION(S): at 12:44

## 2024-04-08 RX ADMIN — Medication 6 MILLIGRAM(S): at 05:18

## 2024-04-08 RX ADMIN — AMLODIPINE BESYLATE 2.5 MILLIGRAM(S): 2.5 TABLET ORAL at 15:35

## 2024-04-08 RX ADMIN — INSULIN GLARGINE 5 UNIT(S): 100 INJECTION, SOLUTION SUBCUTANEOUS at 21:34

## 2024-04-08 RX ADMIN — Medication 100 MILLIGRAM(S): at 05:18

## 2024-04-08 NOTE — PROGRESS NOTE ADULT - PROBLEM SELECTOR PLAN 3
left pyelonephritis - IV Rocephin switched to Zosyn  - UCx + Klebsiella   -avoid IV hydration -Pt with chronic afib with episodes of RVR while hospitalized, likely due to infection  - Toprol XL switched from 100mg BID to 50mg q6h for better around the clock control --> now back to 100mg BID  -C/w digoxin 125 mcg every other day -- dig level wnl   -C/w Eliquis 2.5 mg bid  -cardio consulted ([facility])

## 2024-04-08 NOTE — PROGRESS NOTE ADULT - PROBLEM SELECTOR PLAN 6
-C/w synthroid 75 mcg daily A1c 6.5%  - C/w insulin SS  - ADMELOG increased to 6 units pre meals  - Lantus 5 units at bedtime

## 2024-04-08 NOTE — PROGRESS NOTE ADULT - SUBJECTIVE AND OBJECTIVE BOX
[facility]   INFECTIOUS DISEASES   [address] [place] [address]  Tel: [phone]     Fax: [phone]  =========================================================  MD Nasrin Marinelli Kaushal, MD Cho, Michelle, MD Sunjit, Jaspal, MD  =========================================================    LEXY EISENBERG [id number]    Follow up: COVID and pneumoina/UTI    Doing better, now in room air. No new overnight event.     Allergies:  No Known Allergies    acetaminophen     Tablet .. 650 milliGRAM(s) Oral every 6 hours PRN  albuterol/ipratropium for Nebulization 3 milliLiter(s) Nebulizer every 6 hours PRN  aluminum hydroxide/magnesium hydroxide/simethicone Suspension 30 milliLiter(s) Oral every 4 hours PRN  apixaban 2.5 milliGRAM(s) Oral every 12 hours  benzonatate 100 milliGRAM(s) Oral three times a day PRN  bisacodyl Suppository 10 milliGRAM(s) Rectal once  bisacodyl Suppository 10 milliGRAM(s) Rectal daily PRN  dexAMETHasone     Tablet 6 milliGRAM(s) Oral daily  dextrose 5%. 1000 milliLiter(s) IV Continuous <Continuous>  dextrose 5%. 1000 milliLiter(s) IV Continuous <Continuous>  dextrose 50% Injectable 25 Gram(s) IV Push once  dextrose 50% Injectable 12.5 Gram(s) IV Push once  dextrose 50% Injectable 25 Gram(s) IV Push once  dextrose Oral Gel 15 Gram(s) Oral once PRN  digoxin     Tablet 125 MICROGram(s) Oral every other day  escitalopram 10 milliGRAM(s) Oral daily  glucagon  Injectable 1 milliGRAM(s) IntraMuscular once  hemorrhoidal Ointment 1 Application(s) Rectal four times a day  insulin glargine Injectable (LANTUS) 5 Unit(s) SubCutaneous at bedtime  insulin lispro (ADMELOG) corrective regimen sliding scale   SubCutaneous three times a day before meals  insulin lispro (ADMELOG) corrective regimen sliding scale   SubCutaneous at bedtime  insulin lispro Injectable (ADMELOG) 6 Unit(s) SubCutaneous three times a day before meals  levothyroxine 75 MICROGram(s) Oral daily  melatonin 3 milliGRAM(s) Oral at bedtime PRN  memantine 10 milliGRAM(s) Oral two times a day  metoprolol succinate  milliGRAM(s) Oral every 12 hours  ondansetron Injectable 4 milliGRAM(s) IV Push every 8 hours PRN  pantoprazole    Tablet 40 milliGRAM(s) Oral before breakfast  piperacillin/tazobactam IVPB.. 3.375 Gram(s) IV Intermittent every 12 hours  polyethylene glycol 3350 17 Gram(s) Oral daily  potassium chloride    Tablet ER 20 milliEquivalent(s) Oral once  rivastigmine patch  9.5 mG/24 Hr(s) 1 Patch Transdermal every 24 hours  saccharomyces boulardii 250 milliGRAM(s) Oral two times a day  senna 2 Tablet(s) Oral at bedtime  tamsulosin 0.4 milliGRAM(s) Oral at bedtime     REVIEW OF SYSTEMS:  Not answering in details.      Physical Exam:  ICU Vital Signs Last 24 Hrs  T(C): 36.3 (08 Apr 2024 05:00), Max: 36.4 (07 Apr 2024 13:01)  T(F): 97.3 (08 Apr 2024 05:00), Max: 97.6 (07 Apr 2024 19:49)  HR: 82 (08 Apr 2024 05:00) (71 - 88)  BP: 141/102 (08 Apr 2024 05:00) (136/86 - 145/100)    RR: 17 (08 Apr 2024 05:00) (17 - 18)  SpO2: 95% (08 Apr 2024 05:00) (92% - 95%)  O2 Parameters below as of 08 Apr 2024 05:00  Patient On (Oxygen Delivery Method): room air  GEN: NAD  HEENT: normocephalic and atraumatic. EOMI. SUNDEEP.    NECK: Supple.  No lymphadenopathy   LUNGS: Clear to auscultation.  HEART: Regular rate and rhythm   ABDOMEN: Soft, nontender, and nondistended.  Positive bowel sounds.    : No CVA tenderness  EXTREMITIES: Without edema.  MSK: no joint swelling  NEUROLOGIC: grossly intact.  SKIN: No rash     Labs:  04-08    139  |  110<H>  |  63<H>  ----------------------------<  206<H>  4.1   |  18<L>  |  2.10<H>    Ca    8.5      08 Apr 2024 08:45    TPro  6.1  /  Alb  2.5<L>  /  TBili  0.6  /  DBili  x   /  AST  27  /  ALT  66  /  AlkPhos  80  04-08                        9.8    17.32 )-----------( 293      ( 08 Apr 2024 08:45 )             29.2     Urinalysis Basic - ( 08 Apr 2024 08:45 )    Color: x / Appearance: x / SG: x / pH: x  Gluc: 206 mg/dL / Ketone: x  / Bili: x / Urobili: x   Blood: x / Protein: x / Nitrite: x   Leuk Esterase: x / RBC: x / WBC x   Sq Epi: x / Non Sq Epi: x / Bacteria: x    LIVER FUNCTIONS - ( 08 Apr 2024 08:45 )  Alb: 2.5 g/dL / Pro: 6.1 g/dL / ALK PHOS: 80 U/L / ALT: 66 U/L / AST: 27 U/L / GGT: x           RECENT CULTURES:  04-01 @ 23:07 Clean Catch Clean Catch (Midstream) Klebsiella pneumoniae    10,000 - 49,000 CFU/mL Klebsiella pneumoniae    04-01 @ 20:30 .Blood Blood-Peripheral     No growth at 5 days    04-01 @ 20:20 .Blood Blood-Peripheral     No growth at 5 days    All imaging and data are reviewed.     Assessment and Plan:   87 y/o female with PMH of HFrEF of 20-25 %, dementia, Afib, hypothyroidism, DM who presented to ED to be evaluated for weakness. Found to have acute hypoxic respiratory failure likely 2/2 COVID, but cannot rule out underlying bacterial pneumonia. Also being treated for UTI, CT showed evidence of Cystitis and left pyeloureteritis. Urine culture grew klebsiella, sensitivities reviewed.    On 4/4, WBC increased to 18--could be steroid related but will antibiotic coverage broadened to Zosyn given minimal clinical improvement. Also with increasing LFT's, remdesivir stopped after 3rd dose.     On 4/5, O2 requirements improvement and off HFNC. No fever and WBC slightly improved to 17. LFT's also better but creatinine increased.    On 4/7, no fevers and on room air. WBC stable at 17, suspect leukocytosis is steroid related. Transaminitis resolved. Initial blood cultures no growth.    On 4/8: Off o2, no fever. Sitting on chair, doing well, still with leukocytosis.     # COVID 19   # Acute respiratory failure   # Pneumonia  # UTI/pyeloueteritis  # Leukocytosis  # Transaminitis    - Continue Zosyn (renally dosed)--if continues to improve plan for 5 day course until 4/9 AM  - s/p remdesivir x 3 doses  - Continue Dexamethasone 6mg daily to complete 10 days  - Anticoagulation as per protocol  - Monitor WBC, high due to steroid  - Aspiration precautions    Will follow PRN.     Elbert Amezcua MD  Division of Infectious Diseases   Please call ID service at [phone] with any question.      50 minutes spent on total encounter assessing patient, examination, chart review, counseling and coordinating care by the attending physician/nurse/care manager.

## 2024-04-08 NOTE — PROGRESS NOTE ADULT - PROBLEM SELECTOR PLAN 8
-c/w pts home Eliquis 2.5 bid    - PT: home PT with assist YVETTE on CKD 2 -Cr at baseline (1.20-1.40)  -pt with worsening with renal function  with urinary retention s/p st cath  -avoid nephrotoxic agents  -Nephro Dr. Oleary following

## 2024-04-08 NOTE — PROGRESS NOTE ADULT - PROBLEM SELECTOR PLAN 7
YVETTE on CKD 2 -Cr at baseline (1.20-1.40)  -pt with worsening with renal function  with urinary retention s/p st cath  -avoid nephrotoxic agents  -Nephro Dr. Oleary following -C/w synthroid 75 mcg daily

## 2024-04-08 NOTE — PROGRESS NOTE ADULT - PROBLEM SELECTOR PLAN 2
-Pt with chronic afib with episodes of RVR while hospitalized, likely due to infection  - Toprol XL switched from 100mg BID to 50mg q6h for better around the clock control --> now back to 100mg BID  -C/w digoxin 125 mcg every other day -- dig level wnl   -C/w Eliquis 2.5 mg bid  -cardio consulted ([facility]) - oral thrush noted on exam   - start Magic Mouthwash

## 2024-04-08 NOTE — PROGRESS NOTE ADULT - PROBLEM SELECTOR PLAN 4
HFrEF:-BNP: [id number] (12/2022): EF 20-25%, apex and anterior wall akinesis, inferior wall hypokinesis, mod MR, mild TR  -CXR with vascular congestion and b/l pleural effusions  -likely component of acute on chronic systolic CHF contributing to hypoxia, will avoid IV diuresis in setting of worsening renal function  - Repeat TTE pending left pyelonephritis - IV Rocephin switched to Zosyn  - UCx + Klebsiella   -avoid IV hydration

## 2024-04-08 NOTE — PROGRESS NOTE ADULT - PROBLEM SELECTOR PLAN 1
-Pt with acute hypoxia -- placed on HFNC,  titrated down to 6LNC, now saturating well on room air  -acute hypoxic respiratory failure likely multifactorial including pulmonary edema, COVID, suspected overlying bacterial PNA-Concern for flash pulmonary edema as an acute decompensation  -d-dimer elevated at 500  - Procal 0.37 -> 6.3 -> 2.7  -CT Angio chest, and CT abdomen and pelvis w/ IV contras: Multilobar pneumonia right lung. Septal thickening which may reflect interstitial edema. Bilateral pleural effusions with compressive atelectasis lower lobes. No acute pulmonary embolism. Cystitis and left pyeloureteritis. Distended rectum with fecal impaction.  -duoneb PRN -Tessalon pearls PRN for cough  -Completed 3/5 days Remdesivir, then discontinued due to worsening transaminitis  -c/w Decadron 6mg po x10 days - Thursday last dose.  -continue Zosyn until 4/9  -ID consulted (Dr. Amezcua),  -isolation precautions -Pt with acute hypoxia -- placed on HFNC,  titrated down to 6LNC, now saturating well on room air  -acute hypoxic respiratory failure likely multifactorial including pulmonary edema, COVID, suspected overlying bacterial PNA-Concern for flash pulmonary edema as an acute decompensation  -d-dimer elevated at 500  - Procal 0.37 -> 6.3 -> 2.7  -CT Angio chest, and CT abdomen and pelvis w/ IV contras: Multilobar pneumonia right lung. Septal thickening which may reflect interstitial edema. Bilateral pleural effusions with compressive atelectasis lower lobes. No acute pulmonary embolism. Cystitis and left pyeloureteritis. Distended rectum with fecal impaction.  -duoneb PRN -Tessalon pearls PRN for cough  -Completed 3/5 days Remdesivir, then discontinued due to worsening transaminitis  - STOP Decadron in setting of hallucinations -- leukocytosis likely reactive 2/2 steroids  - continue IV Zosyn until 4/9 AM  -ID consulted (Dr. Amezcua)  -isolation precautions -Pt with acute hypoxia -- placed on HFNC,  titrated down to 6LNC, now saturating well on room air  -acute hypoxic respiratory failure likely multifactorial including pulmonary edema, COVID, suspected overlying bacterial PNA-Concern for flash pulmonary edema as an acute decompensation  -d-dimer elevated at 500  - Procal 0.37 -> 6.3 -> 2.7  -CT Angio chest, and CT abdomen and pelvis w/ IV contras: Multilobar pneumonia right lung. Septal thickening which may reflect interstitial edema. Bilateral pleural effusions with compressive atelectasis lower lobes. No acute pulmonary embolism. Cystitis and left pyeloureteritis. Distended rectum with fecal impaction.  -duoneb PRN -Tessalon pearls PRN for cough  -Completed 4/5 days Remdesivir, then discontinued due to worsening transaminitis  - STOP Decadron in setting of hallucinations -- leukocytosis likely reactive 2/2 steroids  - continue IV Zosyn until 4/9 AM  -ID consulted (Dr. Amezcua)  -isolation precautions

## 2024-04-08 NOTE — PROGRESS NOTE ADULT - ASSESSMENT
CKD 3  Hypertension  Dyspnea: Pneumonia, COVID 19+, CHF  Diabetes  Hypokalemia  Urinary retention    Renal indices improving. To continue current meds. Monitor blood sugar levels. Insulin coverage as needed.   Add amlodipine. Monitor BP trend. Titrate BP meds as needed. Avoid nephrotoxic meds as possible. Encourage PO intake as tolerated.   Will follow electrolytes and renal function trend. D/w pt's daughter at bedside.

## 2024-04-08 NOTE — PROGRESS NOTE ADULT - PROBLEM SELECTOR PLAN 5
A1c 6.5%  - C/w insulin SS  - ADMELOG increased to 6 units pre meals  - Lantus 5 units at bedtime HFrEF:-BNP: [id number] (12/2022): EF 20-25%, apex and anterior wall akinesis, inferior wall hypokinesis, mod MR, mild TR  -CXR with vascular congestion and b/l pleural effusions  -likely component of acute on chronic systolic CHF contributing to hypoxia, will avoid IV diuresis in setting of worsening renal function  - Repeat TTE pending

## 2024-04-08 NOTE — PROGRESS NOTE ADULT - SUBJECTIVE AND OBJECTIVE BOX
[facility] -- Dylan Hernandez Pannella, Patel, Savella Goodger, Cohen  Office # [phone]      Follow Up:  Afib Systolic hf     Subjective/Observations:   Seen at bedside, on room air   unable to provide any meaningful information in NAD    REVIEW OF SYSTEMS: All other review of systems is negative unless indicated above    PAST MEDICAL & SURGICAL HISTORY:  Atrial fibrillation      Hypothyroidism      Mild Alzheimer's dementia      Diabetes mellitus      Acute on chronic systolic congestive heart failure      Hypertension      Hyperlipemia      Cardiac LV ejection fraction 21-30%        History of appendectomy      H/O hernia repair      History of cholecystectomy          MEDICATIONS  (STANDING):  apixaban 2.5 milliGRAM(s) Oral every 12 hours  bisacodyl Suppository 10 milliGRAM(s) Rectal once  dexAMETHasone     Tablet 6 milliGRAM(s) Oral daily  dextrose 5%. 1000 milliLiter(s) (100 mL/Hr) IV Continuous <Continuous>  dextrose 5%. 1000 milliLiter(s) (50 mL/Hr) IV Continuous <Continuous>  dextrose 50% Injectable 12.5 Gram(s) IV Push once  dextrose 50% Injectable 25 Gram(s) IV Push once  dextrose 50% Injectable 25 Gram(s) IV Push once  digoxin     Tablet 125 MICROGram(s) Oral every other day  escitalopram 10 milliGRAM(s) Oral daily  glucagon  Injectable 1 milliGRAM(s) IntraMuscular once  hemorrhoidal Ointment 1 Application(s) Rectal four times a day  insulin glargine Injectable (LANTUS) 5 Unit(s) SubCutaneous at bedtime  insulin lispro (ADMELOG) corrective regimen sliding scale   SubCutaneous three times a day before meals  insulin lispro (ADMELOG) corrective regimen sliding scale   SubCutaneous at bedtime  insulin lispro Injectable (ADMELOG) 6 Unit(s) SubCutaneous three times a day before meals  levothyroxine 75 MICROGram(s) Oral daily  memantine 10 milliGRAM(s) Oral two times a day  metoprolol succinate  milliGRAM(s) Oral every 12 hours  pantoprazole    Tablet 40 milliGRAM(s) Oral before breakfast  piperacillin/tazobactam IVPB.. 3.375 Gram(s) IV Intermittent every 12 hours  polyethylene glycol 3350 17 Gram(s) Oral daily  potassium chloride    Tablet ER 20 milliEquivalent(s) Oral once  rivastigmine patch  9.5 mG/24 Hr(s) 1 Patch Transdermal every 24 hours  saccharomyces boulardii 250 milliGRAM(s) Oral two times a day  senna 2 Tablet(s) Oral at bedtime  tamsulosin 0.4 milliGRAM(s) Oral at bedtime    MEDICATIONS  (PRN):  acetaminophen     Tablet .. 650 milliGRAM(s) Oral every 6 hours PRN Temp greater or equal to 38C (100.4F), Mild Pain (1 - 3)  albuterol/ipratropium for Nebulization 3 milliLiter(s) Nebulizer every 6 hours PRN Shortness of Breath  aluminum hydroxide/magnesium hydroxide/simethicone Suspension 30 milliLiter(s) Oral every 4 hours PRN Dyspepsia  benzonatate 100 milliGRAM(s) Oral three times a day PRN Cough  bisacodyl Suppository 10 milliGRAM(s) Rectal daily PRN Constipation  dextrose Oral Gel 15 Gram(s) Oral once PRN Blood Glucose LESS THAN 70 milliGRAM(s)/deciliter  melatonin 3 milliGRAM(s) Oral at bedtime PRN Insomnia  ondansetron Injectable 4 milliGRAM(s) IV Push every 8 hours PRN Nausea and/or Vomiting      Allergies    No Known Allergies    Intolerances        Vital Signs Last 24 Hrs  T(C): 36.3 (2024 05:00), Max: 36.4 (2024 13:01)  T(F): 97.3 (2024 05:00), Max: 97.6 (2024 19:49)  HR: 82 (2024 05:00) (71 - 88)  BP: 141/102 (2024 05:00) (136/86 - 145/100)  BP(mean): --  RR: 17 (2024 05:00) (17 - 18)  SpO2: 95% (2024 05:00) (92% - 95%)    Parameters below as of 2024 05:00  Patient On (Oxygen Delivery Method): room air        I&O's Summary    2024 07:01  -  2024 07:00  --------------------------------------------------------  IN: 0 mL / OUT: 200 mL / NET: -200 mL          PHYSICAL EXAM:  TELE: af  Constitutional: NAD, awake and alert, frail   HEENT: Moist Mucous Membranes, Anicteric  Pulmonary: Non-labored, breath sounds are DIM   Cardiovascular: IRRegular, S1 and S2 nl, No murmurs, rubs, gallops or clicks  Gastrointestinal: Bowel Sounds present, soft, nontender.   Lymph: No lymphadenopathy. No peripheral edema.  Skin: No visible rashes or ulcers.  Psych:  Mood & affect appropriate    LABS: All Labs Reviewed:                        10.0   17.89 )-----------( 287      ( 2024 06:35 )             31.4                         10.6   18.01 )-----------( 281      ( 2024 09:45 )             32.9     2024 06:35    138    |  105    |  68     ----------------------------<  190    3.4     |  20     |  2.20   2024 09:45    139    |  108    |  68     ----------------------------<  202    3.8     |  19     |  2.40     Ca    8.3        2024 06:35  Ca    8.4        2024 09:45  Phos  3.9       2024 09:45  Mg     2.2       2024 09:45    TPro  6.1    /  Alb  2.4    /  TBili  0.5    /  DBili  x      /  AST  31     /  ALT  75     /  AlkPhos  101    2024 06:35  TPro  6.4    /  Alb  2.4    /  TBili  0.5    /  DBili  x      /  AST  42     /  ALT  97     /  AlkPhos  96     2024 09:45             EC Lead ECG:   Ventricular Rate 127 BPM    QRS Duration 84 ms    Q-T Interval 278 ms    QTC Calculation(Bazett) 404 ms    R Axis 28 degrees    T Axis -61 degrees    Diagnosis Line Atrial fibrillation with rapid ventricular response  Low voltage QRS  Septal infarct , age undetermined  Abnormal ECG  No previous ECGs available  Confirmed by rogerio Harvey ([id number]) on 2024 2:48:52 PM (24 @ 19:24)      ACC: [id number] EXAM:  ECHO TTE WO CON COMP W DOPP                          PROCEDURE DATE:  2022          INTERPRETATION:  INDICATION: Abnormal EKG  Sonographer KL    Blood Pressure 142/86    Height 158 cm     Weight 51.7 kg       BSA 1.5   sqm    Dimensions:  LA 3.6       Normal Values: 2.0 - 4.0 cm  Ao 3.2        Normal Values: 2.0 - 3.8 cm  SEPTUM 1.0       Normal Values: 0.6 - 1.2 cm  PWT 0.9       Normal Values: 0.6 - 1.1 cm  LVIDd 5.8         Normal Values: 3.0 - 5.6 cm  LVIDs 4.3      Normal Values: 1.8 - 4.0 cm      OBSERVATIONS:  Mitral Valve: Moderate MR.  Aortic Valve/Aorta: normal trileaflet aortic valve.  Tricuspid Valve: Mild TR.  Pulmonic Valve: Mild PI  Left Atrium: Enlarged  Right Atrium: Enlarged  Left Ventricle: Left ventricular enlargement with severe left ventricular   systolic dysfunction, estimated LVEF of 20-25%. The entire apex and   anterior walls appear akinetic. The inferior and inferolateral walls   appear hypokinetic. Remaining walls are not well-visualized  Right Ventricle: Grossly normal size and systolic function.  Pericardium: no significant pericardial effusion.  Pulmonary/RV Pressure: estimated PA systolic pressure of at least 35 mmHg   assuming an RA pressure of 3mmHg.        IMPRESSION:  Left ventricular enlargement with severe left ventricular systolic   dysfunction, estimated LVEF of 20-25%. The entire apex and anterior walls   appear akinetic. The inferior and inferolateral walls appear hypokinetic.  Grossly normal RV size and systolic function.  Biatrial enlargement  Normal trileaflet aortic valve, without AI.  Moderate MR  Mild TR.  No significant pericardial effusion.    --- End of Report ---            JOHN BAEZA MD; Attending Cardiologist  This document has been electronically signed. Dec 23 2022  4:40PM      Radiology:         [facility] -- Dylan Hernandez Pannella, Patel, Savella Goodger, Cohen  Office # [phone]      Follow Up:  Afib Systolic hf     Subjective/Observations:   Seen at bedside, on room air   unable to provide any meaningful information in NAD    REVIEW OF SYSTEMS: All other review of systems is negative unless indicated above    PAST MEDICAL & SURGICAL HISTORY:  Atrial fibrillation      Hypothyroidism      Mild Alzheimer's dementia      Diabetes mellitus      Acute on chronic systolic congestive heart failure      Hypertension      Hyperlipemia      Cardiac LV ejection fraction 21-30%        History of appendectomy      H/O hernia repair      History of cholecystectomy          MEDICATIONS  (STANDING):  apixaban 2.5 milliGRAM(s) Oral every 12 hours  bisacodyl Suppository 10 milliGRAM(s) Rectal once  dexAMETHasone     Tablet 6 milliGRAM(s) Oral daily  dextrose 5%. 1000 milliLiter(s) (100 mL/Hr) IV Continuous <Continuous>  dextrose 5%. 1000 milliLiter(s) (50 mL/Hr) IV Continuous <Continuous>  dextrose 50% Injectable 12.5 Gram(s) IV Push once  dextrose 50% Injectable 25 Gram(s) IV Push once  dextrose 50% Injectable 25 Gram(s) IV Push once  digoxin     Tablet 125 MICROGram(s) Oral every other day  escitalopram 10 milliGRAM(s) Oral daily  glucagon  Injectable 1 milliGRAM(s) IntraMuscular once  hemorrhoidal Ointment 1 Application(s) Rectal four times a day  insulin glargine Injectable (LANTUS) 5 Unit(s) SubCutaneous at bedtime  insulin lispro (ADMELOG) corrective regimen sliding scale   SubCutaneous three times a day before meals  insulin lispro (ADMELOG) corrective regimen sliding scale   SubCutaneous at bedtime  insulin lispro Injectable (ADMELOG) 6 Unit(s) SubCutaneous three times a day before meals  levothyroxine 75 MICROGram(s) Oral daily  memantine 10 milliGRAM(s) Oral two times a day  metoprolol succinate  milliGRAM(s) Oral every 12 hours  pantoprazole    Tablet 40 milliGRAM(s) Oral before breakfast  piperacillin/tazobactam IVPB.. 3.375 Gram(s) IV Intermittent every 12 hours  polyethylene glycol 3350 17 Gram(s) Oral daily  potassium chloride    Tablet ER 20 milliEquivalent(s) Oral once  rivastigmine patch  9.5 mG/24 Hr(s) 1 Patch Transdermal every 24 hours  saccharomyces boulardii 250 milliGRAM(s) Oral two times a day  senna 2 Tablet(s) Oral at bedtime  tamsulosin 0.4 milliGRAM(s) Oral at bedtime    MEDICATIONS  (PRN):  acetaminophen     Tablet .. 650 milliGRAM(s) Oral every 6 hours PRN Temp greater or equal to 38C (100.4F), Mild Pain (1 - 3)  albuterol/ipratropium for Nebulization 3 milliLiter(s) Nebulizer every 6 hours PRN Shortness of Breath  aluminum hydroxide/magnesium hydroxide/simethicone Suspension 30 milliLiter(s) Oral every 4 hours PRN Dyspepsia  benzonatate 100 milliGRAM(s) Oral three times a day PRN Cough  bisacodyl Suppository 10 milliGRAM(s) Rectal daily PRN Constipation  dextrose Oral Gel 15 Gram(s) Oral once PRN Blood Glucose LESS THAN 70 milliGRAM(s)/deciliter  melatonin 3 milliGRAM(s) Oral at bedtime PRN Insomnia  ondansetron Injectable 4 milliGRAM(s) IV Push every 8 hours PRN Nausea and/or Vomiting      Allergies    No Known Allergies    Intolerances        Vital Signs Last 24 Hrs  T(C): 36.3 (2024 05:00), Max: 36.4 (2024 13:01)  T(F): 97.3 (2024 05:00), Max: 97.6 (2024 19:49)  HR: 82 (2024 05:00) (71 - 88)  BP: 141/102 (2024 05:00) (136/86 - 145/100)  BP(mean): --  RR: 17 (2024 05:00) (17 - 18)  SpO2: 95% (2024 05:00) (92% - 95%)    Parameters below as of 2024 05:00  Patient On (Oxygen Delivery Method): room air        I&O's Summary    2024 07:01  -  2024 07:00  --------------------------------------------------------  IN: 0 mL / OUT: 200 mL / NET: -200 mL          PHYSICAL EXAM:  TELE: af  Constitutional: NAD, awake and alert, frail   HEENT: Moist Mucous Membranes, Anicteric  Pulmonary: Non-labored, breath sounds are DIM   Cardiovascular: IRRegular, S1 and S2 nl, No murmurs, rubs, gallops or clicks  Gastrointestinal: Bowel Sounds present, soft, nontender.   Lymph: No lymphadenopathy. No peripheral edema.  Skin: No visible rashes or ulcers.  Psych:  Mood & affect appropriate    LABS: All Labs Reviewed:                        10.0   17.89 )-----------( 287      ( 2024 06:35 )             31.4                         10.6   18.01 )-----------( 281      ( 2024 09:45 )             32.9     2024 06:35    138    |  105    |  68     ----------------------------<  190    3.4     |  20     |  2.20   2024 09:45    139    |  108    |  68     ----------------------------<  202    3.8     |  19     |  2.40     Ca    8.3        2024 06:35  Ca    8.4        2024 09:45  Phos  3.9       2024 09:45  Mg     2.2       2024 09:45    TPro  6.1    /  Alb  2.4    /  TBili  0.5    /  DBili  x      /  AST  31     /  ALT  75     /  AlkPhos  101    2024 06:35  TPro  6.4    /  Alb  2.4    /  TBili  0.5    /  DBili  x      /  AST  42     /  ALT  97     /  AlkPhos  96     2024 09:45             EC Lead ECG:   Ventricular Rate 127 BPM    QRS Duration 84 ms    Q-T Interval 278 ms    QTC Calculation(Bazett) 404 ms    R Axis 28 degrees    T Axis -61 degrees    Diagnosis Line Atrial fibrillation with rapid ventricular response  Low voltage QRS  Septal infarct , age undetermined  Abnormal ECG  No previous ECGs available  Confirmed by rogerio Harvey ([id number]) on 2024 2:48:52 PM (24 @ 19:24)      ACC: [id number] EXAM:  ECHO TTE WO CON COMP W DOPP                          PROCEDURE DATE:  2022          INTERPRETATION:  INDICATION: Abnormal EKG  Sonographer KL    Blood Pressure 142/86    Height 158 cm     Weight 51.7 kg       BSA 1.5   sqm    Dimensions:  LA 3.6       Normal Values: 2.0 - 4.0 cm  Ao 3.2        Normal Values: 2.0 - 3.8 cm  SEPTUM 1.0       Normal Values: 0.6 - 1.2 cm  PWT 0.9       Normal Values: 0.6 - 1.1 cm  LVIDd 5.8         Normal Values: 3.0 - 5.6 cm  LVIDs 4.3      Normal Values: 1.8 - 4.0 cm      OBSERVATIONS:  Mitral Valve: Moderate MR.  Aortic Valve/Aorta: normal trileaflet aortic valve.  Tricuspid Valve: Mild TR.  Pulmonic Valve: Mild PI  Left Atrium: Enlarged  Right Atrium: Enlarged  Left Ventricle: Left ventricular enlargement with severe left ventricular   systolic dysfunction, estimated LVEF of 20-25%. The entire apex and   anterior walls appear akinetic. The inferior and inferolateral walls   appear hypokinetic. Remaining walls are not well-visualized  Right Ventricle: Grossly normal size and systolic function.  Pericardium: no significant pericardial effusion.  Pulmonary/RV Pressure: estimated PA systolic pressure of at least 35 mmHg   assuming an RA pressure of 3mmHg.        IMPRESSION:  Left ventricular enlargement with severe left ventricular systolic   dysfunction, estimated LVEF of 20-25%. The entire apex and anterior walls   appear akinetic. The inferior and inferolateral walls appear hypokinetic.  Grossly normal RV size and systolic function.  Biatrial enlargement  Normal trileaflet aortic valve, without AI.  Moderate MR  Mild TR.  No significant pericardial effusion.    --- End of Report ---            JOHN BAEZA MD; Attending Cardiologist  This document has been electronically signed. Dec 23 2022  4:40PM      Radiology:

## 2024-04-08 NOTE — CASE MANAGEMENT PROGRESS NOTE - NSCMPROGRESSNOTE_GEN_ALL_CORE
Discussed pt on rounds, pt remains acute, per RN pt is increasingly confused, on IV Zosyn, on IV Steroids. CM will continue to collaborate with interdisciplinary team and remain available to assist.

## 2024-04-08 NOTE — PROGRESS NOTE ADULT - ASSESSMENT
87 y/o female with PMH of HFrEF of 20-25 %, dementia, Afib, hypothyroidism, DM who presented to ED to be evaluated for weakness, found to have +UTI and Covid positive     Persistent Atrial Fibrillation, COVID  - Known hx of Afib, with RVR episode in the setting of catecholamine surge 2/2 recent acute stressor (UTI, COVID)   - Remains rate-controlled  - Continue BB   - Continue Digoxin 0.125 QOD.  Please, obtain level in am, 4/8  - Avoid CCB given reduced EF   - Continue Eliquis    - EKG Afib w/ RVR, unchanged from prior   - No anginal complaints   - No evidence of any active ischemia     - TTE (12/2022): EF 20-25%, apex and anterior wall akinesis, inferior wall hypokinesis, mod MR, mild TR  - Known to our service, from last admission, she has known LV Dysfunction from TTE likely from a missed MI was decided for medical management given her frailty, increased creatinine at that time  - Continue to hold on IV Lasix for now given YVETTE.  - Intravascularly dry, though, creatinine slowly improving    - For GDMT Continue Toprol XL  - Unable to start ace/arb given YVETTE   - Needs repeat TTE    - BP elevated repeat after am medication administration   - +COVID management per primary  - Monitor and replete lytes, keep K>4, Mg>2.  - Will continue to follow.

## 2024-04-08 NOTE — PROGRESS NOTE ADULT - SUBJECTIVE AND OBJECTIVE BOX
Patient is a 88y old  Female who presents with a chief complaint of weakness (07 Apr 2024 16:25)      INTERVAL HPI/OVERNIGHT EVENTS: No acute overnight events. Pt seen/examined at bedside. Pt states that she feels well, cough is improving. Denies any pain. Denies cp or SOB.    Tele- AFib 97bpm, range     MEDICATIONS  (STANDING):  apixaban 2.5 milliGRAM(s) Oral every 12 hours  bisacodyl Suppository 10 milliGRAM(s) Rectal once  dexAMETHasone     Tablet 6 milliGRAM(s) Oral daily  dextrose 5%. 1000 milliLiter(s) (50 mL/Hr) IV Continuous <Continuous>  dextrose 5%. 1000 milliLiter(s) (100 mL/Hr) IV Continuous <Continuous>  dextrose 50% Injectable 25 Gram(s) IV Push once  dextrose 50% Injectable 12.5 Gram(s) IV Push once  dextrose 50% Injectable 25 Gram(s) IV Push once  digoxin     Tablet 125 MICROGram(s) Oral every other day  escitalopram 10 milliGRAM(s) Oral daily  glucagon  Injectable 1 milliGRAM(s) IntraMuscular once  hemorrhoidal Ointment 1 Application(s) Rectal four times a day  insulin glargine Injectable (LANTUS) 5 Unit(s) SubCutaneous at bedtime  insulin lispro (ADMELOG) corrective regimen sliding scale   SubCutaneous three times a day before meals  insulin lispro (ADMELOG) corrective regimen sliding scale   SubCutaneous at bedtime  insulin lispro Injectable (ADMELOG) 6 Unit(s) SubCutaneous three times a day before meals  levothyroxine 75 MICROGram(s) Oral daily  memantine 10 milliGRAM(s) Oral two times a day  metoprolol succinate  milliGRAM(s) Oral every 12 hours  pantoprazole    Tablet 40 milliGRAM(s) Oral before breakfast  piperacillin/tazobactam IVPB.. 3.375 Gram(s) IV Intermittent every 12 hours  polyethylene glycol 3350 17 Gram(s) Oral daily  potassium chloride    Tablet ER 20 milliEquivalent(s) Oral once  rivastigmine patch  9.5 mG/24 Hr(s) 1 Patch Transdermal every 24 hours  saccharomyces boulardii 250 milliGRAM(s) Oral two times a day  senna 2 Tablet(s) Oral at bedtime  tamsulosin 0.4 milliGRAM(s) Oral at bedtime    MEDICATIONS  (PRN):  acetaminophen     Tablet .. 650 milliGRAM(s) Oral every 6 hours PRN Temp greater or equal to 38C (100.4F), Mild Pain (1 - 3)  albuterol/ipratropium for Nebulization 3 milliLiter(s) Nebulizer every 6 hours PRN Shortness of Breath  aluminum hydroxide/magnesium hydroxide/simethicone Suspension 30 milliLiter(s) Oral every 4 hours PRN Dyspepsia  benzonatate 100 milliGRAM(s) Oral three times a day PRN Cough  bisacodyl Suppository 10 milliGRAM(s) Rectal daily PRN Constipation  dextrose Oral Gel 15 Gram(s) Oral once PRN Blood Glucose LESS THAN 70 milliGRAM(s)/deciliter  melatonin 3 milliGRAM(s) Oral at bedtime PRN Insomnia  ondansetron Injectable 4 milliGRAM(s) IV Push every 8 hours PRN Nausea and/or Vomiting      Allergies    No Known Allergies    Intolerances        REVIEW OF SYSTEMS:  CONSTITUTIONAL: No fever or chills  HEENT:  No headache, no sore throat  RESPIRATORY: + cough,  No wheezing, or shortness of breath  CARDIOVASCULAR: No chest pain, palpitations  GASTROINTESTINAL: No abd pain, nausea, vomiting, or diarrhea  GENITOURINARY: No dysuria, frequency, or hematuria  NEUROLOGICAL: no focal weakness or dizziness  MUSCULOSKELETAL: no myalgias     Vital Signs Last 24 Hrs  T(C): 36.3 (08 Apr 2024 05:00), Max: 36.4 (07 Apr 2024 13:01)  T(F): 97.3 (08 Apr 2024 05:00), Max: 97.6 (07 Apr 2024 19:49)  HR: 82 (08 Apr 2024 05:00) (71 - 88)  BP: 141/102 (08 Apr 2024 05:00) (136/86 - 145/100)  BP(mean): --  RR: 17 (08 Apr 2024 05:00) (17 - 18)  SpO2: 95% (08 Apr 2024 05:00) (92% - 95%)    Parameters below as of 08 Apr 2024 05:00  Patient On (Oxygen Delivery Method): room air        PHYSICAL EXAM:  GENERAL: NAD, on room air  HEENT:  anicteric, moist mucous membranes  CHEST/LUNG:  CTA b/l, no rales, wheezes, or rhonchi  HEART:  irregularly irregular, S1, S2  ABDOMEN:  BS+, soft, nontender, nondistended  EXTREMITIES: no edema, cyanosis, or calf tenderness  NERVOUS SYSTEM: A&O x2, answers questions and follows commands appropriately      LABS:    CBC Full  -  ( 07 Apr 2024 06:35 )  WBC Count : 17.89 K/uL  Hemoglobin : 10.0 g/dL  Hematocrit : 31.4 %  Platelet Count - Automated : 287 K/uL  Mean Cell Volume : 85.6 fl  Mean Cell Hemoglobin : 27.2 pg  Mean Cell Hemoglobin Concentration : 31.8 gm/dL  Auto Neutrophil # : 15.92 K/uL  Auto Lymphocyte # : 0.84 K/uL  Auto Monocyte # : 0.95 K/uL  Auto Eosinophil # : 0.00 K/uL  Auto Basophil # : 0.03 K/uL  Auto Neutrophil % : 89.0 %  Auto Lymphocyte % : 4.7 %  Auto Monocyte % : 5.3 %  Auto Eosinophil % : 0.0 %  Auto Basophil % : 0.2 %      Ca    8.3        07 Apr 2024 06:35        Urinalysis Basic - ( 07 Apr 2024 06:35 )    Color: x / Appearance: x / SG: x / pH: x  Gluc: 190 mg/dL / Ketone: x  / Bili: x / Urobili: x   Blood: x / Protein: x / Nitrite: x   Leuk Esterase: x / RBC: x / WBC x   Sq Epi: x / Non Sq Epi: x / Bacteria: x      CAPILLARY BLOOD GLUCOSE      POCT Blood Glucose.: 208 mg/dL (08 Apr 2024 08:13)  POCT Blood Glucose.: 261 mg/dL (07 Apr 2024 21:06)  POCT Blood Glucose.: 236 mg/dL (07 Apr 2024 16:46)  POCT Blood Glucose.: 244 mg/dL (07 Apr 2024 11:33)        Culture - Urine (collected 04-01-24 @ 23:07)  Source: Clean Catch Clean Catch (Midstream)  Final Report (04-04-24 @ 18:21):    10,000 - 49,000 CFU/mL Klebsiella pneumoniae  Organism: Klebsiella pneumoniae (04-04-24 @ 18:21)  Organism: Klebsiella pneumoniae (04-04-24 @ 18:21)      Method Type: SARAH      -  Amoxicillin/Clavulanic Acid: S <=8/4      -  Ampicillin: R >16 These ampicillin results predict results for amoxicillin      -  Ampicillin/Sulbactam: S <=4/2      -  Aztreonam: S <=4      -  Cefazolin: S <=2 For uncomplicated UTI with K. pneumoniae, E. coli, or P. mirablis: SARAH <=16 is sensitive and SARAH >=32 is resistant. This also predicts results for oral agents cefaclor, cefdinir, cefpodoxime, cefprozil, cefuroxime axetil, cephalexin and locarbef for uncomplicated UTI. Note that some isolates may be susceptible to these agents while testing resistant to cefazolin.      -  Cefepime: S <=2      -  Cefoxitin: S <=8      -  Ceftriaxone: S <=1      -  Cefuroxime: S <=4      -  Ciprofloxacin: S <=0.25      -  Ertapenem: S <=0.5      -  Gentamicin: S <=2      -  Imipenem: S <=1      -  Levofloxacin: S <=0.5      -  Meropenem: S <=1      -  Nitrofurantoin: S <=32 Should not be used to treat pyelonephritis      -  Piperacillin/Tazobactam: S <=8      -  Tobramycin: S <=2      -  Trimethoprim/Sulfamethoxazole: R >2/38    Culture - Blood (collected 04-01-24 @ 20:30)  Source: .Blood Blood-Peripheral  Final Report (04-07-24 @ 01:00):    No growth at 5 days    Culture - Blood (collected 04-01-24 @ 20:20)  Source: .Blood Blood-Peripheral  Final Report (04-07-24 @ 01:00):    No growth at 5 days        RADIOLOGY & ADDITIONAL TESTS:    Personally reviewed.     Consultant(s) Notes Reviewed:  [x] YES  [ ] NO     Patient is a 88y old  Female who presents with a chief complaint of weakness (07 Apr 2024 16:25)      INTERVAL HPI/OVERNIGHT EVENTS: No acute overnight events. Pt seen/examined at bedside, observed to have some hallucinations, calling out for "Oumou." Pt states that she feels well, cough is improving. Denies any pain. Denies cp or SOB.    Tele- AFib 97bpm, range     MEDICATIONS  (STANDING):  apixaban 2.5 milliGRAM(s) Oral every 12 hours  bisacodyl Suppository 10 milliGRAM(s) Rectal once  dexAMETHasone     Tablet 6 milliGRAM(s) Oral daily  dextrose 5%. 1000 milliLiter(s) (50 mL/Hr) IV Continuous <Continuous>  dextrose 5%. 1000 milliLiter(s) (100 mL/Hr) IV Continuous <Continuous>  dextrose 50% Injectable 25 Gram(s) IV Push once  dextrose 50% Injectable 12.5 Gram(s) IV Push once  dextrose 50% Injectable 25 Gram(s) IV Push once  digoxin     Tablet 125 MICROGram(s) Oral every other day  escitalopram 10 milliGRAM(s) Oral daily  glucagon  Injectable 1 milliGRAM(s) IntraMuscular once  hemorrhoidal Ointment 1 Application(s) Rectal four times a day  insulin glargine Injectable (LANTUS) 5 Unit(s) SubCutaneous at bedtime  insulin lispro (ADMELOG) corrective regimen sliding scale   SubCutaneous three times a day before meals  insulin lispro (ADMELOG) corrective regimen sliding scale   SubCutaneous at bedtime  insulin lispro Injectable (ADMELOG) 6 Unit(s) SubCutaneous three times a day before meals  levothyroxine 75 MICROGram(s) Oral daily  memantine 10 milliGRAM(s) Oral two times a day  metoprolol succinate  milliGRAM(s) Oral every 12 hours  pantoprazole    Tablet 40 milliGRAM(s) Oral before breakfast  piperacillin/tazobactam IVPB.. 3.375 Gram(s) IV Intermittent every 12 hours  polyethylene glycol 3350 17 Gram(s) Oral daily  potassium chloride    Tablet ER 20 milliEquivalent(s) Oral once  rivastigmine patch  9.5 mG/24 Hr(s) 1 Patch Transdermal every 24 hours  saccharomyces boulardii 250 milliGRAM(s) Oral two times a day  senna 2 Tablet(s) Oral at bedtime  tamsulosin 0.4 milliGRAM(s) Oral at bedtime    MEDICATIONS  (PRN):  acetaminophen     Tablet .. 650 milliGRAM(s) Oral every 6 hours PRN Temp greater or equal to 38C (100.4F), Mild Pain (1 - 3)  albuterol/ipratropium for Nebulization 3 milliLiter(s) Nebulizer every 6 hours PRN Shortness of Breath  aluminum hydroxide/magnesium hydroxide/simethicone Suspension 30 milliLiter(s) Oral every 4 hours PRN Dyspepsia  benzonatate 100 milliGRAM(s) Oral three times a day PRN Cough  bisacodyl Suppository 10 milliGRAM(s) Rectal daily PRN Constipation  dextrose Oral Gel 15 Gram(s) Oral once PRN Blood Glucose LESS THAN 70 milliGRAM(s)/deciliter  melatonin 3 milliGRAM(s) Oral at bedtime PRN Insomnia  ondansetron Injectable 4 milliGRAM(s) IV Push every 8 hours PRN Nausea and/or Vomiting      Allergies    No Known Allergies    Intolerances        REVIEW OF SYSTEMS:  CONSTITUTIONAL: No fever or chills  HEENT:  No headache, no sore throat  RESPIRATORY: + cough,  No wheezing, or shortness of breath  CARDIOVASCULAR: No chest pain, palpitations  GASTROINTESTINAL: No abd pain, nausea, vomiting, or diarrhea  GENITOURINARY: No dysuria, frequency, or hematuria  NEUROLOGICAL: no focal weakness or dizziness  MUSCULOSKELETAL: no myalgias     Vital Signs Last 24 Hrs  T(C): 36.3 (08 Apr 2024 05:00), Max: 36.4 (07 Apr 2024 13:01)  T(F): 97.3 (08 Apr 2024 05:00), Max: 97.6 (07 Apr 2024 19:49)  HR: 82 (08 Apr 2024 05:00) (71 - 88)  BP: 141/102 (08 Apr 2024 05:00) (136/86 - 145/100)  BP(mean): --  RR: 17 (08 Apr 2024 05:00) (17 - 18)  SpO2: 95% (08 Apr 2024 05:00) (92% - 95%)    Parameters below as of 08 Apr 2024 05:00  Patient On (Oxygen Delivery Method): room air        PHYSICAL EXAM:  GENERAL: NAD, on room air  HEENT:  anicteric, moist mucous membranes  CHEST/LUNG:  CTA b/l, no rales, wheezes, or rhonchi  HEART:  irregularly irregular, S1, S2  ABDOMEN:  BS+, soft, nontender, nondistended  EXTREMITIES: no edema, cyanosis, or calf tenderness  NERVOUS SYSTEM: A&O x2, answers questions and follows commands appropriately, hallucinations      LABS:    CBC Full  -  ( 07 Apr 2024 06:35 )  WBC Count : 17.89 K/uL  Hemoglobin : 10.0 g/dL  Hematocrit : 31.4 %  Platelet Count - Automated : 287 K/uL  Mean Cell Volume : 85.6 fl  Mean Cell Hemoglobin : 27.2 pg  Mean Cell Hemoglobin Concentration : 31.8 gm/dL  Auto Neutrophil # : 15.92 K/uL  Auto Lymphocyte # : 0.84 K/uL  Auto Monocyte # : 0.95 K/uL  Auto Eosinophil # : 0.00 K/uL  Auto Basophil # : 0.03 K/uL  Auto Neutrophil % : 89.0 %  Auto Lymphocyte % : 4.7 %  Auto Monocyte % : 5.3 %  Auto Eosinophil % : 0.0 %  Auto Basophil % : 0.2 %      Ca    8.3        07 Apr 2024 06:35        Urinalysis Basic - ( 07 Apr 2024 06:35 )    Color: x / Appearance: x / SG: x / pH: x  Gluc: 190 mg/dL / Ketone: x  / Bili: x / Urobili: x   Blood: x / Protein: x / Nitrite: x   Leuk Esterase: x / RBC: x / WBC x   Sq Epi: x / Non Sq Epi: x / Bacteria: x      CAPILLARY BLOOD GLUCOSE      POCT Blood Glucose.: 208 mg/dL (08 Apr 2024 08:13)  POCT Blood Glucose.: 261 mg/dL (07 Apr 2024 21:06)  POCT Blood Glucose.: 236 mg/dL (07 Apr 2024 16:46)  POCT Blood Glucose.: 244 mg/dL (07 Apr 2024 11:33)        Culture - Urine (collected 04-01-24 @ 23:07)  Source: Clean Catch Clean Catch (Midstream)  Final Report (04-04-24 @ 18:21):    10,000 - 49,000 CFU/mL Klebsiella pneumoniae  Organism: Klebsiella pneumoniae (04-04-24 @ 18:21)  Organism: Klebsiella pneumoniae (04-04-24 @ 18:21)      Method Type: SARAH      -  Amoxicillin/Clavulanic Acid: S <=8/4      -  Ampicillin: R >16 These ampicillin results predict results for amoxicillin      -  Ampicillin/Sulbactam: S <=4/2      -  Aztreonam: S <=4      -  Cefazolin: S <=2 For uncomplicated UTI with K. pneumoniae, E. coli, or P. mirablis: SARAH <=16 is sensitive and SARAH >=32 is resistant. This also predicts results for oral agents cefaclor, cefdinir, cefpodoxime, cefprozil, cefuroxime axetil, cephalexin and locarbef for uncomplicated UTI. Note that some isolates may be susceptible to these agents while testing resistant to cefazolin.      -  Cefepime: S <=2      -  Cefoxitin: S <=8      -  Ceftriaxone: S <=1      -  Cefuroxime: S <=4      -  Ciprofloxacin: S <=0.25      -  Ertapenem: S <=0.5      -  Gentamicin: S <=2      -  Imipenem: S <=1      -  Levofloxacin: S <=0.5      -  Meropenem: S <=1      -  Nitrofurantoin: S <=32 Should not be used to treat pyelonephritis      -  Piperacillin/Tazobactam: S <=8      -  Tobramycin: S <=2      -  Trimethoprim/Sulfamethoxazole: R >2/38    Culture - Blood (collected 04-01-24 @ 20:30)  Source: .Blood Blood-Peripheral  Final Report (04-07-24 @ 01:00):    No growth at 5 days    Culture - Blood (collected 04-01-24 @ 20:20)  Source: .Blood Blood-Peripheral  Final Report (04-07-24 @ 01:00):    No growth at 5 days        RADIOLOGY & ADDITIONAL TESTS:    Personally reviewed.     Consultant(s) Notes Reviewed:  [x] YES  [ ] NO     Patient is a 88y old  Female who presents with a chief complaint of weakness (07 Apr 2024 16:25)      INTERVAL HPI/OVERNIGHT EVENTS: No acute overnight events. Pt seen/examined at bedside, observed to have some hallucinations, calling out for "Oumou." Pt states that she feels well, cough is improving. Denies any pain. Denies cp or SOB.    Tele- AFib 97bpm, range     MEDICATIONS  (STANDING):  apixaban 2.5 milliGRAM(s) Oral every 12 hours  bisacodyl Suppository 10 milliGRAM(s) Rectal once  dexAMETHasone     Tablet 6 milliGRAM(s) Oral daily  dextrose 5%. 1000 milliLiter(s) (50 mL/Hr) IV Continuous <Continuous>  dextrose 5%. 1000 milliLiter(s) (100 mL/Hr) IV Continuous <Continuous>  dextrose 50% Injectable 25 Gram(s) IV Push once  dextrose 50% Injectable 12.5 Gram(s) IV Push once  dextrose 50% Injectable 25 Gram(s) IV Push once  digoxin     Tablet 125 MICROGram(s) Oral every other day  escitalopram 10 milliGRAM(s) Oral daily  glucagon  Injectable 1 milliGRAM(s) IntraMuscular once  hemorrhoidal Ointment 1 Application(s) Rectal four times a day  insulin glargine Injectable (LANTUS) 5 Unit(s) SubCutaneous at bedtime  insulin lispro (ADMELOG) corrective regimen sliding scale   SubCutaneous three times a day before meals  insulin lispro (ADMELOG) corrective regimen sliding scale   SubCutaneous at bedtime  insulin lispro Injectable (ADMELOG) 6 Unit(s) SubCutaneous three times a day before meals  levothyroxine 75 MICROGram(s) Oral daily  memantine 10 milliGRAM(s) Oral two times a day  metoprolol succinate  milliGRAM(s) Oral every 12 hours  pantoprazole    Tablet 40 milliGRAM(s) Oral before breakfast  piperacillin/tazobactam IVPB.. 3.375 Gram(s) IV Intermittent every 12 hours  polyethylene glycol 3350 17 Gram(s) Oral daily  potassium chloride    Tablet ER 20 milliEquivalent(s) Oral once  rivastigmine patch  9.5 mG/24 Hr(s) 1 Patch Transdermal every 24 hours  saccharomyces boulardii 250 milliGRAM(s) Oral two times a day  senna 2 Tablet(s) Oral at bedtime  tamsulosin 0.4 milliGRAM(s) Oral at bedtime    MEDICATIONS  (PRN):  acetaminophen     Tablet .. 650 milliGRAM(s) Oral every 6 hours PRN Temp greater or equal to 38C (100.4F), Mild Pain (1 - 3)  albuterol/ipratropium for Nebulization 3 milliLiter(s) Nebulizer every 6 hours PRN Shortness of Breath  aluminum hydroxide/magnesium hydroxide/simethicone Suspension 30 milliLiter(s) Oral every 4 hours PRN Dyspepsia  benzonatate 100 milliGRAM(s) Oral three times a day PRN Cough  bisacodyl Suppository 10 milliGRAM(s) Rectal daily PRN Constipation  dextrose Oral Gel 15 Gram(s) Oral once PRN Blood Glucose LESS THAN 70 milliGRAM(s)/deciliter  melatonin 3 milliGRAM(s) Oral at bedtime PRN Insomnia  ondansetron Injectable 4 milliGRAM(s) IV Push every 8 hours PRN Nausea and/or Vomiting      Allergies    No Known Allergies    Intolerances        REVIEW OF SYSTEMS:  CONSTITUTIONAL: No fever or chills  HEENT:  No headache, no sore throat  RESPIRATORY: + cough,  No wheezing, or shortness of breath  CARDIOVASCULAR: No chest pain, palpitations  GASTROINTESTINAL: No abd pain, nausea, vomiting, or diarrhea  GENITOURINARY: No dysuria, frequency, or hematuria  NEUROLOGICAL: no focal weakness or dizziness  MUSCULOSKELETAL: no myalgias     Vital Signs Last 24 Hrs  T(C): 36.3 (08 Apr 2024 05:00), Max: 36.4 (07 Apr 2024 13:01)  T(F): 97.3 (08 Apr 2024 05:00), Max: 97.6 (07 Apr 2024 19:49)  HR: 82 (08 Apr 2024 05:00) (71 - 88)  BP: 141/102 (08 Apr 2024 05:00) (136/86 - 145/100)  BP(mean): --  RR: 17 (08 Apr 2024 05:00) (17 - 18)  SpO2: 95% (08 Apr 2024 05:00) (92% - 95%)    Parameters below as of 08 Apr 2024 05:00  Patient On (Oxygen Delivery Method): room air        PHYSICAL EXAM:  GENERAL: NAD, on room air  HEENT:  anicteric, moist mucous membranes, + oral thrush  CHEST/LUNG:  CTA b/l, no rales, wheezes, or rhonchi  HEART:  irregularly irregular, S1, S2  ABDOMEN:  BS+, soft, nontender, nondistended  EXTREMITIES: no edema, cyanosis, or calf tenderness  NERVOUS SYSTEM: A&O x2, answers questions and follows commands appropriately, hallucinations      LABS:    CBC Full  -  ( 07 Apr 2024 06:35 )  WBC Count : 17.89 K/uL  Hemoglobin : 10.0 g/dL  Hematocrit : 31.4 %  Platelet Count - Automated : 287 K/uL  Mean Cell Volume : 85.6 fl  Mean Cell Hemoglobin : 27.2 pg  Mean Cell Hemoglobin Concentration : 31.8 gm/dL  Auto Neutrophil # : 15.92 K/uL  Auto Lymphocyte # : 0.84 K/uL  Auto Monocyte # : 0.95 K/uL  Auto Eosinophil # : 0.00 K/uL  Auto Basophil # : 0.03 K/uL  Auto Neutrophil % : 89.0 %  Auto Lymphocyte % : 4.7 %  Auto Monocyte % : 5.3 %  Auto Eosinophil % : 0.0 %  Auto Basophil % : 0.2 %      Ca    8.3        07 Apr 2024 06:35        Urinalysis Basic - ( 07 Apr 2024 06:35 )    Color: x / Appearance: x / SG: x / pH: x  Gluc: 190 mg/dL / Ketone: x  / Bili: x / Urobili: x   Blood: x / Protein: x / Nitrite: x   Leuk Esterase: x / RBC: x / WBC x   Sq Epi: x / Non Sq Epi: x / Bacteria: x      CAPILLARY BLOOD GLUCOSE      POCT Blood Glucose.: 208 mg/dL (08 Apr 2024 08:13)  POCT Blood Glucose.: 261 mg/dL (07 Apr 2024 21:06)  POCT Blood Glucose.: 236 mg/dL (07 Apr 2024 16:46)  POCT Blood Glucose.: 244 mg/dL (07 Apr 2024 11:33)        Culture - Urine (collected 04-01-24 @ 23:07)  Source: Clean Catch Clean Catch (Midstream)  Final Report (04-04-24 @ 18:21):    10,000 - 49,000 CFU/mL Klebsiella pneumoniae  Organism: Klebsiella pneumoniae (04-04-24 @ 18:21)  Organism: Klebsiella pneumoniae (04-04-24 @ 18:21)      Method Type: SARAH      -  Amoxicillin/Clavulanic Acid: S <=8/4      -  Ampicillin: R >16 These ampicillin results predict results for amoxicillin      -  Ampicillin/Sulbactam: S <=4/2      -  Aztreonam: S <=4      -  Cefazolin: S <=2 For uncomplicated UTI with K. pneumoniae, E. coli, or P. mirablis: SARAH <=16 is sensitive and SARAH >=32 is resistant. This also predicts results for oral agents cefaclor, cefdinir, cefpodoxime, cefprozil, cefuroxime axetil, cephalexin and locarbef for uncomplicated UTI. Note that some isolates may be susceptible to these agents while testing resistant to cefazolin.      -  Cefepime: S <=2      -  Cefoxitin: S <=8      -  Ceftriaxone: S <=1      -  Cefuroxime: S <=4      -  Ciprofloxacin: S <=0.25      -  Ertapenem: S <=0.5      -  Gentamicin: S <=2      -  Imipenem: S <=1      -  Levofloxacin: S <=0.5      -  Meropenem: S <=1      -  Nitrofurantoin: S <=32 Should not be used to treat pyelonephritis      -  Piperacillin/Tazobactam: S <=8      -  Tobramycin: S <=2      -  Trimethoprim/Sulfamethoxazole: R >2/38    Culture - Blood (collected 04-01-24 @ 20:30)  Source: .Blood Blood-Peripheral  Final Report (04-07-24 @ 01:00):    No growth at 5 days    Culture - Blood (collected 04-01-24 @ 20:20)  Source: .Blood Blood-Peripheral  Final Report (04-07-24 @ 01:00):    No growth at 5 days        RADIOLOGY & ADDITIONAL TESTS:    Personally reviewed.     Consultant(s) Notes Reviewed:  [x] YES  [ ] NO     Patient is a 88y old  Female who presents with a chief complaint of weakness (07 Apr 2024 16:25)      INTERVAL HPI/OVERNIGHT EVENTS: No acute overnight events. Pt seen/examined at bedside, observed to have some hallucinations, calling out for "Oumou." Pt states that she feels well, cough is improving. Denies any pain. Denies cp or SOB.    Tele- AFib 97bpm, range   room air 93   MEDICATIONS  (STANDING):  apixaban 2.5 milliGRAM(s) Oral every 12 hours  bisacodyl Suppository 10 milliGRAM(s) Rectal once  dexAMETHasone     Tablet 6 milliGRAM(s) Oral daily  dextrose 5%. 1000 milliLiter(s) (50 mL/Hr) IV Continuous <Continuous>  dextrose 5%. 1000 milliLiter(s) (100 mL/Hr) IV Continuous <Continuous>  dextrose 50% Injectable 25 Gram(s) IV Push once  dextrose 50% Injectable 12.5 Gram(s) IV Push once  dextrose 50% Injectable 25 Gram(s) IV Push once  digoxin     Tablet 125 MICROGram(s) Oral every other day  escitalopram 10 milliGRAM(s) Oral daily  glucagon  Injectable 1 milliGRAM(s) IntraMuscular once  hemorrhoidal Ointment 1 Application(s) Rectal four times a day  insulin glargine Injectable (LANTUS) 5 Unit(s) SubCutaneous at bedtime  insulin lispro (ADMELOG) corrective regimen sliding scale   SubCutaneous three times a day before meals  insulin lispro (ADMELOG) corrective regimen sliding scale   SubCutaneous at bedtime  insulin lispro Injectable (ADMELOG) 6 Unit(s) SubCutaneous three times a day before meals  levothyroxine 75 MICROGram(s) Oral daily  memantine 10 milliGRAM(s) Oral two times a day  metoprolol succinate  milliGRAM(s) Oral every 12 hours  pantoprazole    Tablet 40 milliGRAM(s) Oral before breakfast  piperacillin/tazobactam IVPB.. 3.375 Gram(s) IV Intermittent every 12 hours  polyethylene glycol 3350 17 Gram(s) Oral daily  potassium chloride    Tablet ER 20 milliEquivalent(s) Oral once  rivastigmine patch  9.5 mG/24 Hr(s) 1 Patch Transdermal every 24 hours  saccharomyces boulardii 250 milliGRAM(s) Oral two times a day  senna 2 Tablet(s) Oral at bedtime  tamsulosin 0.4 milliGRAM(s) Oral at bedtime    MEDICATIONS  (PRN):  acetaminophen     Tablet .. 650 milliGRAM(s) Oral every 6 hours PRN Temp greater or equal to 38C (100.4F), Mild Pain (1 - 3)  albuterol/ipratropium for Nebulization 3 milliLiter(s) Nebulizer every 6 hours PRN Shortness of Breath  aluminum hydroxide/magnesium hydroxide/simethicone Suspension 30 milliLiter(s) Oral every 4 hours PRN Dyspepsia  benzonatate 100 milliGRAM(s) Oral three times a day PRN Cough  bisacodyl Suppository 10 milliGRAM(s) Rectal daily PRN Constipation  dextrose Oral Gel 15 Gram(s) Oral once PRN Blood Glucose LESS THAN 70 milliGRAM(s)/deciliter  melatonin 3 milliGRAM(s) Oral at bedtime PRN Insomnia  ondansetron Injectable 4 milliGRAM(s) IV Push every 8 hours PRN Nausea and/or Vomiting      Allergies    No Known Allergies    Intolerances        REVIEW OF SYSTEMS:  CONSTITUTIONAL: No fever or chills  HEENT:  No headache, no sore throat  RESPIRATORY: + cough,  No wheezing, or shortness of breath  CARDIOVASCULAR: No chest pain, palpitations  GASTROINTESTINAL: No abd pain, nausea, vomiting, or diarrhea  GENITOURINARY: No dysuria, frequency, or hematuria  NEUROLOGICAL: no focal weakness or dizziness  MUSCULOSKELETAL: no myalgias     Vital Signs Last 24 Hrs  T(C): 36.3 (08 Apr 2024 05:00), Max: 36.4 (07 Apr 2024 13:01)  T(F): 97.3 (08 Apr 2024 05:00), Max: 97.6 (07 Apr 2024 19:49)  HR: 82 (08 Apr 2024 05:00) (71 - 88)  BP: 141/102 (08 Apr 2024 05:00) (136/86 - 145/100)  BP(mean): --  RR: 17 (08 Apr 2024 05:00) (17 - 18)  SpO2: 95% (08 Apr 2024 05:00) (92% - 95%)    Parameters below as of 08 Apr 2024 05:00  Patient On (Oxygen Delivery Method): room air        PHYSICAL EXAM:  GENERAL: NAD, on room air  HEENT:  anicteric, moist mucous membranes, + oral thrush  CHEST/LUNG:  CTA b/l, no rales, wheezes, or rhonchi  HEART:  irregularly irregular, S1, S2 , no tachy   ABDOMEN:  BS+, soft, nontender, nondistended  EXTREMITIES: no edema, cyanosis, or calf tenderness  NERVOUS SYSTEM: A&O x2, answers questions and follows commands appropriately, hallucinations  gu intact     LABS:    CBC Full  -  ( 07 Apr 2024 06:35 )  WBC Count : 17.89 K/uL  Hemoglobin : 10.0 g/dL  Hematocrit : 31.4 %  Platelet Count - Automated : 287 K/uL  Mean Cell Volume : 85.6 fl  Mean Cell Hemoglobin : 27.2 pg  Mean Cell Hemoglobin Concentration : 31.8 gm/dL  Auto Neutrophil # : 15.92 K/uL  Auto Lymphocyte # : 0.84 K/uL  Auto Monocyte # : 0.95 K/uL  Auto Eosinophil # : 0.00 K/uL  Auto Basophil # : 0.03 K/uL  Auto Neutrophil % : 89.0 %  Auto Lymphocyte % : 4.7 %  Auto Monocyte % : 5.3 %  Auto Eosinophil % : 0.0 %  Auto Basophil % : 0.2 %      Ca    8.3        07 Apr 2024 06:35        Urinalysis Basic - ( 07 Apr 2024 06:35 )    Color: x / Appearance: x / SG: x / pH: x  Gluc: 190 mg/dL / Ketone: x  / Bili: x / Urobili: x   Blood: x / Protein: x / Nitrite: x   Leuk Esterase: x / RBC: x / WBC x   Sq Epi: x / Non Sq Epi: x / Bacteria: x      CAPILLARY BLOOD GLUCOSE      POCT Blood Glucose.: 208 mg/dL (08 Apr 2024 08:13)  POCT Blood Glucose.: 261 mg/dL (07 Apr 2024 21:06)  POCT Blood Glucose.: 236 mg/dL (07 Apr 2024 16:46)  POCT Blood Glucose.: 244 mg/dL (07 Apr 2024 11:33)        Culture - Urine (collected 04-01-24 @ 23:07)  Source: Clean Catch Clean Catch (Midstream)  Final Report (04-04-24 @ 18:21):    10,000 - 49,000 CFU/mL Klebsiella pneumoniae  Organism: Klebsiella pneumoniae (04-04-24 @ 18:21)  Organism: Klebsiella pneumoniae (04-04-24 @ 18:21)      Method Type: SARAH      -  Amoxicillin/Clavulanic Acid: S <=8/4      -  Ampicillin: R >16 These ampicillin results predict results for amoxicillin      -  Ampicillin/Sulbactam: S <=4/2      -  Aztreonam: S <=4      -  Cefazolin: S <=2 For uncomplicated UTI with K. pneumoniae, E. coli, or P. mirablis: SARAH <=16 is sensitive and SARAH >=32 is resistant. This also predicts results for oral agents cefaclor, cefdinir, cefpodoxime, cefprozil, cefuroxime axetil, cephalexin and locarbef for uncomplicated UTI. Note that some isolates may be susceptible to these agents while testing resistant to cefazolin.      -  Cefepime: S <=2      -  Cefoxitin: S <=8      -  Ceftriaxone: S <=1      -  Cefuroxime: S <=4      -  Ciprofloxacin: S <=0.25      -  Ertapenem: S <=0.5      -  Gentamicin: S <=2      -  Imipenem: S <=1      -  Levofloxacin: S <=0.5      -  Meropenem: S <=1      -  Nitrofurantoin: S <=32 Should not be used to treat pyelonephritis      -  Piperacillin/Tazobactam: S <=8      -  Tobramycin: S <=2      -  Trimethoprim/Sulfamethoxazole: R >2/38    Culture - Blood (collected 04-01-24 @ 20:30)  Source: .Blood Blood-Peripheral  Final Report (04-07-24 @ 01:00):    No growth at 5 days    Culture - Blood (collected 04-01-24 @ 20:20)  Source: .Blood Blood-Peripheral  Final Report (04-07-24 @ 01:00):    No growth at 5 days        RADIOLOGY & ADDITIONAL TESTS:    Personally reviewed.     Consultant(s) Notes Reviewed:  [x] YES  [ ] NO

## 2024-04-09 LAB
ANION GAP SERPL CALC-SCNC: 7 MMOL/L — SIGNIFICANT CHANGE UP (ref 5–17)
BUN SERPL-MCNC: 60 MG/DL — HIGH (ref 7–23)
CALCIUM SERPL-MCNC: 8.7 MG/DL — SIGNIFICANT CHANGE UP (ref 8.5–10.1)
CHLORIDE SERPL-SCNC: 112 MMOL/L — HIGH (ref 96–108)
CO2 SERPL-SCNC: 22 MMOL/L — SIGNIFICANT CHANGE UP (ref 22–31)
CREAT SERPL-MCNC: 2 MG/DL — HIGH (ref 0.5–1.3)
EGFR: 24 ML/MIN/1.73M2 — LOW
GLUCOSE BLDC GLUCOMTR-MCNC: 132 MG/DL — HIGH (ref 70–99)
GLUCOSE BLDC GLUCOMTR-MCNC: 167 MG/DL — HIGH (ref 70–99)
GLUCOSE BLDC GLUCOMTR-MCNC: 168 MG/DL — HIGH (ref 70–99)
GLUCOSE BLDC GLUCOMTR-MCNC: 228 MG/DL — HIGH (ref 70–99)
GLUCOSE SERPL-MCNC: 147 MG/DL — HIGH (ref 70–99)
POTASSIUM SERPL-MCNC: 4.1 MMOL/L — SIGNIFICANT CHANGE UP (ref 3.5–5.3)
POTASSIUM SERPL-SCNC: 4.1 MMOL/L — SIGNIFICANT CHANGE UP (ref 3.5–5.3)
SODIUM SERPL-SCNC: 141 MMOL/L — SIGNIFICANT CHANGE UP (ref 135–145)

## 2024-04-09 PROCEDURE — 99232 SBSQ HOSP IP/OBS MODERATE 35: CPT

## 2024-04-09 PROCEDURE — 99233 SBSQ HOSP IP/OBS HIGH 50: CPT | Mod: GC

## 2024-04-09 PROCEDURE — 99233 SBSQ HOSP IP/OBS HIGH 50: CPT

## 2024-04-09 PROCEDURE — 71250 CT THORAX DX C-: CPT | Mod: 26

## 2024-04-09 RX ORDER — HYDRALAZINE HCL 50 MG
25 TABLET ORAL EVERY 8 HOURS
Refills: 0 | Status: DISCONTINUED | OUTPATIENT
Start: 2024-04-09 | End: 2024-04-11

## 2024-04-09 RX ADMIN — RIVASTIGMINE 1 PATCH: 4.6 PATCH, EXTENDED RELEASE TRANSDERMAL at 08:19

## 2024-04-09 RX ADMIN — DIPHENHYDRAMINE HYDROCHLORIDE AND LIDOCAINE HYDROCHLORIDE AND ALUMINUM HYDROXIDE AND MAGNESIUM HYDRO 15 MILLILITER(S): KIT at 14:31

## 2024-04-09 RX ADMIN — PHENYLEPHRINE-SHARK LIVER OIL-MINERAL OIL-PETROLATUM RECTAL OINTMENT 1 APPLICATION(S): at 05:43

## 2024-04-09 RX ADMIN — APIXABAN 2.5 MILLIGRAM(S): 2.5 TABLET, FILM COATED ORAL at 18:10

## 2024-04-09 RX ADMIN — RIVASTIGMINE 1 PATCH: 4.6 PATCH, EXTENDED RELEASE TRANSDERMAL at 05:43

## 2024-04-09 RX ADMIN — MEMANTINE HYDROCHLORIDE 10 MILLIGRAM(S): 10 TABLET ORAL at 18:10

## 2024-04-09 RX ADMIN — TAMSULOSIN HYDROCHLORIDE 0.4 MILLIGRAM(S): 0.4 CAPSULE ORAL at 22:06

## 2024-04-09 RX ADMIN — Medication 6 UNIT(S): at 09:37

## 2024-04-09 RX ADMIN — TAMSULOSIN HYDROCHLORIDE 0.4 MILLIGRAM(S): 0.4 CAPSULE ORAL at 01:30

## 2024-04-09 RX ADMIN — Medication 6 UNIT(S): at 18:12

## 2024-04-09 RX ADMIN — Medication 250 MILLIGRAM(S): at 05:45

## 2024-04-09 RX ADMIN — AMLODIPINE BESYLATE 2.5 MILLIGRAM(S): 2.5 TABLET ORAL at 05:44

## 2024-04-09 RX ADMIN — Medication 2: at 18:11

## 2024-04-09 RX ADMIN — PIPERACILLIN AND TAZOBACTAM 25 GRAM(S): 4; .5 INJECTION, POWDER, LYOPHILIZED, FOR SOLUTION INTRAVENOUS at 01:39

## 2024-04-09 RX ADMIN — INSULIN GLARGINE 5 UNIT(S): 100 INJECTION, SOLUTION SUBCUTANEOUS at 22:07

## 2024-04-09 RX ADMIN — DIPHENHYDRAMINE HYDROCHLORIDE AND LIDOCAINE HYDROCHLORIDE AND ALUMINUM HYDROXIDE AND MAGNESIUM HYDRO 15 MILLILITER(S): KIT at 22:06

## 2024-04-09 RX ADMIN — Medication 3 MILLIGRAM(S): at 22:05

## 2024-04-09 RX ADMIN — Medication 250 MILLIGRAM(S): at 18:10

## 2024-04-09 RX ADMIN — Medication 75 MICROGRAM(S): at 05:45

## 2024-04-09 RX ADMIN — Medication 6 UNIT(S): at 12:18

## 2024-04-09 RX ADMIN — Medication 100 MILLIGRAM(S): at 05:44

## 2024-04-09 RX ADMIN — ESCITALOPRAM OXALATE 10 MILLIGRAM(S): 10 TABLET, FILM COATED ORAL at 12:19

## 2024-04-09 RX ADMIN — Medication 25 MILLIGRAM(S): at 22:05

## 2024-04-09 RX ADMIN — APIXABAN 2.5 MILLIGRAM(S): 2.5 TABLET, FILM COATED ORAL at 05:45

## 2024-04-09 RX ADMIN — MEMANTINE HYDROCHLORIDE 10 MILLIGRAM(S): 10 TABLET ORAL at 05:44

## 2024-04-09 RX ADMIN — PIPERACILLIN AND TAZOBACTAM 25 GRAM(S): 4; .5 INJECTION, POWDER, LYOPHILIZED, FOR SOLUTION INTRAVENOUS at 12:18

## 2024-04-09 RX ADMIN — Medication 2: at 12:18

## 2024-04-09 RX ADMIN — PANTOPRAZOLE SODIUM 40 MILLIGRAM(S): 20 TABLET, DELAYED RELEASE ORAL at 05:45

## 2024-04-09 RX ADMIN — RIVASTIGMINE 1 PATCH: 4.6 PATCH, EXTENDED RELEASE TRANSDERMAL at 19:34

## 2024-04-09 RX ADMIN — PHENYLEPHRINE-SHARK LIVER OIL-MINERAL OIL-PETROLATUM RECTAL OINTMENT 1 APPLICATION(S): at 01:39

## 2024-04-09 RX ADMIN — Medication 100 MILLIGRAM(S): at 18:10

## 2024-04-09 RX ADMIN — DIPHENHYDRAMINE HYDROCHLORIDE AND LIDOCAINE HYDROCHLORIDE AND ALUMINUM HYDROXIDE AND MAGNESIUM HYDRO 15 MILLILITER(S): KIT at 05:44

## 2024-04-09 NOTE — PROGRESS NOTE ADULT - PROBLEM SELECTOR PLAN 5
left pyelonephritis - IV Rocephin switched to Zosyn, last day 4/9  - UCx + Klebsiella   -avoid IV hydration

## 2024-04-09 NOTE — PROGRESS NOTE ADULT - ASSESSMENT
CKD 3  Hypertension  Dyspnea: Pneumonia, COVID 19+, CHF  Diabetes  Hypokalemia  Urinary retention    Renal indices stable. To continue current meds. Monitor blood sugar levels. Insulin coverage as needed. Taken off amlodipine.   Monitor BP trend. Titrate BP meds as needed. Avoid nephrotoxic meds as possible. Encourage PO intake as tolerated.   Will follow electrolytes and renal function trend.

## 2024-04-09 NOTE — PROGRESS NOTE ADULT - SUBJECTIVE AND OBJECTIVE BOX
[facility]   INFECTIOUS DISEASES   [address] [place] [address]  Tel: [phone]     Fax: [phone]  =========================================================  MD Nasrin Marinelli Kaushal, MD Cho, Michelle, MD Sunjit, Jaspal, MD  =========================================================    LEXY EISENBERG [id number]    Follow up: COVID and pneumonia/UTI    Doing better, now in room air. More confused since admission.     Allergies:  No Known Allergies    acetaminophen     Tablet .. 650 milliGRAM(s) Oral every 6 hours PRN  albuterol/ipratropium for Nebulization 3 milliLiter(s) Nebulizer every 6 hours PRN  aluminum hydroxide/magnesium hydroxide/simethicone Suspension 30 milliLiter(s) Oral every 4 hours PRN  apixaban 2.5 milliGRAM(s) Oral every 12 hours  benzonatate 100 milliGRAM(s) Oral three times a day PRN  bisacodyl Suppository 10 milliGRAM(s) Rectal once  bisacodyl Suppository 10 milliGRAM(s) Rectal daily PRN  dexAMETHasone     Tablet 6 milliGRAM(s) Oral daily  dextrose 5%. 1000 milliLiter(s) IV Continuous <Continuous>  dextrose 5%. 1000 milliLiter(s) IV Continuous <Continuous>  dextrose 50% Injectable 25 Gram(s) IV Push once  dextrose 50% Injectable 12.5 Gram(s) IV Push once  dextrose 50% Injectable 25 Gram(s) IV Push once  dextrose Oral Gel 15 Gram(s) Oral once PRN  digoxin     Tablet 125 MICROGram(s) Oral every other day  escitalopram 10 milliGRAM(s) Oral daily  glucagon  Injectable 1 milliGRAM(s) IntraMuscular once  hemorrhoidal Ointment 1 Application(s) Rectal four times a day  insulin glargine Injectable (LANTUS) 5 Unit(s) SubCutaneous at bedtime  insulin lispro (ADMELOG) corrective regimen sliding scale   SubCutaneous three times a day before meals  insulin lispro (ADMELOG) corrective regimen sliding scale   SubCutaneous at bedtime  insulin lispro Injectable (ADMELOG) 6 Unit(s) SubCutaneous three times a day before meals  levothyroxine 75 MICROGram(s) Oral daily  melatonin 3 milliGRAM(s) Oral at bedtime PRN  memantine 10 milliGRAM(s) Oral two times a day  metoprolol succinate  milliGRAM(s) Oral every 12 hours  ondansetron Injectable 4 milliGRAM(s) IV Push every 8 hours PRN  pantoprazole    Tablet 40 milliGRAM(s) Oral before breakfast  piperacillin/tazobactam IVPB.. 3.375 Gram(s) IV Intermittent every 12 hours  polyethylene glycol 3350 17 Gram(s) Oral daily  potassium chloride    Tablet ER 20 milliEquivalent(s) Oral once  rivastigmine patch  9.5 mG/24 Hr(s) 1 Patch Transdermal every 24 hours  saccharomyces boulardii 250 milliGRAM(s) Oral two times a day  senna 2 Tablet(s) Oral at bedtime  tamsulosin 0.4 milliGRAM(s) Oral at bedtime     REVIEW OF SYSTEMS:  Not answering in details.      Physical Exam:  Vital Signs Last 24 Hrs  T(C): 36.2 (09 Apr 2024 12:06), Max: 36.5 (09 Apr 2024 05:30)  T(F): 97.2 (09 Apr 2024 12:06), Max: 97.7 (09 Apr 2024 05:30)  HR: 80 (09 Apr 2024 12:06) (73 - 91)  BP: 160/81 (09 Apr 2024 12:06) (153/77 - 173/113)  BP(mean): --  RR: 18 (09 Apr 2024 12:06) (18 - 18)  SpO2: 97% (09 Apr 2024 12:06) (91% - 97%)  Parameters below as of 09 Apr 2024 12:06  Patient On (Oxygen Delivery Method): room air  GEN: NAD  HEENT: normocephalic and atraumatic. EOMI. SUNDEEP.    NECK: Supple.  No lymphadenopathy   LUNGS: Clear to auscultation.  HEART: Regular rate and rhythm   ABDOMEN: Soft, nontender, and nondistended.  Positive bowel sounds.    : No CVA tenderness  EXTREMITIES: Without edema.  MSK: no joint swelling  NEUROLOGIC: grossly intact.  SKIN: No rash     Labs:                        9.8    17.32 )-----------( 293      ( 08 Apr 2024 08:45 )             29.2     04-09    141  |  112<H>  |  60<H>  ----------------------------<  147<H>  4.1   |  22  |  2.00<H>    Ca    8.7      09 Apr 2024 08:00    TPro  6.1  /  Alb  2.5<L>  /  TBili  0.6  /  DBili  x   /  AST  27  /  ALT  66  /  AlkPhos  80  04-08    Culture - Urine (collected 04-01-24 @ 23:07)  Source: Clean Catch Clean Catch (Midstream)  Final Report (04-04-24 @ 18:21):    10,000 - 49,000 CFU/mL Klebsiella pneumoniae  Organism: Klebsiella pneumoniae (04-04-24 @ 18:21)  Organism: Klebsiella pneumoniae (04-04-24 @ 18:21)    Sensitivities:      Method Type: SARAH      -  Amoxicillin/Clavulanic Acid: S <=8/4      -  Ampicillin: R >16 These ampicillin results predict results for amoxicillin      -  Ampicillin/Sulbactam: S <=4/2      -  Aztreonam: S <=4      -  Cefazolin: S <=2 For uncomplicated UTI with K. pneumoniae, E. coli, or P. mirablis: SARAH <=16 is sensitive and SARAH >=32 is resistant. This also predicts results for oral agents cefaclor, cefdinir, cefpodoxime, cefprozil, cefuroxime axetil, cephalexin and locarbef for uncomplicated UTI. Note that some isolates may be susceptible to these agents while testing resistant to cefazolin.      -  Cefepime: S <=2      -  Cefoxitin: S <=8      -  Ceftriaxone: S <=1      -  Cefuroxime: S <=4      -  Ciprofloxacin: S <=0.25      -  Ertapenem: S <=0.5      -  Gentamicin: S <=2      -  Imipenem: S <=1      -  Levofloxacin: S <=0.5      -  Meropenem: S <=1      -  Nitrofurantoin: S <=32 Should not be used to treat pyelonephritis      -  Piperacillin/Tazobactam: S <=8      -  Tobramycin: S <=2      -  Trimethoprim/Sulfamethoxazole: R >2/38    Culture - Blood (collected 04-01-24 @ 20:30)  Source: .Blood Blood-Peripheral  Final Report (04-07-24 @ 01:00):    No growth at 5 days    Culture - Blood (collected 04-01-24 @ 20:20)  Source: .Blood Blood-Peripheral  Final Report (04-07-24 @ 01:00):    No growth at 5 days    Culture - Urine (collected 11-27-23 @ 21:40)  Source: Clean Catch Clean Catch (Midstream)  Final Report (11-30-23 @ 22:49):    >100,000 CFU/ml Klebsiella pneumoniae  Organism: Klebsiella pneumoniae (11-30-23 @ 22:49)  Organism: Klebsiella pneumoniae (11-30-23 @ 22:49)    Sensitivities:      Method Type: SARAH      -  Amoxicillin/Clavulanic Acid: S <=8/4      -  Ampicillin: R >16 These ampicillin results predict results for amoxicillin      -  Ampicillin/Sulbactam: S 8/4      -  Aztreonam: S <=4      -  Cefazolin: S <=2 For uncomplicated UTI with K. pneumoniae, E. coli, or P. mirablis: SARAH <=16 is sensitive and SARAH >=32 is resistant. This also predicts results for oral agents cefaclor, cefdinir, cefpodoxime, cefprozil, cefuroxime axetil, cephalexin and locarbef for uncomplicated UTI. Note that some isolates may be susceptible to these agents while testing resistant to cefazolin.      -  Cefepime: S <=2      -  Cefoxitin: R >16      -  Ceftriaxone: S <=1      -  Cefuroxime: R >16      -  Ciprofloxacin: R >2      -  Ertapenem: S <=0.5      -  Gentamicin: S <=2      -  Imipenem: S <=1      -  Levofloxacin: R 4      -  Meropenem: S <=1      -  Nitrofurantoin: I 64 Should not be used to treat pyelonephritis      -  Piperacillin/Tazobactam: S <=8      -  Tobramycin: S <=2      -  Trimethoprim/Sulfamethoxazole: R >2/38    Culture - Blood (collected 11-27-23 @ 18:20)  Source: .Blood Blood-Peripheral  Final Report (12-03-23 @ 01:01):    No growth at 5 days    Culture - Blood (collected 11-27-23 @ 18:10)  Source: .Blood Blood-Peripheral  Final Report (12-03-23 @ 01:01):    No growth at 5 days    Culture - Fungal, Body Fluid (collected 01-27-23 @ 11:54)  Source: Pleural Fl Pleural Fluid  Final Report (02-25-23 @ 15:01):    No fungus isolated at 4 weeks.    Culture - Body Fluid with Gram Stain (collected 01-27-23 @ 11:54)  Source: Pleural Fl Pleural Fluid  Gram Stain (01-28-23 @ 03:08):    polymorphonuclear leukocytes seen    No organisms seen    by cytocentrifuge  Final Report (02-01-23 @ 17:31):    No growth at 5 days    Culture - Blood (collected 01-26-23 @ 01:15)  Source: .Blood Blood-Venous  Final Report (01-31-23 @ 09:00):    No Growth Final    Culture - Blood (collected 01-26-23 @ 01:15)  Source: .Blood Blood-Venous  Final Report (01-31-23 @ 09:00):    No Growth Final    WBC Count: 17.32 K/uL (04-08-24 @ 08:45)  WBC Count: 17.89 K/uL (04-07-24 @ 06:35)  WBC Count: 18.01 K/uL (04-06-24 @ 09:45)  WBC Count: 17.38 K/uL (04-05-24 @ 07:00)    Creatinine: 2.00 mg/dL (04-09-24 @ 08:00)  Creatinine: 2.10 mg/dL (04-08-24 @ 08:45)  Creatinine: 2.20 mg/dL (04-07-24 @ 06:35)  Creatinine: 2.40 mg/dL (04-06-24 @ 09:45)  Creatinine: 2.30 mg/dL (04-05-24 @ 07:00)    C-Reactive Protein, Serum: 44 mg/L (04-05-24 @ 07:00)  C-Reactive Protein, Serum: 31 mg/L (04-04-24 @ 08:55)  C-Reactive Protein, Serum: 8 mg/L (04-02-24 @ 10:33)    Ferritin: 96 ng/mL (04-05-24 @ 07:00)  Ferritin: 127 ng/mL (04-04-24 @ 08:55)  Ferritin: 70 ng/mL (04-02-24 @ 10:33)    Sedimentation Rate, Erythrocyte: 19 mm/hr (04-04-24 @ 08:55)    Procalcitonin, Serum: 2.71 ng/mL (04-05-24 @ 07:00)  Procalcitonin, Serum: 6.30 ng/mL (04-04-24 @ 08:55)  Procalcitonin, Serum: 0.37 ng/mL (04-02-24 @ 10:33)     SARS-CoV-2 Result: Detected (04-01-24 @ 20:30)    All imaging and data are reviewed.     Assessment and Plan:   89 y/o female with PMH of HFrEF of 20-25 %, dementia, Afib, hypothyroidism, DM who presented to ED to be evaluated for weakness. Found to have acute hypoxic respiratory failure likely 2/2 COVID, but cannot rule out underlying bacterial pneumonia. Also being treated for UTI, CT showed evidence of Cystitis and left pyeloureteritis. Urine culture grew klebsiella, sensitivities reviewed.    On 4/4, WBC increased to 18--could be steroid related but will antibiotic coverage broadened to Zosyn given minimal clinical improvement. Also with increasing LFT's, remdesivir stopped after 3rd dose.     On 4/5, O2 requirements improvement and off HFNC. No fever and WBC slightly improved to 17. LFT's also better but creatinine increased.    On 4/7, no fevers and on room air. WBC stable at 17, suspect leukocytosis is steroid related. Transaminitis resolved. Initial blood cultures no growth.    On 4/8: Off o2, no fever. Sitting on chair, doing well, still with leukocytosis.     4/09: Confusion could be related to steroid or hospital delirium. No fever, leukocytosis 2' to steroid.      # COVID 19   # Acute respiratory failure   # Pneumonia  # UTI/pyeloueteritis  # Leukocytosis  # Transaminitis    - Has completed 5days of Zosyn (renally dosed) today  - s/p remdesivir x 3 doses  - Watch off antibiotics   - Stop Dexamethasone   - Anticoagulation as per protocol  - Monitor WBC, high due to steroid  - Aspiration precautions    Will sign off please call with any question.     Elbert Amezcua MD  Division of Infectious Diseases   Please call ID service at [phone] with any question.      50 minutes spent on total encounter assessing patient, examination, chart review, counseling and coordinating care by the attending physician/nurse/care manager.

## 2024-04-09 NOTE — CONSULT NOTE ADULT - ASSESSMENT
87 y/o female with PMH of HFrEF of 20-25 %, dementia, Afib, hypothyroidism, DM who presented to ED to be evaluated for weakness. 87 y/o female with PMH of HFrEF of 20-25 %, dementia, Afib, hypothyroidism, DM who presented to ED to be evaluated for weakness.    pleural eff  atelectasis  OP  OA  HF  Dementia  AF  Hypothyroidism  DM  COVID    cvs rx regimen  I and O  I isma if able to cooperate  CXR noted  CT chest pending  TTE reviewed - Pulm HTN - valv heart disease - systolic HF  cardio following  monitor VS and HD and Sat  assist with needs  oral hygiene  skin care  AF - rate and rhythm control, on DOAC  spoke with DTR at bedside

## 2024-04-09 NOTE — PROGRESS NOTE ADULT - PROBLEM SELECTOR PLAN 4
-Pt with chronic afib with episodes of RVR while hospitalized, likely due to infection  - Toprol XL switched from 100mg BID to 50mg q6h for better around the clock control --> now back to 100mg BID  -C/w digoxin 125 mcg every other day -- dig level wnl   -C/w Eliquis 2.5 mg bid  -cardio consulted ([facility])

## 2024-04-09 NOTE — PROGRESS NOTE ADULT - PROBLEM SELECTOR PLAN 2
HFrEF:-BNP: TTE (12/2022): EF 20-25%, apex and anterior wall akinesis, inferior wall hypokinesis, mod MR, mild TR  -CXR with vascular congestion and b/l pleural effusions  -likely component of acute on chronic systolic CHF contributing to hypoxia, will avoid IV diuresis in setting of worsening renal function  - 4/8 TTE LV EF 23%, large left pleural effusion  - f/u CT Chest non-con  - Pulm Dr. Isaac consulted

## 2024-04-09 NOTE — CONSULT NOTE ADULT - SUBJECTIVE AND OBJECTIVE BOX
Date/Time Patient Seen:  		  Referring MD:   Data Reviewed	       Patient is a 88y old  Female who presents with a chief complaint of weakness (09 Apr 2024 12:34)      Subjective/HPI   History of Present Illness:   89 y/o female with PMH of HFrEF of 20-25 %, dementia, Afib, hypothyroidism, DM who presented to ED to be evaluated for weakness.  PAST MEDICAL & SURGICAL HISTORY:  Atrial fibrillation    Hypothyroidism    Mild Alzheimer's dementia    Diabetes mellitus    Acute on chronic systolic congestive heart failure    Hypertension    Hyperlipemia    Cardiac LV ejection fraction 21-30%  12/22    History of appendectomy    H/O hernia repair    History of cholecystectomy          Medication list         MEDICATIONS  (STANDING):  amLODIPine   Tablet 2.5 milliGRAM(s) Oral daily  apixaban 2.5 milliGRAM(s) Oral every 12 hours  bisacodyl Suppository 10 milliGRAM(s) Rectal once  dextrose 5%. 1000 milliLiter(s) (100 mL/Hr) IV Continuous <Continuous>  dextrose 5%. 1000 milliLiter(s) (50 mL/Hr) IV Continuous <Continuous>  dextrose 50% Injectable 12.5 Gram(s) IV Push once  dextrose 50% Injectable 25 Gram(s) IV Push once  dextrose 50% Injectable 25 Gram(s) IV Push once  digoxin     Tablet 125 MICROGram(s) Oral every other day  escitalopram 10 milliGRAM(s) Oral daily  FIRST- Mouthwash  BLM 15 milliLiter(s) Swish and Spit three times a day  glucagon  Injectable 1 milliGRAM(s) IntraMuscular once  hemorrhoidal Ointment 1 Application(s) Rectal four times a day  insulin glargine Injectable (LANTUS) 5 Unit(s) SubCutaneous at bedtime  insulin lispro (ADMELOG) corrective regimen sliding scale   SubCutaneous three times a day before meals  insulin lispro (ADMELOG) corrective regimen sliding scale   SubCutaneous at bedtime  insulin lispro Injectable (ADMELOG) 6 Unit(s) SubCutaneous three times a day before meals  levothyroxine 75 MICROGram(s) Oral daily  memantine 10 milliGRAM(s) Oral two times a day  metoprolol succinate  milliGRAM(s) Oral every 12 hours  pantoprazole    Tablet 40 milliGRAM(s) Oral before breakfast  piperacillin/tazobactam IVPB.. 3.375 Gram(s) IV Intermittent every 12 hours  polyethylene glycol 3350 17 Gram(s) Oral daily  rivastigmine patch  9.5 mG/24 Hr(s) 1 Patch Transdermal every 24 hours  saccharomyces boulardii 250 milliGRAM(s) Oral two times a day  senna 2 Tablet(s) Oral at bedtime  tamsulosin 0.4 milliGRAM(s) Oral at bedtime    MEDICATIONS  (PRN):  acetaminophen     Tablet .. 650 milliGRAM(s) Oral every 6 hours PRN Temp greater or equal to 38C (100.4F), Mild Pain (1 - 3)  albuterol/ipratropium for Nebulization 3 milliLiter(s) Nebulizer every 6 hours PRN Shortness of Breath  aluminum hydroxide/magnesium hydroxide/simethicone Suspension 30 milliLiter(s) Oral every 4 hours PRN Dyspepsia  benzonatate 100 milliGRAM(s) Oral three times a day PRN Cough  bisacodyl Suppository 10 milliGRAM(s) Rectal daily PRN Constipation  dextrose Oral Gel 15 Gram(s) Oral once PRN Blood Glucose LESS THAN 70 milliGRAM(s)/deciliter  melatonin 3 milliGRAM(s) Oral at bedtime PRN Insomnia  ondansetron Injectable 4 milliGRAM(s) IV Push every 8 hours PRN Nausea and/or Vomiting         Vitals log        ICU Vital Signs Last 24 Hrs  T(C): 36.2 (09 Apr 2024 12:06), Max: 36.5 (09 Apr 2024 05:30)  T(F): 97.2 (09 Apr 2024 12:06), Max: 97.7 (09 Apr 2024 05:30)  HR: 80 (09 Apr 2024 12:06) (73 - 91)  BP: 160/81 (09 Apr 2024 12:06) (153/77 - 173/113)  BP(mean): --  ABP: --  ABP(mean): --  RR: 18 (09 Apr 2024 12:06) (18 - 18)  SpO2: 97% (09 Apr 2024 12:06) (91% - 97%)    O2 Parameters below as of 09 Apr 2024 12:06  Patient On (Oxygen Delivery Method): room air                 Input and Output:  I&O's Detail    08 Apr 2024 07:01  -  09 Apr 2024 07:00  --------------------------------------------------------  IN:  Total IN: 0 mL    OUT:    Voided (mL): 900 mL  Total OUT: 900 mL    Total NET: -900 mL          Lab Data                        9.8    17.32 )-----------( 293      ( 08 Apr 2024 08:45 )             29.2     04-09    141  |  112<H>  |  60<H>  ----------------------------<  147<H>  4.1   |  22  |  2.00<H>    Ca    8.7      09 Apr 2024 08:00    TPro  6.1  /  Alb  2.5<L>  /  TBili  0.6  /  DBili  x   /  AST  27  /  ALT  66  /  AlkPhos  80  04-08            Review of Systems	      Objective     Physical Examination        Pertinent Lab findings & Imaging      Clancy:  NO   Adequate UO     I&O's Detail    08 Apr 2024 07:01  -  09 Apr 2024 07:00  --------------------------------------------------------  IN:  Total IN: 0 mL    OUT:    Voided (mL): 900 mL  Total OUT: 900 mL    Total NET: -900 mL               Discussed with:     Cultures:	        Radiology    INTERPRETATION:  Evaluate for volume overload.    AP chest. Prior 4/1/2024    IMPRESSION: No change heart mediastinum. Vascular congestion, bibasilar   airspace disease and small bibasilar pleural effusions. Findings may all   be on the basis of CHF. Correlate clinically for concomitant infection.    --- End of Report ---            KYLIE KENNY MD; Attending Radiologist  This document has been electronically signed. Apr 4 2024  2:55PM    CONCLUSIONS:      1. Left ventricular cavity is normal in size. Left ventricular wall thickness is normal. Left ventricular systolic function is severely decreased with an ejection fraction of 23 % by Martin's method of disks. There are no regional wall motion abnormalities seen.   2. Mildly enlarged right ventricular cavity size, with normal wall thickness, and normal systolic function.   3. The left atrium is moderately dilated.   4. The right atrium is mildly dilated.   5. Structurally normal mitral valve with normal leaflet excursion. The leaflets are thickened.   6. Moderate mitral regurgitation. The degree of MR may be underestimated.   7. Trileaflet aortic valve with normal systolic excursion.   8. Mild aortic regurgitation.   9. Mild to moderate tricuspid regurgitation.  10. Estimated pulmonary artery systolic pressure is 57 mmHg, consistent with moderate pulmonary hypertension.  11. The inferior vena cava is dilated (dilated >2.1cm) with abnormal inspiratory collapse (abnormal <50%) consistent with elevated right atrial pressure (~15, range 10-20mmHg).  12. Large left pleural effusion noted.      LUNGS AND LARGE AIRWAYS: PLEURA:  There are patchy groundglass and airspace consolidations involving the   posterior segment right upper lobe and dependent and basal segments of   the right lower lobe.  Mild interlobular septal thickening in the bilateral upper lobes, could   reflect interstitial edema.  There are moderate-sized bilateral pleural effusions and associated   compressive atelectasis bilateral lower lobes.                   Date/Time Patient Seen:  		  Referring MD:   Data Reviewed	       Patient is a 88y old  Female who presents with a chief complaint of weakness (09 Apr 2024 12:34)      Subjective/HPI   History of Present Illness:   87 y/o female with PMH of HFrEF of 20-25 %, dementia, Afib, hypothyroidism, DM who presented to ED to be evaluated for weakness.  PAST MEDICAL & SURGICAL HISTORY:  Atrial fibrillation    Hypothyroidism    Mild Alzheimer's dementia    Diabetes mellitus    Acute on chronic systolic congestive heart failure    Hypertension    Hyperlipemia    Cardiac LV ejection fraction 21-30%  12/22    History of appendectomy    H/O hernia repair    History of cholecystectomy          Medication list         MEDICATIONS  (STANDING):  amLODIPine   Tablet 2.5 milliGRAM(s) Oral daily  apixaban 2.5 milliGRAM(s) Oral every 12 hours  bisacodyl Suppository 10 milliGRAM(s) Rectal once  dextrose 5%. 1000 milliLiter(s) (100 mL/Hr) IV Continuous <Continuous>  dextrose 5%. 1000 milliLiter(s) (50 mL/Hr) IV Continuous <Continuous>  dextrose 50% Injectable 12.5 Gram(s) IV Push once  dextrose 50% Injectable 25 Gram(s) IV Push once  dextrose 50% Injectable 25 Gram(s) IV Push once  digoxin     Tablet 125 MICROGram(s) Oral every other day  escitalopram 10 milliGRAM(s) Oral daily  FIRST- Mouthwash  BLM 15 milliLiter(s) Swish and Spit three times a day  glucagon  Injectable 1 milliGRAM(s) IntraMuscular once  hemorrhoidal Ointment 1 Application(s) Rectal four times a day  insulin glargine Injectable (LANTUS) 5 Unit(s) SubCutaneous at bedtime  insulin lispro (ADMELOG) corrective regimen sliding scale   SubCutaneous three times a day before meals  insulin lispro (ADMELOG) corrective regimen sliding scale   SubCutaneous at bedtime  insulin lispro Injectable (ADMELOG) 6 Unit(s) SubCutaneous three times a day before meals  levothyroxine 75 MICROGram(s) Oral daily  memantine 10 milliGRAM(s) Oral two times a day  metoprolol succinate  milliGRAM(s) Oral every 12 hours  pantoprazole    Tablet 40 milliGRAM(s) Oral before breakfast  piperacillin/tazobactam IVPB.. 3.375 Gram(s) IV Intermittent every 12 hours  polyethylene glycol 3350 17 Gram(s) Oral daily  rivastigmine patch  9.5 mG/24 Hr(s) 1 Patch Transdermal every 24 hours  saccharomyces boulardii 250 milliGRAM(s) Oral two times a day  senna 2 Tablet(s) Oral at bedtime  tamsulosin 0.4 milliGRAM(s) Oral at bedtime    MEDICATIONS  (PRN):  acetaminophen     Tablet .. 650 milliGRAM(s) Oral every 6 hours PRN Temp greater or equal to 38C (100.4F), Mild Pain (1 - 3)  albuterol/ipratropium for Nebulization 3 milliLiter(s) Nebulizer every 6 hours PRN Shortness of Breath  aluminum hydroxide/magnesium hydroxide/simethicone Suspension 30 milliLiter(s) Oral every 4 hours PRN Dyspepsia  benzonatate 100 milliGRAM(s) Oral three times a day PRN Cough  bisacodyl Suppository 10 milliGRAM(s) Rectal daily PRN Constipation  dextrose Oral Gel 15 Gram(s) Oral once PRN Blood Glucose LESS THAN 70 milliGRAM(s)/deciliter  melatonin 3 milliGRAM(s) Oral at bedtime PRN Insomnia  ondansetron Injectable 4 milliGRAM(s) IV Push every 8 hours PRN Nausea and/or Vomiting         Vitals log        ICU Vital Signs Last 24 Hrs  T(C): 36.2 (09 Apr 2024 12:06), Max: 36.5 (09 Apr 2024 05:30)  T(F): 97.2 (09 Apr 2024 12:06), Max: 97.7 (09 Apr 2024 05:30)  HR: 80 (09 Apr 2024 12:06) (73 - 91)  BP: 160/81 (09 Apr 2024 12:06) (153/77 - 173/113)  BP(mean): --  ABP: --  ABP(mean): --  RR: 18 (09 Apr 2024 12:06) (18 - 18)  SpO2: 97% (09 Apr 2024 12:06) (91% - 97%)    O2 Parameters below as of 09 Apr 2024 12:06  Patient On (Oxygen Delivery Method): room air                 Input and Output:  I&O's Detail    08 Apr 2024 07:01  -  09 Apr 2024 07:00  --------------------------------------------------------  IN:  Total IN: 0 mL    OUT:    Voided (mL): 900 mL  Total OUT: 900 mL    Total NET: -900 mL          Lab Data                        9.8    17.32 )-----------( 293      ( 08 Apr 2024 08:45 )             29.2     04-09    141  |  112<H>  |  60<H>  ----------------------------<  147<H>  4.1   |  22  |  2.00<H>    Ca    8.7      09 Apr 2024 08:00    TPro  6.1  /  Alb  2.5<L>  /  TBili  0.6  /  DBili  x   /  AST  27  /  ALT  66  /  AlkPhos  80  04-08       Review of Systems:  Review of Systems: all 12 points of ROS examined. Neg exc mentioned      Allergies and Intolerances:        Allergies:  	No Known Allergies:     Home Medications:   * Patient Currently Takes Medications as of 02-Apr-2024 01:24 documented in Structured Notes  · 	tamsulosin 0.4 mg oral capsule: Last Dose Taken:  , 1 cap(s) orally once a day (at bedtime)  · 	Lexapro 10 mg oral tablet: Last Dose Taken:  , 1 tab(s) orally once a day  · 	saccharomyces boulardii lyo 250 mg oral capsule: Last Dose Taken:  , 1 cap(s) orally 2 times a day  · 	digoxin 125 mcg (0.125 mg) oral tablet: Last Dose Taken:  , 1 tab(s) orally every other day  · 	Januvia 25 mg oral tablet: Last Dose Taken:  , 1 tab(s) orally once a day   · 	metoprolol succinate 100 mg oral tablet, extended release: Last Dose Taken:  , 1 tab(s) orally 2 times a day   · 	levothyroxine 75 mcg (0.075 mg) oral tablet: Last Dose Taken:  , 1 tab(s) orally once a day  · 	Vitamin D3 50 mcg (2000 intl units) oral tablet: Last Dose Taken:  , 1 tab(s) orally once a day  · 	metFORMIN 1000 mg oral tablet: Last Dose Taken:  , 1 tab(s) orally 2 times a day  · 	Melatonin 10 mg oral capsule: Last Dose Taken:  , 1 cap(s) orally once a day (at bedtime)  · 	rivastigmine 9.5 mg/24 hr transdermal film, extended release: Last Dose Taken:  , 1 patch transdermal once a day  · 	memantine 10 mg oral tablet: Last Dose Taken:  , 1 tab(s) orally 2 times a day  · 	Eliquis 2.5 mg oral tablet: Last Dose Taken:  , 1 tab(s) orally 2 times a day    Patient History:    Social History:  · Substance use	No     Tobacco Screening:  · Core Measure Site	Yes  · Has the patient used tobacco in the past 30 days?	No    Risk Assessment:    Present on Admission:  Deep Venous Thrombosis	no  Pulmonary Embolus	no     HIV Screening:  · In accordance with [place] State law, we offer every patient who comes to our ED an HIV test. Would you like to be tested today?	Opt out     Review of Systems:  Review of Systems: all 12 points of ROS examined. Neg exc mentioned      Allergies and Intolerances:        Allergies:  	No Known Allergies:     Home Medications:   * Patient Currently Takes Medications as of 02-Apr-2024 01:24 documented in Structured Notes  · 	tamsulosin 0.4 mg oral capsule: Last Dose Taken:  , 1 cap(s) orally once a day (at bedtime)  · 	Lexapro 10 mg oral tablet: Last Dose Taken:  , 1 tab(s) orally once a day  · 	saccharomyces boulardii lyo 250 mg oral capsule: Last Dose Taken:  , 1 cap(s) orally 2 times a day  · 	digoxin 125 mcg (0.125 mg) oral tablet: Last Dose Taken:  , 1 tab(s) orally every other day  · 	Januvia 25 mg oral tablet: Last Dose Taken:  , 1 tab(s) orally once a day   · 	metoprolol succinate 100 mg oral tablet, extended release: Last Dose Taken:  , 1 tab(s) orally 2 times a day   · 	levothyroxine 75 mcg (0.075 mg) oral tablet: Last Dose Taken:  , 1 tab(s) orally once a day  · 	Vitamin D3 50 mcg (2000 intl units) oral tablet: Last Dose Taken:  , 1 tab(s) orally once a day  · 	metFORMIN 1000 mg oral tablet: Last Dose Taken:  , 1 tab(s) orally 2 times a day  · 	Melatonin 10 mg oral capsule: Last Dose Taken:  , 1 cap(s) orally once a day (at bedtime)  · 	rivastigmine 9.5 mg/24 hr transdermal film, extended release: Last Dose Taken:  , 1 patch transdermal once a day  · 	memantine 10 mg oral tablet: Last Dose Taken:  , 1 tab(s) orally 2 times a day  · 	Eliquis 2.5 mg oral tablet: Last Dose Taken:  , 1 tab(s) orally 2 times a day    Patient History:    Social History:  · Substance use	No     Tobacco Screening:  · Core Measure Site	Yes  · Has the patient used tobacco in the past 30 days?	No    Risk Assessment:    Present on Admission:  Deep Venous Thrombosis	no  Pulmonary Embolus	no     HIV Screening:  · In accordance with [place] State law, we offer every patient who comes to our ED an HIV test. Would you like to be tested today?	Opt out      Review of Systems	  weakness  on RA      Objective     Physical Examination    heart s1s2  lung dc bS  head nc  head at  extr - no gross edema      Pertinent Lab findings & Imaging      Julieth:  NO   Adequate UO     I&O's Detail    08 Apr 2024 07:01  -  09 Apr 2024 07:00  --------------------------------------------------------  IN:  Total IN: 0 mL    OUT:    Voided (mL): 900 mL  Total OUT: 900 mL    Total NET: -900 mL               Discussed with:     Cultures:	        Radiology    INTERPRETATION:  Evaluate for volume overload.    AP chest. Prior 4/1/2024    IMPRESSION: No change heart mediastinum. Vascular congestion, bibasilar   airspace disease and small bibasilar pleural effusions. Findings may all   be on the basis of CHF. Correlate clinically for concomitant infection.    --- End of Report ---            KYLIE KENNY MD; Attending Radiologist  This document has been electronically signed. Apr 4 2024  2:55PM    CONCLUSIONS:      1. Left ventricular cavity is normal in size. Left ventricular wall thickness is normal. Left ventricular systolic function is severely decreased with an ejection fraction of 23 % by Martin's method of disks. There are no regional wall motion abnormalities seen.   2. Mildly enlarged right ventricular cavity size, with normal wall thickness, and normal systolic function.   3. The left atrium is moderately dilated.   4. The right atrium is mildly dilated.   5. Structurally normal mitral valve with normal leaflet excursion. The leaflets are thickened.   6. Moderate mitral regurgitation. The degree of MR may be underestimated.   7. Trileaflet aortic valve with normal systolic excursion.   8. Mild aortic regurgitation.   9. Mild to moderate tricuspid regurgitation.  10. Estimated pulmonary artery systolic pressure is 57 mmHg, consistent with moderate pulmonary hypertension.  11. The inferior vena cava is dilated (dilated >2.1cm) with abnormal inspiratory collapse (abnormal <50%) consistent with elevated right atrial pressure (~15, range 10-20mmHg).  12. Large left pleural effusion noted.      LUNGS AND LARGE AIRWAYS: PLEURA:  There are patchy groundglass and airspace consolidations involving the   posterior segment right upper lobe and dependent and basal segments of   the right lower lobe.  Mild interlobular septal thickening in the bilateral upper lobes, could   reflect interstitial edema.  There are moderate-sized bilateral pleural effusions and associated   compressive atelectasis bilateral lower lobes.

## 2024-04-09 NOTE — CONSULT NOTE ADULT - CONSULT REASON
response to breathing treatment:
ams  weakness
CKD
Afib with RVR
COVID with acute respiratory failure

## 2024-04-09 NOTE — PROGRESS NOTE ADULT - PROBLEM SELECTOR PLAN 1
-Pt with acute hypoxia -- placed on HFNC,  titrated down to 6LNC, now saturating well on room air  -acute hypoxic respiratory failure likely multifactorial including pulmonary edema, COVID, suspected overlying bacterial PNA-Concern for flash pulmonary edema as an acute decompensation  -d-dimer elevated at 500  - Procal 0.37 -> 6.3 -> 2.7  -CT Angio chest, and CT abdomen and pelvis w/ IV contras: Multilobar pneumonia right lung. Septal thickening which may reflect interstitial edema. Bilateral pleural effusions with compressive atelectasis lower lobes. No acute pulmonary embolism. Cystitis and left pyeloureteritis. Distended rectum with fecal impaction.  -duoneb PRN -Tessalon pearls PRN for cough  -Completed 4/5 days Remdesivir, then discontinued due to worsening transaminitis  - STOP Decadron in setting of hallucinations -- leukocytosis likely reactive 2/2 steroids  - continue IV Zosyn until 4/9 AM  -ID consulted (Dr. Amezcua)  -isolation precautions

## 2024-04-09 NOTE — PROGRESS NOTE ADULT - SUBJECTIVE AND OBJECTIVE BOX
Patient is a 88y old  Female who presents with a chief complaint of weakness (03 Apr 2024 16:36)  Patient seen in follow up for CKD.        PAST MEDICAL HISTORY:  Atrial fibrillation    Hypothyroidism    Mild Alzheimer's dementia    Diabetes mellitus    Acute on chronic systolic congestive heart failure    Hypertension    Hyperlipemia    Cardiac LV ejection fraction 21-30%      MEDICATIONS  (STANDING):  apixaban 2.5 milliGRAM(s) Oral every 12 hours  bisacodyl Suppository 10 milliGRAM(s) Rectal once  dextrose 5%. 1000 milliLiter(s) (50 mL/Hr) IV Continuous <Continuous>  dextrose 5%. 1000 milliLiter(s) (100 mL/Hr) IV Continuous <Continuous>  dextrose 50% Injectable 25 Gram(s) IV Push once  dextrose 50% Injectable 25 Gram(s) IV Push once  dextrose 50% Injectable 12.5 Gram(s) IV Push once  digoxin     Tablet 125 MICROGram(s) Oral every other day  escitalopram 10 milliGRAM(s) Oral daily  FIRST- Mouthwash  BLM 15 milliLiter(s) Swish and Spit three times a day  glucagon  Injectable 1 milliGRAM(s) IntraMuscular once  hemorrhoidal Ointment 1 Application(s) Rectal four times a day  hydrALAZINE 25 milliGRAM(s) Oral every 8 hours  insulin glargine Injectable (LANTUS) 5 Unit(s) SubCutaneous at bedtime  insulin lispro (ADMELOG) corrective regimen sliding scale   SubCutaneous three times a day before meals  insulin lispro (ADMELOG) corrective regimen sliding scale   SubCutaneous at bedtime  insulin lispro Injectable (ADMELOG) 6 Unit(s) SubCutaneous three times a day before meals  levothyroxine 75 MICROGram(s) Oral daily  memantine 10 milliGRAM(s) Oral two times a day  metoprolol succinate  milliGRAM(s) Oral every 12 hours  pantoprazole    Tablet 40 milliGRAM(s) Oral before breakfast  piperacillin/tazobactam IVPB.. 3.375 Gram(s) IV Intermittent every 12 hours  polyethylene glycol 3350 17 Gram(s) Oral daily  rivastigmine patch  9.5 mG/24 Hr(s) 1 Patch Transdermal every 24 hours  saccharomyces boulardii 250 milliGRAM(s) Oral two times a day  senna 2 Tablet(s) Oral at bedtime  tamsulosin 0.4 milliGRAM(s) Oral at bedtime    MEDICATIONS  (PRN):  acetaminophen     Tablet .. 650 milliGRAM(s) Oral every 6 hours PRN Temp greater or equal to 38C (100.4F), Mild Pain (1 - 3)  albuterol/ipratropium for Nebulization 3 milliLiter(s) Nebulizer every 6 hours PRN Shortness of Breath  aluminum hydroxide/magnesium hydroxide/simethicone Suspension 30 milliLiter(s) Oral every 4 hours PRN Dyspepsia  benzonatate 100 milliGRAM(s) Oral three times a day PRN Cough  bisacodyl Suppository 10 milliGRAM(s) Rectal daily PRN Constipation  dextrose Oral Gel 15 Gram(s) Oral once PRN Blood Glucose LESS THAN 70 milliGRAM(s)/deciliter  melatonin 3 milliGRAM(s) Oral at bedtime PRN Insomnia  ondansetron Injectable 4 milliGRAM(s) IV Push every 8 hours PRN Nausea and/or Vomiting    T(C): 36.2 (04-09-24 @ 12:06), Max: 36.5 (04-09-24 @ 05:30)  HR: 80 (04-09-24 @ 12:06) (69 - 91)  BP: 160/81 (04-09-24 @ 12:06) (136/86 - 173/113)  RR: 18 (04-09-24 @ 12:06)  SpO2: 97% (04-09-24 @ 12:06)  Wt(kg): --  I&O's Detail    08 Apr 2024 07:01  -  09 Apr 2024 07:00  --------------------------------------------------------  IN:  Total IN: 0 mL    OUT:    Voided (mL): 900 mL  Total OUT: 900 mL    Total NET: -900 mL                    PHYSICAL EXAM:    General: No distress  Respiratory: b/l air entry  Cardiovascular: S1 S2  Gastrointestinal: soft  Extremities:  no edema                     LABORATORY:                        9.8    17.32 )-----------( 293      ( 08 Apr 2024 08:45 )             29.2     04-09    141  |  112<H>  |  60<H>  ----------------------------<  147<H>  4.1   |  22  |  2.00<H>    Ca    8.7      09 Apr 2024 08:00    TPro  6.1  /  Alb  2.5<L>  /  TBili  0.6  /  DBili  x   /  AST  27  /  ALT  66  /  AlkPhos  80  04-08    Sodium: 141 mmol/L (04-09 @ 08:00)  Sodium: 139 mmol/L (04-08 @ 08:45)    Potassium: 4.1 mmol/L (04-09 @ 08:00)  Potassium: 4.1 mmol/L (04-08 @ 08:45)    Hemoglobin: 9.8 g/dL (04-08 @ 08:45)  Hemoglobin: 10.0 g/dL (04-07 @ 06:35)    Creatinine, Serum 2.00 (04-09 @ 08:00)  Creatinine, Serum 2.10 (04-08 @ 08:45)  Creatinine, Serum 2.20 (04-07 @ 06:35)        LIVER FUNCTIONS - ( 08 Apr 2024 08:45 )  Alb: 2.5 g/dL / Pro: 6.1 g/dL / ALK PHOS: 80 U/L / ALT: 66 U/L / AST: 27 U/L / GGT: x           Urinalysis Basic - ( 09 Apr 2024 08:00 )    Color: x / Appearance: x / SG: x / pH: x  Gluc: 147 mg/dL / Ketone: x  / Bili: x / Urobili: x   Blood: x / Protein: x / Nitrite: x   Leuk Esterase: x / RBC: x / WBC x   Sq Epi: x / Non Sq Epi: x / Bacteria: x

## 2024-04-09 NOTE — PROGRESS NOTE ADULT - SUBJECTIVE AND OBJECTIVE BOX
Patient is a 88y old  Female who presents with a chief complaint of weakness (09 Apr 2024 07:31)      INTERVAL HPI/OVERNIGHT EVENTS: No acute overnight events. Pt seen/examined at bedside. Pt states that she feels well, no complaints. Denies pain, CP, SOB.     MEDICATIONS  (STANDING):  amLODIPine   Tablet 2.5 milliGRAM(s) Oral daily  apixaban 2.5 milliGRAM(s) Oral every 12 hours  bisacodyl Suppository 10 milliGRAM(s) Rectal once  dextrose 5%. 1000 milliLiter(s) (50 mL/Hr) IV Continuous <Continuous>  dextrose 5%. 1000 milliLiter(s) (100 mL/Hr) IV Continuous <Continuous>  dextrose 50% Injectable 25 Gram(s) IV Push once  dextrose 50% Injectable 25 Gram(s) IV Push once  dextrose 50% Injectable 12.5 Gram(s) IV Push once  digoxin     Tablet 125 MICROGram(s) Oral every other day  escitalopram 10 milliGRAM(s) Oral daily  FIRST- Mouthwash  BLM 15 milliLiter(s) Swish and Spit three times a day  glucagon  Injectable 1 milliGRAM(s) IntraMuscular once  hemorrhoidal Ointment 1 Application(s) Rectal four times a day  insulin glargine Injectable (LANTUS) 5 Unit(s) SubCutaneous at bedtime  insulin lispro (ADMELOG) corrective regimen sliding scale   SubCutaneous three times a day before meals  insulin lispro (ADMELOG) corrective regimen sliding scale   SubCutaneous at bedtime  insulin lispro Injectable (ADMELOG) 6 Unit(s) SubCutaneous three times a day before meals  levothyroxine 75 MICROGram(s) Oral daily  memantine 10 milliGRAM(s) Oral two times a day  metoprolol succinate  milliGRAM(s) Oral every 12 hours  pantoprazole    Tablet 40 milliGRAM(s) Oral before breakfast  piperacillin/tazobactam IVPB.. 3.375 Gram(s) IV Intermittent every 12 hours  polyethylene glycol 3350 17 Gram(s) Oral daily  rivastigmine patch  9.5 mG/24 Hr(s) 1 Patch Transdermal every 24 hours  saccharomyces boulardii 250 milliGRAM(s) Oral two times a day  senna 2 Tablet(s) Oral at bedtime  tamsulosin 0.4 milliGRAM(s) Oral at bedtime    MEDICATIONS  (PRN):  acetaminophen     Tablet .. 650 milliGRAM(s) Oral every 6 hours PRN Temp greater or equal to 38C (100.4F), Mild Pain (1 - 3)  albuterol/ipratropium for Nebulization 3 milliLiter(s) Nebulizer every 6 hours PRN Shortness of Breath  aluminum hydroxide/magnesium hydroxide/simethicone Suspension 30 milliLiter(s) Oral every 4 hours PRN Dyspepsia  benzonatate 100 milliGRAM(s) Oral three times a day PRN Cough  bisacodyl Suppository 10 milliGRAM(s) Rectal daily PRN Constipation  dextrose Oral Gel 15 Gram(s) Oral once PRN Blood Glucose LESS THAN 70 milliGRAM(s)/deciliter  melatonin 3 milliGRAM(s) Oral at bedtime PRN Insomnia  ondansetron Injectable 4 milliGRAM(s) IV Push every 8 hours PRN Nausea and/or Vomiting      Allergies    No Known Allergies    Intolerances        REVIEW OF SYSTEMS:  CONSTITUTIONAL: No fever or chills  HEENT:  No headache, no sore throat  RESPIRATORY: + cough,  No wheezing, or shortness of breath  CARDIOVASCULAR: No chest pain, palpitations  GASTROINTESTINAL: No abd pain, nausea, vomiting, or diarrhea  GENITOURINARY: No dysuria, frequency, or hematuria  NEUROLOGICAL: no focal weakness or dizziness  MUSCULOSKELETAL: no myalgias     Vital Signs Last 24 Hrs  T(C): 36.2 (09 Apr 2024 12:06), Max: 36.5 (09 Apr 2024 05:30)  T(F): 97.2 (09 Apr 2024 12:06), Max: 97.7 (09 Apr 2024 05:30)  HR: 80 (09 Apr 2024 12:06) (73 - 91)  BP: 160/81 (09 Apr 2024 12:06) (153/77 - 173/113)  BP(mean): --  RR: 18 (09 Apr 2024 12:06) (18 - 18)  SpO2: 97% (09 Apr 2024 12:06) (91% - 97%)    Parameters below as of 09 Apr 2024 12:06  Patient On (Oxygen Delivery Method): room air        PHYSICAL EXAM:  GENERAL: NAD, on room air  HEENT:  anicteric, moist mucous membranes, + oral thrush  CHEST/LUNG:  CTA b/l, no rales, wheezes, or rhonchi  HEART:  irregularly irregular, S1, S2 , no tachy   ABDOMEN:  BS+, soft, nontender, nondistended  EXTREMITIES: no edema, cyanosis, or calf tenderness  NERVOUS SYSTEM: A&O x2, answers questions and follows commands appropriately    LABS:    CBC Full  -  ( 08 Apr 2024 08:45 )  WBC Count : 17.32 K/uL  Hemoglobin : 9.8 g/dL  Hematocrit : 29.2 %  Platelet Count - Automated : 293 K/uL  Mean Cell Volume : 82.3 fl  Mean Cell Hemoglobin : 27.6 pg  Mean Cell Hemoglobin Concentration : 33.6 gm/dL  Auto Neutrophil # : 15.29 K/uL  Auto Lymphocyte # : 0.88 K/uL  Auto Monocyte # : 0.94 K/uL  Auto Eosinophil # : 0.01 K/uL  Auto Basophil # : 0.03 K/uL  Auto Neutrophil % : 88.2 %  Auto Lymphocyte % : 5.1 %  Auto Monocyte % : 5.4 %  Auto Eosinophil % : 0.1 %  Auto Basophil % : 0.2 %    09 Apr 2024 08:00    141    |  112    |  60     ----------------------------<  147    4.1     |  22     |  2.00     Ca    8.7        09 Apr 2024 08:00        Urinalysis Basic - ( 09 Apr 2024 08:00 )    Color: x / Appearance: x / SG: x / pH: x  Gluc: 147 mg/dL / Ketone: x  / Bili: x / Urobili: x   Blood: x / Protein: x / Nitrite: x   Leuk Esterase: x / RBC: x / WBC x   Sq Epi: x / Non Sq Epi: x / Bacteria: x      CAPILLARY BLOOD GLUCOSE      POCT Blood Glucose.: 168 mg/dL (09 Apr 2024 11:59)  POCT Blood Glucose.: 132 mg/dL (09 Apr 2024 08:33)  POCT Blood Glucose.: 206 mg/dL (08 Apr 2024 21:19)  POCT Blood Glucose.: 243 mg/dL (08 Apr 2024 17:13)          RADIOLOGY & ADDITIONAL TESTS:    Personally reviewed.     Consultant(s) Notes Reviewed:  [x] YES  [ ] NO     Patient is a 88y old  Female who presents with a chief complaint of weakness (09 Apr 2024 07:31)      INTERVAL HPI/OVERNIGHT EVENTS: No acute overnight events. Pt seen/examined at bedside. Pt states that she feels well, no complaints. Denies pain, CP, SOB.     MEDICATIONS  (STANDING):  amLODIPine   Tablet 2.5 milliGRAM(s) Oral daily  apixaban 2.5 milliGRAM(s) Oral every 12 hours  bisacodyl Suppository 10 milliGRAM(s) Rectal once  dextrose 5%. 1000 milliLiter(s) (50 mL/Hr) IV Continuous <Continuous>  dextrose 5%. 1000 milliLiter(s) (100 mL/Hr) IV Continuous <Continuous>  dextrose 50% Injectable 25 Gram(s) IV Push once  dextrose 50% Injectable 25 Gram(s) IV Push once  dextrose 50% Injectable 12.5 Gram(s) IV Push once  digoxin     Tablet 125 MICROGram(s) Oral every other day  escitalopram 10 milliGRAM(s) Oral daily  FIRST- Mouthwash  BLM 15 milliLiter(s) Swish and Spit three times a day  glucagon  Injectable 1 milliGRAM(s) IntraMuscular once  hemorrhoidal Ointment 1 Application(s) Rectal four times a day  insulin glargine Injectable (LANTUS) 5 Unit(s) SubCutaneous at bedtime  insulin lispro (ADMELOG) corrective regimen sliding scale   SubCutaneous three times a day before meals  insulin lispro (ADMELOG) corrective regimen sliding scale   SubCutaneous at bedtime  insulin lispro Injectable (ADMELOG) 6 Unit(s) SubCutaneous three times a day before meals  levothyroxine 75 MICROGram(s) Oral daily  memantine 10 milliGRAM(s) Oral two times a day  metoprolol succinate  milliGRAM(s) Oral every 12 hours  pantoprazole    Tablet 40 milliGRAM(s) Oral before breakfast  piperacillin/tazobactam IVPB.. 3.375 Gram(s) IV Intermittent every 12 hours  polyethylene glycol 3350 17 Gram(s) Oral daily  rivastigmine patch  9.5 mG/24 Hr(s) 1 Patch Transdermal every 24 hours  saccharomyces boulardii 250 milliGRAM(s) Oral two times a day  senna 2 Tablet(s) Oral at bedtime  tamsulosin 0.4 milliGRAM(s) Oral at bedtime    MEDICATIONS  (PRN):  acetaminophen     Tablet .. 650 milliGRAM(s) Oral every 6 hours PRN Temp greater or equal to 38C (100.4F), Mild Pain (1 - 3)  albuterol/ipratropium for Nebulization 3 milliLiter(s) Nebulizer every 6 hours PRN Shortness of Breath  aluminum hydroxide/magnesium hydroxide/simethicone Suspension 30 milliLiter(s) Oral every 4 hours PRN Dyspepsia  benzonatate 100 milliGRAM(s) Oral three times a day PRN Cough  bisacodyl Suppository 10 milliGRAM(s) Rectal daily PRN Constipation  dextrose Oral Gel 15 Gram(s) Oral once PRN Blood Glucose LESS THAN 70 milliGRAM(s)/deciliter  melatonin 3 milliGRAM(s) Oral at bedtime PRN Insomnia  ondansetron Injectable 4 milliGRAM(s) IV Push every 8 hours PRN Nausea and/or Vomiting      Allergies    No Known Allergies    Intolerances        REVIEW OF SYSTEMS:  CONSTITUTIONAL: No fever or chills  HEENT:  No headache, no sore throat  RESPIRATORY: + cough,  No wheezing, or shortness of breath  CARDIOVASCULAR: No chest pain, palpitations  GASTROINTESTINAL: No abd pain, nausea, vomiting, or diarrhea  GENITOURINARY: No dysuria, frequency, or hematuria  NEUROLOGICAL: no focal weakness or dizziness  MUSCULOSKELETAL: no myalgias     Vital Signs Last 24 Hrs  T(C): 36.2 (09 Apr 2024 12:06), Max: 36.5 (09 Apr 2024 05:30)  T(F): 97.2 (09 Apr 2024 12:06), Max: 97.7 (09 Apr 2024 05:30)  HR: 80 (09 Apr 2024 12:06) (73 - 91)  BP: 160/81 (09 Apr 2024 12:06) (153/77 - 173/113)  BP(mean): --  RR: 18 (09 Apr 2024 12:06) (18 - 18)  SpO2: 97% (09 Apr 2024 12:06) (91% - 97%)    Parameters below as of 09 Apr 2024 12:06  Patient On (Oxygen Delivery Method): room air        PHYSICAL EXAM:  GENERAL: NAD, on room air  HEENT:  anicteric, moist mucous membranes, + oral thrush  CHEST/LUNG:  CTA b/l, no rales, wheezes, or rhonchi  HEART:  irregularly irregular, S1, S2 , no tachy   ABDOMEN:  BS+, soft, nontender, nondistended  EXTREMITIES: no edema, cyanosis, or calf tenderness  NERVOUS SYSTEM: A&O x2, answers questions and follows commands appropriately  gu intact   LABS:    CBC Full  -  ( 08 Apr 2024 08:45 )  WBC Count : 17.32 K/uL  Hemoglobin : 9.8 g/dL  Hematocrit : 29.2 %  Platelet Count - Automated : 293 K/uL  Mean Cell Volume : 82.3 fl  Mean Cell Hemoglobin : 27.6 pg  Mean Cell Hemoglobin Concentration : 33.6 gm/dL  Auto Neutrophil # : 15.29 K/uL  Auto Lymphocyte # : 0.88 K/uL  Auto Monocyte # : 0.94 K/uL  Auto Eosinophil # : 0.01 K/uL  Auto Basophil # : 0.03 K/uL  Auto Neutrophil % : 88.2 %  Auto Lymphocyte % : 5.1 %  Auto Monocyte % : 5.4 %  Auto Eosinophil % : 0.1 %  Auto Basophil % : 0.2 %    09 Apr 2024 08:00    141    |  112    |  60     ----------------------------<  147    4.1     |  22     |  2.00     Ca    8.7        09 Apr 2024 08:00        Urinalysis Basic - ( 09 Apr 2024 08:00 )    Color: x / Appearance: x / SG: x / pH: x  Gluc: 147 mg/dL / Ketone: x  / Bili: x / Urobili: x   Blood: x / Protein: x / Nitrite: x   Leuk Esterase: x / RBC: x / WBC x   Sq Epi: x / Non Sq Epi: x / Bacteria: x      CAPILLARY BLOOD GLUCOSE      POCT Blood Glucose.: 168 mg/dL (09 Apr 2024 11:59)  POCT Blood Glucose.: 132 mg/dL (09 Apr 2024 08:33)  POCT Blood Glucose.: 206 mg/dL (08 Apr 2024 21:19)  POCT Blood Glucose.: 243 mg/dL (08 Apr 2024 17:13)          RADIOLOGY & ADDITIONAL TESTS:    Personally reviewed.     Consultant(s) Notes Reviewed:  [x] YES  [ ] NO

## 2024-04-09 NOTE — PROGRESS NOTE ADULT - ASSESSMENT
89 y/o female with PMH of HFrEF of 20-25 %, dementia, Afib, hypothyroidism, DM who presented to ED to be evaluated for weakness, found to have +UTI and Covid positive     Persistent Atrial Fibrillation, COVID  - Known hx of Afib, with RVR episode in the setting of catecholamine surge 2/2 recent acute stressor (UTI, COVID)   - Remains rate-controlled  - Continue BB   - Continue Digoxin 0.125 QOD.  Please, obtain level in am  - Continue Eliquis    - EKG Afib w/ RVR, unchanged from prior   - No anginal complaints   - No evidence of any active ischemia     - TTE (12/2022): EF 20-25%, apex and anterior wall akinesis, inferior wall hypokinesis, mod MR, mild TR  - Known to our service, from last admission, she has known LV Dysfunction from TTE likely from a missed MI was decided for medical management given her frailty, increased creatinine at that time  -ECHO 4/8/24 as above lv ef 23%  normal systolic function, left atrium moderately dilatated moderate MR , mild AR moderate pulmonary hypertension large left pleural effusion    -iv lasix has been held for YVETTE , large effusion noted on echo, would obtain Pulm consult for possible thoracentesis     - For GDMT Continue Toprol XL  - Unable to start ace/arb given YVETTE   - Needs repeat TTE    - - +COVID management per primary    - Monitor and replete lytes, keep K>4, Mg>2.  - Will continue to follow. 89 y/o female with PMH of HFrEF of 20-25 %, dementia, Afib, hypothyroidism, DM who presented to ED to be evaluated for weakness, found to have +UTI and Covid positive     Persistent Atrial Fibrillation, COVID  - Known hx of Afib, with RVR episode in the setting of catecholamine surge 2/2 recent acute stressor (UTI, COVID)   - Remains rate-controlled  - Continue BB   - Continue Digoxin 0.125 QOD. dig level 0.8 4/8   - Continue Eliquis    - EKG Afib w/ RVR, unchanged from prior   - No anginal complaints   - No evidence of any active ischemia     - TTE (12/2022): EF 20-25%, apex and anterior wall akinesis, inferior wall hypokinesis, mod MR, mild TR  - Known to our service, from last admission, she has known LV Dysfunction from TTE likely from a missed MI was decided for medical management given her frailty, increased creatinine at that time  -ECHO 4/8/24 as above lv ef 23%  normal systolic function, left atrium moderately dilatated moderate MR , mild AR moderate pulmonary hypertension large left pleural effusion    -iv lasix has been held for YVETTE , large effusion noted on echo, would obtain Pulm consult for possible thoracentesis     - For GDMT Continue Toprol XL  - Unable to start ace/arb given YVETTE     - - +COVID management per primary    - Monitor and replete lytes, keep K>4, Mg>2.  - Will continue to follow. 89 y/o female with PMH of HFrEF of 20-25 %, dementia, Afib, hypothyroidism, DM who presented to ED to be evaluated for weakness, found to have +UTI and Covid positive     Persistent Atrial Fibrillation, COVID  - Known hx of Afib, with RVR episode in the setting of catecholamine surge 2/2 recent acute stressor (UTI, COVID)   - Remains rate-controlled  - Continue BB   - Continue Digoxin 0.125 QOD. dig level 0.8 4/8   - Continue Eliquis    - EKG Afib w/ RVR, unchanged from prior   - No anginal complaints   - No evidence of any active ischemia     - TTE (12/2022): EF 20-25%, apex and anterior wall akinesis, inferior wall hypokinesis, mod MR, mild TR  - Known to our service, from last admission, she has known LV Dysfunction from TTE likely from a missed MI was decided for medical management given her frailty, increased creatinine at that time  -ECHO 4/8/24 as above lv ef 23%  normal systolic function, left atrium moderately dilatated moderate MR , mild AR moderate pulmonary hypertension large left pleural effusion    -iv lasix has been held for YVETTE , large effusion noted on echo, would obtain Pulm consult for possible thoracentesis     - For GDMT Continue Toprol XL  - Unable to start ace/arb given YVETTE     - +COVID management per primary    - Monitor and replete lytes, keep K>4, Mg>2.  - Will continue to follow. HLD (hyperlipidemia)

## 2024-04-09 NOTE — PROGRESS NOTE ADULT - SUBJECTIVE AND OBJECTIVE BOX
[facility] -- Dylan Hernandez Pannella, Patel, Savella Goodger, Cohen  Office # [phone]      Follow Up:    Af , Heart Failure   Subjective/Observations:     Seen at bedside, on room air   unable to provide any meaningful information in NAD     REVIEW OF SYSTEMS: All other review of systems is negative unless indicated above    PAST MEDICAL & SURGICAL HISTORY:  Atrial fibrillation      Hypothyroidism      Mild Alzheimer's dementia      Diabetes mellitus      Acute on chronic systolic congestive heart failure      Hypertension      Hyperlipemia      Cardiac LV ejection fraction 21-30%        History of appendectomy      H/O hernia repair      History of cholecystectomy          MEDICATIONS  (STANDING):  amLODIPine   Tablet 2.5 milliGRAM(s) Oral daily  apixaban 2.5 milliGRAM(s) Oral every 12 hours  bisacodyl Suppository 10 milliGRAM(s) Rectal once  dextrose 5%. 1000 milliLiter(s) (50 mL/Hr) IV Continuous <Continuous>  dextrose 5%. 1000 milliLiter(s) (100 mL/Hr) IV Continuous <Continuous>  dextrose 50% Injectable 12.5 Gram(s) IV Push once  dextrose 50% Injectable 25 Gram(s) IV Push once  dextrose 50% Injectable 25 Gram(s) IV Push once  digoxin     Tablet 125 MICROGram(s) Oral every other day  escitalopram 10 milliGRAM(s) Oral daily  FIRST- Mouthwash  BLM 15 milliLiter(s) Swish and Spit three times a day  glucagon  Injectable 1 milliGRAM(s) IntraMuscular once  hemorrhoidal Ointment 1 Application(s) Rectal four times a day  insulin glargine Injectable (LANTUS) 5 Unit(s) SubCutaneous at bedtime  insulin lispro (ADMELOG) corrective regimen sliding scale   SubCutaneous three times a day before meals  insulin lispro (ADMELOG) corrective regimen sliding scale   SubCutaneous at bedtime  insulin lispro Injectable (ADMELOG) 6 Unit(s) SubCutaneous three times a day before meals  levothyroxine 75 MICROGram(s) Oral daily  memantine 10 milliGRAM(s) Oral two times a day  metoprolol succinate  milliGRAM(s) Oral every 12 hours  pantoprazole    Tablet 40 milliGRAM(s) Oral before breakfast  piperacillin/tazobactam IVPB.. 3.375 Gram(s) IV Intermittent every 12 hours  polyethylene glycol 3350 17 Gram(s) Oral daily  rivastigmine patch  9.5 mG/24 Hr(s) 1 Patch Transdermal every 24 hours  saccharomyces boulardii 250 milliGRAM(s) Oral two times a day  senna 2 Tablet(s) Oral at bedtime  tamsulosin 0.4 milliGRAM(s) Oral at bedtime    MEDICATIONS  (PRN):  acetaminophen     Tablet .. 650 milliGRAM(s) Oral every 6 hours PRN Temp greater or equal to 38C (100.4F), Mild Pain (1 - 3)  albuterol/ipratropium for Nebulization 3 milliLiter(s) Nebulizer every 6 hours PRN Shortness of Breath  aluminum hydroxide/magnesium hydroxide/simethicone Suspension 30 milliLiter(s) Oral every 4 hours PRN Dyspepsia  benzonatate 100 milliGRAM(s) Oral three times a day PRN Cough  bisacodyl Suppository 10 milliGRAM(s) Rectal daily PRN Constipation  dextrose Oral Gel 15 Gram(s) Oral once PRN Blood Glucose LESS THAN 70 milliGRAM(s)/deciliter  melatonin 3 milliGRAM(s) Oral at bedtime PRN Insomnia  ondansetron Injectable 4 milliGRAM(s) IV Push every 8 hours PRN Nausea and/or Vomiting      Allergies    No Known Allergies    Intolerances        Vital Signs Last 24 Hrs  T(C): 36.5 (2024 05:30), Max: 36.5 (2024 05:30)  T(F): 97.7 (2024 05:30), Max: 97.7 (2024 05:30)  HR: 73 (2024 05:30) (69 - 91)  BP: 153/77 (2024 05:30) (153/77 - 173/113)  BP(mean): --  RR: 18 (2024 05:30) (18 - 18)  SpO2: 91% (2024 05:30) (91% - 93%)    Parameters below as of 2024 05:30  Patient On (Oxygen Delivery Method): room air        I&O's Summary    2024 07:01  -  2024 07:00  --------------------------------------------------------  IN: 0 mL / OUT: 900 mL / NET: -900 mL          PHYSICAL EXAM:  TELE: af  Constitutional: NAD, awake and alert, well-developed  HEENT: Moist Mucous Membranes, Anicteric  Pulmonary: Non-labored, breath sounds are DIM   Cardiovascular: IRRegular, S1 and S2 nl, No murmurs, rubs, gallops or clicks  Gastrointestinal: Bowel Sounds present, soft, nontender.   Lymph: No lymphadenopathy. No peripheral edema.  Skin: No visible rashes or ulcers.  Psych:  Mood & affect appropriate    LABS: All Labs Reviewed:                        9.8    17.32 )-----------( 293      ( 2024 08:45 )             29.2                         10.0   17.89 )-----------( 287      ( 2024 06:35 )             31.4                         10.6   18.01 )-----------( 281      ( 2024 09:45 )             32.9     2024 08:45    139    |  110    |  63     ----------------------------<  206    4.1     |  18     |  2.10   2024 06:35    138    |  105    |  68     ----------------------------<  190    3.4     |  20     |  2.20   2024 09:45    139    |  108    |  68     ----------------------------<  202    3.8     |  19     |  2.40     Ca    8.5        2024 08:45  Ca    8.3        2024 06:35  Ca    8.4        2024 09:45  Phos  3.9       2024 09:45  Mg     2.2       2024 09:45    TPro  6.1    /  Alb  2.5    /  TBili  0.6    /  DBili  x      /  AST  27     /  ALT  66     /  AlkPhos  80     2024 08:45  TPro  6.1    /  Alb  2.4    /  TBili  0.5    /  DBili  x      /  AST  31     /  ALT  75     /  AlkPhos  101    2024 06:35  TPro  6.4    /  Alb  2.4    /  TBili  0.5    /  DBili  x      /  AST  42     /  ALT  97     /  AlkPhos  96     2024 09:45             EC Lead ECG:   Ventricular Rate 127 BPM    QRS Duration 84 ms    Q-T Interval 278 ms    QTC Calculation(Bazett) 404 ms    R Axis 28 degrees    T Axis -61 degrees    Diagnosis Line Atrial fibrillation with rapid ventricular response  Low voltage QRS  Septal infarct , age undetermined  Abnormal ECG  No previous ECGs available  Confirmed by rogerio Harvey ([id number]) on 2024 2:48:52 PM (24 @ 19:24)      TRANSTHORACIC ECHOCARDIOGRAM REPORT  ________________________________________________________________________________                                      _______       Pt. Name:       LEXY EISENBERG Study Date:    2024  MRN:            [id number]     YOB: 1935  Accession #:    [id number]     Age:           88 years  Account#:       [id number]    Gender:        F  Heart Rate:                   Height:        62.99 in (160.00 cm)  Rhythm:                       Weight:        99.21 lb (45.00 kg)  Blood Pressure: 104/70 mmHg   BSA/BMI:       1.44 m² / 17.58 kg/m²  ________________________________________________________________________________________  Referring Physician:    [id number] Maritza Abad  Interpreting Physician: Harpal Grider MD  Primary Sonographer:    Sandra RINCON    CPT:               ECHO TTE WO CON COMP W DOPP - 92172.m  Indication(s):     Heart failure, unspecified - I50.9  Procedure:         Transthoracic echocardiogram with 2-D, M-mode and complete                     spectral and color flow Doppler.  Ordering Location[facility]  Admission Status:  Inpatient    _______________________________________________________________________________________     CONCLUSIONS:      1. Left ventricular cavity is normal in size. Left ventricular wall thickness is normal. Left ventricular systolic function is severely decreased with an ejection fraction of 23 % by Martin's method of disks. There are no regional wall motion abnormalities seen.   2. Mildly enlarged right ventricular cavity size, with normal wall thickness, and normal systolic function.   3. The left atrium is moderately dilated.   4. The right atrium is mildly dilated.   5. Structurally normal mitral valve with normal leaflet excursion. The leaflets are thickened.   6. Moderate mitral regurgitation. The degree of MR may be underestimated.   7. Trileaflet aortic valve with normal systolic excursion.   8. Mild aortic regurgitation.   9. Mild to moderate tricuspid regurgitation.  10. Estimated pulmonary artery systolic pressure is 57 mmHg, consistent with moderate pulmonary hypertension.  11. The inferior vena cava is dilated (dilated >2.1cm) with abnormal inspiratory collapse (abnormal <50%) consistent with elevated right atrial pressure (~15, range 10-20mmHg).  12. Large left pleural effusion noted.    ________________________________________________________________________________________  FINDINGS:     Left Ventricle:  The left ventricular cavity is normal in size. Left ventricular wall thickness is normal. Left ventricular systolic function is severely decreased with a calculated ejection fraction of 23 % by the Martin's biplane method of disks. There are no regional wall motion abnormalities seen. The left ventricular diastolic function is indeterminate.     Right Ventricle:  The right ventricular cavity is mildly enlarged in size, with normal wall thickness and normal systolic function.     Left Atrium:  The left atrium is moderately dilated.     Right Atrium:  The right atrium is mildly dilated.     Aortic Valve:  The aortic valve appears trileaflet with normal systolic excursion. There is no aortic valve stenosis. There is mild aortic regurgitation. AI VMax is 5.03 m/s. AI pressure half time is 670 msec. AI slope is 2.20 m/s².     Mitral Valve:  Structurally normal mitral valve with normal leaflet excursion. There is mitral valve thickening of the anterior and posterior leaflets. There is moderate mitral regurgitation.     Tricuspid Valve:  Structurally normal tricuspid valve with normal leaflet excursion. There is mild to moderate tricuspid regurgitation. Estimated pulmonary artery systolic pressure is 57 mmHg, consistent with moderate pulmonary hypertension.     Pulmonic Valve:  The pulmonic valve wasnot well visualized. There is mild to moderate pulmonic regurgitation.     Pericardium:  No pericardial effusion seen.     Pleura:  Large left pleural effusion noted.     Systemic Veins:  The inferior vena cava is dilated (dilated >2.1cm) with abnormal inspiratory collapse (abnormal <50%) consistent with elevated right atrial pressure (~15, range 10-20mmHg).  ____________________________________________________________________  QUANTITATIVE DATA:  Left Ventricle Measurements: (Indexed to BSA)     IVSd (2D):   0.9 cm  LVPWd (2D):  0.8 cm  LVIDd (2D):  4.8 cm  LVIDs (2D):  4.3 cm  LV Mass:     142 g  98.8 g/m²  BiPlane LV EF%: 23 %     MV E Vmax:    0.90 m/s  MV A Vmax:    0.33 m/s  MV E/A:       2.74  e' lateral:   8.50 cm/s  e' medial:    6.70 cm/s  E/e' lateral: 10.58  E/e' medial:  13.43  E/e' Average: 11.84  MV DT:        121 msec    Aorta Measurements: (Normal range) (Indexed to BSA)     Sinuses of Valsalva: 2.99 cm (2.7 - 3.3 cm)       Left Atrium Measurements: (Indexed to BSA)  LA Diam 2D:        3.05 cm  LA Vol s, MOD A4C: 39.35 ml.       LVOT / RVOT/ Qp/Qs Data: (Indexed to BSA)  LVOT Diameter: 2.00 cm    Aortic Valve Measurements:  AR Vmax       5.03 m/s  AR PHT        670 msec  AR Bullitt      2.20 m/s²  AR Decel Time 2312 msec    Mitral Valve Measurements:     MV E Vmax: 0.9 m/s  MV A Vmax: 0.3 m/s  MV E/A:    2.7       Tricuspid Valve Measurements:     TR Vmax:          3.2 m/s  TR Peak Gradient: 42.2 mmHg  RA Pressure:      15 mmHg  PASP:             57 mmHg    ________________________________________________________________________________________  Electronically signed on 2024 at 4:00:07 PM by Harpal Grider MD         *** Final ***      Radiology:         [facility] -- Dylan Hernandez Pannella, Patel, Savella Goodger, Cohen  Office # [phone]      Follow Up:    Af , Heart Failure   Subjective/Observations:     Seen at bedside, on room air   unable to provide any meaningful information in NAD       REVIEW OF SYSTEMS: All other review of systems is negative unless indicated above    PAST MEDICAL & SURGICAL HISTORY:  Atrial fibrillation      Hypothyroidism      Mild Alzheimer's dementia      Diabetes mellitus      Acute on chronic systolic congestive heart failure      Hypertension      Hyperlipemia      Cardiac LV ejection fraction 21-30%        History of appendectomy      H/O hernia repair      History of cholecystectomy          MEDICATIONS  (STANDING):  amLODIPine   Tablet 2.5 milliGRAM(s) Oral daily  apixaban 2.5 milliGRAM(s) Oral every 12 hours  bisacodyl Suppository 10 milliGRAM(s) Rectal once  dextrose 5%. 1000 milliLiter(s) (50 mL/Hr) IV Continuous <Continuous>  dextrose 5%. 1000 milliLiter(s) (100 mL/Hr) IV Continuous <Continuous>  dextrose 50% Injectable 12.5 Gram(s) IV Push once  dextrose 50% Injectable 25 Gram(s) IV Push once  dextrose 50% Injectable 25 Gram(s) IV Push once  digoxin     Tablet 125 MICROGram(s) Oral every other day  escitalopram 10 milliGRAM(s) Oral daily  FIRST- Mouthwash  BLM 15 milliLiter(s) Swish and Spit three times a day  glucagon  Injectable 1 milliGRAM(s) IntraMuscular once  hemorrhoidal Ointment 1 Application(s) Rectal four times a day  insulin glargine Injectable (LANTUS) 5 Unit(s) SubCutaneous at bedtime  insulin lispro (ADMELOG) corrective regimen sliding scale   SubCutaneous three times a day before meals  insulin lispro (ADMELOG) corrective regimen sliding scale   SubCutaneous at bedtime  insulin lispro Injectable (ADMELOG) 6 Unit(s) SubCutaneous three times a day before meals  levothyroxine 75 MICROGram(s) Oral daily  memantine 10 milliGRAM(s) Oral two times a day  metoprolol succinate  milliGRAM(s) Oral every 12 hours  pantoprazole    Tablet 40 milliGRAM(s) Oral before breakfast  piperacillin/tazobactam IVPB.. 3.375 Gram(s) IV Intermittent every 12 hours  polyethylene glycol 3350 17 Gram(s) Oral daily  rivastigmine patch  9.5 mG/24 Hr(s) 1 Patch Transdermal every 24 hours  saccharomyces boulardii 250 milliGRAM(s) Oral two times a day  senna 2 Tablet(s) Oral at bedtime  tamsulosin 0.4 milliGRAM(s) Oral at bedtime    MEDICATIONS  (PRN):  acetaminophen     Tablet .. 650 milliGRAM(s) Oral every 6 hours PRN Temp greater or equal to 38C (100.4F), Mild Pain (1 - 3)  albuterol/ipratropium for Nebulization 3 milliLiter(s) Nebulizer every 6 hours PRN Shortness of Breath  aluminum hydroxide/magnesium hydroxide/simethicone Suspension 30 milliLiter(s) Oral every 4 hours PRN Dyspepsia  benzonatate 100 milliGRAM(s) Oral three times a day PRN Cough  bisacodyl Suppository 10 milliGRAM(s) Rectal daily PRN Constipation  dextrose Oral Gel 15 Gram(s) Oral once PRN Blood Glucose LESS THAN 70 milliGRAM(s)/deciliter  melatonin 3 milliGRAM(s) Oral at bedtime PRN Insomnia  ondansetron Injectable 4 milliGRAM(s) IV Push every 8 hours PRN Nausea and/or Vomiting      Allergies    No Known Allergies    Intolerances        Vital Signs Last 24 Hrs  T(C): 36.5 (2024 05:30), Max: 36.5 (2024 05:30)  T(F): 97.7 (2024 05:30), Max: 97.7 (2024 05:30)  HR: 73 (2024 05:30) (69 - 91)  BP: 153/77 (2024 05:30) (153/77 - 173/113)  BP(mean): --  RR: 18 (2024 05:30) (18 - 18)  SpO2: 91% (2024 05:30) (91% - 93%)    Parameters below as of 2024 05:30  Patient On (Oxygen Delivery Method): room air        I&O's Summary    2024 07:01  -  2024 07:00  --------------------------------------------------------  IN: 0 mL / OUT: 900 mL / NET: -900 mL          PHYSICAL EXAM:  TELE: af  Constitutional: NAD, awake and alert, well-developed  HEENT: Moist Mucous Membranes, Anicteric  Pulmonary: Non-labored, breath sounds are DIM   Cardiovascular: IRRegular, S1 and S2 nl, No murmurs, rubs, gallops or clicks  Gastrointestinal: Bowel Sounds present, soft, nontender.   Lymph: No lymphadenopathy. No peripheral edema.  Skin: No visible rashes or ulcers.  Psych:  Mood & affect appropriate    LABS: All Labs Reviewed:                        9.8    17.32 )-----------( 293      ( 2024 08:45 )             29.2                         10.0   17.89 )-----------( 287      ( 2024 06:35 )             31.4                         10.6   18.01 )-----------( 281      ( 2024 09:45 )             32.9     2024 08:45    139    |  110    |  63     ----------------------------<  206    4.1     |  18     |  2.10   2024 06:35    138    |  105    |  68     ----------------------------<  190    3.4     |  20     |  2.20   2024 09:45    139    |  108    |  68     ----------------------------<  202    3.8     |  19     |  2.40     Ca    8.5        2024 08:45  Ca    8.3        2024 06:35  Ca    8.4        2024 09:45  Phos  3.9       2024 09:45  Mg     2.2       2024 09:45    TPro  6.1    /  Alb  2.5    /  TBili  0.6    /  DBili  x      /  AST  27     /  ALT  66     /  AlkPhos  80     2024 08:45  TPro  6.1    /  Alb  2.4    /  TBili  0.5    /  DBili  x      /  AST  31     /  ALT  75     /  AlkPhos  101    2024 06:35  TPro  6.4    /  Alb  2.4    /  TBili  0.5    /  DBili  x      /  AST  42     /  ALT  97     /  AlkPhos  96     2024 09:45             EC Lead ECG:   Ventricular Rate 127 BPM    QRS Duration 84 ms    Q-T Interval 278 ms    QTC Calculation(Bazett) 404 ms    R Axis 28 degrees    T Axis -61 degrees    Diagnosis Line Atrial fibrillation with rapid ventricular response  Low voltage QRS  Septal infarct , age undetermined  Abnormal ECG  No previous ECGs available  Confirmed by rogerio Harvey ([id number]) on 2024 2:48:52 PM (24 @ 19:24)      TRANSTHORACIC ECHOCARDIOGRAM REPORT  ________________________________________________________________________________                                      _______       Pt. Name:       LEXY EISENBERG Study Date:    2024  MRN:            [id number]     YOB: 1935  Accession #:    [id number]     Age:           88 years  Account#:       [id number]    Gender:        F  Heart Rate:                   Height:        62.99 in (160.00 cm)  Rhythm:                       Weight:        99.21 lb (45.00 kg)  Blood Pressure: 104/70 mmHg   BSA/BMI:       1.44 m² / 17.58 kg/m²  ________________________________________________________________________________________  Referring Physician:    [id number] Maritza Abad  Interpreting Physician: Harpal Grider MD  Primary Sonographer:    Sandra RINCON    CPT:               ECHO TTE WO CON COMP W DOPP - 24420.m  Indication(s):     Heart failure, unspecified - I50.9  Procedure:         Transthoracic echocardiogram with 2-D, M-mode and complete                     spectral and color flow Doppler.  Ordering Location: [facility]  Admission Status:  Inpatient    _______________________________________________________________________________________     CONCLUSIONS:      1. Left ventricular cavity is normal in size. Left ventricular wall thickness is normal. Left ventricular systolic function is severely decreased with an ejection fraction of 23 % by Martin's method of disks. There are no regional wall motion abnormalities seen.   2. Mildly enlarged right ventricular cavity size, with normal wall thickness, and normal systolic function.   3. The left atrium is moderately dilated.   4. The right atrium is mildly dilated.   5. Structurally normal mitral valve with normal leaflet excursion. The leaflets are thickened.   6. Moderate mitral regurgitation. The degree of MR may be underestimated.   7. Trileaflet aortic valve with normal systolic excursion.   8. Mild aortic regurgitation.   9. Mild to moderate tricuspid regurgitation.  10. Estimated pulmonary artery systolic pressure is 57 mmHg, consistent with moderate pulmonary hypertension.  11. The inferior vena cava is dilated (dilated >2.1cm) with abnormal inspiratory collapse (abnormal <50%) consistent with elevated right atrial pressure (~15, range 10-20mmHg).  12. Large left pleural effusion noted.    ________________________________________________________________________________________  FINDINGS:     Left Ventricle:  The left ventricular cavity is normal in size. Left ventricular wall thickness is normal. Left ventricular systolic function is severely decreased with a calculated ejection fraction of 23 % by the Martin's biplane method of disks. There are no regional wall motion abnormalities seen. The left ventricular diastolic function is indeterminate.     Right Ventricle:  The right ventricular cavity is mildly enlarged in size, with normal wall thickness and normal systolic function.     Left Atrium:  The left atrium is moderately dilated.     Right Atrium:  The right atrium is mildly dilated.     Aortic Valve:  The aortic valve appears trileaflet with normal systolic excursion. There is no aortic valve stenosis. There is mild aortic regurgitation. AI VMax is 5.03 m/s. AI pressure half time is 670 msec. AI slope is 2.20 m/s².     Mitral Valve:  Structurally normal mitral valve with normal leaflet excursion. There is mitral valve thickening of the anterior and posterior leaflets. There is moderate mitral regurgitation.     Tricuspid Valve:  Structurally normal tricuspid valve with normal leaflet excursion. There is mild to moderate tricuspid regurgitation. Estimated pulmonary artery systolic pressure is 57 mmHg, consistent with moderate pulmonary hypertension.     Pulmonic Valve:  The pulmonic valve wasnot well visualized. There is mild to moderate pulmonic regurgitation.     Pericardium:  No pericardial effusion seen.     Pleura:  Large left pleural effusion noted.     Systemic Veins:  The inferior vena cava is dilated (dilated >2.1cm) with abnormal inspiratory collapse (abnormal <50%) consistent with elevated right atrial pressure (~15, range 10-20mmHg).  ____________________________________________________________________  QUANTITATIVE DATA:  Left Ventricle Measurements: (Indexed to BSA)     IVSd (2D):   0.9 cm  LVPWd (2D):  0.8 cm  LVIDd (2D):  4.8 cm  LVIDs (2D):  4.3 cm  LV Mass:     142 g  98.8 g/m²  BiPlane LV EF%: 23 %     MV E Vmax:    0.90 m/s  MV A Vmax:    0.33 m/s  MV E/A:       2.74  e' lateral:   8.50 cm/s  e' medial:    6.70 cm/s  E/e' lateral: 10.58  E/e' medial:  13.43  E/e' Average: 11.84  MV DT:        121 msec    Aorta Measurements: (Normal range) (Indexed to BSA)     Sinuses of Valsalva: 2.99 cm (2.7 - 3.3 cm)       Left Atrium Measurements: (Indexed to BSA)  LA Diam 2D:        3.05 cm  LA Vol s, MOD A4C: 39.35 ml.       LVOT / RVOT/ Qp/Qs Data: (Indexed to BSA)  LVOT Diameter: 2.00 cm    Aortic Valve Measurements:  AR Vmax       5.03 m/s  AR PHT        670 msec  AR Weber      2.20 m/s²  AR Decel Time 2312 msec    Mitral Valve Measurements:     MV E Vmax: 0.9 m/s  MV A Vmax: 0.3 m/s  MV E/A:    2.7       Tricuspid Valve Measurements:     TR Vmax:          3.2 m/s  TR Peak Gradient: 42.2 mmHg  RA Pressure:      15 mmHg  PASP:             57 mmHg    ________________________________________________________________________________________  Electronically signed on 2024 at 4:00:07 PM by Harpal Grider MD         *** Final ***      Radiology:         [facility] -- Dylan Hernandez Pannella, Patel, Savella Goodger, Cohen  Office # [phone]      Follow Up:    Af , Heart Failure   Subjective/Observations:     Seen at bedside, on room air   unable to provide any meaningful information in NAD       REVIEW OF SYSTEMS: All other review of systems is negative unless indicated above    PAST MEDICAL & SURGICAL HISTORY:  Atrial fibrillation      Hypothyroidism      Mild Alzheimer's dementia      Diabetes mellitus      Acute on chronic systolic congestive heart failure      Hypertension      Hyperlipemia      Cardiac LV ejection fraction 21-30%        History of appendectomy      H/O hernia repair      History of cholecystectomy          MEDICATIONS  (STANDING):  amLODIPine   Tablet 2.5 milliGRAM(s) Oral daily  apixaban 2.5 milliGRAM(s) Oral every 12 hours  bisacodyl Suppository 10 milliGRAM(s) Rectal once  dextrose 5%. 1000 milliLiter(s) (50 mL/Hr) IV Continuous <Continuous>  dextrose 5%. 1000 milliLiter(s) (100 mL/Hr) IV Continuous <Continuous>  dextrose 50% Injectable 12.5 Gram(s) IV Push once  dextrose 50% Injectable 25 Gram(s) IV Push once  dextrose 50% Injectable 25 Gram(s) IV Push once  digoxin     Tablet 125 MICROGram(s) Oral every other day  escitalopram 10 milliGRAM(s) Oral daily  FIRST- Mouthwash  BLM 15 milliLiter(s) Swish and Spit three times a day  glucagon  Injectable 1 milliGRAM(s) IntraMuscular once  hemorrhoidal Ointment 1 Application(s) Rectal four times a day  insulin glargine Injectable (LANTUS) 5 Unit(s) SubCutaneous at bedtime  insulin lispro (ADMELOG) corrective regimen sliding scale   SubCutaneous three times a day before meals  insulin lispro (ADMELOG) corrective regimen sliding scale   SubCutaneous at bedtime  insulin lispro Injectable (ADMELOG) 6 Unit(s) SubCutaneous three times a day before meals  levothyroxine 75 MICROGram(s) Oral daily  memantine 10 milliGRAM(s) Oral two times a day  metoprolol succinate  milliGRAM(s) Oral every 12 hours  pantoprazole    Tablet 40 milliGRAM(s) Oral before breakfast  piperacillin/tazobactam IVPB.. 3.375 Gram(s) IV Intermittent every 12 hours  polyethylene glycol 3350 17 Gram(s) Oral daily  rivastigmine patch  9.5 mG/24 Hr(s) 1 Patch Transdermal every 24 hours  saccharomyces boulardii 250 milliGRAM(s) Oral two times a day  senna 2 Tablet(s) Oral at bedtime  tamsulosin 0.4 milliGRAM(s) Oral at bedtime    MEDICATIONS  (PRN):  acetaminophen     Tablet .. 650 milliGRAM(s) Oral every 6 hours PRN Temp greater or equal to 38C (100.4F), Mild Pain (1 - 3)  albuterol/ipratropium for Nebulization 3 milliLiter(s) Nebulizer every 6 hours PRN Shortness of Breath  aluminum hydroxide/magnesium hydroxide/simethicone Suspension 30 milliLiter(s) Oral every 4 hours PRN Dyspepsia  benzonatate 100 milliGRAM(s) Oral three times a day PRN Cough  bisacodyl Suppository 10 milliGRAM(s) Rectal daily PRN Constipation  dextrose Oral Gel 15 Gram(s) Oral once PRN Blood Glucose LESS THAN 70 milliGRAM(s)/deciliter  melatonin 3 milliGRAM(s) Oral at bedtime PRN Insomnia  ondansetron Injectable 4 milliGRAM(s) IV Push every 8 hours PRN Nausea and/or Vomiting      Allergies    No Known Allergies    Intolerances        Vital Signs Last 24 Hrs  T(C): 36.5 (2024 05:30), Max: 36.5 (2024 05:30)  T(F): 97.7 (2024 05:30), Max: 97.7 (2024 05:30)  HR: 73 (2024 05:30) (69 - 91)  BP: 153/77 (2024 05:30) (153/77 - 173/113)  BP(mean): --  RR: 18 (2024 05:30) (18 - 18)  SpO2: 91% (2024 05:30) (91% - 93%)    Parameters below as of 2024 05:30  Patient On (Oxygen Delivery Method): room air        I&O's Summary    2024 07:01  -  2024 07:00  --------------------------------------------------------  IN: 0 mL / OUT: 900 mL / NET: -900 mL          PHYSICAL EXAM:  TELE: af  Constitutional: NAD, awake and alert, well-developed  HEENT: Moist Mucous Membranes, Anicteric  Pulmonary: Non-labored, breath sounds are DIM   Cardiovascular: Irregular S1 and S2 nl, No murmurs, rubs, gallops or clicks  Gastrointestinal: Bowel Sounds present, soft, nontender.   Lymph: No lymphadenopathy. No peripheral edema.  Skin: No visible rashes or ulcers.  Psych:  Mood & affect appropriate    LABS: All Labs Reviewed:                        9.8    17.32 )-----------( 293      ( 2024 08:45 )             29.2                         10.0   17.89 )-----------( 287      ( 2024 06:35 )             31.4                         10.6   18.01 )-----------( 281      ( 2024 09:45 )             32.9     2024 08:45    139    |  110    |  63     ----------------------------<  206    4.1     |  18     |  2.10   2024 06:35    138    |  105    |  68     ----------------------------<  190    3.4     |  20     |  2.20   2024 09:45    139    |  108    |  68     ----------------------------<  202    3.8     |  19     |  2.40     Ca    8.5        2024 08:45  Ca    8.3        2024 06:35  Ca    8.4        2024 09:45  Phos  3.9       2024 09:45  Mg     2.2       2024 09:45    TPro  6.1    /  Alb  2.5    /  TBili  0.6    /  DBili  x      /  AST  27     /  ALT  66     /  AlkPhos  80     2024 08:45  TPro  6.1    /  Alb  2.4    /  TBili  0.5    /  DBili  x      /  AST  31     /  ALT  75     /  AlkPhos  101    2024 06:35  TPro  6.4    /  Alb  2.4    /  TBili  0.5    /  DBili  x      /  AST  42     /  ALT  97     /  AlkPhos  96     2024 09:45             EC Lead ECG:   Ventricular Rate 127 BPM    QRS Duration 84 ms    Q-T Interval 278 ms    QTC Calculation(Bazett) 404 ms    R Axis 28 degrees    T Axis -61 degrees    Diagnosis Line Atrial fibrillation with rapid ventricular response  Low voltage QRS  Septal infarct , age undetermined  Abnormal ECG  No previous ECGs available  Confirmed by rogerio Harvey ([id number]) on 2024 2:48:52 PM (24 @ 19:24)      TRANSTHORACIC ECHOCARDIOGRAM REPORT  ________________________________________________________________________________                                      _______       Pt. Name:       LEXY EISENBERG Study Date:    2024  MRN:            [id number]     YOB: 1935  Accession #:    [id number]     Age:           88 years  Account#:       [id number]    Gender:        F  Heart Rate:                   Height:        62.99 in (160.00 cm)  Rhythm:                       Weight:        99.21 lb (45.00 kg)  Blood Pressure: 104/70 mmHg   BSA/BMI:       1.44 m² / 17.58 kg/m²  ________________________________________________________________________________________  Referring Physician:    [id number] Maritza Abad  Interpreting Physician: Harpal Grider MD  Primary Sonographer:    Sandra RINCON    CPT:               ECHO TTE WO CON COMP W DOPP - 54463.m  Indication(s):     Heart failure, unspecified - I50.9  Procedure:         Transthoracic echocardiogram with 2-D, M-mode and complete                     spectral and color flow Doppler.  Ordering Location: [facility]  Admission Status:  Inpatient    _______________________________________________________________________________________     CONCLUSIONS:      1. Left ventricular cavity is normal in size. Left ventricular wall thickness is normal. Left ventricular systolic function is severely decreased with an ejection fraction of 23 % by Martin's method of disks. There are no regional wall motion abnormalities seen.   2. Mildly enlarged right ventricular cavity size, with normal wall thickness, and normal systolic function.   3. The left atrium is moderately dilated.   4. The right atrium is mildly dilated.   5. Structurally normal mitral valve with normal leaflet excursion. The leaflets are thickened.   6. Moderate mitral regurgitation. The degree of MR may be underestimated.   7. Trileaflet aortic valve with normal systolic excursion.   8. Mild aortic regurgitation.   9. Mild to moderate tricuspid regurgitation.  10. Estimated pulmonary artery systolic pressure is 57 mmHg, consistent with moderate pulmonary hypertension.  11. The inferior vena cava is dilated (dilated >2.1cm) with abnormal inspiratory collapse (abnormal <50%) consistent with elevated right atrial pressure (~15, range 10-20mmHg).  12. Large left pleural effusion noted.    ________________________________________________________________________________________  FINDINGS:     Left Ventricle:  The left ventricular cavity is normal in size. Left ventricular wall thickness is normal. Left ventricular systolic function is severely decreased with a calculated ejection fraction of 23 % by the Martin's biplane method of disks. There are no regional wall motion abnormalities seen. The left ventricular diastolic function is indeterminate.     Right Ventricle:  The right ventricular cavity is mildly enlarged in size, with normal wall thickness and normal systolic function.     Left Atrium:  The left atrium is moderately dilated.     Right Atrium:  The right atrium is mildly dilated.     Aortic Valve:  The aortic valve appears trileaflet with normal systolic excursion. There is no aortic valve stenosis. There is mild aortic regurgitation. AI VMax is 5.03 m/s. AI pressure half time is 670 msec. AI slope is 2.20 m/s².     Mitral Valve:  Structurally normal mitral valve with normal leaflet excursion. There is mitral valve thickening of the anterior and posterior leaflets. There is moderate mitral regurgitation.     Tricuspid Valve:  Structurally normal tricuspid valve with normal leaflet excursion. There is mild to moderate tricuspid regurgitation. Estimated pulmonary artery systolic pressure is 57 mmHg, consistent with moderate pulmonary hypertension.     Pulmonic Valve:  The pulmonic valve wasnot well visualized. There is mild to moderate pulmonic regurgitation.     Pericardium:  No pericardial effusion seen.     Pleura:  Large left pleural effusion noted.     Systemic Veins:  The inferior vena cava is dilated (dilated >2.1cm) with abnormal inspiratory collapse (abnormal <50%) consistent with elevated right atrial pressure (~15, range 10-20mmHg).  ____________________________________________________________________  QUANTITATIVE DATA:  Left Ventricle Measurements: (Indexed to BSA)     IVSd (2D):   0.9 cm  LVPWd (2D):  0.8 cm  LVIDd (2D):  4.8 cm  LVIDs (2D):  4.3 cm  LV Mass:     142 g  98.8 g/m²  BiPlane LV EF%: 23 %     MV E Vmax:    0.90 m/s  MV A Vmax:    0.33 m/s  MV E/A:       2.74  e' lateral:   8.50 cm/s  e' medial:    6.70 cm/s  E/e' lateral: 10.58  E/e' medial:  13.43  E/e' Average: 11.84  MV DT:        121 msec    Aorta Measurements: (Normal range) (Indexed to BSA)     Sinuses of Valsalva: 2.99 cm (2.7 - 3.3 cm)       Left Atrium Measurements: (Indexed to BSA)  LA Diam 2D:        3.05 cm  LA Vol s, MOD A4C: 39.35 ml.       LVOT / RVOT/ Qp/Qs Data: (Indexed to BSA)  LVOT Diameter: 2.00 cm    Aortic Valve Measurements:  AR Vmax       5.03 m/s  AR PHT        670 msec  AR [place]      2.20 m/s²  AR Decel Time 2312 msec    Mitral Valve Measurements:     MV E Vmax: 0.9 m/s  MV A Vmax: 0.3 m/s  MV E/A:    2.7       Tricuspid Valve Measurements:     TR Vmax:          3.2 m/s  TR Peak Gradient: 42.2 mmHg  RA Pressure:      15 mmHg  PASP:             57 mmHg    ________________________________________________________________________________________  Electronically signed on 2024 at 4:00:07 PM by Harpal Grider MD         *** Final ***      Radiology:

## 2024-04-10 ENCOUNTER — TRANSCRIPTION ENCOUNTER (OUTPATIENT)
Age: 89
End: 2024-04-10

## 2024-04-10 DIAGNOSIS — I10 ESSENTIAL (PRIMARY) HYPERTENSION: ICD-10-CM

## 2024-04-10 LAB
ALBUMIN SERPL ELPH-MCNC: 2.2 G/DL — LOW (ref 3.3–5)
ALP SERPL-CCNC: 81 U/L — SIGNIFICANT CHANGE UP (ref 40–120)
ALT FLD-CCNC: 54 U/L — SIGNIFICANT CHANGE UP (ref 12–78)
ANION GAP SERPL CALC-SCNC: 10 MMOL/L — SIGNIFICANT CHANGE UP (ref 5–17)
AST SERPL-CCNC: 22 U/L — SIGNIFICANT CHANGE UP (ref 15–37)
BASOPHILS # BLD AUTO: 0 K/UL — SIGNIFICANT CHANGE UP (ref 0–0.2)
BASOPHILS NFR BLD AUTO: 0 % — SIGNIFICANT CHANGE UP (ref 0–2)
BILIRUB SERPL-MCNC: 0.6 MG/DL — SIGNIFICANT CHANGE UP (ref 0.2–1.2)
BUN SERPL-MCNC: 50 MG/DL — HIGH (ref 7–23)
CALCIUM SERPL-MCNC: 7.9 MG/DL — LOW (ref 8.5–10.1)
CHLORIDE SERPL-SCNC: 112 MMOL/L — HIGH (ref 96–108)
CO2 SERPL-SCNC: 21 MMOL/L — LOW (ref 22–31)
CREAT SERPL-MCNC: 1.6 MG/DL — HIGH (ref 0.5–1.3)
EGFR: 31 ML/MIN/1.73M2 — LOW
EOSINOPHIL # BLD AUTO: 0.12 K/UL — SIGNIFICANT CHANGE UP (ref 0–0.5)
EOSINOPHIL NFR BLD AUTO: 1 % — SIGNIFICANT CHANGE UP (ref 0–6)
GLUCOSE BLDC GLUCOMTR-MCNC: 125 MG/DL — HIGH (ref 70–99)
GLUCOSE SERPL-MCNC: 114 MG/DL — HIGH (ref 70–99)
HCT VFR BLD CALC: 31 % — LOW (ref 34.5–45)
HGB BLD-MCNC: 10.1 G/DL — LOW (ref 11.5–15.5)
LYMPHOCYTES # BLD AUTO: 1.29 K/UL — SIGNIFICANT CHANGE UP (ref 1–3.3)
LYMPHOCYTES # BLD AUTO: 11 % — LOW (ref 13–44)
MAGNESIUM SERPL-MCNC: 2 MG/DL — SIGNIFICANT CHANGE UP (ref 1.6–2.6)
MCHC RBC-ENTMCNC: 26.9 PG — LOW (ref 27–34)
MCHC RBC-ENTMCNC: 32.6 GM/DL — SIGNIFICANT CHANGE UP (ref 32–36)
MCV RBC AUTO: 82.4 FL — SIGNIFICANT CHANGE UP (ref 80–100)
MONOCYTES # BLD AUTO: 0.94 K/UL — HIGH (ref 0–0.9)
MONOCYTES NFR BLD AUTO: 8 % — SIGNIFICANT CHANGE UP (ref 2–14)
NEUTROPHILS # BLD AUTO: 9.36 K/UL — HIGH (ref 1.8–7.4)
NEUTROPHILS NFR BLD AUTO: 80 % — HIGH (ref 43–77)
NRBC # BLD: SIGNIFICANT CHANGE UP /100 WBCS (ref 0–0)
PHOSPHATE SERPL-MCNC: 3 MG/DL — SIGNIFICANT CHANGE UP (ref 2.5–4.5)
PLATELET # BLD AUTO: 280 K/UL — SIGNIFICANT CHANGE UP (ref 150–400)
POTASSIUM SERPL-MCNC: 3.3 MMOL/L — LOW (ref 3.5–5.3)
POTASSIUM SERPL-SCNC: 3.3 MMOL/L — LOW (ref 3.5–5.3)
PROT SERPL-MCNC: 5.8 G/DL — LOW (ref 6–8.3)
RBC # BLD: 3.76 M/UL — LOW (ref 3.8–5.2)
RBC # FLD: 17.2 % — HIGH (ref 10.3–14.5)
SODIUM SERPL-SCNC: 143 MMOL/L — SIGNIFICANT CHANGE UP (ref 135–145)
WBC # BLD: 11.7 K/UL — HIGH (ref 3.8–10.5)
WBC # FLD AUTO: 11.7 K/UL — HIGH (ref 3.8–10.5)

## 2024-04-10 PROCEDURE — 99233 SBSQ HOSP IP/OBS HIGH 50: CPT | Mod: GC

## 2024-04-10 PROCEDURE — 99232 SBSQ HOSP IP/OBS MODERATE 35: CPT

## 2024-04-10 RX ORDER — POTASSIUM CHLORIDE 20 MEQ
40 PACKET (EA) ORAL ONCE
Refills: 0 | Status: COMPLETED | OUTPATIENT
Start: 2024-04-10 | End: 2024-04-10

## 2024-04-10 RX ORDER — POTASSIUM CHLORIDE 20 MEQ
40 PACKET (EA) ORAL ONCE
Refills: 0 | Status: DISCONTINUED | OUTPATIENT
Start: 2024-04-10 | End: 2024-04-10

## 2024-04-10 RX ORDER — ISOSORBIDE DINITRATE 5 MG/1
10 TABLET ORAL THREE TIMES A DAY
Refills: 0 | Status: DISCONTINUED | OUTPATIENT
Start: 2024-04-10 | End: 2024-04-11

## 2024-04-10 RX ORDER — ISOSORBIDE DINITRATE 5 MG/1
10 TABLET ORAL THREE TIMES A DAY
Refills: 0 | Status: DISCONTINUED | OUTPATIENT
Start: 2024-04-10 | End: 2024-04-10

## 2024-04-10 RX ORDER — FUROSEMIDE 40 MG
20 TABLET ORAL DAILY
Refills: 0 | Status: DISCONTINUED | OUTPATIENT
Start: 2024-04-10 | End: 2024-04-11

## 2024-04-10 RX ORDER — LOPERAMIDE HCL 2 MG
2 TABLET ORAL
Refills: 0 | Status: DISCONTINUED | OUTPATIENT
Start: 2024-04-10 | End: 2024-04-11

## 2024-04-10 RX ADMIN — PHENYLEPHRINE-SHARK LIVER OIL-MINERAL OIL-PETROLATUM RECTAL OINTMENT 1 APPLICATION(S): at 05:26

## 2024-04-10 RX ADMIN — Medication 100 MILLIGRAM(S): at 05:26

## 2024-04-10 RX ADMIN — PHENYLEPHRINE-SHARK LIVER OIL-MINERAL OIL-PETROLATUM RECTAL OINTMENT 1 APPLICATION(S): at 17:53

## 2024-04-10 RX ADMIN — Medication 75 MICROGRAM(S): at 05:26

## 2024-04-10 RX ADMIN — Medication 100 MILLIGRAM(S): at 17:52

## 2024-04-10 RX ADMIN — INSULIN GLARGINE 5 UNIT(S): 100 INJECTION, SOLUTION SUBCUTANEOUS at 21:57

## 2024-04-10 RX ADMIN — Medication 125 MICROGRAM(S): at 12:46

## 2024-04-10 RX ADMIN — DIPHENHYDRAMINE HYDROCHLORIDE AND LIDOCAINE HYDROCHLORIDE AND ALUMINUM HYDROXIDE AND MAGNESIUM HYDRO 15 MILLILITER(S): KIT at 05:26

## 2024-04-10 RX ADMIN — PANTOPRAZOLE SODIUM 40 MILLIGRAM(S): 20 TABLET, DELAYED RELEASE ORAL at 05:26

## 2024-04-10 RX ADMIN — RIVASTIGMINE 1 PATCH: 4.6 PATCH, EXTENDED RELEASE TRANSDERMAL at 05:30

## 2024-04-10 RX ADMIN — Medication 2: at 17:52

## 2024-04-10 RX ADMIN — APIXABAN 2.5 MILLIGRAM(S): 2.5 TABLET, FILM COATED ORAL at 17:52

## 2024-04-10 RX ADMIN — RIVASTIGMINE 1 PATCH: 4.6 PATCH, EXTENDED RELEASE TRANSDERMAL at 06:17

## 2024-04-10 RX ADMIN — TAMSULOSIN HYDROCHLORIDE 0.4 MILLIGRAM(S): 0.4 CAPSULE ORAL at 21:57

## 2024-04-10 RX ADMIN — Medication 25 MILLIGRAM(S): at 14:31

## 2024-04-10 RX ADMIN — Medication 2 MILLIGRAM(S): at 14:32

## 2024-04-10 RX ADMIN — PIPERACILLIN AND TAZOBACTAM 25 GRAM(S): 4; .5 INJECTION, POWDER, LYOPHILIZED, FOR SOLUTION INTRAVENOUS at 00:46

## 2024-04-10 RX ADMIN — Medication 40 MILLIEQUIVALENT(S): at 10:15

## 2024-04-10 RX ADMIN — DIPHENHYDRAMINE HYDROCHLORIDE AND LIDOCAINE HYDROCHLORIDE AND ALUMINUM HYDROXIDE AND MAGNESIUM HYDRO 15 MILLILITER(S): KIT at 21:58

## 2024-04-10 RX ADMIN — PHENYLEPHRINE-SHARK LIVER OIL-MINERAL OIL-PETROLATUM RECTAL OINTMENT 1 APPLICATION(S): at 12:47

## 2024-04-10 RX ADMIN — Medication 20 MILLIGRAM(S): at 10:15

## 2024-04-10 RX ADMIN — MEMANTINE HYDROCHLORIDE 10 MILLIGRAM(S): 10 TABLET ORAL at 05:26

## 2024-04-10 RX ADMIN — ISOSORBIDE DINITRATE 10 MILLIGRAM(S): 5 TABLET ORAL at 14:32

## 2024-04-10 RX ADMIN — Medication 25 MILLIGRAM(S): at 21:57

## 2024-04-10 RX ADMIN — DIPHENHYDRAMINE HYDROCHLORIDE AND LIDOCAINE HYDROCHLORIDE AND ALUMINUM HYDROXIDE AND MAGNESIUM HYDRO 15 MILLILITER(S): KIT at 14:32

## 2024-04-10 RX ADMIN — Medication 25 MILLIGRAM(S): at 05:27

## 2024-04-10 RX ADMIN — MEMANTINE HYDROCHLORIDE 10 MILLIGRAM(S): 10 TABLET ORAL at 17:52

## 2024-04-10 RX ADMIN — Medication 250 MILLIGRAM(S): at 17:52

## 2024-04-10 RX ADMIN — Medication 250 MILLIGRAM(S): at 05:34

## 2024-04-10 RX ADMIN — PIPERACILLIN AND TAZOBACTAM 25 GRAM(S): 4; .5 INJECTION, POWDER, LYOPHILIZED, FOR SOLUTION INTRAVENOUS at 12:48

## 2024-04-10 RX ADMIN — Medication 6 UNIT(S): at 08:55

## 2024-04-10 RX ADMIN — Medication 1 APPLICATION(S): at 00:46

## 2024-04-10 RX ADMIN — PHENYLEPHRINE-SHARK LIVER OIL-MINERAL OIL-PETROLATUM RECTAL OINTMENT 1 APPLICATION(S): at 00:46

## 2024-04-10 RX ADMIN — RIVASTIGMINE 1 PATCH: 4.6 PATCH, EXTENDED RELEASE TRANSDERMAL at 07:56

## 2024-04-10 RX ADMIN — ESCITALOPRAM OXALATE 10 MILLIGRAM(S): 10 TABLET, FILM COATED ORAL at 12:45

## 2024-04-10 RX ADMIN — APIXABAN 2.5 MILLIGRAM(S): 2.5 TABLET, FILM COATED ORAL at 05:27

## 2024-04-10 RX ADMIN — Medication 3 MILLIGRAM(S): at 21:57

## 2024-04-10 RX ADMIN — Medication 6 UNIT(S): at 17:53

## 2024-04-10 RX ADMIN — Medication 6 UNIT(S): at 12:46

## 2024-04-10 RX ADMIN — RIVASTIGMINE 1 PATCH: 4.6 PATCH, EXTENDED RELEASE TRANSDERMAL at 19:20

## 2024-04-10 NOTE — DISCHARGE NOTE NURSING/CASE MANAGEMENT/SOCIAL WORK - NSDCPEPTCAREGIVEDUMATLIST _GEN_ALL_CORE
Heart Failure/Diabetes/Influenza Vaccination/Coronavirus/COVID19 Heart Failure/Diabetes/Influenza Vaccination/Apixaban/Eliquis/Coronavirus/COVID19

## 2024-04-10 NOTE — PROGRESS NOTE ADULT - SUBJECTIVE AND OBJECTIVE BOX
[facility] -- Dylan Hernandez Pannella, Patel, Savella, Goodger: Office # [phone]    Follow Up:  Afib , Heart Failure     Subjective/Observations: Patient seen and examined. Patient alert awake, Denies chest pain or  difficulty breathing . but unable to provide meaningful information      REVIEW OF SYSTEMS: All other review of systems are negative unless indicated above    PAST MEDICAL & SURGICAL HISTORY:  Atrial fibrillation      Hypothyroidism      Mild Alzheimer's dementia      Diabetes mellitus      Acute on chronic systolic congestive heart failure      Hypertension      Hyperlipemia      Cardiac LV ejection fraction 21-30%  12/22      History of appendectomy      H/O hernia repair      History of cholecystectomy          MEDICATIONS  (STANDING):  apixaban 2.5 milliGRAM(s) Oral every 12 hours  bisacodyl Suppository 10 milliGRAM(s) Rectal once  dextrose 5%. 1000 milliLiter(s) (100 mL/Hr) IV Continuous <Continuous>  dextrose 5%. 1000 milliLiter(s) (50 mL/Hr) IV Continuous <Continuous>  dextrose 50% Injectable 12.5 Gram(s) IV Push once  dextrose 50% Injectable 25 Gram(s) IV Push once  dextrose 50% Injectable 25 Gram(s) IV Push once  digoxin     Tablet 125 MICROGram(s) Oral every other day  escitalopram 10 milliGRAM(s) Oral daily  FIRST- Mouthwash  BLM 15 milliLiter(s) Swish and Spit three times a day  furosemide    Tablet 20 milliGRAM(s) Oral daily  glucagon  Injectable 1 milliGRAM(s) IntraMuscular once  hemorrhoidal Ointment 1 Application(s) Rectal four times a day  hydrALAZINE 25 milliGRAM(s) Oral every 8 hours  insulin glargine Injectable (LANTUS) 5 Unit(s) SubCutaneous at bedtime  insulin lispro (ADMELOG) corrective regimen sliding scale   SubCutaneous three times a day before meals  insulin lispro (ADMELOG) corrective regimen sliding scale   SubCutaneous at bedtime  insulin lispro Injectable (ADMELOG) 6 Unit(s) SubCutaneous three times a day before meals  isosorbide   dinitrate Tablet (ISORDIL) 10 milliGRAM(s) Oral three times a day  levothyroxine 75 MICROGram(s) Oral daily  memantine 10 milliGRAM(s) Oral two times a day  metoprolol succinate  milliGRAM(s) Oral every 12 hours  pantoprazole    Tablet 40 milliGRAM(s) Oral before breakfast  piperacillin/tazobactam IVPB.. 3.375 Gram(s) IV Intermittent every 12 hours  polyethylene glycol 3350 17 Gram(s) Oral daily  rivastigmine patch  9.5 mG/24 Hr(s) 1 Patch Transdermal every 24 hours  saccharomyces boulardii 250 milliGRAM(s) Oral two times a day  senna 2 Tablet(s) Oral at bedtime  tamsulosin 0.4 milliGRAM(s) Oral at bedtime    MEDICATIONS  (PRN):  acetaminophen     Tablet .. 650 milliGRAM(s) Oral every 6 hours PRN Temp greater or equal to 38C (100.4F), Mild Pain (1 - 3)  albuterol/ipratropium for Nebulization 3 milliLiter(s) Nebulizer every 6 hours PRN Shortness of Breath  aluminum hydroxide/magnesium hydroxide/simethicone Suspension 30 milliLiter(s) Oral every 4 hours PRN Dyspepsia  benzonatate 100 milliGRAM(s) Oral three times a day PRN Cough  bisacodyl Suppository 10 milliGRAM(s) Rectal daily PRN Constipation  dextrose Oral Gel 15 Gram(s) Oral once PRN Blood Glucose LESS THAN 70 milliGRAM(s)/deciliter  melatonin 3 milliGRAM(s) Oral at bedtime PRN Insomnia  ondansetron Injectable 4 milliGRAM(s) IV Push every 8 hours PRN Nausea and/or Vomiting    Allergies    No Known Allergies    Intolerances      Vital Signs Last 24 Hrs  T(C): 36.3 (10 Apr 2024 11:15), Max: 37.1 (10 Apr 2024 01:04)  T(F): 97.4 (10 Apr 2024 11:15), Max: 98.7 (10 Apr 2024 01:04)  HR: 87 (10 Apr 2024 11:15) (87 - 93)  BP: 149/93 (10 Apr 2024 11:15) (149/93 - 163/85)  BP(mean): --  RR: 20 (10 Apr 2024 11:15) (18 - 20)  SpO2: 99% (10 Apr 2024 11:15) (96% - 99%)    Parameters below as of 10 Apr 2024 11:15  Patient On (Oxygen Delivery Method): room air      I&O's Summary    09 Apr 2024 07:01  -  10 Apr 2024 07:00  --------------------------------------------------------  IN: 0 mL / OUT: 250 mL / NET: -250 mL          TELE: atrial fibrillation 60-90  PHYSICAL EXAM:  Constitutional: NAD, awake and alert,   HEENT: Moist Mucous Membranes, Anicteric  Pulmonary: Non-labored, breath sounds are clear bilaterally, No wheezing, rales or rhonchi  Cardiovascular: Regular, S1 and S2, No murmurs, No rubs, gallops or clicks  Gastrointestinal:  soft, nontender, nondistended   Lymph: No peripheral edema. No lymphadenopathy.   Skin: No visible rashes or ulcers.  Psych:  Mood & affect appropriate      LABS: All Labs Reviewed:                        10.1   11.70 )-----------( 280      ( 10 Apr 2024 05:50 )             31.0                         9.8    17.32 )-----------( 293      ( 08 Apr 2024 08:45 )             29.2     10 Apr 2024 05:50    143    |  112    |  50     ----------------------------<  114    3.3     |  21     |  1.60   09 Apr 2024 08:00    141    |  112    |  60     ----------------------------<  147    4.1     |  22     |  2.00   08 Apr 2024 08:45    139    |  110    |  63     ----------------------------<  206    4.1     |  18     |  2.10     Ca    7.9        10 Apr 2024 05:50  Ca    8.7        09 Apr 2024 08:00  Ca    8.5        08 Apr 2024 08:45  Phos  3.0       10 Apr 2024 05:50  Mg     2.0       10 Apr 2024 05:50    TPro  5.8    /  Alb  2.2    /  TBili  0.6    /  DBili  x      /  AST  22     /  ALT  54     /  AlkPhos  81     10 Apr 2024 05:50  TPro  6.1    /  Alb  2.5    /  TBili  0.6    /  DBili  x      /  AST  27     /  ALT  66     /  AlkPhos  80     08 Apr 2024 08:45   LIVER FUNCTIONS - ( 10 Apr 2024 05:50 )  Alb: 2.2 g/dL / Pro: 5.8 g/dL / ALK PHOS: 81 U/L / ALT: 54 U/L / AST: 22 U/L / GGT: x             12 Lead ECG:   Ventricular Rate 127 BPM    QRS Duration 84 ms    Q-T Interval 278 ms    QTC Calculation(Bazett) 404 ms    R Axis 28 degrees    T Axis -61 degrees    Diagnosis Line Atrial fibrillation with rapid ventricular response  Low voltage QRS  Septal infarct , age undetermined  Abnormal ECG  No previous ECGs available  Confirmed by rogerio Harvey ([id number]) on 4/2/2024 2:48:52 PM (04-01-24 @ 19:24)      TRANSTHORACIC ECHOCARDIOGRAM REPORT  ________________________________________________________________________________                                      _______       Pt. Name:       LEXY EISENBERG Study Date:    4/8/2024  MRN:            [id number]     YOB: 1935  Accession #:    [id number]     Age:           88 years  Account#:       [id number]    Gender:        F  Heart Rate:                   Height:        62.99 in (160.00 cm)  Rhythm:                       Weight:        99.21 lb (45.00 kg)  Blood Pressure: 104/70 mmHg   BSA/BMI:       1.44 m² / 17.58 kg/m²  ________________________________________________________________________________________  Referring Physician:    [id number] Maritza Abad  Interpreting Physician: Harpal Grider MD  Primary Sonographer:    Sandra RINCON    CPT:               ECHO TTE WO CON COMP W DOPP - 56437.m  Indication(s):     Heart failure, unspecified - I50.9  Procedure:         Transthoracic echocardiogram with 2-D, M-mode and complete                     spectral and color flow Doppler.  Ordering Location: [facility]  Admission Status:  Inpatient    _______________________________________________________________________________________     CONCLUSIONS:      1. Left ventricular cavity is normal in size. Left ventricular wall thickness is normal. Left ventricular systolic function is severely decreased with an ejection fraction of 23 % by Martin's method of disks. There are no regional wall motion abnormalities seen.   2. Mildly enlarged right ventricular cavity size, with normal wall thickness, and normal systolic function.   3. The left atrium is moderately dilated.   4. The right atrium is mildly dilated.   5. Structurally normal mitral valve with normal leaflet excursion. The leaflets are thickened.   6. Moderate mitral regurgitation. The degree of MR may be underestimated.   7. Trileaflet aortic valve with normal systolic excursion.   8. Mild aortic regurgitation.   9. Mild to moderate tricuspid regurgitation.  10. Estimated pulmonary artery systolic pressure is 57 mmHg, consistent with moderate pulmonary hypertension.  11. The inferior vena cava is dilated (dilated >2.1cm) with abnormal inspiratory collapse (abnormal <50%) consistent with elevated right atrial pressure (~15, range 10-20mmHg).  12. Large left pleural effusion noted.    ________________________________________________________________________________________  FINDINGS:     Left Ventricle:  The left ventricular cavity is normal in size. Left ventricular wall thickness is normal. Left ventricular systolic function is severely decreased with a calculated ejection fraction of 23 % by the Martin's biplane method of disks. There are no regional wall motion abnormalities seen. The left ventricular diastolic function is indeterminate.     Right Ventricle:  The right ventricular cavity is mildly enlarged in size, with normal wall thickness and normal systolic function.     Left Atrium:  The left atrium is moderately dilated.     Right Atrium:  The right atrium is mildly dilated.     Aortic Valve:  The aortic valve appears trileaflet with normal systolic excursion. There is no aortic valve stenosis. There is mild aortic regurgitation. AI VMax is 5.03 m/s. AI pressure half time is 670 msec. AI slope is 2.20 m/s².     Mitral Valve:  Structurally normal mitral valve with normal leaflet excursion. There is mitral valve thickening of the anterior and posterior leaflets. There is moderate mitral regurgitation.     Tricuspid Valve:  Structurally normal tricuspid valve with normal leaflet excursion. There is mild to moderate tricuspid regurgitation. Estimated pulmonary artery systolic pressure is 57 mmHg, consistent with moderate pulmonary hypertension.     Pulmonic Valve:  The pulmonic valve wasnot well visualized. There is mild to moderate pulmonic regurgitation.     Pericardium:  No pericardial effusion seen.     Pleura:  Large left pleural effusion noted.     Systemic Veins:  The inferior vena cava is dilated (dilated >2.1cm) with abnormal inspiratory collapse (abnormal <50%) consistent with elevated right atrial pressure (~15, range 10-20mmHg).  ____________________________________________________________________  QUANTITATIVE DATA:  Left Ventricle Measurements: (Indexed to BSA)     IVSd (2D):   0.9 cm  LVPWd (2D):  0.8 cm  LVIDd (2D):  4.8 cm  LVIDs (2D):  4.3 cm  LV Mass:     142 g  98.8 g/m²  BiPlane LV EF%: 23 %     MV E Vmax:    0.90 m/s  MV A Vmax:    0.33 m/s  MV E/A:       2.74  e' lateral:   8.50 cm/s  e' medial:    6.70 cm/s  E/e' lateral: 10.58  E/e' medial:  13.43  E/e' Average: 11.84  MV DT:        121 msec    Aorta Measurements: (Normal range) (Indexed to BSA)     Sinuses of Valsalva: 2.99 cm (2.7 - 3.3 cm)       Left Atrium Measurements: (Indexed to BSA)  LA Diam 2D:        3.05 cm  LA Vol s, MOD A4C: 39.35 ml.       LVOT / RVOT/ Qp/Qs Data: (Indexed to BSA)  LVOT Diameter: 2.00 cm    Aortic Valve Measurements:  AR Vmax       5.03 m/s  AR PHT        670 msec  AR [place]      2.20 m/s²  AR Decel Time 2312 msec    Mitral Valve Measurements:     MV E Vmax: 0.9 m/s  MV A Vmax: 0.3 m/s  MV E/A:    2.7       Tricuspid Valve Measurements:     TR Vmax:          3.2 m/s  TR Peak Gradient: 42.2 mmHg  RA Pressure:      15 mmHg  PASP:             57 mmHg    ________________________________________________________________________________________  Electronically signed on 4/8/2024 at 4:00:07 PM by Harpal Grider MD         *** Final ***

## 2024-04-10 NOTE — PROGRESS NOTE ADULT - PROBLEM SELECTOR PLAN 7
left pyelonephritis - IV Rocephin switched to Zosyn, last day 4/9  - UCx + Klebsiella   -avoid IV hydration left pyelonephritis - IV Rocephin switched to Zosyn, last day 4/9  - UCx + Klebsiella

## 2024-04-10 NOTE — CASE MANAGEMENT PROGRESS NOTE - NSCMPROGRESSNOTE_GEN_ALL_CORE
Discussed pt in rounds, per MD not stable for transition home today. BP medications being adjusted, started on Isordil today. Per MD pt also with diarrhea, started on Imodium.

## 2024-04-10 NOTE — PROGRESS NOTE ADULT - TIME BILLING
pt seen and examine today with  sepsis  poa  and  acute hypoxic respiratory  failure with covid 19 pna   with gram neg joon klebsiella uti/ lt pyelonephritis  with multiple comorbidity chr systolic chf / afib , urinary retention  with constipation s/p cath  / rick  ckd 2. over all prognosis poor  .daughter aware bedside plan.
Pt seen + examined on 4/2. Case discussed with resident. Agree with assessment and plan above (edited by me in detail):  Time spent: 55min. Time spent discussing/coordinating care with consultants, CM/SW team, and counseling the patient and pt's family on medical condition -- acute hypoxic respiratory failure likely multifactorial including pulmonary edema, COVID, suspected overlying bacterial PNA, sepsis, pyelonephritis.     87yo F w/ PMH of HFrEF of 20-25%, dementia, Afib (on low-dose Eliquis), hypothyroidism, T2DM, CKD3b, p/w generalized weakness admitted with sepsis due to UTI and COVID, afib w/ RVR, found to have acute hypoxic respiratory failure and likely L-sided pyelonephritis.     -Pt with acute hypoxia this AM -- placed on HFNC -- wean as tolerated  -acute hypoxic respiratory failure likely multifactorial including pulmonary edema, COVID, suspected overlying bacterial PNA  -Concern for possible PE or flash pulmonary edema as an acute decompensation  -d-dimer elevated at 500  -ferritin 70, procal 0.37, CRP 8  -CT Angio chest, and CT abdomen and pelvis w/ IV contrast performed which showed: Multilobar pneumonia right lung. Septal thickening which may reflect interstitial edema. Bilateral pleural effusions with compressive atelectasis lower lobes. No acute pulmonary embolism.  Cystitis and left pyeloureteritis; correlate for ascending urinary tract infection. Distended rectum with fecal impaction.  -given lasix 20mg IV x1 -- discussed with cardio consult (Dr. Candice campuzano), recs appreciated  -duoneb PRN  -Tessalon pearls PRN for cough  -Started on Remdesivir and Decadron per ID recs  -c/w ceftriaxone to cover PNA as well as pyelonephritis   -ID consulted (Dr. Amezcua), recs appreciated  -isolation precautions    -Pt with chronic afib now w/ RVR to the 150s this AM  -given lopressor 5mg IV x2 with HR improving to 110s now  -C/w metoprolol succinate 100 mg bid  -C/w digoxin 125 mcg every other day  -C/w Eliquis 2.5 mg bid  -Cardio consulted (Dr. Candice campuzano), recs appreciated    -likely had component of acute on chronic systolic CHF contributing to hypoxia today -- given lasix 20mg IV x1 -- monitor closely  -f/up BMP  -nephro (Dr. Oleary), recs appreciated  -pt's renal function approx at baseline CKD3b level     -dulcolax suppository of enema if pt agrees given constipation seen on CT
pt seen and examine today with  sepsis  poa  and  acute hypoxic respiratory  failure with covid 19  superimpose  bacterial pna   /   off  high flow  now on  room air  ,  gram neg joon klebsiella uti/ lt pyelonephritis  - iv abx  Zosyn until  4/9/24 ,  chr systolic chf / afib , urinary retention  with constipation s/p cath  / rick /atn  on  ckd 2  -  off Lasix fu bmp  in am  .
pt seen and examine today with  sepsis  poa  and  acute hypoxic respiratory  failure with covid 19  superimpose  bacterial pna   /   off  high flow  now on  room air  ,  gram neg joon klebsiella uti/ lt pyelonephritis  com,  chr systolic chf / afib , urinary retention  with constipation s/p cath  / rick /atn  on  ckd 2 .
pt seen and examine today with  sepsis  poa  and  acute hypoxic respiratory  failure with covid 19 pna   /  off  high flow  now on   4lit nc ,  gram neg joon klebsiella uti/ lt pyelonephritis ,  chr systolic chf / afib , urinary retention  with constipation s/p cath  / rick /atn  on  ckd 2.
pt seen and examine today with  sepsis  poa  and  acute hypoxic respiratory  failure with covid 19  superimpose  bacterial pna   /   off  high flow  now on  room air  ,  gram neg joon klebsiella uti/ lt pyelonephritis  - iv abx  Zosyn until  4/9/24  afternoon ,  chr systolic chf / afib , urinary retention  with constipation s/p cath  / rick /atn  on  ckd 2  -  off Lasix   cr improving  .   pt echo showed lt pleural effusion -ECHO 4/8/24 as above lv ef 23%  normal systolic function, left atrium moderately dilatated moderate MR , mild AR moderate pulmonary hypertension large left pleural effusion     / ct chest urgent / remain room air - started hydralazine 25 mg po q8hr  for better bp management , isosorbide from  am  if bp not controlled  .
pt seen and examine today with  sepsis  poa  and  acute hypoxic respiratory  failure with covid 19 pna   with gram neg joon uti/ lt pyelonephritis  with multiple comorbidity chr systolic chf / afib , ckd 2.
direct patient care including but not limited to reviewing chart, medications ,laboratory data, imaging reports, discussion of plan of care with consultants on the case, coordination of care with multidisciplinary team involved in the case and discussion of plan with patient.  Patient and family agreeable to plan of care and verbalized understanding the anticipated hospital course and treatment plan.
pt seen and examine today with  sepsis  poa  and  acute hypoxic respiratory  failure with covid 19  superimpose  bacterial pna   /   off  high flow  now on  room air  ,  gram neg joon klebsiella uti/ lt pyelonephritis  - iv abx  Zosyn until  4/9/24  afternoon ,  chr systolic chf / afib , urinary retention  with constipation s/p cath  / rick /atn  on  ckd 2  -  off Lasix   cr improving  . dc planning in am / daughter aware plan .

## 2024-04-10 NOTE — PROGRESS NOTE ADULT - PROBLEM SELECTOR PLAN 4
YVETTE on CKD 2 -Cr at baseline (1.20-1.40) -- IMPROVING  -pt with worsening with renal function  with urinary retention s/p st cath  -Nephro Dr. Oleary - continue furosemide 20mg qd

## 2024-04-10 NOTE — PROGRESS NOTE ADULT - ASSESSMENT
CKD 3  Hypertension  Dyspnea: Pneumonia, COVID 19+, CHF/Pleural effusions  Diabetes  Hypokalemia  Urinary retention    Renal indices stable. CT results noted. + Pleural effusion. Add low dose lasix. Potassium supplementation. Monitor blood sugar levels. Insulin coverage as needed.   Monitor BP trend. Titrate BP meds as needed. Avoid nephrotoxic meds as possible. Encourage PO intake as tolerated. Cardiology follow up.   Will follow electrolytes and renal function trend.

## 2024-04-10 NOTE — PROGRESS NOTE ADULT - SUBJECTIVE AND OBJECTIVE BOX
Patient is a 88y old  Female who presents with a chief complaint of weakness (10 Apr 2024 07:52)      INTERVAL HPI/OVERNIGHT EVENTS: No acute overnight events. Pt seen/examined at bedside. Pt states that she feels well, no complaints. Denies pain, CP, SOB.     MEDICATIONS  (STANDING):  apixaban 2.5 milliGRAM(s) Oral every 12 hours  bisacodyl Suppository 10 milliGRAM(s) Rectal once  dextrose 5%. 1000 milliLiter(s) (100 mL/Hr) IV Continuous <Continuous>  dextrose 5%. 1000 milliLiter(s) (50 mL/Hr) IV Continuous <Continuous>  dextrose 50% Injectable 12.5 Gram(s) IV Push once  dextrose 50% Injectable 25 Gram(s) IV Push once  dextrose 50% Injectable 25 Gram(s) IV Push once  digoxin     Tablet 125 MICROGram(s) Oral every other day  escitalopram 10 milliGRAM(s) Oral daily  FIRST- Mouthwash  BLM 15 milliLiter(s) Swish and Spit three times a day  furosemide    Tablet 20 milliGRAM(s) Oral daily  glucagon  Injectable 1 milliGRAM(s) IntraMuscular once  hemorrhoidal Ointment 1 Application(s) Rectal four times a day  hydrALAZINE 25 milliGRAM(s) Oral every 8 hours  insulin glargine Injectable (LANTUS) 5 Unit(s) SubCutaneous at bedtime  insulin lispro (ADMELOG) corrective regimen sliding scale   SubCutaneous three times a day before meals  insulin lispro (ADMELOG) corrective regimen sliding scale   SubCutaneous at bedtime  insulin lispro Injectable (ADMELOG) 6 Unit(s) SubCutaneous three times a day before meals  isosorbide   dinitrate Tablet (ISORDIL) 10 milliGRAM(s) Oral three times a day  levothyroxine 75 MICROGram(s) Oral daily  memantine 10 milliGRAM(s) Oral two times a day  metoprolol succinate  milliGRAM(s) Oral every 12 hours  pantoprazole    Tablet 40 milliGRAM(s) Oral before breakfast  piperacillin/tazobactam IVPB.. 3.375 Gram(s) IV Intermittent every 12 hours  polyethylene glycol 3350 17 Gram(s) Oral daily  rivastigmine patch  9.5 mG/24 Hr(s) 1 Patch Transdermal every 24 hours  saccharomyces boulardii 250 milliGRAM(s) Oral two times a day  senna 2 Tablet(s) Oral at bedtime  tamsulosin 0.4 milliGRAM(s) Oral at bedtime    MEDICATIONS  (PRN):  acetaminophen     Tablet .. 650 milliGRAM(s) Oral every 6 hours PRN Temp greater or equal to 38C (100.4F), Mild Pain (1 - 3)  albuterol/ipratropium for Nebulization 3 milliLiter(s) Nebulizer every 6 hours PRN Shortness of Breath  aluminum hydroxide/magnesium hydroxide/simethicone Suspension 30 milliLiter(s) Oral every 4 hours PRN Dyspepsia  benzonatate 100 milliGRAM(s) Oral three times a day PRN Cough  bisacodyl Suppository 10 milliGRAM(s) Rectal daily PRN Constipation  dextrose Oral Gel 15 Gram(s) Oral once PRN Blood Glucose LESS THAN 70 milliGRAM(s)/deciliter  melatonin 3 milliGRAM(s) Oral at bedtime PRN Insomnia  ondansetron Injectable 4 milliGRAM(s) IV Push every 8 hours PRN Nausea and/or Vomiting      Allergies    No Known Allergies    Intolerances        REVIEW OF SYSTEMS:  CONSTITUTIONAL: No fever or chills  HEENT:  No headache, no sore throat  RESPIRATORY: No cough, wheezing, or shortness of breath  CARDIOVASCULAR: No chest pain, palpitations  GASTROINTESTINAL: No abd pain, nausea, vomiting, or diarrhea  GENITOURINARY: No dysuria, frequency, or hematuria  NEUROLOGICAL: no focal weakness or dizziness  MUSCULOSKELETAL: no myalgias     Vital Signs Last 24 Hrs  T(C): 36.3 (10 Apr 2024 11:15), Max: 37.1 (10 Apr 2024 01:04)  T(F): 97.4 (10 Apr 2024 11:15), Max: 98.7 (10 Apr 2024 01:04)  HR: 87 (10 Apr 2024 11:15) (80 - 93)  BP: 149/93 (10 Apr 2024 11:15) (149/93 - 163/85)  BP(mean): --  RR: 20 (10 Apr 2024 11:15) (18 - 20)  SpO2: 99% (10 Apr 2024 11:15) (96% - 99%)    Parameters below as of 10 Apr 2024 11:15  Patient On (Oxygen Delivery Method): room air        PHYSICAL EXAM:  GENERAL: NAD, on room air  HEENT:  anicteric, moist mucous membranes, + oral thrush  CHEST/LUNG:  CTA b/l, no rales, wheezes, or rhonchi  HEART:  irregularly irregular, S1, S2 , no tachy   ABDOMEN:  BS+, soft, nontender, nondistended  EXTREMITIES: no edema, cyanosis, or calf tenderness  NERVOUS SYSTEM: A&O x2, answers questions and follows commands appropriately      LABS:                        10.1   11.70 )-----------( 280      ( 10 Apr 2024 05:50 )             31.0     CBC Full  -  ( 10 Apr 2024 05:50 )  WBC Count : 11.70 K/uL  Hemoglobin : 10.1 g/dL  Hematocrit : 31.0 %  Platelet Count - Automated : 280 K/uL  Mean Cell Volume : 82.4 fl  Mean Cell Hemoglobin : 26.9 pg  Mean Cell Hemoglobin Concentration : 32.6 gm/dL  Auto Neutrophil # : 9.36 K/uL  Auto Lymphocyte # : 1.29 K/uL  Auto Monocyte # : 0.94 K/uL  Auto Eosinophil # : 0.12 K/uL  Auto Basophil # : 0.00 K/uL  Auto Neutrophil % : 80.0 %  Auto Lymphocyte % : 11.0 %  Auto Monocyte % : 8.0 %  Auto Eosinophil % : 1.0 %  Auto Basophil % : 0.0 %    10 Apr 2024 05:50    143    |  112    |  50     ----------------------------<  114    3.3     |  21     |  1.60     Ca    7.9        10 Apr 2024 05:50  Phos  3.0       10 Apr 2024 05:50  Mg     2.0       10 Apr 2024 05:50    TPro  5.8    /  Alb  2.2    /  TBili  0.6    /  DBili  x      /  AST  22     /  ALT  54     /  AlkPhos  81     10 Apr 2024 05:50      Urinalysis Basic - ( 10 Apr 2024 05:50 )    Color: x / Appearance: x / SG: x / pH: x  Gluc: 114 mg/dL / Ketone: x  / Bili: x / Urobili: x   Blood: x / Protein: x / Nitrite: x   Leuk Esterase: x / RBC: x / WBC x   Sq Epi: x / Non Sq Epi: x / Bacteria: x      CAPILLARY BLOOD GLUCOSE      POCT Blood Glucose.: 125 mg/dL (10 Apr 2024 07:55)  POCT Blood Glucose.: 228 mg/dL (09 Apr 2024 21:51)  POCT Blood Glucose.: 167 mg/dL (09 Apr 2024 17:11)  POCT Blood Glucose.: 168 mg/dL (09 Apr 2024 11:59)          RADIOLOGY & ADDITIONAL TESTS:    Personally reviewed.     Consultant(s) Notes Reviewed:  [x] YES  [ ] NO

## 2024-04-10 NOTE — PROGRESS NOTE ADULT - PROBLEM SELECTOR PLAN 3
-Pt with acute hypoxia -- placed on HFNC,  titrated down to 6LNC, now saturating well on room air  -acute hypoxic respiratory failure likely multifactorial including pulmonary edema, COVID, suspected overlying bacterial PNA-Concern for flash pulmonary edema as an acute decompensation  -d-dimer elevated at 500  - Procal 0.37 -> 6.3 -> 2.7  -CT Angio chest, and CT abdomen and pelvis w/ IV contras: Multilobar pneumonia right lung. Septal thickening which may reflect interstitial edema. Bilateral pleural effusions with compressive atelectasis lower lobes. No acute pulmonary embolism. Cystitis and left pyeloureteritis. Distended rectum with fecal impaction.  -duoneb PRN -Tessalon pearls PRN for cough  -Completed 4/5 days Remdesivir, then discontinued due to worsening transaminitis  - STOP Decadron in setting of hallucinations -- leukocytosis likely reactive 2/2 steroids  - s/p IV zosyn  -ID consulted   -isolation precautions -Pt with acute hypoxia -- placed on HFNC,  titrated down to 6LNC, now saturating well on room air  -acute hypoxic respiratory failure likely multifactorial including pulmonary edema, COVID, suspected overlying bacterial PNA-Concern for flash pulmonary edema as an acute decompensation  -d-dimer elevated at 500  - Procal 0.37 -> 6.3 -> 2.7  -CT Angio chest, and CT abdomen and pelvis w/ IV contras: Multilobar pneumonia right lung. Septal thickening which may reflect interstitial edema. Bilateral pleural effusions with compressive atelectasis lower lobes. No acute pulmonary embolism. Cystitis and left pyeloureteritis. Distended rectum with fecal impaction.  -duoneb PRN -Tessalon pearls PRN for cough  -Completed  Remdesivir, then discontinued due to worsening transaminitis  - STOP Decadron in setting of hallucinations -- leukocytosis likely reactive 2/2 steroids  - s/p IV zosyn  -ID consulted   -isolation precautions

## 2024-04-10 NOTE — PROGRESS NOTE ADULT - PROBLEM SELECTOR PLAN 1
- SBPs 160-170  - continue hydralazine 25mg q8  - continue furosemide 20mg daily  - per Cardio, start isordil 10mg q8 for optimal BP control  - Cardio following

## 2024-04-10 NOTE — DISCHARGE NOTE NURSING/CASE MANAGEMENT/SOCIAL WORK - PATIENT PORTAL LINK FT
You can access the FollowMyHealth Patient Portal offered by [facility] by registering at the following website: [email or web address]. By joining [employer]’s FollowMyHealth portal, you will also be able to view your health information using other applications (apps) compatible with our system.

## 2024-04-10 NOTE — PROGRESS NOTE ADULT - ASSESSMENT
89 y/o female with PMH of HFrEF of 20-25 %, dementia, Afib, hypothyroidism, DM who presented to ED to be evaluated for weakness.    pleural eff  atelectasis  OP  OA  HF  Dementia  AF  Hypothyroidism  DM  COVID    ct chest - no evidence of PNA - Pleural eff - small to mod in size - atelectasis and Pulm edema  effusions are not sizeable enough for thoracentesis  cont optimization of cvs rx regimen    cvs rx regimen  I and O  I isma if able to cooperate  CXR noted  TTE reviewed - Pulm HTN - valv heart disease - systolic HF  cardio following  monitor VS and HD and Sat  assist with needs  oral hygiene  skin care  AF - rate and rhythm control, on DOAC  spoke with DTR at bedside   Zygomaticofacial Flap Text: Given the location of the defect, shape of the defect and the proximity to free margins a zygomaticofacial flap was deemed most appropriate for repair.  Using a sterile surgical marker, the appropriate flap was drawn incorporating the defect and placing the expected incisions within the relaxed skin tension lines where possible. The area thus outlined was incised deep to adipose tissue with a #15 scalpel blade with preservation of a vascular pedicle.  The skin margins were undermined to an appropriate distance in all directions utilizing iris scissors.  The flap was then placed into the defect and anchored with interrupted buried subcutaneous sutures.

## 2024-04-10 NOTE — PROGRESS NOTE ADULT - SUBJECTIVE AND OBJECTIVE BOX
Patient is a 88y old  Female who presents with a chief complaint of weakness (03 Apr 2024 16:36)  Patient seen in follow up for CKD.        PAST MEDICAL HISTORY:  Atrial fibrillation    Hypothyroidism    Mild Alzheimer's dementia    Diabetes mellitus    Acute on chronic systolic congestive heart failure    Hypertension    Hyperlipemia    Cardiac LV ejection fraction 21-30%      MEDICATIONS  (STANDING):  apixaban 2.5 milliGRAM(s) Oral every 12 hours  dextrose 5%. 1000 milliLiter(s) (50 mL/Hr) IV Continuous <Continuous>  dextrose 5%. 1000 milliLiter(s) (100 mL/Hr) IV Continuous <Continuous>  dextrose 50% Injectable 12.5 Gram(s) IV Push once  dextrose 50% Injectable 25 Gram(s) IV Push once  dextrose 50% Injectable 25 Gram(s) IV Push once  digoxin     Tablet 125 MICROGram(s) Oral every other day  escitalopram 10 milliGRAM(s) Oral daily  FIRST- Mouthwash  BLM 15 milliLiter(s) Swish and Spit three times a day  furosemide    Tablet 20 milliGRAM(s) Oral daily  glucagon  Injectable 1 milliGRAM(s) IntraMuscular once  hemorrhoidal Ointment 1 Application(s) Rectal four times a day  hydrALAZINE 25 milliGRAM(s) Oral every 8 hours  insulin glargine Injectable (LANTUS) 5 Unit(s) SubCutaneous at bedtime  insulin lispro (ADMELOG) corrective regimen sliding scale   SubCutaneous three times a day before meals  insulin lispro (ADMELOG) corrective regimen sliding scale   SubCutaneous at bedtime  insulin lispro Injectable (ADMELOG) 6 Unit(s) SubCutaneous three times a day before meals  isosorbide   dinitrate Tablet (ISORDIL) 10 milliGRAM(s) Oral three times a day  levothyroxine 75 MICROGram(s) Oral daily  memantine 10 milliGRAM(s) Oral two times a day  metoprolol succinate  milliGRAM(s) Oral every 12 hours  pantoprazole    Tablet 40 milliGRAM(s) Oral before breakfast  rivastigmine patch  9.5 mG/24 Hr(s) 1 Patch Transdermal every 24 hours  saccharomyces boulardii 250 milliGRAM(s) Oral two times a day  tamsulosin 0.4 milliGRAM(s) Oral at bedtime    MEDICATIONS  (PRN):  acetaminophen     Tablet .. 650 milliGRAM(s) Oral every 6 hours PRN Temp greater or equal to 38C (100.4F), Mild Pain (1 - 3)  albuterol/ipratropium for Nebulization 3 milliLiter(s) Nebulizer every 6 hours PRN Shortness of Breath  aluminum hydroxide/magnesium hydroxide/simethicone Suspension 30 milliLiter(s) Oral every 4 hours PRN Dyspepsia  benzonatate 100 milliGRAM(s) Oral three times a day PRN Cough  dextrose Oral Gel 15 Gram(s) Oral once PRN Blood Glucose LESS THAN 70 milliGRAM(s)/deciliter  loperamide 2 milliGRAM(s) Oral four times a day PRN Diarrhea  melatonin 3 milliGRAM(s) Oral at bedtime PRN Insomnia  ondansetron Injectable 4 milliGRAM(s) IV Push every 8 hours PRN Nausea and/or Vomiting    T(C): 36.3 (04-10-24 @ 11:15), Max: 37.1 (04-10-24 @ 01:04)  HR: 87 (04-10-24 @ 11:15) (73 - 93)  BP: 149/93 (04-10-24 @ 11:15) (149/93 - 173/113)  RR: 20 (04-10-24 @ 11:15)  SpO2: 99% (04-10-24 @ 11:15)  Wt(kg): --  I&O's Detail    09 Apr 2024 07:01  -  10 Apr 2024 07:00  --------------------------------------------------------  IN:  Total IN: 0 mL    OUT:    Voided (mL): 250 mL  Total OUT: 250 mL    Total NET: -250 mL                    PHYSICAL EXAM:    General: No distress  Respiratory: b/l air entry  Cardiovascular: S1 S2  Gastrointestinal: soft  Extremities:  no edema                     LABORATORY:                        10.1   11.70 )-----------( 280      ( 10 Apr 2024 05:50 )             31.0     04-10    143  |  112<H>  |  50<H>  ----------------------------<  114<H>  3.3<L>   |  21<L>  |  1.60<H>    Ca    7.9<L>      10 Apr 2024 05:50  Phos  3.0     04-10  Mg     2.0     04-10    TPro  5.8<L>  /  Alb  2.2<L>  /  TBili  0.6  /  DBili  x   /  AST  22  /  ALT  54  /  AlkPhos  81  04-10    Sodium: 143 mmol/L (04-10 @ 05:50)  Sodium: 141 mmol/L (04-09 @ 08:00)    Potassium: 3.3 mmol/L (04-10 @ 05:50)  Potassium: 4.1 mmol/L (04-09 @ 08:00)    Hemoglobin: 10.1 g/dL (04-10 @ 05:50)  Hemoglobin: 9.8 g/dL (04-08 @ 08:45)    Creatinine, Serum 1.60 (04-10 @ 05:50)  Creatinine, Serum 2.00 (04-09 @ 08:00)  Creatinine, Serum 2.10 (04-08 @ 08:45)        LIVER FUNCTIONS - ( 10 Apr 2024 05:50 )  Alb: 2.2 g/dL / Pro: 5.8 g/dL / ALK PHOS: 81 U/L / ALT: 54 U/L / AST: 22 U/L / GGT: x           Urinalysis Basic - ( 10 Apr 2024 05:50 )    Color: x / Appearance: x / SG: x / pH: x  Gluc: 114 mg/dL / Ketone: x  / Bili: x / Urobili: x   Blood: x / Protein: x / Nitrite: x   Leuk Esterase: x / RBC: x / WBC x   Sq Epi: x / Non Sq Epi: x / Bacteria: x        < from: CT Chest No Cont (04.09.24 @ 15:49) >    ACC: [id number] EXAM:  CT CHEST   ORDERED BY: JOHN ROSALES     PROCEDURE DATE:  04/09/2024          INTERPRETATION:  CLINICAL INFORMATION: Weakness and pleural effusion.    COMPARISON: CT angiogram chest 4/2/2024.    CONTRAST/COMPLICATIONS:  IV Contrast: NONE  Oral Contrast: NONE  Complications: None reported at time of study completion    PROCEDURE:  CT of the Chest was performed.  Sagittal and coronal reformats were performed.    FINDINGS:    LUNGS AND AIRWAYS: PLEURA:    Persistent moderate-sized bilateral pleural effusions and associated   compressive atelectasis lower lobes.  Mild interlobular septal thickening may reflect interstitial edema.  The central airways are patent.    Small calcified granuloma right lower lobe.    MEDIASTINUM AND FATOUMATA:  Precarinal lymph node measuring up to 1 cm short axis.  Evaluation of the pulmonary hilum is limited without intravenous contrast.    VESSELS: Atherosclerotic changes thoracic aorta and coronary artery   calcification.    HEART:  Cardiomegaly.   No pericardial effusion.    CHEST WALL AND LOWER NECK: Within normal limits.    VISUALIZED UPPER ABDOMEN:  Limited by streak artifact.  Cholecystectomy.  Partially imaged right renal cyst.    BONES: No acute osseous abnormality.    IMPRESSION:    Persistent moderate bilateral pleural effusions and associated   compressive atelectasis.    Mild septal thickening, may reflect interstitial edema.    Other findings as discussed above.    --- End of Report ---            MAX JULES MD; Attending Radiologist  This document has been electronically signed. Apr 9 2024  4:14PM    < end of copied text >

## 2024-04-10 NOTE — CHART NOTE - NSCHARTNOTEFT_GEN_A_CORE
Assessment:   Pt seen for nutrition follow up.  Chart reviewed, hospital course noted.     Brief History:   "87yo F w/ PMH of HFrEF of 20-25%, dementia, Afib (on low-dose Eliquis), hypothyroidism, T2DM, CKD3b, p/w generalized weakness admitted with sepsis due to UTI and COVID, afib w/ RVR, found to have acute hypoxic respiratory failure and likely L-sided pyelonephritis."      Factors impacting intake: [ ] none [ ] nausea  [ ] vomiting [ ] diarrhea [ ] constipation  [ ]chewing problems [ ] swallowing issues  [x] other: compromised respiratory status, acute illness    Diet Prescription: Diet, Consistent Carbohydrate w/Evening Snack:   Low Sodium  Supplement Feeding Modality:  Oral  Glucerna Shake Cans or Servings Per Day:  1       Frequency:  Two Times a day (04-05-24 @ 10:45)    Intake: varies, somewhat improved    Current Weight: 4/2 112.4#, 4/10 115.3#      Pertinent Medications: MEDICATIONS  (STANDING):  apixaban 2.5 milliGRAM(s) Oral every 12 hours  bisacodyl Suppository 10 milliGRAM(s) Rectal once  dextrose 5%. 1000 milliLiter(s) (50 mL/Hr) IV Continuous <Continuous>  dextrose 5%. 1000 milliLiter(s) (100 mL/Hr) IV Continuous <Continuous>  dextrose 50% Injectable 25 Gram(s) IV Push once  dextrose 50% Injectable 12.5 Gram(s) IV Push once  dextrose 50% Injectable 25 Gram(s) IV Push once  digoxin     Tablet 125 MICROGram(s) Oral every other day  escitalopram 10 milliGRAM(s) Oral daily  FIRST- Mouthwash  BLM 15 milliLiter(s) Swish and Spit three times a day  furosemide    Tablet 20 milliGRAM(s) Oral daily  glucagon  Injectable 1 milliGRAM(s) IntraMuscular once  hemorrhoidal Ointment 1 Application(s) Rectal four times a day  hydrALAZINE 25 milliGRAM(s) Oral every 8 hours  insulin glargine Injectable (LANTUS) 5 Unit(s) SubCutaneous at bedtime  insulin lispro (ADMELOG) corrective regimen sliding scale   SubCutaneous three times a day before meals  insulin lispro (ADMELOG) corrective regimen sliding scale   SubCutaneous at bedtime  insulin lispro Injectable (ADMELOG) 6 Unit(s) SubCutaneous three times a day before meals  levothyroxine 75 MICROGram(s) Oral daily  memantine 10 milliGRAM(s) Oral two times a day  metoprolol succinate  milliGRAM(s) Oral every 12 hours  pantoprazole    Tablet 40 milliGRAM(s) Oral before breakfast  piperacillin/tazobactam IVPB.. 3.375 Gram(s) IV Intermittent every 12 hours  polyethylene glycol 3350 17 Gram(s) Oral daily  potassium chloride   Powder 40 milliEquivalent(s) Oral once  rivastigmine patch  9.5 mG/24 Hr(s) 1 Patch Transdermal every 24 hours  saccharomyces boulardii 250 milliGRAM(s) Oral two times a day  senna 2 Tablet(s) Oral at bedtime  tamsulosin 0.4 milliGRAM(s) Oral at bedtime    MEDICATIONS  (PRN):  acetaminophen     Tablet .. 650 milliGRAM(s) Oral every 6 hours PRN Temp greater or equal to 38C (100.4F), Mild Pain (1 - 3)  albuterol/ipratropium for Nebulization 3 milliLiter(s) Nebulizer every 6 hours PRN Shortness of Breath  aluminum hydroxide/magnesium hydroxide/simethicone Suspension 30 milliLiter(s) Oral every 4 hours PRN Dyspepsia  benzonatate 100 milliGRAM(s) Oral three times a day PRN Cough  bisacodyl Suppository 10 milliGRAM(s) Rectal daily PRN Constipation  dextrose Oral Gel 15 Gram(s) Oral once PRN Blood Glucose LESS THAN 70 milliGRAM(s)/deciliter  melatonin 3 milliGRAM(s) Oral at bedtime PRN Insomnia  ondansetron Injectable 4 milliGRAM(s) IV Push every 8 hours PRN Nausea and/or Vomiting    Pertinent Labs: 04-10 Na143 mmol/L Glu 114 mg/dL<H> K+ 3.3 mmol/L<L> Cr  1.60 mg/dL<H> BUN 50 mg/dL<H> 04-10 Phos 3.0 mg/dL 04-10 Alb 2.2 g/dL<L>     CAPILLARY BLOOD GLUCOSE      POCT Blood Glucose.: 125 mg/dL (10 Apr 2024 07:55)  POCT Blood Glucose.: 228 mg/dL (09 Apr 2024 21:51)  POCT Blood Glucose.: 167 mg/dL (09 Apr 2024 17:11)  POCT Blood Glucose.: 168 mg/dL (09 Apr 2024 11:59)      Skin: R buttock II, coccyx I, R heel DTI  Edema: none noted    Estimated Needs:   [x] no change since previous assessment on: 4/4 based on #  [ ] recalculated: based on   kcal/day: 3735-7358 lela (25-30 lela/kg)  gms protein/day: 69-80 gms (1.2-1.4gm/kg)    Previous Nutrition Diagnosis:     [x] Underweight    [x] Increased Nutrient Needs (ella/prot/vit/min)  [x]  Malnutrition (severe acute on chronic)    Nutrition Diagnosis is [x] ongoing  [ ] resolved [ ] not applicable     New Nutrition Diagnosis: [x] not applicable       Interventions:   Recommend  [x] Continue current diet and supplements as ordered  [ ] Change Diet To:  [ ] Nutrition Supplement  [ ] Nutrition Support  [x] Other: daily MVI    Monitoring and Evaluation:   [x] PO intake [ x ] Tolerance to diet prescription [ x ] weights [ x ] labs [ x ] follow up per protocol  [x] other: s/s GI distress, bowel function, skin integrity/ edema Assessment:   Pt seen for nutrition follow up.  Chart reviewed, hospital course noted.     Brief History:   "89yo F w/ PMH of HFrEF of 20-25%, dementia, Afib (on low-dose Eliquis), hypothyroidism, T2DM, CKD3b, p/w generalized weakness admitted with sepsis due to UTI and COVID, afib w/ RVR, found to have acute hypoxic respiratory failure and likely L-sided pyelonephritis."    Pt seen at bedside, asleep, dtr present so information obtained from her.  Pt eating fairly well depending on meal served. Drinking Glucerna shakes.  Dtr reports loose stools, questioning whether from ONS, however pt takes Glucerna at home without incidence.   Daily fecal incontinence noted in EMR.   On bowel regimen however not currently being given.  On Florastor as well.     Factors impacting intake: [ ] none [ ] nausea  [ ] vomiting [ ] diarrhea [ ] constipation  [ ]chewing problems [ ] swallowing issues  [x] other: compromised respiratory status, acute illness    Diet Prescription: Diet, Consistent Carbohydrate w/Evening Snack:   Low Sodium  Supplement Feeding Modality:  Oral  Glucerna Shake Cans or Servings Per Day:  1       Frequency:  Two Times a day (04-05-24 @ 10:45)    Intake: varies, somewhat improved    Current Weight: 4/2 112.4#, 4/10 115.3#      Pertinent Medications: MEDICATIONS  (STANDING):  apixaban 2.5 milliGRAM(s) Oral every 12 hours  bisacodyl Suppository 10 milliGRAM(s) Rectal once  dextrose 5%. 1000 milliLiter(s) (50 mL/Hr) IV Continuous <Continuous>  dextrose 5%. 1000 milliLiter(s) (100 mL/Hr) IV Continuous <Continuous>  dextrose 50% Injectable 25 Gram(s) IV Push once  dextrose 50% Injectable 12.5 Gram(s) IV Push once  dextrose 50% Injectable 25 Gram(s) IV Push once  digoxin     Tablet 125 MICROGram(s) Oral every other day  escitalopram 10 milliGRAM(s) Oral daily  FIRST- Mouthwash  BLM 15 milliLiter(s) Swish and Spit three times a day  furosemide    Tablet 20 milliGRAM(s) Oral daily  glucagon  Injectable 1 milliGRAM(s) IntraMuscular once  hemorrhoidal Ointment 1 Application(s) Rectal four times a day  hydrALAZINE 25 milliGRAM(s) Oral every 8 hours  insulin glargine Injectable (LANTUS) 5 Unit(s) SubCutaneous at bedtime  insulin lispro (ADMELOG) corrective regimen sliding scale   SubCutaneous three times a day before meals  insulin lispro (ADMELOG) corrective regimen sliding scale   SubCutaneous at bedtime  insulin lispro Injectable (ADMELOG) 6 Unit(s) SubCutaneous three times a day before meals  levothyroxine 75 MICROGram(s) Oral daily  memantine 10 milliGRAM(s) Oral two times a day  metoprolol succinate  milliGRAM(s) Oral every 12 hours  pantoprazole    Tablet 40 milliGRAM(s) Oral before breakfast  piperacillin/tazobactam IVPB.. 3.375 Gram(s) IV Intermittent every 12 hours  polyethylene glycol 3350 17 Gram(s) Oral daily  potassium chloride   Powder 40 milliEquivalent(s) Oral once  rivastigmine patch  9.5 mG/24 Hr(s) 1 Patch Transdermal every 24 hours  saccharomyces boulardii 250 milliGRAM(s) Oral two times a day  senna 2 Tablet(s) Oral at bedtime  tamsulosin 0.4 milliGRAM(s) Oral at bedtime    MEDICATIONS  (PRN):  acetaminophen     Tablet .. 650 milliGRAM(s) Oral every 6 hours PRN Temp greater or equal to 38C (100.4F), Mild Pain (1 - 3)  albuterol/ipratropium for Nebulization 3 milliLiter(s) Nebulizer every 6 hours PRN Shortness of Breath  aluminum hydroxide/magnesium hydroxide/simethicone Suspension 30 milliLiter(s) Oral every 4 hours PRN Dyspepsia  benzonatate 100 milliGRAM(s) Oral three times a day PRN Cough  bisacodyl Suppository 10 milliGRAM(s) Rectal daily PRN Constipation  dextrose Oral Gel 15 Gram(s) Oral once PRN Blood Glucose LESS THAN 70 milliGRAM(s)/deciliter  melatonin 3 milliGRAM(s) Oral at bedtime PRN Insomnia  ondansetron Injectable 4 milliGRAM(s) IV Push every 8 hours PRN Nausea and/or Vomiting    Pertinent Labs: 04-10 Na143 mmol/L Glu 114 mg/dL<H> K+ 3.3 mmol/L<L> Cr  1.60 mg/dL<H> BUN 50 mg/dL<H> 04-10 Phos 3.0 mg/dL 04-10 Alb 2.2 g/dL<L>     CAPILLARY BLOOD GLUCOSE      POCT Blood Glucose.: 125 mg/dL (10 Apr 2024 07:55)  POCT Blood Glucose.: 228 mg/dL (09 Apr 2024 21:51)  POCT Blood Glucose.: 167 mg/dL (09 Apr 2024 17:11)  POCT Blood Glucose.: 168 mg/dL (09 Apr 2024 11:59)      Skin: R buttock II, coccyx I, R heel DTI  Edema: none noted    Estimated Needs:   [x] no change since previous assessment on: 4/4 based on #  [ ] recalculated: based on   kcal/day: 7390-9434 lela (25-30 lela/kg)  gms protein/day: 69-80 gms (1.2-1.4gm/kg)    Previous Nutrition Diagnosis:     [x] Underweight    [x] Increased Nutrient Needs (lela/prot/vit/min)  [x]  Malnutrition (severe acute on chronic)    Nutrition Diagnosis is [x] ongoing  [ ] resolved [ ] not applicable     New Nutrition Diagnosis: [x] not applicable       Interventions:   Recommend  [x] Continue current diet and supplements as ordered  [ ] Change Diet To:  [ ] Nutrition Supplement  [ ] Nutrition Support  [x] Other: daily MVI    Monitoring and Evaluation:   [x] PO intake [ x ] Tolerance to diet prescription [ x ] weights [ x ] labs [ x ] follow up per protocol  [x] other: s/s GI distress, bowel function, skin integrity/ edema

## 2024-04-10 NOTE — PROGRESS NOTE ADULT - NUTRITIONAL ASSESSMENT
This patient has been assessed with a concern for Malnutrition and has been determined to have a diagnosis/diagnoses of Severe protein-calorie malnutrition and Underweight (BMI < 19).    This patient is being managed with:   Diet Consistent Carbohydrate w/Evening Snack-  Low Sodium  Supplement Feeding Modality:  Oral  Glucerna Shake Cans or Servings Per Day:  1       Frequency:  Two Times a day  Entered: Apr 5 2024 10:44AM  

## 2024-04-10 NOTE — PROGRESS NOTE ADULT - SUBJECTIVE AND OBJECTIVE BOX
Date/Time Patient Seen:  		  Referring MD:   Data Reviewed	       Patient is a 88y old  Female who presents with a chief complaint of weakness (09 Apr 2024 15:59)      Subjective/HPI     PAST MEDICAL & SURGICAL HISTORY:  Atrial fibrillation    Hypothyroidism    Mild Alzheimer's dementia    Diabetes mellitus    Acute on chronic systolic congestive heart failure    Hypertension    Hyperlipemia    Cardiac LV ejection fraction 21-30%  12/22    History of appendectomy    H/O hernia repair    History of cholecystectomy          Medication list         MEDICATIONS  (STANDING):  apixaban 2.5 milliGRAM(s) Oral every 12 hours  bisacodyl Suppository 10 milliGRAM(s) Rectal once  dextrose 5%. 1000 milliLiter(s) (50 mL/Hr) IV Continuous <Continuous>  dextrose 5%. 1000 milliLiter(s) (100 mL/Hr) IV Continuous <Continuous>  dextrose 50% Injectable 25 Gram(s) IV Push once  dextrose 50% Injectable 12.5 Gram(s) IV Push once  dextrose 50% Injectable 25 Gram(s) IV Push once  digoxin     Tablet 125 MICROGram(s) Oral every other day  escitalopram 10 milliGRAM(s) Oral daily  FIRST- Mouthwash  BLM 15 milliLiter(s) Swish and Spit three times a day  glucagon  Injectable 1 milliGRAM(s) IntraMuscular once  hemorrhoidal Ointment 1 Application(s) Rectal four times a day  hydrALAZINE 25 milliGRAM(s) Oral every 8 hours  insulin glargine Injectable (LANTUS) 5 Unit(s) SubCutaneous at bedtime  insulin lispro (ADMELOG) corrective regimen sliding scale   SubCutaneous three times a day before meals  insulin lispro (ADMELOG) corrective regimen sliding scale   SubCutaneous at bedtime  insulin lispro Injectable (ADMELOG) 6 Unit(s) SubCutaneous three times a day before meals  levothyroxine 75 MICROGram(s) Oral daily  memantine 10 milliGRAM(s) Oral two times a day  metoprolol succinate  milliGRAM(s) Oral every 12 hours  pantoprazole    Tablet 40 milliGRAM(s) Oral before breakfast  piperacillin/tazobactam IVPB.. 3.375 Gram(s) IV Intermittent every 12 hours  polyethylene glycol 3350 17 Gram(s) Oral daily  rivastigmine patch  9.5 mG/24 Hr(s) 1 Patch Transdermal every 24 hours  saccharomyces boulardii 250 milliGRAM(s) Oral two times a day  senna 2 Tablet(s) Oral at bedtime  tamsulosin 0.4 milliGRAM(s) Oral at bedtime    MEDICATIONS  (PRN):  acetaminophen     Tablet .. 650 milliGRAM(s) Oral every 6 hours PRN Temp greater or equal to 38C (100.4F), Mild Pain (1 - 3)  albuterol/ipratropium for Nebulization 3 milliLiter(s) Nebulizer every 6 hours PRN Shortness of Breath  aluminum hydroxide/magnesium hydroxide/simethicone Suspension 30 milliLiter(s) Oral every 4 hours PRN Dyspepsia  benzonatate 100 milliGRAM(s) Oral three times a day PRN Cough  bisacodyl Suppository 10 milliGRAM(s) Rectal daily PRN Constipation  dextrose Oral Gel 15 Gram(s) Oral once PRN Blood Glucose LESS THAN 70 milliGRAM(s)/deciliter  melatonin 3 milliGRAM(s) Oral at bedtime PRN Insomnia  ondansetron Injectable 4 milliGRAM(s) IV Push every 8 hours PRN Nausea and/or Vomiting         Vitals log        ICU Vital Signs Last 24 Hrs  T(C): 36.3 (10 Apr 2024 05:16), Max: 37.1 (10 Apr 2024 01:04)  T(F): 97.4 (10 Apr 2024 05:16), Max: 98.7 (10 Apr 2024 01:04)  HR: 87 (10 Apr 2024 05:16) (80 - 93)  BP: 163/85 (10 Apr 2024 05:16) (160/81 - 163/85)  BP(mean): --  ABP: --  ABP(mean): --  RR: 18 (10 Apr 2024 05:16) (18 - 18)  SpO2: 96% (10 Apr 2024 05:16) (96% - 97%)    O2 Parameters below as of 10 Apr 2024 05:16  Patient On (Oxygen Delivery Method): room air                 Input and Output:  I&O's Detail    09 Apr 2024 07:01  -  10 Apr 2024 07:00  --------------------------------------------------------  IN:  Total IN: 0 mL    OUT:    Voided (mL): 250 mL  Total OUT: 250 mL    Total NET: -250 mL          Lab Data                        9.8    17.32 )-----------( 293      ( 08 Apr 2024 08:45 )             29.2     04-09    141  |  112<H>  |  60<H>  ----------------------------<  147<H>  4.1   |  22  |  2.00<H>    Ca    8.7      09 Apr 2024 08:00    TPro  6.1  /  Alb  2.5<L>  /  TBili  0.6  /  DBili  x   /  AST  27  /  ALT  66  /  AlkPhos  80  04-08            Review of Systems	      Objective     Physical Examination    heart s1s2  lung dc BS  head nc      Pertinent Lab findings & Imaging      Julieth:  NO   Adequate UO     I&O's Detail    09 Apr 2024 07:01  -  10 Apr 2024 07:00  --------------------------------------------------------  IN:  Total IN: 0 mL    OUT:    Voided (mL): 250 mL  Total OUT: 250 mL    Total NET: -250 mL               Discussed with:     Cultures:	        Radiology

## 2024-04-10 NOTE — DISCHARGE NOTE NURSING/CASE MANAGEMENT/SOCIAL WORK - NSSCNAMETXT_GEN_ALL_CORE
[facility]   Nurse to visit the day after hospital discharge; physical therapist to follow. Please contact the home care agency at the above phone number if you have not heard from them by 12 noon on the day after your hospital discharge.

## 2024-04-10 NOTE — PROGRESS NOTE ADULT - ATTENDING COMMENTS
patient seen and examined at bedside with residents/student, agree with resident assessment and plan except the following:     HFrEF with pleural effusion: repeat CT reviewed, not sizable for thoracentesis as per pulmonary, patient clinically improved, no need of oxygen supply, tolerated low dose lasix, need out patient monitoring, discussed with patient and daughter.     Pneumonia/COVID infection: completed treatment. off antibiotic, off oxygen     Diarrhea: daughter reports multiple loose BM in the last week, hold off laxatives, add loperamide prn    HTN: uncontrolled, added imdur with metoprolol and hydralazine, will monitor BP

## 2024-04-10 NOTE — PROGRESS NOTE ADULT - PROBLEM SELECTOR PLAN 2
HFrEF:-BNP: TTE (12/2022): EF 20-25%, apex and anterior wall akinesis, inferior wall hypokinesis, mod MR, mild TR  -CXR with vascular congestion and b/l pleural effusions  -likely component of acute on chronic systolic CHF contributing to hypoxia, will avoid IV diuresis in setting of worsening renal function  - 4/8 TTE LV EF 23%, large left pleural effusion  - CT Chest: Persistent moderate bilateral pleural effusions and associated compressive atelectasis, Mild septal thickening, may reflect interstitial edema.  - Pulm Dr. Isaac -- effusions not sizable enough for thoracentesis

## 2024-04-10 NOTE — PROGRESS NOTE ADULT - ASSESSMENT
89 y/o female with PMH of HFrEF of 20-25 %, dementia, Afib, hypothyroidism, DM who presented to ED to be evaluated for weakness, found to have +UTI and Covid positive     Persistent Atrial Fibrillation, COVID  - Known hx of Afib, with RVR episode in the setting of catecholamine surge 2/2 recent acute stressor (UTI, COVID)   - Remains rate-controlled  - Continue BB   - Continue Digoxin 0.125 QOD. dig level 0.8 4/8   - Continue Eliquis    - EKG Afib w/ RVR, unchanged from prior   - No anginal complaints   - No evidence of any active ischemia     - TTE (12/2022): EF 20-25%, apex and anterior wall akinesis, inferior wall hypokinesis, mod MR, mild TR  - Known to our service, from last admission, she has known LV Dysfunction from TTE likely from a missed MI was decided for medical management given her frailty, increased creatinine at that time  -ECHO 4/8/24 as above lv ef 23%  normal systolic function, left atrium moderately dilatated moderate MR , mild AR moderate pulmonary hypertension large left pleural effusion    -iv lasix has been held for YVETTE , large effusion noted on echo, would obtain Pulm consult for possible thoracentesis     - For GDMT Continue Toprol XL  - -160  - holding ACE for elevated Scr   - consider hydralazine 20mg Po tid for Bp control, then transition to ACe once Scr atble    - +COVID management per primary    - Monitor and replete lytes, keep K>4, Mg>2.  - Will continue to follow. 89 y/o female with PMH of HFrEF of 20-25 %, dementia, Afib, hypothyroidism, DM who presented to ED to be evaluated for weakness, found to have +UTI and Covid positive     Persistent Atrial Fibrillation, COVID  - Known hx of Afib, with RVR episode in the setting of catecholamine surge 2/2 recent acute stressor (UTI, COVID)   - Remains rate-controlled  - Continue BB   - Continue Digoxin 0.125 QOD. dig level 0.8 4/8   - Continue Eliquis    - EKG Afib w/ RVR, unchanged from prior   - No anginal complaints   - No evidence of any active ischemia     - TTE (12/2022): EF 20-25%, apex and anterior wall akinesis, inferior wall hypokinesis, mod MR, mild TR  - Known to our service, from last admission, she has known LV Dysfunction from TTE likely from a missed MI was decided for medical management given her frailty, increased creatinine at that time  -ECHO 4/8/24 as above lv ef 23%  normal systolic function, left atrium moderately dilatated moderate MR , mild AR moderate pulmonary hypertension large left pleural effusion    -iv lasix has been held for YVETTE , large effusion noted on echo, would obtain Pulm consult for possible thoracentesis  - Scr stable - continue Po lasix    - For GDMT Continue Toprol XL  - -160  - continue hydralazine 25mg Po tid   - consider starting isordil TID as Blood Pressure tolerated      - +COVID management per primary    - Monitor and replete lytes, keep K>4, Mg>2.  - Will continue to follow.

## 2024-04-10 NOTE — PROGRESS NOTE ADULT - PROBLEM SELECTOR PLAN 6
-Pt with chronic afib with episodes of RVR while hospitalized, likely due to infection  - Toprol XL switched from 100mg BID to 50mg q6h for better around the clock control --> now back to 100mg BID  -C/w digoxin 125 mcg every other day -- dig level wnl   -C/w Eliquis 2.5 mg bid  -cardio consulted ([facility])

## 2024-04-10 NOTE — PROGRESS NOTE ADULT - PROBLEM SELECTOR PLAN 9
-C/w synthroid 75 mcg daily
-c/w pts home Eliquis 2.5 bid    - PT: home PT with assist
-c/w pts home Eliquis 2.5 bid    - PT: home PT with assist

## 2024-04-10 NOTE — PROGRESS NOTE ADULT - PROBLEM SELECTOR PROBLEM 1
Acute hypoxemic respiratory failure due to COVID-19
HTN (hypertension)
Acute hypoxemic respiratory failure due to COVID-19
Acute hypoxemic respiratory failure due to COVID-19

## 2024-04-11 VITALS
DIASTOLIC BLOOD PRESSURE: 84 MMHG | RESPIRATION RATE: 17 BRPM | OXYGEN SATURATION: 95 % | HEART RATE: 88 BPM | TEMPERATURE: 98 F | SYSTOLIC BLOOD PRESSURE: 152 MMHG

## 2024-04-11 LAB
ALBUMIN SERPL ELPH-MCNC: 2.1 G/DL — LOW (ref 3.3–5)
ALP SERPL-CCNC: 68 U/L — SIGNIFICANT CHANGE UP (ref 40–120)
ALT FLD-CCNC: 44 U/L — SIGNIFICANT CHANGE UP (ref 12–78)
ANION GAP SERPL CALC-SCNC: 10 MMOL/L — SIGNIFICANT CHANGE UP (ref 5–17)
AST SERPL-CCNC: 26 U/L — SIGNIFICANT CHANGE UP (ref 15–37)
BASOPHILS # BLD AUTO: 0.02 K/UL — SIGNIFICANT CHANGE UP (ref 0–0.2)
BASOPHILS NFR BLD AUTO: 0.2 % — SIGNIFICANT CHANGE UP (ref 0–2)
BILIRUB SERPL-MCNC: 0.5 MG/DL — SIGNIFICANT CHANGE UP (ref 0.2–1.2)
BUN SERPL-MCNC: 43 MG/DL — HIGH (ref 7–23)
CALCIUM SERPL-MCNC: 8 MG/DL — LOW (ref 8.5–10.1)
CHLORIDE SERPL-SCNC: 110 MMOL/L — HIGH (ref 96–108)
CO2 SERPL-SCNC: 21 MMOL/L — LOW (ref 22–31)
CREAT SERPL-MCNC: 1.5 MG/DL — HIGH (ref 0.5–1.3)
EGFR: 34 ML/MIN/1.73M2 — LOW
EOSINOPHIL # BLD AUTO: 0.23 K/UL — SIGNIFICANT CHANGE UP (ref 0–0.5)
EOSINOPHIL NFR BLD AUTO: 2.1 % — SIGNIFICANT CHANGE UP (ref 0–6)
GLUCOSE SERPL-MCNC: 119 MG/DL — HIGH (ref 70–99)
HCT VFR BLD CALC: 32.3 % — LOW (ref 34.5–45)
HGB BLD-MCNC: 10.3 G/DL — LOW (ref 11.5–15.5)
IMM GRANULOCYTES NFR BLD AUTO: 3.8 % — HIGH (ref 0–0.9)
LYMPHOCYTES # BLD AUTO: 1.54 K/UL — SIGNIFICANT CHANGE UP (ref 1–3.3)
LYMPHOCYTES # BLD AUTO: 14.4 % — SIGNIFICANT CHANGE UP (ref 13–44)
MAGNESIUM SERPL-MCNC: 1.9 MG/DL — SIGNIFICANT CHANGE UP (ref 1.6–2.6)
MCHC RBC-ENTMCNC: 27.1 PG — SIGNIFICANT CHANGE UP (ref 27–34)
MCHC RBC-ENTMCNC: 31.9 GM/DL — LOW (ref 32–36)
MCV RBC AUTO: 85 FL — SIGNIFICANT CHANGE UP (ref 80–100)
MONOCYTES # BLD AUTO: 0.97 K/UL — HIGH (ref 0–0.9)
MONOCYTES NFR BLD AUTO: 9 % — SIGNIFICANT CHANGE UP (ref 2–14)
NEUTROPHILS # BLD AUTO: 7.55 K/UL — HIGH (ref 1.8–7.4)
NEUTROPHILS NFR BLD AUTO: 70.5 % — SIGNIFICANT CHANGE UP (ref 43–77)
NRBC # BLD: 0 /100 WBCS — SIGNIFICANT CHANGE UP (ref 0–0)
PHOSPHATE SERPL-MCNC: 3.4 MG/DL — SIGNIFICANT CHANGE UP (ref 2.5–4.5)
PLATELET # BLD AUTO: 274 K/UL — SIGNIFICANT CHANGE UP (ref 150–400)
POTASSIUM SERPL-MCNC: 4.4 MMOL/L — SIGNIFICANT CHANGE UP (ref 3.5–5.3)
POTASSIUM SERPL-SCNC: 4.4 MMOL/L — SIGNIFICANT CHANGE UP (ref 3.5–5.3)
PROT SERPL-MCNC: 5.8 G/DL — LOW (ref 6–8.3)
RBC # BLD: 3.8 M/UL — SIGNIFICANT CHANGE UP (ref 3.8–5.2)
RBC # FLD: 17.3 % — HIGH (ref 10.3–14.5)
SODIUM SERPL-SCNC: 141 MMOL/L — SIGNIFICANT CHANGE UP (ref 135–145)
WBC # BLD: 10.72 K/UL — HIGH (ref 3.8–10.5)
WBC # FLD AUTO: 10.72 K/UL — HIGH (ref 3.8–10.5)

## 2024-04-11 PROCEDURE — 87086 URINE CULTURE/COLONY COUNT: CPT

## 2024-04-11 PROCEDURE — 82728 ASSAY OF FERRITIN: CPT

## 2024-04-11 PROCEDURE — 74177 CT ABD & PELVIS W/CONTRAST: CPT | Mod: MC

## 2024-04-11 PROCEDURE — 97530 THERAPEUTIC ACTIVITIES: CPT

## 2024-04-11 PROCEDURE — 97162 PT EVAL MOD COMPLEX 30 MIN: CPT

## 2024-04-11 PROCEDURE — 83735 ASSAY OF MAGNESIUM: CPT

## 2024-04-11 PROCEDURE — 83880 ASSAY OF NATRIURETIC PEPTIDE: CPT

## 2024-04-11 PROCEDURE — 99233 SBSQ HOSP IP/OBS HIGH 50: CPT

## 2024-04-11 PROCEDURE — 84145 PROCALCITONIN (PCT): CPT

## 2024-04-11 PROCEDURE — 87641 MR-STAPH DNA AMP PROBE: CPT

## 2024-04-11 PROCEDURE — 85379 FIBRIN DEGRADATION QUANT: CPT

## 2024-04-11 PROCEDURE — 36415 COLL VENOUS BLD VENIPUNCTURE: CPT

## 2024-04-11 PROCEDURE — 97116 GAIT TRAINING THERAPY: CPT

## 2024-04-11 PROCEDURE — 96374 THER/PROPH/DIAG INJ IV PUSH: CPT

## 2024-04-11 PROCEDURE — 99239 HOSP IP/OBS DSCHRG MGMT >30: CPT

## 2024-04-11 PROCEDURE — 97535 SELF CARE MNGMENT TRAINING: CPT

## 2024-04-11 PROCEDURE — 71250 CT THORAX DX C-: CPT | Mod: MC

## 2024-04-11 PROCEDURE — 87637 SARSCOV2&INF A&B&RSV AMP PRB: CPT

## 2024-04-11 PROCEDURE — 84100 ASSAY OF PHOSPHORUS: CPT

## 2024-04-11 PROCEDURE — 94760 N-INVAS EAR/PLS OXIMETRY 1: CPT

## 2024-04-11 PROCEDURE — 99291 CRITICAL CARE FIRST HOUR: CPT

## 2024-04-11 PROCEDURE — 80053 COMPREHEN METABOLIC PANEL: CPT

## 2024-04-11 PROCEDURE — 83036 HEMOGLOBIN GLYCOSYLATED A1C: CPT

## 2024-04-11 PROCEDURE — 85652 RBC SED RATE AUTOMATED: CPT

## 2024-04-11 PROCEDURE — 82962 GLUCOSE BLOOD TEST: CPT

## 2024-04-11 PROCEDURE — 80162 ASSAY OF DIGOXIN TOTAL: CPT

## 2024-04-11 PROCEDURE — 71275 CT ANGIOGRAPHY CHEST: CPT | Mod: MC

## 2024-04-11 PROCEDURE — 97110 THERAPEUTIC EXERCISES: CPT

## 2024-04-11 PROCEDURE — 96375 TX/PRO/DX INJ NEW DRUG ADDON: CPT

## 2024-04-11 PROCEDURE — 81001 URINALYSIS AUTO W/SCOPE: CPT

## 2024-04-11 PROCEDURE — 93306 TTE W/DOPPLER COMPLETE: CPT

## 2024-04-11 PROCEDURE — 83615 LACTATE (LD) (LDH) ENZYME: CPT

## 2024-04-11 PROCEDURE — 85025 COMPLETE CBC W/AUTO DIFF WBC: CPT

## 2024-04-11 PROCEDURE — 97166 OT EVAL MOD COMPLEX 45 MIN: CPT

## 2024-04-11 PROCEDURE — 93005 ELECTROCARDIOGRAM TRACING: CPT

## 2024-04-11 PROCEDURE — 70450 CT HEAD/BRAIN W/O DYE: CPT | Mod: MC

## 2024-04-11 PROCEDURE — 71045 X-RAY EXAM CHEST 1 VIEW: CPT

## 2024-04-11 PROCEDURE — 87640 STAPH A DNA AMP PROBE: CPT

## 2024-04-11 PROCEDURE — 85730 THROMBOPLASTIN TIME PARTIAL: CPT

## 2024-04-11 PROCEDURE — 83605 ASSAY OF LACTIC ACID: CPT

## 2024-04-11 PROCEDURE — 85610 PROTHROMBIN TIME: CPT

## 2024-04-11 PROCEDURE — 87040 BLOOD CULTURE FOR BACTERIA: CPT

## 2024-04-11 PROCEDURE — 80048 BASIC METABOLIC PNL TOTAL CA: CPT

## 2024-04-11 PROCEDURE — 87186 SC STD MICRODIL/AGAR DIL: CPT

## 2024-04-11 PROCEDURE — 87077 CULTURE AEROBIC IDENTIFY: CPT

## 2024-04-11 PROCEDURE — 86140 C-REACTIVE PROTEIN: CPT

## 2024-04-11 RX ORDER — DIPHENHYDRAMINE HYDROCHLORIDE AND LIDOCAINE HYDROCHLORIDE AND ALUMINUM HYDROXIDE AND MAGNESIUM HYDRO
5 KIT
Qty: 1 | Refills: 0
Start: 2024-04-11 | End: 2024-04-17

## 2024-04-11 RX ORDER — MAGNESIUM OXIDE 400 MG ORAL TABLET 241.3 MG
400 TABLET ORAL ONCE
Refills: 0 | Status: COMPLETED | OUTPATIENT
Start: 2024-04-11 | End: 2024-04-11

## 2024-04-11 RX ORDER — HYDRALAZINE HCL 50 MG
1 TABLET ORAL
Qty: 90 | Refills: 0
Start: 2024-04-11 | End: 2024-05-10

## 2024-04-11 RX ORDER — TAMSULOSIN HYDROCHLORIDE 0.4 MG/1
1 CAPSULE ORAL
Qty: 30 | Refills: 0
Start: 2024-04-11 | End: 2024-05-10

## 2024-04-11 RX ORDER — FUROSEMIDE 40 MG
1 TABLET ORAL
Qty: 30 | Refills: 0
Start: 2024-04-11 | End: 2024-05-10

## 2024-04-11 RX ORDER — POLYETHYLENE GLYCOL 3350 17 G/17G
17 POWDER, FOR SOLUTION ORAL
Qty: 510 | Refills: 0
Start: 2024-04-11 | End: 2024-05-10

## 2024-04-11 RX ADMIN — MEMANTINE HYDROCHLORIDE 10 MILLIGRAM(S): 10 TABLET ORAL at 05:37

## 2024-04-11 RX ADMIN — Medication 4: at 12:31

## 2024-04-11 RX ADMIN — MAGNESIUM OXIDE 400 MG ORAL TABLET 400 MILLIGRAM(S): 241.3 TABLET ORAL at 12:30

## 2024-04-11 RX ADMIN — PHENYLEPHRINE-SHARK LIVER OIL-MINERAL OIL-PETROLATUM RECTAL OINTMENT 1 APPLICATION(S): at 05:38

## 2024-04-11 RX ADMIN — ISOSORBIDE DINITRATE 10 MILLIGRAM(S): 5 TABLET ORAL at 12:31

## 2024-04-11 RX ADMIN — APIXABAN 2.5 MILLIGRAM(S): 2.5 TABLET, FILM COATED ORAL at 05:36

## 2024-04-11 RX ADMIN — Medication 250 MILLIGRAM(S): at 05:49

## 2024-04-11 RX ADMIN — Medication 25 MILLIGRAM(S): at 05:37

## 2024-04-11 RX ADMIN — PHENYLEPHRINE-SHARK LIVER OIL-MINERAL OIL-PETROLATUM RECTAL OINTMENT 1 APPLICATION(S): at 00:43

## 2024-04-11 RX ADMIN — RIVASTIGMINE 1 PATCH: 4.6 PATCH, EXTENDED RELEASE TRANSDERMAL at 07:30

## 2024-04-11 RX ADMIN — Medication 75 MICROGRAM(S): at 05:37

## 2024-04-11 RX ADMIN — RIVASTIGMINE 1 PATCH: 4.6 PATCH, EXTENDED RELEASE TRANSDERMAL at 05:37

## 2024-04-11 RX ADMIN — RIVASTIGMINE 1 PATCH: 4.6 PATCH, EXTENDED RELEASE TRANSDERMAL at 05:30

## 2024-04-11 RX ADMIN — Medication 6 UNIT(S): at 12:32

## 2024-04-11 RX ADMIN — Medication 100 MILLIGRAM(S): at 05:37

## 2024-04-11 RX ADMIN — DIPHENHYDRAMINE HYDROCHLORIDE AND LIDOCAINE HYDROCHLORIDE AND ALUMINUM HYDROXIDE AND MAGNESIUM HYDRO 15 MILLILITER(S): KIT at 05:37

## 2024-04-11 RX ADMIN — PANTOPRAZOLE SODIUM 40 MILLIGRAM(S): 20 TABLET, DELAYED RELEASE ORAL at 05:37

## 2024-04-11 RX ADMIN — DIPHENHYDRAMINE HYDROCHLORIDE AND LIDOCAINE HYDROCHLORIDE AND ALUMINUM HYDROXIDE AND MAGNESIUM HYDRO 15 MILLILITER(S): KIT at 14:20

## 2024-04-11 RX ADMIN — Medication 25 MILLIGRAM(S): at 14:55

## 2024-04-11 RX ADMIN — Medication 20 MILLIGRAM(S): at 05:36

## 2024-04-11 RX ADMIN — ISOSORBIDE DINITRATE 10 MILLIGRAM(S): 5 TABLET ORAL at 05:36

## 2024-04-11 RX ADMIN — Medication 6 UNIT(S): at 08:25

## 2024-04-11 RX ADMIN — ESCITALOPRAM OXALATE 10 MILLIGRAM(S): 10 TABLET, FILM COATED ORAL at 12:31

## 2024-04-11 NOTE — CAREGIVER ENGAGEMENT NOTE - CAREGIVER EDUCATION HOME CARE SERVICES - FREE TEXT
had visiting nurse and home Physical therapy through [facility] in the past
Visiting nurse and Home pHysical therapy

## 2024-04-11 NOTE — PROGRESS NOTE ADULT - SUBJECTIVE AND OBJECTIVE BOX
Date/Time Patient Seen:  		  Referring MD:   Data Reviewed	       Patient is a 88y old  Female who presents with a chief complaint of weakness (10 Apr 2024 14:41)      Subjective/HPI     PAST MEDICAL & SURGICAL HISTORY:  Atrial fibrillation    Hypothyroidism    Mild Alzheimer's dementia    Diabetes mellitus    Acute on chronic systolic congestive heart failure    Hypertension    Hyperlipemia    Cardiac LV ejection fraction 21-30%  12/22    History of appendectomy    H/O hernia repair    History of cholecystectomy          Medication list         MEDICATIONS  (STANDING):  apixaban 2.5 milliGRAM(s) Oral every 12 hours  dextrose 5%. 1000 milliLiter(s) (100 mL/Hr) IV Continuous <Continuous>  dextrose 5%. 1000 milliLiter(s) (50 mL/Hr) IV Continuous <Continuous>  dextrose 50% Injectable 12.5 Gram(s) IV Push once  dextrose 50% Injectable 25 Gram(s) IV Push once  dextrose 50% Injectable 25 Gram(s) IV Push once  digoxin     Tablet 125 MICROGram(s) Oral every other day  escitalopram 10 milliGRAM(s) Oral daily  FIRST- Mouthwash  BLM 15 milliLiter(s) Swish and Spit three times a day  furosemide    Tablet 20 milliGRAM(s) Oral daily  glucagon  Injectable 1 milliGRAM(s) IntraMuscular once  hemorrhoidal Ointment 1 Application(s) Rectal four times a day  hydrALAZINE 25 milliGRAM(s) Oral every 8 hours  insulin glargine Injectable (LANTUS) 5 Unit(s) SubCutaneous at bedtime  insulin lispro (ADMELOG) corrective regimen sliding scale   SubCutaneous three times a day before meals  insulin lispro (ADMELOG) corrective regimen sliding scale   SubCutaneous at bedtime  insulin lispro Injectable (ADMELOG) 6 Unit(s) SubCutaneous three times a day before meals  isosorbide   dinitrate Tablet (ISORDIL) 10 milliGRAM(s) Oral three times a day  levothyroxine 75 MICROGram(s) Oral daily  memantine 10 milliGRAM(s) Oral two times a day  metoprolol succinate  milliGRAM(s) Oral every 12 hours  pantoprazole    Tablet 40 milliGRAM(s) Oral before breakfast  rivastigmine patch  9.5 mG/24 Hr(s) 1 Patch Transdermal every 24 hours  saccharomyces boulardii 250 milliGRAM(s) Oral two times a day  tamsulosin 0.4 milliGRAM(s) Oral at bedtime    MEDICATIONS  (PRN):  acetaminophen     Tablet .. 650 milliGRAM(s) Oral every 6 hours PRN Temp greater or equal to 38C (100.4F), Mild Pain (1 - 3)  albuterol/ipratropium for Nebulization 3 milliLiter(s) Nebulizer every 6 hours PRN Shortness of Breath  aluminum hydroxide/magnesium hydroxide/simethicone Suspension 30 milliLiter(s) Oral every 4 hours PRN Dyspepsia  benzonatate 100 milliGRAM(s) Oral three times a day PRN Cough  dextrose Oral Gel 15 Gram(s) Oral once PRN Blood Glucose LESS THAN 70 milliGRAM(s)/deciliter  loperamide 2 milliGRAM(s) Oral four times a day PRN Diarrhea  melatonin 3 milliGRAM(s) Oral at bedtime PRN Insomnia  ondansetron Injectable 4 milliGRAM(s) IV Push every 8 hours PRN Nausea and/or Vomiting         Vitals log        ICU Vital Signs Last 24 Hrs  T(C): 36.4 (11 Apr 2024 05:29), Max: 36.8 (10 Apr 2024 21:49)  T(F): 97.5 (11 Apr 2024 05:29), Max: 98.2 (10 Apr 2024 21:49)  HR: 91 (11 Apr 2024 05:29) (78 - 91)  BP: 163/84 (11 Apr 2024 05:29) (149/93 - 173/96)  BP(mean): --  ABP: --  ABP(mean): --  RR: 17 (11 Apr 2024 05:29) (17 - 20)  SpO2: 97% (11 Apr 2024 05:29) (94% - 99%)    O2 Parameters below as of 11 Apr 2024 05:29  Patient On (Oxygen Delivery Method): room air                 Input and Output:  I&O's Detail    09 Apr 2024 07:01  -  10 Apr 2024 07:00  --------------------------------------------------------  IN:  Total IN: 0 mL    OUT:    Voided (mL): 250 mL  Total OUT: 250 mL    Total NET: -250 mL          Lab Data                        10.1   11.70 )-----------( 280      ( 10 Apr 2024 05:50 )             31.0     04-10    143  |  112<H>  |  50<H>  ----------------------------<  114<H>  3.3<L>   |  21<L>  |  1.60<H>    Ca    7.9<L>      10 Apr 2024 05:50  Phos  3.0     04-10  Mg     2.0     04-10    TPro  5.8<L>  /  Alb  2.2<L>  /  TBili  0.6  /  DBili  x   /  AST  22  /  ALT  54  /  AlkPhos  81  04-10            Review of Systems	      Objective     Physical Examination    heart s1s2  lung dc BS  head nc      Pertinent Lab findings & Imaging      Julieth:  NO   Adequate UO     I&O's Detail    09 Apr 2024 07:01  -  10 Apr 2024 07:00  --------------------------------------------------------  IN:  Total IN: 0 mL    OUT:    Voided (mL): 250 mL  Total OUT: 250 mL    Total NET: -250 mL               Discussed with:     Cultures:	        Radiology

## 2024-04-11 NOTE — PROGRESS NOTE ADULT - ASSESSMENT
CKD 3  Hypertension  Dyspnea: Pneumonia, COVID 19+, CHF/Pleural effusions  Diabetes  Hypokalemia  Urinary retention    Renal indices stable. To continue current meds. On PO lasix. Monitor blood sugar levels. Insulin coverage as needed. Monitor BP trend. Titrate BP meds as needed. Avoid nephrotoxic meds as possible. Encourage PO intake as tolerated. Will follow electrolytes and renal function trend.   D/w pt's daughter at bedside.

## 2024-04-11 NOTE — PROGRESS NOTE ADULT - ASSESSMENT
87 y/o female with PMH of HFrEF of 20-25 %, dementia, Afib, hypothyroidism, DM who presented to ED to be evaluated for weakness, found to have +UTI and Covid positive     Persistent Atrial Fibrillation, COVID  - Known hx of Afib, with RVR episode in the setting of catecholamine surge 2/2 recent acute stressor (UTI, COVID)   - Remains rate-controlled  - Continue BB   - Continue Digoxin 0.125 QOD. dig level 0.8 4/8   - Continue Eliquis    - EKG Afib w/ RVR, unchanged from prior   - No anginal complaints   - No evidence of any active ischemia     - TTE (12/2022): EF 20-25%, apex and anterior wall akinesis, inferior wall hypokinesis, mod MR, mild TR  - Known to our service, from last admission, she has known LV Dysfunction from TTE likely from a missed MI was decided for medical management given her frailty, increased creatinine at that time  -ECHO 4/8/24 as above lv ef 23%  normal systolic function, left atrium moderately dilatated moderate MR , mild AR moderate pulmonary hypertension large left pleural effusion    -effusions noted on echo seen by pulm not sizeable for thora, seen by renal ok to continue po lasix     - For GDMT Continue Toprol XL  -unable to start ace/arb given rick   - continue hydralazine and isordil       - +COVID management per primary    - Monitor and replete lytes, keep K>4, Mg>2.  - Will continue to follow.   87 y/o female with PMH of HFrEF of 20-25 %, dementia, Afib, hypothyroidism, DM who presented to ED to be evaluated for weakness, found to have +UTI and Covid positive     Persistent Atrial Fibrillation, COVID  - Known hx of Afib, with RVR episode in the setting of catecholamine surge 2/2 recent acute stressor (UTI, COVID)   - Remains rate-controlled  - Continue BB   - Continue Digoxin 0.125 QOD. dig level 0.8 4/8   - Continue Eliquis    - EKG Afib w/ RVR, unchanged from prior   - No anginal complaints   - No evidence of any active ischemia     - TTE (12/2022): EF 20-25%, apex and anterior wall akinesis, inferior wall hypokinesis, mod MR, mild TR  - Known to our service, from last admission, she has known LV Dysfunction from TTE likely from a missed MI was decided for medical management given her frailty, increased creatinine at that time  -ECHO 4/8/24 as above lv ef 23%  normal systolic function, left atrium moderately dilatated moderate MR , mild AR moderate pulmonary hypertension large left pleural effusion    -effusions noted on echo seen by pulm not sizeable for thora, seen by renal ok to continue po lasix     - For GDMT Continue Toprol XL  -unable to start ace/arb given rick   - continue hydralazine and isordil     - +COVID management per primary    - Monitor and replete lytes, keep K>4, Mg>2.  - Will continue to follow.

## 2024-04-11 NOTE — PROGRESS NOTE ADULT - ASSESSMENT
87 y/o female with PMH of HFrEF of 20-25 %, dementia, Afib, hypothyroidism, DM who presented to ED to be evaluated for weakness.    pleural eff  atelectasis  OP  OA  HF  Dementia  AF  Hypothyroidism  DM  COVID    ct chest - no evidence of PNA - Pleural eff - small to mod in size - atelectasis and Pulm edema  effusions are not sizeable enough for thoracentesis  cont optimization of [facility]vs rx regimen  on LASIX    [facility] rx regimen  I and O  I isma if able to cooperate  CXR noted  TTE reviewed - Pulm HTN - valv heart disease - systolic HF  cardio following  monitor VS and HD and Sat  assist with needs  oral hygiene  skin care  AF - rate and rhythm control, on DOAC  spoke with DTR at bedside

## 2024-04-11 NOTE — CAREGIVER ENGAGEMENT NOTE - CAREGIVER EDUCATION - TYPES DISCUSSED
Discharge plan/DME/Home Care Services/Transportation coordination
Discharge plan/DME/Home Care Services/Transportation coordination

## 2024-04-11 NOTE — CAREGIVER ENGAGEMENT NOTE - CAREGIVER OUTREACH NOTES - FREE TEXT
Discussed that per MD plan is for her mother to be transitioned home later today. Discussed that patient was accepted to [facility] at home for visiting nurse and home physical therapy. Discharge Notice reviewed with daughter Machelle, copy given to daughter. Daughter is in agreement with her mother transitioning home today and will be transporting her home.
Confirmed that she is caregiver for patient, , provided information for CM to complete Care Coordination assessment.

## 2024-04-11 NOTE — PROGRESS NOTE ADULT - SUBJECTIVE AND OBJECTIVE BOX
[facility] -- Dylan Hernandez Pannella, Patel, Savella Goodger, Cohen  Office # [phone]      Follow Up:  Pleural effusions     Subjective/Observations:       REVIEW OF SYSTEMS: All other review of systems is negative unless indicated above    PAST MEDICAL & SURGICAL HISTORY:  Atrial fibrillation      Hypothyroidism      Mild Alzheimer's dementia      Diabetes mellitus      Acute on chronic systolic congestive heart failure      Hypertension      Hyperlipemia      Cardiac LV ejection fraction 21-30%        History of appendectomy      H/O hernia repair      History of cholecystectomy          MEDICATIONS  (STANDING):  apixaban 2.5 milliGRAM(s) Oral every 12 hours  dextrose 5%. 1000 milliLiter(s) (50 mL/Hr) IV Continuous <Continuous>  dextrose 5%. 1000 milliLiter(s) (100 mL/Hr) IV Continuous <Continuous>  dextrose 50% Injectable 25 Gram(s) IV Push once  dextrose 50% Injectable 12.5 Gram(s) IV Push once  dextrose 50% Injectable 25 Gram(s) IV Push once  digoxin     Tablet 125 MICROGram(s) Oral every other day  escitalopram 10 milliGRAM(s) Oral daily  FIRST- Mouthwash  BLM 15 milliLiter(s) Swish and Spit three times a day  furosemide    Tablet 20 milliGRAM(s) Oral daily  glucagon  Injectable 1 milliGRAM(s) IntraMuscular once  hemorrhoidal Ointment 1 Application(s) Rectal four times a day  hydrALAZINE 25 milliGRAM(s) Oral every 8 hours  insulin glargine Injectable (LANTUS) 5 Unit(s) SubCutaneous at bedtime  insulin lispro (ADMELOG) corrective regimen sliding scale   SubCutaneous three times a day before meals  insulin lispro (ADMELOG) corrective regimen sliding scale   SubCutaneous at bedtime  insulin lispro Injectable (ADMELOG) 6 Unit(s) SubCutaneous three times a day before meals  isosorbide   dinitrate Tablet (ISORDIL) 10 milliGRAM(s) Oral three times a day  levothyroxine 75 MICROGram(s) Oral daily  memantine 10 milliGRAM(s) Oral two times a day  metoprolol succinate  milliGRAM(s) Oral every 12 hours  pantoprazole    Tablet 40 milliGRAM(s) Oral before breakfast  rivastigmine patch  9.5 mG/24 Hr(s) 1 Patch Transdermal every 24 hours  saccharomyces boulardii 250 milliGRAM(s) Oral two times a day  tamsulosin 0.4 milliGRAM(s) Oral at bedtime    MEDICATIONS  (PRN):  acetaminophen     Tablet .. 650 milliGRAM(s) Oral every 6 hours PRN Temp greater or equal to 38C (100.4F), Mild Pain (1 - 3)  albuterol/ipratropium for Nebulization 3 milliLiter(s) Nebulizer every 6 hours PRN Shortness of Breath  aluminum hydroxide/magnesium hydroxide/simethicone Suspension 30 milliLiter(s) Oral every 4 hours PRN Dyspepsia  benzonatate 100 milliGRAM(s) Oral three times a day PRN Cough  dextrose Oral Gel 15 Gram(s) Oral once PRN Blood Glucose LESS THAN 70 milliGRAM(s)/deciliter  loperamide 2 milliGRAM(s) Oral four times a day PRN Diarrhea  melatonin 3 milliGRAM(s) Oral at bedtime PRN Insomnia  ondansetron Injectable 4 milliGRAM(s) IV Push every 8 hours PRN Nausea and/or Vomiting      Allergies    No Known Allergies    Intolerances        Vital Signs Last 24 Hrs  T(C): 36.4 (2024 05:29), Max: 36.8 (10 Apr 2024 21:49)  T(F): 97.5 (2024 05:29), Max: 98.2 (10 Apr 2024 21:49)  HR: 91 (2024 05:29) (78 - 91)  BP: 163/84 (2024 05:29) (149/93 - 173/96)  BP(mean): --  RR: 17 (2024 05:29) (17 - 20)  SpO2: 97% (2024 05:29) (94% - 99%)    Parameters below as of 2024 05:29  Patient On (Oxygen Delivery Method): room air        I&O's Summary        PHYSICAL EXAM:  TELE: af  Constitutional: NAD, awake and alert, well-developed  HEENT: Moist Mucous Membranes, Anicteric  Pulmonary: Non-labored, breath sounds are DIM   Cardiovascular: irRegular, S1 and S2 nl, Murmur   Gastrointestinal: Bowel Sounds present, soft, nontender.   Lymph: No lymphadenopathy. No peripheral edema.  Skin: No visible rashes or ulcers.  Psych:  Mood & affect appropriate    LABS: All Labs Reviewed:                        10.1   11.70 )-----------( 280      ( 10 Apr 2024 05:50 )             31.0                         9.8    17.32 )-----------( 293      ( 2024 08:45 )             29.2     10 Apr 2024 05:50    143    |  112    |  50     ----------------------------<  114    3.3     |  21     |  1.60   2024 08:00    141    |  112    |  60     ----------------------------<  147    4.1     |  22     |  2.00   2024 08:45    139    |  110    |  63     ----------------------------<  206    4.1     |  18     |  2.10     Ca    7.9        10 Apr 2024 05:50  Ca    8.7        2024 08:00  Ca    8.5        2024 08:45  Phos  3.0       10 Apr 2024 05:50  Mg     2.0       10 Apr 2024 05:50    TPro  5.8    /  Alb  2.2    /  TBili  0.6    /  DBili  x      /  AST  22     /  ALT  54     /  AlkPhos  81     10 Apr 2024 05:50  TPro  6.1    /  Alb  2.5    /  TBili  0.6    /  DBili  x      /  AST  27     /  ALT  66     /  AlkPhos  80     2024 08:45             EC Lead ECG:   Ventricular Rate 127 BPM    QRS Duration 84 ms    Q-T Interval 278 ms    QTC Calculation(Bazett) 404 ms    R Axis 28 degrees    T Axis -61 degrees    Diagnosis Line Atrial fibrillation with rapid ventricular response  Low voltage QRS  Septal infarct , age undetermined  Abnormal ECG  No previous ECGs available  Confirmed by rogerio Harvey ([id number]) on 2024 2:48:52 PM (24 @ 19:24)      TRANSTHORACIC ECHOCARDIOGRAM REPORT  ________________________________________________________________________________                                      _______       Pt. Name:       LEXY EISENBERG Study Date:    2024  MRN:            [id number]     YOB: 1935  Accession #:    [id number]     Age:           88 years  Account#:       [id number]    Gender:        F  Heart Rate:                   Height:        62.99 in (160.00 cm)  Rhythm:                       Weight:        99.21 lb (45.00 kg)  Blood Pressure: 104/70 mmHg   BSA/BMI:       1.44 m² / 17.58 kg/m²  ________________________________________________________________________________________  Referring Physician:    [id number] Maritza Abad  Interpreting Physician: Harpal Grider MD  Primary Sonographer:    Sandra RINCON    CPT:               ECHO TTE WO CON COMP W DOPP - 99088.m  Indication(s):     Heart failure, unspecified - I50.9  Procedure:         Transthoracic echocardiogram with 2-D, M-mode and complete                     spectral and color flow Doppler.  Ordering Location: [facility]  Admission Status:  Inpatient    _______________________________________________________________________________________     CONCLUSIONS:      1. Left ventricular cavity is normal in size. Left ventricular wall thickness is normal. Left ventricular systolic function is severely decreased with an ejection fraction of 23 % by Maritn's method of disks. There are no regional wall motion abnormalities seen.   2. Mildly enlarged right ventricular cavity size, with normal wall thickness, and normal systolic function.   3. The left atrium is moderately dilated.   4. The right atrium is mildly dilated.   5. Structurally normal mitral valve with normal leaflet excursion. The leaflets are thickened.   6. Moderate mitral regurgitation. The degree of MR may be underestimated.   7. Trileaflet aortic valve with normal systolic excursion.   8. Mild aortic regurgitation.   9. Mild to moderate tricuspid regurgitation.  10. Estimated pulmonary artery systolic pressure is 57 mmHg, consistent with moderate pulmonary hypertension.  11. The inferior vena cava is dilated (dilated >2.1cm) with abnormal inspiratory collapse (abnormal <50%) consistent with elevated right atrial pressure (~15, range 10-20mmHg).  12. Large left pleural effusion noted.    ________________________________________________________________________________________  FINDINGS:     Left Ventricle:  The left ventricular cavity is normal in size. Left ventricular wall thickness is normal. Left ventricular systolic function is severely decreased with a calculated ejection fraction of 23 % by the Martin's biplane method of disks. There are no regional wall motion abnormalities seen. The left ventricular diastolic function is indeterminate.     Right Ventricle:  The right ventricular cavity is mildly enlarged in size, with normal wall thickness and normal systolic function.     Left Atrium:  The left atrium is moderately dilated.     Right Atrium:  The right atrium is mildly dilated.     Aortic Valve:  The aortic valve appears trileaflet with normal systolic excursion. There is no aortic valve stenosis. There is mild aortic regurgitation. AI VMax is 5.03 m/s. AI pressure half time is 670 msec. AI slope is 2.20 m/s².     Mitral Valve:  Structurally normal mitral valve with normal leaflet excursion. There is mitral valve thickening of the anterior and posterior leaflets. There is moderate mitral regurgitation.     Tricuspid Valve:  Structurally normal tricuspid valve with normal leaflet excursion. There is mild to moderate tricuspid regurgitation. Estimated pulmonary artery systolic pressure is 57 mmHg, consistent with moderate pulmonary hypertension.     Pulmonic Valve:  The pulmonic valve wasnot well visualized. There is mild to moderate pulmonic regurgitation.     Pericardium:  No pericardial effusion seen.     Pleura:  Large left pleural effusion noted.     Systemic Veins:  The inferior vena cava is dilated (dilated >2.1cm) with abnormal inspiratory collapse (abnormal <50%) consistent with elevated right atrial pressure (~15, range 10-20mmHg).  ____________________________________________________________________  QUANTITATIVE DATA:  Left Ventricle Measurements: (Indexed to BSA)     IVSd (2D):   0.9 cm  LVPWd (2D):  0.8 cm  LVIDd (2D):  4.8 cm  LVIDs (2D):  4.3 cm  LV Mass:     142 g  98.8 g/m²  BiPlane LV EF%: 23 %     MV E Vmax:    0.90 m/s  MV A Vmax:    0.33 m/s  MV E/A:       2.74  e' lateral:   8.50 cm/s  e' medial:    6.70 cm/s  E/e' lateral: 10.58  E/e' medial:  13.43  E/e' Average: 11.84  MV DT:        121 msec    Aorta Measurements: (Normal range) (Indexed to BSA)     Sinuses of Valsalva: 2.99 cm (2.7 - 3.3 cm)       Left Atrium Measurements: (Indexed to BSA)  LA Diam 2D:        3.05 cm  LA Vol s, MOD A4C: 39.35 ml.       LVOT / RVOT/ Qp/Qs Data: (Indexed to BSA)  LVOT Diameter: 2.00 cm    Aortic Valve Measurements:  AR Vmax       5.03 m/s  AR PHT        670 msec  AR [place]      2.20 m/s²  AR Decel Time 2312 msec    Mitral Valve Measurements:     MV E Vmax: 0.9 m/s  MV A Vmax: 0.3 m/s  MV E/A:    2.7       Tricuspid Valve Measurements:     TR Vmax:          3.2 m/s  TR Peak Gradient: 42.2 mmHg  RA Pressure:      15 mmHg  PASP:             57 mmHg    ________________________________________________________________________________________  Electronically signed on 2024 at 4:00:07 PM by Harpal Grider MD         *** Final ***      Radiology:         [facility] -- Dylan Hernandez Pannella, Patel, Savella Goodger, Cohen  Office # [phone]      Follow Up:  Pleural effusions     Subjective/Observations:   No events overnight resting comfortably in  chair on room air , in on acute distress not best historian but offers no complaints    REVIEW OF SYSTEMS: All other review of systems is negative unless indicated above    PAST MEDICAL & SURGICAL HISTORY:  Atrial fibrillation      Hypothyroidism      Mild Alzheimer's dementia      Diabetes mellitus      Acute on chronic systolic congestive heart failure      Hypertension      Hyperlipemia      Cardiac LV ejection fraction 21-30%        History of appendectomy      H/O hernia repair      History of cholecystectomy          MEDICATIONS  (STANDING):  apixaban 2.5 milliGRAM(s) Oral every 12 hours  dextrose 5%. 1000 milliLiter(s) (50 mL/Hr) IV Continuous <Continuous>  dextrose 5%. 1000 milliLiter(s) (100 mL/Hr) IV Continuous <Continuous>  dextrose 50% Injectable 25 Gram(s) IV Push once  dextrose 50% Injectable 12.5 Gram(s) IV Push once  dextrose 50% Injectable 25 Gram(s) IV Push once  digoxin     Tablet 125 MICROGram(s) Oral every other day  escitalopram 10 milliGRAM(s) Oral daily  FIRST- Mouthwash  BLM 15 milliLiter(s) Swish and Spit three times a day  furosemide    Tablet 20 milliGRAM(s) Oral daily  glucagon  Injectable 1 milliGRAM(s) IntraMuscular once  hemorrhoidal Ointment 1 Application(s) Rectal four times a day  hydrALAZINE 25 milliGRAM(s) Oral every 8 hours  insulin glargine Injectable (LANTUS) 5 Unit(s) SubCutaneous at bedtime  insulin lispro (ADMELOG) corrective regimen sliding scale   SubCutaneous three times a day before meals  insulin lispro (ADMELOG) corrective regimen sliding scale   SubCutaneous at bedtime  insulin lispro Injectable (ADMELOG) 6 Unit(s) SubCutaneous three times a day before meals  isosorbide   dinitrate Tablet (ISORDIL) 10 milliGRAM(s) Oral three times a day  levothyroxine 75 MICROGram(s) Oral daily  memantine 10 milliGRAM(s) Oral two times a day  metoprolol succinate  milliGRAM(s) Oral every 12 hours  pantoprazole    Tablet 40 milliGRAM(s) Oral before breakfast  rivastigmine patch  9.5 mG/24 Hr(s) 1 Patch Transdermal every 24 hours  saccharomyces boulardii 250 milliGRAM(s) Oral two times a day  tamsulosin 0.4 milliGRAM(s) Oral at bedtime    MEDICATIONS  (PRN):  acetaminophen     Tablet .. 650 milliGRAM(s) Oral every 6 hours PRN Temp greater or equal to 38C (100.4F), Mild Pain (1 - 3)  albuterol/ipratropium for Nebulization 3 milliLiter(s) Nebulizer every 6 hours PRN Shortness of Breath  aluminum hydroxide/magnesium hydroxide/simethicone Suspension 30 milliLiter(s) Oral every 4 hours PRN Dyspepsia  benzonatate 100 milliGRAM(s) Oral three times a day PRN Cough  dextrose Oral Gel 15 Gram(s) Oral once PRN Blood Glucose LESS THAN 70 milliGRAM(s)/deciliter  loperamide 2 milliGRAM(s) Oral four times a day PRN Diarrhea  melatonin 3 milliGRAM(s) Oral at bedtime PRN Insomnia  ondansetron Injectable 4 milliGRAM(s) IV Push every 8 hours PRN Nausea and/or Vomiting      Allergies    No Known Allergies    Intolerances        Vital Signs Last 24 Hrs  T(C): 36.4 (2024 05:29), Max: 36.8 (10 Apr 2024 21:49)  T(F): 97.5 (2024 05:29), Max: 98.2 (10 Apr 2024 21:49)  HR: 91 (2024 05:29) (78 - 91)  BP: 163/84 (2024 05:29) (149/93 - 173/96)  BP(mean): --  RR: 17 (2024 05:29) (17 - 20)  SpO2: 97% (2024 05:29) (94% - 99%)    Parameters below as of 2024 05:29  Patient On (Oxygen Delivery Method): room air        I&O's Summary        PHYSICAL EXAM:Constitutional: NAD, awake and alert, well-developed  HEENT: Moist Mucous Membranes, Anicteric  Pulmonary: Non-labored, breath sounds are DIM   Cardiovascular: irRegular, S1 and S2 nl, Murmur   Gastrointestinal: Bowel Sounds present, soft, nontender.   Lymph: No lymphadenopathy. No peripheral edema.  Skin: No visible rashes or ulcers.  Psych:  Mood & affect appropriate    LABS: All Labs Reviewed:                        10.   1170 )-----------( 280      ( 10 Apr 2024 05:50 )             31.0                         9.8    17.32 )-----------( 293      ( 2024 08:45 )             29.2     10 Apr 2024 05:50    143    |  112    |  50     ----------------------------<  114    3.3     |  21     |  1.60   2024 08:00    141    |  112    |  60     ----------------------------<  147    4.1     |  22     |  2.00   2024 08:45    139    |  110    |  63     ----------------------------<  206    4.1     |  18     |  2.10     Ca    7.9        10 Apr 2024 05:50  Ca    8.7        2024 08:00  Ca    8.5        2024 08:45  Phos  3.0       10 Apr 2024 05:50  Mg     2.0       10 Apr 2024 05:50    TPro  5.8    /  Alb  2.2    /  TBili  0.6    /  DBili  x      /  AST  22     /  ALT  54     /  AlkPhos  81     10 Apr 2024 05:50  TPro  6.1    /  Alb  2.5    /  TBili  0.6    /  DBili  x      /  AST  27     /  ALT  66     /  AlkPhos  80     2024 08:45             EC Lead ECG:   Ventricular Rate 127 BPM    QRS Duration 84 ms    Q-T Interval 278 ms    QTC Calculation(Bazett) 404 ms    R Axis 28 degrees    T Axis -61 degrees    Diagnosis Line Atrial fibrillation with rapid ventricular response  Low voltage QRS  Septal infarct , age undetermined  Abnormal ECG  No previous ECGs available  Confirmed by rogerio Harvey ([id number]) on 2024 2:48:52 PM (24 @ 19:24)      TRANSTHORACIC ECHOCARDIOGRAM REPORT  ________________________________________________________________________________                                      _______       Pt. Name:       LEXY EISENBERG Study Date:    2024  MRN:            [id number]     YOB: 1935  Accession #:    [id number]     Age:           88 years  Account#:       [id number]    Gender:        F  Heart Rate:                   Height:        62.99 in (160.00 cm)  Rhythm:                       Weight:        99.21 lb (45.00 kg)  Blood Pressure: 104/70 mmHg   BSA/BMI:       1.44 m² / 17.58 kg/m²  ________________________________________________________________________________________  Referring Physician:    [id number] Maritza Abad  Interpreting Physician: Harpal Grider MD  Primary Sonographer:    Sandra RINCON    CPT:               ECHO TTE WO CON COMP W DOPP - 69338.m  Indication(s):     Heart failure, unspecified - I50.9  Procedure:         Transthoracic echocardiogram with 2-D, M-mode and complete                     spectral and color flow Doppler.  Ordering Location: [facility]  Admission Status:  Inpatient    _______________________________________________________________________________________     CONCLUSIONS:      1. Left ventricular cavity is normal in size. Left ventricular wall thickness is normal. Left ventricular systolic function is severely decreased with an ejection fraction of 23 % by Martin's method of disks. There are no regional wall motion abnormalities seen.   2. Mildly enlarged right ventricular cavity size, with normal wall thickness, and normal systolic function.   3. The left atrium is moderately dilated.   4. The right atrium is mildly dilated.   5. Structurally normal mitral valve with normal leaflet excursion. The leaflets are thickened.   6. Moderate mitral regurgitation. The degree of MR may be underestimated.   7. Trileaflet aortic valve with normal systolic excursion.   8. Mild aortic regurgitation.   9. Mild to moderate tricuspid regurgitation.  10. Estimated pulmonary artery systolic pressure is 57 mmHg, consistent with moderate pulmonary hypertension.  11. The inferior vena cava is dilated (dilated >2.1cm) with abnormal inspiratory collapse (abnormal <50%) consistent with elevated right atrial pressure (~15, range 10-20mmHg).  12. Large left pleural effusion noted.    ________________________________________________________________________________________  FINDINGS:     Left Ventricle:  The left ventricular cavity is normal in size. Left ventricular wall thickness is normal. Left ventricular systolic function is severely decreased with a calculated ejection fraction of 23 % by the Martin's biplane method of disks. There are no regional wall motion abnormalities seen. The left ventricular diastolic function is indeterminate.     Right Ventricle:  The right ventricular cavity is mildly enlarged in size, with normal wall thickness and normal systolic function.     Left Atrium:  The left atrium is moderately dilated.     Right Atrium:  The right atrium is mildly dilated.     Aortic Valve:  The aortic valve appears trileaflet with normal systolic excursion. There is no aortic valve stenosis. There is mild aortic regurgitation. AI VMax is 5.03 m/s. AI pressure half time is 670 msec. AI slope is 2.20 m/s².     Mitral Valve:  Structurally normal mitral valve with normal leaflet excursion. There is mitral valve thickening of the anterior and posterior leaflets. There is moderate mitral regurgitation.     Tricuspid Valve:  Structurally normal tricuspid valve with normal leaflet excursion. There is mild to moderate tricuspid regurgitation. Estimated pulmonary artery systolic pressure is 57 mmHg, consistent with moderate pulmonary hypertension.     Pulmonic Valve:  The pulmonic valve wasnot well visualized. There is mild to moderate pulmonic regurgitation.     Pericardium:  No pericardial effusion seen.     Pleura:  Large left pleural effusion noted.     Systemic Veins:  The inferior vena cava is dilated (dilated >2.1cm) with abnormal inspiratory collapse (abnormal <50%) consistent with elevated right atrial pressure (~15, range 10-20mmHg).  ____________________________________________________________________  QUANTITATIVE DATA:  Left Ventricle Measurements: (Indexed to BSA)     IVSd (2D):   0.9 cm  LVPWd (2D):  0.8 cm  LVIDd (2D):  4.8 cm  LVIDs (2D):  4.3 cm  LV Mass:     142 g  98.8 g/m²  BiPlane LV EF%: 23 %     MV E Vmax:    0.90 m/s  MV A Vmax:    0.33 m/s  MV E/A:       2.74  e' lateral:   8.50 cm/s  e' medial:    6.70 cm/s  E/e' lateral: 10.58  E/e' medial:  13.43  E/e' Average: 11.84  MV DT:        121 msec    Aorta Measurements: (Normal range) (Indexed to BSA)     Sinuses of Valsalva: 2.99 cm (2.7 - 3.3 cm)       Left Atrium Measurements: (Indexed to BSA)  LA Diam 2D:        3.05 cm  LA Vol s, MOD A4C: 39.35 ml.       LVOT / RVOT/ Qp/Qs Data: (Indexed to BSA)  LVOT Diameter: 2.00 cm    Aortic Valve Measurements:  AR Vmax       5.03 m/s  AR PHT        670 msec  AR [place]      2.20 m/s²  AR Decel Time 2312 msec    Mitral Valve Measurements:     MV E Vmax: 0.9 m/s  MV A Vmax: 0.3 m/s  MV E/A:    2.7       Tricuspid Valve Measurements:     TR Vmax:          3.2 m/s  TR Peak Gradient: 42.2 mmHg  RA Pressure:      15 mmHg  PASP:             57 mmHg    ________________________________________________________________________________________  Electronically signed on 2024 at 4:00:07 PM by Harpal Grider MD         *** Final ***      Radiology:

## 2024-04-11 NOTE — PROGRESS NOTE ADULT - NS ATTEND AMEND GEN_ALL_CORE FT
87 y/o female with PMH of HFrEF of 20-25 %, dementia, Afib, hypothyroidism, DM who presented to ED to be evaluated for weakness, found to have +UTI and Covid positive      - Known hx of Afib, with RVR episode in the setting of catecholamine surge 2/2 recent acute stressor (UTI, COVID)   - Remains rate-controlled  - Continue BB   - Continue Digoxin 0.125 QOD. dig level 0.8 4/8   - Continue Eliquis  - No evidence of any active ischemia   -ECHO 4/8/24 lv ef 23%  normal systolic function, left atrium moderately dilatated moderate MR , mild AR moderate pulmonary hypertension large left pleural effusion    -iv lasix has been held for YVETTE , large effusion noted on echo, would obtain Pulm consult for possible thoracentesis
89 y/o female with PMH of HFrEF of 20-25 %, dementia, Afib, hypothyroidism, DM who presented to ED to be evaluated for weakness, found to have +UTI and Covid positive       - Known hx of Afib, with RVR episode in the setting of catecholamine surge 2/2 recent acute stressor (UTI, COVID)   - Tele af   continue BB   - Continue Digoxin 0.125 QoD  - Avoid CCB given reduced ef   - Continue Eliquis  - TTE (12/2022): EF 20-25%, apex and anterior wall akinesis, inferior wall hypokinesis, mod MR, mild TR  - known to our service, from last admission, she has known LV Dysfunction from TTE likely from a missed MI was decided for medical management given her frailty, increased creatinine at that time  - Would hold on IV lasix for now given worsening renal function. Procal elevated in the setting of Covid PNA  no clear vol ol on exam but has effs on ct, can try IV lasix when creat stabilizes  - GDMT change lopressor to toprol on dc    - Unable to start ace/arb given YVETTE   - Needs repeat TTE  - +COVID management per primary
89 y/o female with PMH of HFrEF of 20-25 %, dementia, Afib, hypothyroidism, DM who presented to ED to be evaluated for weakness, found to have +UTI and Covid positive     rapid af in the setting of covid with severe resp failure  Remains on HFNC at 40L/50%  cont bb and dig, AF with rates up to 140s  Would repeat a Dig level in the setting of her worsening renal function  No CCB in the setting of severely reduced LVEF  Would repeat a TTE  cont ac. Dose reduced for age and weight  Would hold on IV lasix for now given worsening renal function. Procal elevated in the setting of Covid PNA  no clear vol ol on exam but has effs on ct, can try IV lasix when creat stabilizes  May need amio gtt if rates remain uncontrolled. Currently on max beta blocker. do not want to give more Dig in the setting of her YVETTE.   at risk of worsening decompensation
87 y/o female with PMH of HFrEF of 20-25 %, dementia, Afib, hypothyroidism, DM who presented to ED to be evaluated for weakness, found to have +UTI and Covid positive     - Known hx of Afib, with RVR episode in the setting of catecholamine surge 2/2 recent acute stressor (UTI, COVID)   - Remains rate-controlled  - Continue BB   - Continue Digoxin 0.125 QOD. dig level 0.8 4/8   - Continue Eliquis  - Known to our service, from last admission, she has known LV Dysfunction from TTE likely from a missed MI was decided for medical management given her frailty, increased creatinine at that time  -effusions noted on echo seen by pulm not sizeable for thora, seen by renal ok to continue po lasix   - For GDMT Continue Toprol XL  -unable to start ace/arb given rick   - continue hydralazine and isordil   - +COVID management per primary
89 y/o female with PMH of HFrEF of 20-25 %, dementia, Afib, hypothyroidism, DM who presented to ED to be evaluated for weakness, found to have +UTI and Covid positive     - Known hx of Afib, with RVR episode in the setting of catecholamine surge 2/2 recent acute stressor (UTI, COVID)   - Tele AF  - Continue Metoprolol Succinate  - Continue Digoxin 0.125 QoD, needs repeat level.   - Avoid CCB given reduced ef   - Continue Eliquis, dose reduced for age and renal function.   - TTE (12/2022): EF 20-25%, apex and anterior wall akinesis, inferior wall hypokinesis, mod MR, mild TR  - known to our service, from last admission, she has known LV Dysfunction from TTE likely from a missed MI was decided for medical management given her frailty, increased creatinine at that time  - Would hold on IV lasix for now given worsening renal function. Procal elevated in the setting of Covid PNA  - no clear vol ol on exam but has effs on ct  - Unable to start ace/arb given YVETTE. Baseline Cr 1.4-1.7  - Needs repeat TTE  - +COVID management per primary.
I have personally seen and examined the patient in detail.  I have spoken to the provider regarding the assessment and plan of care.  I have made changes to the note accordingly.
I have personally seen and examined the patient in detail.  I have spoken to the provider regarding the assessment and plan of care.  I have made changes to the note accordingly.
87 y/o female with PMH of HFrEF of 20-25 %, dementia, Afib, hypothyroidism, DM who presented to ED to be evaluated for weakness, found to have +UTI and Covid positive     - Known hx of Afib, with RVR episode in the setting of catecholamine surge 2/2 recent acute stressor (UTI, COVID)   - Tele AF  - Continue Metoprolol Succinate  - Continue Digoxin 0.125 QoD, needs repeat level.   - Avoid CCB given reduced ef   - bp now high. start on hydralazine 25mg q8 and if able add isordil 10mg q8 keith  - Continue Eliquis, dose reduced for age and renal function.   - TTE (12/2022): EF 20-25%, apex and anterior wall akinesis, inferior wall hypokinesis, mod MR, mild TR  - known to our service, from last admission, she has known LV Dysfunction from TTE likely from a missed MI was decided for medical management given her frailty, increased creatinine at that time  - TTE 4/4024 with severe LV dysfunction, moderate MR possibly worse PASP 57 w RA 15     - Unable to start ace/arb given YVETTE. Baseline Cr 1.4-1.7  - likely with degree of vol ol. awaiting chest ct today.   - may need to be challenged w diuretics.   - fu renal

## 2024-04-11 NOTE — PROGRESS NOTE ADULT - PROVIDER SPECIALTY LIST ADULT
Cardiology
Infectious Disease
Nephrology
Nephrology
Cardiology
Infectious Disease
Nephrology
Cardiology
Cardiology
Hospitalist
Nephrology
Nephrology
Cardiology
Hospitalist
Pulmonology
Pulmonology
Hospitalist

## 2024-04-11 NOTE — PROGRESS NOTE ADULT - SUBJECTIVE AND OBJECTIVE BOX
Patient is a 88y old  Female who presents with a chief complaint of weakness (03 Apr 2024 16:36)  Patient seen in follow up for CKD.        PAST MEDICAL HISTORY:  Atrial fibrillation    Hypothyroidism    Mild Alzheimer's dementia    Diabetes mellitus    Acute on chronic systolic congestive heart failure    Hypertension    Hyperlipemia    Cardiac LV ejection fraction 21-30%      MEDICATIONS  (STANDING):  apixaban 2.5 milliGRAM(s) Oral every 12 hours  dextrose 5%. 1000 milliLiter(s) (100 mL/Hr) IV Continuous <Continuous>  dextrose 5%. 1000 milliLiter(s) (50 mL/Hr) IV Continuous <Continuous>  dextrose 50% Injectable 12.5 Gram(s) IV Push once  dextrose 50% Injectable 25 Gram(s) IV Push once  dextrose 50% Injectable 25 Gram(s) IV Push once  digoxin     Tablet 125 MICROGram(s) Oral every other day  escitalopram 10 milliGRAM(s) Oral daily  FIRST- Mouthwash  BLM 15 milliLiter(s) Swish and Spit three times a day  furosemide    Tablet 20 milliGRAM(s) Oral daily  glucagon  Injectable 1 milliGRAM(s) IntraMuscular once  hemorrhoidal Ointment 1 Application(s) Rectal four times a day  hydrALAZINE 25 milliGRAM(s) Oral every 8 hours  insulin glargine Injectable (LANTUS) 5 Unit(s) SubCutaneous at bedtime  insulin lispro (ADMELOG) corrective regimen sliding scale   SubCutaneous three times a day before meals  insulin lispro (ADMELOG) corrective regimen sliding scale   SubCutaneous at bedtime  insulin lispro Injectable (ADMELOG) 6 Unit(s) SubCutaneous three times a day before meals  isosorbide   dinitrate Tablet (ISORDIL) 10 milliGRAM(s) Oral three times a day  levothyroxine 75 MICROGram(s) Oral daily  memantine 10 milliGRAM(s) Oral two times a day  metoprolol succinate  milliGRAM(s) Oral every 12 hours  pantoprazole    Tablet 40 milliGRAM(s) Oral before breakfast  rivastigmine patch  9.5 mG/24 Hr(s) 1 Patch Transdermal every 24 hours  saccharomyces boulardii 250 milliGRAM(s) Oral two times a day  tamsulosin 0.4 milliGRAM(s) Oral at bedtime    MEDICATIONS  (PRN):  acetaminophen     Tablet .. 650 milliGRAM(s) Oral every 6 hours PRN Temp greater or equal to 38C (100.4F), Mild Pain (1 - 3)  albuterol/ipratropium for Nebulization 3 milliLiter(s) Nebulizer every 6 hours PRN Shortness of Breath  aluminum hydroxide/magnesium hydroxide/simethicone Suspension 30 milliLiter(s) Oral every 4 hours PRN Dyspepsia  benzonatate 100 milliGRAM(s) Oral three times a day PRN Cough  dextrose Oral Gel 15 Gram(s) Oral once PRN Blood Glucose LESS THAN 70 milliGRAM(s)/deciliter  loperamide 2 milliGRAM(s) Oral four times a day PRN Diarrhea  melatonin 3 milliGRAM(s) Oral at bedtime PRN Insomnia  ondansetron Injectable 4 milliGRAM(s) IV Push every 8 hours PRN Nausea and/or Vomiting    T(C): 36.4 (04-11-24 @ 14:45), Max: 37.1 (04-10-24 @ 01:04)  HR: 88 (04-11-24 @ 14:45) (78 - 93)  BP: 152/84 (04-11-24 @ 14:45) (136/80 - 173/96)  RR: 17 (04-11-24 @ 14:45)  SpO2: 95% (04-11-24 @ 14:45)  Wt(kg): --  I&O's Detail    11 Apr 2024 07:01  -  11 Apr 2024 18:29  --------------------------------------------------------  IN:    Oral Fluid: 510 mL  Total IN: 510 mL    OUT:  Total OUT: 0 mL    Total NET: 510 mL                    PHYSICAL EXAM:    General: No distress  Respiratory: b/l air entry  Cardiovascular: S1 S2  Gastrointestinal: soft  Extremities:  no edema                     LABORATORY:                        10.3   10.72 )-----------( 274      ( 11 Apr 2024 07:10 )             32.3     04-11    141  |  110<H>  |  43<H>  ----------------------------<  119<H>  4.4   |  21<L>  |  1.50<H>    Ca    8.0<L>      11 Apr 2024 07:10  Phos  3.4     04-11  Mg     1.9     04-11    TPro  5.8<L>  /  Alb  2.1<L>  /  TBili  0.5  /  DBili  x   /  AST  26  /  ALT  44  /  AlkPhos  68  04-11    Sodium: 141 mmol/L (04-11 @ 07:10)  Sodium: 143 mmol/L (04-10 @ 05:50)    Potassium: 4.4 mmol/L (04-11 @ 07:10)  Potassium: 3.3 mmol/L (04-10 @ 05:50)    Hemoglobin: 10.3 g/dL (04-11 @ 07:10)  Hemoglobin: 10.1 g/dL (04-10 @ 05:50)    Creatinine, Serum 1.50 (04-11 @ 07:10)  Creatinine, Serum 1.60 (04-10 @ 05:50)  Creatinine, Serum 2.00 (04-09 @ 08:00)        LIVER FUNCTIONS - ( 11 Apr 2024 07:10 )  Alb: 2.1 g/dL / Pro: 5.8 g/dL / ALK PHOS: 68 U/L / ALT: 44 U/L / AST: 26 U/L / GGT: x           Urinalysis Basic - ( 11 Apr 2024 07:10 )    Color: x / Appearance: x / SG: x / pH: x  Gluc: 119 mg/dL / Ketone: x  / Bili: x / Urobili: x   Blood: x / Protein: x / Nitrite: x   Leuk Esterase: x / RBC: x / WBC x   Sq Epi: x / Non Sq Epi: x / Bacteria: x

## 2024-04-12 ENCOUNTER — APPOINTMENT (OUTPATIENT)
Dept: CARDIOLOGY | Facility: CLINIC | Age: 89
End: 2024-04-12

## 2024-04-12 RX ORDER — ISOSORBIDE DINITRATE 5 MG/1
1 TABLET ORAL
Qty: 30 | Refills: 0
Start: 2024-04-12 | End: 2024-05-11

## 2024-04-19 NOTE — CAREGIVER ENGAGEMENT NOTE - CAREGIVER NAME
Diagnosis:  1.  CNS vasculitis with cognitive changes, gait disturbance: Symptoms suggest no recurrence of active CNS vasculitis, in line with results of September 2023 MRI.  I recommend no change in immunomodulatory regimen, which is mycophenolate and tacrolimus employed for antirejection purposes.  I recommend repeating CNS imaging (MRI) in 9-2024, and follow-up in approximately 8 months to dovetail with follow-up in cardiology.  
Machelle Mccall
Machelle Mccall

## 2024-10-09 NOTE — ED ADULT NURSE NOTE - NS ED NURSE LEVEL OF CONSCIOUSNESS SPEECH
Speaking Coherently

## 2024-11-07 NOTE — DISCHARGE NOTE NURSING/CASE MANAGEMENT/SOCIAL WORK - NSDPACMPNY_GEN_ALL_CORE
The patient's goals for the shift include Pt. will be able to go home today.    The clinical goals for the shift include pt. will james able to have Bowel movement prior to discharge    Over the shift, the patient did not make progress toward the following goals. Barriers to progression include pt. Will go home today with meds to bed.. Recommendations to address these barriers include  pt. Pt. Took Senakot tabs awaiting bowel movement.     Family

## 2024-12-04 NOTE — SWALLOW BEDSIDE ASSESSMENT ADULT - DIET PRIOR TO ADMI
[Time Spent: ___ minutes] : I have spent [unfilled] minutes of time on the encounter which excludes teaching and separately reported services.
Regular solids with thin liquids (with partial denture in) and Soft and bite sized with thin liquids (without partial denture in place)

## 2025-06-14 NOTE — PROGRESS NOTE ADULT - PROBLEM/PLAN-1
Dark colored in bilateral nephrostomy tubes. 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 Increased hematuria on 6/11 in R bag with clots. Per Urology nikia due to friable  tissue.    - IR consulted for displaced site of R nephrostomy but is flushing so just monitor for now per IR team  - Trend CBC BID;   - if H&H drops -> urgent CTA A&P arterial/venous phase eval for bleed and call IR   - Maintain active T&S  - transfuse PRN hgb <7 or for large Hgb drop  - BP regulation (SBP < 140) DISPLAY PLAN FREE TEXT Dark colored in bilateral nephrostomy tubes. 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 Increased hematuria on 6/11 in R bag with clots. Per Urology nikia due to friable  tissue. Confirmed clot in bladder on 6/13 KUB US.    - IR consulted to switch tubes to nephrostomy tubes to reduce bladder irritation and allow bladder bleeding to tamponade off  - Trend CBC BID  - if H&H drops -> urgent CTA A&P arterial/venous phase eval for bleed and call IR   - Maintain active T&S  - transfuse PRN hgb <7 or for large Hgb drop  - BP regulation (SBP < 140)

## 2025-06-25 NOTE — ED PROVIDER NOTE - CARE PROVIDERS DIRECT ADDRESSES
Quality 226: Preventive Care And Screening: Tobacco Use: Screening And Cessation Intervention: Patient screened for tobacco use, is a smoker AND received Cessation Counseling within measurement period or in the six months prior to the measurement period
Detail Level: Detailed
,DirectAddress_Unknown